# Patient Record
Sex: FEMALE | Race: WHITE | Employment: OTHER | URBAN - METROPOLITAN AREA
[De-identification: names, ages, dates, MRNs, and addresses within clinical notes are randomized per-mention and may not be internally consistent; named-entity substitution may affect disease eponyms.]

---

## 2017-01-03 RX ORDER — METHYLPHENIDATE HYDROCHLORIDE 20 MG/1
20 TABLET ORAL DAILY
Qty: 30 TABLET | Refills: 0 | Status: SHIPPED | OUTPATIENT
Start: 2017-01-03 | End: 2017-02-13 | Stop reason: SDUPTHER

## 2017-01-16 RX ORDER — SIMVASTATIN 10 MG
TABLET ORAL
Qty: 90 TABLET | Refills: 3 | Status: SHIPPED | OUTPATIENT
Start: 2017-01-16 | End: 2017-07-26 | Stop reason: SDUPTHER

## 2017-02-13 RX ORDER — METHYLPHENIDATE HYDROCHLORIDE 20 MG/1
20 TABLET ORAL DAILY
Qty: 30 TABLET | Refills: 0 | Status: SHIPPED | OUTPATIENT
Start: 2017-02-13 | End: 2017-02-15 | Stop reason: DRUGHIGH

## 2017-02-14 ENCOUNTER — TELEPHONE (OUTPATIENT)
Dept: FAMILY MEDICINE CLINIC | Age: 67
End: 2017-02-14

## 2017-02-15 RX ORDER — METHYLPHENIDATE HYDROCHLORIDE 10 MG/1
10 TABLET ORAL DAILY
Qty: 30 TABLET | Refills: 0 | Status: SHIPPED | OUTPATIENT
Start: 2017-02-15 | End: 2017-07-05 | Stop reason: SDUPTHER

## 2017-02-24 ENCOUNTER — TELEPHONE (OUTPATIENT)
Dept: FAMILY MEDICINE CLINIC | Age: 67
End: 2017-02-24

## 2017-06-05 ENCOUNTER — TELEPHONE (OUTPATIENT)
Dept: FAMILY MEDICINE CLINIC | Age: 67
End: 2017-06-05

## 2017-06-22 ENCOUNTER — TELEPHONE (OUTPATIENT)
Dept: FAMILY MEDICINE CLINIC | Age: 67
End: 2017-06-22

## 2017-06-22 DIAGNOSIS — I69.398 ABNORMALITY OF GAIT FOLLOWING CEREBROVASCULAR ACCIDENT: ICD-10-CM

## 2017-06-22 DIAGNOSIS — R41.89 COGNITIVE IMPAIRMENT: ICD-10-CM

## 2017-06-22 DIAGNOSIS — R26.9 ABNORMALITY OF GAIT FOLLOWING CEREBROVASCULAR ACCIDENT: ICD-10-CM

## 2017-06-22 DIAGNOSIS — Z86.73 H/O: CVA (CEREBROVASCULAR ACCIDENT): Primary | ICD-10-CM

## 2017-07-04 ENCOUNTER — TELEPHONE (OUTPATIENT)
Dept: FAMILY MEDICINE CLINIC | Age: 67
End: 2017-07-04

## 2017-07-04 DIAGNOSIS — R41.89 COGNITIVE IMPAIRMENT: ICD-10-CM

## 2017-07-04 DIAGNOSIS — Z86.73 H/O: CVA (CEREBROVASCULAR ACCIDENT): Primary | ICD-10-CM

## 2017-07-05 RX ORDER — METHYLPHENIDATE HYDROCHLORIDE 5 MG/1
5 TABLET ORAL DAILY
Qty: 30 TABLET | Refills: 0 | Status: SHIPPED | OUTPATIENT
Start: 2017-07-05 | End: 2017-07-26 | Stop reason: SDUPTHER

## 2017-07-21 ENCOUNTER — HOSPITAL ENCOUNTER (OUTPATIENT)
Dept: OCCUPATIONAL THERAPY | Age: 67
Setting detail: THERAPIES SERIES
Discharge: HOME | End: 2017-07-21
Payer: MEDICARE

## 2017-07-21 DIAGNOSIS — I67.1 NONRUPTURED CEREBRAL ANEURYSM: ICD-10-CM

## 2017-07-21 PROCEDURE — G8992 OTHER PT/OT  D/C STATUS: HCPCS

## 2017-07-21 PROCEDURE — G8990 OTHER PT/OT CURRENT STATUS: HCPCS

## 2017-07-21 PROCEDURE — G8991 OTHER PT/OT GOAL STATUS: HCPCS

## 2017-07-21 PROCEDURE — 97535 SELF CARE MNGMENT TRAINING: CPT

## 2017-07-21 PROCEDURE — 97165 OT EVAL LOW COMPLEX 30 MIN: CPT

## 2017-07-26 ENCOUNTER — OFFICE VISIT (OUTPATIENT)
Dept: FAMILY MEDICINE CLINIC | Age: 67
End: 2017-07-26
Payer: MEDICARE

## 2017-07-26 VITALS
HEIGHT: 64 IN | SYSTOLIC BLOOD PRESSURE: 130 MMHG | WEIGHT: 130.5 LBS | DIASTOLIC BLOOD PRESSURE: 70 MMHG | RESPIRATION RATE: 20 BRPM | BODY MASS INDEX: 22.28 KG/M2 | HEART RATE: 96 BPM

## 2017-07-26 DIAGNOSIS — M85.80 OSTEOPENIA: ICD-10-CM

## 2017-07-26 DIAGNOSIS — E78.2 MIXED HYPERLIPIDEMIA: ICD-10-CM

## 2017-07-26 DIAGNOSIS — N32.81 OAB (OVERACTIVE BLADDER): Primary | ICD-10-CM

## 2017-07-26 DIAGNOSIS — E03.9 HYPOTHYROIDISM, UNSPECIFIED TYPE: ICD-10-CM

## 2017-07-26 DIAGNOSIS — F32.9 REACTIVE DEPRESSION: ICD-10-CM

## 2017-07-26 DIAGNOSIS — R41.89 COGNITIVE IMPAIRMENT: ICD-10-CM

## 2017-07-26 DIAGNOSIS — F41.9 CHRONIC ANXIETY: ICD-10-CM

## 2017-07-26 DIAGNOSIS — Z11.59 NEED FOR HEPATITIS C SCREENING TEST: ICD-10-CM

## 2017-07-26 DIAGNOSIS — Z51.81 MEDICATION MONITORING ENCOUNTER: ICD-10-CM

## 2017-07-26 DIAGNOSIS — Z23 NEED FOR PROPHYLACTIC VACCINATION AGAINST STREPTOCOCCUS PNEUMONIAE (PNEUMOCOCCUS): ICD-10-CM

## 2017-07-26 DIAGNOSIS — Z86.73 H/O: CVA (CEREBROVASCULAR ACCIDENT): ICD-10-CM

## 2017-07-26 PROCEDURE — 1090F PRES/ABSN URINE INCON ASSESS: CPT | Performed by: FAMILY MEDICINE

## 2017-07-26 PROCEDURE — 90670 PCV13 VACCINE IM: CPT | Performed by: FAMILY MEDICINE

## 2017-07-26 PROCEDURE — 4040F PNEUMOC VAC/ADMIN/RCVD: CPT | Performed by: FAMILY MEDICINE

## 2017-07-26 PROCEDURE — 3017F COLORECTAL CA SCREEN DOC REV: CPT | Performed by: FAMILY MEDICINE

## 2017-07-26 PROCEDURE — G8420 CALC BMI NORM PARAMETERS: HCPCS | Performed by: FAMILY MEDICINE

## 2017-07-26 PROCEDURE — G0009 ADMIN PNEUMOCOCCAL VACCINE: HCPCS | Performed by: FAMILY MEDICINE

## 2017-07-26 PROCEDURE — G8400 PT W/DXA NO RESULTS DOC: HCPCS | Performed by: FAMILY MEDICINE

## 2017-07-26 PROCEDURE — 3014F SCREEN MAMMO DOC REV: CPT | Performed by: FAMILY MEDICINE

## 2017-07-26 PROCEDURE — G8427 DOCREV CUR MEDS BY ELIG CLIN: HCPCS | Performed by: FAMILY MEDICINE

## 2017-07-26 PROCEDURE — 99214 OFFICE O/P EST MOD 30 MIN: CPT | Performed by: FAMILY MEDICINE

## 2017-07-26 PROCEDURE — 1036F TOBACCO NON-USER: CPT | Performed by: FAMILY MEDICINE

## 2017-07-26 PROCEDURE — 1123F ACP DISCUSS/DSCN MKR DOCD: CPT | Performed by: FAMILY MEDICINE

## 2017-07-26 RX ORDER — METHYLPHENIDATE HYDROCHLORIDE 5 MG/1
5 TABLET ORAL DAILY
Qty: 30 TABLET | Refills: 0 | Status: SHIPPED | OUTPATIENT
Start: 2017-07-26 | End: 2017-08-31 | Stop reason: SDUPTHER

## 2017-07-26 RX ORDER — SIMVASTATIN 10 MG
TABLET ORAL
Qty: 90 TABLET | Refills: 3 | Status: SHIPPED | OUTPATIENT
Start: 2017-07-26 | End: 2018-03-19 | Stop reason: SDUPTHER

## 2017-07-26 RX ORDER — OXYBUTYNIN CHLORIDE 5 MG/1
5 TABLET ORAL NIGHTLY
Qty: 90 TABLET | Refills: 3 | Status: CANCELLED | OUTPATIENT
Start: 2017-07-26

## 2017-07-26 RX ORDER — LEVOTHYROXINE SODIUM 0.05 MG/1
TABLET ORAL
Qty: 90 TABLET | Refills: 3 | Status: SHIPPED | OUTPATIENT
Start: 2017-07-26 | End: 2017-08-25 | Stop reason: SDUPTHER

## 2017-07-26 RX ORDER — DONEPEZIL HYDROCHLORIDE 10 MG/1
TABLET, FILM COATED ORAL
Qty: 90 TABLET | Refills: 3 | Status: SHIPPED | OUTPATIENT
Start: 2017-07-26 | End: 2018-07-03

## 2017-07-26 RX ORDER — OXYBUTYNIN CHLORIDE 10 MG/1
10 TABLET, EXTENDED RELEASE ORAL DAILY
Qty: 90 TABLET | Refills: 3 | Status: SHIPPED | OUTPATIENT
Start: 2017-07-26 | End: 2017-09-17 | Stop reason: ALTCHOICE

## 2017-07-26 RX ORDER — BUPROPION HYDROCHLORIDE 150 MG/1
TABLET, EXTENDED RELEASE ORAL
Qty: 180 TABLET | Refills: 3 | Status: SHIPPED | OUTPATIENT
Start: 2017-07-26 | End: 2018-07-25 | Stop reason: SDUPTHER

## 2017-07-26 ASSESSMENT — ENCOUNTER SYMPTOMS
GASTROINTESTINAL NEGATIVE: 1
ALLERGIC/IMMUNOLOGIC NEGATIVE: 1
WHEEZING: 0
COUGH: 0
SHORTNESS OF BREATH: 0
RESPIRATORY NEGATIVE: 1

## 2017-07-26 ASSESSMENT — PATIENT HEALTH QUESTIONNAIRE - PHQ9
2. FEELING DOWN, DEPRESSED OR HOPELESS: 1
1. LITTLE INTEREST OR PLEASURE IN DOING THINGS: 1
SUM OF ALL RESPONSES TO PHQ QUESTIONS 1-9: 2
SUM OF ALL RESPONSES TO PHQ9 QUESTIONS 1 & 2: 2

## 2017-08-25 ENCOUNTER — TELEPHONE (OUTPATIENT)
Dept: FAMILY MEDICINE CLINIC | Age: 67
End: 2017-08-25

## 2017-08-25 DIAGNOSIS — E03.9 HYPOTHYROIDISM, UNSPECIFIED TYPE: ICD-10-CM

## 2017-08-25 LAB
CHOLESTEROL, TOTAL: 220 MG/DL
CHOLESTEROL/HDL RATIO: ABNORMAL
HDLC SERPL-MCNC: 110 MG/DL (ref 35–70)
LDL CHOLESTEROL CALCULATED: 94 MG/DL (ref 0–160)
TRIGL SERPL-MCNC: 80 MG/DL
TSH SERPL DL<=0.05 MIU/L-ACNC: 1.25 UIU/ML
VLDLC SERPL CALC-MCNC: 16 MG/DL

## 2017-08-25 RX ORDER — LEVOTHYROXINE SODIUM 0.05 MG/1
TABLET ORAL
Qty: 90 TABLET | Refills: 3 | Status: SHIPPED | OUTPATIENT
Start: 2017-08-25 | End: 2018-07-25 | Stop reason: SDUPTHER

## 2017-08-31 ENCOUNTER — TELEPHONE (OUTPATIENT)
Dept: FAMILY MEDICINE CLINIC | Age: 67
End: 2017-08-31

## 2017-08-31 DIAGNOSIS — R41.89 COGNITIVE IMPAIRMENT: ICD-10-CM

## 2017-08-31 DIAGNOSIS — Z86.73 H/O: CVA (CEREBROVASCULAR ACCIDENT): ICD-10-CM

## 2017-08-31 RX ORDER — METHYLPHENIDATE HYDROCHLORIDE 5 MG/1
5 TABLET ORAL DAILY
Qty: 30 TABLET | Refills: 0 | Status: SHIPPED | OUTPATIENT
Start: 2017-08-31 | End: 2017-10-09 | Stop reason: SDUPTHER

## 2017-09-15 ENCOUNTER — TELEPHONE (OUTPATIENT)
Dept: FAMILY MEDICINE CLINIC | Age: 67
End: 2017-09-15

## 2017-09-17 ENCOUNTER — HOSPITAL ENCOUNTER (EMERGENCY)
Age: 67
Discharge: HOME | End: 2017-09-17
Attending: EMERGENCY MEDICINE
Payer: MEDICARE

## 2017-09-17 VITALS
BODY MASS INDEX: 21.17 KG/M2 | TEMPERATURE: 98.4 F | RESPIRATION RATE: 18 BRPM | HEART RATE: 95 BPM | OXYGEN SATURATION: 100 % | WEIGHT: 124 LBS | HEIGHT: 64 IN | SYSTOLIC BLOOD PRESSURE: 140 MMHG | DIASTOLIC BLOOD PRESSURE: 95 MMHG

## 2017-09-17 DIAGNOSIS — N39.0 URINARY TRACT INFECTION WITHOUT HEMATURIA, SITE UNSPECIFIED: Primary | ICD-10-CM

## 2017-09-17 LAB
BILIRUBIN URINE: NEGATIVE
BLOOD, URINE: ABNORMAL
CHARACTER, URINE: CLEAR
COLOR: YELLOW
GLUCOSE, URINE: NEGATIVE MG/DL
KETONES, URINE: NEGATIVE
LEUKOCYTES, UA: ABNORMAL
NITRATE, UA: NEGATIVE
PH UA: 6 (ref 5–9)
PROTEIN UA: 30 MG/DL
REFLEX TO URINE C & S: ABNORMAL
SPECIFIC GRAVITY UA: 1.02 (ref 1–1.03)
UROBILINOGEN, URINE: 0.2 EU/DL (ref 0–1)

## 2017-09-17 PROCEDURE — 87077 CULTURE AEROBIC IDENTIFY: CPT

## 2017-09-17 PROCEDURE — 99213 OFFICE O/P EST LOW 20 MIN: CPT

## 2017-09-17 PROCEDURE — 81003 URINALYSIS AUTO W/O SCOPE: CPT

## 2017-09-17 PROCEDURE — 87186 SC STD MICRODIL/AGAR DIL: CPT

## 2017-09-17 PROCEDURE — 87086 URINE CULTURE/COLONY COUNT: CPT

## 2017-09-17 PROCEDURE — 87184 SC STD DISK METHOD PER PLATE: CPT

## 2017-09-17 RX ORDER — CIPROFLOXACIN 500 MG/1
500 TABLET, FILM COATED ORAL 2 TIMES DAILY
Qty: 20 TABLET | Refills: 0 | Status: SHIPPED | OUTPATIENT
Start: 2017-09-17 | End: 2017-09-27

## 2017-09-17 RX ORDER — PHENAZOPYRIDINE HYDROCHLORIDE 200 MG/1
200 TABLET, FILM COATED ORAL 3 TIMES DAILY PRN
Qty: 6 TABLET | Refills: 1 | Status: SHIPPED | OUTPATIENT
Start: 2017-09-17 | End: 2017-09-19

## 2017-09-17 ASSESSMENT — ENCOUNTER SYMPTOMS
SORE THROAT: 0
EYE REDNESS: 0
EYE DISCHARGE: 0
EYE PAIN: 0
COUGH: 0
WHEEZING: 0
SHORTNESS OF BREATH: 0
DIARRHEA: 0
VOMITING: 0
NAUSEA: 0
ABDOMINAL PAIN: 0

## 2017-09-19 LAB
ORGANISM: ABNORMAL
URINE CULTURE REFLEX: ABNORMAL

## 2017-10-09 DIAGNOSIS — Z86.73 H/O: CVA (CEREBROVASCULAR ACCIDENT): ICD-10-CM

## 2017-10-09 DIAGNOSIS — R41.89 COGNITIVE IMPAIRMENT: ICD-10-CM

## 2017-10-09 RX ORDER — METHYLPHENIDATE HYDROCHLORIDE 5 MG/1
5 TABLET ORAL DAILY
Qty: 30 TABLET | Refills: 0 | Status: SHIPPED | OUTPATIENT
Start: 2017-10-09 | End: 2017-11-06 | Stop reason: SDUPTHER

## 2017-11-06 DIAGNOSIS — Z86.73 H/O: CVA (CEREBROVASCULAR ACCIDENT): ICD-10-CM

## 2017-11-06 DIAGNOSIS — R41.89 COGNITIVE IMPAIRMENT: ICD-10-CM

## 2017-11-06 RX ORDER — METHYLPHENIDATE HYDROCHLORIDE 5 MG/1
5 TABLET ORAL DAILY
Qty: 30 TABLET | Refills: 0 | Status: SHIPPED | OUTPATIENT
Start: 2017-11-06 | End: 2017-12-07 | Stop reason: SDUPTHER

## 2017-11-06 NOTE — TELEPHONE ENCOUNTER
11/06/2017   Lavon Gamboa called requesting a refill on the following medications:  Requested Prescriptions     Pending Prescriptions Disp Refills    methylphenidate (RITALIN) 5 MG tablet 30 tablet 0     Sig: Take 1 tablet by mouth daily .      Pharmacy verified:  [email or web address]      Date of last visit: 07/26/2017  Date of next visit (if applicable): 3/17/0598        Date of last fill and quantity (to be completed by clinical staff)  Pharmacy name:

## 2017-12-07 DIAGNOSIS — Z86.73 H/O: CVA (CEREBROVASCULAR ACCIDENT): ICD-10-CM

## 2017-12-07 DIAGNOSIS — R41.89 COGNITIVE IMPAIRMENT: ICD-10-CM

## 2017-12-07 RX ORDER — METHYLPHENIDATE HYDROCHLORIDE 5 MG/1
5 TABLET ORAL DAILY
Qty: 30 TABLET | Refills: 0 | Status: SHIPPED | OUTPATIENT
Start: 2017-12-07 | End: 2018-01-04 | Stop reason: SDUPTHER

## 2017-12-07 NOTE — TELEPHONE ENCOUNTER
Prem Joseph called requesting a refill on the following medications:  Requested Prescriptions     Pending Prescriptions Disp Refills    methylphenidate (RITALIN) 5 MG tablet 30 tablet 0     Sig: Take 1 tablet by mouth daily . Pharmacy verified:  [facility]      Date of last visit:   7/26/2017  Date of next visit (if applicable): 4/03/6359

## 2017-12-18 DIAGNOSIS — K21.9 GASTROESOPHAGEAL REFLUX DISEASE WITHOUT ESOPHAGITIS: ICD-10-CM

## 2017-12-18 RX ORDER — PANTOPRAZOLE SODIUM 40 MG/1
TABLET, DELAYED RELEASE ORAL
Qty: 60 TABLET | Refills: 11 | Status: SHIPPED | OUTPATIENT
Start: 2017-12-18 | End: 2018-07-03

## 2018-01-04 DIAGNOSIS — R41.89 COGNITIVE IMPAIRMENT: ICD-10-CM

## 2018-01-04 DIAGNOSIS — Z86.73 H/O: CVA (CEREBROVASCULAR ACCIDENT): ICD-10-CM

## 2018-01-04 RX ORDER — METHYLPHENIDATE HYDROCHLORIDE 5 MG/1
5 TABLET ORAL DAILY
Qty: 30 TABLET | Refills: 0 | Status: SHIPPED | OUTPATIENT
Start: 2018-01-04 | End: 2018-01-24 | Stop reason: SDUPTHER

## 2018-01-04 NOTE — TELEPHONE ENCOUNTER
Chano Mims called requesting a refill on the following medications:  Requested Prescriptions     Pending Prescriptions Disp Refills    methylphenidate (RITALIN) 5 MG tablet 30 tablet 0     Sig: Take 1 tablet by mouth daily for 30 days.      Ph[facility]:  [email or web address]      Date of last visit: 7/26/17  Date of next visit (if applicable): 0/35/8022

## 2018-01-24 ENCOUNTER — OFFICE VISIT (OUTPATIENT)
Dept: FAMILY MEDICINE CLINIC | Age: 68
End: 2018-01-24
Payer: MEDICARE

## 2018-01-24 VITALS
WEIGHT: 127.4 LBS | RESPIRATION RATE: 13 BRPM | BODY MASS INDEX: 21.75 KG/M2 | HEART RATE: 112 BPM | OXYGEN SATURATION: 97 % | HEIGHT: 64 IN | DIASTOLIC BLOOD PRESSURE: 72 MMHG | SYSTOLIC BLOOD PRESSURE: 120 MMHG

## 2018-01-24 DIAGNOSIS — R11.0 CHRONIC NAUSEA: ICD-10-CM

## 2018-01-24 DIAGNOSIS — R41.89 COGNITIVE IMPAIRMENT: Primary | ICD-10-CM

## 2018-01-24 DIAGNOSIS — E03.9 ACQUIRED HYPOTHYROIDISM: ICD-10-CM

## 2018-01-24 DIAGNOSIS — F32.9 REACTIVE DEPRESSION: ICD-10-CM

## 2018-01-24 DIAGNOSIS — Z12.31 ENCOUNTER FOR SCREENING MAMMOGRAM FOR BREAST CANCER: ICD-10-CM

## 2018-01-24 DIAGNOSIS — Z86.73 H/O: CVA (CEREBROVASCULAR ACCIDENT): ICD-10-CM

## 2018-01-24 DIAGNOSIS — Z51.81 MEDICATION MONITORING ENCOUNTER: ICD-10-CM

## 2018-01-24 DIAGNOSIS — L98.491 SUPERFICIAL ULCER OF SKIN (HCC): ICD-10-CM

## 2018-01-24 DIAGNOSIS — F32.5 MAJOR DEPRESSION, SINGLE EPISODE, IN COMPLETE REMISSION (HCC): ICD-10-CM

## 2018-01-24 PROCEDURE — 99214 OFFICE O/P EST MOD 30 MIN: CPT | Performed by: FAMILY MEDICINE

## 2018-01-24 PROCEDURE — 3017F COLORECTAL CA SCREEN DOC REV: CPT | Performed by: FAMILY MEDICINE

## 2018-01-24 PROCEDURE — G8420 CALC BMI NORM PARAMETERS: HCPCS | Performed by: FAMILY MEDICINE

## 2018-01-24 PROCEDURE — 1090F PRES/ABSN URINE INCON ASSESS: CPT | Performed by: FAMILY MEDICINE

## 2018-01-24 PROCEDURE — 1123F ACP DISCUSS/DSCN MKR DOCD: CPT | Performed by: FAMILY MEDICINE

## 2018-01-24 PROCEDURE — G8400 PT W/DXA NO RESULTS DOC: HCPCS | Performed by: FAMILY MEDICINE

## 2018-01-24 PROCEDURE — 1036F TOBACCO NON-USER: CPT | Performed by: FAMILY MEDICINE

## 2018-01-24 PROCEDURE — 4040F PNEUMOC VAC/ADMIN/RCVD: CPT | Performed by: FAMILY MEDICINE

## 2018-01-24 PROCEDURE — G8484 FLU IMMUNIZE NO ADMIN: HCPCS | Performed by: FAMILY MEDICINE

## 2018-01-24 PROCEDURE — 3014F SCREEN MAMMO DOC REV: CPT | Performed by: FAMILY MEDICINE

## 2018-01-24 PROCEDURE — G8427 DOCREV CUR MEDS BY ELIG CLIN: HCPCS | Performed by: FAMILY MEDICINE

## 2018-01-24 RX ORDER — ONDANSETRON 4 MG/1
4 TABLET, FILM COATED ORAL EVERY 6 HOURS PRN
Qty: 120 TABLET | Refills: 2 | Status: SHIPPED | OUTPATIENT
Start: 2018-01-24 | End: 2019-10-27

## 2018-01-24 RX ORDER — METHYLPHENIDATE HYDROCHLORIDE 10 MG/1
10 TABLET ORAL DAILY
Qty: 30 TABLET | Refills: 0 | Status: SHIPPED | OUTPATIENT
Start: 2018-01-24 | End: 2018-02-27 | Stop reason: SDUPTHER

## 2018-01-24 ASSESSMENT — ENCOUNTER SYMPTOMS
CONSTIPATION: 0
NAUSEA: 1
RESPIRATORY NEGATIVE: 1
VOMITING: 0
DIARRHEA: 0

## 2018-01-29 ENCOUNTER — HOSPITAL ENCOUNTER (OUTPATIENT)
Dept: MRI IMAGING | Age: 68
Discharge: HOME | End: 2018-01-29
Payer: MEDICARE

## 2018-01-29 DIAGNOSIS — I67.1 CEREBRAL ANEURYSM: ICD-10-CM

## 2018-01-29 PROCEDURE — 70544 MR ANGIOGRAPHY HEAD W/O DYE: CPT

## 2018-02-07 ENCOUNTER — HOSPITAL ENCOUNTER (OUTPATIENT)
Dept: WOMENS IMAGING | Age: 68
Discharge: HOME | End: 2018-02-07
Payer: MEDICARE

## 2018-02-07 DIAGNOSIS — Z12.31 ENCOUNTER FOR SCREENING MAMMOGRAM FOR BREAST CANCER: ICD-10-CM

## 2018-02-07 PROCEDURE — 77067 SCR MAMMO BI INCL CAD: CPT

## 2018-02-27 DIAGNOSIS — Z86.73 H/O: CVA (CEREBROVASCULAR ACCIDENT): ICD-10-CM

## 2018-02-27 DIAGNOSIS — R41.89 COGNITIVE IMPAIRMENT: ICD-10-CM

## 2018-02-27 RX ORDER — METHYLPHENIDATE HYDROCHLORIDE 10 MG/1
10 TABLET ORAL DAILY
Qty: 30 TABLET | Refills: 0 | Status: SHIPPED | OUTPATIENT
Start: 2018-02-27 | End: 2018-04-02 | Stop reason: SDUPTHER

## 2018-03-19 RX ORDER — SIMVASTATIN 10 MG
TABLET ORAL
Qty: 90 TABLET | Refills: 3 | Status: SHIPPED | OUTPATIENT
Start: 2018-03-19 | End: 2019-02-25 | Stop reason: SDUPTHER

## 2018-03-28 ENCOUNTER — TELEPHONE (OUTPATIENT)
Dept: FAMILY MEDICINE CLINIC | Age: 68
End: 2018-03-28

## 2018-03-28 NOTE — TELEPHONE ENCOUNTER
Patient calling and questioning if she can take OTC Prevagen with all her other medications. Please advise. OK to LVM if patient does not answer.

## 2018-04-02 DIAGNOSIS — Z86.73 H/O: CVA (CEREBROVASCULAR ACCIDENT): ICD-10-CM

## 2018-04-02 DIAGNOSIS — R41.89 COGNITIVE IMPAIRMENT: ICD-10-CM

## 2018-04-02 RX ORDER — METHYLPHENIDATE HYDROCHLORIDE 10 MG/1
10 TABLET ORAL DAILY
Qty: 30 TABLET | Refills: 0 | Status: SHIPPED | OUTPATIENT
Start: 2018-04-02 | End: 2018-05-04 | Stop reason: SDUPTHER

## 2018-05-04 DIAGNOSIS — Z86.73 H/O: CVA (CEREBROVASCULAR ACCIDENT): ICD-10-CM

## 2018-05-04 DIAGNOSIS — R41.89 COGNITIVE IMPAIRMENT: ICD-10-CM

## 2018-05-04 RX ORDER — METHYLPHENIDATE HYDROCHLORIDE 10 MG/1
10 TABLET ORAL DAILY
Qty: 30 TABLET | Refills: 0 | Status: SHIPPED | OUTPATIENT
Start: 2018-05-04 | End: 2018-06-28 | Stop reason: SDUPTHER

## 2018-06-15 ENCOUNTER — TELEPHONE (OUTPATIENT)
Dept: FAMILY MEDICINE CLINIC | Age: 68
End: 2018-06-15

## 2018-06-15 DIAGNOSIS — R41.89 COGNITIVE IMPAIRMENT: Primary | ICD-10-CM

## 2018-06-28 DIAGNOSIS — R41.89 COGNITIVE IMPAIRMENT: ICD-10-CM

## 2018-06-28 DIAGNOSIS — Z86.73 H/O: CVA (CEREBROVASCULAR ACCIDENT): ICD-10-CM

## 2018-06-28 RX ORDER — METHYLPHENIDATE HYDROCHLORIDE 5 MG/1
10 TABLET ORAL DAILY
Qty: 30 TABLET | Refills: 0 | Status: SHIPPED | OUTPATIENT
Start: 2018-06-28 | End: 2018-07-28

## 2018-06-28 RX ORDER — METHYLPHENIDATE HYDROCHLORIDE 10 MG/1
10 TABLET ORAL DAILY
Qty: 30 TABLET | Refills: 0 | Status: SHIPPED | OUTPATIENT
Start: 2018-06-28 | End: 2018-06-28 | Stop reason: SDUPTHER

## 2018-07-03 ENCOUNTER — INITIAL CONSULT (OUTPATIENT)
Dept: NEUROLOGY | Age: 68
End: 2018-07-03
Payer: MEDICARE

## 2018-07-03 VITALS
HEART RATE: 78 BPM | DIASTOLIC BLOOD PRESSURE: 64 MMHG | BODY MASS INDEX: 21.68 KG/M2 | HEIGHT: 64 IN | WEIGHT: 127 LBS | SYSTOLIC BLOOD PRESSURE: 120 MMHG

## 2018-07-03 DIAGNOSIS — I69.30 HISTORY OF STROKE WITH RESIDUAL DEFICIT: Primary | ICD-10-CM

## 2018-07-03 PROCEDURE — 99204 OFFICE O/P NEW MOD 45 MIN: CPT | Performed by: PSYCHIATRY & NEUROLOGY

## 2018-07-03 PROCEDURE — 1090F PRES/ABSN URINE INCON ASSESS: CPT | Performed by: PSYCHIATRY & NEUROLOGY

## 2018-07-03 PROCEDURE — 1123F ACP DISCUSS/DSCN MKR DOCD: CPT | Performed by: PSYCHIATRY & NEUROLOGY

## 2018-07-03 PROCEDURE — G8427 DOCREV CUR MEDS BY ELIG CLIN: HCPCS | Performed by: PSYCHIATRY & NEUROLOGY

## 2018-07-03 PROCEDURE — 1036F TOBACCO NON-USER: CPT | Performed by: PSYCHIATRY & NEUROLOGY

## 2018-07-03 PROCEDURE — G8400 PT W/DXA NO RESULTS DOC: HCPCS | Performed by: PSYCHIATRY & NEUROLOGY

## 2018-07-03 PROCEDURE — 3017F COLORECTAL CA SCREEN DOC REV: CPT | Performed by: PSYCHIATRY & NEUROLOGY

## 2018-07-03 PROCEDURE — G8420 CALC BMI NORM PARAMETERS: HCPCS | Performed by: PSYCHIATRY & NEUROLOGY

## 2018-07-03 PROCEDURE — 4040F PNEUMOC VAC/ADMIN/RCVD: CPT | Performed by: PSYCHIATRY & NEUROLOGY

## 2018-07-03 RX ORDER — OXYBUTYNIN CHLORIDE 10 MG/1
10 TABLET, EXTENDED RELEASE ORAL DAILY
COMMUNITY
End: 2018-07-25 | Stop reason: SDUPTHER

## 2018-07-03 RX ORDER — DONEPEZIL HYDROCHLORIDE 5 MG/1
5 TABLET, FILM COATED ORAL NIGHTLY
Qty: 30 TABLET | Refills: 3 | Status: SHIPPED | OUTPATIENT
Start: 2018-07-03 | End: 2018-07-25 | Stop reason: SINTOL

## 2018-07-03 NOTE — LETTER
[facility] PROFESSIONAL [facility]  [facility]  [address]  Dept: [phone]  Dept Fax: [phone]  Loc: [id number] [facility] [place]        7/10/2018      Patient:  Paz Garcia  MRN:  [id number]  YOB: 1950  Date of Visit:  7/3/2018    Dear Dr. Anika Goldstein,    Thank you for referring Noe Casillas to me for consultation. Please see attached visit summary with my findings. If you have any questions, please do not hesitate to call me.       Sincerely,         Deisy Rojas MD

## 2018-07-03 NOTE — PROGRESS NOTES
Take  by mouth.  Calcium Carbonate-Vitamin D (CALCIUM-VITAMIN D) 600-200 MG-UNIT CAPS Take 2 tablets by mouth daily.  fish oil-omega-3 fatty acids 1000 MG capsule Take 1 g by mouth. One tablet daily along with red yeast rice       No current facility-administered medications for this visit. Social History     Social History    Marital status:      Spouse name: N/A    Number of children: N/A    Years of education: N/A     Social History Main Topics    Smoking status: Former Smoker     Packs/day: 1.00     Years: 40.00     Types: Cigarettes    Smokeless tobacco: Never Used    Alcohol use No    Drug use: No    Sexual activity: Not Asked     Other Topics Concern    None     Social History Narrative    None       Family History   Problem Relation Age of Onset    Cancer Mother         Female Cancer    Stroke Mother         Possible    Diabetes Father     Heart Disease Maternal Aunt     Parkinsonism Maternal Uncle        Review of Systems   Complete review of systems is negative other than what is mentioned in HPI      Vitals:    07/03/18 1338   BP: 120/64   Site: Right Arm   Position: Sitting   Cuff Size: Medium Adult   Pulse: 78   Weight: 127 lb (57.6 kg)   Height: 5' 4\" (1.626 m)       Physical Examination:  General appearance - alert, well appearing, and in no distress, oriented to person, place, and time and normal weight  Mental status- Level of Alertness: awake  Orientation: person, place, time  Memory: normal  Fund of Knowledge: normal  Attention/Concentration: normal  Language: normal. Mood is normal.   Neck - supple, no significant adenopathy, carotids upstroke normal bilaterally,   There is no carotid bruit . No neck lymphadenopathy .  No thyroid enlargement   Neurological -   Cranial [place]-TB-NCW:.   Cranial nerve II: Normal   Cranial nerve III: Pupils: equal, round, reactive to light  Cranial nerves III, IV, VI: Extraocular Movements: intact   Cranial nerve V: Facial The anterior cerebral arteries are normal. The middle cerebral arteries and their proximal branches are normal. In the anterior communicating artery location, there is   low signal consistent with a coil mass. This area appears stable. There is no residual or recurrent aneurysm. Impression  No evidence of a residual or recurrent aneurysm. **This report has been created using voice recognition software. It may contain minor errors which are inherent in voice recognition technology. **    Final report electronically signed by Dr. Lyric Santana on 1/29/2018 3:10 PM       Results for orders placed during the hospital encounter of 05/18/15   CT Head W/O contrast    Narrative PROCEDURE: CT HEAD WO CONTRAST    CLINICAL INFORMATION: hit head this AM, bruising, hx of cva, no recollection of injury        COMPARISON: 1/7/15    TECHNIQUE: Noncontrast head CT. Motion limits evaluation. FINDINGS: There is moderate chronic small vessel ischemic white matter disease change. There are cerebrovascular calcifications. Metallic density at the midline suprasellar cistern region is present. Artifact from this limits evaluation. It is   approximately 7 mm size. Patient has a history of aneurysm with coil placement    No acute abnormality of visible paranasal sinuses and mastoid air cells. No definite acute calvarial fracture. Right frontal region akila hole noted    No definite acute infarct or intracranial hemorrhage. The ventricular system is prominent. It could be residual from the previous hemorrhage. Impression IMPRESSION:   1. No definite acute intracranial hemorrhage or infarct. 2. Mild hydrocephalus. 3. Status post aneurysm coil and akila hole.   4. Moderate appearing chronic small vessel ischemic white matter disease changes    Final report electronically signed by Dr. Johann Taylor on 5/18/2015 12:55 PM       We reviewed the patient records from referring provider and available information in the EHR ASSESSMENT:      Diagnosis Orders   1. History of stroke with residual deficit        This is a 60-year-old female with history of prior hemorrhagic stroke, status post aneurysm coiling in 2015, who presents for establishing care. She experienced some mild cognitive impairment and was placed on Aricept, She is complaining from nausea with the Aricept @10 mg daily. She was recommended evaluation with PMNR and was suggested by Dr Fabio Toledo to see PMNR in [facility] and was suggested to see Dr Julia Molina. Her exam is non focal.  She would benefit from rehab services for help with her post stroke care. I reviewed her MRA of the brain, agree with interpretation. We will change her Aricept to 5 mg daily to help her cope with the nausea that she is experiencing. I will refer her to physical medicine and rehab. The patient was agreeable with the plan as outlined below;        Plan    1. Change Arciept to 5 mg daily. 2. Referral to [facility] for post stroke management. 3. Call with any new symptoms or concerns. 4. Follow up as needed.      Dong Oconnor MD

## 2018-07-25 ENCOUNTER — OFFICE VISIT (OUTPATIENT)
Dept: FAMILY MEDICINE CLINIC | Age: 68
End: 2018-07-25
Payer: MEDICARE

## 2018-07-25 VITALS
HEIGHT: 64 IN | WEIGHT: 126.5 LBS | DIASTOLIC BLOOD PRESSURE: 70 MMHG | HEART RATE: 76 BPM | SYSTOLIC BLOOD PRESSURE: 124 MMHG | RESPIRATION RATE: 12 BRPM | BODY MASS INDEX: 21.6 KG/M2

## 2018-07-25 DIAGNOSIS — F32.9 REACTIVE DEPRESSION: ICD-10-CM

## 2018-07-25 DIAGNOSIS — E03.9 HYPOTHYROIDISM, UNSPECIFIED TYPE: ICD-10-CM

## 2018-07-25 DIAGNOSIS — Z86.73 H/O: CVA (CEREBROVASCULAR ACCIDENT): ICD-10-CM

## 2018-07-25 DIAGNOSIS — N32.81 OAB (OVERACTIVE BLADDER): Primary | ICD-10-CM

## 2018-07-25 DIAGNOSIS — R41.89 COGNITIVE IMPAIRMENT: ICD-10-CM

## 2018-07-25 PROCEDURE — 1036F TOBACCO NON-USER: CPT | Performed by: FAMILY MEDICINE

## 2018-07-25 PROCEDURE — G8427 DOCREV CUR MEDS BY ELIG CLIN: HCPCS | Performed by: FAMILY MEDICINE

## 2018-07-25 PROCEDURE — 1123F ACP DISCUSS/DSCN MKR DOCD: CPT | Performed by: FAMILY MEDICINE

## 2018-07-25 PROCEDURE — 99214 OFFICE O/P EST MOD 30 MIN: CPT | Performed by: FAMILY MEDICINE

## 2018-07-25 PROCEDURE — G8420 CALC BMI NORM PARAMETERS: HCPCS | Performed by: FAMILY MEDICINE

## 2018-07-25 PROCEDURE — 4040F PNEUMOC VAC/ADMIN/RCVD: CPT | Performed by: FAMILY MEDICINE

## 2018-07-25 PROCEDURE — G8400 PT W/DXA NO RESULTS DOC: HCPCS | Performed by: FAMILY MEDICINE

## 2018-07-25 PROCEDURE — 1090F PRES/ABSN URINE INCON ASSESS: CPT | Performed by: FAMILY MEDICINE

## 2018-07-25 PROCEDURE — 1101F PT FALLS ASSESS-DOCD LE1/YR: CPT | Performed by: FAMILY MEDICINE

## 2018-07-25 PROCEDURE — 3017F COLORECTAL CA SCREEN DOC REV: CPT | Performed by: FAMILY MEDICINE

## 2018-07-25 RX ORDER — OXYBUTYNIN CHLORIDE 15 MG/1
15 TABLET, EXTENDED RELEASE ORAL DAILY
Qty: 30 TABLET | Refills: 3 | Status: SHIPPED | OUTPATIENT
Start: 2018-07-25 | End: 2019-06-10 | Stop reason: DRUGHIGH

## 2018-07-25 RX ORDER — RIVASTIGMINE 4.6 MG/24H
1 PATCH, EXTENDED RELEASE TRANSDERMAL DAILY
Qty: 30 PATCH | Refills: 3 | Status: SHIPPED | OUTPATIENT
Start: 2018-07-25 | End: 2018-10-11

## 2018-07-25 RX ORDER — LEVOTHYROXINE SODIUM 0.05 MG/1
TABLET ORAL
Qty: 90 TABLET | Refills: 3 | Status: SHIPPED | OUTPATIENT
Start: 2018-07-25 | End: 2019-09-02 | Stop reason: SDUPTHER

## 2018-07-25 RX ORDER — OXYBUTYNIN CHLORIDE 10 MG/1
10 TABLET, EXTENDED RELEASE ORAL DAILY
Qty: 90 TABLET | Refills: 3 | Status: SHIPPED | OUTPATIENT
Start: 2018-07-25 | End: 2018-07-25 | Stop reason: DRUGHIGH

## 2018-07-25 RX ORDER — BUPROPION HYDROCHLORIDE 150 MG/1
TABLET, EXTENDED RELEASE ORAL
Qty: 180 TABLET | Refills: 3 | Status: SHIPPED | OUTPATIENT
Start: 2018-07-25 | End: 2019-04-11 | Stop reason: ALTCHOICE

## 2018-07-25 ASSESSMENT — ENCOUNTER SYMPTOMS
ALLERGIC/IMMUNOLOGIC NEGATIVE: 1
RESPIRATORY NEGATIVE: 1
EYES NEGATIVE: 1
GASTROINTESTINAL NEGATIVE: 1

## 2018-07-25 NOTE — PATIENT INSTRUCTIONS
You may receive a survey about your visit with us today. The feedback from our patients helps us identify what is working well and where the service to all patients can be enhanced. Thank you! Stop Aricept due to N/V and start Exelon patch for cognitive impairment. Continue present medications.

## 2018-07-25 NOTE — PROGRESS NOTES
Chronic Disease Visit Information    BP Readings from Last 3 Encounters:   07/25/18 124/70   07/03/18 120/64   01/24/18 120/72          Hemoglobin A1C (%)   Date Value   01/30/2016 5.3     LDL Calculated (mg/dL)   Date Value   08/25/2017 94     HDL   Date Value   08/25/2017 110 mg/dL (A)   04/26/2012 110 mg/dl     BUN (mg/dl)   Date Value   05/18/2015 14     CREATININE (mg/dl)   Date Value   05/18/2015 0.6     Glucose (mg/dl)   Date Value   05/18/2015 99            Have you changed or started any medications since your last visit including any over-the-counter medicines, vitamins, or herbal medicines? no   Are you having any side effects from any of your medications? -  no  Have you stopped taking any of your medications? Is so, why? -  no    Have you seen any other physician or provider since your last visit? No  Have you had any other diagnostic tests since your last visit? No  Have you been seen in the emergency room and/or had an admission to a hospital since we last saw you? No  Have you had your annual diabetic retinal (eye) exam? No  Have you had your routine dental cleaning in the past 6 months? yes -  5/2018    Have you activated your [employer] account? If not, what are your barriers?  Yes     Patient Care Team:  Brandi Becker MD as PCP - General (Family Medicine)  Brandi Becker MD as PCP - S Attributed Provider         Medical History Review  Past Medical, Family, and Social History reviewed and does contribute to the patient presenting condition    Health Maintenance   Topic Date Due    DTaP/Tdap/Td vaccine (1 - Tdap) 06/13/1969    Shingles Vaccine (1 of 2 - 2 Dose Series) 06/13/2000    Low dose CT lung screening  06/13/2005    Pneumococcal low/med risk (2 of 2 - PPSV23) 07/26/2018    TSH testing  08/25/2018    Flu vaccine (1) 09/01/2018    Colon cancer screen colonoscopy  06/18/2019    Breast cancer screen  02/07/2020    Lipid screen  08/25/2022    DEXA (modify frequency per FRAX score)

## 2018-07-31 ENCOUNTER — OFFICE VISIT (OUTPATIENT)
Dept: PHYSICAL MEDICINE AND REHAB | Age: 68
End: 2018-07-31
Payer: MEDICARE

## 2018-07-31 VITALS
BODY MASS INDEX: 21.82 KG/M2 | WEIGHT: 127.8 LBS | DIASTOLIC BLOOD PRESSURE: 87 MMHG | SYSTOLIC BLOOD PRESSURE: 134 MMHG | HEIGHT: 64 IN | HEART RATE: 96 BPM

## 2018-07-31 DIAGNOSIS — R41.89 COGNITIVE DEFICITS: ICD-10-CM

## 2018-07-31 DIAGNOSIS — I69.90 LATE EFFECTS OF CVA (CEREBROVASCULAR ACCIDENT): Primary | ICD-10-CM

## 2018-07-31 PROCEDURE — G8400 PT W/DXA NO RESULTS DOC: HCPCS | Performed by: NURSE PRACTITIONER

## 2018-07-31 PROCEDURE — 3017F COLORECTAL CA SCREEN DOC REV: CPT | Performed by: NURSE PRACTITIONER

## 2018-07-31 PROCEDURE — 1090F PRES/ABSN URINE INCON ASSESS: CPT | Performed by: NURSE PRACTITIONER

## 2018-07-31 PROCEDURE — G8427 DOCREV CUR MEDS BY ELIG CLIN: HCPCS | Performed by: NURSE PRACTITIONER

## 2018-07-31 PROCEDURE — 1123F ACP DISCUSS/DSCN MKR DOCD: CPT | Performed by: NURSE PRACTITIONER

## 2018-07-31 PROCEDURE — G8420 CALC BMI NORM PARAMETERS: HCPCS | Performed by: NURSE PRACTITIONER

## 2018-07-31 PROCEDURE — 99214 OFFICE O/P EST MOD 30 MIN: CPT | Performed by: NURSE PRACTITIONER

## 2018-07-31 PROCEDURE — 4040F PNEUMOC VAC/ADMIN/RCVD: CPT | Performed by: NURSE PRACTITIONER

## 2018-07-31 PROCEDURE — 1036F TOBACCO NON-USER: CPT | Performed by: NURSE PRACTITIONER

## 2018-07-31 PROCEDURE — 1101F PT FALLS ASSESS-DOCD LE1/YR: CPT | Performed by: NURSE PRACTITIONER

## 2018-07-31 RX ORDER — ATOMOXETINE 18 MG/1
18 CAPSULE ORAL DAILY
Qty: 30 CAPSULE | Refills: 2 | Status: SHIPPED | OUTPATIENT
Start: 2018-07-31 | End: 2018-09-11 | Stop reason: SDUPTHER

## 2018-07-31 RX ORDER — METHYLPHENIDATE HYDROCHLORIDE 10 MG/1
10 TABLET ORAL DAILY
COMMUNITY
End: 2018-07-31

## 2018-07-31 RX ORDER — DONEPEZIL HYDROCHLORIDE 5 MG/1
5 TABLET, FILM COATED ORAL NIGHTLY
COMMUNITY
End: 2019-04-11

## 2018-07-31 NOTE — PROGRESS NOTES
Physical Medicine & Rehabilitation   Outpatient Consult Note       Chief Complaint:    Chief Complaint   Patient presents with    Consultation     Stroke        Referring Provider: Jack Elias MD    History of Present Illness:  I had the opportunity to evaluate Amy Callaway today in my office. As you know, she is a 76 y.o. female who presents for evaluation after stroke. She suffered from a subarachnoid hemorrhage in January 2015 and completed inpatient rehabilitation at [facility]. Has been following with neurology and PM&R at [facility], and she wants to establish locally. She shares that her residual deficits from the stroke were an off balance feeling, cognitive deficits, as well as nocturnal urinary incontinence. She has issues was concentration, impulsivity, and short term memory. She is currently on ditropan which she feels helps. Is also taking Aricept and Ritalin. She shares that the Aricept has helped with her memory issues, however it makes her nauseous. Her PCP tried to switch her to Exelon, but it was too expensive and she never filled the prescription. Dr. Lon Lizama decreased the dose to 5 mg, and she is no longer nauseous, however she does not feel like it is helping with her memory as much. She was also previously on Strattera which she greatly benefited from, however it became too costly as well, and she was switched to Ritalin. She had a driving evaluation completed while following with [facility] which she has passed and is now back to independently driving. Overall she feels that her balance has greatly improved since her SAH. She denies any recent falls, states she hasn't had any since right after her stroke. She can lose balance at times, but is able to catch herself before falling. Her biggest frustration at this point is her memory and cognition. She would like to try Strattera again.      Past Medical and Surgical History:    The patient has a past medical history of Aneurysm, identified. Within the right external capsule and putamen region there is a small 5 mm focal area of  enhancement (series 9 image 82) which demonstrates mild surrounding edema on the T2/FLAIR sequences. No associated diffusion is visualized with this small area of enhancement. There is increased T2/FLAIR signal and marked intrinsic T1 hyperintense material consistent with patient's known subarachnoid hemorrhage within the left sylvian fissure, adjacent to the interhemispheric  falx, and within multiple sulci in the left frontal and parietal lobes. These same areas also demonstrate decreased signal on the susceptibility weighted images. There are a few additional scattered foci of punctate microhemorrhages  in the right frontal and parietal lobes in a lobar distribution. Increased FLAIR signal mild enhancement is also seen around the prior right frontal ventriculostomy catheter tract. There is also artifact associated with the aneurysm clip in the region of the anterior communicating artery. The ventricles remain enlarged disproportionate to the size of the sulci,  unchanged compared to the prior studies. Impression:  · History of subarachnoid hemorrhage (1/2015)  · Cognitive deficits due to CVA  · Ataxic gait    Plan:  · Stop Ritalin  · Start Strattera 18 mg daily- will see if insurance approves and how much it costs patient  · Continue Aricept, can try taking 10 mg in divided doses with meals to see if this helps with nausea and continues to help with memory  · Continue oxybutynin  · Patient gait stable at this time, she declines any additional therapy    Return in about 6 weeks (around 9/11/2018). It was my pleasure to evaluate Renetta Price today. Please call with any concerns or questions.   40 minutes spent in evaluation efforts    SARITHA Valentin - CNP

## 2018-09-01 ENCOUNTER — HOSPITAL ENCOUNTER (EMERGENCY)
Age: 68
Discharge: HOME | End: 2018-09-01
Payer: MEDICARE

## 2018-09-01 VITALS
HEART RATE: 90 BPM | BODY MASS INDEX: 21.45 KG/M2 | OXYGEN SATURATION: 96 % | RESPIRATION RATE: 18 BRPM | WEIGHT: 125 LBS | TEMPERATURE: 97.6 F | DIASTOLIC BLOOD PRESSURE: 75 MMHG | SYSTOLIC BLOOD PRESSURE: 134 MMHG

## 2018-09-01 DIAGNOSIS — N30.01 ACUTE CYSTITIS WITH HEMATURIA: Primary | ICD-10-CM

## 2018-09-01 LAB
BILIRUBIN URINE: NEGATIVE
BLOOD, URINE: ABNORMAL
CHARACTER, URINE: ABNORMAL
COLOR: YELLOW
GLUCOSE, URINE: NEGATIVE MG/DL
KETONES, URINE: NEGATIVE
LEUKOCYTES, UA: ABNORMAL
NITRATE, UA: NEGATIVE
PH UA: 6 (ref 5–9)
PROTEIN UA: NEGATIVE MG/DL
REFLEX TO URINE C & S: ABNORMAL
SPECIFIC GRAVITY UA: 1.01 (ref 1–1.03)
UROBILINOGEN, URINE: 0.2 EU/DL (ref 0–1)

## 2018-09-01 PROCEDURE — 99213 OFFICE O/P EST LOW 20 MIN: CPT

## 2018-09-01 PROCEDURE — 87184 SC STD DISK METHOD PER PLATE: CPT

## 2018-09-01 PROCEDURE — 87077 CULTURE AEROBIC IDENTIFY: CPT

## 2018-09-01 PROCEDURE — 87186 SC STD MICRODIL/AGAR DIL: CPT

## 2018-09-01 PROCEDURE — 87086 URINE CULTURE/COLONY COUNT: CPT

## 2018-09-01 PROCEDURE — 81003 URINALYSIS AUTO W/O SCOPE: CPT

## 2018-09-01 PROCEDURE — 99213 OFFICE O/P EST LOW 20 MIN: CPT | Performed by: NURSE PRACTITIONER

## 2018-09-01 RX ORDER — CIPROFLOXACIN 250 MG/1
250 TABLET, FILM COATED ORAL 2 TIMES DAILY
Qty: 14 TABLET | Refills: 0 | Status: SHIPPED | OUTPATIENT
Start: 2018-09-01 | End: 2018-09-08

## 2018-09-01 RX ORDER — CIPROFLOXACIN 250 MG/1
250 TABLET, FILM COATED ORAL 2 TIMES DAILY
Qty: 14 TABLET | Refills: 0 | Status: SHIPPED | OUTPATIENT
Start: 2018-09-01 | End: 2018-09-01 | Stop reason: SDUPTHER

## 2018-09-01 ASSESSMENT — ENCOUNTER SYMPTOMS
BACK PAIN: 0
ABDOMINAL PAIN: 0
NAUSEA: 0
VOMITING: 0

## 2018-09-01 ASSESSMENT — PAIN SCALES - GENERAL: PAINLEVEL_OUTOF10: 1

## 2018-09-01 ASSESSMENT — PAIN DESCRIPTION - PAIN TYPE: TYPE: ACUTE PAIN

## 2018-09-01 NOTE — ED PROVIDER NOTES
MEDICATIONS       Previous Medications    ATOMOXETINE (STRATTERA) 18 MG CAPSULE    Take 1 capsule by mouth daily    BUPROPION (WELLBUTRIN SR) 150 MG EXTENDED RELEASE TABLET    TAKE 1 TABLET TWICE A DAY    CALCIUM CARBONATE-VITAMIN D (CALCIUM-VITAMIN D) 600-200 MG-UNIT CAPS    Take 2 tablets by mouth daily. DONEPEZIL (ARICEPT) 5 MG TABLET    Take 5 mg by mouth nightly    FISH OIL-OMEGA-3 FATTY ACIDS 1000 MG CAPSULE    Take 1 g by mouth. One tablet daily along with red yeast rice    GLUCOSAMINE-CHONDROIT-VIT C-MN (GLUCOSAMINE CHONDR 500 COMPLEX) CAPS    Take 2 capsules by mouth daily. LEVOTHYROXINE (SYNTHROID) 50 MCG TABLET    TAKE 1 TABLET DAILY    MULTIPLE VITAMIN (MULTI-VITAMIN PO)    Take  by mouth. ONDANSETRON (ZOFRAN) 4 MG TABLET    Take 1 tablet by mouth every 6 hours as needed for Nausea or Vomiting    OXYBUTYNIN (DITROPAN XL) 15 MG EXTENDED RELEASE TABLET    Take 1 tablet by mouth daily    RIVASTIGMINE (EXELON) 4.6 MG/24HR    Place 1 patch onto the skin daily    SIMVASTATIN (ZOCOR) 10 MG TABLET    TAKE ONE TABLET BY MOUTH NIGHTLY       ALLERGIES     Patient is is allergic to sulfa antibiotics. Patients   Immunization History   Administered Date(s) Administered    Influenza, High Dose (Fluzone 65 yrs and older) 12/06/2017    Pneumococcal 13-valent Conjugate (Crater Limbo) 07/26/2017       FAMILY HISTORY     Patient's family history includes Cancer in her mother; Diabetes in her father; Heart Disease in her maternal aunt; Parkinsonism in her maternal uncle; Stroke in her mother. SOCIAL HISTORY     Patient  reports that she quit smoking about 3 years ago. Her smoking use included Cigarettes. She has a 40.00 pack-year smoking history. She has never used smokeless tobacco. She reports that she does not drink alcohol or use drugs.     PHYSICAL EXAM     ED TRIAGE VITALS  BP: 134/75, Temp: 97.6 °F (36.4 °C), Pulse: 90, Resp: 18, SpO2: 96 %,Estimated body mass index is 21.45 kg/m² as calculated from the development of high fever, chills, persistent vomiting, development of increasing back or flank pain or increase in hematuria the patient is advised to go to the emergency department for reevaluation and further follow-up if they wouldn't notice any of the above symptoms. The patient or Patient designated representative was also advised to follow up with family doctor or primary care provider after the antibiotic is completed for repeat urinalysis. The patient did verbalize understanding of discharge instructions and is agreeable to the treatment plan. The patient left ambulatory without any changes or concerns in stable condition.       PATIENT REFERRED TO:  Giselle Huitron MD  [facility] [address] [address]      DISCHARGE MEDICATIONS:  New Prescriptions    CIPROFLOXACIN (CIPRO) 250 MG TABLET    Take 1 tablet by mouth 2 times daily for 7 days       Discontinued Medications    No medications on file       Current Discharge Medication List          [address] [facility]    (Please note that portions of this note were completed with a voice recognition program.  Efforts were made to edit the dictations but occasionally words are mis-transcribed.)           SARITHA Carter - NOHEMY  09/01/18 [id number]

## 2018-09-01 NOTE — ED TRIAGE NOTES
Pt walked to room 4. Pt here with complaints of a bladder infection. Pt has urgency and burning. Started this morning.

## 2018-09-03 LAB
ORGANISM: ABNORMAL
URINE CULTURE REFLEX: ABNORMAL

## 2018-09-11 ENCOUNTER — OFFICE VISIT (OUTPATIENT)
Dept: PHYSICAL MEDICINE AND REHAB | Age: 68
End: 2018-09-11
Payer: MEDICARE

## 2018-09-11 VITALS
DIASTOLIC BLOOD PRESSURE: 81 MMHG | HEIGHT: 64 IN | WEIGHT: 126 LBS | SYSTOLIC BLOOD PRESSURE: 126 MMHG | HEART RATE: 100 BPM | BODY MASS INDEX: 21.51 KG/M2

## 2018-09-11 DIAGNOSIS — R41.89 COGNITIVE DEFICITS: ICD-10-CM

## 2018-09-11 DIAGNOSIS — I69.90 LATE EFFECTS OF CVA (CEREBROVASCULAR ACCIDENT): Primary | ICD-10-CM

## 2018-09-11 PROCEDURE — 1123F ACP DISCUSS/DSCN MKR DOCD: CPT | Performed by: NURSE PRACTITIONER

## 2018-09-11 PROCEDURE — G8400 PT W/DXA NO RESULTS DOC: HCPCS | Performed by: NURSE PRACTITIONER

## 2018-09-11 PROCEDURE — G8420 CALC BMI NORM PARAMETERS: HCPCS | Performed by: NURSE PRACTITIONER

## 2018-09-11 PROCEDURE — 3017F COLORECTAL CA SCREEN DOC REV: CPT | Performed by: NURSE PRACTITIONER

## 2018-09-11 PROCEDURE — G8427 DOCREV CUR MEDS BY ELIG CLIN: HCPCS | Performed by: NURSE PRACTITIONER

## 2018-09-11 PROCEDURE — 1036F TOBACCO NON-USER: CPT | Performed by: NURSE PRACTITIONER

## 2018-09-11 PROCEDURE — 1090F PRES/ABSN URINE INCON ASSESS: CPT | Performed by: NURSE PRACTITIONER

## 2018-09-11 PROCEDURE — 1101F PT FALLS ASSESS-DOCD LE1/YR: CPT | Performed by: NURSE PRACTITIONER

## 2018-09-11 PROCEDURE — 4040F PNEUMOC VAC/ADMIN/RCVD: CPT | Performed by: NURSE PRACTITIONER

## 2018-09-11 PROCEDURE — 99213 OFFICE O/P EST LOW 20 MIN: CPT | Performed by: NURSE PRACTITIONER

## 2018-09-11 RX ORDER — ATOMOXETINE 18 MG/1
18 CAPSULE ORAL DAILY
Qty: 30 CAPSULE | Refills: 2 | Status: SHIPPED | OUTPATIENT
Start: 2018-09-11 | End: 2018-12-10 | Stop reason: SDUPTHER

## 2018-09-20 ENCOUNTER — TELEPHONE (OUTPATIENT)
Dept: FAMILY MEDICINE CLINIC | Age: 68
End: 2018-09-20

## 2018-09-20 DIAGNOSIS — N39.42 URINARY INCONTINENCE WITHOUT SENSORY AWARENESS: Primary | ICD-10-CM

## 2018-10-11 DIAGNOSIS — Z86.73 H/O: CVA (CEREBROVASCULAR ACCIDENT): ICD-10-CM

## 2018-10-11 DIAGNOSIS — R41.89 COGNITIVE IMPAIRMENT: ICD-10-CM

## 2018-10-11 RX ORDER — DONEPEZIL HYDROCHLORIDE 10 MG/1
TABLET, FILM COATED ORAL
Qty: 90 TABLET | Refills: 3 | Status: SHIPPED | OUTPATIENT
Start: 2018-10-11 | End: 2019-10-22 | Stop reason: SDUPTHER

## 2018-10-11 NOTE — TELEPHONE ENCOUNTER
Per Dr. Cash Barroso we are to check with pt to see if she is on both Aricept and Exelon patch. They are in the same family of medication. Attempted to contact pt, left vm.

## 2018-11-07 ENCOUNTER — TELEPHONE (OUTPATIENT)
Dept: FAMILY MEDICINE CLINIC | Age: 68
End: 2018-11-07

## 2018-12-10 ENCOUNTER — OFFICE VISIT (OUTPATIENT)
Dept: PHYSICAL MEDICINE AND REHAB | Age: 68
End: 2018-12-10
Payer: MEDICARE

## 2018-12-10 VITALS
HEART RATE: 102 BPM | BODY MASS INDEX: 21.34 KG/M2 | DIASTOLIC BLOOD PRESSURE: 85 MMHG | WEIGHT: 125 LBS | SYSTOLIC BLOOD PRESSURE: 135 MMHG | HEIGHT: 64 IN

## 2018-12-10 DIAGNOSIS — R41.89 COGNITIVE DEFICITS: ICD-10-CM

## 2018-12-10 DIAGNOSIS — I69.90 LATE EFFECTS OF CVA (CEREBROVASCULAR ACCIDENT): Primary | ICD-10-CM

## 2018-12-10 PROCEDURE — 4040F PNEUMOC VAC/ADMIN/RCVD: CPT | Performed by: NURSE PRACTITIONER

## 2018-12-10 PROCEDURE — G8400 PT W/DXA NO RESULTS DOC: HCPCS | Performed by: NURSE PRACTITIONER

## 2018-12-10 PROCEDURE — G8427 DOCREV CUR MEDS BY ELIG CLIN: HCPCS | Performed by: NURSE PRACTITIONER

## 2018-12-10 PROCEDURE — 1036F TOBACCO NON-USER: CPT | Performed by: NURSE PRACTITIONER

## 2018-12-10 PROCEDURE — G8482 FLU IMMUNIZE ORDER/ADMIN: HCPCS | Performed by: NURSE PRACTITIONER

## 2018-12-10 PROCEDURE — G8420 CALC BMI NORM PARAMETERS: HCPCS | Performed by: NURSE PRACTITIONER

## 2018-12-10 PROCEDURE — 1101F PT FALLS ASSESS-DOCD LE1/YR: CPT | Performed by: NURSE PRACTITIONER

## 2018-12-10 PROCEDURE — 1123F ACP DISCUSS/DSCN MKR DOCD: CPT | Performed by: NURSE PRACTITIONER

## 2018-12-10 PROCEDURE — 3017F COLORECTAL CA SCREEN DOC REV: CPT | Performed by: NURSE PRACTITIONER

## 2018-12-10 PROCEDURE — 1090F PRES/ABSN URINE INCON ASSESS: CPT | Performed by: NURSE PRACTITIONER

## 2018-12-10 PROCEDURE — 99213 OFFICE O/P EST LOW 20 MIN: CPT | Performed by: NURSE PRACTITIONER

## 2018-12-10 RX ORDER — ATOMOXETINE 25 MG/1
25 CAPSULE ORAL DAILY
Qty: 30 CAPSULE | Refills: 2 | Status: SHIPPED | OUTPATIENT
Start: 2018-12-10 | End: 2019-03-11 | Stop reason: SDUPTHER

## 2018-12-10 NOTE — PROGRESS NOTES
[address] [facility]  Outpatient progress note    Chief Complaint:   Chief Complaint   Patient presents with    3 Month Follow-Up     Late effects of CVA         Subjective: Emilia Solis is a 76 y.o. female who returns to the office today for further follow up. Patient states she is doing well on the Strattera, however she is wondering if her cognition could be better on an increased dose. Still notices some issues with attention and concentration. Is wondering if she can go up on dose of Strattera. Otherwise no new issues. Is staying busy with the upcoming holidays. Review of Systems:  CONSTITUTIONAL:  negative  EYES:  negative  HEENT:  negative  RESPIRATORY:  negative  CARDIOVASCULAR:  negative  GASTROINTESTINAL:  negative  GENITOURINARY:  negative  SKIN:  negative  HEMATOLOGIC/LYMPHATIC:  negative  MUSCULOSKELETAL:  negative  NEUROLOGICAL:  positive for memory problems  BEHAVIOR/PSYCH:  positive for anxiety  All other review of systems otherwise negative    Physical Exam:  /85   Pulse 102   Ht 5' 4\" (1.626 m)   Wt 125 lb (56.7 kg)   LMP  (Exact Date)   BMI 21.46 kg/m²     Orientation:   person, place, time  Mood: euthymic  Affect: anxious  General appearance: Patient is well nourished, well developed, well groomed and in no acute distress, appearing stated age, anxious     Memory:   abnormal - deficits noted,   Attention/Concentration: abnormal - poor  Language:  Normal, but very fast paced     Cranial Nerves:  cranial nerves II-XII are grossly intact  ROM:  normal  Motor Exam:  Motor exam is symmetrical 5 out of 5 all extremities bilaterally  Tone:  normal  Muscle bulk: within normal limits  Sensory:  Sensory intact  Coordination:   normal  Deep Tendon Reflexes:  Reflexes are intact and symmetrical bilaterally  Gait: steady.  No AD used.      Skin: warm and dry, no rash or erythema  Peripheral vascular: Pulses: Normal upper

## 2019-01-08 DIAGNOSIS — K21.9 GASTROESOPHAGEAL REFLUX DISEASE WITHOUT ESOPHAGITIS: ICD-10-CM

## 2019-01-09 RX ORDER — PANTOPRAZOLE SODIUM 40 MG/1
TABLET, DELAYED RELEASE ORAL
Qty: 60 TABLET | Refills: 11 | Status: SHIPPED | OUTPATIENT
Start: 2019-01-09 | End: 2019-04-11

## 2019-02-14 ENCOUNTER — HOSPITAL ENCOUNTER (OUTPATIENT)
Dept: MRI IMAGING | Age: 69
Discharge: HOME | End: 2019-02-14
Payer: MEDICARE

## 2019-02-14 ENCOUNTER — HOSPITAL ENCOUNTER (OUTPATIENT)
Dept: WOMENS IMAGING | Age: 69
Discharge: HOME | End: 2019-02-14
Payer: MEDICARE

## 2019-02-14 DIAGNOSIS — I67.1 CEREBRAL ANEURYSM, NONRUPTURED: ICD-10-CM

## 2019-02-14 DIAGNOSIS — Z12.31 VISIT FOR SCREENING MAMMOGRAM: ICD-10-CM

## 2019-02-14 PROCEDURE — 77063 BREAST TOMOSYNTHESIS BI: CPT

## 2019-02-14 PROCEDURE — 70544 MR ANGIOGRAPHY HEAD W/O DYE: CPT

## 2019-02-25 RX ORDER — SIMVASTATIN 10 MG
TABLET ORAL
Qty: 90 TABLET | Refills: 3 | Status: SHIPPED | OUTPATIENT
Start: 2019-02-25 | End: 2020-03-16

## 2019-03-11 ENCOUNTER — OFFICE VISIT (OUTPATIENT)
Dept: PHYSICAL MEDICINE AND REHAB | Age: 69
End: 2019-03-11
Payer: MEDICARE

## 2019-03-11 VITALS
BODY MASS INDEX: 21.34 KG/M2 | SYSTOLIC BLOOD PRESSURE: 119 MMHG | WEIGHT: 125 LBS | HEART RATE: 102 BPM | HEIGHT: 64 IN | DIASTOLIC BLOOD PRESSURE: 80 MMHG

## 2019-03-11 DIAGNOSIS — R41.89 COGNITIVE DEFICITS: ICD-10-CM

## 2019-03-11 DIAGNOSIS — I69.90 LATE EFFECTS OF CVA (CEREBROVASCULAR ACCIDENT): Primary | ICD-10-CM

## 2019-03-11 PROCEDURE — G8420 CALC BMI NORM PARAMETERS: HCPCS | Performed by: NURSE PRACTITIONER

## 2019-03-11 PROCEDURE — 1090F PRES/ABSN URINE INCON ASSESS: CPT | Performed by: NURSE PRACTITIONER

## 2019-03-11 PROCEDURE — 99213 OFFICE O/P EST LOW 20 MIN: CPT | Performed by: NURSE PRACTITIONER

## 2019-03-11 PROCEDURE — 1036F TOBACCO NON-USER: CPT | Performed by: NURSE PRACTITIONER

## 2019-03-11 PROCEDURE — 3017F COLORECTAL CA SCREEN DOC REV: CPT | Performed by: NURSE PRACTITIONER

## 2019-03-11 PROCEDURE — 4040F PNEUMOC VAC/ADMIN/RCVD: CPT | Performed by: NURSE PRACTITIONER

## 2019-03-11 PROCEDURE — G8482 FLU IMMUNIZE ORDER/ADMIN: HCPCS | Performed by: NURSE PRACTITIONER

## 2019-03-11 PROCEDURE — G8427 DOCREV CUR MEDS BY ELIG CLIN: HCPCS | Performed by: NURSE PRACTITIONER

## 2019-03-11 PROCEDURE — 1101F PT FALLS ASSESS-DOCD LE1/YR: CPT | Performed by: NURSE PRACTITIONER

## 2019-03-11 PROCEDURE — 1123F ACP DISCUSS/DSCN MKR DOCD: CPT | Performed by: NURSE PRACTITIONER

## 2019-03-11 PROCEDURE — G8400 PT W/DXA NO RESULTS DOC: HCPCS | Performed by: NURSE PRACTITIONER

## 2019-03-11 RX ORDER — ATOMOXETINE 40 MG/1
40 CAPSULE ORAL DAILY
Qty: 30 CAPSULE | Refills: 2 | Status: SHIPPED | OUTPATIENT
Start: 2019-03-11 | End: 2019-06-24 | Stop reason: SDUPTHER

## 2019-04-04 ENCOUNTER — TELEPHONE (OUTPATIENT)
Dept: FAMILY MEDICINE CLINIC | Age: 69
End: 2019-04-04

## 2019-04-04 NOTE — TELEPHONE ENCOUNTER
I would start using Aveeno hand lotion morning and evening especially after bathing or washing her hands on a regular basis until she is seen.

## 2019-04-11 ENCOUNTER — TELEPHONE (OUTPATIENT)
Dept: FAMILY MEDICINE CLINIC | Age: 69
End: 2019-04-11

## 2019-04-11 ENCOUNTER — OFFICE VISIT (OUTPATIENT)
Dept: FAMILY MEDICINE CLINIC | Age: 69
End: 2019-04-11
Payer: MEDICARE

## 2019-04-11 VITALS
HEART RATE: 104 BPM | DIASTOLIC BLOOD PRESSURE: 82 MMHG | RESPIRATION RATE: 16 BRPM | SYSTOLIC BLOOD PRESSURE: 120 MMHG | BODY MASS INDEX: 20.45 KG/M2 | WEIGHT: 119.2 LBS

## 2019-04-11 DIAGNOSIS — R73.01 IFG (IMPAIRED FASTING GLUCOSE): ICD-10-CM

## 2019-04-11 DIAGNOSIS — N32.81 OAB (OVERACTIVE BLADDER): ICD-10-CM

## 2019-04-11 DIAGNOSIS — F32.9 REACTIVE DEPRESSION: ICD-10-CM

## 2019-04-11 DIAGNOSIS — B35.2 TINEA MANUS: Primary | ICD-10-CM

## 2019-04-11 DIAGNOSIS — E03.9 HYPOTHYROIDISM, UNSPECIFIED TYPE: ICD-10-CM

## 2019-04-11 DIAGNOSIS — Z86.73 H/O: CVA (CEREBROVASCULAR ACCIDENT): ICD-10-CM

## 2019-04-11 DIAGNOSIS — F32.5 MAJOR DEPRESSIVE DISORDER, SINGLE EPISODE, IN FULL REMISSION (HCC): ICD-10-CM

## 2019-04-11 DIAGNOSIS — E78.2 MIXED HYPERLIPIDEMIA: ICD-10-CM

## 2019-04-11 PROCEDURE — 1036F TOBACCO NON-USER: CPT | Performed by: NURSE PRACTITIONER

## 2019-04-11 PROCEDURE — G8427 DOCREV CUR MEDS BY ELIG CLIN: HCPCS | Performed by: NURSE PRACTITIONER

## 2019-04-11 PROCEDURE — G8400 PT W/DXA NO RESULTS DOC: HCPCS | Performed by: NURSE PRACTITIONER

## 2019-04-11 PROCEDURE — 4040F PNEUMOC VAC/ADMIN/RCVD: CPT | Performed by: NURSE PRACTITIONER

## 2019-04-11 PROCEDURE — 3017F COLORECTAL CA SCREEN DOC REV: CPT | Performed by: NURSE PRACTITIONER

## 2019-04-11 PROCEDURE — 99214 OFFICE O/P EST MOD 30 MIN: CPT | Performed by: NURSE PRACTITIONER

## 2019-04-11 PROCEDURE — 1090F PRES/ABSN URINE INCON ASSESS: CPT | Performed by: NURSE PRACTITIONER

## 2019-04-11 PROCEDURE — 1123F ACP DISCUSS/DSCN MKR DOCD: CPT | Performed by: NURSE PRACTITIONER

## 2019-04-11 PROCEDURE — G8420 CALC BMI NORM PARAMETERS: HCPCS | Performed by: NURSE PRACTITIONER

## 2019-04-11 RX ORDER — TURMERIC ROOT EXTRACT 500 MG
1 TABLET ORAL DAILY
COMMUNITY
End: 2019-10-27

## 2019-04-11 RX ORDER — CLOTRIMAZOLE AND BETAMETHASONE DIPROPIONATE 10; .64 MG/G; MG/G
CREAM TOPICAL
Qty: 90 G | Refills: 1 | Status: SHIPPED | OUTPATIENT
Start: 2019-04-11 | End: 2020-10-21

## 2019-04-11 RX ORDER — DULOXETIN HYDROCHLORIDE 30 MG/1
30 CAPSULE, DELAYED RELEASE ORAL DAILY
Qty: 30 CAPSULE | Refills: 1 | Status: ON HOLD | OUTPATIENT
Start: 2019-04-11 | End: 2019-04-26

## 2019-04-11 ASSESSMENT — ENCOUNTER SYMPTOMS
COUGH: 0
SHORTNESS OF BREATH: 0
ABDOMINAL PAIN: 0
NAUSEA: 0

## 2019-04-11 NOTE — TELEPHONE ENCOUNTER
Patient was just seen today by Lis Christianson for possible fungus on her hands and feet. She forgot to ask if it may be contagious, because she does have an infant grandson. Please call her to advise, and please leave her a detailed msg if she can not answer.

## 2019-04-11 NOTE — PROGRESS NOTES
Visit Information    Have you changed or started any medications since your last visit including any over-the-counter medicines, vitamins, or herbal medicines? yes - see updated med list   Are you having any side effects from any of your medications? -  no  Have you stopped taking any of your medications? Is so, why? -  yes - see updated med list    Have you seen any other physician or provider since your last visit? No  Have you had any other diagnostic tests since your last visit? No  Have you been seen in the emergency room and/or had an admission to a hospital since we last saw you? No  Have you had your routine dental cleaning in the past 6 months? n/a    Have you activated your [employer] account? If not, what are your barriers?  Yes     Patient Care Team:  Remington Renteria MD as PCP - General (Family Medicine)  Remington Renteria MD as PCP - [facility] Attributed Provider    Medical History Review  Past Medical, Family, and Social History reviewed and does not contribute to the patient presenting condition    Health Maintenance   Topic Date Due    DTaP/Tdap/Td vaccine (1 - Tdap) 06/13/1969    Shingles Vaccine (1 of 2) 06/13/2000    Low dose CT lung screening  06/13/2005    Pneumococcal 65+ years Vaccine (2 of 2 - PPSV23) 07/26/2018    TSH testing  08/25/2018    Colon cancer screen colonoscopy  06/18/2019    Breast cancer screen  02/14/2021    Lipid screen  08/25/2022    Flu vaccine  Completed    Hepatitis C screen  Completed    DEXA (modify frequency per FRAX score)  Addressed

## 2019-04-11 NOTE — PROGRESS NOTES
Subjective:      Patient ID: Porsha Whitten is a 76 y.o. female. HPI: Acute for rash    Chief Complaint   Patient presents with    Other     hands are feet are cracking - painful    Other     requesting orders for labs     Onset of 2 months with redness and dryness of her palms and fingers with cracking of skin. Cracking of skin on heel bilateral.  No redness on topside of hand. Itching. Using Aveeno x 1 week with some improvement. Patient Active Problem List   Diagnosis    Hyperlipemia    Hypothyroidism    Depression    Chronic anxiety    Osteopenia    Medication monitoring encounter    H/O: CVA (cerebrovascular accident), aneurysm bleed, Jan 2015    Abnormality of gait following cerebrovascular accident    GERD (gastroesophageal reflux disease)    Cognitive impairment due to CVA.  OAB (overactive bladder)    Superficial ulcer of skin (HCC)    Chronic nausea     MAMMO - 2019  COLONOSCOPY - 2009    NEURO - Dr. Terrie Ambrose - Dr. Natalio Cassidy    Patient is status post CVA but doing well subsequently. Follows with NEURO. Her speech is essentially back to normal also. Her hypothyroidism continues to respond well to her current thyroid dose. She is on 50 µg daily. Her reactive depression not well controlled on Wellbutrin. Complains of anxiety as well.      Denies CP, SOB or chest tightness    BP wnl    BP Readings from Last 3 Encounters:   04/11/19 120/82   03/11/19 119/80   12/10/18 135/85     Due for annual labs    Lab Results   Component Value Date    LABA1C 5.3 01/30/2016     No results found for: EAG    No components found for: CHLPL  Lab Results   Component Value Date    TRIG 80 08/25/2017    TRIG 79 01/30/2016    TRIG 56 12/23/2014     Lab Results   Component Value Date     (A) 08/25/2017    HDL 87 (A) 01/30/2016     (A) 12/23/2014     Lab Results   Component Value Date    LDLCALC 94 08/25/2017    LDLCALC 100 01/30/2016    LDLCALC 117 12/23/2014     No results found for: LABVLDL      Chemistry        Component Value Date/Time     05/18/2015 1250    K 4.6 05/18/2015 1250     05/18/2015 1250    CO2 29 05/18/2015 1250    BUN 14 05/18/2015 1250    CREATININE 0.6 05/18/2015 1250        Component Value Date/Time    CALCIUM 9.8 05/18/2015 1250            Lab Results   Component Value Date    TSH 1.250 08/25/2017       Lab Results   Component Value Date    WBC 7.3 05/18/2015    HGB 12.8 05/18/2015    HCT 38.7 05/18/2015    MCV 86.6 05/18/2015     05/18/2015         Health Maintenance   Topic Date Due    DTaP/Tdap/Td vaccine (1 - Tdap) 06/13/1969    Shingles Vaccine (1 of 2) 06/13/2000    Low dose CT lung screening  06/13/2005    TSH testing  08/25/2018    Colon cancer screen colonoscopy  06/18/2019    Breast cancer screen  02/14/2021    Lipid screen  08/25/2022    Flu vaccine  Completed    Pneumococcal 65+ years Vaccine  Completed    Hepatitis C screen  Completed    DEXA (modify frequency per FRAX score)  Addressed       Immunization History   Administered Date(s) Administered    Influenza Vaccine, unspecified formulation 12/06/2017    Influenza, High Dose (Fluzone 65 yrs and older) 12/06/2017, 11/07/2018    Pneumococcal 13-valent Conjugate ([id number]) 07/26/2017    Pneumococcal Polysaccharide ([id number]) 11/13/2018               Review of Systems   Constitutional: Negative for chills and fever. HENT: Negative. Respiratory: Negative for cough and shortness of breath. Cardiovascular: Negative for chest pain. Gastrointestinal: Negative for abdominal pain and nausea. Skin: Positive for rash. Neurological: Negative for dizziness, light-headedness and headaches. Psychiatric/Behavioral: Positive for dysphoric mood. The patient is nervous/anxious. Objective:   Physical Exam   Constitutional: Vital signs are normal. No distress. Eyes: Pupils are equal, round, and reactive to light.  EOM are normal.   Neck: Normal range of motion. Neck supple. Cardiovascular: Normal rate and regular rhythm. No murmur heard. Pulmonary/Chest: Effort normal and breath sounds normal. She has no wheezes. Abdominal: Soft. Bowel sounds are normal. She exhibits no distension. There is no tenderness. Musculoskeletal: Normal range of motion. She exhibits no tenderness. Hands:  Skin: Skin is warm and dry. No rash noted. Psychiatric: Her mood appears anxious. She exhibits a depressed mood. Assessment:       Diagnosis Orders   1. Tinea manus  clotrimazole-betamethasone (LOTRISONE) 1-0.05 % cream   2. Reactive depression  DULoxetine (CYMBALTA) 30 MG extended release capsule   3. IFG (impaired fasting glucose)  Comprehensive Metabolic Panel    Hemoglobin A1C   4. Hypothyroidism, unspecified type  TSH with Reflex   5. Mixed hyperlipidemia  CBC    Lipid Panel   6. H/O: CVA (cerebrovascular accident), aneurysm bleed, Jan 2015     7.  OAB (overactive bladder)             Plan:      Lotrisone as directed to palms and heel  Change Wellbutrin to Cymbalta 30 mg   - cross coverage anxiety/depression  Chronic conditions stable  Labs reviewed  Follow up with Specialists  Labs as above  RTO in 1 month          SARITHA Bain - CNP

## 2019-04-13 LAB
AVERAGE GLUCOSE: NORMAL
CHOLESTEROL, TOTAL: 223 MG/DL
CHOLESTEROL/HDL RATIO: ABNORMAL
HBA1C MFR BLD: 5.3 %
HDLC SERPL-MCNC: 109 MG/DL (ref 35–70)
LDL CHOLESTEROL CALCULATED: 98 MG/DL (ref 0–160)
TRIGL SERPL-MCNC: 81 MG/DL
TSH SERPL DL<=0.05 MIU/L-ACNC: 3.69 UIU/ML
VLDLC SERPL CALC-MCNC: ABNORMAL MG/DL

## 2019-04-19 ENCOUNTER — HOSPITAL ENCOUNTER (EMERGENCY)
Age: 69
Discharge: HOME | End: 2019-04-19
Payer: MEDICARE

## 2019-04-19 VITALS
WEIGHT: 125 LBS | OXYGEN SATURATION: 96 % | TEMPERATURE: 98 F | BODY MASS INDEX: 21.45 KG/M2 | SYSTOLIC BLOOD PRESSURE: 137 MMHG | HEART RATE: 82 BPM | RESPIRATION RATE: 18 BRPM | DIASTOLIC BLOOD PRESSURE: 63 MMHG

## 2019-04-19 DIAGNOSIS — R39.9 LOWER URINARY TRACT SYMPTOMS (LUTS): Primary | ICD-10-CM

## 2019-04-19 LAB
BILIRUBIN URINE: NEGATIVE
BLOOD, URINE: ABNORMAL
CHARACTER, URINE: CLEAR
COLOR: YELLOW
GLUCOSE, URINE: NEGATIVE MG/DL
KETONES, URINE: ABNORMAL
LEUKOCYTES, UA: ABNORMAL
NITRATE, UA: NEGATIVE
PH UA: 6 (ref 5–9)
PROTEIN UA: 100 MG/DL
REFLEX TO URINE C & S: ABNORMAL
SPECIFIC GRAVITY UA: >= 1.03 (ref 1–1.03)
UROBILINOGEN, URINE: 0.2 EU/DL (ref 0–1)

## 2019-04-19 PROCEDURE — 87086 URINE CULTURE/COLONY COUNT: CPT

## 2019-04-19 PROCEDURE — 99214 OFFICE O/P EST MOD 30 MIN: CPT

## 2019-04-19 PROCEDURE — 99214 OFFICE O/P EST MOD 30 MIN: CPT | Performed by: NURSE PRACTITIONER

## 2019-04-19 PROCEDURE — 81003 URINALYSIS AUTO W/O SCOPE: CPT

## 2019-04-19 RX ORDER — PHENAZOPYRIDINE HYDROCHLORIDE 200 MG/1
200 TABLET, FILM COATED ORAL EVERY 8 HOURS PRN
Qty: 9 TABLET | Refills: 0 | Status: SHIPPED | OUTPATIENT
Start: 2019-04-19 | End: 2019-04-22

## 2019-04-19 RX ORDER — NITROFURANTOIN 25; 75 MG/1; MG/1
100 CAPSULE ORAL 2 TIMES DAILY
Qty: 14 CAPSULE | Refills: 0 | Status: ON HOLD | OUTPATIENT
Start: 2019-04-19 | End: 2019-04-29 | Stop reason: HOSPADM

## 2019-04-19 RX ORDER — CIPROFLOXACIN 250 MG/1
250 TABLET, FILM COATED ORAL 2 TIMES DAILY
Qty: 6 TABLET | Refills: 0 | Status: SHIPPED | OUTPATIENT
Start: 2019-04-19 | End: 2019-04-19 | Stop reason: HOSPADM

## 2019-04-19 ASSESSMENT — ENCOUNTER SYMPTOMS
NAUSEA: 0
ABDOMINAL PAIN: 0
VOMITING: 0
BACK PAIN: 0

## 2019-04-19 NOTE — ED PROVIDER NOTES
[facility] 36  Urgent Care Encounter      CHIEFCOMPLAINT       Chief Complaint   Patient presents with    Urinary Frequency     Frequecy, urgency and pressure. Started yesterday. Nurses Notes reviewed and I agree except as noted in the HPI. HISTORY OF PRESENT ILLNESS   Klaudia Sampson is a 76 y.o. female who presents:  Patient has history of overactive bladder she is currently taking ditropan 15 mg daily. The history is provided by the patient. Dysuria    This is a new problem. The current episode started yesterday. The problem occurs every urination. The problem has not changed since onset. The patient is experiencing no pain. There has been no fever. There is no history of pyelonephritis. Associated symptoms include frequency and urgency. Pertinent negatives include no chills, no sweats, no nausea, no vomiting, no discharge, no hematuria, no hesitancy, no possible pregnancy and no flank pain. She has tried nothing for the symptoms. Her past medical history does not include kidney stones, single kidney or recurrent UTIs. REVIEW OF SYSTEMS     Review of Systems   Constitutional: Negative for activity change, appetite change, chills, diaphoresis, fatigue and fever. Gastrointestinal: Negative for abdominal pain, nausea and vomiting. Genitourinary: Positive for frequency and urgency. Negative for decreased urine volume, difficulty urinating, dysuria, flank pain, hematuria and hesitancy. Pressure to bladder   Musculoskeletal: Negative for back pain. Neurological: Negative for dizziness and headaches. Hematological: Negative for adenopathy. PAST MEDICAL HISTORY         Diagnosis Date    Aneurysm, cerebral     Osteoarthritis     Thyroid disease     Unspecified cerebral artery occlusion with cerebral infarction        SURGICAL HISTORY     Patient  has a past surgical history that includes Colonoscopy ([id number]); External ear surgery (2000);  External ear mother. SOCIAL HISTORY     Patient  reports that she quit smoking about 4 years ago. Her smoking use included cigarettes. She has a 40.00 pack-year smoking history. She has never used smokeless tobacco. She reports that she does not drink alcohol or use drugs. PHYSICAL EXAM     ED TRIAGE VITALS  BP: 137/63, Temp: 98 °F (36.7 °C), Pulse: 82, Resp: 18, SpO2: 96 %  Physical Exam   Constitutional: She is oriented to person, place, and time. Vital signs are normal. She appears well-developed and well-nourished. Non-toxic appearance. She does not have a sickly appearance. She does not appear ill. No distress. HENT:   Mouth/Throat: Mucous membranes are normal.   Neck: Normal range of motion. Neck supple. Cardiovascular: Normal rate, S1 normal, S2 normal and normal heart sounds. No murmur heard. Pulmonary/Chest: Effort normal and breath sounds normal. No accessory muscle usage. No respiratory distress. Abdominal: Soft. Normal appearance. She exhibits no distension. There is no tenderness. There is no rigidity, no rebound, no guarding and no CVA tenderness. Lymphadenopathy:     She has no cervical adenopathy. Neurological: She is alert and oriented to person, place, and time. Skin: Skin is warm, dry and intact. No rash noted. She is not diaphoretic. No cyanosis. No pallor. Nursing note and vitals reviewed.       DIAGNOSTIC RESULTS   Labs:  Results for orders placed or performed during the hospital encounter of 04/19/19   UA without Microscopic Reflex C&S   Result Value Ref Range    Glucose, Urine Negative NEGATIVE mg/dl    Bilirubin Urine Negative NEGATIVE    Ketones, Urine Trace (A) NEGATIVE    Specific Gravity, UA >=1.030 1.002 - 1.03    Blood, Urine Small (A) NEGATIVE    pH, UA 6.00 5.0 - 9.0    Protein,  (A) NEGATIVE mg/dl    Urobilinogen, Urine 0.20 0.0 - 1.0 eu/dl    Nitrate, UA Negative NEGATIVE    LEUKOCYTES, UA Small (A) NEGATIVE    Color, UA Yellow STRAW-YELL    Character, Urine Clear All patient questions answered. Pt voiced understanding. Reviewed health maintenance. Patient agreedwith treatment plan. Follow up as directed.      SARITHA Haile - SARITHA Huber CNP  04/19/19 0554

## 2019-04-21 LAB
ORGANISM: ABNORMAL
URINE CULTURE, ROUTINE: ABNORMAL

## 2019-04-22 ENCOUNTER — TELEPHONE (OUTPATIENT)
Dept: FAMILY MEDICINE CLINIC | Age: 69
End: 2019-04-22

## 2019-04-26 ENCOUNTER — APPOINTMENT (OUTPATIENT)
Dept: CT IMAGING | Age: 69
DRG: 193 | End: 2019-04-26
Payer: MEDICARE

## 2019-04-26 ENCOUNTER — HOSPITAL ENCOUNTER (INPATIENT)
Age: 69
LOS: 4 days | Discharge: HOME | DRG: 193 | End: 2019-04-30
Attending: FAMILY MEDICINE | Admitting: FAMILY MEDICINE
Payer: MEDICARE

## 2019-04-26 ENCOUNTER — APPOINTMENT (OUTPATIENT)
Dept: GENERAL RADIOLOGY | Age: 69
DRG: 193 | End: 2019-04-26
Payer: MEDICARE

## 2019-04-26 DIAGNOSIS — F32.9 REACTIVE DEPRESSION: ICD-10-CM

## 2019-04-26 DIAGNOSIS — R59.0 MEDIASTINAL LYMPHADENOPATHY: ICD-10-CM

## 2019-04-26 DIAGNOSIS — R06.02 SHORTNESS OF BREATH: ICD-10-CM

## 2019-04-26 DIAGNOSIS — J18.9 PNEUMONIA DUE TO ORGANISM: ICD-10-CM

## 2019-04-26 DIAGNOSIS — R09.02 HYPOXIA: Primary | ICD-10-CM

## 2019-04-26 DIAGNOSIS — R79.89 ELEVATED BRAIN NATRIURETIC PEPTIDE (BNP) LEVEL: ICD-10-CM

## 2019-04-26 LAB
ALLEN TEST: POSITIVE
ANION GAP SERPL CALCULATED.3IONS-SCNC: 13 MEQ/L (ref 8–16)
BACTERIA: ABNORMAL
BASE EXCESS (CALCULATED): 2.1 MMOL/L (ref -2.5–2.5)
BASOPHILS # BLD: 0.2 %
BASOPHILS ABSOLUTE: 0 THOU/MM3 (ref 0–0.1)
BILIRUBIN URINE: ABNORMAL
BLOOD, URINE: ABNORMAL
BUN BLDV-MCNC: 15 MG/DL (ref 7–22)
CALCIUM SERPL-MCNC: 8.9 MG/DL (ref 8.5–10.5)
CASTS: ABNORMAL /LPF
CASTS: PRESENT /LPF
CHARACTER, URINE: ABNORMAL
CHLORIDE BLD-SCNC: 98 MEQ/L (ref 98–111)
CO2: 26 MEQ/L (ref 23–33)
COLLECTED BY:: ABNORMAL
COLOR: ABNORMAL
CREAT SERPL-MCNC: 0.7 MG/DL (ref 0.4–1.2)
CRYSTALS: ABNORMAL
D-DIMER QUANTITATIVE: 2014 NG/ML FEU (ref 0–500)
DEVICE: ABNORMAL
EKG ATRIAL RATE: 118 BPM
EKG P AXIS: 81 DEGREES
EKG P-R INTERVAL: 126 MS
EKG Q-T INTERVAL: 316 MS
EKG QRS DURATION: 78 MS
EKG QTC CALCULATION (BAZETT): 442 MS
EKG R AXIS: 61 DEGREES
EKG T AXIS: 75 DEGREES
EKG VENTRICULAR RATE: 118 BPM
EOSINOPHIL # BLD: 1.4 %
EOSINOPHILS ABSOLUTE: 0.2 THOU/MM3 (ref 0–0.4)
EPITHELIAL CELLS, UA: ABNORMAL /HPF
ERYTHROCYTE [DISTWIDTH] IN BLOOD BY AUTOMATED COUNT: 12.6 % (ref 11.5–14.5)
ERYTHROCYTE [DISTWIDTH] IN BLOOD BY AUTOMATED COUNT: 42.2 FL (ref 35–45)
FLU A ANTIGEN: NEGATIVE
FLU B ANTIGEN: NEGATIVE
GFR SERPL CREATININE-BSD FRML MDRD: 83 ML/MIN/1.73M2
GLUCOSE BLD-MCNC: 113 MG/DL (ref 70–108)
GLUCOSE, URINE: NEGATIVE MG/DL
HCO3: 25 MMOL/L (ref 23–28)
HCT VFR BLD CALC: 39.3 % (ref 37–47)
HEMOGLOBIN: 12.7 GM/DL (ref 12–16)
ICTOTEST: NEGATIVE
IFIO2: 2
IMMATURE GRANS (ABS): 0.05 THOU/MM3 (ref 0–0.07)
IMMATURE GRANULOCYTES: 0.4 %
KETONES, URINE: NEGATIVE
LACTIC ACID: 1.7 MMOL/L (ref 0.5–2.2)
LEUKOCYTE ESTERASE, URINE: ABNORMAL
LYMPHOCYTES # BLD: 7.6 %
LYMPHOCYTES ABSOLUTE: 0.9 THOU/MM3 (ref 1–4.8)
MCH RBC QN AUTO: 29.3 PG (ref 26–33)
MCHC RBC AUTO-ENTMCNC: 32.3 GM/DL (ref 32.2–35.5)
MCV RBC AUTO: 90.6 FL (ref 81–99)
MISCELLANEOUS LAB TEST RESULT: ABNORMAL
MONOCYTES # BLD: 10.7 %
MONOCYTES ABSOLUTE: 1.3 THOU/MM3 (ref 0.4–1.3)
NITRITE, URINE: NEGATIVE
NUCLEATED RED BLOOD CELLS: 0 /100 WBC
O2 SATURATION: 93 %
OSMOLALITY CALCULATION: 275.5 MOSMOL/KG (ref 275–300)
PCO2: 35 MMHG (ref 35–45)
PH BLOOD GAS: 7.47 (ref 7.35–7.45)
PH UA: 6 (ref 5–9)
PLATELET # BLD: 241 THOU/MM3 (ref 130–400)
PLATELET ESTIMATE: ADEQUATE
PMV BLD AUTO: 11.3 FL (ref 9.4–12.4)
PO2: 61 MMHG (ref 71–104)
POTASSIUM SERPL-SCNC: 4.1 MEQ/L (ref 3.5–5.2)
PRO-BNP: 1415 PG/ML (ref 0–900)
PROTEIN UA: 100 MG/DL
RBC # BLD: 4.34 MILL/MM3 (ref 4.2–5.4)
RBC URINE: ABNORMAL /HPF
RENAL EPITHELIAL, UA: PRESENT
SCAN OF BLOOD SMEAR: NORMAL
SEG NEUTROPHILS: 79.7 %
SEGMENTED NEUTROPHILS ABSOLUTE COUNT: 9.3 THOU/MM3 (ref 1.8–7.7)
SODIUM BLD-SCNC: 137 MEQ/L (ref 135–145)
SOURCE, BLOOD GAS: ABNORMAL
SPECIFIC GRAVITY UA: 1.03 (ref 1–1.03)
TROPONIN T: < 0.01 NG/ML
TROPONIN T: < 0.01 NG/ML
UROBILINOGEN, URINE: 1 EU/DL (ref 0–1)
WBC # BLD: 11.7 THOU/MM3 (ref 4.8–10.8)
WBC UA: ABNORMAL /HPF
YEAST: ABNORMAL

## 2019-04-26 PROCEDURE — 99223 1ST HOSP IP/OBS HIGH 75: CPT | Performed by: FAMILY MEDICINE

## 2019-04-26 PROCEDURE — 6370000000 HC RX 637 (ALT 250 FOR IP): Performed by: FAMILY MEDICINE

## 2019-04-26 PROCEDURE — 36415 COLL VENOUS BLD VENIPUNCTURE: CPT

## 2019-04-26 PROCEDURE — 1200000003 HC TELEMETRY R&B

## 2019-04-26 PROCEDURE — 2700000000 HC OXYGEN THERAPY PER DAY

## 2019-04-26 PROCEDURE — 85025 COMPLETE CBC W/AUTO DIFF WBC: CPT

## 2019-04-26 PROCEDURE — 2580000003 HC RX 258: Performed by: FAMILY MEDICINE

## 2019-04-26 PROCEDURE — 87641 MR-STAPH DNA AMP PROBE: CPT

## 2019-04-26 PROCEDURE — 82803 BLOOD GASES ANY COMBINATION: CPT

## 2019-04-26 PROCEDURE — 71275 CT ANGIOGRAPHY CHEST: CPT

## 2019-04-26 PROCEDURE — 36600 WITHDRAWAL OF ARTERIAL BLOOD: CPT

## 2019-04-26 PROCEDURE — 87804 INFLUENZA ASSAY W/OPTIC: CPT

## 2019-04-26 PROCEDURE — 83605 ASSAY OF LACTIC ACID: CPT

## 2019-04-26 PROCEDURE — 2709999900 HC NON-CHARGEABLE SUPPLY

## 2019-04-26 PROCEDURE — 84484 ASSAY OF TROPONIN QUANT: CPT

## 2019-04-26 PROCEDURE — 6360000002 HC RX W HCPCS: Performed by: FAMILY MEDICINE

## 2019-04-26 PROCEDURE — 99215 OFFICE O/P EST HI 40 MIN: CPT

## 2019-04-26 PROCEDURE — 85379 FIBRIN DEGRADATION QUANT: CPT

## 2019-04-26 PROCEDURE — 87040 BLOOD CULTURE FOR BACTERIA: CPT

## 2019-04-26 PROCEDURE — 87205 SMEAR GRAM STAIN: CPT

## 2019-04-26 PROCEDURE — 81001 URINALYSIS AUTO W/SCOPE: CPT

## 2019-04-26 PROCEDURE — 99285 EMERGENCY DEPT VISIT HI MDM: CPT

## 2019-04-26 PROCEDURE — 99214 OFFICE O/P EST MOD 30 MIN: CPT | Performed by: NURSE PRACTITIONER

## 2019-04-26 PROCEDURE — 87899 AGENT NOS ASSAY W/OPTIC: CPT

## 2019-04-26 PROCEDURE — 71046 X-RAY EXAM CHEST 2 VIEWS: CPT

## 2019-04-26 PROCEDURE — 6360000004 HC RX CONTRAST MEDICATION: Performed by: FAMILY MEDICINE

## 2019-04-26 PROCEDURE — 93010 ELECTROCARDIOGRAM REPORT: CPT | Performed by: INTERNAL MEDICINE

## 2019-04-26 PROCEDURE — 94760 N-INVAS EAR/PLS OXIMETRY 1: CPT

## 2019-04-26 PROCEDURE — 93005 ELECTROCARDIOGRAM TRACING: CPT | Performed by: NURSE PRACTITIONER

## 2019-04-26 PROCEDURE — 87070 CULTURE OTHR SPECIMN AEROBIC: CPT

## 2019-04-26 PROCEDURE — 83880 ASSAY OF NATRIURETIC PEPTIDE: CPT

## 2019-04-26 PROCEDURE — 80048 BASIC METABOLIC PNL TOTAL CA: CPT

## 2019-04-26 PROCEDURE — 94640 AIRWAY INHALATION TREATMENT: CPT

## 2019-04-26 PROCEDURE — 87449 NOS EACH ORGANISM AG IA: CPT

## 2019-04-26 RX ORDER — ATOMOXETINE 40 MG/1
40 CAPSULE ORAL DAILY
Status: DISCONTINUED | OUTPATIENT
Start: 2019-04-27 | End: 2019-04-30 | Stop reason: HOSPADM

## 2019-04-26 RX ORDER — ONDANSETRON 2 MG/ML
4 INJECTION INTRAMUSCULAR; INTRAVENOUS EVERY 6 HOURS PRN
Status: DISCONTINUED | OUTPATIENT
Start: 2019-04-26 | End: 2019-04-30 | Stop reason: HOSPADM

## 2019-04-26 RX ORDER — ONDANSETRON 4 MG/1
4 TABLET, FILM COATED ORAL EVERY 6 HOURS PRN
Status: DISCONTINUED | OUTPATIENT
Start: 2019-04-26 | End: 2019-04-30 | Stop reason: HOSPADM

## 2019-04-26 RX ORDER — LEVOTHYROXINE SODIUM 0.05 MG/1
50 TABLET ORAL DAILY
Status: DISCONTINUED | OUTPATIENT
Start: 2019-04-27 | End: 2019-04-30 | Stop reason: HOSPADM

## 2019-04-26 RX ORDER — SIMVASTATIN 10 MG
10 TABLET ORAL NIGHTLY
Status: DISCONTINUED | OUTPATIENT
Start: 2019-04-26 | End: 2019-04-30 | Stop reason: HOSPADM

## 2019-04-26 RX ORDER — FUROSEMIDE 10 MG/ML
40 INJECTION INTRAMUSCULAR; INTRAVENOUS ONCE
Status: COMPLETED | OUTPATIENT
Start: 2019-04-26 | End: 2019-04-26

## 2019-04-26 RX ORDER — DULOXETIN HYDROCHLORIDE 30 MG/1
30 CAPSULE, DELAYED RELEASE ORAL DAILY
Status: DISCONTINUED | OUTPATIENT
Start: 2019-04-27 | End: 2019-04-30 | Stop reason: HOSPADM

## 2019-04-26 RX ORDER — SODIUM CHLORIDE 0.9 % (FLUSH) 0.9 %
10 SYRINGE (ML) INJECTION EVERY 12 HOURS SCHEDULED
Status: DISCONTINUED | OUTPATIENT
Start: 2019-04-26 | End: 2019-04-30 | Stop reason: HOSPADM

## 2019-04-26 RX ORDER — SODIUM CHLORIDE 0.9 % (FLUSH) 0.9 %
10 SYRINGE (ML) INJECTION PRN
Status: DISCONTINUED | OUTPATIENT
Start: 2019-04-26 | End: 2019-04-30 | Stop reason: HOSPADM

## 2019-04-26 RX ORDER — ACETAMINOPHEN 325 MG/1
650 TABLET ORAL EVERY 4 HOURS PRN
Status: DISCONTINUED | OUTPATIENT
Start: 2019-04-26 | End: 2019-04-30 | Stop reason: HOSPADM

## 2019-04-26 RX ORDER — DONEPEZIL HYDROCHLORIDE 10 MG/1
10 TABLET, FILM COATED ORAL NIGHTLY
Status: DISCONTINUED | OUTPATIENT
Start: 2019-04-26 | End: 2019-04-30 | Stop reason: HOSPADM

## 2019-04-26 RX ORDER — IPRATROPIUM BROMIDE AND ALBUTEROL SULFATE 2.5; .5 MG/3ML; MG/3ML
1 SOLUTION RESPIRATORY (INHALATION) ONCE
Status: COMPLETED | OUTPATIENT
Start: 2019-04-26 | End: 2019-04-26

## 2019-04-26 RX ADMIN — ENOXAPARIN SODIUM 40 MG: 40 INJECTION SUBCUTANEOUS at 21:32

## 2019-04-26 RX ADMIN — CEFTRIAXONE SODIUM 1 G: 1 INJECTION, POWDER, FOR SOLUTION INTRAMUSCULAR; INTRAVENOUS at 21:26

## 2019-04-26 RX ADMIN — DONEPEZIL HYDROCHLORIDE 10 MG: 10 TABLET, FILM COATED ORAL at 21:22

## 2019-04-26 RX ADMIN — IOPAMIDOL 80 ML: 755 INJECTION, SOLUTION INTRAVENOUS at 15:28

## 2019-04-26 RX ADMIN — AZITHROMYCIN MONOHYDRATE 500 MG: 500 INJECTION, POWDER, LYOPHILIZED, FOR SOLUTION INTRAVENOUS at 21:30

## 2019-04-26 RX ADMIN — Medication 10 ML: at 21:23

## 2019-04-26 RX ADMIN — SIMVASTATIN 10 MG: 10 TABLET, FILM COATED ORAL at 21:22

## 2019-04-26 RX ADMIN — IPRATROPIUM BROMIDE AND ALBUTEROL SULFATE 1 AMPULE: .5; 3 SOLUTION RESPIRATORY (INHALATION) at 16:08

## 2019-04-26 RX ADMIN — FUROSEMIDE 40 MG: 10 INJECTION, SOLUTION INTRAMUSCULAR; INTRAVENOUS at 17:42

## 2019-04-26 ASSESSMENT — ENCOUNTER SYMPTOMS
SPUTUM PRODUCTION: 0
HEMOPTYSIS: 0
VOMITING: 0
COUGH: 1
NAUSEA: 0
RHINORRHEA: 0
DIARRHEA: 0
SHORTNESS OF BREATH: 1
ABDOMINAL PAIN: 0
SWOLLEN GLANDS: 0
CHEST TIGHTNESS: 0
BACK PAIN: 0
WHEEZING: 0
EYE DISCHARGE: 0
SORE THROAT: 0
STRIDOR: 0
EYE PAIN: 0

## 2019-04-26 NOTE — H&P
History & Physical        Patient:  Isai Dutta  YOB: 1950    MRN: [id number]     Acct: [de-identified]    PCP: SARITHA Vasquez CNP    Date of Admission: 4/26/2019    Date of Service: Pt seen/examined on 04/26/19  and Admitted to Inpatient with expected LOS greater than two midnights due to medical therapy. Chief Complaint:  SOB      History Of Present Illness:   76 y.o. female who presented to [address] [place] [address] with complaints of difficulty breathing. Pt's SOB started about 2 days ago. With her SOB, she has had some sputum production. No fever. She does have a 40 year pack history and stopped smoking 4 years ago. She has never had PFT before. Pt denies orthopnea, but has increased SOB with ambulation. Past Medical History:          Diagnosis Date    Aneurysm, cerebral     Osteoarthritis     Thyroid disease     Unspecified cerebral artery occlusion with cerebral infarction        Past Surgical History:          Procedure Laterality Date   [employer] BRAIN ANEURYSM SURGERY      COLONOSCOPY  [id number]   [employer] COSMETIC SURGERY  2009,2010    juvederm,botox    COSMETIC SURGERY  2013    Restylane, Botox    EXTERNAL EAR SURGERY  2000    reconstruction of lft ear w/re-opening of canal & pinning back of superior ear    EXTERNAL EAR SURGERY  1999    abscess lft ear    EXTERNAL EAR SURGERY  1997    hematoma left ear       Medications Prior to Admission:      Prior to Admission medications    Medication Sig Start Date End Date Taking? Authorizing Provider   nitrofurantoin, macrocrystal-monohydrate, (MACROBID) 100 MG capsule Take 1 capsule by mouth 2 times daily for 7 days 4/19/19 4/26/19  SARITHA Novoa CNP   Cranberry 500 MG TABS Take 1 tablet by mouth daily    Historical Provider, MD   Turmeric 500 MG TABS Take 1 tablet by mouth daily    Historical Provider, MD   clotrimazole-betamethasone (LOTRISONE) 1-0.05 % cream Apply topically 2 times daily.  4/11/19   Ximena Rosenthal 12/16/2013       Intake & Output:  No intake/output data recorded. No intake/output data recorded. Radiology:   CTA CHEST W WO CONTRAST   Final Result      1. There is no acute pulmonary arterial embolism, within limitations of exam..   2. There is hilar and mediastinal lymphadenopathy which is nonspecific. Findings may be reactive, chronic or neoplastic. Correlation and follow-up is advised. 3. Small bilateral pleural effusions with adjacent atelectasis or infiltrate. **This report has been created using voice recognition software. It may contain minor errors which are inherent in voice recognition technology. **      Final report electronically signed by Dr. Monet Garcia on 4/26/2019 3:50 PM      XR CHEST STANDARD (2 VW)   Final Result   Left retrocardiac opacities which may represent atelectasis or infiltrate. **This report has been created using voice recognition software. It may contain minor errors which are inherent in voice recognition technology. **      Final report electronically signed by Dr Simin Brar on 4/26/2019 1:33 PM           DVT prophylaxis: lovenox    Code Status: No Order      PT/OT Eval Status: ordered    Disposition: hopefully home    Active Hospital Problems    Diagnosis Date Noted    SOB (shortness of breath) [R06.02] 04/26/2019       ASSESSMENT/PLAN:  1) Acute Hypoxic Respiratory Failure 2/2 to CHF vs PNA  - CXR: Left retrocardiac opacities which may represent atelectasis or infiltrate.  - CTA:  There is no acute pulmonary arterial embolism, within limitations of the exam.  There is hilar and mediastinal lymphadenopathy which is nonspecific. Findings may be reactive, chronic or neoplastic.   Small B/L pleural effusions  - Pt has been treated with azithromycin/CTX in the ED which we will continue  - Pt also treated with lasix 40 mg IV X 1 dose in the ED   - Pt on supplemental oxygen at 2 liters   - Urine strep/legionella and sputum cultures ordered  - Pt does have a 40 year pack year history --. Would benefit from OP PFTs as ? Underlying lung disease    2) Elevated BNP  - ? CHF  - Cycle Trop  - Check Echo    3) Dementia  - Continue with Aricept    4) Depression  - Continue with Cymbalta    5) Hypothyroidism  - Continue with synthroid    6) HLD  - Continue with zocor      Thank you SARITHA Orellana - NOHEMY for the opportunity to be involved in this patient's care.     Electronically signed by Reyes Dang MD on 4/26/2019 at 5:27 PM

## 2019-04-26 NOTE — ED PROVIDER NOTES
[facility] [address] [address] EMERGENCY DEPT  Urgent Care Encounter       CHIEF COMPLAINT       Chief Complaint   Patient presents with    Cough       Nurses Notes reviewed and I agree except as noted in the HPI. HISTORY OF PRESENT ILLNESS   Jordyn Reynoso is a 76 y.o. female who presents     Patient states that for the last 2-3 days she has had an ongoing cough and has noted more shortness of breath. She states today shortness of breath occurs even with rest.  Patient states that she does not have a history of COPD, asthma, former smoker. It is noted that pulse ox on room air was 90%. Patient states that she does not have any chest pain, however she does have intermittent dizziness with shortness of breath.       Shortness of Breath   Severity:  Moderate  Onset quality:  Gradual  Duration:  2 days  Timing:  Intermittent  Progression:  Unchanged  Chronicity:  New  Context: not activity, not animal exposure, not emotional upset, not fumes, not known allergens, not occupational exposure, not pollens, not smoke exposure, not strong odors, not URI and not weather changes    Relieved by:  None tried  Worsened by:  Nothing  Ineffective treatments:  None tried  Associated symptoms: cough    Associated symptoms: no abdominal pain, no chest pain, no claudication, no diaphoresis, no ear pain, no fever, no headaches, no hemoptysis, no neck pain, no PND, no rash, no sore throat, no sputum production, no syncope, no swollen glands, no vomiting and no wheezing    Cough:     Cough characteristics:  Non-productive    Sputum characteristics:  Nondescript    Severity:  Mild    Onset quality:  Gradual    Duration:  2 days    Timing:  Constant    Progression:  Waxing and waning    Chronicity:  New  Risk factors: no recent alcohol use, no family hx of DVT, no hx of cancer, no hx of PE/DVT, no obesity, no oral contraceptive use, no prolonged immobilization, no recent surgery and no tobacco use        REVIEW OF SYSTEMS     Review of Systems Constitutional: Negative for appetite change, diaphoresis, fatigue and fever. HENT: Positive for congestion and postnasal drip. Negative for ear pain and sore throat. Respiratory: Positive for cough and shortness of breath. Negative for hemoptysis, sputum production, chest tightness, wheezing and stridor. Cardiovascular: Negative for chest pain, palpitations, claudication, leg swelling, syncope and PND. Gastrointestinal: Negative for abdominal pain and vomiting. Musculoskeletal: Negative for neck pain. Skin: Negative for rash. Neurological: Positive for dizziness (intermittent). Negative for weakness, light-headedness, numbness and headaches. All other systems reviewed and are negative. PAST MEDICAL HISTORY         Diagnosis Date    Aneurysm, cerebral     Osteoarthritis     Thyroid disease     Unspecified cerebral artery occlusion with cerebral infarction        SURGICALHISTORY     Patient  has a past surgical history that includes Colonoscopy ([id number]); External ear surgery (2000); External ear surgery (1999); External ear surgery (1997); Cosmetic surgery ([id number]); Cosmetic surgery (2013); and Brain aneurysm surgery. CURRENT MEDICATIONS       Previous Medications    ATOMOXETINE (STRATTERA) 40 MG CAPSULE    Take 1 capsule by mouth daily    CALCIUM CARBONATE-VITAMIN D (CALCIUM-VITAMIN D) 600-200 MG-UNIT CAPS    Take 2 tablets by mouth daily. CLOTRIMAZOLE-BETAMETHASONE (LOTRISONE) 1-0.05 % CREAM    Apply topically 2 times daily. CRANBERRY 500 MG TABS    Take 1 tablet by mouth daily    DONEPEZIL (ARICEPT) 10 MG TABLET    TAKE ONE TABLET BY MOUTH ONCE DAILY IN THE EVENING    DULOXETINE (CYMBALTA) 30 MG EXTENDED RELEASE CAPSULE    Take 1 capsule by mouth daily    FISH OIL-OMEGA-3 FATTY ACIDS 1000 MG CAPSULE    Take 1 g by mouth. One tablet daily along with red yeast rice    GLUCOSAMINE-CHONDROIT-VIT C-MN (GLUCOSAMINE CHONDR 500 COMPLEX) CAPS    Take 2 capsules by mouth daily. Normocephalic and atraumatic. Mouth/Throat: No oropharyngeal exudate. Cardiovascular: Normal rate, normal heart sounds and intact distal pulses. Exam reveals no gallop and no friction rub. No murmur heard. Pulmonary/Chest: Accessory muscle usage present. No stridor. No apnea, no tachypnea and no bradypnea. She is not intubated. No respiratory distress. She has decreased breath sounds in the right lower field and the left lower field. She has wheezes. She has no rhonchi. She has no rales. She exhibits no tenderness. Musculoskeletal: Normal range of motion. Neurological: She is alert and oriented to person, place, and time. Skin: Skin is warm. Capillary refill takes less than 2 seconds. She is not diaphoretic. Psychiatric: She has a normal mood and affect. Her behavior is normal. Judgment and thought content normal.   Nursing note and vitals reviewed. DIAGNOSTIC RESULTS     Labs:  Results for orders placed or performed during the hospital encounter of 04/26/19   EKG 12 Lead   Result Value Ref Range    Ventricular Rate 118 BPM    Atrial Rate 118 BPM    P-R Interval 126 ms    QRS Duration 78 ms    Q-T Interval 316 ms    QTc Calculation (Bazett) 442 ms    P Axis 81 degrees    R Axis 61 degrees    T Axis 75 degrees       IMAGING:    XR CHEST STANDARD (2 VW)    (Results Pending)         EKG: , PACs      URGENT CARE COURSE:     Vitals:    04/26/19 1219 04/26/19 1237 04/26/19 1243   BP: (!) 124/59  (!) 124/54   Pulse: 116     Resp: 26 24 24   Temp: 97.7 °F (36.5 °C)     SpO2: 90% 96% 93%   Weight: 122 lb (55.3 kg)         Medications - No data to display         PROCEDURES:  None    FINAL IMPRESSION      1. Shortness of breath          DISPOSITION/ PLAN   Patient is transferred to [address] [place] [address] via squad due to patient's Pulse ox of 90% on room air. Patient was put on oxygen via nasal cannula at 2 L which did help improve oxygenation to 93%.   Patient is stable upon discharge        PATIENT REFERRED

## 2019-04-26 NOTE — ED PROVIDER NOTES
Jose Correa 13 COMPLAINT       Chief Complaint   Patient presents with    Cough       Nurses Notes reviewed and I agree except as noted in the HPI. HISTORY OF PRESENT ILLNESS    Otto Salinas is a 76 y.o. female with a past medical history of brain aneurysm and stroke who presents to the Emergency Department from urgent care for the evaluation of cough onset 2 days ago. The patient states she had a deep cough and shortness of breath that started 2 days ago. She was seen at urgent care and had an EKG done and then was sent here for further evaluation. The patient states she is producing clear phlegm. She was short of breath but it got better once she was put on 2: NC. Patient denies chest pain or sore throat. Patient states she got her flu and pneumonia shot. She has had pneumonia 3 times in the past, last one being 10-20 years ago. Patient denies ever having a DVT/PE. She states she quit smoking in 2015 due to her stroke but states she is around 2nd hand smoking. Patient had a brain aneurysm in the past. She was life flighted to [facility] and had a coil put in. The patient states she went to urgent care on 4/19 and was diagnosed with a UTI. She was given Macrobid. No further complaints at the time of the initial encounter. The HPI was provided by the patient. REVIEW OF SYSTEMS     Review of Systems   Constitutional: Negative for appetite change, chills, fatigue and fever. HENT: Negative for congestion, ear pain, rhinorrhea and sore throat. Eyes: Negative for pain, discharge and visual disturbance. Respiratory: Positive for cough (productive) and shortness of breath (better with 2L NC). Negative for wheezing. Cardiovascular: Negative for chest pain, palpitations and leg swelling. Gastrointestinal: Negative for abdominal pain, diarrhea, nausea and vomiting.    Genitourinary: Negative for difficulty urinating, dysuria, hematuria and vaginal discharge. Musculoskeletal: Negative for arthralgias, back pain, joint swelling and neck pain. Skin: Negative for pallor and rash. Neurological: Negative for dizziness, syncope, weakness, light-headedness, numbness and headaches. Hematological: Negative for adenopathy. Psychiatric/Behavioral: Negative for confusion and suicidal ideas. The patient is not nervous/anxious. PAST MEDICAL HISTORY    has a past medical history of Aneurysm, cerebral, Osteoarthritis, Pneumonia, Thyroid disease, and Unspecified cerebral artery occlusion with cerebral infarction. SURGICAL HISTORY      has a past surgical history that includes Colonoscopy ([id number]); External ear surgery (2000); External ear surgery (1999); External ear surgery (1997); Cosmetic surgery (6690,1738); Cosmetic surgery (2013); Brain aneurysm surgery; and Breast surgery. CURRENT MEDICATIONS       Current Discharge Medication List      CONTINUE these medications which have NOT CHANGED    Details   nitrofurantoin, macrocrystal-monohydrate, (MACROBID) 100 MG capsule Take 1 capsule by mouth 2 times daily for 7 days  Qty: 14 capsule, Refills: 0      Cranberry 500 MG TABS Take 1 tablet by mouth daily      Turmeric 500 MG TABS Take 1 tablet by mouth daily      clotrimazole-betamethasone (LOTRISONE) 1-0.05 % cream Apply topically 2 times daily. Qty: 90 g, Refills: 1    Associated Diagnoses: Tinea manus      atomoxetine (STRATTERA) 40 MG capsule Take 1 capsule by mouth daily  Qty: 30 capsule, Refills: 2      simvastatin (ZOCOR) 10 MG tablet TAKE 1 TABLET BY MOUTH ONCE DAILY NIGHTLY  Qty: 90 tablet, Refills: 3      donepezil (ARICEPT) 10 MG tablet TAKE ONE TABLET BY MOUTH ONCE DAILY IN THE EVENING  Qty: 90 tablet, Refills: 3    Associated Diagnoses: H/O: CVA (cerebrovascular accident);  Cognitive impairment      levothyroxine (SYNTHROID) 50 MCG tablet TAKE 1 TABLET DAILY  Qty: 90 tablet, Refills: 3    Associated Diagnoses: Hypothyroidism, unspecified type      oxybutynin (DITROPAN XL) 15 MG extended release tablet Take 1 tablet by mouth daily  Qty: 30 tablet, Refills: 3    Associated Diagnoses: OAB (overactive bladder)      ondansetron (ZOFRAN) 4 MG tablet Take 1 tablet by mouth every 6 hours as needed for Nausea or Vomiting  Qty: 120 tablet, Refills: 2    Associated Diagnoses: Chronic nausea      Glucosamine-Chondroit-Vit C-Mn (GLUCOSAMINE CHONDR 500 COMPLEX) CAPS Take 1 capsule by mouth 2 times daily       Multiple Vitamin (MULTI-VITAMIN PO) Take  by mouth. Calcium Carbonate-Vitamin D (CALCIUM-VITAMIN D) 600-200 MG-UNIT CAPS Take 1 tablet by mouth 2 times daily     Associated Diagnoses: Chronic bronchitis (HCC)      fish oil-omega-3 fatty acids 1000 MG capsule Take 1 g by mouth. One tablet daily along with red yeast rice             ALLERGIES     is allergic to sulfa antibiotics. FAMILY HISTORY     indicated that her mother is . She indicated that her father is . She indicated that her maternal aunt is . She indicated that her maternal uncle is . family history includes Cancer in her mother; Diabetes in her father; Heart Disease in her maternal aunt; Parkinsonism in her maternal uncle; Stroke in her mother. SOCIAL HISTORY      reports that she quit smoking about 4 years ago. Her smoking use included cigarettes. She has a 40.00 pack-year smoking history. She has never used smokeless tobacco. She reports that she does not drink alcohol or use drugs. PHYSICAL EXAM     INITIAL VITALS:  height is 5' 4\" (1.626 m) and weight is 122 lb (55.3 kg). Her oral temperature is 99.6 °F (37.6 °C). Her blood pressure is 118/66 and her pulse is 103. Her respiration is 18 and oxygen saturation is 94%. Physical Exam   Constitutional: She is oriented to person, place, and time. She appears well-developed and well-nourished. No distress. HENT:   Head: Normocephalic and atraumatic.    Right Ear: External ear normal.   Left Ear: External ear normal.   Eyes: Conjunctivae are normal.   Neck: Normal range of motion. Neck supple. No thyromegaly present. Cardiovascular: Regular rhythm and normal heart sounds. Tachycardia present. No murmur heard. Pulmonary/Chest: Effort normal. No respiratory distress. She has no decreased breath sounds. She has no wheezes. She has no rhonchi. She has rales (crackles) in the right lower field. She exhibits no tenderness. Abdominal: Soft. She exhibits no distension. There is no tenderness. Musculoskeletal: Normal range of motion. She exhibits no edema. Neurological: She is alert and oriented to person, place, and time. No cranial nerve deficit. Skin: Skin is warm and dry. No rash noted. She is not diaphoretic. No erythema. No pallor. Psychiatric: She has a normal mood and affect. Her behavior is normal. Judgment and thought content normal.   Nursing note and vitals reviewed. DIFFERENTIALDIAGNOSIS:   Pneumonia, bronchitis, influenza, UTI, ACS, PE    DIAGNOSTIC RESULTS     EKG: All EKG's are interpreted by the Emergency Department Physician who either signs or Co-signs this chart in the absence of a cardiologist.  EKG interpreted by Jah Godiwn MD:    Vent. Rate: 118 bpm  WA interval: 126 ms  QRS duration: 78 ms  QTc: 442 ms  P-R-T axes: 81, 61, 75  Sinus tachycardia with PAC's  No STEMI    RADIOLOGY: non-plain film images(s) such as CT, Ultrasound and MRI are read by the radiologist.    CTA CHEST [address]   Final Result      1. There is no acute pulmonary arterial embolism, within limitations of exam..   2. There is hilar and mediastinal lymphadenopathy which is nonspecific. Findings may be reactive, chronic or neoplastic. Correlation and follow-up is advised. 3. Small bilateral pleural effusions with adjacent atelectasis or infiltrate. **This report has been created using voice recognition software.  It may contain minor errors which are inherent in voice recognition technology. **      Final report electronically signed by Dr. Ladan Ruiz on 4/26/2019 3:50 PM      XR CHEST STANDARD (2 VW)   Final Result   Left retrocardiac opacities which may represent atelectasis or infiltrate. **This report has been created using voice recognition software. It may contain minor errors which are inherent in voice recognition technology. **      Final report electronically signed by Dr Jaki Huffman on 4/26/2019 1:33 PM          LABS:   Labs Reviewed   CBC WITH AUTO DIFFERENTIAL - Abnormal; Notable for the following components:       Result Value    WBC 11.7 (*)     Segs Absolute 9.3 (*)     Lymphocytes # 0.9 (*)     All other components within normal limits   BASIC METABOLIC PANEL - Abnormal; Notable for the following components:    Glucose 113 (*)     All other components within normal limits   BRAIN NATRIURETIC PEPTIDE - Abnormal; Notable for the following components:    Pro-BNP 1415.0 (*)     All other components within normal limits   D-DIMER, QUANTITATIVE - Abnormal; Notable for the following components:    D-Dimer, Quant 2014.00 (*)     All other components within normal limits   GLOMERULAR FILTRATION RATE, ESTIMATED - Abnormal; Notable for the following components:    Est, Glom Filt Rate 83 (*)     All other components within normal limits   URINALYSIS WITH MICROSCOPIC - Abnormal; Notable for the following components:    Bilirubin Urine SMALL (*)     Blood, Urine TRACE (*)     Protein,  (*)     Leukocyte Esterase, Urine SMALL (*)     Character, Urine CLOUDY (*)     All other components within normal limits   BLOOD GAS, ARTERIAL - Abnormal; Notable for the following components:    pH, Blood Gas 7.47 (*)     PO2 61 (*)     All other components within normal limits   RAPID INFLUENZA A/B ANTIGENS   CULTURE BLOOD #1   CULTURE BLOOD #2   STREP PNEUMONIAE ANTIGEN   LEGIONELLA ANTIGEN, URINE   RESPIRATORY CULTURE   TROPONIN   LACTIC ACID, PLASMA   ANION GAP OSMOLALITY   SCAN OF BLOOD SMEAR   ICTOTEST, URINE   TROPONIN   TROPONIN   BASIC METABOLIC PANEL W/ REFLEX TO MG FOR LOW K   CBC   TROPONIN       EMERGENCYDEPARTMENT COURSE:   Vitals:    Vitals:    04/26/19 1337 04/26/19 1414 04/26/19 1505 04/26/19 1800   BP: 116/77 115/69 125/74 118/66   Pulse: 107 101 95 103   Resp: 22 24 18 18   Temp:    99.6 °F (37.6 °C)   TempSrc:    Oral   SpO2: 94% (!) 87% 96% 94%   Weight:    122 lb (55.3 kg)   Height:    5' 4\" (1.626 m)       1:24 PM: The patient was seen and evaluated. MDM:  The patient presents to the ED from urgent care with complaints of cough. The patient was put on a cardiac monitor and an EKG was done on arrival. EKG showed no ischemic changes. The patient was tachycardiac and tachypneic but not in any acute distress. The patient's physical exam revealed crackles in the right lower field. Patient was tachycardiac on exam. Patient was hypoxic on ambulation. Within the department, the patient was treated with Zithromax, Rocephin, Lasix and duoneb. Patient's status improved during the duration of their stay. Labs and imaging were ordered, and reviewed with the patient. UA showed signs of infection. Troponin was within normal limits. Pro-BNP was elevated. D-Dimer was elevated. Rest of labs were reassuring. Influenza was negative. CTA chest with and without contrast revealed no acute pulmonary arterial embolism, within limitations of exam. There is hilar and mediastinal lymphadenopathy which is nonspecific. Findings may be reactive, chronic or neoplastic. Small bilateral pleural effusions with adjacent atelectasis or infiltrate. CXR revealed left retrocardiac opacities which may represent atelectasis or infiltrate. I explained my proposed course of treatment with the patient, and he was amenable to my decision. The patient was admitted under Dr. Gaensh Guillaume.      CRITICAL CARE:   None    CONSULTS:  Dr. Ganesh Guillauem, Hospitalist    PROCEDURES:  None     FINAL IMPRESSION      1. Hypoxia    2. Shortness of breath    3. Mediastinal lymphadenopathy    4. Pneumonia due to organism    5. Elevated brain natriuretic peptide (BNP) level          DISPOSITION/PLAN   Admit    PATIENT REFERRED TO:  SARITHA Mckee - CNP  [address] [address]  [phone]          [address] [address]  [phone]            DISCHARGE MEDICATIONS:  Current Discharge Medication List          (Please note that portions of this note were completed with a voice recognition program.  Efforts were made to edit the dictations but occasionally words aremis-transcribed.)    Scribe:  Klaudia Guo 4/26/19 1:24 PM Scribing for and in the presence of Layton Kelly MD.    Signed by: Radha Garcia, 04/26/19 7:08 PM    Provider:  I personally performed theservices described in the documentation, reviewed and edited the documentation which was dictated to the scribe in my presence, and it accurately records my words and actions.     Layton Kelly MD 4/26/19 7:08 PM        Layton Kelly MD  04/26/19 4191

## 2019-04-26 NOTE — ED NOTES
PATIENT UP TO USE THE BATHROOM, WHEN PATIENT RETURNED TO HER BED, HER SPO2 WAS BACK DOWN TO 87% ON ROOM AIR- PATIENT PLACED BACK ON 2 LITERS NC, SPO2 INCREASED TO 96% IMMEDIATELY     Bonita Viveros RN  04/26/19 9108

## 2019-04-26 NOTE — PROGRESS NOTES
Pt admitted to  6K11 via cart/stretcher. Complaints: Shortness of breath. IV in the forearm left, condition patent and no redness. IV site free of s/s of infection or infiltration. Vital signs obtained. Assessment and data collection initiated. Two nurse skin assessment performed by Suzy Bicrh and Gregorio Mon RN. Oriented to room. Policies and procedures for  explained. All questions answered with no further questions at this time. Fall prevention and safety brochure discussed with patient. Bed alarm on. Call light in reach. The best day to schedule a follow up Dr appointment is:  Monday p.m.

## 2019-04-27 LAB
ANION GAP SERPL CALCULATED.3IONS-SCNC: 14 MEQ/L (ref 8–16)
BUN BLDV-MCNC: 14 MG/DL (ref 7–22)
CALCIUM SERPL-MCNC: 8.2 MG/DL (ref 8.5–10.5)
CHLORIDE BLD-SCNC: 100 MEQ/L (ref 98–111)
CO2: 23 MEQ/L (ref 23–33)
CREAT SERPL-MCNC: 0.7 MG/DL (ref 0.4–1.2)
ERYTHROCYTE [DISTWIDTH] IN BLOOD BY AUTOMATED COUNT: 12.6 % (ref 11.5–14.5)
ERYTHROCYTE [DISTWIDTH] IN BLOOD BY AUTOMATED COUNT: 41.5 FL (ref 35–45)
GFR SERPL CREATININE-BSD FRML MDRD: 83 ML/MIN/1.73M2
GLUCOSE BLD-MCNC: 98 MG/DL (ref 70–108)
HCT VFR BLD CALC: 36.2 % (ref 37–47)
HEMOGLOBIN: 11.8 GM/DL (ref 12–16)
LV EF: 60 %
LVEF MODALITY: NORMAL
MAGNESIUM: 2 MG/DL (ref 1.6–2.4)
MCH RBC QN AUTO: 29.3 PG (ref 26–33)
MCHC RBC AUTO-ENTMCNC: 32.6 GM/DL (ref 32.2–35.5)
MCV RBC AUTO: 89.8 FL (ref 81–99)
MRSA SCREEN RT-PCR: NEGATIVE
PLATELET # BLD: 250 THOU/MM3 (ref 130–400)
PMV BLD AUTO: 11.8 FL (ref 9.4–12.4)
POTASSIUM REFLEX MAGNESIUM: 3.3 MEQ/L (ref 3.5–5.2)
RBC # BLD: 4.03 MILL/MM3 (ref 4.2–5.4)
SODIUM BLD-SCNC: 137 MEQ/L (ref 135–145)
TROPONIN T: < 0.01 NG/ML
WBC # BLD: 10.2 THOU/MM3 (ref 4.8–10.8)

## 2019-04-27 PROCEDURE — 6360000002 HC RX W HCPCS: Performed by: FAMILY MEDICINE

## 2019-04-27 PROCEDURE — 2580000003 HC RX 258: Performed by: FAMILY MEDICINE

## 2019-04-27 PROCEDURE — 80048 BASIC METABOLIC PNL TOTAL CA: CPT

## 2019-04-27 PROCEDURE — 93306 TTE W/DOPPLER COMPLETE: CPT

## 2019-04-27 PROCEDURE — 83735 ASSAY OF MAGNESIUM: CPT

## 2019-04-27 PROCEDURE — 85027 COMPLETE CBC AUTOMATED: CPT

## 2019-04-27 PROCEDURE — 84484 ASSAY OF TROPONIN QUANT: CPT

## 2019-04-27 PROCEDURE — 2580000003 HC RX 258: Performed by: HOSPITALIST

## 2019-04-27 PROCEDURE — 36415 COLL VENOUS BLD VENIPUNCTURE: CPT

## 2019-04-27 PROCEDURE — 6370000000 HC RX 637 (ALT 250 FOR IP): Performed by: FAMILY MEDICINE

## 2019-04-27 PROCEDURE — 6370000000 HC RX 637 (ALT 250 FOR IP)

## 2019-04-27 PROCEDURE — 99232 SBSQ HOSP IP/OBS MODERATE 35: CPT | Performed by: HOSPITALIST

## 2019-04-27 PROCEDURE — 1200000003 HC TELEMETRY R&B

## 2019-04-27 RX ORDER — 0.9 % SODIUM CHLORIDE 0.9 %
500 INTRAVENOUS SOLUTION INTRAVENOUS ONCE
Status: COMPLETED | OUTPATIENT
Start: 2019-04-27 | End: 2019-04-27

## 2019-04-27 RX ORDER — POTASSIUM CHLORIDE 20 MEQ/1
40 TABLET, EXTENDED RELEASE ORAL ONCE
Status: COMPLETED | OUTPATIENT
Start: 2019-04-27 | End: 2019-04-27

## 2019-04-27 RX ADMIN — ENOXAPARIN SODIUM 40 MG: 40 INJECTION SUBCUTANEOUS at 20:03

## 2019-04-27 RX ADMIN — POTASSIUM CHLORIDE 40 MEQ: 20 TABLET, EXTENDED RELEASE ORAL at 11:58

## 2019-04-27 RX ADMIN — SIMVASTATIN 10 MG: 10 TABLET, FILM COATED ORAL at 20:04

## 2019-04-27 RX ADMIN — Medication 10 ML: at 08:45

## 2019-04-27 RX ADMIN — OXYBUTYNIN 15 MG: 10 TABLET, FILM COATED, EXTENDED RELEASE ORAL at 08:45

## 2019-04-27 RX ADMIN — AZITHROMYCIN MONOHYDRATE 500 MG: 500 INJECTION, POWDER, LYOPHILIZED, FOR SOLUTION INTRAVENOUS at 20:03

## 2019-04-27 RX ADMIN — ATOMOXETINE 40 MG: 40 CAPSULE ORAL at 08:45

## 2019-04-27 RX ADMIN — Medication 10 ML: at 20:04

## 2019-04-27 RX ADMIN — DONEPEZIL HYDROCHLORIDE 10 MG: 10 TABLET, FILM COATED ORAL at 20:04

## 2019-04-27 RX ADMIN — LEVOTHYROXINE SODIUM 50 MCG: 50 TABLET ORAL at 06:33

## 2019-04-27 RX ADMIN — SODIUM CHLORIDE 500 ML: 9 INJECTION, SOLUTION INTRAVENOUS at 12:30

## 2019-04-27 RX ADMIN — DULOXETINE HYDROCHLORIDE 30 MG: 30 CAPSULE, DELAYED RELEASE ORAL at 08:45

## 2019-04-27 NOTE — PLAN OF CARE
Problem: Safety:  Goal: Free from accidental physical injury  Description  Free from accidental physical injury  Outcome: Ongoing  Note:   No falls noted this shift. Continue falling star program. Bed alarm on, bed in low position. Call light and personal belongings in reach. Patient uses call light appropriately. Problem: Daily Care:  Goal: Daily care needs are met  Description  Daily care needs are met  Outcome: Ongoing  Note:   Patient able to voice needs appropriately and is call light appropriate. Problem: Pain:  Goal: Patient's pain/discomfort is manageable  Description  Patient's pain/discomfort is manageable  Outcome: Ongoing  Note:   Patient does not complain of pain this shift. Problem: Skin Integrity:  Goal: Skin integrity will stabilize  Description  Skin integrity will stabilize  Outcome: Ongoing  Note:   No new signs or symptoms of skin breakdown noted this shift, encouraging patient to turn and reposition self in bed q2h       Problem: Discharge Planning:  Goal: Patients continuum of care needs are met  Description  Patients continuum of care needs are met  Outcome: Ongoing  Note:   Patient plans to return home upon discharge when medically stable. Care plan reviewed with patient. Patient verbalizes understanding of the plan of care and contribute to goal setting.

## 2019-04-27 NOTE — FLOWSHEET NOTE
04/27/19 0841   Provider Notification   Reason for Communication Evaluate   Provider Name [facility]   Provider Notification Physician   Method of Communication Secure Message   Response See orders   Notification Time [phone]       8:41 am  [phone] From: Ruben Murillo RN [facility] RE: Ronald Ontiveros 6A61- Potassium level was 3.3 this AM , would you like potassium replacement ordered?  Thank you  Unread

## 2019-04-27 NOTE — PLAN OF CARE
Problem: Safety:  Goal: Free from accidental physical injury  Description  Free from accidental physical injury  4/27/2019 1025 by Lana Apodaca RN  Outcome: Ongoing  Note:   Call light within reach. Side rails up x2. Bed alarm on. Non skid slippers available. 4/27/2019 0122 by Messi Carrion RN  Outcome: Ongoing  Note:   No falls noted this shift. Continue falling star program. Bed alarm on, bed in low position. Call light and personal belongings in reach. Patient uses call light appropriately. Problem: Daily Care:  Goal: Daily care needs are met  Description  Daily care needs are met  4/27/2019 1025 by Lana Apodaca RN  Outcome: Ongoing  4/27/2019 0122 by Messi Carrion RN  Outcome: Ongoing  Note:   Patient able to voice needs appropriately and is call light appropriate. Problem: Pain:  Goal: Patient's pain/discomfort is manageable  Description  Patient's pain/discomfort is manageable  4/27/2019 1025 by Lana Apodaca RN  Outcome: Ongoing  Note:   Patient denies pain at this time. 4/27/2019 0122 by Messi Carrion RN  Outcome: Ongoing  Note:   Patient does not complain of pain this shift.         Problem: Skin Integrity:  Goal: Skin integrity will stabilize  Description  Skin integrity will stabilize  4/27/2019 1025 by Lana Apodaca RN  Outcome: Ongoing  Note:   No new signs or symptoms of skin breakdown noted this shift, encouraging patient to turn and reposition self in bed q2h    4/27/2019 0122 by Messi Carrion RN  Outcome: Ongoing  Note:   No new signs or symptoms of skin breakdown noted this shift, encouraging patient to turn and reposition self in bed q2h       Problem: Discharge Planning:  Goal: Patients continuum of care needs are met  Description  Patients continuum of care needs are met  4/27/2019 1025 by Lana Apodaca RN  Outcome: Ongoing  Note:   Patient plans to discharge to home   4/27/2019 0122 by Messi Carrion RN  Outcome: Ongoing  Note:   Patient plans to return home upon discharge when medically stable. Care plan reviewed with patient. Patient verbalize understanding of the plan of care and contribute to goal setting.

## 2019-04-27 NOTE — PROGRESS NOTES
Hospitalist Progress Note    Patient:  Rodney Quintanilla      Unit/Bed:6K-11/011-A    YOB: 1950    MRN: [id number]       Acct: [de-identified]     PCP: SARITHA Jane CNP    Date of Admission: 4/26/2019    Active Hospital Problems    Diagnosis Date Noted    SOB (shortness of breath) [R06.02] 04/26/2019       Assessment/Plan:    - Acute Hypoxic Respiratory Failure: likely d/t PNA     4/27: 2D echo unremarkable. Will treat as for atypical PNA. F/U cultures. CXR shows hyperaeration c/w COPD. ABG sowed NL PCo2. Given longstanding smoking Hx would benefit from OP PFT to r/o COPD. RN aware to wean as tolerated  CT chest negative for PE. Serial trop negative and ECG negative for any new/dynamic ischemic changes    - HypoK: 3.3. Likely d/t lasix. On replacement      - Dementia  - Depression  - Continue with Cymbalta  - Hypothyroidism on synthroid. Last TSH WNLs. - HLD  - Continue with zocor          Expected discharge date:  1-2 days         Disposition:      [x] Home                             [] TCU                             [] Rehab                             [] Psych                             [] SNF                             [] [facility]                             [] Other-    Chief Complaint:   Chief Complaint   Patient presents with   [facility]       Hospital Course: Patient was seen, examined and the medical chart was reviewed thoroughly today. In summary, 76 y. o.female admitted overnight for Acute Hypoxic Respiratory Failure. Subjective (past 24 hours):   3-4 day hx of cough, URT symptoms. Denies CP. Ex- heavy smoker quit 4 yrs ago.       Medications:  Reviewed    Infusion Medications   Scheduled Medications    potassium replacement protocol   Other RX Placeholder    potassium chloride  40 mEq Oral Once    atomoxetine  40 mg Oral Daily    donepezil  10 mg Oral Nightly    DULoxetine  30 mg Oral Daily    levothyroxine  50 mcg Oral Daily    oxybutynin  15 mg Oral Daily    simvastatin  10 mg Oral Nightly    sodium chloride flush  10 mL Intravenous 2 times per day    enoxaparin  40 mg Subcutaneous Q24H    azithromycin  500 mg Intravenous Q24H    And    cefTRIAXone (ROCEPHIN) IV  1 g Intravenous Q24H     PRN Meds: ondansetron, sodium chloride flush, magnesium hydroxide, ondansetron, acetaminophen      Intake/Output Summary (Last 24 hours) at 4/27/2019 1108  Last data filed at 4/27/2019 0326  Gross per 24 hour   Intake 316.36 ml   Output 0 ml   Net 316.36 ml       Diet:  DIET CARDIAC; Exam:  BP (!) 117/55   Pulse 100   Temp 98.6 °F (37 °C) (Oral)   Resp 18   Ht 5' 4\" (1.626 m)   Wt 121 lb 12.8 oz (55.2 kg)   LMP  (Exact Date)   SpO2 91%   BMI 20.91 kg/m²     General appearance: A&O x3, Not ill or toxic, in no apparent distress  Neck: Supple, no JVD, no carotid bruits  Heart: Regular rhythm, bradycardic, normal S1 and S2, no rubs, murmurs or gallops  Lungs: clear to ascultation no rales, wheezes, or rhonchi  Abdomen: soft, non-tender, non-distended, no bruits, no masses  Extremities: no clubbing, cyanosis or edema  Neurologic: alert and oriented x 3, cranial nerves 2-12 grossly intact, motor and sensory intact, moving all extremities  Skin: No rashes  Capillary Refill: Brisk,< 3 seconds   Peripheral Pulses: +2 palpable, equal bilaterally           Labs:   Recent Labs     04/26/19  1354 04/27/19  0711   WBC 11.7* 10.2   HGB 12.7 11.8*   HCT 39.3 36.2*    250     Recent Labs     04/26/19  1354 04/27/19  0711    137   K 4.1 3.3*   CL 98 100   CO2 26 23   BUN 15 14   CREATININE 0.7 0.7   CALCIUM 8.9 8.2*     No results for input(s): AST, ALT, BILIDIR, BILITOT, ALKPHOS in the last 72 hours. No results for input(s): INR in the last 72 hours. No results for input(s): [place] in the last 72 hours.     Urinalysis:      Lab Results   Component Value Date    NITRU NEGATIVE 04/26/2019    WBCUA 50-75 04/26/2019    BACTERIA NONE 04/26/2019    RBCUA 3-5 04/26/2019    BLOODU TRACE 04/26/2019    SPECGRAV 1.029 04/26/2019    GLUCOSEU neg 12/16/2013       Radiology:  Xr Chest Standard (2 Vw)    Result Date: 4/26/2019  PROCEDURE: XR CHEST (2 VW) CLINICAL INFORMATION: 69-year-old female with cough. COMPARISON: Chest x-ray 3/15/2012 TECHNIQUE: PA and lateral views of the chest were obtained. FINDINGS: There are some left retrocardiac opacities which may be on the basis of atelectasis or infiltrate. The cardiac silhouette and pulmonary vasculature are within normal limits. There is no significant pleural effusion or pneumothorax. Visualized portions of the upper abdomen are within normal limits. The osseous structures are intact. No acute fractures or suspicious osseous lesions. Left retrocardiac opacities which may represent atelectasis or infiltrate. **This report has been created using voice recognition software. It may contain minor errors which are inherent in voice recognition technology. ** Final report electronically signed by Dr Rosealee Spurling on 4/26/2019 1:33 PM    Cta Chest W Wo Contrast    Result Date: 4/26/2019  PROCEDURE: CTA CHEST W WO CONTRAST CLINICAL INFORMATION: sob chest pain tachycardia elevated d dimer. COMPARISON: No prior study. TECHNIQUE: 1.5 mm axial images were obtained through the chest after the administration of IV contrast.  A non-contrast localizer was obtained. 3D reconstructions were performed on the scanner to include sagittal and bilateral oblique images through the  chest. 80 mL Isovue-370 were administered intravenously. All CT scans at this facility use dose modulation, iterative reconstruction, and/or weight-based dosing when appropriate to reduce radiation dose to as low as reasonably achievable. FINDINGS: Motion artifact throughout the exam limits interpretation. Thyroid gland is unremarkable. Thoracic aorta with normal caliber with atherosclerotic changes. Pulmonary arterial vessels are adequately opacified.  There is no acute pulmonary arterial embolism. Multiple hilar and mediastinal lymphadenopathy. Correlation is advised. No cardiomegaly. No pericardial effusion. There is mild dependent atelectasis with trace bilateral pleural effusions and interlobular septal thickening. No acute osseous findings. No acute upper abdominal findings. 1. There is no acute pulmonary arterial embolism, within limitations of exam.. 2. There is hilar and mediastinal lymphadenopathy which is nonspecific. Findings may be reactive, chronic or neoplastic. Correlation and follow-up is advised. 3. Small bilateral pleural effusions with adjacent atelectasis or infiltrate. **This report has been created using voice recognition software. It may contain minor errors which are inherent in voice recognition technology. ** Final report electronically signed by Dr. Mitali Duarte on 4/26/2019 3:50 PM      Diet: DIET CARDIAC;    DVT prophylaxis: [x] Lovenox                                 [] SCDs                                 [] SQ Heparin                                 [] Encourage ambulation           [] Already on Anticoagulation       Code Status: Full Code      Active Hospital Problems    Diagnosis Date Noted    SOB (shortness of breath) [R06.02] 04/26/2019           Patient was updated about the treatment plan, all the questions and concerns were addressed.         Electronically signed by Randall Meza MD on 4/27/2019 at 11:08 AM

## 2019-04-27 NOTE — PROGRESS NOTES
Per Lizbet Fay , Patient ambulated in the hallway minimal of 20 feet. Patient pulse ox was 80 % with Exercise. Patient felt short of breath . Patient returnt Oxygen saturation to 91 % at rest. Will continue to monitor.

## 2019-04-28 LAB
ANION GAP SERPL CALCULATED.3IONS-SCNC: 14 MEQ/L (ref 8–16)
BASOPHILS # BLD: 0.5 %
BASOPHILS ABSOLUTE: 0.1 THOU/MM3 (ref 0–0.1)
BUN BLDV-MCNC: 19 MG/DL (ref 7–22)
CALCIUM SERPL-MCNC: 8.7 MG/DL (ref 8.5–10.5)
CHLORIDE BLD-SCNC: 102 MEQ/L (ref 98–111)
CO2: 22 MEQ/L (ref 23–33)
CREAT SERPL-MCNC: 0.9 MG/DL (ref 0.4–1.2)
EOSINOPHIL # BLD: 3.7 %
EOSINOPHILS ABSOLUTE: 0.4 THOU/MM3 (ref 0–0.4)
ERYTHROCYTE [DISTWIDTH] IN BLOOD BY AUTOMATED COUNT: 12.9 % (ref 11.5–14.5)
ERYTHROCYTE [DISTWIDTH] IN BLOOD BY AUTOMATED COUNT: 43.8 FL (ref 35–45)
GFR SERPL CREATININE-BSD FRML MDRD: 62 ML/MIN/1.73M2
GLUCOSE BLD-MCNC: 141 MG/DL (ref 70–108)
HCT VFR BLD CALC: 38.2 % (ref 37–47)
HEMOGLOBIN: 12.1 GM/DL (ref 12–16)
IMMATURE GRANS (ABS): 0.04 THOU/MM3 (ref 0–0.07)
IMMATURE GRANULOCYTES: 0.4 %
LYMPHOCYTES # BLD: 14.5 %
LYMPHOCYTES ABSOLUTE: 1.6 THOU/MM3 (ref 1–4.8)
MCH RBC QN AUTO: 29.4 PG (ref 26–33)
MCHC RBC AUTO-ENTMCNC: 31.7 GM/DL (ref 32.2–35.5)
MCV RBC AUTO: 92.9 FL (ref 81–99)
MONOCYTES # BLD: 13.4 %
MONOCYTES ABSOLUTE: 1.5 THOU/MM3 (ref 0.4–1.3)
NUCLEATED RED BLOOD CELLS: 0 /100 WBC
PLATELET # BLD: 297 THOU/MM3 (ref 130–400)
PMV BLD AUTO: 10.7 FL (ref 9.4–12.4)
POTASSIUM SERPL-SCNC: 3.7 MEQ/L (ref 3.5–5.2)
RBC # BLD: 4.11 MILL/MM3 (ref 4.2–5.4)
SEG NEUTROPHILS: 67.5 %
SEGMENTED NEUTROPHILS ABSOLUTE COUNT: 7.4 THOU/MM3 (ref 1.8–7.7)
SODIUM BLD-SCNC: 138 MEQ/L (ref 135–145)
TROPONIN T: < 0.01 NG/ML
TROPONIN T: < 0.01 NG/ML
WBC # BLD: 10.9 THOU/MM3 (ref 4.8–10.8)

## 2019-04-28 PROCEDURE — 1200000003 HC TELEMETRY R&B

## 2019-04-28 PROCEDURE — 80048 BASIC METABOLIC PNL TOTAL CA: CPT

## 2019-04-28 PROCEDURE — 6370000000 HC RX 637 (ALT 250 FOR IP): Performed by: FAMILY MEDICINE

## 2019-04-28 PROCEDURE — 36415 COLL VENOUS BLD VENIPUNCTURE: CPT

## 2019-04-28 PROCEDURE — 2580000003 HC RX 258: Performed by: FAMILY MEDICINE

## 2019-04-28 PROCEDURE — 84484 ASSAY OF TROPONIN QUANT: CPT

## 2019-04-28 PROCEDURE — 99232 SBSQ HOSP IP/OBS MODERATE 35: CPT | Performed by: HOSPITALIST

## 2019-04-28 PROCEDURE — 85025 COMPLETE CBC W/AUTO DIFF WBC: CPT

## 2019-04-28 PROCEDURE — 6360000002 HC RX W HCPCS: Performed by: FAMILY MEDICINE

## 2019-04-28 RX ADMIN — OXYBUTYNIN 15 MG: 10 TABLET, FILM COATED, EXTENDED RELEASE ORAL at 08:02

## 2019-04-28 RX ADMIN — AZITHROMYCIN MONOHYDRATE 500 MG: 500 INJECTION, POWDER, LYOPHILIZED, FOR SOLUTION INTRAVENOUS at 20:23

## 2019-04-28 RX ADMIN — ACETAMINOPHEN 650 MG: 325 TABLET ORAL at 22:15

## 2019-04-28 RX ADMIN — ATOMOXETINE 40 MG: 40 CAPSULE ORAL at 08:02

## 2019-04-28 RX ADMIN — DULOXETINE HYDROCHLORIDE 30 MG: 30 CAPSULE, DELAYED RELEASE ORAL at 08:02

## 2019-04-28 RX ADMIN — Medication 10 ML: at 20:23

## 2019-04-28 RX ADMIN — LEVOTHYROXINE SODIUM 50 MCG: 50 TABLET ORAL at 06:46

## 2019-04-28 RX ADMIN — SIMVASTATIN 10 MG: 10 TABLET, FILM COATED ORAL at 20:22

## 2019-04-28 RX ADMIN — Medication 10 ML: at 08:02

## 2019-04-28 RX ADMIN — ENOXAPARIN SODIUM 40 MG: 40 INJECTION SUBCUTANEOUS at 20:23

## 2019-04-28 RX ADMIN — DONEPEZIL HYDROCHLORIDE 10 MG: 10 TABLET, FILM COATED ORAL at 20:22

## 2019-04-28 ASSESSMENT — PAIN SCALES - GENERAL
PAINLEVEL_OUTOF10: 3
PAINLEVEL_OUTOF10: 0
PAINLEVEL_OUTOF10: 0

## 2019-04-28 NOTE — PROGRESS NOTES
anxious and reassurance provided. Medications:  Reviewed    Infusion Medications   Scheduled Medications    potassium replacement protocol   Other RX Placeholder    atomoxetine  40 mg Oral Daily    donepezil  10 mg Oral Nightly    DULoxetine  30 mg Oral Daily    levothyroxine  50 mcg Oral Daily    oxybutynin  15 mg Oral Daily    simvastatin  10 mg Oral Nightly    sodium chloride flush  10 mL Intravenous 2 times per day    enoxaparin  40 mg Subcutaneous Q24H    azithromycin  500 mg Intravenous Q24H     PRN Meds: ondansetron, sodium chloride flush, magnesium hydroxide, ondansetron, acetaminophen      Intake/Output Summary (Last 24 hours) at 4/28/2019 1411  Last data filed at 4/28/2019 7946  Gross per 24 hour   Intake 1507.15 ml   Output 500 ml   Net 1007.15 ml       Diet:  DIET CARDIAC;     Exam:  BP (!) 104/55   Pulse 104   Temp 97.5 °F (36.4 °C) (Oral)   Resp 16   Ht 5' 4\" (1.626 m)   Wt 121 lb 4.8 oz (55 kg)   LMP  (Exact Date)   SpO2 94%   BMI 20.82 kg/m²     General appearance: A&O x3, Not ill or toxic, in no apparent distress  Neck: Supple, no JVD, no carotid bruits  Heart: Regular rhythm, bradycardic, normal S1 and S2, no rubs, murmurs or gallops  Lungs: clear to ascultation no rales, wheezes, or rhonchi  Abdomen: soft, non-tender, non-distended, no bruits, no masses  Extremities: no clubbing, cyanosis or edema  Neurologic: alert and oriented x 3, cranial nerves 2-12 grossly intact, motor and sensory intact, moving all extremities  Skin: No rashes  Capillary Refill: Brisk,< 3 seconds   Peripheral Pulses: +2 palpable, equal bilaterally           Labs:   Recent Labs     04/26/19  1354 04/27/19  0711   WBC 11.7* 10.2   HGB 12.7 11.8*   HCT 39.3 36.2*    250     Recent Labs     04/26/19  1354 04/27/19  0711    137   K 4.1 3.3*   CL 98 100   CO2 26 23   BUN 15 14   CREATININE 0.7 0.7   CALCIUM 8.9 8.2*     No results for input(s): AST, ALT, BILIDIR, BILITOT, ALKPHOS in the last 72 hours. No results for input(s): INR in the last 72 hours. No results for input(s): Rodrick Aver in the last 72 hours. Urinalysis:      Lab Results   Component Value Date    NITRU NEGATIVE 04/26/2019    WBCUA 50-75 04/26/2019    BACTERIA NONE 04/26/2019    RBCUA 3-5 04/26/2019    BLOODU TRACE 04/26/2019    SPECGRAV 1.029 04/26/2019    GLUCOSEU neg 12/16/2013       Radiology:  Xr Chest Standard (2 Vw)    Result Date: 4/26/2019  PROCEDURE: XR CHEST (2 VW) CLINICAL INFORMATION: 59-year-old female with cough. COMPARISON: Chest x-ray 3/15/2012 TECHNIQUE: PA and lateral views of the chest were obtained. FINDINGS: There are some left retrocardiac opacities which may be on the basis of atelectasis or infiltrate. The cardiac silhouette and pulmonary vasculature are within normal limits. There is no significant pleural effusion or pneumothorax. Visualized portions of the upper abdomen are within normal limits. The osseous structures are intact. No acute fractures or suspicious osseous lesions. Left retrocardiac opacities which may represent atelectasis or infiltrate. **This report has been created using voice recognition software. It may contain minor errors which are inherent in voice recognition technology. ** Final report electronically signed by Dr Puma Martinez on 4/26/2019 1:33 PM    Cta Chest W Wo Contrast    Result Date: 4/26/2019  PROCEDURE: CTA CHEST W WO CONTRAST CLINICAL INFORMATION: sob chest pain tachycardia elevated d dimer. COMPARISON: No prior study. TECHNIQUE: 1.5 mm axial images were obtained through the chest after the administration of IV contrast.  A non-contrast localizer was obtained. 3D reconstructions were performed on the scanner to include sagittal and bilateral oblique images through the  chest. 80 mL Isovue-370 were administered intravenously.  All CT scans at this facility use dose modulation, iterative reconstruction, and/or weight-based dosing when appropriate to reduce radiation dose to as low as reasonably achievable. FINDINGS: Motion artifact throughout the exam limits interpretation. Thyroid gland is unremarkable. Thoracic aorta with normal caliber with atherosclerotic changes. Pulmonary arterial vessels are adequately opacified. There is no acute pulmonary arterial embolism. Multiple hilar and mediastinal lymphadenopathy. Correlation is advised. No cardiomegaly. No pericardial effusion. There is mild dependent atelectasis with trace bilateral pleural effusions and interlobular septal thickening. No acute osseous findings. No acute upper abdominal findings. 1. There is no acute pulmonary arterial embolism, within limitations of exam.. 2. There is hilar and mediastinal lymphadenopathy which is nonspecific. Findings may be reactive, chronic or neoplastic. Correlation and follow-up is advised. 3. Small bilateral pleural effusions with adjacent atelectasis or infiltrate. **This report has been created using voice recognition software. It may contain minor errors which are inherent in voice recognition technology. ** Final report electronically signed by Dr. Giovanni Farris on 4/26/2019 3:50 PM      Diet: DIET CARDIAC;    DVT prophylaxis: [x] Lovenox                                 [] SCDs                                 [] SQ Heparin                                 [] Encourage ambulation           [] Already on Anticoagulation       Code Status: Full Code      Active Hospital Problems    Diagnosis Date Noted    Hypoxia [R09.02]     SOB (shortness of breath) [R06.02] 04/26/2019           Patient was updated about the treatment plan, all the questions and concerns were addressed.         Electronically signed by Sabina Thurman MD on 4/28/2019 at 2:11 PM

## 2019-04-28 NOTE — PROGRESS NOTES
Ambulated patient in hallway 300 ft , ROOM AIR ,  tolerated well. Pulse oxygen stayed above 92% , ranging from 94 -100% . Encouraged patient to focus on breathing when ambulating.

## 2019-04-28 NOTE — PROGRESS NOTES
Patients oxygen saturation has been 93 - 97 % , decreased oxygen to 1L/min. Will continue to monitor.

## 2019-04-28 NOTE — PLAN OF CARE
Problem: Safety:  Goal: Free from accidental physical injury  Description  Free from accidental physical injury  Outcome: Ongoing  Note:   Call light within reach. Side rails up x2. Bed alarm on. Non skid slippers available. Problem: Daily Care:  Goal: Daily care needs are met  Description  Daily care needs are met  Outcome: Ongoing     Problem: Pain:  Goal: Patient's pain/discomfort is manageable  Description  Patient's pain/discomfort is manageable  Outcome: Ongoing  Note:   Patient denies pain      Problem: Skin Integrity:  Goal: Skin integrity will stabilize  Description  Skin integrity will stabilize  Outcome: Ongoing  Note:   No new signs or symptoms of skin breakdown noted this shift, encouraging patient to turn and reposition self in bed q2h       Problem: Discharge Planning:  Goal: Patients continuum of care needs are met  Description  Patients continuum of care needs are met  Outcome: Ongoing   Care plan reviewed with patient. Patient verbalize understanding of the plan of care and contribute to goal setting.

## 2019-04-29 LAB
ANION GAP SERPL CALCULATED.3IONS-SCNC: 11 MEQ/L (ref 8–16)
ANION GAP SERPL CALCULATED.3IONS-SCNC: 12 MEQ/L (ref 8–16)
BASOPHILS # BLD: 0.5 %
BASOPHILS ABSOLUTE: 0.1 THOU/MM3 (ref 0–0.1)
BUN BLDV-MCNC: 22 MG/DL (ref 7–22)
BUN BLDV-MCNC: 23 MG/DL (ref 7–22)
CALCIUM SERPL-MCNC: 8.1 MG/DL (ref 8.5–10.5)
CALCIUM SERPL-MCNC: 8.7 MG/DL (ref 8.5–10.5)
CHLORIDE BLD-SCNC: 104 MEQ/L (ref 98–111)
CHLORIDE BLD-SCNC: 104 MEQ/L (ref 98–111)
CO2: 23 MEQ/L (ref 23–33)
CO2: 27 MEQ/L (ref 23–33)
CREAT SERPL-MCNC: 1.4 MG/DL (ref 0.4–1.2)
CREAT SERPL-MCNC: 1.4 MG/DL (ref 0.4–1.2)
CREATININE URINE: 58.5 MG/DL
EOSINOPHIL # BLD: 4.8 %
EOSINOPHIL SMEAR: NORMAL
EOSINOPHILS ABSOLUTE: 0.6 THOU/MM3 (ref 0–0.4)
ERYTHROCYTE [DISTWIDTH] IN BLOOD BY AUTOMATED COUNT: 12.7 % (ref 11.5–14.5)
ERYTHROCYTE [DISTWIDTH] IN BLOOD BY AUTOMATED COUNT: 41.8 FL (ref 35–45)
GFR SERPL CREATININE-BSD FRML MDRD: 37 ML/MIN/1.73M2
GFR SERPL CREATININE-BSD FRML MDRD: 37 ML/MIN/1.73M2
GLUCOSE BLD-MCNC: 101 MG/DL (ref 70–108)
GLUCOSE BLD-MCNC: 99 MG/DL (ref 70–108)
GRAM STAIN RESULT: NORMAL
HCT VFR BLD CALC: 37.3 % (ref 37–47)
HEMOGLOBIN: 12 GM/DL (ref 12–16)
IMMATURE GRANS (ABS): 0.06 THOU/MM3 (ref 0–0.07)
IMMATURE GRANULOCYTES: 0.5 %
LEGIONELLA URINARY AG: NEGATIVE
LYMPHOCYTES # BLD: 21 %
LYMPHOCYTES ABSOLUTE: 2.5 THOU/MM3 (ref 1–4.8)
MCH RBC QN AUTO: 28.8 PG (ref 26–33)
MCHC RBC AUTO-ENTMCNC: 32.2 GM/DL (ref 32.2–35.5)
MCV RBC AUTO: 89.4 FL (ref 81–99)
MONOCYTES # BLD: 14.2 %
MONOCYTES ABSOLUTE: 1.7 THOU/MM3 (ref 0.4–1.3)
MRSA SCREEN RT-PCR: NEGATIVE
NUCLEATED RED BLOOD CELLS: 0 /100 WBC
OSMOLALITY URINE: 159 MOSMOL/KG (ref 250–750)
PLATELET # BLD: 356 THOU/MM3 (ref 130–400)
PMV BLD AUTO: 10.7 FL (ref 9.4–12.4)
POTASSIUM SERPL-SCNC: 3.9 MEQ/L (ref 3.5–5.2)
POTASSIUM SERPL-SCNC: 4.3 MEQ/L (ref 3.5–5.2)
RBC # BLD: 4.17 MILL/MM3 (ref 4.2–5.4)
REASON FOR REJECTION: NORMAL
REJECTED TEST: NORMAL
RESPIRATORY CULTURE: NORMAL
SEG NEUTROPHILS: 59 %
SEGMENTED NEUTROPHILS ABSOLUTE COUNT: 6.9 THOU/MM3 (ref 1.8–7.7)
SODIUM BLD-SCNC: 138 MEQ/L (ref 135–145)
SODIUM BLD-SCNC: 143 MEQ/L (ref 135–145)
SODIUM URINE: < 20 MEQ/L
SPECIMEN: NORMAL
STREP PNEUMO AG, UR: NEGATIVE
VANCOMYCIN RESISTANT ENTEROCOCCUS: NEGATIVE
WBC # BLD: 11.7 THOU/MM3 (ref 4.8–10.8)

## 2019-04-29 PROCEDURE — 6370000000 HC RX 637 (ALT 250 FOR IP): Performed by: FAMILY MEDICINE

## 2019-04-29 PROCEDURE — 80048 BASIC METABOLIC PNL TOTAL CA: CPT

## 2019-04-29 PROCEDURE — 2580000003 HC RX 258: Performed by: HOSPITALIST

## 2019-04-29 PROCEDURE — 2580000003 HC RX 258: Performed by: FAMILY MEDICINE

## 2019-04-29 PROCEDURE — 87081 CULTURE SCREEN ONLY: CPT

## 2019-04-29 PROCEDURE — 87500 VANOMYCIN DNA AMP PROBE: CPT

## 2019-04-29 PROCEDURE — 6360000002 HC RX W HCPCS: Performed by: FAMILY MEDICINE

## 2019-04-29 PROCEDURE — 1200000003 HC TELEMETRY R&B

## 2019-04-29 PROCEDURE — 87641 MR-STAPH DNA AMP PROBE: CPT

## 2019-04-29 PROCEDURE — 36415 COLL VENOUS BLD VENIPUNCTURE: CPT

## 2019-04-29 PROCEDURE — 82570 ASSAY OF URINE CREATININE: CPT

## 2019-04-29 PROCEDURE — 99232 SBSQ HOSP IP/OBS MODERATE 35: CPT | Performed by: HOSPITALIST

## 2019-04-29 PROCEDURE — 83935 ASSAY OF URINE OSMOLALITY: CPT

## 2019-04-29 PROCEDURE — 84300 ASSAY OF URINE SODIUM: CPT

## 2019-04-29 PROCEDURE — 85025 COMPLETE CBC W/AUTO DIFF WBC: CPT

## 2019-04-29 PROCEDURE — 89190 NASAL SMEAR FOR EOSINOPHILS: CPT

## 2019-04-29 RX ORDER — DULOXETIN HYDROCHLORIDE 30 MG/1
30 CAPSULE, DELAYED RELEASE ORAL DAILY
Qty: 30 CAPSULE | Refills: 1 | Status: SHIPPED | OUTPATIENT
Start: 2019-04-29 | End: 2019-05-07 | Stop reason: SDUPTHER

## 2019-04-29 RX ORDER — 0.9 % SODIUM CHLORIDE 0.9 %
1000 INTRAVENOUS SOLUTION INTRAVENOUS ONCE
Status: COMPLETED | OUTPATIENT
Start: 2019-04-29 | End: 2019-04-29

## 2019-04-29 RX ORDER — AZITHROMYCIN 250 MG/1
250 TABLET, FILM COATED ORAL DAILY
Qty: 3 TABLET | Refills: 0 | Status: SHIPPED | OUTPATIENT
Start: 2019-04-29 | End: 2019-04-30 | Stop reason: SDUPTHER

## 2019-04-29 RX ORDER — SODIUM CHLORIDE 9 MG/ML
INJECTION, SOLUTION INTRAVENOUS CONTINUOUS
Status: DISCONTINUED | OUTPATIENT
Start: 2019-04-29 | End: 2019-04-30 | Stop reason: HOSPADM

## 2019-04-29 RX ORDER — AZITHROMYCIN 500 MG/1
250 TABLET, FILM COATED ORAL DAILY
Qty: 1.5 TABLET | Refills: 0 | Status: SHIPPED | OUTPATIENT
Start: 2019-04-29 | End: 2019-04-29 | Stop reason: SDUPTHER

## 2019-04-29 RX ORDER — 0.9 % SODIUM CHLORIDE 0.9 %
1000 INTRAVENOUS SOLUTION INTRAVENOUS ONCE
Status: DISCONTINUED | OUTPATIENT
Start: 2019-04-29 | End: 2019-04-29

## 2019-04-29 RX ADMIN — AZITHROMYCIN MONOHYDRATE 500 MG: 500 INJECTION, POWDER, LYOPHILIZED, FOR SOLUTION INTRAVENOUS at 21:16

## 2019-04-29 RX ADMIN — SODIUM CHLORIDE 1000 ML: 9 INJECTION, SOLUTION INTRAVENOUS at 10:41

## 2019-04-29 RX ADMIN — SODIUM CHLORIDE: 9 INJECTION, SOLUTION INTRAVENOUS at 16:57

## 2019-04-29 RX ADMIN — ATOMOXETINE 40 MG: 40 CAPSULE ORAL at 08:37

## 2019-04-29 RX ADMIN — DULOXETINE HYDROCHLORIDE 30 MG: 30 CAPSULE, DELAYED RELEASE ORAL at 08:37

## 2019-04-29 RX ADMIN — LEVOTHYROXINE SODIUM 50 MCG: 50 TABLET ORAL at 05:42

## 2019-04-29 RX ADMIN — ENOXAPARIN SODIUM 40 MG: 40 INJECTION SUBCUTANEOUS at 21:16

## 2019-04-29 RX ADMIN — OXYBUTYNIN 15 MG: 10 TABLET, FILM COATED, EXTENDED RELEASE ORAL at 08:37

## 2019-04-29 RX ADMIN — SIMVASTATIN 10 MG: 10 TABLET, FILM COATED ORAL at 21:16

## 2019-04-29 RX ADMIN — SODIUM CHLORIDE 1000 ML: 9 INJECTION, SOLUTION INTRAVENOUS at 15:44

## 2019-04-29 RX ADMIN — DONEPEZIL HYDROCHLORIDE 10 MG: 10 TABLET, FILM COATED ORAL at 21:16

## 2019-04-29 ASSESSMENT — PAIN SCALES - GENERAL
PAINLEVEL_OUTOF10: 0

## 2019-04-29 NOTE — PLAN OF CARE
Problem: Safety:  Goal: Free from accidental physical injury  Description  Free from accidental physical injury  4/29/2019 0144 by Maira Craig RN  Outcome: Ongoing  Note:   The patient has been free of falls this shift. Bed is in low position, 2/4 rails are up, and alarm is active. Call light is in reach, non-slip socks are on, and hourly rounding performed. 4/28/2019 1603 by Beatriz Mckay RN  Outcome: Ongoing  Note:   Call light within reach. Side rails up x2. Bed alarm on. Non skid slippers available. Problem: Daily Care:  Goal: Daily care needs are met  Description  Daily care needs are met  4/29/2019 0144 by Maira Craig RN  Outcome: Ongoing  Note:   The patient was able to take a shower this shift. Continue to assess and monitor. 4/28/2019 1603 by Beatriz Mckay RN  Outcome: Ongoing     Problem: Pain:  Goal: Patient's pain/discomfort is manageable  Description  Patient's pain/discomfort is manageable  4/29/2019 0144 by Maira Craig RN  Outcome: Ongoing  Note:   The patient did have some discomfort when her IV infiltrated. Ice pack was applied and gave pt tylenol. Pt satisfied. Continue to assess and monitor. 4/28/2019 1603 by Beatriz Mckay RN  Outcome: Ongoing  Note:   Patient denies pain      Problem: Skin Integrity:  Goal: Skin integrity will stabilize  Description  Skin integrity will stabilize  4/29/2019 0144 by Maira Craig RN  Outcome: Ongoing  Note:   The patient has no new skin breakdown this shift. Continue to assess and monitor.   4/28/2019 1603 by Beatriz Mckay RN  Outcome: Ongoing  Note:   No new signs or symptoms of skin breakdown noted this shift, encouraging patient to turn and reposition self in bed q2h       Problem: Discharge Planning:  Goal: Patients continuum of care needs are met  Description  Patients continuum of care needs are met  4/29/2019 0144 by Maira Craig RN  Outcome: Ongoing  Note:   Discharge is pending at this

## 2019-04-29 NOTE — CARE COORDINATION
4/29/19, 9:01 AM    Discharge orders received. Spoke with Jose Florence, she plans return home with her ex-/friend. Denies needs for discharge. Discharge plan discussed by  and . Discharge plan reviewed with patient/ family. Patient/ family verbalize understanding of discharge plan and are in agreement with plan. Understanding was demonstrated using the teach back method.        IMM Letter  IMM Letter given to Patient/Family/Significant other/Guardian/POA/by[de-identified] Dami Hartman CM   IMM Letter date given[de-identified] 04/29/19  IMM Letter time given[de-identified] 2245

## 2019-04-29 NOTE — FLOWSHEET NOTE
04/29/19 1015   Provider Notification   Reason for Communication Review case   Provider Name [facility]   Provider Notification Physician   Method of Communication Secure Message   Response No new orders   Notification Time 1016       Notified  [facility] of Lab results this morning.

## 2019-04-29 NOTE — PROGRESS NOTES
CLINICAL PHARMACY: DISCHARGE MED RECONCILIATION/REVIEW    [facility] Select Patient?: No  Total # of Interventions Recommended: 1 -    - Updated Order #: 1  Total # Interventions Accepted: 1  Intervention Severity:   - Level 1 Intervention Present?: No   - Level 2 #: 0   - Level 3 #: 1   Time Spent (min): 15    Alex CutlerD, BCPS  4/29/2019  10:34 AM

## 2019-04-29 NOTE — FLOWSHEET NOTE
Pt is a 76year old female who is in bed on 6K. She is pleased that she is going home today. Pt talked about her medical condition and how much better she feels today. She welcomed prayer.        04/29/19 1014   Encounter Summary   Services provided to: Patient   Referral/Consult From: Federico   Continue Visiting Yes  (4/29 going home)   Complexity of Encounter Low   Length of Encounter 15 minutes   Spiritual/Congregation   Type Spiritual support   Assessment Approachable   Intervention Prayer;Nurtured hope   Outcome Expressed gratitude;Encouraged

## 2019-04-29 NOTE — DISCHARGE SUMMARY
Hospital Medicine Discharge Summary      Patient Identification:   Mateo Flores   : 1950  MRN: [id number]   Account: [de-identified]      Patient's PCP: SARITHA Coronado CNP    Admit Date: 2019     Discharge Date:   2019    Admitting Physician: Alli Spring MD     Discharge Physician: Adalid June MD     Discharge Diagnoses: Active Hospital Problems    Diagnosis Date Noted    Hypoxia [R09.02]     SOB (shortness of breath) [R06.02] 2019       The patient was seen and examined on day of discharge and this discharge summary is in conjunction with any daily progress note from day of discharge. Hospital Course:   Mateo Flores is a 76 y.o. female admitted to [facility] on 2019 for acute Hypoxic Respiratory Failure. Assessment/Plan:     Acute Hypoxic Respiratory Failure: likely d/t PNA      : 2D echo unremarkable. Will treat as for atypical PNA. F/U cultures. CXR shows hyperaeration c/w COPD. ABG sowed NL PCo2. Given longstanding smoking Hx would benefit from OP PFT to r/o COPD. RN aware to wean as tolerated  CT chest negative for PE. Serial trop negative and ECG negative for any new/dynamic ischemic changes     : noted pt on 1 lpm via NC with Sao2 in high 90s. RN aware to wean completely off O2. Encouraged mobilizing today, also incentive spirometry. Will aim for d/c tomorrow. Home O2 eval only if pt drops belowe 88-89% with activity     - HypoK: 3.3. Likely d/t lasix. On replacement   : resolved. K 3.7     - MILLA: Cr today 1.4 from previously 0.9. Likely pre-renal. Urine lytes c/w pre-renal with Carri less than 20. Negative for  eosinophil . Bolus NS 1 L over 4 hrs given with repeat BMP which showed Cr of . OP F/U w/ PCP in 3 days with repeat BMP. - Dementia  - Depression  - Continue with Cymbalta  - Hypothyroidism on synthroid. Last TSH WNLs.   - HLD  - Continue with zocor        Exam:     Vitals:  Vitals:    19 1519 19 lymphadenopathy which is nonspecific. Findings may be reactive, chronic or neoplastic. Correlation and follow-up is advised. 3. Small bilateral pleural effusions with adjacent atelectasis or infiltrate. **This report has been created using voice recognition software. It may contain minor errors which are inherent in voice recognition technology. **      Final report electronically signed by Dr. Catalan Current on 4/26/2019 3:50 PM      XR CHEST STANDARD (2 VW)   Final Result   Left retrocardiac opacities which may represent atelectasis or infiltrate. **This report has been created using voice recognition software. It may contain minor errors which are inherent in voice recognition technology. **      Final report electronically signed by Dr Saqib Carlton on 4/26/2019 1:33 PM             Consults:     None    Disposition:    [x] Home       [] TCU       [] Rehab       [] Psych       [] SNF       [] [facility]       [] Other-    Condition at Discharge: Stable    Code Status:  Full Code     Patient Instructions:    Discharge lab work: BMP in 3 days  Activity: activity as tolerated  Diet: DIET CARDIAC; Follow-up visits:   SARITHA Aponte - CNP  [address] [place] [address], [address] [place] [address]  [phone]               Discharge Medications:      [facility] Medication Instructions [id number]    Printed on:04/29/19 0745   Medication Information                      atomoxetine (STRATTERA) 40 MG capsule  Take 1 capsule by mouth daily             azithromycin (ZITHROMAX) 500 MG tablet  Take 0.5 tablets by mouth daily for 3 days             Calcium Carbonate-Vitamin D (CALCIUM-VITAMIN D) 600-200 MG-UNIT CAPS  Take 1 tablet by mouth 2 times daily              clotrimazole-betamethasone (LOTRISONE) 1-0.05 % cream  Apply topically 2 times daily.              Cranberry 500 MG TABS  Take 1 tablet by mouth daily             donepezil (ARICEPT) 10 MG tablet  TAKE ONE TABLET BY MOUTH ONCE DAILY IN THE EVENING             DULoxetine (CYMBALTA) 30 MG extended release capsule  Take 1 capsule by mouth daily             fish oil-omega-3 fatty acids 1000 MG capsule  Take 1 g by mouth. One tablet daily along with red yeast rice             Glucosamine-Chondroit-Vit C-Mn (GLUCOSAMINE CHONDR 500 COMPLEX) CAPS  Take 1 capsule by mouth 2 times daily              levothyroxine (SYNTHROID) 50 MCG tablet  TAKE 1 TABLET DAILY             Multiple Vitamin (MULTI-VITAMIN PO)  Take  by mouth. ondansetron (ZOFRAN) 4 MG tablet  Take 1 tablet by mouth every 6 hours as needed for Nausea or Vomiting             oxybutynin (DITROPAN XL) 15 MG extended release tablet  Take 1 tablet by mouth daily             simvastatin (ZOCOR) 10 MG tablet  TAKE 1 TABLET BY MOUTH ONCE DAILY NIGHTLY             Turmeric 500 MG TABS  Take 1 tablet by mouth daily                 Time Spent on discharge is more than 20 minutes in the examination, evaluation, counseling and review of medications and discharge plan. With RN in room, patient was updated about the treatment plan, all the questions and concerns were addressed. Alarming signs and symptoms to return to ED were explained in length. Signed: Thank you SARITHA Portillo - NOHEMY for the opportunity to be involved in this patient's care.     Electronically signed by Jaydon Richardson MD on 4/29/2019 at 7:45 AM

## 2019-04-30 ENCOUNTER — TELEPHONE (OUTPATIENT)
Dept: FAMILY MEDICINE CLINIC | Age: 69
End: 2019-04-30

## 2019-04-30 VITALS
HEIGHT: 64 IN | RESPIRATION RATE: 20 BRPM | DIASTOLIC BLOOD PRESSURE: 65 MMHG | HEART RATE: 94 BPM | WEIGHT: 124.1 LBS | OXYGEN SATURATION: 91 % | SYSTOLIC BLOOD PRESSURE: 133 MMHG | BODY MASS INDEX: 21.19 KG/M2 | TEMPERATURE: 98.2 F

## 2019-04-30 LAB
ANION GAP SERPL CALCULATED.3IONS-SCNC: 13 MEQ/L (ref 8–16)
BASOPHILS # BLD: 0.9 %
BASOPHILS ABSOLUTE: 0.1 THOU/MM3 (ref 0–0.1)
BUN BLDV-MCNC: 18 MG/DL (ref 7–22)
CALCIUM SERPL-MCNC: 7.9 MG/DL (ref 8.5–10.5)
CHLORIDE BLD-SCNC: 112 MEQ/L (ref 98–111)
CO2: 23 MEQ/L (ref 23–33)
CREAT SERPL-MCNC: 1.1 MG/DL (ref 0.4–1.2)
EOSINOPHIL # BLD: 5.7 %
EOSINOPHILS ABSOLUTE: 0.5 THOU/MM3 (ref 0–0.4)
ERYTHROCYTE [DISTWIDTH] IN BLOOD BY AUTOMATED COUNT: 12.9 % (ref 11.5–14.5)
ERYTHROCYTE [DISTWIDTH] IN BLOOD BY AUTOMATED COUNT: 42.2 FL (ref 35–45)
GFR SERPL CREATININE-BSD FRML MDRD: 49 ML/MIN/1.73M2
GLUCOSE BLD-MCNC: 90 MG/DL (ref 70–108)
HCT VFR BLD CALC: 37.1 % (ref 37–47)
HEMOGLOBIN: 12.1 GM/DL (ref 12–16)
IMMATURE GRANS (ABS): 0.04 THOU/MM3 (ref 0–0.07)
IMMATURE GRANULOCYTES: 0.4 %
LYMPHOCYTES # BLD: 28 %
LYMPHOCYTES ABSOLUTE: 2.7 THOU/MM3 (ref 1–4.8)
MCH RBC QN AUTO: 29.2 PG (ref 26–33)
MCHC RBC AUTO-ENTMCNC: 32.6 GM/DL (ref 32.2–35.5)
MCV RBC AUTO: 89.4 FL (ref 81–99)
MONOCYTES # BLD: 10.8 %
MONOCYTES ABSOLUTE: 1 THOU/MM3 (ref 0.4–1.3)
NUCLEATED RED BLOOD CELLS: 0 /100 WBC
PLATELET # BLD: 390 THOU/MM3 (ref 130–400)
PMV BLD AUTO: 10.7 FL (ref 9.4–12.4)
POTASSIUM SERPL-SCNC: 4 MEQ/L (ref 3.5–5.2)
RBC # BLD: 4.15 MILL/MM3 (ref 4.2–5.4)
SEG NEUTROPHILS: 54.2 %
SEGMENTED NEUTROPHILS ABSOLUTE COUNT: 5.2 THOU/MM3 (ref 1.8–7.7)
SODIUM BLD-SCNC: 148 MEQ/L (ref 135–145)
WBC # BLD: 9.6 THOU/MM3 (ref 4.8–10.8)

## 2019-04-30 PROCEDURE — 2580000003 HC RX 258: Performed by: HOSPITALIST

## 2019-04-30 PROCEDURE — 99232 SBSQ HOSP IP/OBS MODERATE 35: CPT | Performed by: HOSPITALIST

## 2019-04-30 PROCEDURE — 36415 COLL VENOUS BLD VENIPUNCTURE: CPT

## 2019-04-30 PROCEDURE — 85025 COMPLETE CBC W/AUTO DIFF WBC: CPT

## 2019-04-30 PROCEDURE — 80048 BASIC METABOLIC PNL TOTAL CA: CPT

## 2019-04-30 PROCEDURE — 6370000000 HC RX 637 (ALT 250 FOR IP): Performed by: FAMILY MEDICINE

## 2019-04-30 RX ORDER — AZITHROMYCIN 250 MG/1
250 TABLET, FILM COATED ORAL DAILY
Qty: 3 TABLET | Refills: 0 | Status: SHIPPED | OUTPATIENT
Start: 2019-04-30 | End: 2019-05-01

## 2019-04-30 RX ADMIN — ATOMOXETINE 40 MG: 40 CAPSULE ORAL at 07:59

## 2019-04-30 RX ADMIN — SODIUM CHLORIDE: 9 INJECTION, SOLUTION INTRAVENOUS at 00:46

## 2019-04-30 RX ADMIN — DULOXETINE HYDROCHLORIDE 30 MG: 30 CAPSULE, DELAYED RELEASE ORAL at 07:59

## 2019-04-30 RX ADMIN — LEVOTHYROXINE SODIUM 50 MCG: 50 TABLET ORAL at 05:33

## 2019-04-30 RX ADMIN — OXYBUTYNIN 15 MG: 10 TABLET, FILM COATED, EXTENDED RELEASE ORAL at 07:59

## 2019-04-30 ASSESSMENT — PAIN SCALES - GENERAL: PAINLEVEL_OUTOF10: 0

## 2019-04-30 NOTE — PROGRESS NOTES
Discharge teaching and instructions for diagnosis/procedure of pneumonia/shortness of breath completed with patient using teachback method. AVS reviewed. Printed prescriptions given to patient. Patient voiced understanding regarding prescriptions, follow up appointments, and care of self at home.

## 2019-04-30 NOTE — CARE COORDINATION
Discharge held yesterday due to elev creat. Creat WNL today. Discharge ordered. 4/30/19, 11:59 AM    Home with friend. Denies needs. Discharge plan discussed by  and . Discharge plan reviewed with patient/ family. Patient/ family verbalize understanding of discharge plan and are in agreement with plan. Understanding was demonstrated using the teach back method.        IMM Letter  IMM Letter given to Patient/Family/Significant other/Guardian/POA/by[de-identified] Nacho Dumont CM   IMM Letter date given[de-identified] 04/29/19  IMM Letter time given[de-identified] 9215

## 2019-04-30 NOTE — PLAN OF CARE
Problem: Safety:  Goal: Free from accidental physical injury  Description  Free from accidental physical injury  Outcome: Ongoing  Note:   Pt has been free of falls this shift. Bed is in low position, 2/4 rails are up, and alarm is active. Call light is in reach, non-slip socks are on, and hourly rounding performed. Problem: Daily Care:  Goal: Daily care needs are met  Description  Daily care needs are met  Outcome: Ongoing  Note:   The patient has been satisfied with her daily needs. Continue to assess and monitor. Problem: Pain:  Goal: Patient's pain/discomfort is manageable  Description  Patient's pain/discomfort is manageable  Outcome: Ongoing  Note:   The patient has had no complaints of pain this shift. Continue to assess and monitor. Problem: Skin Integrity:  Goal: Skin integrity will stabilize  Description  Skin integrity will stabilize  Outcome: Ongoing  Note:   The patient has had no new skin breakdown. Pt turns herself. Continue to assess and monitor. Problem: Discharge Planning:  Goal: Patients continuum of care needs are met  Description  Patients continuum of care needs are met  Outcome: Ongoing  Note:   Discharge is pending at this time. Pt plans on going home depending how her labs come back. Continue to assess and monitor. Care plan reviewed with patient. The patient contributes and verbalizes understanding with plan of care.

## 2019-04-30 NOTE — DISCHARGE SUMMARY
Hospital Medicine Discharge Summary      Patient Identification:   Luís Tyson   : 1950  MRN: [id number]   Account: [de-identified]      Patient's PCP: SARITHA Herbert CNP    Admit Date: 2019     Discharge Date:   2019    Admitting Physician: No admitting provider for patient encounter. Discharge Physician: Jewel Gibson MD     Discharge Diagnoses: Active Hospital Problems    Diagnosis Date Noted    Hypoxia [R09.02]     SOB (shortness of breath) [R06.02] 2019       The patient was seen and examined on day of discharge and this discharge summary is in conjunction with any daily progress note from day of discharge. Hospital Course:   Luís Tyson is a 76 y.o. female admitted to [address] [place] [address] on 2019 for acute Hypoxic Respiratory Failure. Assessment/Plan:     Acute Hypoxic Respiratory Failure: likely d/t PNA      : 2D echo unremarkable. Will treat as for atypical PNA. F/U cultures. CXR shows hyperaeration c/w COPD. ABG sowed NL PCo2. Given longstanding smoking Hx would benefit from OP PFT to r/o COPD. RN aware to wean as tolerated  CT chest negative for PE. Serial trop negative and ECG negative for any new/dynamic ischemic changes     : noted pt on 1 lpm via NC with Sao2 in high 90s. RN aware to wean completely off O2. Encouraged mobilizing today, also incentive spirometry. Will aim for d/c tomorrow. Home O2 eval only if pt drops belowe 88-89% with activity     - HypoK: 3.3. Likely d/t lasix. On replacement   : resolved. K 3.7  : K 4.0 today     - MILLA: Cr today 1.4 from previously 0.9. Likely pre-renal. Urine lytes c/w pre-renal with Carri less than 20. Negative for  eosinophil . Bolus NS 1 L over 4 hrs given with repeat BMP which showed Cr of 1.4 . Pt kept in hospital for IVF NS at 100 cc/hr. : Cr 1.1. Today. Encouraged hydration. OP F/U w/ PCP in 3 days with repeat BMP.       - Dementia  - Depression  - Continue with Cymbalta  - Hypothyroidism on synthroid. Last TSH WNLs. - HLD  - Continue with zocor        Exam:     Vitals:  Vitals:    04/29/19 2100 04/29/19 2314 04/30/19 0300 04/30/19 0750   BP: 120/63 128/60 121/68 133/65   Pulse: 94 100 87 94   Resp: 16 17 18 20   Temp: 98.9 °F (37.2 °C) 98.6 °F (37 °C) 98.9 °F (37.2 °C) 98.2 °F (36.8 °C)   TempSrc: Oral Oral Oral Oral   SpO2: 95% 94% 99% 91%   Weight:   124 lb 1.6 oz (56.3 kg)    Height:         Weight: Weight: 124 lb 1.6 oz (56.3 kg)     24 hour intake/output:    Intake/Output Summary (Last 24 hours) at 4/30/2019 0927  Last data filed at 4/30/2019 0532  Gross per 24 hour   Intake 3327.24 ml   Output 1250 ml   Net 2077.24 ml           General appearance: A&O x3, Not ill or toxic, in no apparent distress  HEENT:  BEE  EOM intact. Neck: Supple, with full range of motion. No jugular venous distention. Trachea midline. Respiratory:   NL A/E bilat with no adventitious sounds   Cardiovascular:  normal S1/S2 with no murmurs/gallops  Abdomen: Soft, non-tender, non-distended, no rigidity or peritoneal signs  Musculoskeletal: NL symmetrical A/PROM bilat U/L extremities   Skin: No rashes. No edema  Neurologic:  CN II-XII intact. NL symmetrical reflexes. NL gait and stance. NL Cerebellar exam. Power 5/5 all muscle groups U/L extremities. Toes downgoing  Capillary Refill: Brisk,< 3 seconds   Peripheral Pulses: +2 palpable, equal bilaterally          Labs:  For convenience and continuity at follow-up the following most recent labs are provided:      CBC:    Lab Results   Component Value Date    WBC 9.6 04/30/2019    HGB 12.1 04/30/2019    HCT 37.1 04/30/2019     04/30/2019       Renal:    Lab Results   Component Value Date     04/30/2019    K 4.0 04/30/2019    K 3.3 04/27/2019     04/30/2019    CO2 23 04/30/2019    BUN 18 04/30/2019    CREATININE 1.1 04/30/2019    CALCIUM 7.9 04/30/2019         Significant Diagnostic Studies    Radiology:   CTA CHEST W WO CONTRAST daily              clotrimazole-betamethasone (LOTRISONE) 1-0.05 % cream  Apply topically 2 times daily. Cranberry 500 MG TABS  Take 1 tablet by mouth daily             donepezil (ARICEPT) 10 MG tablet  TAKE ONE TABLET BY MOUTH ONCE DAILY IN THE EVENING             DULoxetine (CYMBALTA) 30 MG extended release capsule  Take 1 capsule by mouth daily             fish oil-omega-3 fatty acids 1000 MG capsule  Take 1 g by mouth. One tablet daily along with red yeast rice             Glucosamine-Chondroit-Vit C-Mn (GLUCOSAMINE CHONDR 500 COMPLEX) CAPS  Take 1 capsule by mouth 2 times daily              levothyroxine (SYNTHROID) 50 MCG tablet  TAKE 1 TABLET DAILY             Multiple Vitamin (MULTI-VITAMIN PO)  Take  by mouth. ondansetron (ZOFRAN) 4 MG tablet  Take 1 tablet by mouth every 6 hours as needed for Nausea or Vomiting             oxybutynin (DITROPAN XL) 15 MG extended release tablet  Take 1 tablet by mouth daily             simvastatin (ZOCOR) 10 MG tablet  TAKE 1 TABLET BY MOUTH ONCE DAILY NIGHTLY             Turmeric 500 MG TABS  Take 1 tablet by mouth daily                 Time Spent on discharge is more than 20 minutes in the examination, evaluation, counseling and review of medications and discharge plan. With RN in room, patient was updated about the treatment plan, all the questions and concerns were addressed. Alarming signs and symptoms to return to ED were explained in length. Signed: Thank you SARITHA Brooke - NOHEMY for the opportunity to be involved in this patient's care.     Electronically signed by Roberto Tafoya MD on 4/30/2019 at 9:27 AM

## 2019-04-30 NOTE — DISCHARGE INSTR - DIET
 Good nutrition is important when healing from an illness, injury, or surgery. Follow any nutrition recommendations given to you during your hospital stay.  If you were given an oral nutrition supplement while in the hospital, continue to take this supplement at home. You can take it with meals, in-between meals, and/or before bedtime. These supplements can be purchased at most local grocery stores, pharmacies, and chain super-stores.  If you have any questions about your diet or nutrition, call the hospital and ask for the dietitian. Healthy Eating habits help your heart health. Below are general guidlelines for heart healthy choices:    Eat fruits and vegetables. They are loaded with vitamins, minerals and fiber. Eat a variety every day. .   Eat a variety of whole grain products every day. They contain a lot of fiber and nutrients. Examples of whole grains include oats, whole wheat bread, and brown rice. Eat fish at least 2 times each week. Oily fish, which contain omega-3 fatty acids, are best for your heart. These fish include tuna, salmon, mackerel, lake trout, herring, and sardines. Limit saturated fat and cholesterol. To limit saturated fats and cholesterol, try to choose the following foods:   Lean meats and meat alternatives (chicken, fish, turkey, beans, and nuts)  Nonfat and low-fat dairy products (skim or 1% milk, low fat yogurt)  Unsaturated fats, like canola and olive oils instead of saturated fats, such as butter  Read food labels and limit the amount of trans fat you eat. Trans fat raises cholesterol. It is found in many processed foods made with shortening or with partially hydrogenated vegetable oils. These foods include cookies, crackers, chips, and many snack foods. Choose snacks with \"0\" grams of Trans fat. Choose healthy fats. Unsaturated fats, such as olive, canola and peanut oils, and nuts are part of a healthy diet.  But all fats are high in calories, so limit your

## 2019-04-30 NOTE — TELEPHONE ENCOUNTER
.Transition of Care visit scheduled.   5/7/2019  Patient is being discharged to: home   Date of discharge:  4/30/19  Discharge from facility: [facility]  Reason for admission: pneumonia

## 2019-05-01 ENCOUNTER — TELEPHONE (OUTPATIENT)
Dept: FAMILY MEDICINE CLINIC | Age: 69
End: 2019-05-01

## 2019-05-01 LAB — MRSA SCREEN: NORMAL

## 2019-05-01 NOTE — TELEPHONE ENCOUNTER
[facility] Transitions Initial Follow Up Call    Outreach made within 2 business days of discharge: Yes    Patient: Kasandra Duggan Patient : 1950   MRN: [id number]  Reason for Admission: There are no discharge diagnoses documented for the most recent discharge. Discharge Date: 19       Spoke with: Pt    Discharge department/facility: [facility]    TCM Interactive Patient Contact:  Was patient able to fill all prescriptions: Yes  Was patient instructed to bring all medications to the follow-up visit: Yes  Is patient taking all medications as directed in the discharge summary?  Yes  Does patient understand their discharge instructions: Yes  Does patient have questions or concerns that need addressed prior to 7-14 day follow up office visit: no    Scheduled appointment with PCP within 7-14 days    Follow Up  Future Appointments   Date Time Provider Otis Dennis   2019  2:45 PM [address]   2019  1:00 PM [address]   2019  2:00 PM SARITHA Shin - CNP [facility] - [address] [place] [address] ([address] [place] [address])

## 2019-05-01 NOTE — TELEPHONE ENCOUNTER
[facility] Transitions Initial Follow Up Call    Outreach made within 2 business days of discharge: Yes    Patient: Otto Salinas Patient : 1950   MRN: [id number]  Reason for Admission: There are no discharge diagnoses documented for the most recent discharge. Discharge Date: 19       Spoke with: attempted to contact pt, left vm.     Discharge department/facility: [address] [place] [address] Interactive Patient Contact:      Scheduled appointment with PCP within 7-14 days    Follow Up  Future Appointments   Date Time Provider Otis Dennis   2019  2:45 PM [address]   2019  1:00 PM [address]   2019  2:00 PM [facility] - [address] [place] [address] ([address] [place] [address])

## 2019-05-02 LAB
BLOOD CULTURE, ROUTINE: NORMAL
BLOOD CULTURE, ROUTINE: NORMAL

## 2019-05-07 ENCOUNTER — OFFICE VISIT (OUTPATIENT)
Dept: FAMILY MEDICINE CLINIC | Age: 69
End: 2019-05-07
Payer: MEDICARE

## 2019-05-07 VITALS
HEART RATE: 88 BPM | WEIGHT: 118.1 LBS | RESPIRATION RATE: 16 BRPM | HEIGHT: 64 IN | SYSTOLIC BLOOD PRESSURE: 130 MMHG | DIASTOLIC BLOOD PRESSURE: 76 MMHG | BODY MASS INDEX: 20.16 KG/M2

## 2019-05-07 DIAGNOSIS — R09.02 HYPOXIA: Primary | ICD-10-CM

## 2019-05-07 DIAGNOSIS — R73.01 IFG (IMPAIRED FASTING GLUCOSE): ICD-10-CM

## 2019-05-07 DIAGNOSIS — F32.5 MAJOR DEPRESSIVE DISORDER, SINGLE EPISODE, IN FULL REMISSION (HCC): ICD-10-CM

## 2019-05-07 DIAGNOSIS — N17.9 AKI (ACUTE KIDNEY INJURY) (HCC): ICD-10-CM

## 2019-05-07 DIAGNOSIS — E78.2 MIXED HYPERLIPIDEMIA: ICD-10-CM

## 2019-05-07 DIAGNOSIS — F17.210 SMOKING GREATER THAN 40 PACK YEARS: ICD-10-CM

## 2019-05-07 DIAGNOSIS — F32.9 REACTIVE DEPRESSION: ICD-10-CM

## 2019-05-07 DIAGNOSIS — E03.9 HYPOTHYROIDISM, UNSPECIFIED TYPE: ICD-10-CM

## 2019-05-07 DIAGNOSIS — J18.9 ATYPICAL PNEUMONIA: ICD-10-CM

## 2019-05-07 DIAGNOSIS — L98.491 SUPERFICIAL ULCER OF SKIN (HCC): ICD-10-CM

## 2019-05-07 DIAGNOSIS — R35.0 URINARY FREQUENCY: ICD-10-CM

## 2019-05-07 LAB
BILIRUBIN, POC: NEGATIVE
BLOOD URINE, POC: NEGATIVE
CLARITY, POC: ABNORMAL
COLOR, POC: ABNORMAL
GLUCOSE URINE, POC: NEGATIVE
KETONES, POC: NEGATIVE
LEUKOCYTE EST, POC: ABNORMAL
NITRITE, POC: NEGATIVE
PH, POC: 6.5
PROTEIN, POC: NEGATIVE
SPECIFIC GRAVITY, POC: 1.01
UROBILINOGEN, POC: ABNORMAL

## 2019-05-07 PROCEDURE — 99495 TRANSJ CARE MGMT MOD F2F 14D: CPT | Performed by: NURSE PRACTITIONER

## 2019-05-07 PROCEDURE — 81003 URINALYSIS AUTO W/O SCOPE: CPT | Performed by: NURSE PRACTITIONER

## 2019-05-07 RX ORDER — DULOXETIN HYDROCHLORIDE 60 MG/1
60 CAPSULE, DELAYED RELEASE ORAL DAILY
Qty: 30 CAPSULE | Refills: 1 | Status: SHIPPED | OUTPATIENT
Start: 2019-05-07 | End: 2019-06-10 | Stop reason: SDUPTHER

## 2019-05-07 ASSESSMENT — ENCOUNTER SYMPTOMS
COUGH: 0
SHORTNESS OF BREATH: 0
SORE THROAT: 0

## 2019-05-07 NOTE — PROGRESS NOTES
Post-Discharge Transitional Care Management Services      Jordyn Reynoso   YOB: 1950    Date of Visit:  5/7/2019  30 DayPost-Discharge Date: 5/30/19    Chief Complaint   Patient presents with    Follow-Up from [facility] Urinary Tract Infection     urinary urgency       Admit Date: 4/26/2019   Discharge Date:   4/30/2019     Discharge Diagnoses:  [facility] Problems     Diagnosis Date Noted    Hypoxia [R09.02]      SOB (shortness of breath) [R06.02] 04/26/2019         The patient was seen and examined on day of discharge and this discharge summary is in conjunction with any daily progress note from day of discharge.     Hospital Course:   Jordyn Reynoso is a 76 y.o. female admitted to [address] [place] [address] on 4/26/2019 for acute Hypoxic Respiratory Failure.     Assessment/Plan:      Acute Hypoxic Respiratory Failure: likely d/t PNA      4/27: 2D echo unremarkable. Will treat as for atypical PNA. F/U cultures. CXR shows hyperaeration c/w COPD. ABG sowed NL PCo2.  Given longstanding smoking Hx would benefit from OP PFT to r/o COPD. RN aware to wean as tolerated  CT chest negative for PE. Serial trop negative and ECG negative for any new/dynamic ischemic changes     4/28: noted pt on 1 lpm via NC with Sao2 in high 90s. RN aware to wean completely off O2. Encouraged mobilizing today, also incentive spirometry. Will aim for d/c tomorrow. Home O2 eval only if pt drops belowe 88-89% with activity     - HypoK: 3.3. Likely d/t lasix. On replacement   4/29: resolved. K 3.7  4/30: K 4.0 today     - MILLA: Cr today 1.4 from previously 0.9. Likely pre-renal. Urine lytes c/w pre-renal with Carri less than 20. Negative for  eosinophil . Bolus NS 1 L over 4 hrs given with repeat BMP which showed Cr of 1.4 . Pt kept in hospital for IVF NS at 100 cc/hr. 4/30: Cr 1.1. Today. Encouraged hydration. OP F/U w/ PCP in 3 days with repeat BMP.         - Dementia  - Depression  - Continue with 05/07/19 1450   BP: 130/76   Site: Left Upper Arm   Position: Sitting   Cuff Size: Medium Adult   Pulse: 88   Resp: 16   Weight: 118 lb 1.6 oz (53.6 kg)   Height: 5' 4\" (1.626 m)     Body mass index is 20.27 kg/m². Wt Readings from Last 3 Encounters:   05/07/19 118 lb 1.6 oz (53.6 kg)   04/30/19 124 lb 1.6 oz (56.3 kg)   04/19/19 125 lb (56.7 kg)     BP Readings from Last 3 Encounters:   05/07/19 130/76   04/30/19 133/65   04/19/19 137/63        Patient was admitted to [address] [place] [address] from 4/26/19 to 4/30/19 for Respiratory Failure. Inpatient course: Discharge summary reviewed- see chart. Current status: Denies CP, SOB or chest tightness      Review of Systems:  Review of Systems   Constitutional: Negative for chills and fever. HENT: Negative for congestion and sore throat. Respiratory: Negative for cough and shortness of breath. Cardiovascular: Negative for chest pain. Skin: Negative. Neurological: Negative. Negative for dizziness and headaches. Psychiatric/Behavioral: Negative. Physical Exam:  Physical Exam   Constitutional: Vital signs are normal. No distress. Eyes: Pupils are equal, round, and reactive to light. EOM are normal.   Neck: Normal range of motion. Neck supple. Cardiovascular: Normal rate and regular rhythm. No murmur heard. Pulmonary/Chest: Effort normal and breath sounds normal. She has no wheezes. Abdominal: Soft. Bowel sounds are normal. She exhibits no distension. There is no tenderness. Musculoskeletal: Normal range of motion. She exhibits no tenderness. Skin: Skin is warm and dry. No rash noted. Initial post-discharge communication occurred between medical assistant and patient on 5/1/19 - see documentation in chart: telephone encounter. Assessment/Plan:  Kim Medina was seen today for follow-up from hospital and urinary tract infection.     Diagnoses and all orders for this visit:    Hypoxia  -     Full PFT Study With Bronchodilator; Future    Atypical pneumonia    Hypothyroidism, unspecified type    IFG (impaired fasting glucose)    Mixed hyperlipidemia    Major depressive disorder, single episode, in full remission ([facility] [address]    Smoking greater than 40 pack years  -     Full PFT Study With Bronchodilator; Future    Urinary frequency  -     POCT Urinalysis No Micro (Auto)    [facility]  -     Basic Metabolic Panel; Future    Reactive depression  -     DULoxetine (CYMBALTA) 60 MG extended release capsule; Take 1 capsule by mouth daily        MDM:  Overall feeling well. PFT ordered. Repeat BMP due to MILLA in hospital.  Push fluids.       UA: small LEUK   Reassurance of no UTI without symptoms  Mood improved on Cymbalta - would like to increase dose  Cymbalta 60 mg Daily sent  RTO in 1 month      Diagnostic test results reviewed: inpatient labs, EKG, chest x-ray, CT angiogram-Chest and echocardiogram    Patient risk of morbidity and mortality: moderate    Medical Decision Making: moderate complexity

## 2019-05-07 NOTE — PROGRESS NOTES
Visit Information    Have you changed or started any medications since your last visit including any over-the-counter medicines, vitamins, or herbal medicines? yes - see updated med list   Are you having any side effects from any of your medications? -  no  Have you stopped taking any of your medications? Is so, why? -  no    Have you seen any other physician or provider since your last visit? Yes - Records Obtained  Have you had any other diagnostic tests since your last visit? Yes - Records Obtained  Have you been seen in the emergency room and/or had an admission to a hospital since we last saw you? Yes - Records Obtained  Have you had your routine dental cleaning in the past 6 months? n/a    Have you activated your [employer] account? If not, what are your barriers?  Yes     Patient Care Team:  SARITHA Larson - CNP as PCP - General (Family Nurse Practitioner)  Gela Shine MD as PCP - S Attributed Provider    Medical History Review  Past Medical, Family, and Social History reviewed and does contribute to the patient presenting condition    Health Maintenance   Topic Date Due    DTaP/Tdap/Td vaccine (1 - Tdap) 06/13/1969    Shingles Vaccine (1 of 2) 06/13/2000    Colon cancer screen colonoscopy  06/18/2019    TSH testing  04/13/2020    Low dose CT lung screening  04/26/2020    Breast cancer screen  02/14/2021    Lipid screen  04/13/2024    Flu vaccine  Completed    Pneumococcal 65+ years Vaccine  Completed    Hepatitis C screen  Completed    DEXA (modify frequency per FRAX score)  Addressed

## 2019-05-29 ENCOUNTER — HOSPITAL ENCOUNTER (OUTPATIENT)
Dept: PULMONOLOGY | Age: 69
Discharge: HOME | End: 2019-05-29
Payer: MEDICARE

## 2019-05-29 DIAGNOSIS — F17.210 SMOKING GREATER THAN 40 PACK YEARS: ICD-10-CM

## 2019-05-29 DIAGNOSIS — R09.02 HYPOXIA: ICD-10-CM

## 2019-05-29 PROCEDURE — 94726 PLETHYSMOGRAPHY LUNG VOLUMES: CPT

## 2019-05-29 PROCEDURE — 2709999900 HC NON-CHARGEABLE SUPPLY

## 2019-05-29 PROCEDURE — 94060 EVALUATION OF WHEEZING: CPT

## 2019-05-29 PROCEDURE — 94729 DIFFUSING CAPACITY: CPT

## 2019-06-04 ENCOUNTER — TELEPHONE (OUTPATIENT)
Dept: FAMILY MEDICINE CLINIC | Age: 69
End: 2019-06-04

## 2019-06-04 NOTE — TELEPHONE ENCOUNTER
Pt. Is calling to see if they have the test results back yet from the pulmonary function she had done last Wednesday. Please advise pt.  At [phone]

## 2019-06-05 ENCOUNTER — HOSPITAL ENCOUNTER (EMERGENCY)
Age: 69
Discharge: HOME | End: 2019-06-05
Payer: MEDICARE

## 2019-06-05 VITALS
WEIGHT: 120 LBS | SYSTOLIC BLOOD PRESSURE: 134 MMHG | OXYGEN SATURATION: 95 % | HEIGHT: 64 IN | DIASTOLIC BLOOD PRESSURE: 74 MMHG | HEART RATE: 100 BPM | BODY MASS INDEX: 20.49 KG/M2 | TEMPERATURE: 98.9 F | RESPIRATION RATE: 18 BRPM

## 2019-06-05 DIAGNOSIS — N30.00 ACUTE CYSTITIS WITHOUT HEMATURIA: ICD-10-CM

## 2019-06-05 DIAGNOSIS — R30.0 DYSURIA: Primary | ICD-10-CM

## 2019-06-05 DIAGNOSIS — R39.15 URINARY URGENCY: ICD-10-CM

## 2019-06-05 LAB
BILIRUBIN URINE: NEGATIVE
BLOOD, URINE: NEGATIVE
CHARACTER, URINE: ABNORMAL
COLOR: YELLOW
GLUCOSE, URINE: NEGATIVE MG/DL
KETONES, URINE: NEGATIVE
LEUKOCYTES, UA: ABNORMAL
NITRATE, UA: NEGATIVE
PH UA: 6 (ref 5–9)
PROTEIN UA: NEGATIVE MG/DL
REFLEX TO URINE C & S: ABNORMAL
SPECIFIC GRAVITY UA: 1.02 (ref 1–1.03)
UROBILINOGEN, URINE: 0.2 EU/DL (ref 0–1)

## 2019-06-05 PROCEDURE — 81003 URINALYSIS AUTO W/O SCOPE: CPT

## 2019-06-05 PROCEDURE — 99214 OFFICE O/P EST MOD 30 MIN: CPT | Performed by: NURSE PRACTITIONER

## 2019-06-05 PROCEDURE — 99213 OFFICE O/P EST LOW 20 MIN: CPT

## 2019-06-05 PROCEDURE — 87086 URINE CULTURE/COLONY COUNT: CPT

## 2019-06-05 RX ORDER — CIPROFLOXACIN 500 MG/1
500 TABLET, FILM COATED ORAL 2 TIMES DAILY
Qty: 6 TABLET | Refills: 0 | Status: SHIPPED | OUTPATIENT
Start: 2019-06-05 | End: 2019-06-08

## 2019-06-05 RX ORDER — PHENAZOPYRIDINE HYDROCHLORIDE 100 MG/1
100 TABLET, FILM COATED ORAL 3 TIMES DAILY PRN
Qty: 9 TABLET | Refills: 0 | Status: SHIPPED | OUTPATIENT
Start: 2019-06-05 | End: 2019-06-08

## 2019-06-05 ASSESSMENT — ENCOUNTER SYMPTOMS
VOMITING: 0
BACK PAIN: 0
SHORTNESS OF BREATH: 0
SORE THROAT: 0
STRIDOR: 0
NAUSEA: 0
WHEEZING: 0
COUGH: 0
CHEST TIGHTNESS: 0
DIARRHEA: 0

## 2019-06-05 NOTE — ED PROVIDER NOTES
[place]  Urgent Care Encounter       CHIEF COMPLAINT       Chief Complaint   Patient presents with    Dysuria       Nurses Notes reviewed and I agree except as noted in the HPI. HISTORY OF PRESENT ILLNESS   Jordyn Reynoso is a 76 y.o. female who presents     Patient states that for approximately the last 24 hours she has noticed increased urinary frequency as well as urgency. She denies any flank pain or fevers. She states that she has had several UTIs in the past, these symptoms feel similar to that. Dysuria    This is a new problem. The current episode started yesterday. The problem has been gradually worsening. The patient is experiencing no pain. There has been no fever. Associated symptoms include frequency and urgency. Pertinent negatives include no chills, no sweats, no nausea, no vomiting, no hematuria, no possible pregnancy and no flank pain. She has tried sitz baths for the symptoms. Her past medical history is significant for recurrent UTIs. Her past medical history does not include kidney stones, single kidney, urological procedure, urinary stasis or catheterization. REVIEW OF SYSTEMS     Review of Systems   Constitutional: Negative for chills, fatigue and fever. HENT: Negative for congestion and sore throat. Eyes: Negative for visual disturbance. Respiratory: Negative for cough, chest tightness, shortness of breath, wheezing and stridor. Cardiovascular: Negative for chest pain. Gastrointestinal: Negative for diarrhea, nausea and vomiting. Genitourinary: Positive for dysuria, frequency and urgency. Negative for difficulty urinating, flank pain, hematuria and pelvic pain. Musculoskeletal: Negative for arthralgias, back pain, gait problem, joint swelling, myalgias, neck pain and neck stiffness. Skin: Negative for rash. Allergic/Immunologic: Negative for environmental allergies and food allergies.    Neurological: Negative for dizziness, show small amount of leukocytes, urine culture ordered has been made. Patient may take her Pyridium to help with irritation or inflammation to lining of bladder, this may be causing the urgency with her spasms. Patient is informed that medication can turn her urine orange. Patient should continue to drink adequate amounts of fluid to help flush out any bacteria from urinary tract.         PATIENT REFERRED TO:  Lawrnce Phoenix, APRN - CNP  [address] [address] / [place] [address]      DISCHARGE MEDICATIONS:  Discharge Medication List as of 6/5/2019 12:45 PM      START taking these medications    Details   ciprofloxacin (CIPRO) 500 MG tablet Take 1 tablet by mouth 2 times daily for 3 days, Disp-6 tablet, R-0Print      phenazopyridine (PYRIDIUM) 100 MG tablet Take 1 tablet by mouth 3 times daily as needed for Pain, Disp-9 tablet, R-0Print             Discharge Medication List as of 6/5/2019 12:45 PM          Discharge Medication List as of 6/5/2019 12:45 PM          SARITHA Feliz NP    (Please note that portions of this note were completed with a voice recognition program. Efforts were made to edit the dictations but occasionally words are mis-transcribed.)         SARITHA Costa NP  06/05/19 3136

## 2019-06-06 ENCOUNTER — TELEPHONE (OUTPATIENT)
Dept: FAMILY MEDICINE CLINIC | Age: 69
End: 2019-06-06

## 2019-06-06 LAB
ORGANISM: ABNORMAL
URINE CULTURE, ROUTINE: ABNORMAL

## 2019-06-10 ENCOUNTER — OFFICE VISIT (OUTPATIENT)
Dept: FAMILY MEDICINE CLINIC | Age: 69
End: 2019-06-10
Payer: MEDICARE

## 2019-06-10 VITALS
TEMPERATURE: 97.6 F | SYSTOLIC BLOOD PRESSURE: 114 MMHG | RESPIRATION RATE: 15 BRPM | OXYGEN SATURATION: 97 % | DIASTOLIC BLOOD PRESSURE: 60 MMHG | HEART RATE: 88 BPM | WEIGHT: 119.4 LBS | BODY MASS INDEX: 20.49 KG/M2

## 2019-06-10 DIAGNOSIS — J43.9 PULMONARY EMPHYSEMA, UNSPECIFIED EMPHYSEMA TYPE (HCC): Primary | ICD-10-CM

## 2019-06-10 DIAGNOSIS — N32.81 OAB (OVERACTIVE BLADDER): ICD-10-CM

## 2019-06-10 DIAGNOSIS — M85.89 OSTEOPENIA OF MULTIPLE SITES: ICD-10-CM

## 2019-06-10 DIAGNOSIS — Z86.73 H/O: CVA (CEREBROVASCULAR ACCIDENT): ICD-10-CM

## 2019-06-10 DIAGNOSIS — E03.9 ACQUIRED HYPOTHYROIDISM: ICD-10-CM

## 2019-06-10 DIAGNOSIS — F32.9 REACTIVE DEPRESSION: ICD-10-CM

## 2019-06-10 DIAGNOSIS — F17.210 SMOKING GREATER THAN 40 PACK YEARS: ICD-10-CM

## 2019-06-10 DIAGNOSIS — E78.2 MIXED HYPERLIPIDEMIA: ICD-10-CM

## 2019-06-10 PROCEDURE — 99214 OFFICE O/P EST MOD 30 MIN: CPT | Performed by: NURSE PRACTITIONER

## 2019-06-10 PROCEDURE — 3017F COLORECTAL CA SCREEN DOC REV: CPT | Performed by: NURSE PRACTITIONER

## 2019-06-10 PROCEDURE — 1036F TOBACCO NON-USER: CPT | Performed by: NURSE PRACTITIONER

## 2019-06-10 PROCEDURE — 1123F ACP DISCUSS/DSCN MKR DOCD: CPT | Performed by: NURSE PRACTITIONER

## 2019-06-10 PROCEDURE — 1090F PRES/ABSN URINE INCON ASSESS: CPT | Performed by: NURSE PRACTITIONER

## 2019-06-10 PROCEDURE — 3023F SPIROM DOC REV: CPT | Performed by: NURSE PRACTITIONER

## 2019-06-10 PROCEDURE — G8420 CALC BMI NORM PARAMETERS: HCPCS | Performed by: NURSE PRACTITIONER

## 2019-06-10 PROCEDURE — G8400 PT W/DXA NO RESULTS DOC: HCPCS | Performed by: NURSE PRACTITIONER

## 2019-06-10 PROCEDURE — G8427 DOCREV CUR MEDS BY ELIG CLIN: HCPCS | Performed by: NURSE PRACTITIONER

## 2019-06-10 PROCEDURE — G8926 SPIRO NO PERF OR DOC: HCPCS | Performed by: NURSE PRACTITIONER

## 2019-06-10 PROCEDURE — 4040F PNEUMOC VAC/ADMIN/RCVD: CPT | Performed by: NURSE PRACTITIONER

## 2019-06-10 RX ORDER — DULOXETIN HYDROCHLORIDE 60 MG/1
60 CAPSULE, DELAYED RELEASE ORAL DAILY
Qty: 90 CAPSULE | Refills: 3 | Status: SHIPPED | OUTPATIENT
Start: 2019-06-10 | End: 2020-07-06

## 2019-06-10 RX ORDER — OXYBUTYNIN CHLORIDE 15 MG/1
7.5 TABLET, EXTENDED RELEASE ORAL DAILY
Qty: 30 TABLET | Refills: 3 | Status: SHIPPED
Start: 2019-06-10 | End: 2020-01-14

## 2019-06-10 ASSESSMENT — ENCOUNTER SYMPTOMS
SHORTNESS OF BREATH: 0
NAUSEA: 0
ABDOMINAL PAIN: 0
COUGH: 0

## 2019-06-10 NOTE — PROGRESS NOTES
Subjective:      Patient ID: Pat Dixon is a 76 y.o. female. HPI  : 1 month     Chief Complaint   Patient presents with    1 Month Follow-Up     depression     Her reactive depression not well controlled on Wellbutrin. Increase on Cymbalta to improve anxiety/depression controll last visit. Tolerating well. Benefit seen in both anxiety and depression    Denies CP, SOB or chest tightness. ECHO 2019 EF 60% with LV function normal.  PFT 2019 showed moderate emphysema. 40 pack year hx. No maintenance inhalers. Complains of urinary frequency and urgency. Recent UA and Urine Culture NEG for UTI. On Ditropan 15 mg XL. Bladder control issues came on after CVA. Patient Active Problem List   Diagnosis    Hyperlipemia    Hypothyroidism    Depression    Chronic anxiety    Osteopenia    Medication monitoring encounter    H/O: CVA (cerebrovascular accident), aneurysm bleed, Jan 2015    Abnormality of gait following cerebrovascular accident    GERD (gastroesophageal reflux disease)    Cognitive impairment due to CVA.  OAB (overactive bladder)    Superficial ulcer of skin (HCC)    Chronic nausea    SOB (shortness of breath)    Hypoxia    Major depressive disorder, single episode, in full remission ([facility] [address]    Smoking greater than 40 pack years     MAMMO - 2019  COLONOSCOPY - 2009  Chanelle Leyva    Patient is status post CVA but doing well subsequently. Follows with NEURO. Her speech is essentially back to normal also. Her hypothyroidism continues to respond well to her current thyroid dose. She is on 50 µg daily.     Lab Results   Component Value Date    TSH 3.690 04/13/2019       BP wnl    BP Readings from Last 3 Encounters:   06/10/19 114/60   06/05/19 134/74   05/07/19 130/76     Labs reviewed    Lab Results   Component Value Date    LABA1C 5.3 04/13/2019    LABA1C 5.3 01/30/2016     No results found for: EAG    No components found for: CHLPL  Lab Results   Component Value Date    TRIG 81 04/13/2019    TRIG 80 08/25/2017    TRIG 79 01/30/2016     Lab Results   Component Value Date     (A) 04/13/2019     (A) 08/25/2017    HDL 87 (A) 01/30/2016     Lab Results   Component Value Date    LDLCALC 98 04/13/2019    LDLCALC 94 08/25/2017    LDLCALC 100 01/30/2016     No results found for: LABVLDL      Chemistry        Component Value Date/Time     (H) 04/30/2019 0730    K 4.0 04/30/2019 0730    K 3.3 (L) 04/27/2019 0711     (H) 04/30/2019 0730    CO2 23 04/30/2019 0730    BUN 18 04/30/2019 0730    CREATININE 1.1 04/30/2019 0730        Component Value Date/Time    CALCIUM 7.9 (L) 04/30/2019 0730            Lab Results   Component Value Date    TSH 3.690 04/13/2019       Lab Results   Component Value Date    WBC 9.6 04/30/2019    HGB 12.1 04/30/2019    HCT 37.1 04/30/2019    MCV 89.4 04/30/2019     04/30/2019         Health Maintenance   Topic Date Due    DTaP/Tdap/Td vaccine (1 - Tdap) 06/13/1969    Shingles Vaccine (1 of 2) 06/13/2000    Colon cancer screen colonoscopy  06/18/2019    TSH testing  04/13/2020    Low dose CT lung screening  04/26/2020    Breast cancer screen  02/14/2021    Lipid screen  04/13/2024    Flu vaccine  Completed    Pneumococcal 65+ years Vaccine  Completed    Hepatitis C screen  Completed    DEXA (modify frequency per FRAX score)  Addressed       Immunization History   Administered Date(s) Administered    Influenza Vaccine, unspecified formulation 12/06/2017    Influenza, High Dose (Fluzone 65 yrs and older) 12/06/2017, 11/07/2018    Pneumococcal 13-valent Conjugate ([id number]) 07/26/2017    Pneumococcal Polysaccharide ([id number]) 11/13/2018         Review of Systems   Constitutional: Negative for chills and fever. HENT: Negative. Respiratory: Negative for cough and shortness of breath. Cardiovascular: Negative for chest pain.    Gastrointestinal: Negative for abdominal pain Specialists  Healthy Lifestyles discussed  RTO in 10 month          Js Garza, SARITHA - CNP

## 2019-06-11 ENCOUNTER — TELEPHONE (OUTPATIENT)
Dept: FAMILY MEDICINE CLINIC | Age: 69
End: 2019-06-11

## 2019-06-11 DIAGNOSIS — J43.9 PULMONARY EMPHYSEMA, UNSPECIFIED EMPHYSEMA TYPE (HCC): Primary | ICD-10-CM

## 2019-06-11 NOTE — TELEPHONE ENCOUNTER
Pt called and said that the inhaler that was called in is not covered under pts insurance.  Pt said that her insurance covers:  [address] [facility]

## 2019-06-24 ENCOUNTER — OFFICE VISIT (OUTPATIENT)
Dept: PHYSICAL MEDICINE AND REHAB | Age: 69
End: 2019-06-24
Payer: MEDICARE

## 2019-06-24 VITALS
BODY MASS INDEX: 20.4 KG/M2 | WEIGHT: 119.49 LBS | SYSTOLIC BLOOD PRESSURE: 120 MMHG | HEART RATE: 96 BPM | HEIGHT: 64 IN | DIASTOLIC BLOOD PRESSURE: 75 MMHG

## 2019-06-24 DIAGNOSIS — I69.90 LATE EFFECTS OF CVA (CEREBROVASCULAR ACCIDENT): Primary | ICD-10-CM

## 2019-06-24 DIAGNOSIS — R41.89 COGNITIVE DEFICITS: ICD-10-CM

## 2019-06-24 PROCEDURE — 99213 OFFICE O/P EST LOW 20 MIN: CPT | Performed by: NURSE PRACTITIONER

## 2019-06-24 PROCEDURE — 1123F ACP DISCUSS/DSCN MKR DOCD: CPT | Performed by: NURSE PRACTITIONER

## 2019-06-24 PROCEDURE — G8420 CALC BMI NORM PARAMETERS: HCPCS | Performed by: NURSE PRACTITIONER

## 2019-06-24 PROCEDURE — 1036F TOBACCO NON-USER: CPT | Performed by: NURSE PRACTITIONER

## 2019-06-24 PROCEDURE — G8400 PT W/DXA NO RESULTS DOC: HCPCS | Performed by: NURSE PRACTITIONER

## 2019-06-24 PROCEDURE — 1090F PRES/ABSN URINE INCON ASSESS: CPT | Performed by: NURSE PRACTITIONER

## 2019-06-24 PROCEDURE — G8427 DOCREV CUR MEDS BY ELIG CLIN: HCPCS | Performed by: NURSE PRACTITIONER

## 2019-06-24 PROCEDURE — 3017F COLORECTAL CA SCREEN DOC REV: CPT | Performed by: NURSE PRACTITIONER

## 2019-06-24 PROCEDURE — 4040F PNEUMOC VAC/ADMIN/RCVD: CPT | Performed by: NURSE PRACTITIONER

## 2019-06-24 RX ORDER — ATOMOXETINE 40 MG/1
40 CAPSULE ORAL DAILY
Qty: 30 CAPSULE | Refills: 5 | Status: SHIPPED | OUTPATIENT
Start: 2019-06-24 | End: 2019-09-12 | Stop reason: SDUPTHER

## 2019-06-24 NOTE — PROGRESS NOTES
pulses; Edema: no    Impression:  · History of subarachnoid hemorrhage (1/2015)  · Cognitive deficits due to CVA  · Ataxic gait    Plan:  · Continue Strattera 40 mg daily  · Continue Aricept  · Encouraged HEP    Return in about 6 months (around 12/24/2019). It was my pleasure to evaluate Kaleb Parrer today. Please call with any concerns or questions.   15 minutes spent in evaluation efforts    Stormy Walker, APRN - CNP

## 2019-09-02 DIAGNOSIS — E03.9 HYPOTHYROIDISM, UNSPECIFIED TYPE: ICD-10-CM

## 2019-09-03 RX ORDER — LEVOTHYROXINE SODIUM 0.05 MG/1
TABLET ORAL
Qty: 90 TABLET | Refills: 3 | Status: SHIPPED | OUTPATIENT
Start: 2019-09-03 | End: 2020-06-22

## 2019-09-12 RX ORDER — ATOMOXETINE 40 MG/1
40 CAPSULE ORAL DAILY
Qty: 90 CAPSULE | Refills: 1 | Status: SHIPPED | OUTPATIENT
Start: 2019-09-12 | End: 2019-12-23 | Stop reason: SDUPTHER

## 2019-10-22 DIAGNOSIS — R41.89 COGNITIVE IMPAIRMENT: ICD-10-CM

## 2019-10-22 DIAGNOSIS — Z86.73 H/O: CVA (CEREBROVASCULAR ACCIDENT): ICD-10-CM

## 2019-10-23 RX ORDER — DONEPEZIL HYDROCHLORIDE 10 MG/1
TABLET, FILM COATED ORAL
Qty: 90 TABLET | Refills: 3 | Status: SHIPPED | OUTPATIENT
Start: 2019-10-23 | End: 2020-11-09

## 2019-10-27 ENCOUNTER — HOSPITAL ENCOUNTER (EMERGENCY)
Age: 69
Discharge: HOME | End: 2019-10-27
Payer: MEDICARE

## 2019-10-27 VITALS
SYSTOLIC BLOOD PRESSURE: 149 MMHG | HEART RATE: 100 BPM | TEMPERATURE: 97.1 F | OXYGEN SATURATION: 96 % | BODY MASS INDEX: 21.85 KG/M2 | HEIGHT: 64 IN | RESPIRATION RATE: 18 BRPM | WEIGHT: 128 LBS | DIASTOLIC BLOOD PRESSURE: 70 MMHG

## 2019-10-27 DIAGNOSIS — N30.01 ACUTE CYSTITIS WITH HEMATURIA: Primary | ICD-10-CM

## 2019-10-27 LAB
BILIRUBIN URINE: NEGATIVE
BLOOD, URINE: ABNORMAL
CHARACTER, URINE: ABNORMAL
COLOR: YELLOW
GLUCOSE, URINE: NEGATIVE MG/DL
KETONES, URINE: NEGATIVE
LEUKOCYTES, UA: ABNORMAL
NITRATE, UA: NEGATIVE
PH UA: 8.5 (ref 5–9)
PROTEIN UA: 30 MG/DL
REFLEX TO URINE C & S: ABNORMAL
SPECIFIC GRAVITY UA: 1.01 (ref 1–1.03)
UROBILINOGEN, URINE: 0.2 EU/DL (ref 0–1)

## 2019-10-27 PROCEDURE — 99213 OFFICE O/P EST LOW 20 MIN: CPT

## 2019-10-27 PROCEDURE — 87186 SC STD MICRODIL/AGAR DIL: CPT

## 2019-10-27 PROCEDURE — 87077 CULTURE AEROBIC IDENTIFY: CPT

## 2019-10-27 PROCEDURE — 87086 URINE CULTURE/COLONY COUNT: CPT

## 2019-10-27 PROCEDURE — 99213 OFFICE O/P EST LOW 20 MIN: CPT | Performed by: NURSE PRACTITIONER

## 2019-10-27 PROCEDURE — 81003 URINALYSIS AUTO W/O SCOPE: CPT

## 2019-10-27 RX ORDER — CEFDINIR 300 MG/1
300 CAPSULE ORAL 2 TIMES DAILY
Qty: 10 CAPSULE | Refills: 0 | Status: SHIPPED | OUTPATIENT
Start: 2019-10-27 | End: 2019-11-01

## 2019-10-27 ASSESSMENT — ENCOUNTER SYMPTOMS
NAUSEA: 0
VOMITING: 0
SHORTNESS OF BREATH: 0

## 2019-10-28 ENCOUNTER — TELEPHONE (OUTPATIENT)
Dept: FAMILY MEDICINE CLINIC | Age: 69
End: 2019-10-28

## 2019-10-29 LAB
ORGANISM: ABNORMAL
URINE CULTURE REFLEX: ABNORMAL

## 2019-11-05 ENCOUNTER — OFFICE VISIT (OUTPATIENT)
Dept: FAMILY MEDICINE CLINIC | Age: 69
End: 2019-11-05
Payer: MEDICARE

## 2019-11-05 VITALS
TEMPERATURE: 97.8 F | SYSTOLIC BLOOD PRESSURE: 130 MMHG | BODY MASS INDEX: 21.78 KG/M2 | WEIGHT: 126.9 LBS | HEART RATE: 92 BPM | DIASTOLIC BLOOD PRESSURE: 82 MMHG | RESPIRATION RATE: 18 BRPM

## 2019-11-05 DIAGNOSIS — N30.00 ACUTE CYSTITIS WITHOUT HEMATURIA: Primary | ICD-10-CM

## 2019-11-05 PROCEDURE — 4040F PNEUMOC VAC/ADMIN/RCVD: CPT | Performed by: NURSE PRACTITIONER

## 2019-11-05 PROCEDURE — G8400 PT W/DXA NO RESULTS DOC: HCPCS | Performed by: NURSE PRACTITIONER

## 2019-11-05 PROCEDURE — 99213 OFFICE O/P EST LOW 20 MIN: CPT | Performed by: NURSE PRACTITIONER

## 2019-11-05 PROCEDURE — G8427 DOCREV CUR MEDS BY ELIG CLIN: HCPCS | Performed by: NURSE PRACTITIONER

## 2019-11-05 PROCEDURE — G8482 FLU IMMUNIZE ORDER/ADMIN: HCPCS | Performed by: NURSE PRACTITIONER

## 2019-11-05 PROCEDURE — 3017F COLORECTAL CA SCREEN DOC REV: CPT | Performed by: NURSE PRACTITIONER

## 2019-11-05 PROCEDURE — 1036F TOBACCO NON-USER: CPT | Performed by: NURSE PRACTITIONER

## 2019-11-05 PROCEDURE — 1123F ACP DISCUSS/DSCN MKR DOCD: CPT | Performed by: NURSE PRACTITIONER

## 2019-11-05 PROCEDURE — 1090F PRES/ABSN URINE INCON ASSESS: CPT | Performed by: NURSE PRACTITIONER

## 2019-11-05 PROCEDURE — G8420 CALC BMI NORM PARAMETERS: HCPCS | Performed by: NURSE PRACTITIONER

## 2019-11-05 RX ORDER — ACYCLOVIR 400 MG/1
1 TABLET ORAL 2 TIMES DAILY
Refills: 11 | COMMUNITY
Start: 2019-11-04 | End: 2020-10-21

## 2019-11-05 RX ORDER — CIPROFLOXACIN 250 MG/1
250 TABLET, FILM COATED ORAL 2 TIMES DAILY
Qty: 10 TABLET | Refills: 0 | Status: SHIPPED | OUTPATIENT
Start: 2019-11-05 | End: 2019-11-10

## 2019-11-05 ASSESSMENT — ENCOUNTER SYMPTOMS
NAUSEA: 0
RESPIRATORY NEGATIVE: 1
VOMITING: 0
ABDOMINAL PAIN: 0

## 2019-11-12 ENCOUNTER — TELEPHONE (OUTPATIENT)
Dept: FAMILY MEDICINE CLINIC | Age: 69
End: 2019-11-12

## 2019-11-12 DIAGNOSIS — J43.9 PULMONARY EMPHYSEMA, UNSPECIFIED EMPHYSEMA TYPE (HCC): Primary | ICD-10-CM

## 2019-11-12 RX ORDER — BUDESONIDE AND FORMOTEROL FUMARATE DIHYDRATE 80; 4.5 UG/1; UG/1
2 AEROSOL RESPIRATORY (INHALATION) 2 TIMES DAILY
Qty: 1 INHALER | Refills: 5 | Status: ON HOLD | OUTPATIENT
Start: 2019-11-12 | End: 2019-12-01

## 2019-12-01 ENCOUNTER — HOSPITAL ENCOUNTER (OUTPATIENT)
Age: 69
Setting detail: OBSERVATION
Discharge: HOME | End: 2019-12-02
Attending: EMERGENCY MEDICINE | Admitting: INTERNAL MEDICINE
Payer: MEDICARE

## 2019-12-01 ENCOUNTER — APPOINTMENT (OUTPATIENT)
Dept: GENERAL RADIOLOGY | Age: 69
End: 2019-12-01
Payer: MEDICARE

## 2019-12-01 ENCOUNTER — APPOINTMENT (OUTPATIENT)
Dept: CT IMAGING | Age: 69
End: 2019-12-01
Payer: MEDICARE

## 2019-12-01 DIAGNOSIS — R55 SYNCOPE AND COLLAPSE: Primary | ICD-10-CM

## 2019-12-01 DIAGNOSIS — S01.01XA LACERATION OF SCALP, INITIAL ENCOUNTER: ICD-10-CM

## 2019-12-01 LAB
ALBUMIN SERPL-MCNC: 4.6 G/DL (ref 3.5–5.1)
ALP BLD-CCNC: 68 U/L (ref 38–126)
ALT SERPL-CCNC: 21 U/L (ref 11–66)
ANION GAP SERPL CALCULATED.3IONS-SCNC: 18 MEQ/L (ref 8–16)
APTT: 28.9 SECONDS (ref 22–38)
AST SERPL-CCNC: 35 U/L (ref 5–40)
BASOPHILS # BLD: 1.4 %
BASOPHILS ABSOLUTE: 0.1 THOU/MM3 (ref 0–0.1)
BILIRUB SERPL-MCNC: 0.3 MG/DL (ref 0.3–1.2)
BUN BLDV-MCNC: 12 MG/DL (ref 7–22)
CALCIUM SERPL-MCNC: 10.1 MG/DL (ref 8.5–10.5)
CHLORIDE BLD-SCNC: 103 MEQ/L (ref 98–111)
CO2: 21 MEQ/L (ref 23–33)
CREAT SERPL-MCNC: 0.6 MG/DL (ref 0.4–1.2)
EKG ATRIAL RATE: 93 BPM
EKG P AXIS: 75 DEGREES
EKG P-R INTERVAL: 146 MS
EKG Q-T INTERVAL: 358 MS
EKG QRS DURATION: 78 MS
EKG QTC CALCULATION (BAZETT): 445 MS
EKG R AXIS: 44 DEGREES
EKG T AXIS: 66 DEGREES
EKG VENTRICULAR RATE: 93 BPM
EOSINOPHIL # BLD: 2.6 %
EOSINOPHILS ABSOLUTE: 0.2 THOU/MM3 (ref 0–0.4)
ERYTHROCYTE [DISTWIDTH] IN BLOOD BY AUTOMATED COUNT: 13.5 % (ref 11.5–14.5)
ERYTHROCYTE [DISTWIDTH] IN BLOOD BY AUTOMATED COUNT: 45.9 FL (ref 35–45)
ETHYL ALCOHOL, SERUM: 0.22 %
GFR SERPL CREATININE-BSD FRML MDRD: > 90 ML/MIN/1.73M2
GLUCOSE BLD-MCNC: 91 MG/DL (ref 70–108)
GLUCOSE BLD-MCNC: 98 MG/DL (ref 70–108)
HCT VFR BLD CALC: 45.4 % (ref 37–47)
HEMOGLOBIN: 14.7 GM/DL (ref 12–16)
IMMATURE GRANS (ABS): 0.02 THOU/MM3 (ref 0–0.07)
IMMATURE GRANULOCYTES: 0.3 %
INR BLD: 0.9 (ref 0.85–1.13)
LYMPHOCYTES # BLD: 33.2 %
LYMPHOCYTES ABSOLUTE: 2.2 THOU/MM3 (ref 1–4.8)
MCH RBC QN AUTO: 29.7 PG (ref 26–33)
MCHC RBC AUTO-ENTMCNC: 32.4 GM/DL (ref 32.2–35.5)
MCV RBC AUTO: 91.7 FL (ref 81–99)
MONOCYTES # BLD: 8 %
MONOCYTES ABSOLUTE: 0.5 THOU/MM3 (ref 0.4–1.3)
NUCLEATED RED BLOOD CELLS: 0 /100 WBC
OSMOLALITY CALCULATION: 282.9 MOSMOL/KG (ref 275–300)
PHOSPHORUS: 3.5 MG/DL (ref 2.4–4.7)
PLATELET # BLD: 285 THOU/MM3 (ref 130–400)
PMV BLD AUTO: 10.7 FL (ref 9.4–12.4)
POTASSIUM REFLEX MAGNESIUM: 3.9 MEQ/L (ref 3.5–5.2)
PRO-BNP: 130.2 PG/ML (ref 0–900)
RBC # BLD: 4.95 MILL/MM3 (ref 4.2–5.4)
SEG NEUTROPHILS: 54.5 %
SEGMENTED NEUTROPHILS ABSOLUTE COUNT: 3.7 THOU/MM3 (ref 1.8–7.7)
SODIUM BLD-SCNC: 142 MEQ/L (ref 135–145)
TOTAL CK: 111 U/L (ref 30–135)
TOTAL PROTEIN: 7.9 G/DL (ref 6.1–8)
TROPONIN T: < 0.01 NG/ML
WBC # BLD: 6.7 THOU/MM3 (ref 4.8–10.8)

## 2019-12-01 PROCEDURE — 84484 ASSAY OF TROPONIN QUANT: CPT

## 2019-12-01 PROCEDURE — 70450 CT HEAD/BRAIN W/O DYE: CPT

## 2019-12-01 PROCEDURE — 71045 X-RAY EXAM CHEST 1 VIEW: CPT

## 2019-12-01 PROCEDURE — 72125 CT NECK SPINE W/O DYE: CPT

## 2019-12-01 PROCEDURE — 83880 ASSAY OF NATRIURETIC PEPTIDE: CPT

## 2019-12-01 PROCEDURE — 85730 THROMBOPLASTIN TIME PARTIAL: CPT

## 2019-12-01 PROCEDURE — 84100 ASSAY OF PHOSPHORUS: CPT

## 2019-12-01 PROCEDURE — 85610 PROTHROMBIN TIME: CPT

## 2019-12-01 PROCEDURE — 36415 COLL VENOUS BLD VENIPUNCTURE: CPT

## 2019-12-01 PROCEDURE — 82948 REAGENT STRIP/BLOOD GLUCOSE: CPT

## 2019-12-01 PROCEDURE — G0378 HOSPITAL OBSERVATION PER HR: HCPCS

## 2019-12-01 PROCEDURE — 82550 ASSAY OF CK (CPK): CPT

## 2019-12-01 PROCEDURE — 93005 ELECTROCARDIOGRAM TRACING: CPT | Performed by: EMERGENCY MEDICINE

## 2019-12-01 PROCEDURE — 2060000000 HC ICU INTERMEDIATE R&B

## 2019-12-01 PROCEDURE — G0480 DRUG TEST DEF 1-7 CLASSES: HCPCS

## 2019-12-01 PROCEDURE — 80053 COMPREHEN METABOLIC PANEL: CPT

## 2019-12-01 PROCEDURE — 99220 PR INITIAL OBSERVATION CARE/DAY 70 MINUTES: CPT | Performed by: INTERNAL MEDICINE

## 2019-12-01 PROCEDURE — 85025 COMPLETE CBC W/AUTO DIFF WBC: CPT

## 2019-12-01 PROCEDURE — 99285 EMERGENCY DEPT VISIT HI MDM: CPT

## 2019-12-01 PROCEDURE — 2709999900 HC NON-CHARGEABLE SUPPLY

## 2019-12-01 RX ORDER — ONDANSETRON 2 MG/ML
4 INJECTION INTRAMUSCULAR; INTRAVENOUS EVERY 6 HOURS PRN
Status: DISCONTINUED | OUTPATIENT
Start: 2019-12-01 | End: 2019-12-01

## 2019-12-01 RX ORDER — SODIUM CHLORIDE 0.9 % (FLUSH) 0.9 %
10 SYRINGE (ML) INJECTION EVERY 12 HOURS SCHEDULED
Status: DISCONTINUED | OUTPATIENT
Start: 2019-12-01 | End: 2019-12-02 | Stop reason: HOSPADM

## 2019-12-01 RX ORDER — LORAZEPAM 2 MG/1
4 TABLET ORAL
Status: DISCONTINUED | OUTPATIENT
Start: 2019-12-01 | End: 2019-12-02 | Stop reason: HOSPADM

## 2019-12-01 RX ORDER — SODIUM CHLORIDE 0.9 % (FLUSH) 0.9 %
10 SYRINGE (ML) INJECTION PRN
Status: DISCONTINUED | OUTPATIENT
Start: 2019-12-01 | End: 2019-12-01

## 2019-12-01 RX ORDER — ONDANSETRON 2 MG/ML
4 INJECTION INTRAMUSCULAR; INTRAVENOUS EVERY 6 HOURS PRN
Status: DISCONTINUED | OUTPATIENT
Start: 2019-12-01 | End: 2019-12-02 | Stop reason: HOSPADM

## 2019-12-01 RX ORDER — LORAZEPAM 2 MG/1
2 TABLET ORAL
Status: DISCONTINUED | OUTPATIENT
Start: 2019-12-01 | End: 2019-12-02 | Stop reason: HOSPADM

## 2019-12-01 RX ORDER — SODIUM CHLORIDE 9 MG/ML
INJECTION, SOLUTION INTRAVENOUS CONTINUOUS
Status: DISCONTINUED | OUTPATIENT
Start: 2019-12-02 | End: 2019-12-02 | Stop reason: HOSPADM

## 2019-12-01 RX ORDER — ACETAMINOPHEN 325 MG/1
650 TABLET ORAL EVERY 4 HOURS PRN
Status: DISCONTINUED | OUTPATIENT
Start: 2019-12-01 | End: 2019-12-02 | Stop reason: HOSPADM

## 2019-12-01 RX ORDER — LORAZEPAM 2 MG/ML
3 INJECTION INTRAMUSCULAR
Status: DISCONTINUED | OUTPATIENT
Start: 2019-12-01 | End: 2019-12-02 | Stop reason: HOSPADM

## 2019-12-01 RX ORDER — SODIUM CHLORIDE 0.9 % (FLUSH) 0.9 %
10 SYRINGE (ML) INJECTION PRN
Status: DISCONTINUED | OUTPATIENT
Start: 2019-12-01 | End: 2019-12-02 | Stop reason: HOSPADM

## 2019-12-01 RX ORDER — LORAZEPAM 2 MG/ML
2 INJECTION INTRAMUSCULAR
Status: DISCONTINUED | OUTPATIENT
Start: 2019-12-01 | End: 2019-12-02 | Stop reason: HOSPADM

## 2019-12-01 RX ORDER — LORAZEPAM 2 MG/ML
4 INJECTION INTRAMUSCULAR
Status: DISCONTINUED | OUTPATIENT
Start: 2019-12-01 | End: 2019-12-02 | Stop reason: HOSPADM

## 2019-12-01 RX ORDER — LORAZEPAM 1 MG/1
1 TABLET ORAL
Status: DISCONTINUED | OUTPATIENT
Start: 2019-12-01 | End: 2019-12-02 | Stop reason: HOSPADM

## 2019-12-01 RX ORDER — LORAZEPAM 2 MG/ML
1 INJECTION INTRAMUSCULAR
Status: DISCONTINUED | OUTPATIENT
Start: 2019-12-01 | End: 2019-12-02 | Stop reason: HOSPADM

## 2019-12-01 RX ORDER — THIAMINE HYDROCHLORIDE 100 MG/ML
100 INJECTION, SOLUTION INTRAMUSCULAR; INTRAVENOUS DAILY
Status: DISCONTINUED | OUTPATIENT
Start: 2019-12-02 | End: 2019-12-02 | Stop reason: HOSPADM

## 2019-12-01 ASSESSMENT — PAIN SCALES - GENERAL: PAINLEVEL_OUTOF10: 0

## 2019-12-02 VITALS
SYSTOLIC BLOOD PRESSURE: 110 MMHG | HEART RATE: 94 BPM | WEIGHT: 130.9 LBS | RESPIRATION RATE: 17 BRPM | DIASTOLIC BLOOD PRESSURE: 60 MMHG | BODY MASS INDEX: 22.35 KG/M2 | TEMPERATURE: 98.7 F | HEIGHT: 64 IN | OXYGEN SATURATION: 97 %

## 2019-12-02 PROBLEM — S01.91XA LACERATION OF HEAD: Status: ACTIVE | Noted: 2019-12-02

## 2019-12-02 PROBLEM — F10.10 ALCOHOL ABUSE: Status: ACTIVE | Noted: 2019-12-02

## 2019-12-02 PROBLEM — S06.0X9A CONCUSSION WITH LOSS OF CONSCIOUSNESS: Status: ACTIVE | Noted: 2019-12-02

## 2019-12-02 LAB
ANION GAP SERPL CALCULATED.3IONS-SCNC: 16 MEQ/L (ref 8–16)
BUN BLDV-MCNC: 14 MG/DL (ref 7–22)
CALCIUM SERPL-MCNC: 9.2 MG/DL (ref 8.5–10.5)
CHLORIDE BLD-SCNC: 107 MEQ/L (ref 98–111)
CO2: 21 MEQ/L (ref 23–33)
CREAT SERPL-MCNC: 0.7 MG/DL (ref 0.4–1.2)
D-DIMER QUANTITATIVE: 428 NG/ML FEU (ref 0–500)
EKG ATRIAL RATE: 97 BPM
EKG P AXIS: 81 DEGREES
EKG P-R INTERVAL: 138 MS
EKG Q-T INTERVAL: 370 MS
EKG QRS DURATION: 78 MS
EKG QTC CALCULATION (BAZETT): 469 MS
EKG R AXIS: 0 DEGREES
EKG T AXIS: 80 DEGREES
EKG VENTRICULAR RATE: 97 BPM
ERYTHROCYTE [DISTWIDTH] IN BLOOD BY AUTOMATED COUNT: 13.7 % (ref 11.5–14.5)
ERYTHROCYTE [DISTWIDTH] IN BLOOD BY AUTOMATED COUNT: 46.1 FL (ref 35–45)
GFR SERPL CREATININE-BSD FRML MDRD: 83 ML/MIN/1.73M2
GLUCOSE BLD-MCNC: 81 MG/DL (ref 70–108)
HCT VFR BLD CALC: 42.7 % (ref 37–47)
HEMOGLOBIN: 13.6 GM/DL (ref 12–16)
MCH RBC QN AUTO: 29.5 PG (ref 26–33)
MCHC RBC AUTO-ENTMCNC: 31.9 GM/DL (ref 32.2–35.5)
MCV RBC AUTO: 92.6 FL (ref 81–99)
PLATELET # BLD: 281 THOU/MM3 (ref 130–400)
PMV BLD AUTO: 11.2 FL (ref 9.4–12.4)
POTASSIUM SERPL-SCNC: 3.8 MEQ/L (ref 3.5–5.2)
RBC # BLD: 4.61 MILL/MM3 (ref 4.2–5.4)
REASON FOR REJECTION: NORMAL
REJECTED TEST: NORMAL
SODIUM BLD-SCNC: 144 MEQ/L (ref 135–145)
TROPONIN T: < 0.01 NG/ML
TROPONIN T: < 0.01 NG/ML
WBC # BLD: 6.8 THOU/MM3 (ref 4.8–10.8)

## 2019-12-02 PROCEDURE — 2500000003 HC RX 250 WO HCPCS: Performed by: INTERNAL MEDICINE

## 2019-12-02 PROCEDURE — 6370000000 HC RX 637 (ALT 250 FOR IP): Performed by: INTERNAL MEDICINE

## 2019-12-02 PROCEDURE — 36415 COLL VENOUS BLD VENIPUNCTURE: CPT

## 2019-12-02 PROCEDURE — 80048 BASIC METABOLIC PNL TOTAL CA: CPT

## 2019-12-02 PROCEDURE — 2709999900 HC NON-CHARGEABLE SUPPLY

## 2019-12-02 PROCEDURE — 99217 PR OBSERVATION CARE DISCHARGE MANAGEMENT: CPT | Performed by: INTERNAL MEDICINE

## 2019-12-02 PROCEDURE — 94640 AIRWAY INHALATION TREATMENT: CPT

## 2019-12-02 PROCEDURE — 93010 ELECTROCARDIOGRAM REPORT: CPT | Performed by: INTERNAL MEDICINE

## 2019-12-02 PROCEDURE — 93270 REMOTE 30 DAY ECG REV/REPORT: CPT | Performed by: INTERNAL MEDICINE

## 2019-12-02 PROCEDURE — 93307 TTE W/O DOPPLER COMPLETE: CPT

## 2019-12-02 PROCEDURE — 2580000003 HC RX 258: Performed by: INTERNAL MEDICINE

## 2019-12-02 PROCEDURE — 93005 ELECTROCARDIOGRAM TRACING: CPT | Performed by: INTERNAL MEDICINE

## 2019-12-02 PROCEDURE — G0378 HOSPITAL OBSERVATION PER HR: HCPCS

## 2019-12-02 PROCEDURE — 99213 OFFICE O/P EST LOW 20 MIN: CPT | Performed by: INTERNAL MEDICINE

## 2019-12-02 PROCEDURE — 6360000002 HC RX W HCPCS: Performed by: INTERNAL MEDICINE

## 2019-12-02 PROCEDURE — 85379 FIBRIN DEGRADATION QUANT: CPT

## 2019-12-02 PROCEDURE — 96372 THER/PROPH/DIAG INJ SC/IM: CPT

## 2019-12-02 PROCEDURE — 84484 ASSAY OF TROPONIN QUANT: CPT

## 2019-12-02 PROCEDURE — 94760 N-INVAS EAR/PLS OXIMETRY 1: CPT

## 2019-12-02 PROCEDURE — 85027 COMPLETE CBC AUTOMATED: CPT

## 2019-12-02 RX ORDER — ATOMOXETINE 40 MG/1
40 CAPSULE ORAL DAILY
Status: DISCONTINUED | OUTPATIENT
Start: 2019-12-02 | End: 2019-12-02 | Stop reason: HOSPADM

## 2019-12-02 RX ORDER — CLOTRIMAZOLE AND BETAMETHASONE DIPROPIONATE 10; .64 MG/G; MG/G
CREAM TOPICAL 2 TIMES DAILY
Status: DISCONTINUED | OUTPATIENT
Start: 2019-12-02 | End: 2019-12-02 | Stop reason: HOSPADM

## 2019-12-02 RX ORDER — ACYCLOVIR 400 MG/1
400 TABLET ORAL 2 TIMES DAILY
Status: DISCONTINUED | OUTPATIENT
Start: 2019-12-02 | End: 2019-12-02 | Stop reason: HOSPADM

## 2019-12-02 RX ORDER — SIMVASTATIN 10 MG
10 TABLET ORAL NIGHTLY
Status: DISCONTINUED | OUTPATIENT
Start: 2019-12-02 | End: 2019-12-02 | Stop reason: HOSPADM

## 2019-12-02 RX ORDER — DONEPEZIL HYDROCHLORIDE 10 MG/1
10 TABLET, FILM COATED ORAL NIGHTLY
Status: DISCONTINUED | OUTPATIENT
Start: 2019-12-02 | End: 2019-12-02 | Stop reason: HOSPADM

## 2019-12-02 RX ORDER — LEVOTHYROXINE SODIUM 0.05 MG/1
50 TABLET ORAL DAILY
Status: DISCONTINUED | OUTPATIENT
Start: 2019-12-02 | End: 2019-12-02 | Stop reason: HOSPADM

## 2019-12-02 RX ORDER — OMEGA-3/DHA/EPA/FISH OIL 300-1000MG
1 CAPSULE ORAL DAILY
Status: DISCONTINUED | OUTPATIENT
Start: 2019-12-03 | End: 2019-12-02

## 2019-12-02 RX ORDER — DULOXETIN HYDROCHLORIDE 60 MG/1
60 CAPSULE, DELAYED RELEASE ORAL DAILY
Status: DISCONTINUED | OUTPATIENT
Start: 2019-12-02 | End: 2019-12-02 | Stop reason: HOSPADM

## 2019-12-02 RX ORDER — MULTIVITAMIN WITH FOLIC ACID 400 MCG
1 TABLET ORAL DAILY
Status: DISCONTINUED | OUTPATIENT
Start: 2019-12-02 | End: 2019-12-02 | Stop reason: HOSPADM

## 2019-12-02 RX ORDER — OXYBUTYNIN CHLORIDE 5 MG/1
30 TABLET, EXTENDED RELEASE ORAL DAILY
Status: DISCONTINUED | OUTPATIENT
Start: 2019-12-02 | End: 2019-12-02 | Stop reason: HOSPADM

## 2019-12-02 RX ORDER — MAGNESIUM HYDROXIDE 400 MG/5ML
1 SUSPENSION, ORAL (FINAL DOSE FORM) ORAL 2 TIMES DAILY
Status: DISCONTINUED | OUTPATIENT
Start: 2019-12-02 | End: 2019-12-02

## 2019-12-02 RX ORDER — OYSTER SHELL CALCIUM WITH VITAMIN D 500; 200 MG/1; [IU]/1
1 TABLET, FILM COATED ORAL 2 TIMES DAILY
Status: DISCONTINUED | OUTPATIENT
Start: 2019-12-02 | End: 2019-12-02 | Stop reason: HOSPADM

## 2019-12-02 RX ADMIN — GLYCOPYRROLATE AND FORMOTEROL FUMARATE 1 PUFF: 9; 4.8 AEROSOL, METERED RESPIRATORY (INHALATION) at 08:09

## 2019-12-02 RX ADMIN — SODIUM CHLORIDE, PRESERVATIVE FREE 10 ML: 5 INJECTION INTRAVENOUS at 00:51

## 2019-12-02 RX ADMIN — OXYBUTYNIN CHLORIDE 30 MG: 5 TABLET, EXTENDED RELEASE ORAL at 08:59

## 2019-12-02 RX ADMIN — ACETAMINOPHEN 650 MG: 325 TABLET ORAL at 09:00

## 2019-12-02 RX ADMIN — DULOXETINE HYDROCHLORIDE 60 MG: 60 CAPSULE, DELAYED RELEASE ORAL at 08:59

## 2019-12-02 RX ADMIN — THERA TABS 1 TABLET: TAB at 09:00

## 2019-12-02 RX ADMIN — ATOMOXETINE 40 MG: 40 CAPSULE ORAL at 11:51

## 2019-12-02 RX ADMIN — CALCIUM CARBONATE-VITAMIN D TAB 500 MG-200 UNIT 1 TABLET: 500-200 TAB at 08:59

## 2019-12-02 RX ADMIN — SODIUM CHLORIDE: 9 INJECTION, SOLUTION INTRAVENOUS at 00:51

## 2019-12-02 RX ADMIN — FOLIC ACID: 5 INJECTION, SOLUTION INTRAMUSCULAR; INTRAVENOUS; SUBCUTANEOUS at 13:20

## 2019-12-02 RX ADMIN — THIAMINE HYDROCHLORIDE 100 MG: 100 INJECTION, SOLUTION INTRAMUSCULAR; INTRAVENOUS at 09:04

## 2019-12-02 RX ADMIN — ACYCLOVIR 400 MG: 400 TABLET ORAL at 09:00

## 2019-12-02 RX ADMIN — LEVOTHYROXINE SODIUM 50 MCG: 50 TABLET ORAL at 08:59

## 2019-12-02 RX ADMIN — CLOTRIMAZOLE AND BETAMETHASONE DIPROPIONATE: 10; .5 CREAM TOPICAL at 08:59

## 2019-12-02 ASSESSMENT — PAIN SCALES - GENERAL
PAINLEVEL_OUTOF10: 8
PAINLEVEL_OUTOF10: 4

## 2019-12-03 ENCOUNTER — TELEPHONE (OUTPATIENT)
Dept: FAMILY MEDICINE CLINIC | Age: 69
End: 2019-12-03

## 2019-12-11 ENCOUNTER — HOSPITAL ENCOUNTER (EMERGENCY)
Age: 69
Discharge: HOME | End: 2019-12-12
Payer: MEDICARE

## 2019-12-11 DIAGNOSIS — F10.929 ACUTE ALCOHOLIC INTOXICATION WITH COMPLICATION (HCC): Primary | ICD-10-CM

## 2019-12-11 DIAGNOSIS — F32.A DEPRESSION, UNSPECIFIED DEPRESSION TYPE: ICD-10-CM

## 2019-12-11 LAB
ACETAMINOPHEN LEVEL: < 5 UG/ML (ref 0–20)
ALBUMIN SERPL-MCNC: 4.6 G/DL (ref 3.5–5.1)
ALP BLD-CCNC: 55 U/L (ref 38–126)
ALT SERPL-CCNC: 21 U/L (ref 11–66)
AMPHETAMINE+METHAMPHETAMINE URINE SCREEN: NEGATIVE
ANION GAP SERPL CALCULATED.3IONS-SCNC: 17 MEQ/L (ref 8–16)
AST SERPL-CCNC: 37 U/L (ref 5–40)
BACTERIA: ABNORMAL /HPF
BARBITURATE QUANTITATIVE URINE: NEGATIVE
BASOPHILS # BLD: 1.4 %
BASOPHILS ABSOLUTE: 0.1 THOU/MM3 (ref 0–0.1)
BENZODIAZEPINE QUANTITATIVE URINE: NEGATIVE
BILIRUB SERPL-MCNC: 0.3 MG/DL (ref 0.3–1.2)
BILIRUBIN DIRECT: < 0.2 MG/DL (ref 0–0.3)
BILIRUBIN URINE: NEGATIVE
BLOOD, URINE: NEGATIVE
BUN BLDV-MCNC: 11 MG/DL (ref 7–22)
CALCIUM SERPL-MCNC: 9.6 MG/DL (ref 8.5–10.5)
CANNABINOID QUANTITATIVE URINE: NEGATIVE
CASTS 2: ABNORMAL /LPF
CASTS UA: ABNORMAL /LPF
CHARACTER, URINE: CLEAR
CHLORIDE BLD-SCNC: 106 MEQ/L (ref 98–111)
CO2: 21 MEQ/L (ref 23–33)
COCAINE METABOLITE QUANTITATIVE URINE: NEGATIVE
COLOR: YELLOW
CREAT SERPL-MCNC: 0.7 MG/DL (ref 0.4–1.2)
CRYSTALS, UA: ABNORMAL
EOSINOPHIL # BLD: 0.8 %
EOSINOPHILS ABSOLUTE: 0.1 THOU/MM3 (ref 0–0.4)
EPITHELIAL CELLS, UA: ABNORMAL /HPF
ERYTHROCYTE [DISTWIDTH] IN BLOOD BY AUTOMATED COUNT: 13 % (ref 11.5–14.5)
ERYTHROCYTE [DISTWIDTH] IN BLOOD BY AUTOMATED COUNT: 44.8 FL (ref 35–45)
ETHYL ALCOHOL, SERUM: 0.17 %
GFR SERPL CREATININE-BSD FRML MDRD: 83 ML/MIN/1.73M2
GLUCOSE BLD-MCNC: 103 MG/DL (ref 70–108)
GLUCOSE URINE: NEGATIVE MG/DL
HCT VFR BLD CALC: 46.8 % (ref 37–47)
HEMOGLOBIN: 14.6 GM/DL (ref 12–16)
IMMATURE GRANS (ABS): 0.04 THOU/MM3 (ref 0–0.07)
IMMATURE GRANULOCYTES: 0.4 %
KETONES, URINE: NEGATIVE
LEUKOCYTE ESTERASE, URINE: ABNORMAL
LYMPHOCYTES # BLD: 22.2 %
LYMPHOCYTES ABSOLUTE: 2.1 THOU/MM3 (ref 1–4.8)
MCH RBC QN AUTO: 29.6 PG (ref 26–33)
MCHC RBC AUTO-ENTMCNC: 31.2 GM/DL (ref 32.2–35.5)
MCV RBC AUTO: 94.7 FL (ref 81–99)
MISCELLANEOUS 2: ABNORMAL
MONOCYTES # BLD: 5.4 %
MONOCYTES ABSOLUTE: 0.5 THOU/MM3 (ref 0.4–1.3)
NITRITE, URINE: NEGATIVE
NUCLEATED RED BLOOD CELLS: 0 /100 WBC
OPIATES, URINE: NEGATIVE
OSMOLALITY CALCULATION: 286.5 MOSMOL/KG (ref 275–300)
OXYCODONE: NEGATIVE
PH UA: 6.5 (ref 5–9)
PHENCYCLIDINE QUANTITATIVE URINE: NEGATIVE
PLATELET # BLD: 284 THOU/MM3 (ref 130–400)
PMV BLD AUTO: 11.5 FL (ref 9.4–12.4)
POTASSIUM SERPL-SCNC: 3.9 MEQ/L (ref 3.5–5.2)
PROTEIN UA: NEGATIVE
RBC # BLD: 4.94 MILL/MM3 (ref 4.2–5.4)
RBC URINE: ABNORMAL /HPF
RENAL EPITHELIAL, UA: ABNORMAL
SALICYLATE, SERUM: < 0.3 MG/DL (ref 2–10)
SEG NEUTROPHILS: 69.8 %
SEGMENTED NEUTROPHILS ABSOLUTE COUNT: 6.7 THOU/MM3 (ref 1.8–7.7)
SODIUM BLD-SCNC: 144 MEQ/L (ref 135–145)
SPECIFIC GRAVITY, URINE: 1 (ref 1–1.03)
TOTAL PROTEIN: 7.9 G/DL (ref 6.1–8)
TSH SERPL DL<=0.05 MIU/L-ACNC: 1.17 UIU/ML (ref 0.4–4.2)
UROBILINOGEN, URINE: 0.2 EU/DL (ref 0–1)
WBC # BLD: 9.6 THOU/MM3 (ref 4.8–10.8)
WBC UA: ABNORMAL /HPF
YEAST: ABNORMAL

## 2019-12-11 PROCEDURE — 85025 COMPLETE CBC W/AUTO DIFF WBC: CPT

## 2019-12-11 PROCEDURE — 81001 URINALYSIS AUTO W/SCOPE: CPT

## 2019-12-11 PROCEDURE — 84443 ASSAY THYROID STIM HORMONE: CPT

## 2019-12-11 PROCEDURE — 80307 DRUG TEST PRSMV CHEM ANLYZR: CPT

## 2019-12-11 PROCEDURE — 80053 COMPREHEN METABOLIC PANEL: CPT

## 2019-12-11 PROCEDURE — 82248 BILIRUBIN DIRECT: CPT

## 2019-12-11 PROCEDURE — G0480 DRUG TEST DEF 1-7 CLASSES: HCPCS

## 2019-12-11 PROCEDURE — 99285 EMERGENCY DEPT VISIT HI MDM: CPT

## 2019-12-11 PROCEDURE — 36415 COLL VENOUS BLD VENIPUNCTURE: CPT

## 2019-12-11 ASSESSMENT — ENCOUNTER SYMPTOMS
RHINORRHEA: 0
SORE THROAT: 0
COUGH: 0
NAUSEA: 0
VOMITING: 0
DIARRHEA: 0
WHEEZING: 0
ABDOMINAL PAIN: 0
EYE DISCHARGE: 0
BACK PAIN: 0
SHORTNESS OF BREATH: 0
EYE PAIN: 0

## 2019-12-11 ASSESSMENT — SLEEP AND FATIGUE QUESTIONNAIRES: DO YOU HAVE DIFFICULTY SLEEPING: COMMENT

## 2019-12-12 VITALS
TEMPERATURE: 98.1 F | WEIGHT: 130 LBS | RESPIRATION RATE: 19 BRPM | HEIGHT: 64 IN | BODY MASS INDEX: 22.2 KG/M2 | DIASTOLIC BLOOD PRESSURE: 73 MMHG | SYSTOLIC BLOOD PRESSURE: 133 MMHG | HEART RATE: 91 BPM | OXYGEN SATURATION: 93 %

## 2019-12-12 LAB — ETHYL ALCOHOL, SERUM: 0.03 %

## 2019-12-12 PROCEDURE — G0480 DRUG TEST DEF 1-7 CLASSES: HCPCS

## 2019-12-12 PROCEDURE — 36415 COLL VENOUS BLD VENIPUNCTURE: CPT

## 2019-12-13 ENCOUNTER — OFFICE VISIT (OUTPATIENT)
Dept: FAMILY MEDICINE CLINIC | Age: 69
End: 2019-12-13
Payer: MEDICARE

## 2019-12-13 VITALS
SYSTOLIC BLOOD PRESSURE: 112 MMHG | HEART RATE: 108 BPM | BODY MASS INDEX: 21.46 KG/M2 | DIASTOLIC BLOOD PRESSURE: 62 MMHG | WEIGHT: 125 LBS | OXYGEN SATURATION: 98 % | RESPIRATION RATE: 24 BRPM

## 2019-12-13 DIAGNOSIS — F32.9 REACTIVE DEPRESSION: ICD-10-CM

## 2019-12-13 DIAGNOSIS — R93.0 ABNORMAL CT OF THE HEAD: ICD-10-CM

## 2019-12-13 DIAGNOSIS — R41.89 COGNITIVE IMPAIRMENT: ICD-10-CM

## 2019-12-13 DIAGNOSIS — R55 SYNCOPE AND COLLAPSE: ICD-10-CM

## 2019-12-13 DIAGNOSIS — N39.0 RECURRENT UTI: ICD-10-CM

## 2019-12-13 DIAGNOSIS — N32.81 OAB (OVERACTIVE BLADDER): ICD-10-CM

## 2019-12-13 DIAGNOSIS — S01.01XD LACERATION OF SCALP, SUBSEQUENT ENCOUNTER: ICD-10-CM

## 2019-12-13 DIAGNOSIS — F10.10 ALCOHOL ABUSE: Primary | ICD-10-CM

## 2019-12-13 DIAGNOSIS — Z86.73 H/O: CVA (CEREBROVASCULAR ACCIDENT): ICD-10-CM

## 2019-12-13 DIAGNOSIS — M48.02 SPINAL STENOSIS, CERVICAL REGION: ICD-10-CM

## 2019-12-13 PROCEDURE — 99215 OFFICE O/P EST HI 40 MIN: CPT | Performed by: NURSE PRACTITIONER

## 2019-12-13 PROCEDURE — G8420 CALC BMI NORM PARAMETERS: HCPCS | Performed by: NURSE PRACTITIONER

## 2019-12-13 PROCEDURE — 4040F PNEUMOC VAC/ADMIN/RCVD: CPT | Performed by: NURSE PRACTITIONER

## 2019-12-13 PROCEDURE — G8482 FLU IMMUNIZE ORDER/ADMIN: HCPCS | Performed by: NURSE PRACTITIONER

## 2019-12-13 PROCEDURE — 3017F COLORECTAL CA SCREEN DOC REV: CPT | Performed by: NURSE PRACTITIONER

## 2019-12-13 PROCEDURE — 1123F ACP DISCUSS/DSCN MKR DOCD: CPT | Performed by: NURSE PRACTITIONER

## 2019-12-13 PROCEDURE — G8427 DOCREV CUR MEDS BY ELIG CLIN: HCPCS | Performed by: NURSE PRACTITIONER

## 2019-12-13 PROCEDURE — 1036F TOBACCO NON-USER: CPT | Performed by: NURSE PRACTITIONER

## 2019-12-13 PROCEDURE — 1090F PRES/ABSN URINE INCON ASSESS: CPT | Performed by: NURSE PRACTITIONER

## 2019-12-13 PROCEDURE — 1111F DSCHRG MED/CURRENT MED MERGE: CPT | Performed by: NURSE PRACTITIONER

## 2019-12-13 PROCEDURE — G8400 PT W/DXA NO RESULTS DOC: HCPCS | Performed by: NURSE PRACTITIONER

## 2019-12-13 RX ORDER — DILTIAZEM HYDROCHLORIDE 60 MG/1
TABLET, FILM COATED ORAL
Refills: 5 | COMMUNITY
Start: 2019-12-12 | End: 2020-10-21

## 2019-12-13 RX ORDER — QUETIAPINE FUMARATE 50 MG/1
50 TABLET, EXTENDED RELEASE ORAL NIGHTLY
Qty: 30 TABLET | Refills: 1 | Status: SHIPPED | OUTPATIENT
Start: 2019-12-13 | End: 2020-01-14 | Stop reason: SDUPTHER

## 2019-12-15 ASSESSMENT — ENCOUNTER SYMPTOMS
NAUSEA: 0
ABDOMINAL PAIN: 0
SHORTNESS OF BREATH: 0
COUGH: 0

## 2019-12-23 ENCOUNTER — OFFICE VISIT (OUTPATIENT)
Dept: PHYSICAL MEDICINE AND REHAB | Age: 69
End: 2019-12-23
Payer: MEDICARE

## 2019-12-23 VITALS
BODY MASS INDEX: 21.34 KG/M2 | HEIGHT: 64 IN | HEART RATE: 82 BPM | DIASTOLIC BLOOD PRESSURE: 70 MMHG | WEIGHT: 125 LBS | SYSTOLIC BLOOD PRESSURE: 122 MMHG

## 2019-12-23 DIAGNOSIS — R41.89 COGNITIVE DEFICITS: ICD-10-CM

## 2019-12-23 DIAGNOSIS — I69.90 LATE EFFECTS OF CVA (CEREBROVASCULAR ACCIDENT): Primary | ICD-10-CM

## 2019-12-23 PROCEDURE — G8427 DOCREV CUR MEDS BY ELIG CLIN: HCPCS | Performed by: NURSE PRACTITIONER

## 2019-12-23 PROCEDURE — 4040F PNEUMOC VAC/ADMIN/RCVD: CPT | Performed by: NURSE PRACTITIONER

## 2019-12-23 PROCEDURE — 1090F PRES/ABSN URINE INCON ASSESS: CPT | Performed by: NURSE PRACTITIONER

## 2019-12-23 PROCEDURE — 3017F COLORECTAL CA SCREEN DOC REV: CPT | Performed by: NURSE PRACTITIONER

## 2019-12-23 PROCEDURE — 1111F DSCHRG MED/CURRENT MED MERGE: CPT | Performed by: NURSE PRACTITIONER

## 2019-12-23 PROCEDURE — 99213 OFFICE O/P EST LOW 20 MIN: CPT | Performed by: NURSE PRACTITIONER

## 2019-12-23 PROCEDURE — 1036F TOBACCO NON-USER: CPT | Performed by: NURSE PRACTITIONER

## 2019-12-23 PROCEDURE — G8482 FLU IMMUNIZE ORDER/ADMIN: HCPCS | Performed by: NURSE PRACTITIONER

## 2019-12-23 PROCEDURE — G8400 PT W/DXA NO RESULTS DOC: HCPCS | Performed by: NURSE PRACTITIONER

## 2019-12-23 PROCEDURE — 1123F ACP DISCUSS/DSCN MKR DOCD: CPT | Performed by: NURSE PRACTITIONER

## 2019-12-23 PROCEDURE — G8420 CALC BMI NORM PARAMETERS: HCPCS | Performed by: NURSE PRACTITIONER

## 2019-12-23 RX ORDER — ATOMOXETINE 40 MG/1
40 CAPSULE ORAL DAILY
Qty: 90 CAPSULE | Refills: 1 | Status: SHIPPED | OUTPATIENT
Start: 2019-12-23 | End: 2020-06-25 | Stop reason: SDUPTHER

## 2020-01-06 ENCOUNTER — HOSPITAL ENCOUNTER (OUTPATIENT)
Age: 70
Discharge: HOME | End: 2020-01-06
Payer: MEDICARE

## 2020-01-06 ENCOUNTER — HOSPITAL ENCOUNTER (OUTPATIENT)
Dept: GENERAL RADIOLOGY | Age: 70
Discharge: HOME | End: 2020-01-06
Payer: MEDICARE

## 2020-01-06 PROCEDURE — 71046 X-RAY EXAM CHEST 2 VIEWS: CPT

## 2020-01-08 ENCOUNTER — OFFICE VISIT (OUTPATIENT)
Dept: NEUROSURGERY | Age: 70
End: 2020-01-08
Payer: MEDICARE

## 2020-01-08 VITALS
DIASTOLIC BLOOD PRESSURE: 81 MMHG | WEIGHT: 126 LBS | HEIGHT: 64 IN | SYSTOLIC BLOOD PRESSURE: 125 MMHG | BODY MASS INDEX: 21.51 KG/M2

## 2020-01-08 PROCEDURE — 1036F TOBACCO NON-USER: CPT | Performed by: NEUROLOGICAL SURGERY

## 2020-01-08 PROCEDURE — 4040F PNEUMOC VAC/ADMIN/RCVD: CPT | Performed by: NEUROLOGICAL SURGERY

## 2020-01-08 PROCEDURE — G8427 DOCREV CUR MEDS BY ELIG CLIN: HCPCS | Performed by: NEUROLOGICAL SURGERY

## 2020-01-08 PROCEDURE — 1090F PRES/ABSN URINE INCON ASSESS: CPT | Performed by: NEUROLOGICAL SURGERY

## 2020-01-08 PROCEDURE — 1123F ACP DISCUSS/DSCN MKR DOCD: CPT | Performed by: NEUROLOGICAL SURGERY

## 2020-01-08 PROCEDURE — G8482 FLU IMMUNIZE ORDER/ADMIN: HCPCS | Performed by: NEUROLOGICAL SURGERY

## 2020-01-08 PROCEDURE — 3017F COLORECTAL CA SCREEN DOC REV: CPT | Performed by: NEUROLOGICAL SURGERY

## 2020-01-08 PROCEDURE — 99204 OFFICE O/P NEW MOD 45 MIN: CPT | Performed by: NEUROLOGICAL SURGERY

## 2020-01-08 PROCEDURE — G8420 CALC BMI NORM PARAMETERS: HCPCS | Performed by: NEUROLOGICAL SURGERY

## 2020-01-08 PROCEDURE — G8400 PT W/DXA NO RESULTS DOC: HCPCS | Performed by: NEUROLOGICAL SURGERY

## 2020-01-08 ASSESSMENT — ENCOUNTER SYMPTOMS
ABDOMINAL PAIN: 0
CHEST TIGHTNESS: 0
TROUBLE SWALLOWING: 0

## 2020-01-08 NOTE — LETTER
[address] [facility] Neurosurgery  [address]  Phone: [phone]  Fax: [phone]    Betty Gregorio MD        January 8, 2020     Shaun Adams, APRN - [facility]  [address] [facility], [address] [place] [address]  [address] [address]    Patient: Geraldine Ford  MR Number: [id number]  YOB: 1950  Date of Visit: 1/8/2020    Dear Dr. Shaun Adams: Thank you for the request for consultation for Joao Carey to me for the evaluation of her abnormal findings of her recent brain CT. Below are the relevant portions of my assessment and plan of care. Assessment:    HPI:    This is a 71-year-old female who was referred to the neurosurgery outpatient clinic after she underwent a brain CT that showed findings suggestive of communicating hydrocephalus. Patient had past medical history significant for cerebral aneurysm clipping in 2015. In today visit, patient denied any headache any significant dizziness. He denied any change in her balance or any new weakness or numbness. She denied any changes in her vision. She denied any seizures. She denied any decline in her cognitive function. However patient stated that she has some issues in controlling her urination from time to time. On Physical exam:      Examination of carotid arteries (puls, amplitude, bruits) or Examination of peripheral vascular system  (swelling, varicosities and pulses, temperature, edema,tenderness) : WNL  Patient is A/A/Ox3  Recent and remote memory: Good  Attention span and concentration: decline   Language (naming objects;repeating phrases;spontaneous speech): WNL  Fund of knowledge: Good   Cranial nerves: Grossly intact   Muscle strength, tone in all 4 extremities 5/5 through out  DTR in all 4 extremities:2+  Babinski:Down response  Gait: WNL  Cerebellar function: WNL  Sensation: Grossly intact.    Straight leg raising test:Negative     Reviewed CT Type:  Film and Report    Lab Results Component Value Date    WBC 9.6 12/11/2019    HGB 14.6 12/11/2019    HCT 46.8 12/11/2019     12/11/2019    CHOL 223 04/13/2019    TRIG 81 04/13/2019     (A) 04/13/2019    ALT 21 12/11/2019    AST 37 12/11/2019     12/11/2019    K 3.9 12/11/2019     12/11/2019    CREATININE 0.7 12/11/2019    BUN 11 12/11/2019    CO2 21 (L) 12/11/2019    TSH 1.170 12/11/2019    INR 0.90 12/01/2019    LABA1C 5.3 04/13/2019       Assessment and Plan      Diagnosis Orders   1. Spinal stenosis of cervical region     2. Cervical disc herniation       -Given patient previous medical history of aneurysmal subarachnoid hemorrhage and the findings of her new brain CT, patient perhaps is  Developing a normal pressure hydrocephalus.  - However, at this time, she does not have the full picture of this diagnosis.  -For this reason, at this time, I will request for the patient and ophthalmology referral to rule out any papillary edema ( as a method to rule out the possibility of high pressure hydrocephalus). - I will request for patient also a new brain CT after after 3 months as there is no deterioration in patient neurological status.  -With patient in detail the finding of her brain CT and my recommendation and treatment plan for her at this time.  -All questions and concerns were addressed and answered.  -Patient was in was in agreement with the above recommendation treatment plan. If you have questions, please do not hesitate to call me. I look forward to following Tabatha Ann along with you.     Sincerely,        Kia Pichardo MD

## 2020-01-08 NOTE — PROGRESS NOTES
[facility]  [address] [place] [address] Road [address]  Dept: [phone]  Dept Fax: [phone]      Patient Name:  Fallon Finley  Visit Date:  1/8/2020    HPI:     Ms. Helena Lopez is a 71 y.o. female that presents today at [facility] for evaluation of the following:      No chief complaint on file. HPI   This is a 58-year-old female who was referred to the neurosurgery outpatient clinic after she underwent a brain CT that showed findings suggestive of communicating hydrocephalus. Patient had past medical history significant for cerebral aneurysm clipping in 2015. In today visit, patient denied any headache any significant dizziness. He denied any change in her balance or any new weakness or numbness. She denied any changes in her vision. She denied any seizures. She denied any decline in her cognitive function. However patient stated that she has some issues in controlling her urination from time to time.        Medications:    Current Outpatient Medications:     atomoxetine (STRATTERA) 40 MG capsule, Take 1 capsule by mouth daily, Disp: 90 capsule, Rfl: 1    SYMBICORT 80-4.5 MCG/ACT AERO, INHALE 2 PUFFS BY MOUTH TWICE DAILY RINSE MOUTH AFTER USE, Disp: , Rfl: 5    QUEtiapine (SEROQUEL XR) 50 MG extended release tablet, Take 1 tablet by mouth nightly, Disp: 30 tablet, Rfl: 1    acyclovir (ZOVIRAX) 400 MG tablet, Take 1 tablet by mouth 2 times daily, Disp: , Rfl: 11    donepezil (ARICEPT) 10 MG tablet, TAKE 1 TABLET BY MOUTH IN THE EVENING, Disp: 90 tablet, Rfl: 3    levothyroxine (EUTHYROX) 50 MCG tablet, TAKE 1 TABLET BY MOUTH ONCE DAILY, Disp: 90 tablet, Rfl: 3    umeclidinium-vilanterol (ANORO ELLIPTA) 62.5-25 MCG/INH AEPB inhaler, Inhale 1 puff into the lungs daily, Disp: 60 each, Rfl: 11    DULoxetine (CYMBALTA) 60 MG extended release capsule, Take 1 capsule by mouth daily, Disp: 90 capsule, Rfl: 3   standard drinks of alcohol per week. She reports that she does not use drugs. Subjective:      Review of Systems   Constitutional: Negative for activity change. HENT: Negative for trouble swallowing. Eyes: Negative for visual disturbance. Respiratory: Negative for chest tightness. Cardiovascular: Negative for chest pain. Gastrointestinal: Negative for abdominal pain. Genitourinary: Positive for urgency. Musculoskeletal: Positive for neck pain. Neurological: Negative for dizziness, seizures, weakness, numbness and headaches. Psychiatric/Behavioral: The patient is nervous/anxious. Objective:     LMP  (Exact Date)      Examination of carotid arteries (puls, amplitude, bruits) or Examination of peripheral vascular system  (swelling, varicosities and pulses, temperature, edema,tenderness) : WNL  Patient is A/A/Ox3  Recent and remote memory: Good  Attention span and concentration: decline   Language (naming objects;repeating phrases;spontaneous speech): WNL  Fund of knowledge: Good   Cranial nerves: Grossly intact   Muscle strength, tone in all 4 extremities 5/5 through out  DTR in all 4 extremities:2+  Babinski:Down response  Gait: WNL  Cerebellar function: WNL  Sensation: Grossly intact. Straight leg raising test:Negative     Reviewed CT Type:  Film and Report    Lab Results   Component Value Date    WBC 9.6 12/11/2019    HGB 14.6 12/11/2019    HCT 46.8 12/11/2019     12/11/2019    CHOL 223 04/13/2019    TRIG 81 04/13/2019     (A) 04/13/2019    ALT 21 12/11/2019    AST 37 12/11/2019     12/11/2019    K 3.9 12/11/2019     12/11/2019    CREATININE 0.7 12/11/2019    BUN 11 12/11/2019    CO2 21 (L) 12/11/2019    TSH 1.170 12/11/2019    INR 0.90 12/01/2019    LABA1C 5.3 04/13/2019       Assessment and Plan      Diagnosis Orders   1. Spinal stenosis of cervical region     2.  Cervical disc herniation       -Given patient previous medical history of aneurysmal subarachnoid hemorrhage and the findings of her new brain CT, patient perhaps is  Developing a normal pressure hydrocephalus.  - However, at this time, she does not have the full picture of this diagnosis.  -For this reason, at this time, I will request for the patient and ophthalmology referral to rule out any papillary edema ( as a method to rule out the possibility of high pressure hydrocephalus). - I will request for patient also a new brain CT after after 3 months as there is no deterioration in patient neurological status.  -With patient in detail the finding of her brain CT and my recommendation and treatment plan for her at this time.  -All questions and concerns were addressed and answered.  -Patient was in was in agreement with the above recommendation treatment plan. *Time spent with the patient was 45 minutes. More than 50% was spent counseling/coordinating the patient's care.      Electronically signed by Felix Brooke MD on 1/8/2020 at 1:48 PM

## 2020-01-09 ENCOUNTER — OFFICE VISIT (OUTPATIENT)
Dept: UROLOGY | Age: 70
End: 2020-01-09
Payer: MEDICARE

## 2020-01-09 ENCOUNTER — TELEPHONE (OUTPATIENT)
Dept: FAMILY MEDICINE CLINIC | Age: 70
End: 2020-01-09

## 2020-01-09 VITALS
SYSTOLIC BLOOD PRESSURE: 132 MMHG | DIASTOLIC BLOOD PRESSURE: 76 MMHG | HEIGHT: 64 IN | BODY MASS INDEX: 22.14 KG/M2 | WEIGHT: 129.7 LBS

## 2020-01-09 LAB
BILIRUBIN URINE: NEGATIVE
BLOOD URINE, POC: NEGATIVE
CHARACTER, URINE: ABNORMAL
COLOR, URINE: YELLOW
GLUCOSE URINE: NEGATIVE MG/DL
KETONES, URINE: NEGATIVE
LEUKOCYTE CLUMPS, URINE: ABNORMAL
NITRITE, URINE: NEGATIVE
PH, URINE: 7.5 (ref 5–9)
POST VOID RESIDUAL (PVR): 0 ML
PROTEIN, URINE: NEGATIVE MG/DL
SPECIFIC GRAVITY, URINE: 1.02 (ref 1–1.03)
UROBILINOGEN, URINE: 1 EU/DL (ref 0–1)

## 2020-01-09 PROCEDURE — 81003 URINALYSIS AUTO W/O SCOPE: CPT | Performed by: NURSE PRACTITIONER

## 2020-01-09 PROCEDURE — G8427 DOCREV CUR MEDS BY ELIG CLIN: HCPCS | Performed by: NURSE PRACTITIONER

## 2020-01-09 PROCEDURE — 1090F PRES/ABSN URINE INCON ASSESS: CPT | Performed by: NURSE PRACTITIONER

## 2020-01-09 PROCEDURE — 99214 OFFICE O/P EST MOD 30 MIN: CPT | Performed by: NURSE PRACTITIONER

## 2020-01-09 PROCEDURE — G8482 FLU IMMUNIZE ORDER/ADMIN: HCPCS | Performed by: NURSE PRACTITIONER

## 2020-01-09 PROCEDURE — 4040F PNEUMOC VAC/ADMIN/RCVD: CPT | Performed by: NURSE PRACTITIONER

## 2020-01-09 PROCEDURE — 1123F ACP DISCUSS/DSCN MKR DOCD: CPT | Performed by: NURSE PRACTITIONER

## 2020-01-09 PROCEDURE — 1036F TOBACCO NON-USER: CPT | Performed by: NURSE PRACTITIONER

## 2020-01-09 PROCEDURE — 3017F COLORECTAL CA SCREEN DOC REV: CPT | Performed by: NURSE PRACTITIONER

## 2020-01-09 PROCEDURE — G8420 CALC BMI NORM PARAMETERS: HCPCS | Performed by: NURSE PRACTITIONER

## 2020-01-09 PROCEDURE — G8400 PT W/DXA NO RESULTS DOC: HCPCS | Performed by: NURSE PRACTITIONER

## 2020-01-09 PROCEDURE — 51798 US URINE CAPACITY MEASURE: CPT | Performed by: NURSE PRACTITIONER

## 2020-01-09 NOTE — PROGRESS NOTES
Vitamin (MULTI-VITAMIN PO) Take  by mouth.  Calcium Carbonate-Vitamin D (CALCIUM-VITAMIN D) 600-200 MG-UNIT CAPS Take 1 tablet by mouth 2 times daily       fish oil-omega-3 fatty acids 1000 MG capsule Take 1 g by mouth. One tablet daily along with red yeast rice      umeclidinium-vilanterol (ANORO ELLIPTA) 62.5-25 MCG/INH AEPB inhaler Inhale 1 puff into the lungs daily (Patient not taking: Reported on 1/9/2020) 60 each 11     No current facility-administered medications for this visit. Allergies   Allergen Reactions    Sulfa Antibiotics Nausea And Vomiting       ROS:  Constitutional: Negative for chills, fatigue, fever, or weight loss. Eyes: Denies reported visual changes. ENT: Denies headache, difficulty swallowing, nose bleeds, ringing in ears, or earaches. Cardiovascular: Negative for chest pain, palpitations, tachycardia or edema. Respiratory: Denies cough or SOB. GI:The patient denies abdominal or flank pain, anorexia, nausea or vomiting. : See HPI  Musculoskeletal: Patient denies low back pain or painful or reduced ROM of the back or extremities. Neurological: The patient denies any symptoms of neurological impairment or TIA's; no history of stroke. Lymphatic: Denies swollen glands in neck, axillary or inguinal areas. Psychiatric: Denies anxiety or depression. Skin: Denies rash or lesions. The remainder of the complete ROS is negative    PHYSICAL EXAM:  VITALS:  /76   Ht 5' 4\" (1.626 m)   Wt 129 lb 11.2 oz (58.8 kg)   LMP  (Exact Date)   BMI 22.26 kg/m² . Constitutional:    Alert and oriented times 3, no acute distress and cooperative to examination with appropriate mood and affect. HEENT:   Head:         Normocephalic and atraumatic. Mouth/Throat:          Mucous membranes are normal.   Eyes:         EOM are normal. No scleral icterus. Nose:    The external appearance of the nose is normal  Ears:      The ears appear normal to external inspection   Neck:

## 2020-01-09 NOTE — TELEPHONE ENCOUNTER
Fax received from [address] stating Symbicort is non-formulary--see letter in media. Attempted PA or change medication?   Please advise

## 2020-01-10 NOTE — TELEPHONE ENCOUNTER
Patient notified and understanding voiced. She will drop off new card to the office so PA can be done.

## 2020-01-10 NOTE — COMMUNICATION BODY
aneurysmal subarachnoid hemorrhage and the findings of her new brain CT, patient perhaps is  Developing a normal pressure hydrocephalus.  - However, at this time, she does not have the full picture of this diagnosis.  -For this reason, at this time, I will request for the patient and ophthalmology referral to rule out any papillary edema ( as a method to rule out the possibility of high pressure hydrocephalus). - I will request for patient also a new brain CT after after 3 months as there is no deterioration in patient neurological status.  -With patient in detail the finding of her brain CT and my recommendation and treatment plan for her at this time.  -All questions and concerns were addressed and answered.  -Patient was in was in agreement with the above recommendation treatment plan.

## 2020-01-14 ENCOUNTER — OFFICE VISIT (OUTPATIENT)
Dept: FAMILY MEDICINE CLINIC | Age: 70
End: 2020-01-14
Payer: MEDICARE

## 2020-01-14 VITALS
WEIGHT: 130.6 LBS | DIASTOLIC BLOOD PRESSURE: 72 MMHG | BODY MASS INDEX: 22.42 KG/M2 | RESPIRATION RATE: 20 BRPM | SYSTOLIC BLOOD PRESSURE: 128 MMHG | HEART RATE: 112 BPM

## 2020-01-14 PROBLEM — M48.02 SPINAL STENOSIS, CERVICAL REGION: Status: ACTIVE | Noted: 2020-01-14

## 2020-01-14 PROCEDURE — 1090F PRES/ABSN URINE INCON ASSESS: CPT | Performed by: NURSE PRACTITIONER

## 2020-01-14 PROCEDURE — 99214 OFFICE O/P EST MOD 30 MIN: CPT | Performed by: NURSE PRACTITIONER

## 2020-01-14 PROCEDURE — 3023F SPIROM DOC REV: CPT | Performed by: NURSE PRACTITIONER

## 2020-01-14 PROCEDURE — G8427 DOCREV CUR MEDS BY ELIG CLIN: HCPCS | Performed by: NURSE PRACTITIONER

## 2020-01-14 PROCEDURE — 1036F TOBACCO NON-USER: CPT | Performed by: NURSE PRACTITIONER

## 2020-01-14 PROCEDURE — G8420 CALC BMI NORM PARAMETERS: HCPCS | Performed by: NURSE PRACTITIONER

## 2020-01-14 PROCEDURE — 4040F PNEUMOC VAC/ADMIN/RCVD: CPT | Performed by: NURSE PRACTITIONER

## 2020-01-14 PROCEDURE — G8400 PT W/DXA NO RESULTS DOC: HCPCS | Performed by: NURSE PRACTITIONER

## 2020-01-14 PROCEDURE — 1123F ACP DISCUSS/DSCN MKR DOCD: CPT | Performed by: NURSE PRACTITIONER

## 2020-01-14 PROCEDURE — 3017F COLORECTAL CA SCREEN DOC REV: CPT | Performed by: NURSE PRACTITIONER

## 2020-01-14 PROCEDURE — G8926 SPIRO NO PERF OR DOC: HCPCS | Performed by: NURSE PRACTITIONER

## 2020-01-14 PROCEDURE — G8482 FLU IMMUNIZE ORDER/ADMIN: HCPCS | Performed by: NURSE PRACTITIONER

## 2020-01-14 RX ORDER — QUETIAPINE 150 MG/1
150 TABLET, FILM COATED, EXTENDED RELEASE ORAL NIGHTLY
Qty: 90 TABLET | Refills: 3 | Status: SHIPPED | OUTPATIENT
Start: 2020-01-14 | End: 2020-05-06

## 2020-01-14 ASSESSMENT — ENCOUNTER SYMPTOMS
SHORTNESS OF BREATH: 0
NAUSEA: 0
COUGH: 0
ABDOMINAL PAIN: 0

## 2020-01-14 NOTE — PROGRESS NOTES
Subjective:      Patient ID: Maite Kirkland is a 71 y.o. female. HPI  : 1 month     Chief Complaint   Patient presents with    1 Month Follow-Up       Patient Active Problem List   Diagnosis    Hyperlipemia    Hypothyroidism    Depression    Chronic anxiety    Osteopenia    Medication monitoring encounter    H/O: CVA (cerebrovascular accident), aneurysm bleed, Jan 2015    Abnormality of gait following cerebrovascular accident    GERD (gastroesophageal reflux disease)    Cognitive impairment due to CVA.  OAB (overactive bladder)    Chronic nausea    SOB (shortness of breath)    Hypoxia    Major depressive disorder, single episode, in full remission ([address]    Smoking greater than 40 pack years    Pulmonary emphysema ([facility] [address]    Syncope and collapse    Concussion with loss of consciousness    Laceration of head    Alcohol abuse    Spinal stenosis, cervical region     MAMMO - 2019  COLONOSCOPY - 2009  DEXA - due with hx of osteopenia    NEURO - Dr. Tao Rogers - Dr. Rocio Joy    She is on Cymbalta 60 mg and Seroquel 50 mg XR. Mood is improved. Still anxious. Sleeping well. She has been sober x 1 month. Has not got established with alcohol recovery plan yet. Patient is status post CVA but doing well subsequently. Follows with NEURO. Her speech is essentially back to normal also. She is on Strattera and Aricept due to cognitive changes s/p CVA. Seen Dr. Marylen Second regarding potential hydrocephalus. She is getting set up with [facility] for evaluation and repeat CT Head in 3 months. Denies CP, SOB or chest tightness. ECHO 2019 EF 60% with LV function normal.  PFT 2019 showed moderate emphysema. 40 pack year hx. No maintenance inhalers. Will start Myrbetriq 25 mg daily for her OAB symptoms. Seen Urology within the last 1 week    Her hypothyroidism continues to respond well to her current thyroid dose. She is on 50 µg daily.     Lab Results   Component Value Date    TSH 1.170 12/11/2019       BP wnl    BP Readings from Last 3 Encounters:   01/14/20 128/72   01/09/20 132/76   01/08/20 125/81     Labs reviewed    Lab Results   Component Value Date    LABA1C 5.3 04/13/2019    LABA1C 5.3 01/30/2016     No results found for: EAG    No components found for: CHLPL  Lab Results   Component Value Date    TRIG 81 04/13/2019    TRIG 80 08/25/2017    TRIG 79 01/30/2016     Lab Results   Component Value Date     (A) 04/13/2019     (A) 08/25/2017    HDL 87 (A) 01/30/2016     Lab Results   Component Value Date    LDLCALC 98 04/13/2019    LDLCALC 94 08/25/2017    LDLCALC 100 01/30/2016     No results found for: LABVLDL      Chemistry        Component Value Date/Time     12/11/2019 2107    K 3.9 12/11/2019 2107    K 3.9 12/01/2019 2000     12/11/2019 2107    CO2 21 (L) 12/11/2019 2107    BUN 11 12/11/2019 2107    CREATININE 0.7 12/11/2019 2107        Component Value Date/Time    CALCIUM 9.6 12/11/2019 2107    ALKPHOS 55 12/11/2019 2107    AST 37 12/11/2019 2107    ALT 21 12/11/2019 2107    BILITOT 0.3 12/11/2019 2107            Lab Results   Component Value Date    TSH 1.170 12/11/2019       Lab Results   Component Value Date    WBC 9.6 12/11/2019    HGB 14.6 12/11/2019    HCT 46.8 12/11/2019    MCV 94.7 12/11/2019     12/11/2019         Health Maintenance   Topic Date Due    DTaP/Tdap/Td vaccine (1 - Tdap) 06/13/1961    Shingles Vaccine (1 of 2) 06/13/2000    Annual Wellness Visit (AWV)  05/29/2019    Lipid screen  04/13/2020    Low dose CT lung screening  04/26/2020    TSH testing  12/11/2020    Breast cancer screen  02/14/2021    Colon cancer screen colonoscopy  08/29/2029    Flu vaccine  Completed    Pneumococcal 65+ years Vaccine  Completed    Hepatitis C screen  Completed    DEXA (modify frequency per FRAX score)  Addressed       Immunization History   Administered Date(s) Administered    Influenza Vaccine, Diagnosis Orders   1. Major depressive disorder, single episode, in full remission (HCC)  QUEtiapine (SEROQUEL XR) 150 MG TB24 extended release tablet   2. Alcohol abuse     3. Spinal stenosis, cervical region     4. H/O: CVA (cerebrovascular accident), aneurysm bleed, Jan 2015     5. Cognitive impairment due to CVA. 6. Abnormal CT of the head, questionable hydrocephalus     7. OAB (overactive bladder)     8. IFG (impaired fasting glucose)     9. Mixed hyperlipidemia     10. Postmenopausal  MAMMO DEXA BONE DENSITY SCAN   11. Osteopenia of multiple sites     12. Acquired hypothyroidism     13.  Pulmonary emphysema, unspecified emphysema type ([facility] [address]             Plan:      Chronic conditions stable  Specialists OV reviewed  Labs reviewed  Follow up with Specialists  Refill as above   - increase Seroquel 150 mg XR  Establish with Alcoholics Support Group  DEXA SCAN  Healthy Lifestyles discussed  RTO in 3 month          SARITHA Castelan - CNP

## 2020-01-14 NOTE — PROGRESS NOTES
Patient scheduled at [facility] for Dexa Scan on 1/22/2020 at 2:10 pm.  Patient to arrive at 1:55pm to [facility]. NO Calcium or MVI day of and day prior to appt. Patient notified at appt.

## 2020-01-15 RX ORDER — QUETIAPINE FUMARATE 50 MG/1
50 TABLET, EXTENDED RELEASE ORAL NIGHTLY
Qty: 30 TABLET | Refills: 0 | OUTPATIENT
Start: 2020-01-15

## 2020-01-22 ENCOUNTER — HOSPITAL ENCOUNTER (OUTPATIENT)
Dept: WOMENS IMAGING | Age: 70
Discharge: HOME | End: 2020-01-22
Payer: MEDICARE

## 2020-01-22 PROCEDURE — 77080 DXA BONE DENSITY AXIAL: CPT

## 2020-01-23 ENCOUNTER — TELEPHONE (OUTPATIENT)
Dept: FAMILY MEDICINE CLINIC | Age: 70
End: 2020-01-23

## 2020-01-23 RX ORDER — ALENDRONATE SODIUM 70 MG/1
70 TABLET ORAL
Qty: 4 TABLET | Refills: 5 | Status: SHIPPED | OUTPATIENT
Start: 2020-01-23 | End: 2020-02-17 | Stop reason: SDUPTHER

## 2020-01-23 NOTE — TELEPHONE ENCOUNTER
Pt called office stating she is taking the Glucosamine Chondroitin with MSM and feels that it is causing bowel problems. Pt feels it is the MSM in the product that gives her diarrhea. Pt is not able to find a Glucosamine Chondroitin without MSM. She is asking if it is ok to stop taking this altogether. Please advise.

## 2020-01-23 NOTE — TELEPHONE ENCOUNTER
Patient called asking if there should have been a Vit D script sent to [address] [place] [address].   If no call patient will check with pharmacy after 4 pm.

## 2020-01-27 ENCOUNTER — TELEPHONE (OUTPATIENT)
Dept: FAMILY MEDICINE CLINIC | Age: 70
End: 2020-01-27

## 2020-01-27 ENCOUNTER — OFFICE VISIT (OUTPATIENT)
Dept: CARDIOLOGY CLINIC | Age: 70
End: 2020-01-27
Payer: MEDICARE

## 2020-01-27 VITALS
WEIGHT: 127.4 LBS | HEART RATE: 103 BPM | SYSTOLIC BLOOD PRESSURE: 119 MMHG | DIASTOLIC BLOOD PRESSURE: 77 MMHG | HEIGHT: 63 IN | BODY MASS INDEX: 22.57 KG/M2

## 2020-01-27 PROCEDURE — 1036F TOBACCO NON-USER: CPT | Performed by: PHYSICIAN ASSISTANT

## 2020-01-27 PROCEDURE — G8399 PT W/DXA RESULTS DOCUMENT: HCPCS | Performed by: PHYSICIAN ASSISTANT

## 2020-01-27 PROCEDURE — G8482 FLU IMMUNIZE ORDER/ADMIN: HCPCS | Performed by: PHYSICIAN ASSISTANT

## 2020-01-27 PROCEDURE — 3017F COLORECTAL CA SCREEN DOC REV: CPT | Performed by: PHYSICIAN ASSISTANT

## 2020-01-27 PROCEDURE — G8427 DOCREV CUR MEDS BY ELIG CLIN: HCPCS | Performed by: PHYSICIAN ASSISTANT

## 2020-01-27 PROCEDURE — 99213 OFFICE O/P EST LOW 20 MIN: CPT | Performed by: PHYSICIAN ASSISTANT

## 2020-01-27 PROCEDURE — G8420 CALC BMI NORM PARAMETERS: HCPCS | Performed by: PHYSICIAN ASSISTANT

## 2020-01-27 PROCEDURE — 1090F PRES/ABSN URINE INCON ASSESS: CPT | Performed by: PHYSICIAN ASSISTANT

## 2020-01-27 PROCEDURE — 1123F ACP DISCUSS/DSCN MKR DOCD: CPT | Performed by: PHYSICIAN ASSISTANT

## 2020-01-27 PROCEDURE — 4040F PNEUMOC VAC/ADMIN/RCVD: CPT | Performed by: PHYSICIAN ASSISTANT

## 2020-01-27 NOTE — PROGRESS NOTES
Pt C/O sob on exertion, sleeps more at night than she would like but some is due to medications.        Pt denies CP, Headache, dizziness, heart palpitations, swelling
of wine per week     Comment: 2 glass of vodka every other day     Drug use: No    Sexual activity: Yes   Lifestyle    Physical activity:     Days per week: None     Minutes per session: None    Stress: None   Relationships    Social connections:     Talks on phone: None     Gets together: None     Attends Hinduism service: None     Active member of club or organization: None     Attends meetings of clubs or organizations: None     Relationship status: None    Intimate partner violence:     Fear of current or ex partner: None     Emotionally abused: None     Physically abused: None     Forced sexual activity: None   Other Topics Concern    None   Social History Narrative    None       Family History   Problem Relation Age of Onset    Cancer Mother         Female Cancer    Stroke Mother         Possible    Diabetes Father     Heart Disease Maternal Aunt     Parkinsonism Maternal Uncle        Blood pressure 119/77, pulse 103, height 5' 3\" (1.6 m), weight 127 lb 6.4 oz (57.8 kg), not currently breastfeeding. General:   Well developed, well nourished  Lungs:   Clear to auscultation  Heart:    Normal S1 S2, No murmur, rubs, or gallops  Abdomen:   Soft, non tender, no organomegalies, positive bowel sounds  Extremities:   No edema, no cyanosis, good peripheral pulses  Neurological:   Awake, alert, oriented. No obvious focal deficits  Musculoskeletal:  No obvious deformities        Echo 12/12/2019  Conclusions      Summary   Normal left ventricle size and systolic function. Ejection fraction was   estimated at 60-65%. There were no regional left ventricular wall motion   abnormalities and wall thickness was within normal limits. Cardiac event monitor 12/31/2019  CONCLUSION:  Sinus rhythm. Occasional PACs and PVCs.        Diagnosis Orders   1.  History of syncope         The patient is educated on symptoms of heart disease that include chest pain and passing out, dizziness, etc and to report them if there

## 2020-01-28 ENCOUNTER — TELEPHONE (OUTPATIENT)
Dept: NEUROSURGERY | Age: 70
End: 2020-01-28

## 2020-02-06 ENCOUNTER — OFFICE VISIT (OUTPATIENT)
Dept: UROLOGY | Age: 70
End: 2020-02-06
Payer: MEDICARE

## 2020-02-06 VITALS — WEIGHT: 128.3 LBS | HEIGHT: 63 IN | BODY MASS INDEX: 22.73 KG/M2

## 2020-02-06 LAB
BILIRUBIN URINE: NEGATIVE
BLOOD URINE, POC: NEGATIVE
CHARACTER, URINE: CLEAR
COLOR, URINE: YELLOW
GLUCOSE URINE: NEGATIVE MG/DL
KETONES, URINE: NEGATIVE
LEUKOCYTE CLUMPS, URINE: ABNORMAL
NITRITE, URINE: NEGATIVE
PH, URINE: 7 (ref 5–9)
POST VOID RESIDUAL (PVR): 43 ML
PROTEIN, URINE: NEGATIVE MG/DL
SPECIFIC GRAVITY, URINE: 1.02 (ref 1–1.03)
UROBILINOGEN, URINE: 0.2 EU/DL (ref 0–1)

## 2020-02-06 PROCEDURE — 1090F PRES/ABSN URINE INCON ASSESS: CPT | Performed by: NURSE PRACTITIONER

## 2020-02-06 PROCEDURE — G8427 DOCREV CUR MEDS BY ELIG CLIN: HCPCS | Performed by: NURSE PRACTITIONER

## 2020-02-06 PROCEDURE — 81003 URINALYSIS AUTO W/O SCOPE: CPT | Performed by: NURSE PRACTITIONER

## 2020-02-06 PROCEDURE — G8420 CALC BMI NORM PARAMETERS: HCPCS | Performed by: NURSE PRACTITIONER

## 2020-02-06 PROCEDURE — 99213 OFFICE O/P EST LOW 20 MIN: CPT | Performed by: NURSE PRACTITIONER

## 2020-02-06 PROCEDURE — 4040F PNEUMOC VAC/ADMIN/RCVD: CPT | Performed by: NURSE PRACTITIONER

## 2020-02-06 PROCEDURE — 1123F ACP DISCUSS/DSCN MKR DOCD: CPT | Performed by: NURSE PRACTITIONER

## 2020-02-06 PROCEDURE — G8399 PT W/DXA RESULTS DOCUMENT: HCPCS | Performed by: NURSE PRACTITIONER

## 2020-02-06 PROCEDURE — 3017F COLORECTAL CA SCREEN DOC REV: CPT | Performed by: NURSE PRACTITIONER

## 2020-02-06 PROCEDURE — 51798 US URINE CAPACITY MEASURE: CPT | Performed by: NURSE PRACTITIONER

## 2020-02-06 PROCEDURE — G8482 FLU IMMUNIZE ORDER/ADMIN: HCPCS | Performed by: NURSE PRACTITIONER

## 2020-02-06 PROCEDURE — 1036F TOBACCO NON-USER: CPT | Performed by: NURSE PRACTITIONER

## 2020-02-06 NOTE — PROGRESS NOTES
SURGERY      reconstruction of lft ear w/re-opening of canal & pinning back of superior ear    EXTERNAL EAR SURGERY      abscess lft ear    EXTERNAL EAR SURGERY      hematoma left ear       Family History   Problem Relation Age of Onset    Cancer Mother         Female Cancer    Stroke Mother         Possible    Diabetes Father     Heart Disease Maternal Aunt     Parkinsonism Maternal Uncle        Social History     Tobacco Use    Smoking status: Former Smoker     Packs/day: 1.00     Years: 40.00     Pack years: 40.00     Types: Cigarettes     Last attempt to quit: 2015     Years since quittin.1    Smokeless tobacco: Never Used   Substance Use Topics    Alcohol use: Yes     Alcohol/week: 2.0 standard drinks     Types: 2 Glasses of wine per week     Comment: 2 glass of vodka every other day       Current Outpatient Medications   Medication Sig Dispense Refill    mirabegron (MYRBETRIQ) 25 MG TB24 Take 1 tablet by mouth daily 30 tablet 5    alendronate (FOSAMAX) 70 MG tablet Take 1 tablet by mouth every 7 days Take with water on an empty stomach- wait 30 minutes before eating or taking other meds. Avoid lying down for 30 minutes after dose.  4 tablet 5    QUEtiapine (SEROQUEL XR) 150 MG TB24 extended release tablet Take 1 tablet by mouth nightly 90 tablet 3    atomoxetine (STRATTERA) 40 MG capsule Take 1 capsule by mouth daily 90 capsule 1    SYMBICORT 80-4.5 MCG/ACT AERO INHALE 2 PUFFS BY MOUTH TWICE DAILY RINSE MOUTH AFTER USE  5    acyclovir (ZOVIRAX) 400 MG tablet Take 1 tablet by mouth 2 times daily  11    donepezil (ARICEPT) 10 MG tablet TAKE 1 TABLET BY MOUTH IN THE EVENING 90 tablet 3    levothyroxine (EUTHYROX) 50 MCG tablet TAKE 1 TABLET BY MOUTH ONCE DAILY 90 tablet 3    DULoxetine (CYMBALTA) 60 MG extended release capsule Take 1 capsule by mouth daily 90 capsule 3    Cranberry 500 MG TABS Take 2 tablets by mouth daily       clotrimazole-betamethasone (LOTRISONE) 1-0.05 % cream Apply topically 2 times daily. 90 g 1    simvastatin (ZOCOR) 10 MG tablet TAKE 1 TABLET BY MOUTH ONCE DAILY NIGHTLY 90 tablet 3    Glucosamine-Chondroit-Vit C-Mn (GLUCOSAMINE CHONDR 500 COMPLEX) CAPS Take 1 capsule by mouth 2 times daily       Multiple Vitamin (MULTI-VITAMIN PO) Take  by mouth.  Calcium Carbonate-Vitamin D (CALCIUM-VITAMIN D) 600-200 MG-UNIT CAPS Take 1 tablet by mouth 2 times daily       fish oil-omega-3 fatty acids 1000 MG capsule Take 1 g by mouth. One tablet daily along with red yeast rice       No current facility-administered medications for this visit. Allergies   Allergen Reactions    Sulfa Antibiotics Nausea And Vomiting       ROS:  Constitutional: Negative for chills, fatigue, fever, or weight loss. Eyes: Denies reported visual changes. ENT: Denies headache, difficulty swallowing, nose bleeds, ringing in ears, or earaches. Cardiovascular: Negative for chest pain, palpitations, tachycardia or edema. Respiratory: Denies cough or SOB. GI:The patient denies abdominal or flank pain, anorexia, nausea or vomiting. : See HPI  Musculoskeletal: Patient denies low back pain or painful or reduced ROM of the back or extremities. Neurological: The patient denies any symptoms of neurological impairment or TIA's; no history of stroke. Lymphatic: Denies swollen glands in neck, axillary or inguinal areas. Psychiatric: Denies anxiety or depression. Skin: Denies rash or lesions. The remainder of the complete ROS is negative    PHYSICAL EXAM:  VITALS:  Ht 5' 3\" (1.6 m)   Wt 128 lb 4.8 oz (58.2 kg)   LMP  (Exact Date)   BMI 22.73 kg/m² . Constitutional:    Alert and oriented times 3, no acute distress and cooperative to examination with appropriate mood and affect. HEENT:   Head:         Normocephalic and atraumatic. Mouth/Throat:          Mucous membranes are normal.   Eyes:         EOM are normal. No scleral icterus.   Nose:    The external appearance of

## 2020-02-17 ENCOUNTER — TELEPHONE (OUTPATIENT)
Dept: FAMILY MEDICINE CLINIC | Age: 70
End: 2020-02-17

## 2020-02-17 RX ORDER — ALENDRONATE SODIUM 70 MG/1
70 TABLET ORAL
Qty: 12 TABLET | Refills: 3 | Status: SHIPPED | OUTPATIENT
Start: 2020-02-17 | End: 2021-03-22

## 2020-02-17 NOTE — TELEPHONE ENCOUNTER
Patient called asking if she can get a larger supply of Fosamax currently getting 1 month is she able to get a 90 day supply to [address] [place] [address]. Patient would like a call back with advice.

## 2020-04-23 ENCOUNTER — TELEPHONE (OUTPATIENT)
Dept: UROLOGY | Age: 70
End: 2020-04-23

## 2020-04-28 ENCOUNTER — TELEPHONE (OUTPATIENT)
Dept: UROLOGY | Age: 70
End: 2020-04-28

## 2020-04-28 NOTE — TELEPHONE ENCOUNTER
Prior authorization attempted but it came back no prior Abimael Castañeda is needed. The cost is after insurance has paid there part. Pt may have deductable she has to meet. I attempted to call patient. Left voice mail message to call back.

## 2020-04-28 NOTE — TELEPHONE ENCOUNTER
Prior Auth initiated for [employer] . PA sent to [facility]. Came back no PA is required for Myrbetriq. The price pharmacy is giving her is after insurance has paid. She may have a deductable to meet first. She will have to call insurance company and inquire about what her deductable is if she doesn't know. Left message to call us back.

## 2020-04-30 ENCOUNTER — TELEPHONE (OUTPATIENT)
Dept: UROLOGY | Age: 70
End: 2020-04-30

## 2020-04-30 NOTE — TELEPHONE ENCOUNTER
Can the patient have samples of the myrbetriq? Her office visit is scheduled 05/07/2020. Please advise. Thank you.

## 2020-05-06 ENCOUNTER — TELEPHONE (OUTPATIENT)
Dept: FAMILY MEDICINE CLINIC | Age: 70
End: 2020-05-06

## 2020-05-06 RX ORDER — QUETIAPINE FUMARATE 50 MG/1
50 TABLET, EXTENDED RELEASE ORAL NIGHTLY
Qty: 30 TABLET | Refills: 1 | Status: SHIPPED | OUTPATIENT
Start: 2020-05-06 | End: 2020-05-06 | Stop reason: SDUPTHER

## 2020-05-06 RX ORDER — QUETIAPINE FUMARATE 50 MG/1
50 TABLET, EXTENDED RELEASE ORAL NIGHTLY
Qty: 30 TABLET | Refills: 1 | Status: SHIPPED | OUTPATIENT
Start: 2020-05-06 | End: 2020-10-21

## 2020-05-07 ENCOUNTER — VIRTUAL VISIT (OUTPATIENT)
Dept: UROLOGY | Age: 70
End: 2020-05-07
Payer: MEDICARE

## 2020-05-07 PROCEDURE — 99442 PR PHYS/QHP TELEPHONE EVALUATION 11-20 MIN: CPT | Performed by: NURSE PRACTITIONER

## 2020-05-07 ASSESSMENT — ENCOUNTER SYMPTOMS
EYE PAIN: 0
EYE ITCHING: 0
CHEST TIGHTNESS: 0
SHORTNESS OF BREATH: 0
NAUSEA: 0
BACK PAIN: 0
COLOR CHANGE: 0
DIARRHEA: 0

## 2020-05-07 NOTE — PROGRESS NOTES
Patient:  Eric Me    Review of Systems    Review of Systems   Constitutional: Negative for chills and fever. Eyes: Negative for pain and itching. Respiratory: Negative for chest tightness and shortness of breath. Cardiovascular: Negative for chest pain and leg swelling. Gastrointestinal: Negative for diarrhea and nausea. Endocrine: Positive for heat intolerance. Negative for cold intolerance. Genitourinary: Negative for difficulty urinating and hematuria. Musculoskeletal: Negative for back pain and joint swelling. Skin: Negative for color change and rash. Allergic/Immunologic: Negative for environmental allergies and food allergies. Neurological: Negative for dizziness and headaches. Hematological: Does not bruise/bleed easily.

## 2020-05-11 NOTE — TELEPHONE ENCOUNTER
I will see if [place] exception and if they do I will initiate it but if it is deductible it isn't going to make a difference.  She will have to pay that first.

## 2020-05-12 ENCOUNTER — HOSPITAL ENCOUNTER (EMERGENCY)
Age: 70
Discharge: HOME | End: 2020-05-12
Payer: MEDICARE

## 2020-05-12 VITALS
TEMPERATURE: 97 F | OXYGEN SATURATION: 95 % | RESPIRATION RATE: 14 BRPM | DIASTOLIC BLOOD PRESSURE: 76 MMHG | HEART RATE: 86 BPM | HEIGHT: 63 IN | SYSTOLIC BLOOD PRESSURE: 153 MMHG | BODY MASS INDEX: 23.04 KG/M2 | WEIGHT: 130 LBS

## 2020-05-12 LAB
BILIRUBIN URINE: NEGATIVE
BLOOD, URINE: ABNORMAL
CHARACTER, URINE: CLEAR
COLOR: YELLOW
GLUCOSE URINE: NEGATIVE MG/DL
KETONES, URINE: ABNORMAL
LEUKOCYTE ESTERASE, URINE: ABNORMAL
NITRITE, URINE: NEGATIVE
PH UA: 8 (ref 5–9)
PROTEIN UA: ABNORMAL MG/DL
SPECIFIC GRAVITY UA: 1.02 (ref 1–1.03)
UROBILINOGEN, URINE: 0.2 EU/DL (ref 0.2–1)

## 2020-05-12 PROCEDURE — 99213 OFFICE O/P EST LOW 20 MIN: CPT | Performed by: NURSE PRACTITIONER

## 2020-05-12 PROCEDURE — 87086 URINE CULTURE/COLONY COUNT: CPT

## 2020-05-12 PROCEDURE — 81003 URINALYSIS AUTO W/O SCOPE: CPT

## 2020-05-12 PROCEDURE — 99213 OFFICE O/P EST LOW 20 MIN: CPT

## 2020-05-12 RX ORDER — CEFDINIR 300 MG/1
300 CAPSULE ORAL 2 TIMES DAILY
Qty: 6 CAPSULE | Refills: 0 | Status: SHIPPED | OUTPATIENT
Start: 2020-05-12 | End: 2020-05-15

## 2020-05-12 RX ORDER — GREEN TEA/HOODIA GORDONII 315-12.5MG
1 CAPSULE ORAL DAILY
Qty: 30 TABLET | Refills: 0 | COMMUNITY
Start: 2020-05-12 | End: 2020-06-11

## 2020-05-12 ASSESSMENT — ENCOUNTER SYMPTOMS
WHEEZING: 0
COUGH: 0
STRIDOR: 0
APNEA: 0
SHORTNESS OF BREATH: 0
CHEST TIGHTNESS: 0
CHOKING: 0
NAUSEA: 0
VOMITING: 0

## 2020-05-13 ENCOUNTER — CARE COORDINATION (OUTPATIENT)
Dept: CARE COORDINATION | Age: 70
End: 2020-05-13

## 2020-05-14 LAB
ORGANISM: ABNORMAL
URINE CULTURE, ROUTINE: ABNORMAL

## 2020-05-26 ENCOUNTER — HOSPITAL ENCOUNTER (OUTPATIENT)
Dept: CT IMAGING | Age: 70
Discharge: HOME | End: 2020-05-26
Payer: MEDICARE

## 2020-05-26 ENCOUNTER — HOSPITAL ENCOUNTER (OUTPATIENT)
Dept: MRI IMAGING | Age: 70
Discharge: HOME | End: 2020-05-26
Payer: MEDICARE

## 2020-05-26 PROCEDURE — 70450 CT HEAD/BRAIN W/O DYE: CPT

## 2020-05-26 PROCEDURE — 70544 MR ANGIOGRAPHY HEAD W/O DYE: CPT

## 2020-06-08 ENCOUNTER — OFFICE VISIT (OUTPATIENT)
Dept: FAMILY MEDICINE CLINIC | Age: 70
End: 2020-06-08
Payer: MEDICARE

## 2020-06-08 VITALS
HEART RATE: 96 BPM | BODY MASS INDEX: 23.48 KG/M2 | DIASTOLIC BLOOD PRESSURE: 80 MMHG | OXYGEN SATURATION: 98 % | WEIGHT: 132.5 LBS | SYSTOLIC BLOOD PRESSURE: 128 MMHG | RESPIRATION RATE: 15 BRPM | HEIGHT: 63 IN | TEMPERATURE: 97.2 F

## 2020-06-08 PROCEDURE — G8926 SPIRO NO PERF OR DOC: HCPCS | Performed by: NURSE PRACTITIONER

## 2020-06-08 PROCEDURE — 1036F TOBACCO NON-USER: CPT | Performed by: NURSE PRACTITIONER

## 2020-06-08 PROCEDURE — G0438 PPPS, INITIAL VISIT: HCPCS | Performed by: NURSE PRACTITIONER

## 2020-06-08 PROCEDURE — G8420 CALC BMI NORM PARAMETERS: HCPCS | Performed by: NURSE PRACTITIONER

## 2020-06-08 PROCEDURE — 4040F PNEUMOC VAC/ADMIN/RCVD: CPT | Performed by: NURSE PRACTITIONER

## 2020-06-08 PROCEDURE — G8427 DOCREV CUR MEDS BY ELIG CLIN: HCPCS | Performed by: NURSE PRACTITIONER

## 2020-06-08 PROCEDURE — 3017F COLORECTAL CA SCREEN DOC REV: CPT | Performed by: NURSE PRACTITIONER

## 2020-06-08 PROCEDURE — 3023F SPIROM DOC REV: CPT | Performed by: NURSE PRACTITIONER

## 2020-06-08 PROCEDURE — 1123F ACP DISCUSS/DSCN MKR DOCD: CPT | Performed by: NURSE PRACTITIONER

## 2020-06-08 PROCEDURE — 1090F PRES/ABSN URINE INCON ASSESS: CPT | Performed by: NURSE PRACTITIONER

## 2020-06-08 PROCEDURE — 99214 OFFICE O/P EST MOD 30 MIN: CPT | Performed by: NURSE PRACTITIONER

## 2020-06-08 PROCEDURE — G8399 PT W/DXA RESULTS DOCUMENT: HCPCS | Performed by: NURSE PRACTITIONER

## 2020-06-08 ASSESSMENT — PATIENT HEALTH QUESTIONNAIRE - PHQ9
SUM OF ALL RESPONSES TO PHQ QUESTIONS 1-9: 0
SUM OF ALL RESPONSES TO PHQ QUESTIONS 1-9: 0

## 2020-06-08 ASSESSMENT — ENCOUNTER SYMPTOMS
SHORTNESS OF BREATH: 0
NAUSEA: 0
ABDOMINAL PAIN: 0
COUGH: 0

## 2020-06-08 ASSESSMENT — LIFESTYLE VARIABLES: HOW OFTEN DO YOU HAVE A DRINK CONTAINING ALCOHOL: 0

## 2020-06-08 NOTE — PROGRESS NOTES
12/11/2019     BP wnl    BP Readings from Last 3 Encounters:   06/08/20 128/80   05/12/20 (!) 153/76   01/27/20 119/77     Labs reviewed. Due for repeat screening.      Lab Results   Component Value Date    LABA1C 5.3 04/13/2019    LABA1C 5.3 01/30/2016     No results found for: EAG    No components found for: CHLPL  Lab Results   Component Value Date    TRIG 81 04/13/2019    TRIG 80 08/25/2017    TRIG 79 01/30/2016     Lab Results   Component Value Date     (A) 04/13/2019     (A) 08/25/2017    HDL 87 (A) 01/30/2016     Lab Results   Component Value Date    LDLCALC 98 04/13/2019    LDLCALC 94 08/25/2017    LDLCALC 100 01/30/2016     No results found for: LABVLDL      Chemistry        Component Value Date/Time     12/11/2019 2107    K 3.9 12/11/2019 2107    K 3.9 12/01/2019 2000     12/11/2019 2107    CO2 21 (L) 12/11/2019 2107    BUN 11 12/11/2019 2107    CREATININE 0.7 12/11/2019 2107        Component Value Date/Time    CALCIUM 9.6 12/11/2019 2107    ALKPHOS 55 12/11/2019 2107    AST 37 12/11/2019 2107    ALT 21 12/11/2019 2107    BILITOT 0.3 12/11/2019 2107            Lab Results   Component Value Date    TSH 1.170 12/11/2019       Lab Results   Component Value Date    WBC 9.6 12/11/2019    HGB 14.6 12/11/2019    HCT 46.8 12/11/2019    MCV 94.7 12/11/2019     12/11/2019         Health Maintenance   Topic Date Due    DTaP/Tdap/Td vaccine (1 - Tdap) 06[birth year]13/1969    Shingles Vaccine (1 of 2) 06/13/2000    Annual Wellness Visit (AWV)  05/29/2019    Lipid screen  04/13/2020    TSH testing  12/11/2020    Low dose CT lung screening  01/17/2021    Breast cancer screen  02/14/2021    Colon cancer screen colonoscopy  08/29/2029    Flu vaccine  Completed    Pneumococcal 65+ years Vaccine  Completed    Hepatitis C screen  Completed    DEXA (modify frequency per FRAX score)  Addressed    Hepatitis A vaccine  Aged Out    Hepatitis B vaccine  Aged Out    Hib vaccine  Aged C/ Apollo Beatris 19 Meningococcal (ACWY) vaccine  Aged Out       Immunization History   Administered Date(s) Administered    Influenza Vaccine, unspecified formulation 12/06/2017    Influenza, High Dose (Fluzone 65 yrs and older) 12/06/2017, 11/07/2018    Influenza, Triv, inactivated, subunit, adjuvanted, IM (Fluad 65 yrs and older) 10/30/2019    Pneumococcal Conjugate 13-valent ([id number]) 07/26/2017    Pneumococcal Polysaccharide ([id number]) 11/13/2018         Review of Systems   Constitutional: Negative for chills and fever. HENT: Negative. Respiratory: Negative for cough and shortness of breath. Cardiovascular: Negative for chest pain. Gastrointestinal: Negative for abdominal pain and nausea. Genitourinary: Positive for frequency and urgency. Neurological: Negative for dizziness, light-headedness and headaches. Psychiatric/Behavioral: Negative for dysphoric mood. The patient is nervous/anxious. Objective:   Physical Exam  Constitutional:       General: She is not in acute distress. HENT:      Head: Normocephalic. Right Ear: Tympanic membrane normal.      Left Ear: Tympanic membrane normal.      Nose: Nose normal.   Eyes:      Pupils: Pupils are equal, round, and reactive to light. Neck:      Musculoskeletal: Normal range of motion and neck supple. Vascular: No carotid bruit. Cardiovascular:      Rate and Rhythm: Normal rate and regular rhythm. Pulses: Normal pulses. Heart sounds: Normal heart sounds. No murmur. Pulmonary:      Effort: Pulmonary effort is normal.      Breath sounds: Normal breath sounds. No wheezing. Abdominal:      General: Bowel sounds are normal. There is no distension. Palpations: Abdomen is soft. Tenderness: There is no abdominal tenderness. Musculoskeletal: Normal range of motion. General: No tenderness. Skin:     General: Skin is warm and dry. Findings: No rash.    Neurological:      Mental Status: She is alert and oriented

## 2020-06-08 NOTE — PROGRESS NOTES
Medicare Annual Wellness Visit  Name: Rose Rubin Date: 2020   MRN: [id number] Sex: Female   Age: 71 y.o. Ethnicity: Non-/Non    : 1950 Race: Dawna Murphy is here for Medicare AWV    Screenings for behavioral, psychosocial and functional/safety risks, and cognitive dysfunction are all negative except as indicated below. These results, as well as other patient data from the [address] form, are documented in Flowsheets linked to this Encounter. Allergies   Allergen Reactions    Sulfa Antibiotics Nausea And Vomiting         Prior to Visit Medications    Medication Sig Taking? Authorizing Provider   Oxybutynin Chloride (DITROPAN XL PO) Take by mouth Yes Historical Provider, MD   Probiotic Acidophilus (FLORANEX) TABS Take 1 tablet by mouth daily Yes SARITHA Olson CNP   QUEtiapine (SEROQUEL XR) 50 MG extended release tablet Take 1 tablet by mouth nightly Yes SARITHA Cunningham CNP   simvastatin (ZOCOR) 10 MG tablet Take 1 tablet by mouth nightly Yes SARITHA Cunningham CNP   alendronate (FOSAMAX) 70 MG tablet Take 1 tablet by mouth every 7 days Take with water on an empty stomach- wait 30 minutes before eating or taking other meds. Avoid lying down for 30 minutes after dose.  Yes SARITHA Cunningham CNP   mirabegron (MYRBETRIQ) 25 MG TB24 Take 1 tablet by mouth daily Yes SARITHA Riley CNP   atomoxetine (STRATTERA) 40 MG capsule Take 1 capsule by mouth daily Yes SARITHA Laboy CNP   SYMBICORT 80-4.5 MCG/ACT AERO INHALE 2 PUFFS BY MOUTH TWICE DAILY RINSE MOUTH AFTER USE Yes Historical Provider, MD   acyclovir (ZOVIRAX) 400 MG tablet Take 1 tablet by mouth 2 times daily Yes Historical Provider, MD   donepezil (ARICEPT) 10 MG tablet TAKE 1 TABLET BY MOUTH IN THE EVENING Yes SARITHA Cunningham CNP   levothyroxine (EUTHYROX) 50 MCG tablet TAKE 1 TABLET BY MOUTH ONCE DAILY Yes SARITHA Cunningham CNP MD as Consulting Physician (Ophthalmology)    Wt Readings from Last 3 Encounters:   06/08/20 132 lb 8 oz (60.1 kg)   05/12/20 130 lb (59 kg)   02/06/20 128 lb 4.8 oz (58.2 kg)     Vitals:    06/08/20 1422   BP: 128/80   Site: Left Upper Arm   Position: Sitting   Cuff Size: Medium Adult   Pulse: 96   Resp: 15   Temp: 97.2 °F (36.2 °C)   TempSrc: Infrared   SpO2: 98%   Weight: 132 lb 8 oz (60.1 kg)   Height: 5' 3\" (1.6 m)     Body mass index is 23.47 kg/m². Based upon direct observation of the patient, evaluation of cognition reveals recent and remote memory intact. Patient's complete Health Risk Assessment and screening values have been reviewed and are found in Flowsheets. The following problems were reviewed today and where indicated follow up appointments were made and/or referrals ordered. Positive Risk Factor Screenings with Interventions:     Health Habits/Nutrition:  Health Habits/Nutrition  Do you exercise for at least 20 minutes 2-3 times per week?: (!) No  Have you lost any weight without trying in the past 3 months?: No  Do you eat fewer than 2 meals per day?: No  Have you seen a dentist within the past year?: Yes  Body mass index is 23.47 kg/m².   Health Habits/Nutrition Interventions:  · Inadequate physical activity:  educational materials provided to promote increased physical activity    Hearing/Vision:  No exam data present  Hearing/Vision  Do you or your family notice any trouble with your hearing?: (!) Yes  Do you have difficulty driving, watching TV, or doing any of your daily activities because of your eyesight?: No  Have you had an eye exam within the past year?: Yes  Hearing/Vision Interventions:  · Vision concerns:  patient encouraged to make appointment with his/her eye specialist    Personalized Preventive Plan   Current Health Maintenance Status  Immunization History   Administered Date(s) Administered    Influenza Vaccine, unspecified formulation 12/06/2017    Influenza, High Dose

## 2020-06-08 NOTE — PATIENT INSTRUCTIONS
Personalized Preventive Plan for Marilou Dyer - 6/8/2020  Medicare offers a range of preventive health benefits. Some of the tests and screenings are paid in full while other may be subject to a deductible, co-insurance, and/or copay. Some of these benefits include a comprehensive review of your medical history including lifestyle, illnesses that may run in your family, and various assessments and screenings as appropriate. After reviewing your medical record and screening and assessments performed today your provider may have ordered immunizations, labs, imaging, and/or referrals for you. A list of these orders (if applicable) as well as your Preventive Care list are included within your After Visit Summary for your review. Other Preventive Recommendations:    · A preventive eye exam performed by an eye specialist is recommended every 1-2 years to screen for glaucoma; cataracts, macular degeneration, and other eye disorders. · A preventive dental visit is recommended every 6 months. · Try to get at least 150 minutes of exercise per week or 10,000 steps per day on a pedometer . · Order or download the FREE \"Exercise & Physical Activity: Your Everyday Guide\" from [employer]. Call 1-374.748.4799 or search [employer] online. · You need 3359-3227 mg of calcium and 9599-5374 IU of vitamin D per day. It is possible to meet your calcium requirement with diet alone, but a vitamin D supplement is usually necessary to meet this goal.  · When exposed to the sun, use a sunscreen that protects against both UVA and UVB radiation with an SPF of 30 or greater. Reapply every 2 to 3 hours or after sweating, drying off with a towel, or swimming. · Always wear a seat belt when traveling in a car. Always wear a helmet when riding a bicycle or motorcycle. Personalized Preventive Plan for Marilou Dyer - 6/8/2020  Medicare offers a range of preventive health benefits.  Some of the tests and screenings are paid in full while other may be subject to a deductible, co-insurance, and/or copay. Some of these benefits include a comprehensive review of your medical history including lifestyle, illnesses that may run in your family, and various assessments and screenings as appropriate. After reviewing your medical record and screening and assessments performed today your provider may have ordered immunizations, labs, imaging, and/or referrals for you. A list of these orders (if applicable) as well as your Preventive Care list are included within your After Visit Summary for your review. Other Preventive Recommendations:    A preventive eye exam performed by an eye specialist is recommended every 1-2 years to screen for glaucoma; cataracts, macular degeneration, and other eye disorders. A preventive dental visit is recommended every 6 months. Try to get at least 150 minutes of exercise per week or 10,000 steps per day on a pedometer . Order or download the FREE \"Exercise & Physical Activity: Your Everyday Guide\" from [employer]. Call 7-647.415.4371 or search [employer] online. You need 3403-4266 mg of calcium and 9349-0310 IU of vitamin D per day. It is possible to meet your calcium requirement with diet alone, but a vitamin D supplement is usually necessary to meet this goal.  When exposed to the sun, use a sunscreen that protects against both UVA and UVB radiation with an SPF of 30 or greater. Reapply every 2 to 3 hours or after sweating, drying off with a towel, or swimming. Always wear a seat belt when traveling in a car. Always wear a helmet when riding a bicycle or motorcycle.

## 2020-06-10 ENCOUNTER — OFFICE VISIT (OUTPATIENT)
Dept: NEUROSURGERY | Age: 70
End: 2020-06-10
Payer: MEDICARE

## 2020-06-10 VITALS
BODY MASS INDEX: 23.46 KG/M2 | WEIGHT: 132.4 LBS | HEIGHT: 63 IN | TEMPERATURE: 96.7 F | DIASTOLIC BLOOD PRESSURE: 84 MMHG | HEART RATE: 96 BPM | SYSTOLIC BLOOD PRESSURE: 131 MMHG

## 2020-06-10 PROCEDURE — 1036F TOBACCO NON-USER: CPT | Performed by: NEUROLOGICAL SURGERY

## 2020-06-10 PROCEDURE — 3017F COLORECTAL CA SCREEN DOC REV: CPT | Performed by: NEUROLOGICAL SURGERY

## 2020-06-10 PROCEDURE — G8427 DOCREV CUR MEDS BY ELIG CLIN: HCPCS | Performed by: NEUROLOGICAL SURGERY

## 2020-06-10 PROCEDURE — G8420 CALC BMI NORM PARAMETERS: HCPCS | Performed by: NEUROLOGICAL SURGERY

## 2020-06-10 PROCEDURE — G8399 PT W/DXA RESULTS DOCUMENT: HCPCS | Performed by: NEUROLOGICAL SURGERY

## 2020-06-10 PROCEDURE — 1123F ACP DISCUSS/DSCN MKR DOCD: CPT | Performed by: NEUROLOGICAL SURGERY

## 2020-06-10 PROCEDURE — 99214 OFFICE O/P EST MOD 30 MIN: CPT | Performed by: NEUROLOGICAL SURGERY

## 2020-06-10 PROCEDURE — 1090F PRES/ABSN URINE INCON ASSESS: CPT | Performed by: NEUROLOGICAL SURGERY

## 2020-06-10 PROCEDURE — 4040F PNEUMOC VAC/ADMIN/RCVD: CPT | Performed by: NEUROLOGICAL SURGERY

## 2020-06-10 NOTE — PROGRESS NOTES
Afton Courser presented  today for a follow-up visit. This is a 70-year-old female who I saw initially in 1/8/2020 as a new referral after she underwent a brain CT that showed findings suggestive of communicating hydrocephalus. Patient had past medical history significant for cerebral aneurysm clipping in 2015. In today visit, patient denied any headache any significant dizziness. He denied any change in her balance or any new weakness or numbness. She denied any changes in her vision. She denied any seizures. She denied any decline in her cognitive function. However patient stated that she has some issues in controlling her urination from time to time. In today visit:    Patient stated that there is no significant changes in her symptoms since the last time I saw her. However with further questioning the patient she admits that she has some memory issues by that patient denied that in her previous visit.  -So she mentioned to get that her an issue in controlling her urination that is going for a while. Also patient stated that she has a balance issue.    -Today neurological exam showed no significant changes in patient neurological exam comparing with her previous visit. Patient's most recent brain CT:    There is again noted to be a coil pack at the anterior communicating artery with streak artifact, stable compared to prior exam. There is mild to moderate ventriculomegaly which is mildly disproportionate to peripheral volume loss, unchanged compared to    prior exam dating back to 2015. There are mild patchy areas of hypodensity in the periventricular white matter, also similar to prior exam. Gray-white matter differentiation otherwise appears grossly preserved. No intracranial hemorrhage, mass effect or    midline shift is identified. The calvarium appears intact. Orbits are unremarkable.  Paranasal sinuses and mastoid air cells are clear.     -Patient ophthalmology evaluation showed no papillary edema.    A/P:    -As patient overall clinical picture, past medical history finding of her brain imaging studies are suggestive of normal pressure hydrocephalus as a possible underlying pathological process, I will request for patient PT/OT and speech (cognative) according to normal pressure hydrocephalus protocol to see the degree of deficit that patient has in this areas, is on the result of upcoming evaluation I may consider the patient for lumbar drain trial according to normal pressure hydrocephalus protocol.  - Given patient previous medical history of aneurysmal subarachnoid hemorrhage and the findings of her new brain CT, patient perhaps is  Developing a normal pressure hydrocephalus.  - However, at this time, she does not have the full picture of this diagnosis.  -For this reason, at this time, I will request for the patient and ophthalmology referral to rule out any papillary edema ( as a method to rule out the possibility of high pressure hydrocephalus). -I discussed with patient in detail her current overall neurological status and the finding of her brain CT, as well as my recommendation and treatment plan for her at this time.  -All questions and concerns were addressed and answered.  -Patient was in was in agreement with the above recommendation treatment plan. -I will see the patient again after a couple of weeks after she completed her upcoming PT OT and speech evaluation.     *Time spent with the patient was 25 minutes. More than 50% was spent counseling/coordinating the patient's care.

## 2020-06-16 LAB
AVERAGE GLUCOSE: NORMAL
CHOLESTEROL, TOTAL: 245 MG/DL
CHOLESTEROL/HDL RATIO: ABNORMAL
HBA1C MFR BLD: 5.1 %
HDLC SERPL-MCNC: 138 MG/DL (ref 35–70)
LDL CHOLESTEROL CALCULATED: 85 MG/DL (ref 0–160)
TRIGL SERPL-MCNC: 110 MG/DL
TSH SERPL DL<=0.05 MIU/L-ACNC: 2.34 UIU/ML
VLDLC SERPL CALC-MCNC: 22 MG/DL

## 2020-06-18 ENCOUNTER — HOSPITAL ENCOUNTER (OUTPATIENT)
Dept: WOMENS IMAGING | Age: 70
Discharge: HOME | End: 2020-06-18
Payer: MEDICARE

## 2020-06-18 ENCOUNTER — TELEPHONE (OUTPATIENT)
Dept: FAMILY MEDICINE CLINIC | Age: 70
End: 2020-06-18

## 2020-06-18 PROCEDURE — G0279 TOMOSYNTHESIS, MAMMO: HCPCS

## 2020-06-18 PROCEDURE — 76642 ULTRASOUND BREAST LIMITED: CPT

## 2020-06-25 ENCOUNTER — OFFICE VISIT (OUTPATIENT)
Dept: PHYSICAL MEDICINE AND REHAB | Age: 70
End: 2020-06-25
Payer: MEDICARE

## 2020-06-25 VITALS
HEART RATE: 91 BPM | DIASTOLIC BLOOD PRESSURE: 78 MMHG | HEIGHT: 55 IN | WEIGHT: 132.4 LBS | TEMPERATURE: 97.7 F | SYSTOLIC BLOOD PRESSURE: 137 MMHG | BODY MASS INDEX: 30.64 KG/M2

## 2020-06-25 PROCEDURE — G8427 DOCREV CUR MEDS BY ELIG CLIN: HCPCS | Performed by: NURSE PRACTITIONER

## 2020-06-25 PROCEDURE — 1090F PRES/ABSN URINE INCON ASSESS: CPT | Performed by: NURSE PRACTITIONER

## 2020-06-25 PROCEDURE — G8417 CALC BMI ABV UP PARAM F/U: HCPCS | Performed by: NURSE PRACTITIONER

## 2020-06-25 PROCEDURE — 4040F PNEUMOC VAC/ADMIN/RCVD: CPT | Performed by: NURSE PRACTITIONER

## 2020-06-25 PROCEDURE — 3017F COLORECTAL CA SCREEN DOC REV: CPT | Performed by: NURSE PRACTITIONER

## 2020-06-25 PROCEDURE — 1036F TOBACCO NON-USER: CPT | Performed by: NURSE PRACTITIONER

## 2020-06-25 PROCEDURE — G8399 PT W/DXA RESULTS DOCUMENT: HCPCS | Performed by: NURSE PRACTITIONER

## 2020-06-25 PROCEDURE — 99213 OFFICE O/P EST LOW 20 MIN: CPT | Performed by: NURSE PRACTITIONER

## 2020-06-25 PROCEDURE — 1123F ACP DISCUSS/DSCN MKR DOCD: CPT | Performed by: NURSE PRACTITIONER

## 2020-06-25 RX ORDER — ATOMOXETINE 40 MG/1
40 CAPSULE ORAL DAILY
Qty: 90 CAPSULE | Refills: 1 | Status: SHIPPED | OUTPATIENT
Start: 2020-06-25 | End: 2020-10-23 | Stop reason: SDUPTHER

## 2020-06-25 RX ORDER — HYDROXYZINE HYDROCHLORIDE 25 MG/1
25 TABLET, FILM COATED ORAL 3 TIMES DAILY PRN
Qty: 30 TABLET | Refills: 0 | Status: SHIPPED | OUTPATIENT
Start: 2020-06-25 | End: 2020-07-05

## 2020-06-25 NOTE — PROGRESS NOTES
[address] [facility]  Outpatient progress note    Chief Complaint:   Chief Complaint   Patient presents with    Follow-up     6 month F/U        Subjective: Dameon Story is a 79 y.o. female who returns to the office today for further follow up. Cognitive deficits from CVA are stable. Strattera working well. Feels that current dose is adequate. Taking aricept as well. Having more anxiety lately. Seeing psychologist which helps. Wondering if she can get something to help if she starts having anxiety attack. Review of Systems:  CONSTITUTIONAL:  negative  EYES:  negative  HEENT:  negative  RESPIRATORY:  negative  CARDIOVASCULAR:  negative  GASTROINTESTINAL:  negative  GENITOURINARY:  negative  SKIN:  negative  HEMATOLOGIC/LYMPHATIC:  negative  MUSCULOSKELETAL:  negative  NEUROLOGICAL:  positive for memory problems  BEHAVIOR/PSYCH:  positive for anxiety  All other review of systems otherwise negative    Physical Exam:  /78 (Site: Left Upper Arm, Position: Sitting)   Pulse 91   Temp 97.7 °F (36.5 °C)   Ht 1' 7.2\" (0.488 m)   Wt 132 lb 6.4 oz (60.1 kg)   .52 kg/m²     Orientation:   person, place, time  Mood: euthymic  Affect: anxious  General appearance: Patient is well nourished, well developed, well groomed and in no acute distress, appearing stated age, anxious     Memory:   abnormal - deficits noted,   Attention/Concentration: abnormal - poor  Language:  Normal, but very fast paced     Cranial Nerves:  cranial nerves II-XII are grossly intact  ROM:  normal  Motor Exam:  Motor exam is symmetrical 5 out of 5 all extremities bilaterally  Tone:  normal  Muscle bulk: within normal limits  Sensory:  Sensory intact  Coordination:   normal  Deep Tendon Reflexes:  Reflexes are intact and symmetrical bilaterally  Gait: steady.  No AD used.      Skin: warm and dry, no rash or erythema  Peripheral vascular: Pulses: Normal upper and

## 2020-07-14 ENCOUNTER — OFFICE VISIT (OUTPATIENT)
Dept: UROLOGY | Age: 70
End: 2020-07-14
Payer: MEDICARE

## 2020-07-14 VITALS — HEIGHT: 63 IN | WEIGHT: 132 LBS | BODY MASS INDEX: 23.39 KG/M2 | TEMPERATURE: 97 F

## 2020-07-14 LAB
BILIRUBIN URINE: NEGATIVE
BLOOD URINE, POC: NEGATIVE
CHARACTER, URINE: CLEAR
COLOR, URINE: YELLOW
GLUCOSE URINE: NEGATIVE MG/DL
KETONES, URINE: NEGATIVE
LEUKOCYTE CLUMPS, URINE: NEGATIVE
NITRITE, URINE: NEGATIVE
PH, URINE: 6.5 (ref 5–9)
POST VOID RESIDUAL (PVR): 0 ML
PROTEIN, URINE: NEGATIVE MG/DL
SPECIFIC GRAVITY, URINE: >= 1.03 (ref 1–1.03)
UROBILINOGEN, URINE: 0.2 EU/DL (ref 0–1)

## 2020-07-14 PROCEDURE — 1090F PRES/ABSN URINE INCON ASSESS: CPT | Performed by: NURSE PRACTITIONER

## 2020-07-14 PROCEDURE — 81003 URINALYSIS AUTO W/O SCOPE: CPT | Performed by: NURSE PRACTITIONER

## 2020-07-14 PROCEDURE — 51798 US URINE CAPACITY MEASURE: CPT | Performed by: NURSE PRACTITIONER

## 2020-07-14 PROCEDURE — 99213 OFFICE O/P EST LOW 20 MIN: CPT | Performed by: NURSE PRACTITIONER

## 2020-07-14 PROCEDURE — 1123F ACP DISCUSS/DSCN MKR DOCD: CPT | Performed by: NURSE PRACTITIONER

## 2020-07-14 PROCEDURE — 1036F TOBACCO NON-USER: CPT | Performed by: NURSE PRACTITIONER

## 2020-07-14 PROCEDURE — G8427 DOCREV CUR MEDS BY ELIG CLIN: HCPCS | Performed by: NURSE PRACTITIONER

## 2020-07-14 PROCEDURE — G8420 CALC BMI NORM PARAMETERS: HCPCS | Performed by: NURSE PRACTITIONER

## 2020-07-14 PROCEDURE — 3017F COLORECTAL CA SCREEN DOC REV: CPT | Performed by: NURSE PRACTITIONER

## 2020-07-14 PROCEDURE — 4040F PNEUMOC VAC/ADMIN/RCVD: CPT | Performed by: NURSE PRACTITIONER

## 2020-07-14 PROCEDURE — G8399 PT W/DXA RESULTS DOCUMENT: HCPCS | Performed by: NURSE PRACTITIONER

## 2020-07-16 ENCOUNTER — HOSPITAL ENCOUNTER (OUTPATIENT)
Dept: SPEECH THERAPY | Age: 70
Setting detail: THERAPIES SERIES
Discharge: HOME | End: 2020-07-16
Payer: MEDICARE

## 2020-07-16 ENCOUNTER — HOSPITAL ENCOUNTER (OUTPATIENT)
Dept: OCCUPATIONAL THERAPY | Age: 70
Setting detail: THERAPIES SERIES
Discharge: HOME | End: 2020-07-16
Payer: MEDICARE

## 2020-07-16 ENCOUNTER — HOSPITAL ENCOUNTER (OUTPATIENT)
Dept: PHYSICAL THERAPY | Age: 70
Setting detail: THERAPIES SERIES
Discharge: HOME | End: 2020-07-16
Payer: MEDICARE

## 2020-07-16 PROCEDURE — 97165 OT EVAL LOW COMPLEX 30 MIN: CPT

## 2020-07-16 PROCEDURE — 97112 NEUROMUSCULAR REEDUCATION: CPT

## 2020-07-16 PROCEDURE — 92523 SPEECH SOUND LANG COMPREHEN: CPT

## 2020-07-16 PROCEDURE — 97161 PT EVAL LOW COMPLEX 20 MIN: CPT

## 2020-07-16 ASSESSMENT — 9 HOLE PEG TEST
TEST_RESULT: FUNCTIONAL
TESTTIME_SECONDS: 22
TESTTIME_SECONDS: 24
TEST_RESULT: FUNCTIONAL

## 2020-07-16 NOTE — DISCHARGE SUMMARY
** PLEASE SIGN, DATE AND TIME CERTIFICATION BELOW AND RETURN TO [facility] OUTPATIENT REHABILITATION (FAX #: [phone]). ATTEST/CO-SIGN IF ACCESSING VIA [employer]. THANK YOU.**    I certify that I have examined the patient below and determined that Physical Medicine and Rehabilitation service is necessary and that I approve the established plan of care for up to 90 days or as specifically noted. Attestation, signature or co-signature of physician indicates approval of certification requirements.    ________________________ ____________ __________  Physician Signature   Date   Time  [address]                        Date: 2020  Patient Name: Destin Mendez        CSN: [id number]     : 1950  (79 y.o.)  Gender: female   Referring Practitioner: Lizet José MD  Diagnosis: other hydrocephalus G91.8     Additional Pertinent Hx: Patient suffered a CVA 5 years ago. States that she is doing much better but is still having some balance issues along with incontinence and memory issues. States family doctor set her up with Dr Hernán Motta due to some concerns. States Dr Hernán Motta is considering possible hydrocephalus but wants therapy results to help make a decision on if needs a shunt. States also mentioned possibly just doing a drain and not putting a shunt in.    Destin Mendez  has a past medical history of Aneurysm, cerebral, COPD (chronic obstructive pulmonary disease) ([facility] [address] Hyperlipidemia, Osteoarthritis, Pneumonia, Thyroid disease, and Unspecified cerebral artery occlusion with cerebral infarction. Destin Mendez  has a past surgical history that includes Colonoscopy ([id number]); External ear surgery (); External ear surgery (); External ear surgery (); Cosmetic surgery (1194,0210); Cosmetic surgery (); Brain aneurysm surgery; and Breast surgery.     See Medical History Questionnaire for information related to allergies and medications. General:  OT Visit Information  Onset Date: 07/16/20  OT Insurance Information: Medicare - unlimited visits based on medical necessity  Total # of Visits to Date: 1  Certification Period Expiration Date: 07/16/20  Progress Note Counter: 1/1  Chart Reviewed: Yes    Restrictions/Precautions:       Position Activity Restriction  Other position/activity restrictions: previous CVA         Subjective:  Subjective: Patient reports that her greatest aggrevation is her incontinence. Pain:  Pain Assessment  Patient Currently in Pain: No    Social/Functional History:    Lives With: Alone  Type of Home: House  Home Layout: One level  Home Access: Level entry     Bathroom Shower/Tub: Tub/Shower unit  Bathroom Toilet: Standard  Bathroom Equipment: Grab bars in shower       ADL Assistance: Independent     Homemaking Responsibilities: Yes  Meal Prep Responsibility: Primary  Laundry Responsibility: Primary  Cleaning Responsibility: Primary  Bill Paying/Finance Responsibility: Primary  Shopping Responsibility: Primary  Dependent Care Responsibility: Primary(two cats)  Health Care Management: Primary    Active : Yes  Occupation: Retired    Objective     Hand Dominance: Right            LUE AROM (degrees)  LUE AROM : WFL          RUE AROM (degrees)  RUE AROM : WFL       LUE Strength  LUE Strength Comment: Left shoulder throughout: 5/5. Left elbow flexion and extension 4/5    Left Hand Strength -  (lbs)  Handle Setting 2: 53              RUE Strength  RUE Strength Comment: Right Shoulder throughout: 4/5.   Left elbow flexion and extension 5/5    Right Hand Strength -  (lbs)  Handle Setting 2: 50        Left 9 Hole Peg Test Time (secs): 22   Left 9-Hole Peg Test: Functional   Right 9 Hole Peg Test Time (secs): 24   Right 9-Hole Peg Test: Functional       ADL  Feeding: Independent  Grooming: Independent  UE Bathing: Independent  LE Bathing: Independent  UE Dressing: Independent  LE Dressing: Independent  Toileting: Modified independent          Activity Tolerance: Additional Comments: Tolerated treatment well. Assessment:  Assessment: Patient presents to the clinic this date per normal pressure hydrocephalus protocol. Recommend pelvic floor therapy to address incontinence concerns. Clinical Decision Making: Clinical Decision making was of Low Complexity as the result of analysis of data from a problem focused assessment, a consideration of a limited number of treatment options, no significant comorbidities affecting the plan of care and no modification or assistance required to complete the evaluation. Patient Education: role of OT, purpose of evaluation this date        Plan: Recommend pelvic floor therapy to address incontinence concerns. Evaluation Complexity: Based on the findings of patient history, examination, clinical presentation, and decision making during this evaluation, this patient is of low complexity.     Arlette Taylor, MOT, OTR/L [id number]

## 2020-07-16 NOTE — DISCHARGE SUMMARY
** PLEASE SIGN, DATE AND TIME CERTIFICATION BELOW AND RETURN TO [facility] OUTPATIENT REHABILITATION (FAX #: [phone]). ATTEST/CO-SIGN IF ACCESSING VIA [employer]. THANK YOU.**    I certify that I have examined the patient below and determined that Physical Medicine and Rehabilitation service is necessary and that I approve the established plan of care for up to 90 days or as specifically noted. Attestation, signature or co-signature of physician indicates approval of certification requirements.    ________________________ ____________ __________  Physician Signature   Date   Time  [address] [facility]  Normal Pressure Hydrocephalus Protocol Baseline Evaluation  [address] [employer] IN:  1100  TIME OUT:  7354  UNTIMED TREATMENT:  45  TIMED TREATMENT: 0  TOTAL TIME: 45 minutes     FCM - Memory:  5    Date: 2020  Patient Name: Yesica Chan       CSN: [id number]    : 1950  (79 y.o.)  Gender: female   Referring Physician:  Alaina Saba MD  Diagnosis: Abnormal brain CT (R90.89 [ICD-10-CM]); Other hydrocephalus ([facility] [address] (G91.8 [ICD-10-CM]); Memory deficit (R41.3 [ICD-10-CM])  Secondary Diagnosis:  Cognitive-linguistic deficits   Insurance/Certification Information: Medicare   Visit number / total approved visits: 1 - unlimited visits based on medical necessity   Visit count since last progress note:  1  Script comments: NPH protocol     History of Present Illness/Injury: Patient reports that she was referred to Dr. Taye Aponte with symptoms of incontinence and memory changes. Patient reports that Dr. Taye Aponte referred patient to OP rehabilitation for completion of NPH protocol. Patient reports there are not any current plans for shunt placement at this time - patient has a follow up with Dr. Taye Aponte on 20 to review results of NPH protocol, additional testing, and to establish medical plan of care.   Patient reports she wants to get her 'bladder under control.'  Patient admits to some memory changes, specifically remembering names and recall of events that occurred the day prior. Patient with history of stroke in 2015 and received OP speech therapy from March 2015-February of 2016 to address cognitive deficits and dysphagia. Please see medical history questionnaire for information related to prior medical history, allergies and medications    Social History: Patient is living alone independently, has one daughter who lives locally and available for support. Patient has 2 other daughters who live out of state. Patient cares for 2 cats at home. Patient is an active . Patient reports independence with home related IADLs, including medication management, financial management, daily schedule management, cooking, and cleaning. Patient reports she recently had cataract surgery to both eyes. Subjective:  Patient pleasant and cooperative. No family present. Pain: 0/10    [place] Cognitive Assessment [facility]) version 7.1 completed. Pt scored 20/30. Normal is greater than or equal to 26/30. Visuospatial/Executive 4/5   Naming 1/3   Memory   Immediate 4/5   Short-Term 1/5   Attention   Digit Repetition 2/2   Sustained/Selective 1/1   Mental Math 3/3   Language   Sentence Repetition 0/2   Fluency Naming 7 items in 60seconds   Abstraction 2/2   Orientation 6/6   Total      O-Log 30/30   Typical Functioning      Cog-Log 23/30   Mild Impairment       IMPRESSIONS: The patient presents with mild cognitive-linguistic deficits as indicated by results of MOCA and Cog-Log administered this date. Deficits noted in the areas of immediate/working/delayed recall, executive functioning, attention, naming, and sentence repetition. Decreased word retrieval was evident both in structured and unstructured verbal tasks, although patient was often able to self-correct errors within conversation.       REHAB POTENTIAL:  Good     EDUCATION:  Learner:

## 2020-07-16 NOTE — DISCHARGE SUMMARY
** PLEASE SIGN, DATE AND TIME CERTIFICATION BELOW AND RETURN TO [facility] OUTPATIENT REHABILITATION (FAX #: [phone]). ATTEST/CO-SIGN IF ACCESSING VIA [employer]. THANK YOU.**    I certify that I have examined the patient below and determined that Physical Medicine and Rehabilitation service is necessary and that I approve the established plan of care for up to 90 days or as specifically noted. Attestation, signature or co-signature of physician indicates approval of certification requirements.    ________________________ ____________ __________  Physician Signature   Date   Time  [facility]  OUTPATIENT REHABILITATION  PHYSICAL THERAPY   NPH EVALUATION/DISCHARGE  PRE-SHUNT PLACEMENT   Myriam -   Time In: 1000  Time Out: 1025  Total timed code minutes: 8  Total treatment minutes: 25    Date: 2020  Patient Name: Vic Delgado        CSN: [id number]   : 1950  (79 y.o.)  Gender: female   Diagnosis:   R90.89 (ICD-10-CM) - Other abnormal findings on diagnostic imaging of central nervous system    R41.3 (ICD-10-CM) - Other amnesia    G91.8 (ICD-10-CM) - Other hydrocephalus        Treatment Diagnosis: balance deficits  Physician: Dr Beto Martinez  Certification Date:   Follow up with doctor: 2020   Insurance: Medicare unlimited visits with aquatics and modalities except Ionto and HP/CP covered. GENERAL:  Fall History:  had a fall a couple months ago where caught foot on edge of bed and fell but did not injure herself. Pain: denies  Functional/ADL History: WFL    SUBJECTIVE: Patient had a sroke 5 years ago and was in and out of therapy. States is doing much better bur still having some balance issues along with incontinence and memory issues. [facility] family doctor set her up with Dr Malaika Gustafson due to some concerns. States Dr Malaika Gustafson is considering possible hydrocephalus but wants therapy results to help make a decision on if needs a shunt.  States also mentioned possibly just doing a drain and not putting a shunt in. OBJECTIVE:  Gait: [facility]    Functional Outcomes Measurements:  Functional Outcome Measurement Used Initially: Tinetti Balance Test  Current Functional Outcome Measurements:24/28  TINETTI BALANCE TEST    BALANCE Patient is seated in a hard, armless chair   1 SITTING BALANCE 1 - Steady, safe   2. RISES FROM CHAIR 2 - Able without use of arms   3. ATTEMPTS TO RISE 2 - Able to rise, 1 attempt   4. IMMEDIATE STANDING BALANCE (FIRST 5 SECONDS) 1 - Steady but uses walker or other support   5. STANDING BALANCE 1 - Steady but wide stance and uses support   6. NUDGED 1 - Staggers, grabs, catches   7. EYES CLOSED 1 - Steady   8. TURNING 360 DEGREES 1 - Continuous and 1 - Steady   9. SITTING DOWN 2 - Safe,smooth motion   BALANCE SCORE 13/16       GAIT SECTION Patient stands with therapist, walks across room (+/- aids), fist at usual pace, then at rapid pace. 1.  INDICATION OF GAIT (Immediately after told to \"go\" 1 - No hesitancy   2. STEP LENGTH AND HEIGHT 1 - Step through Right and 1 - Step through Left   3. FOOT CLEARANCE 1 - Left foot clears floor and 1 - Right foot clears floor   4. STEP SYMMETRY 1 - Right and left step length appear equal   5. STEP CONTINUITY 1 - Steps appear continuous   6. PATH 1 - Mild/moderate deviation or uses walking aid   7. TRUNK 2 - No sway, flexion, use of arms or walking aid   8. WALKING TIME 1 - Heels almost touching while walking   GAIT SCORE 11/12   TOTAL SCORE 24/28   Risk Indicators:  Less than/equal to 18 = high risk  19-23 Moderate risk  Greater than/equal to 24 = low risk                     Activity Tolerance: Patient tolerated treatment well    ASSESSMENT:  Problem List:Decreased balance    Assessment: Patient with minimal balance issues that could be remnants from stroke 5 years ago but not sure the exact cause. Patient not having issues with falls and is just slightly unsteady - no AD needed. No further therapy needed. Prognosis: Good  Patient Education: POC  Education Outcome: Verbalized understanding    PLAN:  Frequency and Duration of Treatment: No further treatment warranted  Treatment Recommendations: None    History: Personal factors or comorbidities that impact plan of care -  Moderate Complexity: 1-2 personal factors or comorbidities. See history section above for details. Examination: Body structures and functions, activity limitations, participation restrictions; using standardized tests and measures - Low Complexity: 1-2 body structures and functional, activity limitation and/or participation restrictions. See restrictions and objective section above for details. Clinical Presentation: Low - Stable and Uncomplicated: No further therapy needed    Decision Making: Low Complexity due to No further therapy needed. Decision making was based on patient assessment and decision making process in determining plan of care and establishing reasonable expectations for measurable functional outcomes. Evaluation Complexity: Based on the findings of patient history, examination, clinical presentation, and decision making during this evaluation, the evaluation of Mackenzie Current  is of low complexity.     GOALS:   PATIENT GOAL: To find out if needs a shunt   LONG-TERM GOALS:  to be met in 1 visit   X No goals set due to patient seen for eval [place] [id number]: 7/16/2020

## 2020-08-03 ENCOUNTER — HOSPITAL ENCOUNTER (OUTPATIENT)
Dept: GENERAL RADIOLOGY | Age: 70
Discharge: HOME | End: 2020-08-03
Payer: MEDICARE

## 2020-08-03 ENCOUNTER — TELEPHONE (OUTPATIENT)
Dept: FAMILY MEDICINE CLINIC | Age: 70
End: 2020-08-03

## 2020-08-03 ENCOUNTER — HOSPITAL ENCOUNTER (OUTPATIENT)
Age: 70
Discharge: HOME | End: 2020-08-03
Payer: MEDICARE

## 2020-08-03 PROCEDURE — 72072 X-RAY EXAM THORAC SPINE 3VWS: CPT

## 2020-08-03 PROCEDURE — 71100 X-RAY EXAM RIBS UNI 2 VIEWS: CPT

## 2020-08-03 NOTE — TELEPHONE ENCOUNTER
Pt on  stating she injured her back positive for bruising. Pt requesting a xray order to be done at [place]. LVM for pt to call the office to obtain further detail on her back injury.

## 2020-08-04 ENCOUNTER — TELEPHONE (OUTPATIENT)
Dept: FAMILY MEDICINE CLINIC | Age: 70
End: 2020-08-04

## 2020-08-04 RX ORDER — BACLOFEN 10 MG/1
10-20 TABLET ORAL 3 TIMES DAILY
Qty: 15 TABLET | Refills: 0 | Status: SHIPPED | OUTPATIENT
Start: 2020-08-04 | End: 2020-10-21

## 2020-08-04 NOTE — TELEPHONE ENCOUNTER
----- Message from SARITHA Quinones CNP sent at 8/4/2020  7:41 AM EDT -----  Notify XR NEG for rib fracture. Back XR showed arthritis change but nothing acute. Rest, heating pad for pain from fall.

## 2020-08-04 NOTE — TELEPHONE ENCOUNTER
Patient calling and requesting medication for muscle spasms in her back.   Will check with [facility] after 4pm   Please advise

## 2020-08-12 ENCOUNTER — OFFICE VISIT (OUTPATIENT)
Dept: NEUROSURGERY | Age: 70
End: 2020-08-12
Payer: MEDICARE

## 2020-08-12 VITALS
SYSTOLIC BLOOD PRESSURE: 138 MMHG | DIASTOLIC BLOOD PRESSURE: 84 MMHG | HEIGHT: 63 IN | WEIGHT: 130 LBS | BODY MASS INDEX: 23.04 KG/M2 | TEMPERATURE: 98 F

## 2020-08-12 PROCEDURE — 1036F TOBACCO NON-USER: CPT | Performed by: NEUROLOGICAL SURGERY

## 2020-08-12 PROCEDURE — 1090F PRES/ABSN URINE INCON ASSESS: CPT | Performed by: NEUROLOGICAL SURGERY

## 2020-08-12 PROCEDURE — G8427 DOCREV CUR MEDS BY ELIG CLIN: HCPCS | Performed by: NEUROLOGICAL SURGERY

## 2020-08-12 PROCEDURE — G8399 PT W/DXA RESULTS DOCUMENT: HCPCS | Performed by: NEUROLOGICAL SURGERY

## 2020-08-12 PROCEDURE — G8420 CALC BMI NORM PARAMETERS: HCPCS | Performed by: NEUROLOGICAL SURGERY

## 2020-08-12 PROCEDURE — 3017F COLORECTAL CA SCREEN DOC REV: CPT | Performed by: NEUROLOGICAL SURGERY

## 2020-08-12 PROCEDURE — 99214 OFFICE O/P EST MOD 30 MIN: CPT | Performed by: NEUROLOGICAL SURGERY

## 2020-08-12 PROCEDURE — 1123F ACP DISCUSS/DSCN MKR DOCD: CPT | Performed by: NEUROLOGICAL SURGERY

## 2020-08-12 PROCEDURE — 4040F PNEUMOC VAC/ADMIN/RCVD: CPT | Performed by: NEUROLOGICAL SURGERY

## 2020-08-12 NOTE — PROGRESS NOTES
Arina Graf presented  today for a follow-up visit.     This is a 80-year-old female who I saw initially in 1/8/2020 as a new referral after she underwent a brain CT that showed findings suggestive of communicating hydrocephalus. Patient had past medical history significant for cerebral aneurysm clipping in 2015. In today visit, patient denied any headache any significant dizziness. He denied any change in her balance or any new weakness or numbness. She denied any changes in her vision. She denied any seizures. She denied any decline in her cognitive function. However patient stated that she has some issues in controlling her urination from time to time.      During patient's last visit (on 6/10/2020), patient stated that there is no significant changes in her symptoms since the last time I saw her. However with further questioning the patient she admits that she has some memory issues despite denied that in her previous visit.  -Patient also stated that she has an issue  controlling her urination that is going for a while. Also patient stated that she has a balance issue.        Patient's most recent brain CT:     There is again noted to be a coil pack at the anterior communicating artery with streak artifact, stable compared to prior exam. There is mild to moderate ventriculomegaly which is mildly disproportionate to peripheral volume loss, unchanged compared to    prior exam dating back to 2015. There are mild patchy areas of hypodensity in the periventricular white matter, also similar to prior exam. Gray-white matter differentiation otherwise appears grossly preserved. No intracranial hemorrhage, mass effect or    midline shift is identified. The calvarium appears intact. Orbits are unremarkable.  Paranasal sinuses and mastoid air cells are clear.      -Patient ophthalmology evaluation showed no papillary edema.  -As I suspected that patient that may have normal pressure hydrocephalus I sent her for PT OT and speech evaluation according to our normal pressure hydrocephalus protocol. In today evaluation:    -Patient stated that there is no significant changes in her last visit to us.    -Also today neurological exam showed no significant changes in patient neurological exam comparing with her previous visit. - Patient underwent PT OT and speech evaluation (cording to our NPH protocol ) that showed:    he patient presents with mild cognitive-linguistic deficits as indicated by results of MOCA and Cog-Log administered this date. Deficits noted in the areas of immediate/working/delayed recall, executive functioning, attention, naming, and sentence repetition. Decreased word retrieval was evident both in structured and unstructured verbal tasks, although patient was often able to self-correct errors within conversation. Patient with minimal balance issues that could be remnants from stroke 5 years ago but not sure the exact cause. Patient not having issues with falls and is just slightly unsteady . Tinetti Balance Test: 24/28    Assessment and plan:    -Despite patient overall clinical picture, past medical history findings, neurological exam elevation the findings of her brain imaging studies are suggestive of normal pressure hydrocephalus as a possible underlying pathological process. However, this possible pathological process seems to not interfere syncope significantly with her overall all functionality in her daily life. Beside that I have noticed that patient still did not appreciate the issues that she has especially in her balance and memory. For that reason, and as patient's overall symptoms are still not that significant, I would recommend for patient to continue observation clinically and radiologically at this time.   In this regard, quest for the patient a new brain CT after 6 to 1-year with new PT/ OT and speech evaluation according to our normal pressure hydrocephalus (as long as there is no deterioration patient never current status). -After the above are completed I will see the patient again for reevaluation and to update her recommendation treatment plan. -Today, I discussed with patient in detail again her current overall neurological status and the finding of her brain CT, as well as the findings of her PT/OT and speech evaluation. I disscused with patient my recommendation and treatment plan for her at this time.  -I encouraged the patient to contact my office immediately if she develop any deterioration in her neurological status (especially in her balance cognitive). -All questions and concerns were addressed and answered.  -Patient was in was in agreement with the above recommendation treatment plan.       *Time spent with the patient was 25 minutes. More than 50% was spent counseling/coordinating the patient's care.

## 2020-08-18 ENCOUNTER — TELEPHONE (OUTPATIENT)
Dept: FAMILY MEDICINE CLINIC | Age: 70
End: 2020-08-18

## 2020-08-18 NOTE — TELEPHONE ENCOUNTER
Pt called office stating she has an appt with Nav Taveras at [address] [facility] next week for incontinence. She saw her previously for this, but it's time for a new order. Fax to them at [phone]. If no call back, pt will know this has been faxed today. Please advise.

## 2020-08-26 ENCOUNTER — OFFICE VISIT (OUTPATIENT)
Dept: UROLOGY | Age: 70
End: 2020-08-26
Payer: MEDICARE

## 2020-08-26 VITALS — TEMPERATURE: 97.1 F | HEIGHT: 63 IN | WEIGHT: 131 LBS | BODY MASS INDEX: 23.21 KG/M2

## 2020-08-26 LAB
BILIRUBIN URINE: NEGATIVE
BLOOD URINE, POC: NEGATIVE
CHARACTER, URINE: CLEAR
COLOR, URINE: YELLOW
GLUCOSE URINE: NEGATIVE MG/DL
KETONES, URINE: NEGATIVE
LEUKOCYTE CLUMPS, URINE: ABNORMAL
NITRITE, URINE: NEGATIVE
PH, URINE: 7 (ref 5–9)
POST VOID RESIDUAL (PVR): 0 ML
PROTEIN, URINE: NEGATIVE MG/DL
SPECIFIC GRAVITY, URINE: 1.02 (ref 1–1.03)
UROBILINOGEN, URINE: 0.2 EU/DL (ref 0–1)

## 2020-08-26 PROCEDURE — 51798 US URINE CAPACITY MEASURE: CPT | Performed by: NURSE PRACTITIONER

## 2020-08-26 PROCEDURE — G8420 CALC BMI NORM PARAMETERS: HCPCS | Performed by: NURSE PRACTITIONER

## 2020-08-26 PROCEDURE — 1036F TOBACCO NON-USER: CPT | Performed by: NURSE PRACTITIONER

## 2020-08-26 PROCEDURE — G8399 PT W/DXA RESULTS DOCUMENT: HCPCS | Performed by: NURSE PRACTITIONER

## 2020-08-26 PROCEDURE — 4040F PNEUMOC VAC/ADMIN/RCVD: CPT | Performed by: NURSE PRACTITIONER

## 2020-08-26 PROCEDURE — G8427 DOCREV CUR MEDS BY ELIG CLIN: HCPCS | Performed by: NURSE PRACTITIONER

## 2020-08-26 PROCEDURE — 99214 OFFICE O/P EST MOD 30 MIN: CPT | Performed by: NURSE PRACTITIONER

## 2020-08-26 PROCEDURE — 1123F ACP DISCUSS/DSCN MKR DOCD: CPT | Performed by: NURSE PRACTITIONER

## 2020-08-26 PROCEDURE — 3017F COLORECTAL CA SCREEN DOC REV: CPT | Performed by: NURSE PRACTITIONER

## 2020-08-26 PROCEDURE — 1090F PRES/ABSN URINE INCON ASSESS: CPT | Performed by: NURSE PRACTITIONER

## 2020-08-26 PROCEDURE — 81003 URINALYSIS AUTO W/O SCOPE: CPT | Performed by: NURSE PRACTITIONER

## 2020-08-26 RX ORDER — OXYBUTYNIN CHLORIDE 5 MG/1
5 TABLET, EXTENDED RELEASE ORAL DAILY
Qty: 30 TABLET | Refills: 3 | Status: SHIPPED | OUTPATIENT
Start: 2020-08-26 | End: 2020-12-17

## 2020-08-26 NOTE — PROGRESS NOTES
[address] [facility].  [address]  [facility] [address]  Dept: [phone]  Loc: [phone]  Visit Date: 8/26/2020      HPI:     Amie Hernandez is a 79 y.o. with past medical history as listed below who presents today in follow-up for OAB. Recently increased Myrbetriq to 50 mg daily as pt was reporting symptoms of urgency with incontinence and nocturia on the 25 mg. She still is bothered by symptoms of urgency. She wears pad when out of house and changes it 2-3 times a day. She has appt scheduled with pelvic floor therapy next week. She reports she sometimes has these symptoms with UTI. No dysuria or hematuria. Previous records:     Patient reports frequent UTI. Last documented UTI in October. She reports increase in urgency is usually her first symptom of  UTI. She was given Myrbetriq samples at last office visit, has not tried them yet. She is still on Oxybutynin. States urgency is a little better, nocturia 2 times a night.     She reports history of urgency, frequency, and urge incontinence for greater than 1 year. She reports being on Oxybutynin 15 mg XL for over a year with no real improvement in symptoms. She gets up 3 times a night. She tries to avoid fluids 2 hours before bedtime but its hard for her. She reports urgency during the day, does not wear pad but does need to change underwear at times. She reports leaking of urine with laughing, coughing, and sneezing. Patient concerned about needing bladder surgery to help with symptoms. She doesn't feel any vaginal pressure or feel that her bladder has dropped. She has no previous history of bladder or pelvic surgeries. She does have history of stroke after aneurysm in 2015      Debbiebrii Smith comes in today by herself. Hx is obtained from the patient and medical record.        Current Outpatient Medications   Medication Sig Dispense Refill    oxybutynin (DITROPAN XL) 5 MG extended release tablet with no wheezes, rales, or rhonchi noted. Normal respiratory rate and rhthym. No use of accessory muscles. Abdominal:         Soft. No tenderness. Bowel sounds present. Musculoskeletal:         Normal range of motion. No edema or tenderness of lower extremities. Extremities: No cyanosis, clubbing, or edema present. Neurological:        Alert and oriented. Psychiatric:        Normal mood and affect. Labs   Urine dip demonstrates   Results for POC orders placed in visit on 08/26/20   POCT Urinalysis No Micro (Auto)   Result Value Ref Range    Glucose, Ur Negative NEGATIVE mg/dl    Bilirubin Urine Negative     Ketones, Urine Negative NEGATIVE    Specific Gravity, Urine 1.025 1.002 - 1.03    Blood, UA POC Negative NEGATIVE    pH, Urine 7.00 5.0 - 9.0    Protein, Urine Negative NEGATIVE mg/dl    Urobilinogen, Urine 0.20 0.0 - 1.0 eu/dl    Nitrite, Urine Negative NEGATIVE    Leukocyte Clumps, Urine Trace (A) NEGATIVE    Color, Urine Yellow YELLOW-STR    Character, Urine Clear [id number].[id number]   poct post void residual   Result Value Ref Range    post void residual 0 ml        Recent BUN/Creatinine:  Lab Results   Component Value Date    BUN 11 12/11/2019    CREATININE 0.7 12/11/2019          Assessment & Plan:     OAB  Urge incontinence    Pt was given Myrbetriq 50 mg samples, she reports minimal improvement in symptoms of urgency with incontinence. We discussed advanced therapies and importance of keeping diary to monitor progress. Add oxybutynin 5 mg daily    FU with pelvic floor therapy next week. Samples of Myrbetriq given. Send urine for culture. FU in 2 months.        SARITHA Walker  Urology

## 2020-08-28 LAB
ORGANISM: ABNORMAL
URINE CULTURE, ROUTINE: ABNORMAL
URINE CULTURE, ROUTINE: ABNORMAL

## 2020-08-30 ENCOUNTER — TELEPHONE (OUTPATIENT)
Dept: UROLOGY | Age: 70
End: 2020-08-30

## 2020-08-31 RX ORDER — DOXYCYCLINE HYCLATE 100 MG/1
100 CAPSULE ORAL 2 TIMES DAILY
Qty: 10 CAPSULE | Refills: 0 | Status: SHIPPED | OUTPATIENT
Start: 2020-08-31 | End: 2020-09-05

## 2020-08-31 NOTE — TELEPHONE ENCOUNTER
Patient advised of the urine results. The urgency and frequency has not changed. She denies burning, fever or chills. She still has some incontinence that is unchanged. Please advise. Thank you.

## 2020-08-31 NOTE — TELEPHONE ENCOUNTER
The patient is concerned with having MRSA. She does not want to give it to her fiance. Can she be retested at a later date to be cleared? I can leave a voicemail. Please advise. Thank you.

## 2020-09-07 ENCOUNTER — NURSE ONLY (OUTPATIENT)
Dept: LAB | Age: 70
End: 2020-09-07

## 2020-09-10 ENCOUNTER — TELEPHONE (OUTPATIENT)
Dept: UROLOGY | Age: 70
End: 2020-09-10

## 2020-09-10 LAB
ORGANISM: ABNORMAL
URINE CULTURE, ROUTINE: ABNORMAL

## 2020-09-10 NOTE — TELEPHONE ENCOUNTER
Attempted to call the patient. After checking HIPPA, voicemail left stating the urine culture was negative and did not show infection.

## 2020-09-15 ENCOUNTER — TELEPHONE (OUTPATIENT)
Dept: UROLOGY | Age: 70
End: 2020-09-15

## 2020-09-15 NOTE — TELEPHONE ENCOUNTER
Patient cancelled the appointment with you tomorrow. She would like to speak to you regarding the MRSA since she can't come to the appointment. Please call her at [phone]. Thank you.

## 2020-09-28 ENCOUNTER — TELEPHONE (OUTPATIENT)
Dept: FAMILY MEDICINE CLINIC | Age: 70
End: 2020-09-28

## 2020-09-28 RX ORDER — HYDROXYZINE HYDROCHLORIDE 25 MG/1
12.5-25 TABLET, FILM COATED ORAL EVERY 8 HOURS PRN
Qty: 30 TABLET | Refills: 1 | Status: SHIPPED | OUTPATIENT
Start: 2020-09-28 | End: 2020-10-21 | Stop reason: SDUPTHER

## 2020-10-21 ENCOUNTER — OFFICE VISIT (OUTPATIENT)
Dept: FAMILY MEDICINE CLINIC | Age: 70
End: 2020-10-21
Payer: MEDICARE

## 2020-10-21 VITALS
HEART RATE: 101 BPM | OXYGEN SATURATION: 98 % | WEIGHT: 130.5 LBS | DIASTOLIC BLOOD PRESSURE: 62 MMHG | BODY MASS INDEX: 23.12 KG/M2 | TEMPERATURE: 96.7 F | SYSTOLIC BLOOD PRESSURE: 112 MMHG | RESPIRATION RATE: 16 BRPM

## 2020-10-21 PROCEDURE — 1090F PRES/ABSN URINE INCON ASSESS: CPT | Performed by: NURSE PRACTITIONER

## 2020-10-21 PROCEDURE — G8427 DOCREV CUR MEDS BY ELIG CLIN: HCPCS | Performed by: NURSE PRACTITIONER

## 2020-10-21 PROCEDURE — G8482 FLU IMMUNIZE ORDER/ADMIN: HCPCS | Performed by: NURSE PRACTITIONER

## 2020-10-21 PROCEDURE — G8399 PT W/DXA RESULTS DOCUMENT: HCPCS | Performed by: NURSE PRACTITIONER

## 2020-10-21 PROCEDURE — 1036F TOBACCO NON-USER: CPT | Performed by: NURSE PRACTITIONER

## 2020-10-21 PROCEDURE — 3017F COLORECTAL CA SCREEN DOC REV: CPT | Performed by: NURSE PRACTITIONER

## 2020-10-21 PROCEDURE — G8926 SPIRO NO PERF OR DOC: HCPCS | Performed by: NURSE PRACTITIONER

## 2020-10-21 PROCEDURE — 99214 OFFICE O/P EST MOD 30 MIN: CPT | Performed by: NURSE PRACTITIONER

## 2020-10-21 PROCEDURE — 1123F ACP DISCUSS/DSCN MKR DOCD: CPT | Performed by: NURSE PRACTITIONER

## 2020-10-21 PROCEDURE — G8420 CALC BMI NORM PARAMETERS: HCPCS | Performed by: NURSE PRACTITIONER

## 2020-10-21 PROCEDURE — 4040F PNEUMOC VAC/ADMIN/RCVD: CPT | Performed by: NURSE PRACTITIONER

## 2020-10-21 PROCEDURE — 3023F SPIROM DOC REV: CPT | Performed by: NURSE PRACTITIONER

## 2020-10-21 RX ORDER — ACAMPROSATE CALCIUM 333 MG/1
666 TABLET, DELAYED RELEASE ORAL NIGHTLY
Qty: 60 TABLET | Refills: 2 | Status: SHIPPED | OUTPATIENT
Start: 2020-10-21 | End: 2021-02-08

## 2020-10-21 RX ORDER — HYDROXYZINE HYDROCHLORIDE 25 MG/1
12.5-25 TABLET, FILM COATED ORAL EVERY 8 HOURS PRN
Qty: 60 TABLET | Refills: 1 | Status: SHIPPED | OUTPATIENT
Start: 2020-10-21 | End: 2020-11-16 | Stop reason: SDUPTHER

## 2020-10-21 ASSESSMENT — ENCOUNTER SYMPTOMS
COUGH: 0
SHORTNESS OF BREATH: 0
ABDOMINAL PAIN: 0
NAUSEA: 0

## 2020-10-21 NOTE — PROGRESS NOTES
Subjective:      Patient ID: Elia Goodwin is a 79 y.o. female. HPI  : 1 month     Chief Complaint   Patient presents with    Discuss Medications       Patient Active Problem List   Diagnosis    Hyperlipemia    Hypothyroidism    Depression    Chronic anxiety    Osteopenia    Medication monitoring encounter    H/O: CVA (cerebrovascular accident), aneurysm bleed, Jan 2015    Abnormality of gait following cerebrovascular accident    GERD (gastroesophageal reflux disease)    Cognitive impairment due to CVA.  OAB (overactive bladder)    Chronic nausea    SOB (shortness of breath)    Hypoxia    Major depressive disorder, single episode, in full remission ([facility] [place] [address]    Smoking greater than 40 pack years    Pulmonary emphysema (HCC)    Syncope and collapse    Concussion with loss of consciousness    Laceration of head    Alcohol abuse    Spinal stenosis, cervical region     MAMMO - 2019  COLONOSCOPY - 2009  DEXA - 2020 Osteoporosis  [facility] - Dr. Karel Venegas - Dr. Mercedes Moctezuma    She is on Cymbalta 60 mg and Straterra 40 mg. Mood stable. Seroquel 50 mg XR HS for sleep - sleeping 10+ hours. She is following with counselor via [email or web address]. She is drinking a glass of wine. Alcoholic. Patient is status post CVA but doing well subsequently. Follows with [facility]. Her speech is essentially back to normal also. She is on Strattera and Aricept due to cognitive changes s/p CVA. Seen Dr. Bailey Bueno regarding potential hydrocephalus. MRA June 2020 and CT Head June 2020 were unremarkable    Denies CP, SOB or chest tightness. ECHO 2019 EF 60% with LV function normal.  PFT 2019 showed moderate emphysema. 40 pack year hx. Has not been compliant with her inhalers. Her hypothyroidism continues to respond well to her current thyroid dose. She is on 50 µg daily.     Lab Results   Component Value Date    TSH 2.340 06/16/2020     BP wnl    BP Readings from Last 3 Encounters:   10/21/20 112/62   08/12/20 138/84   06/25/20 137/78     Labs reviewed.       Lab Results   Component Value Date    LABA1C 5.1 06/16/2020    LABA1C 5.3 04/13/2019    LABA1C 5.3 01/30/2016     No results found for: EAG    No components found for: CHLPL  Lab Results   Component Value Date    TRIG 110 06/16/2020    TRIG 81 04/13/2019    TRIG 80 08/25/2017     Lab Results   Component Value Date     (A) 06/16/2020     (A) 04/13/2019     (A) 08/25/2017     Lab Results   Component Value Date    LDLCALC 85 06/16/2020    LDLCALC 98 04/13/2019    LDLCALC 94 08/25/2017     No results found for: LABVLDL      Chemistry        Component Value Date/Time     12/11/2019 2107    K 3.9 12/11/2019 2107    K 3.9 12/01/2019 2000     12/11/2019 2107    CO2 21 (L) 12/11/2019 2107    BUN 11 12/11/2019 2107    CREATININE 0.7 12/11/2019 2107        Component Value Date/Time    CALCIUM 9.6 12/11/2019 2107    ALKPHOS 55 12/11/2019 2107    AST 37 12/11/2019 2107    ALT 21 12/11/2019 2107    BILITOT 0.3 12/11/2019 2107            Lab Results   Component Value Date    TSH 2.340 06/16/2020       Lab Results   Component Value Date    WBC 9.6 12/11/2019    HGB 14.6 12/11/2019    HCT 46.8 12/11/2019    MCV 94.7 12/11/2019     12/11/2019         Health Maintenance   Topic Date Due    DTaP/Tdap/Td vaccine (1 - Tdap) 06/13/1969    Shingles Vaccine (2 of 2) 12/08/2020    Low dose CT lung screening  01/17/2021    Annual Wellness Visit (AWV)  06/09/2021    Lipid screen  06/16/2021    TSH testing  06/16/2021    Breast cancer screen  06/18/2022    Colon cancer screen colonoscopy  08/29/2029    Flu vaccine  Completed    Pneumococcal 65+ years Vaccine  Completed    Hepatitis C screen  Completed    DEXA (modify frequency per FRAX score)  Addressed    Hepatitis A vaccine  Aged Out    Hepatitis B vaccine  Aged Out    Hib vaccine  Aged Out    Meningococcal (ACWY) tenderness. Skin:     General: Skin is warm and dry. Findings: No rash. Neurological:      Mental Status: She is alert and oriented to person, place, and time. Psychiatric:         Mood and Affect: Mood is anxious and depressed. Assessment:       Diagnosis Orders   1. Alcohol abuse  acamprosate (CAMPRAL) 333 MG tablet   2. Major depressive disorder, single episode, in full remission (HCC)  hydrOXYzine (ATARAX) 25 MG tablet   3. Cognitive impairment due to CVA. 4. Pulmonary emphysema, unspecified emphysema type (HCC)  tiotropium (SPIRIVA RESPIMAT) 2.5 MCG/ACT AERS inhaler   5. Smoking greater than 40 pack years     6. Mixed hyperlipidemia     7.  Acquired hypothyroidism     8. OAB (overactive bladder)             Plan:      Chronic conditions stable   Medications reviewed  Follow up with Specialists  Refill as above   - d/c Seroqeul due to drowsiness   - Atarax 25 mg HS for sleep  Campral 2 tabs every evening   - wine drinking routinely in evening  Healthy Lifestyles discussed  Preventative TUD  RTO in 2 weeks          SARITHA Mayorga - CNP

## 2020-10-23 ENCOUNTER — OFFICE VISIT (OUTPATIENT)
Dept: PHYSICAL MEDICINE AND REHAB | Age: 70
End: 2020-10-23
Payer: MEDICARE

## 2020-10-23 VITALS
OXYGEN SATURATION: 95 % | DIASTOLIC BLOOD PRESSURE: 58 MMHG | BODY MASS INDEX: 23.64 KG/M2 | TEMPERATURE: 97.9 F | SYSTOLIC BLOOD PRESSURE: 108 MMHG | HEART RATE: 93 BPM | HEIGHT: 63 IN | WEIGHT: 133.4 LBS

## 2020-10-23 PROCEDURE — 3017F COLORECTAL CA SCREEN DOC REV: CPT | Performed by: NURSE PRACTITIONER

## 2020-10-23 PROCEDURE — 1123F ACP DISCUSS/DSCN MKR DOCD: CPT | Performed by: NURSE PRACTITIONER

## 2020-10-23 PROCEDURE — 99213 OFFICE O/P EST LOW 20 MIN: CPT | Performed by: NURSE PRACTITIONER

## 2020-10-23 PROCEDURE — G8482 FLU IMMUNIZE ORDER/ADMIN: HCPCS | Performed by: NURSE PRACTITIONER

## 2020-10-23 PROCEDURE — G8420 CALC BMI NORM PARAMETERS: HCPCS | Performed by: NURSE PRACTITIONER

## 2020-10-23 PROCEDURE — 1090F PRES/ABSN URINE INCON ASSESS: CPT | Performed by: NURSE PRACTITIONER

## 2020-10-23 PROCEDURE — 4040F PNEUMOC VAC/ADMIN/RCVD: CPT | Performed by: NURSE PRACTITIONER

## 2020-10-23 PROCEDURE — G8399 PT W/DXA RESULTS DOCUMENT: HCPCS | Performed by: NURSE PRACTITIONER

## 2020-10-23 PROCEDURE — G8427 DOCREV CUR MEDS BY ELIG CLIN: HCPCS | Performed by: NURSE PRACTITIONER

## 2020-10-23 PROCEDURE — 1036F TOBACCO NON-USER: CPT | Performed by: NURSE PRACTITIONER

## 2020-10-23 RX ORDER — QUETIAPINE FUMARATE 50 MG/1
50 TABLET, EXTENDED RELEASE ORAL NIGHTLY
Qty: 30 TABLET | Refills: 0 | OUTPATIENT
Start: 2020-10-23

## 2020-10-23 RX ORDER — ATOMOXETINE 40 MG/1
40 CAPSULE ORAL DAILY
Qty: 90 CAPSULE | Refills: 1 | Status: SHIPPED | OUTPATIENT
Start: 2020-10-23 | End: 2021-07-23 | Stop reason: SDUPTHER

## 2020-10-28 ENCOUNTER — OFFICE VISIT (OUTPATIENT)
Dept: UROLOGY | Age: 70
End: 2020-10-28
Payer: MEDICARE

## 2020-10-28 VITALS — TEMPERATURE: 97 F | BODY MASS INDEX: 24.27 KG/M2 | WEIGHT: 137 LBS | HEIGHT: 63 IN

## 2020-10-28 LAB
BILIRUBIN URINE: NEGATIVE
BLOOD URINE, POC: NEGATIVE
CHARACTER, URINE: CLEAR
COLOR, URINE: YELLOW
GLUCOSE URINE: NEGATIVE MG/DL
KETONES, URINE: NEGATIVE
LEUKOCYTE CLUMPS, URINE: NEGATIVE
NITRITE, URINE: NEGATIVE
PH, URINE: 6 (ref 5–9)
POST VOID RESIDUAL (PVR): 66 ML
PROTEIN, URINE: NEGATIVE MG/DL
SPECIFIC GRAVITY, URINE: >= 1.03 (ref 1–1.03)
UROBILINOGEN, URINE: 0.2 EU/DL (ref 0–1)

## 2020-10-28 PROCEDURE — G8427 DOCREV CUR MEDS BY ELIG CLIN: HCPCS | Performed by: NURSE PRACTITIONER

## 2020-10-28 PROCEDURE — 81003 URINALYSIS AUTO W/O SCOPE: CPT | Performed by: NURSE PRACTITIONER

## 2020-10-28 PROCEDURE — G8482 FLU IMMUNIZE ORDER/ADMIN: HCPCS | Performed by: NURSE PRACTITIONER

## 2020-10-28 PROCEDURE — G8399 PT W/DXA RESULTS DOCUMENT: HCPCS | Performed by: NURSE PRACTITIONER

## 2020-10-28 PROCEDURE — 1090F PRES/ABSN URINE INCON ASSESS: CPT | Performed by: NURSE PRACTITIONER

## 2020-10-28 PROCEDURE — 1123F ACP DISCUSS/DSCN MKR DOCD: CPT | Performed by: NURSE PRACTITIONER

## 2020-10-28 PROCEDURE — 99213 OFFICE O/P EST LOW 20 MIN: CPT | Performed by: NURSE PRACTITIONER

## 2020-10-28 PROCEDURE — 3017F COLORECTAL CA SCREEN DOC REV: CPT | Performed by: NURSE PRACTITIONER

## 2020-10-28 PROCEDURE — 4040F PNEUMOC VAC/ADMIN/RCVD: CPT | Performed by: NURSE PRACTITIONER

## 2020-10-28 PROCEDURE — G8420 CALC BMI NORM PARAMETERS: HCPCS | Performed by: NURSE PRACTITIONER

## 2020-10-28 PROCEDURE — 1036F TOBACCO NON-USER: CPT | Performed by: NURSE PRACTITIONER

## 2020-10-28 PROCEDURE — 51798 US URINE CAPACITY MEASURE: CPT | Performed by: NURSE PRACTITIONER

## 2020-10-28 NOTE — PROGRESS NOTES
[address] [place] [address],[address]  [address] [place], [place] [id number]  Dept: [phone]  Loc: [phone]  Visit Date: 10/28/2020      HPI:     Juan Severino is a 79 y.o. with past medical history as listed below who presents today in follow-up for OAB. Recently increased Myrbetriq to 50 mg daily as pt was reporting symptoms of urgency with incontinence and nocturia on the 25 mg. She reports symptoms are improved. She denies nocturia, dysuria, or hematuria. She reports urgency is improved. She is pleased with results. She is still taking Oxybutynin 5 mg XL, and wondering if she can stop this medication. She is seeing pelvic floor therapy but has trouble remembering to do exercises at home. She thinks it is helping. Urine today negative, and PVR acceptable at 66. Previous records:     Patient reports frequent UTI. Last documented UTI in October. She reports increase in urgency is usually her first symptom of  UTI. She was given Myrbetriq samples at last office visit, has not tried them yet. She is still on Oxybutynin. States urgency is a little better, nocturia 2 times a night.     She reports history of urgency, frequency, and urge incontinence for greater than 1 year. She reports being on Oxybutynin 15 mg XL for over a year with no real improvement in symptoms. She gets up 3 times a night. She tries to avoid fluids 2 hours before bedtime but its hard for her. She reports urgency during the day, does not wear pad but does need to change underwear at times. She reports leaking of urine with laughing, coughing, and sneezing. Patient concerned about needing bladder surgery to help with symptoms. She doesn't feel any vaginal pressure or feel that her bladder has dropped. She has no previous history of bladder or pelvic surgeries. She does have history of stroke after aneurysm in 2015      Venecia Painting comes in today by herself.  Hx is obtained from the patient and medical record. Current Outpatient Medications   Medication Sig Dispense Refill    mirabegron (MYRBETRIQ) 50 MG TB24 Take 50 mg by mouth daily 30 tablet 11    atomoxetine (STRATTERA) 40 MG capsule Take 1 capsule by mouth daily 90 capsule 1    hydrOXYzine (ATARAX) 25 MG tablet Take 0.5-1 tablets by mouth every 8 hours as needed for Anxiety 60 tablet 1    tiotropium (SPIRIVA RESPIMAT) 2.5 MCG/ACT AERS inhaler Inhale 2 puffs into the lungs daily 4 g 5    acamprosate (CAMPRAL) 333 MG tablet Take 2 tablets by mouth nightly 60 tablet 2    DULoxetine (CYMBALTA) 60 MG extended release capsule Take 1 capsule by mouth once daily 90 capsule 3    oxybutynin (DITROPAN XL) 5 MG extended release tablet Take 1 tablet by mouth daily 30 tablet 3    levothyroxine (SYNTHROID) 50 MCG tablet Take 1 tablet by mouth once daily 90 tablet 2    simvastatin (ZOCOR) 10 MG tablet Take 1 tablet by mouth nightly 90 tablet 3    alendronate (FOSAMAX) 70 MG tablet Take 1 tablet by mouth every 7 days Take with water on an empty stomach- wait 30 minutes before eating or taking other meds. Avoid lying down for 30 minutes after dose. 12 tablet 3    donepezil (ARICEPT) 10 MG tablet TAKE 1 TABLET BY MOUTH IN THE EVENING 90 tablet 3    Multiple Vitamin (MULTI-VITAMIN PO) Take  by mouth.  Calcium Carbonate-Vitamin D (CALCIUM-VITAMIN D) 600-200 MG-UNIT CAPS Take 1 tablet by mouth 2 times daily       fish oil-omega-3 fatty acids 1000 MG capsule Take 1 g by mouth. One tablet daily along with red yeast rice       No current facility-administered medications for this visit. Past Medical History  Anup White  has a past medical history of Aneurysm, cerebral, COPD (chronic obstructive pulmonary disease) ([facility] [address] Hyperlipidemia, Osteoarthritis, Pneumonia, Thyroid disease, and Unspecified cerebral artery occlusion with cerebral infarction.     Past Surgical History  The patient  has a past surgical history that includes Colonoscopy (14196980); External ear surgery (2000); External ear surgery (1999); External ear surgery (1997); Cosmetic surgery (0912,0955); Cosmetic surgery (2013); Brain aneurysm surgery; and Breast surgery. Family History  This patient's family history includes Cancer in her mother; Diabetes in her father; Heart Disease in her maternal aunt; Parkinsonism in her maternal uncle; Stroke in her mother. Social History  Anup White  reports that she quit smoking about 5 years ago. Her smoking use included cigarettes. She has a 40.00 pack-year smoking history. She has never used smokeless tobacco. She reports current alcohol use of about 2.0 standard drinks of alcohol per week. She reports that she does not use drugs. Subjective:     Review of Systems  No problems with ears, nose or throat. No problems with eyes. No chest pain, shortness of breath, abdominal pain, extremity pain or weakness, and no neurological deficits. No rashes.  symptoms per HPI. The remainder of the review of symptoms is negative. Objective:     PE:   Vitals:    10/28/20 1443   Temp: 97 °F (36.1 °C)   Weight: 137 lb (62.1 kg)   Height: 5' 3\" (1.6 m)       Constitutional: Alert and oriented times 3, no acute distress and cooperative to examination with appropriate mood and affect. HENT:   Head:        Normocephalic and atraumatic. Mouth/Throat:         Mucous membranes are normal.   Eyes:         EOM are normal. No scleral icterus. PERRLA. Neck:        Supple, symmetrical, trachea midline  Cardiovascular:        Normal rate, regular rhythm, S1 S2 heart sounds. No murmurs, rub, or gallops. Pulmonary/Chest:     Normal respiratory rate and rhthym. No use of accessory muscles. Abdominal:         Soft. No tenderness. Bowel sounds present. Musculoskeletal:         Normal range of motion. No edema or tenderness of lower extremities. Extremities: No cyanosis, clubbing, or edema present. Neurological:        Alert and oriented.    Psychiatric: Normal mood and affect. Labs   Urine dip demonstrates   Results for POC orders placed in visit on 10/28/20   POCT Urinalysis No Micro (Auto)   Result Value Ref Range    Glucose, Ur Negative NEGATIVE mg/dl    Bilirubin Urine Negative     Ketones, Urine Negative NEGATIVE    Specific Gravity, Urine >= 1.030 1.002 - 1.030    Blood, UA POC Negative NEGATIVE    pH, Urine 6.00 5.0 - 9.0    Protein, Urine Negative NEGATIVE mg/dl    Urobilinogen, Urine 0.20 0.0 - 1.0 eu/dl    Nitrite, Urine Negative NEGATIVE    Leukocyte Clumps, Urine Negative NEGATIVE    Color, Urine Yellow [id number]-STRAW    Character, Urine Clear [id number]   poct post void residual   Result Value Ref Range    post void residual 66 ml        Recent BUN/Creatinine:  Lab Results   Component Value Date    BUN 11 12/11/2019    CREATININE 0.7 12/11/2019          Assessment & Plan:     OAB  Urge incontinence    Pt was given Myrbetriq 50 mg samples, she would like to continue on this medication, script sent as well. We discussed weaning off of Oxybutynin. Continue pelvic floor therapy  Start colace    Urine negative today    FU in 6 months call sooner with any questions or concerns.       SARITHA Dyer  Urology

## 2020-11-03 ENCOUNTER — OFFICE VISIT (OUTPATIENT)
Dept: FAMILY MEDICINE CLINIC | Age: 70
End: 2020-11-03
Payer: MEDICARE

## 2020-11-03 VITALS
DIASTOLIC BLOOD PRESSURE: 68 MMHG | HEART RATE: 84 BPM | RESPIRATION RATE: 18 BRPM | WEIGHT: 131.8 LBS | BODY MASS INDEX: 23.35 KG/M2 | TEMPERATURE: 97.3 F | SYSTOLIC BLOOD PRESSURE: 118 MMHG

## 2020-11-03 PROCEDURE — G8420 CALC BMI NORM PARAMETERS: HCPCS | Performed by: NURSE PRACTITIONER

## 2020-11-03 PROCEDURE — 4040F PNEUMOC VAC/ADMIN/RCVD: CPT | Performed by: NURSE PRACTITIONER

## 2020-11-03 PROCEDURE — 3023F SPIROM DOC REV: CPT | Performed by: NURSE PRACTITIONER

## 2020-11-03 PROCEDURE — 1123F ACP DISCUSS/DSCN MKR DOCD: CPT | Performed by: NURSE PRACTITIONER

## 2020-11-03 PROCEDURE — 1036F TOBACCO NON-USER: CPT | Performed by: NURSE PRACTITIONER

## 2020-11-03 PROCEDURE — 99214 OFFICE O/P EST MOD 30 MIN: CPT | Performed by: NURSE PRACTITIONER

## 2020-11-03 PROCEDURE — G8427 DOCREV CUR MEDS BY ELIG CLIN: HCPCS | Performed by: NURSE PRACTITIONER

## 2020-11-03 PROCEDURE — G8399 PT W/DXA RESULTS DOCUMENT: HCPCS | Performed by: NURSE PRACTITIONER

## 2020-11-03 PROCEDURE — 3017F COLORECTAL CA SCREEN DOC REV: CPT | Performed by: NURSE PRACTITIONER

## 2020-11-03 PROCEDURE — 1090F PRES/ABSN URINE INCON ASSESS: CPT | Performed by: NURSE PRACTITIONER

## 2020-11-03 PROCEDURE — G8482 FLU IMMUNIZE ORDER/ADMIN: HCPCS | Performed by: NURSE PRACTITIONER

## 2020-11-03 PROCEDURE — G8926 SPIRO NO PERF OR DOC: HCPCS | Performed by: NURSE PRACTITIONER

## 2020-11-03 ASSESSMENT — ENCOUNTER SYMPTOMS
COUGH: 0
SHORTNESS OF BREATH: 0
ABDOMINAL PAIN: 0
NAUSEA: 0

## 2020-11-03 NOTE — PROGRESS NOTES
Subjective:      Patient ID: Dragan Man is a 79 y.o. female. HPI  : 2 week Follow Up    Chief Complaint   Patient presents with    Follow-up       Patient Active Problem List   Diagnosis    Hyperlipemia    Hypothyroidism    Depression    Chronic anxiety    Osteopenia    Medication monitoring encounter    H/O: CVA (cerebrovascular accident), aneurysm bleed, Jan 2015    Abnormality of gait following cerebrovascular accident    GERD (gastroesophageal reflux disease)    Cognitive impairment due to CVA.  OAB (overactive bladder)    Chronic nausea    SOB (shortness of breath)    Hypoxia    Major depressive disorder, single episode, in full remission ([facility] [address]    Smoking greater than 40 pack years    Pulmonary emphysema (HCC)    Syncope and collapse    Concussion with loss of consciousness    Laceration of head    Alcohol abuse    Spinal stenosis, cervical region       MAMMO - 2019  COLONOSCOPY - 2009  DEXA - 2020 Osteoporosis  [facility] - Dr. Linnette Hinojosa - Dr. Gretta Painting    She is on Cymbalta 60 mg and Straterra 40 mg. Mood stable. Take off Seroquel due to next morning drowsinesss. Placed on hydroxyzine with benefit sleep and more energy during the day. Likes benefit of medication change.s     She is following with counselor via [employer] for alcohol abuse. She was drinking a glass of wine in evening. Was started on camparell 2 tabs in evening to decrease alcohol cravings. It is doing well. No wine since last visit. Feeling well. Patient is status post CVA but doing well subsequently. Follows with [facility]. Her speech is essentially back to normal also. She is on Strattera and Aricept due to cognitive changes s/p CVA. Seen Dr. An Chery regarding potential hydrocephalus. MRA June 2020 and CT Head June 2020 were unremarkable    Denies CP, SOB or chest tightness.  ECHO 2019 EF 60% with LV function normal.  PFT 2019 showed moderate emphysema. 40 pack year hx. Has not been compliant with her inhalers. Her hypothyroidism continues to respond well to her current thyroid dose. She is on 50 µg daily. Lab Results   Component Value Date    TSH 2.340 06/16/2020     BP wnl    BP Readings from Last 3 Encounters:   11/03/20 118/68   10/23/20 (!) 108/58   10/21/20 112/62     Labs reviewed.       Lab Results   Component Value Date    LABA1C 5.1 06/16/2020    LABA1C 5.3 04/13/2019    LABA1C 5.3 01/30/2016     No results found for: EAG    No components found for: CHLPL  Lab Results   Component Value Date    TRIG 110 06/16/2020    TRIG 81 04/13/2019    TRIG 80 08/25/2017     Lab Results   Component Value Date     (A) 06/16/2020     (A) 04/13/2019     (A) 08/25/2017     Lab Results   Component Value Date    LDLCALC 85 06/16/2020    LDLCALC 98 04/13/2019    LDLCALC 94 08/25/2017     No results found for: LABVLDL      Chemistry        Component Value Date/Time     12/11/2019 2107    K 3.9 12/11/2019 2107    K 3.9 12/01/2019 2000     12/11/2019 2107    CO2 21 (L) 12/11/2019 2107    BUN 11 12/11/2019 2107    CREATININE 0.7 12/11/2019 2107        Component Value Date/Time    CALCIUM 9.6 12/11/2019 2107    ALKPHOS 55 12/11/2019 2107    AST 37 12/11/2019 2107    ALT 21 12/11/2019 2107    BILITOT 0.3 12/11/2019 2107          Lab Results   Component Value Date    TSH 2.340 06/16/2020       Lab Results   Component Value Date    WBC 9.6 12/11/2019    HGB 14.6 12/11/2019    HCT 46.8 12/11/2019    MCV 94.7 12/11/2019     12/11/2019         Health Maintenance   Topic Date Due    DTaP/Tdap/Td vaccine (1 - Tdap) 06/13/1969    Shingles Vaccine (2 of 2) 12/08/2020    Low dose CT lung screening  01/17/2021    Annual Wellness Visit (AWV)  06/09/2021    Lipid screen  06/16/2021    TSH testing  06/16/2021    Breast cancer screen  06/18/2022    Colon cancer screen colonoscopy  08/29/2029    Flu vaccine Completed    Pneumococcal 65+ years Vaccine  Completed    Hepatitis C screen  Completed    DEXA (modify frequency per FRAX score)  Addressed    Hepatitis A vaccine  Aged Out    Hepatitis B vaccine  Aged Out    Hib vaccine  Aged Out    Meningococcal (ACWY) vaccine  Aged [employer] History   Administered Date(s) Administered    Influenza Vaccine, unspecified formulation 12/06/2017    Influenza Virus Vaccine 12/06/2017    Influenza, High Dose (Fluzone 65 yrs and older) 12/06/2017, 11/07/2018, 10/13/2020    Influenza, High-dose, Eyssenia Memory, 65 yrs +, IM (Fluzone) 10/13/2020    Influenza, Triv, inactivated, subunit, adjuvanted, IM (Fluad 65 yrs and older) 10/30/2019    Pneumococcal Conjugate 13-valent ([id number]) 07/26/2017    Pneumococcal Polysaccharide ([id number]) 11/13/2018    Zoster Recombinant (Shingrix) 10/13/2020         Review of Systems   Constitutional: Negative for chills and fever. HENT: Negative. Respiratory: Negative for cough and shortness of breath. Cardiovascular: Negative for chest pain. Gastrointestinal: Negative for abdominal pain and nausea. Genitourinary: Negative for frequency and urgency. Neurological: Negative for dizziness, light-headedness and headaches. Psychiatric/Behavioral: Positive for confusion. Negative for dysphoric mood. The patient is nervous/anxious. Objective:   Physical Exam  Constitutional:       General: She is not in acute distress. HENT:      Head: Normocephalic. Right Ear: Tympanic membrane normal.      Left Ear: Tympanic membrane normal.      Nose: Nose normal.   Eyes:      Pupils: Pupils are equal, round, and reactive to light. Neck:      Musculoskeletal: Normal range of motion and neck supple. Vascular: No carotid bruit. Cardiovascular:      Rate and Rhythm: Normal rate and regular rhythm. Pulses: Normal pulses. Heart sounds: Normal heart sounds. No murmur.    Pulmonary:      Effort: Pulmonary effort is normal.      Breath sounds: Normal breath sounds. No wheezing. Abdominal:      General: Bowel sounds are normal. There is no distension. Palpations: Abdomen is soft. Tenderness: There is no abdominal tenderness. Musculoskeletal: Normal range of motion. General: No tenderness. Skin:     General: Skin is warm and dry. Findings: No rash. Neurological:      Mental Status: She is alert and oriented to person, place, and time. Psychiatric:         Attention and Perception: She is inattentive. Mood and Affect: Mood is anxious and depressed. Behavior: Behavior is cooperative. Thought Content: Thought content normal.         Cognition and Memory: She exhibits impaired recent memory. Judgment: Judgment normal.         Assessment:       Diagnosis Orders   1. Major depressive disorder, single episode, in full remission ([facility] [address]     2. Alcohol abuse     3. Cognitive impairment due to CVA. 4. Pulmonary emphysema, unspecified emphysema type (HCC)     5. Smoking greater than 40 pack years     6. Mixed hyperlipidemia     7. Acquired hypothyroidism     8. OAB (overactive bladder)     9. IFG (impaired fasting glucose)     10.  Localized osteoporosis without current pathological fracture             Plan:      Chronic conditions stable   Medications reviewed  Follow up with Specialists  Refill as above  Continue Campral 2 tabs every evening for alcohol abuse  Healthy Lifestyles discussed  Preventative UTD  RTO 4 months          Melissa Jackson, APRN - CNP

## 2020-11-16 ENCOUNTER — TELEPHONE (OUTPATIENT)
Dept: UROLOGY | Age: 70
End: 2020-11-16

## 2020-11-16 ENCOUNTER — TELEPHONE (OUTPATIENT)
Dept: FAMILY MEDICINE CLINIC | Age: 70
End: 2020-11-16

## 2020-11-16 RX ORDER — HYDROXYZINE HYDROCHLORIDE 25 MG/1
25-50 TABLET, FILM COATED ORAL EVERY 8 HOURS PRN
Qty: 60 TABLET | Refills: 1 | Status: SHIPPED | OUTPATIENT
Start: 2020-11-16 | End: 2021-03-22

## 2020-11-16 NOTE — TELEPHONE ENCOUNTER
Pt on VM asking if she can have a mg increase on her Atarax from 25 mg to 40 mg. Pharmacy is [facility]. Pt would like a call back with advice.

## 2020-11-16 NOTE — TELEPHONE ENCOUNTER
I called and spoke with the patient and notified her that there are 4 boxes of Myrbetriq 50mg samples at our front office for her to  at her convenience. Patient voiced understanding.

## 2020-12-07 ENCOUNTER — TELEPHONE (OUTPATIENT)
Dept: FAMILY MEDICINE CLINIC | Age: 70
End: 2020-12-07

## 2020-12-07 NOTE — TELEPHONE ENCOUNTER
Pt called office requesting an order for a new handicap placard. If no call back, pt is aware this will be ready for  after 2pm today. Please advise.

## 2020-12-10 ENCOUNTER — TELEPHONE (OUTPATIENT)
Dept: UROLOGY | Age: 70
End: 2020-12-10

## 2020-12-17 RX ORDER — OXYBUTYNIN CHLORIDE 5 MG/1
TABLET, EXTENDED RELEASE ORAL
Qty: 30 TABLET | Refills: 0 | Status: SHIPPED | OUTPATIENT
Start: 2020-12-17 | End: 2021-01-25

## 2020-12-18 ENCOUNTER — TELEPHONE (OUTPATIENT)
Dept: UROLOGY | Age: 70
End: 2020-12-18

## 2020-12-18 ENCOUNTER — NURSE ONLY (OUTPATIENT)
Dept: LAB | Age: 70
End: 2020-12-18

## 2020-12-18 LAB
BACTERIA: ABNORMAL
BILIRUBIN URINE: NEGATIVE
BLOOD, URINE: NEGATIVE
CASTS: ABNORMAL /LPF
CASTS: ABNORMAL /LPF
CHARACTER, URINE: ABNORMAL
COLOR: YELLOW
CRYSTALS: ABNORMAL
EPITHELIAL CELLS, UA: ABNORMAL /HPF
GLUCOSE, URINE: NEGATIVE MG/DL
KETONES, URINE: NEGATIVE
LEUKOCYTE ESTERASE, URINE: ABNORMAL
MISCELLANEOUS LAB TEST RESULT: ABNORMAL
NITRITE, URINE: NEGATIVE
PH UA: 8.5 (ref 5–9)
PROTEIN UA: NEGATIVE MG/DL
RBC URINE: ABNORMAL /HPF
RENAL EPITHELIAL, UA: ABNORMAL
SPECIFIC GRAVITY UA: 1.02 (ref 1–1.03)
UROBILINOGEN, URINE: 0.2 EU/DL (ref 0–1)
WBC UA: ABNORMAL /HPF
YEAST: ABNORMAL

## 2020-12-18 RX ORDER — DOXYCYCLINE HYCLATE 100 MG/1
100 CAPSULE ORAL 2 TIMES DAILY
Qty: 10 CAPSULE | Refills: 0 | Status: SHIPPED | OUTPATIENT
Start: 2020-12-18 | End: 2020-12-21 | Stop reason: ALTCHOICE

## 2020-12-18 NOTE — TELEPHONE ENCOUNTER
Patient is requesting lab orders sent to [facility] so she can have a urine sample done for a possible UTI. She stated that she had talked to Linden Lacey about this previously.   Please advise  [phone]

## 2020-12-18 NOTE — TELEPHONE ENCOUNTER
Patient advised doxycycline was sent to the pharmacy to start after giving the urine specimen. She voiced understanding.

## 2020-12-18 NOTE — TELEPHONE ENCOUNTER
shes had staph in her urine before, will send doxycycline , have her send urine prior to starting, will watch for UA

## 2020-12-18 NOTE — TELEPHONE ENCOUNTER
Patient states she has increase urgency and frequency. She denies fever, chills, or burning. Order placed for a urine. Please advise. Thank you.

## 2020-12-20 LAB
ORGANISM: ABNORMAL
URINE CULTURE, ROUTINE: ABNORMAL

## 2020-12-21 RX ORDER — NITROFURANTOIN 25; 75 MG/1; MG/1
100 CAPSULE ORAL 2 TIMES DAILY
Qty: 10 CAPSULE | Refills: 0 | Status: ON HOLD | OUTPATIENT
Start: 2020-12-21 | End: 2020-12-23 | Stop reason: ALTCHOICE

## 2020-12-21 NOTE — TELEPHONE ENCOUNTER
Patient advised of the urine results. She voiced understanding to stop the doxycycline and start the [address].

## 2020-12-22 ENCOUNTER — HOSPITAL ENCOUNTER (INPATIENT)
Age: 70
LOS: 2 days | Discharge: HOME | DRG: 871 | End: 2020-12-25
Attending: EMERGENCY MEDICINE | Admitting: FAMILY MEDICINE
Payer: MEDICARE

## 2020-12-22 ENCOUNTER — APPOINTMENT (OUTPATIENT)
Dept: GENERAL RADIOLOGY | Age: 70
DRG: 871 | End: 2020-12-22
Payer: MEDICARE

## 2020-12-22 LAB
ABO: NORMAL
ALBUMIN SERPL-MCNC: 4.4 G/DL (ref 3.5–5.1)
ALP BLD-CCNC: 44 U/L (ref 38–126)
ALT SERPL-CCNC: 24 U/L (ref 11–66)
ANION GAP SERPL CALCULATED.3IONS-SCNC: 16 MEQ/L (ref 8–16)
ANTIBODY SCREEN: NORMAL
AST SERPL-CCNC: 43 U/L (ref 5–40)
BASOPHILS # BLD: 0.1 %
BASOPHILS # BLD: 0.2 %
BASOPHILS ABSOLUTE: 0 THOU/MM3 (ref 0–0.1)
BASOPHILS ABSOLUTE: 0 THOU/MM3 (ref 0–0.1)
BILIRUB SERPL-MCNC: 0.5 MG/DL (ref 0.3–1.2)
BUN BLDV-MCNC: 18 MG/DL (ref 7–22)
C-REACTIVE PROTEIN: 5.45 MG/DL (ref 0–1)
CALCIUM SERPL-MCNC: 9.9 MG/DL (ref 8.5–10.5)
CHLORIDE BLD-SCNC: 101 MEQ/L (ref 98–111)
CO2: 22 MEQ/L (ref 23–33)
CREAT SERPL-MCNC: 0.8 MG/DL (ref 0.4–1.2)
D-DIMER QUANTITATIVE: 919 NG/ML FEU (ref 0–500)
EKG ATRIAL RATE: 128 BPM
EKG P AXIS: 85 DEGREES
EKG P-R INTERVAL: 148 MS
EKG Q-T INTERVAL: 308 MS
EKG QRS DURATION: 76 MS
EKG QTC CALCULATION (BAZETT): 449 MS
EKG R AXIS: 48 DEGREES
EKG T AXIS: 85 DEGREES
EKG VENTRICULAR RATE: 128 BPM
EOSINOPHIL # BLD: 0.6 %
EOSINOPHIL # BLD: 0.7 %
EOSINOPHILS ABSOLUTE: 0.1 THOU/MM3 (ref 0–0.4)
EOSINOPHILS ABSOLUTE: 0.1 THOU/MM3 (ref 0–0.4)
ERYTHROCYTE [DISTWIDTH] IN BLOOD BY AUTOMATED COUNT: 12.1 % (ref 11.5–14.5)
ERYTHROCYTE [DISTWIDTH] IN BLOOD BY AUTOMATED COUNT: 12.3 % (ref 11.5–14.5)
ERYTHROCYTE [DISTWIDTH] IN BLOOD BY AUTOMATED COUNT: 40.7 FL (ref 35–45)
ERYTHROCYTE [DISTWIDTH] IN BLOOD BY AUTOMATED COUNT: 42.1 FL (ref 35–45)
GFR SERPL CREATININE-BSD FRML MDRD: 71 ML/MIN/1.73M2
GLUCOSE BLD-MCNC: 150 MG/DL (ref 70–108)
HCT VFR BLD CALC: 41.3 % (ref 37–47)
HCT VFR BLD CALC: 42.2 % (ref 37–47)
HEMOGLOBIN: 13.6 GM/DL (ref 12–16)
HEMOGLOBIN: 13.6 GM/DL (ref 12–16)
IMMATURE GRANS (ABS): 0.03 THOU/MM3 (ref 0–0.07)
IMMATURE GRANS (ABS): 0.05 THOU/MM3 (ref 0–0.07)
IMMATURE GRANULOCYTES: 0.3 %
IMMATURE GRANULOCYTES: 0.4 %
LACTIC ACID: 1.2 MMOL/L (ref 0.5–2.2)
LYMPHOCYTES # BLD: 3.1 %
LYMPHOCYTES # BLD: 3.3 %
LYMPHOCYTES ABSOLUTE: 0.3 THOU/MM3 (ref 1–4.8)
LYMPHOCYTES ABSOLUTE: 0.4 THOU/MM3 (ref 1–4.8)
MCH RBC QN AUTO: 30 PG (ref 26–33)
MCH RBC QN AUTO: 30.2 PG (ref 26–33)
MCHC RBC AUTO-ENTMCNC: 32.2 GM/DL (ref 32.2–35.5)
MCHC RBC AUTO-ENTMCNC: 32.9 GM/DL (ref 32.2–35.5)
MCV RBC AUTO: 91.6 FL (ref 81–99)
MCV RBC AUTO: 93.2 FL (ref 81–99)
MONOCYTES # BLD: 2.9 %
MONOCYTES # BLD: 3 %
MONOCYTES ABSOLUTE: 0.3 THOU/MM3 (ref 0.4–1.3)
MONOCYTES ABSOLUTE: 0.3 THOU/MM3 (ref 0.4–1.3)
NUCLEATED RED BLOOD CELLS: 0 /100 WBC
NUCLEATED RED BLOOD CELLS: 0 /100 WBC
OSMOLALITY CALCULATION: 282.3 MOSMOL/KG (ref 275–300)
PLATELET # BLD: 209 THOU/MM3 (ref 130–400)
PLATELET # BLD: 220 THOU/MM3 (ref 130–400)
PMV BLD AUTO: 11.5 FL (ref 9.4–12.4)
PMV BLD AUTO: 11.6 FL (ref 9.4–12.4)
POTASSIUM REFLEX MAGNESIUM: 3.7 MEQ/L (ref 3.5–5.2)
PROCALCITONIN: 0.43 NG/ML (ref 0.01–0.09)
RBC # BLD: 4.51 MILL/MM3 (ref 4.2–5.4)
RBC # BLD: 4.53 MILL/MM3 (ref 4.2–5.4)
RH FACTOR: NORMAL
SARS-COV-2, NAAT: NOT DETECTED
SEG NEUTROPHILS: 92.5 %
SEG NEUTROPHILS: 92.9 %
SEGMENTED NEUTROPHILS ABSOLUTE COUNT: 10.4 THOU/MM3 (ref 1.8–7.7)
SEGMENTED NEUTROPHILS ABSOLUTE COUNT: 10.5 THOU/MM3 (ref 1.8–7.7)
SODIUM BLD-SCNC: 139 MEQ/L (ref 135–145)
TOTAL PROTEIN: 6.8 G/DL (ref 6.1–8)
TROPONIN T: < 0.01 NG/ML
WBC # BLD: 11.2 THOU/MM3 (ref 4.8–10.8)
WBC # BLD: 11.3 THOU/MM3 (ref 4.8–10.8)

## 2020-12-22 PROCEDURE — 6370000000 HC RX 637 (ALT 250 FOR IP): Performed by: EMERGENCY MEDICINE

## 2020-12-22 PROCEDURE — 36415 COLL VENOUS BLD VENIPUNCTURE: CPT

## 2020-12-22 PROCEDURE — 71045 X-RAY EXAM CHEST 1 VIEW: CPT

## 2020-12-22 PROCEDURE — 85025 COMPLETE CBC W/AUTO DIFF WBC: CPT

## 2020-12-22 PROCEDURE — 6360000002 HC RX W HCPCS: Performed by: EMERGENCY MEDICINE

## 2020-12-22 PROCEDURE — 83615 LACTATE (LD) (LDH) ENZYME: CPT

## 2020-12-22 PROCEDURE — 99223 1ST HOSP IP/OBS HIGH 75: CPT | Performed by: NURSE PRACTITIONER

## 2020-12-22 PROCEDURE — 99285 EMERGENCY DEPT VISIT HI MDM: CPT

## 2020-12-22 PROCEDURE — 83605 ASSAY OF LACTIC ACID: CPT

## 2020-12-22 PROCEDURE — 86900 BLOOD TYPING SEROLOGIC ABO: CPT

## 2020-12-22 PROCEDURE — 82728 ASSAY OF FERRITIN: CPT

## 2020-12-22 PROCEDURE — 96365 THER/PROPH/DIAG IV INF INIT: CPT

## 2020-12-22 PROCEDURE — 80053 COMPREHEN METABOLIC PANEL: CPT

## 2020-12-22 PROCEDURE — 84145 PROCALCITONIN (PCT): CPT

## 2020-12-22 PROCEDURE — 84484 ASSAY OF TROPONIN QUANT: CPT

## 2020-12-22 PROCEDURE — 85379 FIBRIN DEGRADATION QUANT: CPT

## 2020-12-22 PROCEDURE — U0002 COVID-19 LAB TEST NON-CDC: HCPCS

## 2020-12-22 PROCEDURE — 86140 C-REACTIVE PROTEIN: CPT

## 2020-12-22 PROCEDURE — 87040 BLOOD CULTURE FOR BACTERIA: CPT

## 2020-12-22 PROCEDURE — 96375 TX/PRO/DX INJ NEW DRUG ADDON: CPT

## 2020-12-22 PROCEDURE — 86850 RBC ANTIBODY SCREEN: CPT

## 2020-12-22 PROCEDURE — 93005 ELECTROCARDIOGRAM TRACING: CPT | Performed by: EMERGENCY MEDICINE

## 2020-12-22 PROCEDURE — 86901 BLOOD TYPING SEROLOGIC RH(D): CPT

## 2020-12-22 PROCEDURE — 2580000003 HC RX 258: Performed by: EMERGENCY MEDICINE

## 2020-12-22 RX ORDER — ACETAMINOPHEN 650 MG/1
650 SUPPOSITORY RECTAL EVERY 6 HOURS PRN
Status: DISCONTINUED | OUTPATIENT
Start: 2020-12-22 | End: 2020-12-23 | Stop reason: SDUPTHER

## 2020-12-22 RX ORDER — ONDANSETRON 2 MG/ML
4 INJECTION INTRAMUSCULAR; INTRAVENOUS ONCE
Status: COMPLETED | OUTPATIENT
Start: 2020-12-22 | End: 2020-12-22

## 2020-12-22 RX ORDER — 0.9 % SODIUM CHLORIDE 0.9 %
500 INTRAVENOUS SOLUTION INTRAVENOUS ONCE
Status: COMPLETED | OUTPATIENT
Start: 2020-12-22 | End: 2020-12-23

## 2020-12-22 RX ORDER — ACETAMINOPHEN 325 MG/1
650 TABLET ORAL EVERY 6 HOURS PRN
Status: DISCONTINUED | OUTPATIENT
Start: 2020-12-22 | End: 2020-12-23 | Stop reason: SDUPTHER

## 2020-12-22 RX ADMIN — CEFTRIAXONE SODIUM 1 G: 1 INJECTION, POWDER, FOR SOLUTION INTRAMUSCULAR; INTRAVENOUS at 22:54

## 2020-12-22 RX ADMIN — ONDANSETRON 4 MG: 2 INJECTION INTRAMUSCULAR; INTRAVENOUS at 22:54

## 2020-12-22 RX ADMIN — ACETAMINOPHEN 650 MG: 325 TABLET ORAL at 23:08

## 2020-12-22 RX ADMIN — SODIUM CHLORIDE 500 ML: 9 INJECTION, SOLUTION INTRAVENOUS at 23:38

## 2020-12-22 ASSESSMENT — ENCOUNTER SYMPTOMS
SHORTNESS OF BREATH: 0
DIARRHEA: 1
ABDOMINAL PAIN: 0
BLOOD IN STOOL: 0
COUGH: 0
NAUSEA: 1
CONSTIPATION: 0
RHINORRHEA: 0
VOMITING: 1
SORE THROAT: 0

## 2020-12-22 ASSESSMENT — PAIN SCALES - GENERAL
PAINLEVEL_OUTOF10: 0
PAINLEVEL_OUTOF10: 5

## 2020-12-22 ASSESSMENT — PAIN DESCRIPTION - PAIN TYPE: TYPE: ACUTE PAIN

## 2020-12-22 ASSESSMENT — PAIN DESCRIPTION - DESCRIPTORS: DESCRIPTORS: ACHING

## 2020-12-22 ASSESSMENT — PAIN DESCRIPTION - ONSET: ONSET: ON-GOING

## 2020-12-22 ASSESSMENT — PAIN DESCRIPTION - PROGRESSION: CLINICAL_PROGRESSION: NOT CHANGED

## 2020-12-22 ASSESSMENT — PAIN DESCRIPTION - LOCATION: LOCATION: HEAD

## 2020-12-22 ASSESSMENT — PAIN DESCRIPTION - FREQUENCY: FREQUENCY: CONTINUOUS

## 2020-12-23 ENCOUNTER — APPOINTMENT (OUTPATIENT)
Dept: CT IMAGING | Age: 70
DRG: 871 | End: 2020-12-23
Payer: MEDICARE

## 2020-12-23 PROBLEM — N39.0 UTI (URINARY TRACT INFECTION): Status: ACTIVE | Noted: 2020-12-23

## 2020-12-23 LAB
ALBUMIN SERPL-MCNC: 3.5 G/DL (ref 3.5–5.1)
ALP BLD-CCNC: 35 U/L (ref 38–126)
ALT SERPL-CCNC: 42 U/L (ref 11–66)
ANION GAP SERPL CALCULATED.3IONS-SCNC: 10 MEQ/L (ref 8–16)
AST SERPL-CCNC: 64 U/L (ref 5–40)
BACTERIA: ABNORMAL /HPF
BILIRUB SERPL-MCNC: 0.3 MG/DL (ref 0.3–1.2)
BILIRUBIN URINE: NEGATIVE
BLOOD, URINE: NEGATIVE
BUN BLDV-MCNC: 16 MG/DL (ref 7–22)
CALCIUM SERPL-MCNC: 8.4 MG/DL (ref 8.5–10.5)
CASTS 2: ABNORMAL /LPF
CASTS UA: ABNORMAL /LPF
CHARACTER, URINE: CLEAR
CHLORIDE BLD-SCNC: 107 MEQ/L (ref 98–111)
CO2: 21 MEQ/L (ref 23–33)
COLOR: ABNORMAL
CREAT SERPL-MCNC: 0.7 MG/DL (ref 0.4–1.2)
CRYSTALS, UA: ABNORMAL
D-DIMER QUANTITATIVE: 1152 NG/ML FEU (ref 0–500)
EPITHELIAL CELLS, UA: ABNORMAL /HPF
FERRITIN: 105 NG/ML (ref 10–291)
GFR SERPL CREATININE-BSD FRML MDRD: 83 ML/MIN/1.73M2
GLUCOSE BLD-MCNC: 133 MG/DL (ref 70–108)
GLUCOSE URINE: NEGATIVE MG/DL
KETONES, URINE: 15
LD: 246 U/L (ref 100–190)
LEUKOCYTE ESTERASE, URINE: ABNORMAL
MISCELLANEOUS 2: ABNORMAL
NITRITE, URINE: NEGATIVE
OSMOLALITY CALCULATION: 278.8 MOSMOL/KG (ref 275–300)
PH UA: 5 (ref 5–9)
POTASSIUM REFLEX MAGNESIUM: 3.8 MEQ/L (ref 3.5–5.2)
PROTEIN UA: ABNORMAL
RBC URINE: ABNORMAL /HPF
RENAL EPITHELIAL, UA: ABNORMAL
SODIUM BLD-SCNC: 138 MEQ/L (ref 135–145)
SPECIFIC GRAVITY, URINE: 1.02 (ref 1–1.03)
TOTAL PROTEIN: 6.2 G/DL (ref 6.1–8)
UROBILINOGEN, URINE: 0.2 EU/DL (ref 0–1)
WBC UA: ABNORMAL /HPF
YEAST: ABNORMAL

## 2020-12-23 PROCEDURE — 80053 COMPREHEN METABOLIC PANEL: CPT

## 2020-12-23 PROCEDURE — 6360000002 HC RX W HCPCS: Performed by: NURSE PRACTITIONER

## 2020-12-23 PROCEDURE — 6370000000 HC RX 637 (ALT 250 FOR IP): Performed by: NURSE PRACTITIONER

## 2020-12-23 PROCEDURE — 2580000003 HC RX 258: Performed by: NURSE PRACTITIONER

## 2020-12-23 PROCEDURE — 99232 SBSQ HOSP IP/OBS MODERATE 35: CPT | Performed by: PHYSICIAN ASSISTANT

## 2020-12-23 PROCEDURE — 81001 URINALYSIS AUTO W/SCOPE: CPT

## 2020-12-23 PROCEDURE — 71275 CT ANGIOGRAPHY CHEST: CPT

## 2020-12-23 PROCEDURE — 85379 FIBRIN DEGRADATION QUANT: CPT

## 2020-12-23 PROCEDURE — 1200000003 HC TELEMETRY R&B

## 2020-12-23 PROCEDURE — 6360000004 HC RX CONTRAST MEDICATION: Performed by: INTERNAL MEDICINE

## 2020-12-23 PROCEDURE — 87086 URINE CULTURE/COLONY COUNT: CPT

## 2020-12-23 PROCEDURE — 2580000003 HC RX 258: Performed by: PHYSICIAN ASSISTANT

## 2020-12-23 PROCEDURE — 93010 ELECTROCARDIOGRAM REPORT: CPT | Performed by: NUCLEAR MEDICINE

## 2020-12-23 RX ORDER — POTASSIUM CHLORIDE 7.45 MG/ML
10 INJECTION INTRAVENOUS PRN
Status: DISCONTINUED | OUTPATIENT
Start: 2020-12-23 | End: 2020-12-25 | Stop reason: HOSPADM

## 2020-12-23 RX ORDER — ACETAMINOPHEN 325 MG/1
650 TABLET ORAL EVERY 6 HOURS PRN
Status: DISCONTINUED | OUTPATIENT
Start: 2020-12-23 | End: 2020-12-25 | Stop reason: HOSPADM

## 2020-12-23 RX ORDER — ACYCLOVIR 400 MG/1
400 TABLET ORAL 2 TIMES DAILY
Status: DISCONTINUED | OUTPATIENT
Start: 2020-12-24 | End: 2020-12-25 | Stop reason: HOSPADM

## 2020-12-23 RX ORDER — ONDANSETRON 2 MG/ML
4 INJECTION INTRAMUSCULAR; INTRAVENOUS EVERY 6 HOURS PRN
Status: DISCONTINUED | OUTPATIENT
Start: 2020-12-23 | End: 2020-12-25 | Stop reason: HOSPADM

## 2020-12-23 RX ORDER — POTASSIUM CHLORIDE 20 MEQ/1
40 TABLET, EXTENDED RELEASE ORAL PRN
Status: DISCONTINUED | OUTPATIENT
Start: 2020-12-23 | End: 2020-12-25 | Stop reason: HOSPADM

## 2020-12-23 RX ORDER — DULOXETIN HYDROCHLORIDE 60 MG/1
60 CAPSULE, DELAYED RELEASE ORAL DAILY
Status: DISCONTINUED | OUTPATIENT
Start: 2020-12-23 | End: 2020-12-25 | Stop reason: HOSPADM

## 2020-12-23 RX ORDER — SODIUM CHLORIDE 9 MG/ML
INJECTION, SOLUTION INTRAVENOUS CONTINUOUS
Status: DISCONTINUED | OUTPATIENT
Start: 2020-12-23 | End: 2020-12-25 | Stop reason: HOSPADM

## 2020-12-23 RX ORDER — OYSTER SHELL CALCIUM WITH VITAMIN D 500; 200 MG/1; [IU]/1
1 TABLET, FILM COATED ORAL 2 TIMES DAILY
Status: DISCONTINUED | OUTPATIENT
Start: 2020-12-23 | End: 2020-12-25 | Stop reason: HOSPADM

## 2020-12-23 RX ORDER — POLYETHYLENE GLYCOL 3350 17 G/17G
17 POWDER, FOR SOLUTION ORAL DAILY PRN
Status: DISCONTINUED | OUTPATIENT
Start: 2020-12-23 | End: 2020-12-25 | Stop reason: HOSPADM

## 2020-12-23 RX ORDER — MAGNESIUM SULFATE IN WATER 40 MG/ML
2 INJECTION, SOLUTION INTRAVENOUS PRN
Status: DISCONTINUED | OUTPATIENT
Start: 2020-12-23 | End: 2020-12-25 | Stop reason: HOSPADM

## 2020-12-23 RX ORDER — 0.9 % SODIUM CHLORIDE 0.9 %
1000 INTRAVENOUS SOLUTION INTRAVENOUS ONCE
Status: COMPLETED | OUTPATIENT
Start: 2020-12-23 | End: 2020-12-23

## 2020-12-23 RX ORDER — ALENDRONATE SODIUM 70 MG/1
70 TABLET ORAL
Status: DISCONTINUED | OUTPATIENT
Start: 2020-12-23 | End: 2020-12-23 | Stop reason: CLARIF

## 2020-12-23 RX ORDER — ACYCLOVIR 400 MG/1
400 TABLET ORAL DAILY
COMMUNITY
Start: 2020-12-11 | End: 2021-11-01

## 2020-12-23 RX ORDER — ACETAMINOPHEN 650 MG/1
650 SUPPOSITORY RECTAL EVERY 6 HOURS PRN
Status: DISCONTINUED | OUTPATIENT
Start: 2020-12-23 | End: 2020-12-23 | Stop reason: SDUPTHER

## 2020-12-23 RX ORDER — HYDROXYZINE HYDROCHLORIDE 25 MG/1
25 TABLET, FILM COATED ORAL EVERY 8 HOURS PRN
Status: DISCONTINUED | OUTPATIENT
Start: 2020-12-23 | End: 2020-12-25 | Stop reason: HOSPADM

## 2020-12-23 RX ORDER — ACETAMINOPHEN 325 MG/1
650 TABLET ORAL EVERY 6 HOURS PRN
Status: DISCONTINUED | OUTPATIENT
Start: 2020-12-23 | End: 2020-12-23 | Stop reason: SDUPTHER

## 2020-12-23 RX ORDER — ACETAMINOPHEN 650 MG/1
650 SUPPOSITORY RECTAL EVERY 6 HOURS PRN
Status: DISCONTINUED | OUTPATIENT
Start: 2020-12-23 | End: 2020-12-25 | Stop reason: HOSPADM

## 2020-12-23 RX ORDER — ACAMPROSATE CALCIUM 333 MG/1
666 TABLET, DELAYED RELEASE ORAL NIGHTLY
Status: DISCONTINUED | OUTPATIENT
Start: 2020-12-23 | End: 2020-12-25 | Stop reason: HOSPADM

## 2020-12-23 RX ORDER — ATORVASTATIN CALCIUM 10 MG/1
10 TABLET, FILM COATED ORAL NIGHTLY
Status: DISCONTINUED | OUTPATIENT
Start: 2020-12-23 | End: 2020-12-25 | Stop reason: HOSPADM

## 2020-12-23 RX ORDER — DEXAMETHASONE 4 MG/1
6 TABLET ORAL DAILY
Status: DISCONTINUED | OUTPATIENT
Start: 2020-12-23 | End: 2020-12-23

## 2020-12-23 RX ORDER — PROMETHAZINE HYDROCHLORIDE 25 MG/1
12.5 TABLET ORAL EVERY 6 HOURS PRN
Status: DISCONTINUED | OUTPATIENT
Start: 2020-12-23 | End: 2020-12-25 | Stop reason: HOSPADM

## 2020-12-23 RX ORDER — ATOMOXETINE 40 MG/1
40 CAPSULE ORAL DAILY
Status: DISCONTINUED | OUTPATIENT
Start: 2020-12-23 | End: 2020-12-25 | Stop reason: HOSPADM

## 2020-12-23 RX ORDER — LEVOTHYROXINE SODIUM 0.05 MG/1
50 TABLET ORAL DAILY
Status: DISCONTINUED | OUTPATIENT
Start: 2020-12-23 | End: 2020-12-25 | Stop reason: HOSPADM

## 2020-12-23 RX ORDER — DONEPEZIL HYDROCHLORIDE 10 MG/1
10 TABLET, FILM COATED ORAL NIGHTLY
Status: DISCONTINUED | OUTPATIENT
Start: 2020-12-23 | End: 2020-12-25 | Stop reason: HOSPADM

## 2020-12-23 RX ORDER — OXYBUTYNIN CHLORIDE 5 MG/1
5 TABLET, EXTENDED RELEASE ORAL NIGHTLY
Status: DISCONTINUED | OUTPATIENT
Start: 2020-12-23 | End: 2020-12-25 | Stop reason: HOSPADM

## 2020-12-23 RX ORDER — SODIUM CHLORIDE 0.9 % (FLUSH) 0.9 %
10 SYRINGE (ML) INJECTION EVERY 12 HOURS SCHEDULED
Status: DISCONTINUED | OUTPATIENT
Start: 2020-12-23 | End: 2020-12-25 | Stop reason: HOSPADM

## 2020-12-23 RX ORDER — SODIUM CHLORIDE 0.9 % (FLUSH) 0.9 %
10 SYRINGE (ML) INJECTION PRN
Status: DISCONTINUED | OUTPATIENT
Start: 2020-12-23 | End: 2020-12-25 | Stop reason: HOSPADM

## 2020-12-23 RX ADMIN — ACAMPROSATE CALCIUM 666 MG: 333 TABLET, DELAYED RELEASE ORAL at 19:53

## 2020-12-23 RX ADMIN — ATOMOXETINE 40 MG: 40 CAPSULE ORAL at 10:01

## 2020-12-23 RX ADMIN — OXYBUTYNIN CHLORIDE 5 MG: 5 TABLET, EXTENDED RELEASE ORAL at 03:56

## 2020-12-23 RX ADMIN — IOPAMIDOL 80 ML: 755 INJECTION, SOLUTION INTRAVENOUS at 02:08

## 2020-12-23 RX ADMIN — HYDROXYZINE HYDROCHLORIDE 25 MG: 25 TABLET ORAL at 16:05

## 2020-12-23 RX ADMIN — ATORVASTATIN CALCIUM 10 MG: 10 TABLET, FILM COATED ORAL at 19:52

## 2020-12-23 RX ADMIN — ENOXAPARIN SODIUM 30 MG: 30 INJECTION SUBCUTANEOUS at 10:01

## 2020-12-23 RX ADMIN — LEVOTHYROXINE SODIUM 50 MCG: 50 TABLET ORAL at 03:58

## 2020-12-23 RX ADMIN — DULOXETINE HYDROCHLORIDE 60 MG: 60 CAPSULE, DELAYED RELEASE ORAL at 10:01

## 2020-12-23 RX ADMIN — CALCIUM CARBONATE-VITAMIN D TAB 500 MG-200 UNIT 1 TABLET: 500-200 TAB at 10:01

## 2020-12-23 RX ADMIN — SODIUM CHLORIDE 1000 ML: 9 INJECTION, SOLUTION INTRAVENOUS at 07:58

## 2020-12-23 RX ADMIN — SODIUM CHLORIDE 1000 ML: 9 INJECTION, SOLUTION INTRAVENOUS at 16:05

## 2020-12-23 RX ADMIN — ACETAMINOPHEN 650 MG: 325 TABLET ORAL at 11:26

## 2020-12-23 RX ADMIN — CALCIUM CARBONATE-VITAMIN D TAB 500 MG-200 UNIT 1 TABLET: 500-200 TAB at 19:53

## 2020-12-23 RX ADMIN — DONEPEZIL HYDROCHLORIDE 10 MG: 10 TABLET, FILM COATED ORAL at 19:52

## 2020-12-23 RX ADMIN — ENOXAPARIN SODIUM 30 MG: 30 INJECTION SUBCUTANEOUS at 19:53

## 2020-12-23 RX ADMIN — SODIUM CHLORIDE 1000 ML: 9 INJECTION, SOLUTION INTRAVENOUS at 01:35

## 2020-12-23 ASSESSMENT — PAIN SCALES - GENERAL
PAINLEVEL_OUTOF10: 0

## 2020-12-23 NOTE — PROGRESS NOTES
Pharmacy Medication History Note      List of current medications patient is taking is complete. Source of information: outside fill report    Changes made to medication list:  Medications removed (include reason, ex. therapy complete or physician discontinued):  Myrbetriq - pt has not filled this medication since 7/3/20  Macrobid - therapy complete    Medications added/doses adjusted:  Acyclovir 400 mg BID     Other notes (ex.  Recent course of antibiotics, Coumadin dosing):  Could not confirm doses of calcium-vit D, fish oil, red yeast rice, or multivitamin     Electronically signed by WILSON Pastor [facility] on 12/23/2020 at 4:15 PM

## 2020-12-23 NOTE — CARE COORDINATION
DISASTER CHARTING    12/23/20, 7:33 AM EST    DISCHARGE ONGOING EVALUATION:     Craig Dong day: 0  Location: 6K-02/002-A Reason for admit: UTI (urinary tract infection) [N39.0]   Barriers to Discharge: Tmax 102.6, Ddimer 919, CRP 5.45, PCT 0.43. Was requiring 6L oxygen in the ER, now 92% RA. IVF, IV rocephin. PCP: SARITHA Xavier - CNP  Patient Goals/Plan/Treatment Preferences: Spoke with Aimee Brown, she is from home alone but her daughter lives only a mile down the road, and can assist if needed. [facility], and denies needs for discharge.

## 2020-12-23 NOTE — PROGRESS NOTES
Pt admitted to  6K02 from ED. Complaints: Shortness of breath, headache, confusion. Vital signs obtained. Assessment and data collection initiated. Two nurse skin assessment performed by Brianna Angela and Rj Sarmiento RN. Oriented to room. Policies and procedures for  explained. Dominga Leo RN discussed hourly rounding with patient addressing 5 P's. Fall prevention and safety brochure discussed with patient. Bed alarm on. Call light in reach. Pt's Daughter Braxton Jackson #[phone] updated on her mothers status and plan of care.

## 2020-12-23 NOTE — H&P
History & Physical        Patient:  Krista Solis  YOB: 1950    MRN: [id number]     Acct: [de-identified]    PCP: SARITHA Sanchez CNP    Date of Admission: 12/22/2020    Date of Service: Pt seen/examined on 12/23/20  and Admitted to Inpatient with expected LOS greater than two midnights due to medical therapy. ASSESSMENT/PLAN:    1. Sepsis possibly secondary to UTI (POA)--check a U/A, has been on Macrobid and doxycycline outpatient; Rocephin 12/22  2. Acute hypoxic respiratory failure--COVID-19 negative; will add Decadron 12/23; painting the patient in droplet precaution  3. Possible metabolic encephalopathy--she is currently alert and oriented for me  4. Moderate to severe centrilobular emphysema--on Spiriva  5. Leukocytosis--monitor  6.  Mild mediastinal and left hilar lymphadenopathy    Chief Complaint: Weakness    History Of Present Illness: 79 y.o. female who presented to [address] [place] [address] with weakness; it was reported in the ER that the patient complained of a headache, confusion, vomiting, diarrhea along with body aches; has recently been treated for UTI as she was initially on doxycycline and then switched over to [facility]; daughter reported to the ER staff that she was getting worse for 2 hours prior to coming in; patient is had a poor oral intake secondary to her not feeling well; the emergency department she was found to have a temperature 102.6, she was also hypoxic ranging 85 to 88% and was placed on 3 L with an O2 sat of 93%; her procalcitonin was noted to be 0.43; 19 was not detected; chest x-ray showed a possible underlying groundglass infiltrate so a CTA of her chest was done as she was tachycardic and hypoxic and it did not reveal a pulmonary embolism however did reveal moderate to severe centrilobular emphysema over no consolidation or pneumonitis; patient's heart rate improved with improvement of her temperature; the emergency department she was started on Rocephin 1 g daily; is being admitted to the hospital service for further care and evaluation.     Past Medical History:          Diagnosis Date    Aneurysm, cerebral     COPD [facility]chronic obstructive pulmonary disease) ([facility] [address]     Hyperlipidemia     Osteoarthritis     Pneumonia     Thyroid disease     Unspecified cerebral artery occlusion with cerebral infarction        Past Surgical History:          Procedure Laterality Date    BRAIN ANEURYSM SURGERY      BREAST SURGERY      breast abcesses    COLONOSCOPY  [id number]    COSMETIC SURGERY  2009,2010    juvederm,botox    COSMETIC SURGERY  2013    Restylane, Botox    EXTERNAL EAR SURGERY  2000    reconstruction of lft ear w/re-opening of canal & pinning back of superior ear    EXTERNAL EAR SURGERY  1999    abscess lft ear    EXTERNAL EAR SURGERY  1997    hematoma left ear       Medications Prior to Admission: Prior to Admission medications    Medication Sig Start Date End Date Taking? Authorizing Provider   nitrofurantoin, macrocrystal-monohydrate, (MACROBID) 100 MG capsule Take 1 capsule by mouth 2 times daily for 5 days 12/21/20 12/26/20  SARITHA Alvarez CNP   oxybutynin (DITROPAN-XL) 5 MG extended release tablet Take 1 tablet by mouth once daily 12/17/20   SARITHA Lozano CNP   Handicap Placard MISC by Does not apply route Expires 12/2025 12/7/20   SARITHA Moise CNP   hydrOXYzine (ATARAX) 25 MG tablet Take 1-2 tablets by mouth every 8 hours as needed for Anxiety 11/16/20   SARITHA Moise CNP   donepezil (ARICEPT) 10 MG tablet Take 1 tablet by mouth in the evening 11/9/20   SARITHA Moise CNP   mirabegron (MYRBETRIQ) 50 MG TB24 Take 50 mg by mouth daily 10/28/20   SARITHA Alvarez CNP   atomoxetine (STRATTERA) 40 MG capsule Take 1 capsule by mouth daily 10/23/20 4/21/21  SARITHA Topete CNP   tiotropium (SPIRIVA RESPIMAT) 2.5 MCG/ACT AERS inhaler Inhale 2 puffs into the lungs daily 10/21/20   SARITHA Moise CNP   acamprosate (CAMPRAL) 333 MG tablet Take 2 tablets by mouth nightly 10/21/20   SARITHA Moise CNP   DULoxetine (CYMBALTA) 60 MG extended release capsule Take 1 capsule by mouth once daily 10/12/20   SARITHA Moise CNP   levothyroxine (SYNTHROID) 50 MCG tablet Take 1 tablet by mouth once daily 6/22/20   SARITHA Moise CNP   simvastatin (ZOCOR) 10 MG tablet Take 1 tablet by mouth nightly 3/16/20   SARITHA Moise CNP   alendronate (FOSAMAX) 70 MG tablet Take 1 tablet by mouth every 7 days Take with water on an empty stomach- wait 30 minutes before eating or taking other meds. Avoid lying down for 30 minutes after dose. 2/17/20   Kang Ti, APRN - CNP   Multiple Vitamin (MULTI-VITAMIN PO) Take  by mouth.     Historical Provider, MD Calcium Carbonate-Vitamin D (CALCIUM-VITAMIN D) 600-200 MG-UNIT CAPS Take 1 tablet by mouth 2 times daily     Historical Provider, MD   fish oil-omega-3 fatty acids 1000 MG capsule Take 1 g by mouth. One tablet daily along with red yeast rice    Historical Provider, MD       Allergies:  Sulfa antibiotics    Social History:   reports that she quit smoking about 5 years ago. Her smoking use included cigarettes. She has a 40.00 pack-year smoking history. She has never used smokeless tobacco. She reports current alcohol use of about 2.0 standard drinks of alcohol per week. She reports that she does not use drugs.     Family History:      Positive as follows:        Problem Relation Age of Onset    Cancer Mother         Female Cancer    Stroke Mother         Possible    Diabetes Father     Heart Disease Maternal Aunt     Parkinsonism Maternal Uncle        REVIEW OF SYSTEMS:     Constitutional: ROS: positive for  -lightheadedness  Head: no headache, no head injury, no migraine   Eyes ROS: denies blurred/double vision  Ears ROS: no hearing difficulty, no tinnitus  Mouth and Throat ROS: no ulceration, dysphagia, dental caries  Psychological ROS: no depression, no anxiety, no panic attacks, denies suicide/homicide ideation  Endocrine ROS: denies polyuria, polydypsia, no heat or cold intolerance  Respiratory ROS: positive for - shortness of breath  Cardiovascular ROS: no chest pain or dyspnea on exertion  Gastrointestinal ROS: positive for - diarrhea and nausea/vomiting  Genito-Urinary ROS: denies dysuria, frequency, urgency; denies hematuria  Musculoskeletal ROS: negative  Neurological ROS: no syncope, no seizures, no numbness or tingling of hands, no numbness or tingling of feet, no paresis  Dermatology: no skin rash, no eczema  Endocrine: no polyuria, polydypsia, no heat/cold intolerance  Hematology: denies bruising easily, denies bleeding problems, denies clotting disorders    PHYSICAL EXAM: GLUCOSEU Negative 10/28/2020       Radiology:     Xr Chest Portable    Result Date: 12/22/2020   ADDENDUM #1 Receipt of this report by the clinical staff was confirmed with Martín Perez RN on Dec 22, 2020 22:47:00 EST. This document has been electronically signed by: Efren Black on 12/22/2020 10:47 PM ORIGINAL REPORT 1-view chest x-ray. Comparison:  DX,SR  - XR CHEST STANDARD (2 VW)  - 01/06/2020 04:03 PM EST Findings: Normal lung volumes. Chronic interstitial changes bilaterally, without consolidation. Suggestion of mild groundglass infiltrates bilaterally, more so on the left. Possible left suprahilar 13 x 9 mm nodule, new compared to previous. No pleural effusion or pneumothorax. Heart size normal. No pulmonary edema. No significant degenerative changes of the thoracic spine. No acute fracture. Impression: 1. Chronic interstitial changes/scarring bilaterally. An underlying groundglass infiltrate may be present. 2.  Possible left suprahilar nodule. 3.  Recommend chest CT scan, with IV contrast if not contraindicated. This document has been electronically signed by: Adenike Qureshi MD on 12/22/2020 10:29 PM       EKG:  I have reviewed the EKG with the following interpretation: 9 is tachycardia heart rate 128      Thank you SARITHA Echols CNP for the opportunity to be involved in this patient's care.     Electronically signed by SARITHA Ochoa CNP on 12/23/2020 at 3:16 AM

## 2020-12-23 NOTE — ED PROVIDER NOTES
[facility] [address] COMPLAINT       Chief Complaint   Patient presents with    Altered Mental Status    Headache       Nurses Notes reviewed and I agree except as noted in the HPI. HISTORY OF PRESENT ILLNESS    Juan R Galdamez is a 79 y.o. female who presents to the emergency department for headache, confusion, vomiting, diarrhea, body aches. Daughter states that patient had a thermometer at home that she was unable to use so she is unsure regarding fever. She states she was switched over to [facility] for a urinary tract infection but daughter states \"I have seen her just get worse and worse in the last 2 hours\". Patient denies cough. Per daughter patient has a history of COPD but does not wear home O2. Daughter states that patient has been going out to local stores. Patient has been unable to keep down anything by mouth recently. REVIEW OF SYSTEMS     Review of Systems   Constitutional: Positive for fatigue and fever. Negative for diaphoresis. HENT: Negative for congestion, rhinorrhea and sore throat. Eyes: Negative for visual disturbance. Respiratory: Negative for cough and shortness of breath. Cardiovascular: Negative for chest pain, palpitations and leg swelling. Gastrointestinal: Positive for diarrhea, nausea and vomiting. Negative for abdominal pain, blood in stool and constipation. Endocrine: Negative for polyuria. Genitourinary: Negative for difficulty urinating and dysuria. Musculoskeletal: Positive for myalgias. Negative for joint swelling. Skin: Negative for rash. Neurological: Positive for headaches. Negative for seizures, syncope, facial asymmetry, speech difficulty and weakness. Hematological: Negative for adenopathy. Psychiatric/Behavioral: Negative for confusion. All other systems reviewed and are negative.        PAST MEDICAL HISTORY has a past medical history of Aneurysm, cerebral, COPD (chronic obstructive pulmonary disease) ([facility] [address] Hyperlipidemia, Osteoarthritis, Pneumonia, Thyroid disease, and Unspecified cerebral artery occlusion with cerebral infarction. SURGICAL HISTORY      has a past surgical history that includes Colonoscopy ([id number]); External ear surgery (2000); External ear surgery (1999); External ear surgery (1997); Cosmetic surgery (0807,5441); Cosmetic surgery (2013); Brain aneurysm surgery; and Breast surgery. CURRENT MEDICATIONS       Previous Medications    ACAMPROSATE (CAMPRAL) 333 MG TABLET    Take 2 tablets by mouth nightly    ALENDRONATE (FOSAMAX) 70 MG TABLET    Take 1 tablet by mouth every 7 days Take with water on an empty stomach- wait 30 minutes before eating or taking other meds. Avoid lying down for 30 minutes after dose. ATOMOXETINE (STRATTERA) 40 MG CAPSULE    Take 1 capsule by mouth daily    CALCIUM CARBONATE-VITAMIN D (CALCIUM-VITAMIN D) 600-200 MG-UNIT CAPS    Take 1 tablet by mouth 2 times daily     DONEPEZIL (ARICEPT) 10 MG TABLET    Take 1 tablet by mouth in the evening    DULOXETINE (CYMBALTA) 60 MG EXTENDED RELEASE CAPSULE    Take 1 capsule by mouth once daily    FISH OIL-OMEGA-3 FATTY ACIDS 1000 MG CAPSULE    Take 1 g by mouth. One tablet daily along with red yeast rice    HANDICAP PLACARD MISC    by Does not apply route Expires 12/2025    HYDROXYZINE (ATARAX) 25 MG TABLET    Take 1-2 tablets by mouth every 8 hours as needed for Anxiety    LEVOTHYROXINE (SYNTHROID) 50 MCG TABLET    Take 1 tablet by mouth once daily    MIRABEGRON (MYRBETRIQ) 50 MG TB24    Take 50 mg by mouth daily    MULTIPLE VITAMIN (MULTI-VITAMIN PO)    Take  by mouth.     NITROFURANTOIN, MACROCRYSTAL-MONOHYDRATE, (MACROBID) 100 MG CAPSULE    Take 1 capsule by mouth 2 times daily for 5 days    OXYBUTYNIN (DITROPAN-XL) 5 MG EXTENDED RELEASE TABLET    Take 1 tablet by mouth once daily SIMVASTATIN (ZOCOR) 10 MG TABLET    Take 1 tablet by mouth nightly    TIOTROPIUM (SPIRIVA RESPIMAT) 2.5 MCG/ACT AERS INHALER    Inhale 2 puffs into the lungs daily       ALLERGIES     is allergic to sulfa antibiotics. FAMILY HISTORY     She indicated that her mother is . She indicated that her father is . She indicated that her maternal aunt is . She indicated that her maternal uncle is . family history includes Cancer in her mother; Diabetes in her father; Heart Disease in her maternal aunt; Parkinsonism in her maternal uncle; Stroke in her mother. SOCIAL HISTORY      reports that she quit smoking about 5 years ago. Her smoking use included cigarettes. She has a 40.00 pack-year smoking history. She has never used smokeless tobacco. She reports current alcohol use of about 2.0 standard drinks of alcohol per week. She reports that she does not use drugs. PHYSICAL EXAM     INITIAL VITALS:  height is 5' 3\" (1.6 m) and weight is 130 lb (59 kg). Her rectal temperature is 102.6 °F (39.2 °C). Her blood pressure is 151/87 (abnormal) and her pulse is 125. Her respiration is 18 and oxygen saturation is 96%. Constitutional: Well-nourished, no distress evident. HEENT: Normocephalic, normal hearing, EOMI, speech clear. Neck: Soft supple. Trachea midline. Heart: Regular rate and rhythm on cardiac monitor, no cyanosis, no JVD appreciated  Lungs: Respiratory effort normal; no retractions, no audible wheezing, no signs of air hunger  Abdomen: Nondistended; soft, nontender. Extremities: Well-perfused, movement normal as observed. Neuro: No focal deficits noted. Psych: Normal affect and behavior.     DIFFERENTIAL DIAGNOSIS:   covid 19, ?sepsis/urosepsis, ?dehydration, ?pna, ?PE, and others    DIAGNOSTIC RESULTS         RADIOLOGY: non-plain film images(s) such as CT,Ultrasound and MRI are read by the radiologist.    XR CHEST PORTABLE   Final Result   Impression: Based upon the patient's history, physical exam, and ED evaluation, I feel the patient's medical condition warrants admission to the hospital for further evaluation and treatment. I have discussed the patient with hospitalist who has agreed with the treatment plan and agreed to admit. I have transferred care of the patient to jagdish Jett    CRITICAL CARE:   None    CONSULTS:  hospitalist for admission    PROCEDURES:  None    FINAL IMPRESSION      1. Hypoxia    2. Altered mental status, unspecified altered mental status type    3. Encounter for screening laboratory testing for COVID-19 virus          DISPOSITION/PLAN   admit    PATIENT REFERRED TO:  No follow-up provider specified. DISCHARGE MEDICATIONS:  New Prescriptions    No medications on file       (Please note that portions of this note were completed with a voice recognition program.  Efforts were made to edit the dictations but occasionally words are mis-transcribed.)    Provider:  I personally performed the services described in the documentation, reviewed and edited the documentation which was dictated, and it accurately records my words and actions.     Osvaldo Song MD 12/22/20 11:07 PM                Osvaldo Song MD  12/22/20 8810

## 2020-12-23 NOTE — PROGRESS NOTES
Hospitalist Progress Note      Patient:  Osiel Dueñas    Unit/Bed:6K-02/002-A  YOB: 1950  MRN: [id number]   Acct: [de-identified]   PCP: SARITHA Nichols CNP  Date of Admission: 12/22/2020    Assessment/Plan:    1. Sepsis 2/2 UTI: Tmax 102. Heart rate 125. White blood cell count 11.2. Patient has failed outpatient therapies: Macrobid and doxy. Patient started on ceftriaxone on 12/22. Urine culture pending. UA shows 15-25 white blood cell count. 2. Acute respiratory failure, hypoxia: Patient's baseline is room air. Patient required 6 L in the emergency department. Patient is currently only requiring 1 L nasal cannula. Continue to monitor. 3. Centrilobular Emphysema, moderate to severe: Continue breathing treatment regimen. 4. HLD: Statin. 5. Hypothyroidism: Synthroid. 6. OAB: Continue home medications.     Chief Complaint: Weakness     Initial H and P:- \"78 y.o. female who presented to [facility] with weakness; it was reported in the ER that the patient complained of a headache, confusion, vomiting, diarrhea along with body aches; has recently been treated for UTI as she was initially on doxycycline and then switched over to [facility]; daughter reported to the ER staff that she was getting worse for 2 hours prior to coming in; patient is had a poor oral intake secondary to her not feeling well; the emergency department she was found to have a temperature 102.6, she was also hypoxic ranging 85 to 88% and was placed on 3 L with an O2 sat of 93%; her procalcitonin was noted to be 0.43; 19 was not detected; chest x-ray showed a possible underlying groundglass infiltrate so a CTA of her chest was done as she was tachycardic and hypoxic and it did not reveal a pulmonary embolism however did reveal moderate to severe centrilobular emphysema over no consolidation or pneumonitis; patient's heart rate improved with improvement of her temperature; the emergency department she was started on Rocephin 1 g daily; is being admitted to the hospital service for further care and evaluation. \"    Subjective (past 24 hours):   Patient was admitted through the night. Patient has been tachycardic throughout her stay. Patient has received multiple boluses. CTA of the chest was negative for PE. Patient has no issues or complaints at this time. Past medical history, family history, social history and allergies reviewed again and is unchanged since admission. ROS patient denies chest pain, shortness of breath, headache, abdominal pain.     Medications:  Reviewed    Infusion Medications    sodium chloride 75 mL/hr at 12/23/20 0345     Scheduled Medications    acamprosate  666 mg Oral Nightly    atomoxetine  40 mg Oral Daily    calcium-vitamin D  1 tablet Oral BID    donepezil  10 mg Oral Nightly    DULoxetine  60 mg Oral Daily 12/22/20  2202      K 3.7      CO2 22*   BUN 18   CREATININE 0.8   CALCIUM 9.9     Recent Labs     12/22/20 2202   AST 43*   ALT 24   BILITOT 0.5   ALKPHOS 44     Microbiology:    Organism:  Lab Results   Component Value Date    ORG Enterococcus faecalis - (Group D) 12/18/2020         Urine culture:   Lab Results   Component Value Date    LABURIN [place] count: 50,000-90,000 CFU/mL  12/18/2020     Urinalysis:      Lab Results   Component Value Date    NITRU NEGATIVE 12/23/2020    WBCUA 15-25 12/23/2020    BACTERIA NONE SEEN 12/23/2020    RBCUA 0-2 12/23/2020    BLOODU NEGATIVE 12/23/2020    SPECGRAV 1.020 12/18/2020    GLUCOSEU NEGATIVE 12/23/2020       Radiology:  CTA CHEST W WO CONTRAST   Final Result   1. No central pulmonary artery embolism. A small peripheral pulmonary    embolism is not excluded. 2.  Moderate to severe centrilobular emphysema. No    consolidation/pneumonitis. Improved aeration at the bases compared to    previous. 3.  Nonspecific mild mediastinal and left hilar lymphadenopathy-improved    compared to previous. No left suprahilar nodule. This document has been electronically signed by: Chanetta Leyden, MD on    12/23/2020 02:53 AM      All CT scans at this facility use dose modulation, iterative    reconstruction, and/or weight-based   dosing when appropriate to reduce radiation dose to as low as reasonably    achievable. XR CHEST PORTABLE   Final Result   Impression:   1. Chronic interstitial changes/scarring bilaterally. An underlying    groundglass infiltrate may be present. 2.  Possible left suprahilar nodule. 3.  Recommend chest CT scan, with IV contrast if not contraindicated.       This document has been electronically signed by: Chanetta Leyden, MD on    12/22/2020 10:29 PM           Electronically signed by ANNAMARIA Vanegas on 12/23/2020 at 7:49 AM

## 2020-12-24 LAB
ALBUMIN SERPL-MCNC: 3.5 G/DL (ref 3.5–5.1)
ALP BLD-CCNC: 53 U/L (ref 38–126)
ALT SERPL-CCNC: 81 U/L (ref 11–66)
ANION GAP SERPL CALCULATED.3IONS-SCNC: 12 MEQ/L (ref 8–16)
AST SERPL-CCNC: 92 U/L (ref 5–40)
BASOPHILS # BLD: 0.3 %
BASOPHILS ABSOLUTE: 0 THOU/MM3 (ref 0–0.1)
BILIRUB SERPL-MCNC: 0.3 MG/DL (ref 0.3–1.2)
BUN BLDV-MCNC: 16 MG/DL (ref 7–22)
CALCIUM SERPL-MCNC: 8.4 MG/DL (ref 8.5–10.5)
CHLORIDE BLD-SCNC: 106 MEQ/L (ref 98–111)
CO2: 23 MEQ/L (ref 23–33)
CREAT SERPL-MCNC: 0.6 MG/DL (ref 0.4–1.2)
D-DIMER QUANTITATIVE: 2061 NG/ML FEU (ref 0–500)
EOSINOPHIL # BLD: 4.1 %
EOSINOPHILS ABSOLUTE: 0.3 THOU/MM3 (ref 0–0.4)
ERYTHROCYTE [DISTWIDTH] IN BLOOD BY AUTOMATED COUNT: 12.5 % (ref 11.5–14.5)
ERYTHROCYTE [DISTWIDTH] IN BLOOD BY AUTOMATED COUNT: 43.2 FL (ref 35–45)
GFR SERPL CREATININE-BSD FRML MDRD: > 90 ML/MIN/1.73M2
GLUCOSE BLD-MCNC: 109 MG/DL (ref 70–108)
HCT VFR BLD CALC: 38 % (ref 37–47)
HEMOGLOBIN: 12.3 GM/DL (ref 12–16)
IMMATURE GRANS (ABS): 0.02 THOU/MM3 (ref 0–0.07)
IMMATURE GRANULOCYTES: 0.3 %
LYMPHOCYTES # BLD: 9.5 %
LYMPHOCYTES ABSOLUTE: 0.7 THOU/MM3 (ref 1–4.8)
MCH RBC QN AUTO: 30.3 PG (ref 26–33)
MCHC RBC AUTO-ENTMCNC: 32.4 GM/DL (ref 32.2–35.5)
MCV RBC AUTO: 93.6 FL (ref 81–99)
MONOCYTES # BLD: 5.8 %
MONOCYTES ABSOLUTE: 0.4 THOU/MM3 (ref 0.4–1.3)
NUCLEATED RED BLOOD CELLS: 0 /100 WBC
ORGANISM: ABNORMAL
PLATELET # BLD: 148 THOU/MM3 (ref 130–400)
PMV BLD AUTO: 12.2 FL (ref 9.4–12.4)
POTASSIUM REFLEX MAGNESIUM: 3.8 MEQ/L (ref 3.5–5.2)
RBC # BLD: 4.06 MILL/MM3 (ref 4.2–5.4)
SEG NEUTROPHILS: 80 %
SEGMENTED NEUTROPHILS ABSOLUTE COUNT: 6.1 THOU/MM3 (ref 1.8–7.7)
SODIUM BLD-SCNC: 141 MEQ/L (ref 135–145)
TOTAL PROTEIN: 5.9 G/DL (ref 6.1–8)
URINE CULTURE REFLEX: ABNORMAL
WBC # BLD: 7.6 THOU/MM3 (ref 4.8–10.8)

## 2020-12-24 PROCEDURE — 6370000000 HC RX 637 (ALT 250 FOR IP): Performed by: NURSE PRACTITIONER

## 2020-12-24 PROCEDURE — 99232 SBSQ HOSP IP/OBS MODERATE 35: CPT | Performed by: PHYSICIAN ASSISTANT

## 2020-12-24 PROCEDURE — 1200000003 HC TELEMETRY R&B

## 2020-12-24 PROCEDURE — 85379 FIBRIN DEGRADATION QUANT: CPT

## 2020-12-24 PROCEDURE — 94761 N-INVAS EAR/PLS OXIMETRY MLT: CPT

## 2020-12-24 PROCEDURE — 36415 COLL VENOUS BLD VENIPUNCTURE: CPT

## 2020-12-24 PROCEDURE — 6360000002 HC RX W HCPCS: Performed by: PHYSICIAN ASSISTANT

## 2020-12-24 PROCEDURE — 6370000000 HC RX 637 (ALT 250 FOR IP): Performed by: PHYSICIAN ASSISTANT

## 2020-12-24 PROCEDURE — 2580000003 HC RX 258: Performed by: NURSE PRACTITIONER

## 2020-12-24 PROCEDURE — 94640 AIRWAY INHALATION TREATMENT: CPT

## 2020-12-24 PROCEDURE — 6360000002 HC RX W HCPCS: Performed by: NURSE PRACTITIONER

## 2020-12-24 PROCEDURE — 80053 COMPREHEN METABOLIC PANEL: CPT

## 2020-12-24 PROCEDURE — 85025 COMPLETE CBC W/AUTO DIFF WBC: CPT

## 2020-12-24 RX ORDER — PREDNISONE 20 MG/1
20 TABLET ORAL DAILY
Status: DISCONTINUED | OUTPATIENT
Start: 2020-12-24 | End: 2020-12-25 | Stop reason: HOSPADM

## 2020-12-24 RX ORDER — ALBUTEROL SULFATE 2.5 MG/3ML
2.5 SOLUTION RESPIRATORY (INHALATION) 4 TIMES DAILY
Status: DISCONTINUED | OUTPATIENT
Start: 2020-12-24 | End: 2020-12-25 | Stop reason: HOSPADM

## 2020-12-24 RX ADMIN — ENOXAPARIN SODIUM 30 MG: 30 INJECTION SUBCUTANEOUS at 08:43

## 2020-12-24 RX ADMIN — OXYBUTYNIN CHLORIDE 5 MG: 5 TABLET, EXTENDED RELEASE ORAL at 20:43

## 2020-12-24 RX ADMIN — CALCIUM CARBONATE-VITAMIN D TAB 500 MG-200 UNIT 1 TABLET: 500-200 TAB at 20:44

## 2020-12-24 RX ADMIN — HYDROXYZINE HYDROCHLORIDE 25 MG: 25 TABLET ORAL at 20:44

## 2020-12-24 RX ADMIN — CEFTRIAXONE SODIUM 1 G: 1 INJECTION, POWDER, FOR SOLUTION INTRAMUSCULAR; INTRAVENOUS at 00:10

## 2020-12-24 RX ADMIN — PREDNISONE 20 MG: 20 TABLET ORAL at 16:43

## 2020-12-24 RX ADMIN — CALCIUM CARBONATE-VITAMIN D TAB 500 MG-200 UNIT 1 TABLET: 500-200 TAB at 08:43

## 2020-12-24 RX ADMIN — ATOMOXETINE 40 MG: 40 CAPSULE ORAL at 08:43

## 2020-12-24 RX ADMIN — ALBUTEROL SULFATE 2.5 MG: 2.5 SOLUTION RESPIRATORY (INHALATION) at 16:32

## 2020-12-24 RX ADMIN — ENOXAPARIN SODIUM 30 MG: 30 INJECTION SUBCUTANEOUS at 20:44

## 2020-12-24 RX ADMIN — DULOXETINE HYDROCHLORIDE 60 MG: 60 CAPSULE, DELAYED RELEASE ORAL at 08:43

## 2020-12-24 RX ADMIN — ALBUTEROL SULFATE 2.5 MG: 2.5 SOLUTION RESPIRATORY (INHALATION) at 20:12

## 2020-12-24 RX ADMIN — SODIUM CHLORIDE: 9 INJECTION, SOLUTION INTRAVENOUS at 00:08

## 2020-12-24 RX ADMIN — ACYCLOVIR 400 MG: 400 TABLET ORAL at 20:44

## 2020-12-24 RX ADMIN — TIOTROPIUM BROMIDE INHALATION SPRAY 2 PUFF: 3.12 SPRAY, METERED RESPIRATORY (INHALATION) at 09:15

## 2020-12-24 RX ADMIN — DONEPEZIL HYDROCHLORIDE 10 MG: 10 TABLET, FILM COATED ORAL at 20:43

## 2020-12-24 RX ADMIN — ACAMPROSATE CALCIUM 666 MG: 333 TABLET, DELAYED RELEASE ORAL at 20:44

## 2020-12-24 RX ADMIN — ATORVASTATIN CALCIUM 10 MG: 10 TABLET, FILM COATED ORAL at 20:44

## 2020-12-24 RX ADMIN — LEVOTHYROXINE SODIUM 50 MCG: 50 TABLET ORAL at 05:50

## 2020-12-24 RX ADMIN — ACYCLOVIR 400 MG: 400 TABLET ORAL at 08:43

## 2020-12-24 ASSESSMENT — PAIN SCALES - GENERAL
PAINLEVEL_OUTOF10: 0
PAINLEVEL_OUTOF10: 0

## 2020-12-24 NOTE — PROGRESS NOTES
Hospitalist Progress Note      Patient:  Payton Vincent    Unit/Bed:6K-02/002-A  YOB: 1950  MRN: [id number]   Acct: [de-identified]   PCP: SARITHA Carlos CNP  Date of Admission: 12/22/2020    Assessment/Plan:    1. Sepsis 2/2 UTI: Tmax 102. Heart rate 125. White blood cell count 11.2. Patient has failed outpatient therapies: Macrobid and doxy. Patient started on ceftriaxone on 12/22. Urine culture showing Mixed Growth. UA shows 15-25 white blood cell count. 2. Acute respiratory failure, hypoxia: Patient's baseline is room air. Patient required 6 L in the emergency department. Patient is currently only requiring 1 L nasal cannula. Continue to monitor. Steroids initiated. CXR tomorrow AM. IS. Acapella. Albuterol. 3. Centrilobular Emphysema, moderate to severe: Continue breathing treatment regimen. 4. HLD: Statin. 5. Hypothyroidism: Synthroid. 6. OAB: Continue home medications.     Chief Complaint: Weakness     Initial H and P:- \"78 y.o. female who presented to [facility] with weakness; it was reported in the ER that the patient complained of a headache, confusion, vomiting, diarrhea along with body aches; has recently been treated for UTI as she was initially on doxycycline and then switched over to [facility]; daughter reported to the ER staff that she was getting worse for 2 hours prior to coming in; patient is had a poor oral intake secondary to her not feeling well; the emergency department she was found to have a temperature 102.6, she was also hypoxic ranging 85 to 88% and was placed on 3 L with an O2 sat of 93%; her procalcitonin was noted to be 0.43; 19 was not detected; chest x-ray showed a possible underlying groundglass infiltrate so a CTA of her chest was done as she was tachycardic and hypoxic and it did not reveal a pulmonary embolism however did reveal moderate to severe centrilobular emphysema over no consolidation or pneumonitis; patient's heart rate improved with improvement of her temperature; the emergency department she was started on Rocephin 1 g daily; is being admitted to the hospital service for further care and evaluation. \"    12/23: Patient was admitted through the night. Patient has been tachycardic throughout her stay. Patient has received multiple boluses. CTA of the chest was negative for PE. Patient has no issues or complaints at this time. Subjective (past 24 hours):   No acute events overnight. Pt has been afebrile. Pt's urinary symptoms are improving. Past medical history, family history, social history and allergies reviewed again and is unchanged since admission. ROS patient denies chest pain, shortness of breath, headache, abdominal pain.     Medications:  Reviewed    Infusion Medications    sodium chloride 75 mL/hr at 12/24/20 0008     Scheduled Medications    acamprosate  666 mg Oral Nightly    atomoxetine  40 mg Oral Daily    calcium-vitamin D  1 tablet Oral BID  donepezil  10 mg Oral Nightly    DULoxetine  60 mg Oral Daily    levothyroxine  50 mcg Oral Daily    oxybutynin  5 mg Oral Nightly    atorvastatin  10 mg Oral Nightly    tiotropium  2 puff Inhalation Daily    sodium chloride flush  10 mL Intravenous 2 times per day    cefTRIAXone (ROCEPHIN) IV  1 g Intravenous Q24H    enoxaparin  30 mg Subcutaneous BID    acyclovir  400 mg Oral BID     PRN Meds: hydrOXYzine, sodium chloride flush, promethazine **OR** ondansetron, polyethylene glycol, potassium chloride **OR** potassium alternative oral replacement **OR** potassium chloride, magnesium sulfate, acetaminophen **OR** acetaminophen      Intake/Output Summary (Last 24 hours) at 12/24/2020 1431  Last data filed at 12/24/2020 1133  Gross per 24 hour   Intake 4666.39 ml   Output 0 ml   Net 4666.39 ml       Diet:  DIET GENERAL;    Exam:  /66   Pulse 99   Temp 99 °F (37.2 °C) (Oral)   Resp 18   Ht 5' 3\" (1.6 m)   Wt 140 lb 11.2 oz (63.8 kg)   SpO2 91%   BMI 24.92 kg/m²   General appearance: No apparent distress, appears stated age and cooperative. HEENT: Pupils equal, round, and reactive to light. Conjunctivae/corneas clear. Neck: Supple, with full range of motion. No jugular venous distention. Trachea midline. Respiratory:  Normal respiratory effort on 1L NC. Breath sounds diminished. Cardiovascular: Regular rate and rhythm with normal S1/S2 without murmurs, rubs or gallops. Abdomen: Soft, non-tender, non-distended with normal bowel sounds. Musculoskeletal: passive and active ROM x 4 extremities. Skin: Skin color, texture, turgor normal.  No rashes or lesions. Neurologic:  Neurovascularly intact without any focal sensory/motor deficits.  Cranial nerves: II-XII intact, grossly non-focal.  Psychiatric: Alert and oriented, thought content appropriate, normal insight  Capillary Refill: Brisk,< 3 seconds   Peripheral Pulses: +2 palpable, equal bilaterally     Labs:   Recent Labs     12/22/20 2202 12/22/20  2215 12/24/20  0759   WBC 11.3* 11.2* 7.6   HGB 13.6 13.6 12.3   HCT 42.2 41.3 38.0    220 148     Recent Labs     12/22/20 2202 12/23/20  0954 12/24/20  0759    138 141   K 3.7 3.8 3.8    107 106   CO2 22* 21* 23   BUN 18 16 16   CREATININE 0.8 0.7 0.6   CALCIUM 9.9 8.4* 8.4*     Recent Labs     12/22/20 2202 12/23/20  0954 12/24/20  0759   AST 43* 64* 92*   ALT 24 42 81*   BILITOT 0.5 0.3 0.3   ALKPHOS 44 35* 53     Microbiology:    Organism:  Lab Results   Component Value Date    ORG Mixed Growth     12/23/2020         Urine culture:   Lab Results   Component Value Date    LABURIN [place] count: 50,000-90,000 CFU/mL  12/18/2020     Urinalysis:      Lab Results   Component Value Date    NITRU NEGATIVE 12/23/2020    WBCUA 15-25 12/23/2020    BACTERIA NONE SEEN 12/23/2020    RBCUA 0-2 12/23/2020    BLOODU NEGATIVE 12/23/2020    SPECGRAV 1.020 12/18/2020    GLUCOSEU NEGATIVE 12/23/2020       Radiology:  CTA CHEST W WO CONTRAST   Final Result   1. No central pulmonary artery embolism. A small peripheral pulmonary    embolism is not excluded. 2.  Moderate to severe centrilobular emphysema. No    consolidation/pneumonitis. Improved aeration at the bases compared to    previous. 3.  Nonspecific mild mediastinal and left hilar lymphadenopathy-improved    compared to previous. No left suprahilar nodule. This document has been electronically signed by: Tabby Joaquin MD on    12/23/2020 02:53 AM      All CT scans at this facility use dose modulation, iterative    reconstruction, and/or weight-based   dosing when appropriate to reduce radiation dose to as low as reasonably    achievable. XR CHEST PORTABLE   Final Result   Impression:   1. Chronic interstitial changes/scarring bilaterally. An underlying    groundglass infiltrate may be present. 2.  Possible left suprahilar nodule. 3.  Recommend chest CT scan, with IV contrast if not contraindicated.

## 2020-12-25 ENCOUNTER — APPOINTMENT (OUTPATIENT)
Dept: GENERAL RADIOLOGY | Age: 70
DRG: 871 | End: 2020-12-25
Payer: MEDICARE

## 2020-12-25 VITALS
DIASTOLIC BLOOD PRESSURE: 76 MMHG | RESPIRATION RATE: 18 BRPM | BODY MASS INDEX: 24.49 KG/M2 | HEART RATE: 99 BPM | TEMPERATURE: 98.1 F | OXYGEN SATURATION: 92 % | SYSTOLIC BLOOD PRESSURE: 155 MMHG | WEIGHT: 138.2 LBS | HEIGHT: 63 IN

## 2020-12-25 LAB
ALBUMIN SERPL-MCNC: 3.4 G/DL (ref 3.5–5.1)
ALP BLD-CCNC: 64 U/L (ref 38–126)
ALT SERPL-CCNC: 65 U/L (ref 11–66)
ANION GAP SERPL CALCULATED.3IONS-SCNC: 8 MEQ/L (ref 8–16)
AST SERPL-CCNC: 55 U/L (ref 5–40)
BASOPHILS # BLD: 0.4 %
BASOPHILS ABSOLUTE: 0 THOU/MM3 (ref 0–0.1)
BILIRUB SERPL-MCNC: 0.2 MG/DL (ref 0.3–1.2)
BUN BLDV-MCNC: 10 MG/DL (ref 7–22)
CALCIUM SERPL-MCNC: 8.2 MG/DL (ref 8.5–10.5)
CHLORIDE BLD-SCNC: 112 MEQ/L (ref 98–111)
CO2: 26 MEQ/L (ref 23–33)
CREAT SERPL-MCNC: 0.5 MG/DL (ref 0.4–1.2)
D-DIMER QUANTITATIVE: 1511 NG/ML FEU (ref 0–500)
EOSINOPHIL # BLD: 1.4 %
EOSINOPHILS ABSOLUTE: 0.1 THOU/MM3 (ref 0–0.4)
ERYTHROCYTE [DISTWIDTH] IN BLOOD BY AUTOMATED COUNT: 12.4 % (ref 11.5–14.5)
ERYTHROCYTE [DISTWIDTH] IN BLOOD BY AUTOMATED COUNT: 41.4 FL (ref 35–45)
GFR SERPL CREATININE-BSD FRML MDRD: > 90 ML/MIN/1.73M2
GLUCOSE BLD-MCNC: 107 MG/DL (ref 70–108)
HCT VFR BLD CALC: 34.4 % (ref 37–47)
HEMOGLOBIN: 11.2 GM/DL (ref 12–16)
IMMATURE GRANS (ABS): 0.02 THOU/MM3 (ref 0–0.07)
IMMATURE GRANULOCYTES: 0.4 %
LYMPHOCYTES # BLD: 16.2 %
LYMPHOCYTES ABSOLUTE: 0.8 THOU/MM3 (ref 1–4.8)
MCH RBC QN AUTO: 29.8 PG (ref 26–33)
MCHC RBC AUTO-ENTMCNC: 32.6 GM/DL (ref 32.2–35.5)
MCV RBC AUTO: 91.5 FL (ref 81–99)
MONOCYTES # BLD: 9.1 %
MONOCYTES ABSOLUTE: 0.4 THOU/MM3 (ref 0.4–1.3)
NUCLEATED RED BLOOD CELLS: 0 /100 WBC
PLATELET # BLD: 179 THOU/MM3 (ref 130–400)
PMV BLD AUTO: 12.1 FL (ref 9.4–12.4)
POTASSIUM REFLEX MAGNESIUM: 3.7 MEQ/L (ref 3.5–5.2)
RBC # BLD: 3.76 MILL/MM3 (ref 4.2–5.4)
SEG NEUTROPHILS: 72.5 %
SEGMENTED NEUTROPHILS ABSOLUTE COUNT: 3.6 THOU/MM3 (ref 1.8–7.7)
SODIUM BLD-SCNC: 146 MEQ/L (ref 135–145)
TOTAL PROTEIN: 5.8 G/DL (ref 6.1–8)
WBC # BLD: 4.9 THOU/MM3 (ref 4.8–10.8)

## 2020-12-25 PROCEDURE — 2580000003 HC RX 258: Performed by: NURSE PRACTITIONER

## 2020-12-25 PROCEDURE — 6360000002 HC RX W HCPCS: Performed by: PHYSICIAN ASSISTANT

## 2020-12-25 PROCEDURE — 94761 N-INVAS EAR/PLS OXIMETRY MLT: CPT

## 2020-12-25 PROCEDURE — 71045 X-RAY EXAM CHEST 1 VIEW: CPT

## 2020-12-25 PROCEDURE — 94640 AIRWAY INHALATION TREATMENT: CPT

## 2020-12-25 PROCEDURE — 85379 FIBRIN DEGRADATION QUANT: CPT

## 2020-12-25 PROCEDURE — 6360000002 HC RX W HCPCS: Performed by: NURSE PRACTITIONER

## 2020-12-25 PROCEDURE — 99232 SBSQ HOSP IP/OBS MODERATE 35: CPT | Performed by: PHYSICIAN ASSISTANT

## 2020-12-25 PROCEDURE — 80053 COMPREHEN METABOLIC PANEL: CPT

## 2020-12-25 PROCEDURE — 94669 MECHANICAL CHEST WALL OSCILL: CPT

## 2020-12-25 PROCEDURE — 6370000000 HC RX 637 (ALT 250 FOR IP): Performed by: NURSE PRACTITIONER

## 2020-12-25 PROCEDURE — 85025 COMPLETE CBC W/AUTO DIFF WBC: CPT

## 2020-12-25 PROCEDURE — 6370000000 HC RX 637 (ALT 250 FOR IP): Performed by: PHYSICIAN ASSISTANT

## 2020-12-25 PROCEDURE — 36415 COLL VENOUS BLD VENIPUNCTURE: CPT

## 2020-12-25 RX ORDER — AZITHROMYCIN 250 MG/1
500 TABLET, FILM COATED ORAL DAILY
Status: DISCONTINUED | OUTPATIENT
Start: 2020-12-25 | End: 2020-12-25 | Stop reason: HOSPADM

## 2020-12-25 RX ORDER — ALBUTEROL SULFATE 90 UG/1
2 AEROSOL, METERED RESPIRATORY (INHALATION) 4 TIMES DAILY PRN
Qty: 1 INHALER | Refills: 0 | Status: SHIPPED | OUTPATIENT
Start: 2020-12-25 | End: 2022-04-14 | Stop reason: SDUPTHER

## 2020-12-25 RX ORDER — CEFDINIR 300 MG/1
300 CAPSULE ORAL 2 TIMES DAILY
Qty: 10 CAPSULE | Refills: 0 | Status: SHIPPED | OUTPATIENT
Start: 2020-12-25 | End: 2020-12-30

## 2020-12-25 RX ORDER — AZITHROMYCIN 500 MG/1
500 TABLET, FILM COATED ORAL DAILY
Qty: 5 TABLET | Refills: 0 | Status: SHIPPED | OUTPATIENT
Start: 2020-12-25 | End: 2020-12-30

## 2020-12-25 RX ORDER — PREDNISONE 20 MG/1
20 TABLET ORAL DAILY
Qty: 3 TABLET | Refills: 0 | Status: SHIPPED | OUTPATIENT
Start: 2020-12-26 | End: 2020-12-29

## 2020-12-25 RX ADMIN — TIOTROPIUM BROMIDE INHALATION SPRAY 2 PUFF: 3.12 SPRAY, METERED RESPIRATORY (INHALATION) at 08:23

## 2020-12-25 RX ADMIN — ACYCLOVIR 400 MG: 400 TABLET ORAL at 09:20

## 2020-12-25 RX ADMIN — ALBUTEROL SULFATE 2.5 MG: 2.5 SOLUTION RESPIRATORY (INHALATION) at 15:25

## 2020-12-25 RX ADMIN — DULOXETINE HYDROCHLORIDE 60 MG: 60 CAPSULE, DELAYED RELEASE ORAL at 09:20

## 2020-12-25 RX ADMIN — LEVOTHYROXINE SODIUM 50 MCG: 50 TABLET ORAL at 04:20

## 2020-12-25 RX ADMIN — HYDROXYZINE HYDROCHLORIDE 25 MG: 25 TABLET ORAL at 09:20

## 2020-12-25 RX ADMIN — ALBUTEROL SULFATE 2.5 MG: 2.5 SOLUTION RESPIRATORY (INHALATION) at 08:15

## 2020-12-25 RX ADMIN — ATOMOXETINE 40 MG: 40 CAPSULE ORAL at 09:20

## 2020-12-25 RX ADMIN — CALCIUM CARBONATE-VITAMIN D TAB 500 MG-200 UNIT 1 TABLET: 500-200 TAB at 09:20

## 2020-12-25 RX ADMIN — ENOXAPARIN SODIUM 30 MG: 30 INJECTION SUBCUTANEOUS at 09:20

## 2020-12-25 RX ADMIN — CEFTRIAXONE SODIUM 1 G: 1 INJECTION, POWDER, FOR SOLUTION INTRAMUSCULAR; INTRAVENOUS at 00:35

## 2020-12-25 RX ADMIN — PREDNISONE 20 MG: 20 TABLET ORAL at 09:20

## 2020-12-25 RX ADMIN — ALBUTEROL SULFATE 2.5 MG: 2.5 SOLUTION RESPIRATORY (INHALATION) at 12:18

## 2020-12-25 RX ADMIN — AZITHROMYCIN 500 MG: 250 TABLET, FILM COATED ORAL at 13:48

## 2020-12-25 ASSESSMENT — PAIN SCALES - GENERAL: PAINLEVEL_OUTOF10: 0

## 2020-12-25 NOTE — PROGRESS NOTES
A home oxygen evaluation has been completed. [x]Patient is an inpatient. It is expected that the patient will be discharged within the next 48 hours. Qualified provider to write order for home prescription if patient qualifies. Social service/care managers will arrange for home oxygen. If patient is active, arrange for Home Medical supplier to assess for Oxygen Conserving Device per pulse oximetry. []Patient is an outpatient. Results will be faxed to the ordering provider. Qualified provider to write order for home prescription if patient qualifies and arranges for home oxygen. Patient was placed on room air for >60 minutes. SpO2 was 90 % on room air at rest. Patients SpO2 was 89% or above and did not qualify for home oxygen. Patient was walked for 6 minutes. SpO2 was 84 % during walking. Patients SpO2 was below 89% and qualified for home oxygen. Oxygen was applied at 2 lpm via nasal cannula to maintain a SpO2 between 90-92% while walking. Actual SpO2 was 93 %. Note: For any SpO2 at 70% see policy and procedure for possible qualifications.

## 2020-12-25 NOTE — PROGRESS NOTES
A home oxygen evaluation has been completed. [x]Patient is an inpatient. It is expected that the patient will be discharged within the next 48 hours. Qualified provider to write order for home prescription if patient qualifies. Social service/care managers will arrange for home oxygen. If patient is active, arrange for Home Medical supplier to assess for Oxygen Conserving Device per pulse oximetry. []Patient is an outpatient. Results will be faxed to the ordering provider. Qualified provider to write order for home prescription if patient qualifies and arranges for home oxygen. Patient was placed on room air during morning hours. SpO2 was 85 % on room air at rest. Placed on 3L to maintain sats of 92% Patient was walked for 8 minutes. SpO2 was 87 % during walking. Patients SpO2 was below 89% and qualified for home oxygen. Oxygen was applied at 6 lpm via nasal cannula to maintain a SpO2 between 90-92% while walking. Actual SpO2 was 91 %. If oxygen need is greater than 4 lpm the SpO2 on 4 lpm was 89. Note: For any SpO2 at 01% see policy and procedure for possible qualifications.

## 2020-12-25 NOTE — PROGRESS NOTES
Discharge teaching and instructions for diagnosis/procedure of UTI completed with patient using teachback method. AVS reviewed. Printed prescriptions given to patient. Patient voiced understanding regarding prescriptions, follow up appointments, and care of self at home. Discharged in a wheelchair to  home with support per family. Daughter here to  patient, here with O2 concentrator, patient left with O2 on and educated on use of by daughter with this RN in room.

## 2020-12-28 ENCOUNTER — TELEPHONE (OUTPATIENT)
Dept: FAMILY MEDICINE CLINIC | Age: 70
End: 2020-12-28

## 2020-12-28 LAB
BLOOD CULTURE, ROUTINE: NORMAL
BLOOD CULTURE, ROUTINE: NORMAL

## 2020-12-28 NOTE — TELEPHONE ENCOUNTER
Cesar 45 Transitions Initial Follow Up Call    Outreach made within 2 business days of discharge: Yes    Patient: Rocio Jackson Patient : 1950   MRN: [id number]  Reason for Admission: There are no discharge diagnoses documented for the most recent discharge.   Discharge Date: 20       Spoke with: SWETA to return call     Discharge department/facility: [facility]        Scheduled appointment with PCP within 7-14 days    Follow Up  Future Appointments   Date Time Provider Otis Dennis   2021  3:00 PM Veronica Rice [facility]   3/4/2021  1:00 PM SARITHA Cherry   2021 11:00 AM STR CT IMAGING RM1  OP [facility] STR[facility] OUT EXP STR Radiolog   2021  1:00 PM Giovany Cervantes [facility] - [address] [facility]   2021  2:30 PM SARITHA Washburn - [address] [facility] [address] [facility]   2021 11:00 AM Dondra Schirmer, APRN - CNP N SRPX Pain [facility] - [address] [place], [place]

## 2020-12-28 NOTE — DISCHARGE SUMMARY
Hospitalist Discharge Summary        Patient: Nini Kennedy  YOB: 1950  MRN: [id number]   Acct: [de-identified]    Primary Care Physician: SARITHA Moise CNP    Admit date  12/22/2020    Discharge date: 12/25/2020 6:31 PM    Chief Complaint on presentation :-  Weakness     Discharge Assessment and Plan:-   1. Sepsis 2/2 UTI: Tmax 102. Heart rate 125. White blood cell count 11.2. Patient has failed outpatient therapies: Macrobid and doxy. Patient started on ceftriaxone on 12/22. Urine culture showing Mixed Growth. UA shows 15-25 white blood cell count. Pt was discharged on [address] 2. Acute respiratory failure, hypoxia: Patient's baseline is room air. Patient required 6 L in the emergency department. Patient is currently only requiring 1 L nasal cannula. Continue to monitor. Steroids initiated. Omnicef & Azithro. IS. Acapella. Albuterol. Home O2 eval was completed and patient required 2L with activity. 3. Centrilobular Emphysema, moderate to severe: Continue breathing treatment regimen. 4. HLD: Statin. 5. Hypothyroidism: Synthroid. 6. OAB: Continue home medications.     Initial H and P and Hospital course:- \"70 y.o. female who presented to [address] [place] [address] with weakness; it was reported in the ER that the patient complained of a headache, confusion, vomiting, diarrhea along with body aches; has recently been treated for UTI as she was initially on doxycycline and then switched over to Macrobid; daughter reported to the ER staff that she was getting worse for 2 hours prior to coming in; patient is had a poor oral intake secondary to her not feeling well; the emergency department she was found to have a temperature 102.6, she was also hypoxic ranging 85 to 88% and was placed on 3 L with an O2 sat of 93%; her procalcitonin was noted to be 0.43; 19 was not detected; chest x-ray showed a possible underlying groundglass infiltrate so a CTA of her chest was done as she was tachycardic and hypoxic and it did not reveal a pulmonary embolism however did reveal moderate to severe centrilobular emphysema over no consolidation or pneumonitis; patient's heart rate improved with improvement of her temperature; the emergency department she was started on Rocephin 1 g daily; is being admitted to the hospital service for further care and evaluation. \"     12/23: Patient was admitted through the night. Patient has been tachycardic throughout her stay. Patient has received multiple boluses. CTA of the chest was negative for PE. Patient has no issues or complaints at this time.     12/24: No acute events overnight. Pt has been afebrile. Pt's urinary symptoms are improving. 12/25: Pt was very anxious throughout the day. The patient was very anxious and sad about not being home for the holiday and being a \"bother\" for her daughter, who was making [place] dinner. Home O2 eval was done during lunch time, the patient was anxious about leaving while her daughter was making dinner. During her first Home O2 eval, she was breathing 30-40 times a minute and saying \"I do not think I can do this. \" After her daughter was done with [place] dinner, the patient stated that she felt more calm and asked to do the evaluation again. The second Home O2 eval showed no oxygen at rest and 2L NC while exercising. On the day of discharge, it was explained to the patient that it was very important to follow up with his PCP to have continued care. Appointments were made and information was given. Physical Exam:-  General appearance: No apparent distress, appears stated age and cooperative. HEENT: Pupils equal, round, and reactive to light. Conjunctivae/corneas clear. Neck: Supple, with full range of motion. No jugular venous distention. Trachea midline. Respiratory:  Normal respiratory effort on 1L NC. Breath sounds diminished. Cardiovascular: Regular rate and rhythm with normal S1/S2 without murmurs, rubs or gallops. Abdomen: Soft, non-tender, non-distended with normal bowel sounds. Musculoskeletal: passive and active ROM x 4 extremities. Skin: Skin color, texture, turgor normal.  No rashes or lesions. Neurologic:  Neurovascularly intact without any focal sensory/motor deficits. Cranial nerves: II-XII intact, grossly non-focal.  Psychiatric: Alert and oriented, thought content appropriate, normal insight  Capillary Refill: Brisk,< 3 seconds   Peripheral Pulses: +2 palpable, equal bilaterally     Vitals:   No data found.   Weight:   Weight: 138 lb 3.2 oz (62.7 kg)   24 hour intake/output:   No intake or output data in the 24 hours ending 12/28/20 1831    Discharge Medications:- Medication List      START taking these medications    albuterol sulfate  (90 Base) MCG/ACT inhaler  Commonly known as: Ventolin HFA  Inhale 2 puffs into the lungs 4 times daily as needed for Wheezing     azithromycin 500 MG tablet  Commonly known as: ZITHROMAX  Take 1 tablet by mouth daily for 5 days     cefdinir 300 MG capsule  Commonly known as: OMNICEF  Take 1 capsule by mouth 2 times daily for 5 days     predniSONE 20 MG tablet  Commonly known as: DELTASONE  Take 1 tablet by mouth daily for 3 days        CONTINUE taking these medications    acamprosate 333 MG tablet  Commonly known as: CAMPRAL  Take 2 tablets by mouth nightly     acyclovir 400 MG tablet  Commonly known as: ZOVIRAX     alendronate 70 MG tablet  Commonly known as: Fosamax  Take 1 tablet by mouth every 7 days Take with water on an empty stomach- wait 30 minutes before eating or taking other meds. Avoid lying down for 30 minutes after dose.      atomoxetine 40 MG capsule  Commonly known as: STRATTERA  Take 1 capsule by mouth daily     Calcium-Vitamin D 600-200 MG-UNIT Caps     donepezil 10 MG tablet  Commonly known as: ARICEPT  Take 1 tablet by mouth in the evening     DULoxetine 60 MG extended release capsule  Commonly known as: CYMBALTA  Take 1 capsule by mouth once daily     fish oil-omega-3 fatty acids 1000 MG capsule     Handicap Placard Misc  by Does not apply route Expires 12/2025     hydrOXYzine 25 MG tablet  Commonly known as: ATARAX  Take 1-2 tablets by mouth every 8 hours as needed for Anxiety     levothyroxine 50 MCG tablet  Commonly known as: SYNTHROID  Take 1 tablet by mouth once daily     MULTI-VITAMIN PO     oxybutynin 5 MG extended release tablet  Commonly known as: DITROPAN-XL  Take 1 tablet by mouth once daily     simvastatin 10 MG tablet  Commonly known as: ZOCOR  Take 1 tablet by mouth nightly     tiotropium 2.5 MCG/ACT Aers inhaler  Commonly known as: Spiriva Respimat  Inhale 2 puffs into the lungs daily STOP taking these medications    mirabegron 50 MG Tb24  Commonly known as: Myrbetriq     nitrofurantoin (macrocrystal-monohydrate) 100 MG capsule  Commonly known as: MACROBID           Where to Get Your Medications      These medications were sent to [facility] [id number] [id number] - [place], [address] [phone]  [address], [facility] [address]    Phone: [phone]   · albuterol sulfate  (90 Base) MCG/ACT inhaler  · azithromycin 500 MG tablet  · cefdinir 300 MG capsule  · predniSONE 20 MG tablet          Labs :-  Labs:         Recent Labs     12/22/20  2215 12/24/20  0759 12/25/20  0536   WBC 11.2* 7.6 4.9   HGB 13.6 12.3 11.2*   HCT 41.3 38.0 34.4*    148 179            Recent Labs     12/23/20  0954 12/24/20  0759 12/25/20  0536    141 146*   K 3.8 3.8 3.7    106 112*   CO2 21* 23 26   BUN 16 16 10   CREATININE 0.7 0.6 0.5   CALCIUM 8.4* 8.4* 8.2*            Recent Labs     12/23/20  0954 12/24/20  0759 12/25/20  0536   AST 64* 92* 55*   ALT 42 81* 65   BILITOT 0.3 0.3 0.2*   ALKPHOS 35* 53 59         Microbiology:    Blood culture #1:   Lab Results   Component Value Date    BC No growth-preliminary No growth  12/22/2020     Organism:    Lab Results   Component Value Date    LABGRAM  04/26/2019     Quality of sputum specimen: Specimen acceptable. Moderate segmented neutrophils observed. Few epithelial cells observed. Rare gram positive cocci occurring singly and in pairs. Rare gram positive bacilli.        Urine culture:   Lab Results   Component Value Date    LABURIN [place] count: 50,000-90,000 CFU/mL  12/18/2020     Lab Results   Component Value Date    ORG Mixed Growth     12/23/2020        Urinalysis:      Lab Results   Component Value Date    NITRU NEGATIVE 12/23/2020    WBCUA 15-25 12/23/2020    BACTERIA NONE SEEN 12/23/2020    RBCUA 0-2 12/23/2020    BLOODU NEGATIVE 12/23/2020    SPECGRAV 1.020 12/18/2020    GLUCOSEU NEGATIVE 12/23/2020 Radiology:-  Cta Chest W Wo Contrast    Result Date: 12/23/2020  CT angiogram of chest with MIP postprocessing: Comparison:  CR,SR  - XR CHEST PORTABLE  - 12/22/2020 09:50 PM EST  CT,SR  - CTA CHEST W WO CONTRAST  - 04/26/2019 03:21 PM EDT FINDINGS: Pulmonary arteries: Pulmonary arteries are well-opacified and demonstrated centrally, without thrombus. Distal segmental pulmonary arteries are suboptimally demonstrated. Thoracic aorta: Normal caliber without dissection. Severe calcific atheromatous disease. Mediastinum/heart: Heart size is normal.  No pericardial effusion. Mild mediastinal and left hilar lymphadenopathy. Trachea and mainstem bronchi of normal caliber without intraluminal lesion. Visualized thyroid gland without mass or enlargement. Esophagus without significant distention or evidence of mass. Lungs/pleura: Moderate to severe centrilobular emphysema with bilateral lung hyperexpansion. No consolidation, pleural effusion or pneumothorax. Atelectasis or scarring at the lung bases. Uppermost abdomen: Limited assessment, unremarkable. Bone/MSK: Moderate spondylosis of the thoracic spine. No acute vertebral body or rib fracture. No destructive osseous lesion. 1.  No central pulmonary artery embolism. A small peripheral pulmonary embolism is not excluded. 2.  Moderate to severe centrilobular emphysema. No consolidation/pneumonitis. Improved aeration at the bases compared to previous. 3.  Nonspecific mild mediastinal and left hilar lymphadenopathy-improved compared to previous. No left suprahilar nodule. This document has been electronically signed by: Farhana Rojas MD on 12/23/2020 02:53 AM All CT scans at this facility use dose modulation, iterative reconstruction, and/or weight-based dosing when appropriate to reduce radiation dose to as low as reasonably achievable.      Xr Chest Portable    Result Date: 12/22/2020 ** ADDENDUM #1 ** Receipt of this report by the clinical staff was confirmed with Steven Razo RN on Dec 22, 2020 22:47:00 EST. This document has been electronically signed by: Ralf Tracy on 12/22/2020 10:47 PM ** ORIGINAL REPORT ** 1-view chest x-ray. Comparison:  DX,SR  - XR CHEST STANDARD (2 VW)  - 01/06/2020 04:03 PM EST Findings: Normal lung volumes. Chronic interstitial changes bilaterally, without consolidation. Suggestion of mild groundglass infiltrates bilaterally, more so on the left. Possible left suprahilar 13 x 9 mm nodule, new compared to previous. No pleural effusion or pneumothorax. Heart size normal. No pulmonary edema. No significant degenerative changes of the thoracic spine. No acute fracture. Impression: 1. Chronic interstitial changes/scarring bilaterally. An underlying groundglass infiltrate may be present. 2.  Possible left suprahilar nodule. 3.  Recommend chest CT scan, with IV contrast if not contraindicated.  This document has been electronically signed by: Ayan Verdin MD on 12/22/2020 10:29 PM        Follow-up scheduled after discharge :-    in the next few days with SARITHA Cabrera - CNP    Consultations during this hospital stay:-  [] NONE [] Cardiology  [] Nephrology  [] Hemo onco   [] GI   [] ID  [] Endocrine  [] Pulm    [] Neuro    [] Psych   [] Urology  [] ENT   [] G SURGERY   []Ortho    []CV surg    [] Palliative  [] Hospice [] Pain management   []    []TCU   [] PT/OT  OTHERS:-    Disposition: home  Condition at Discharge: Stable    Time Spent:- 40 minutes    Electronically signed by ANNAMARIA Martinez on 12/28/2020 at 6:31 PM  Discharging Hospitalist

## 2020-12-28 NOTE — TELEPHONE ENCOUNTER
Cesar 45 Transitions Initial Follow Up Call    Outreach made within 2 business days of discharge: Yes    Patient: Osiel Dueñas Patient : 1950   MRN: [id number]  Reason for Admission: There are no discharge diagnoses documented for the most recent discharge.   Discharge Date: 20       Spoke with: SWETA to return call     Discharge department/facility: [facility]      Scheduled appointment with PCP within 7-14 days    Follow Up  Future Appointments   Date Time Provider Otis Dennis   2021  3:00 PM Veronica Gilbert Jose [address] [facility]   3/4/2021  1:00 PM SARITHA Nichols   2021 11:00 AM STR CT IMAGING RM1  [facility] STR[facility] OUT EXP STR Radiolog   2021  1:00 PM Rosa Maria Harmon [facility] - [address] [facility]   2021  2:30 PM SARITHA Willoughby - [address] [facility]   2021 11:00 AM SARITHA Rosa - CNP N SRPX Pain [facility] - [address] [address]

## 2020-12-30 ENCOUNTER — OFFICE VISIT (OUTPATIENT)
Dept: FAMILY MEDICINE CLINIC | Age: 70
End: 2020-12-30
Payer: MEDICARE

## 2020-12-30 VITALS
DIASTOLIC BLOOD PRESSURE: 72 MMHG | BODY MASS INDEX: 23.01 KG/M2 | SYSTOLIC BLOOD PRESSURE: 118 MMHG | HEART RATE: 112 BPM | WEIGHT: 129.9 LBS | RESPIRATION RATE: 20 BRPM | TEMPERATURE: 97 F

## 2020-12-30 PROCEDURE — 99215 OFFICE O/P EST HI 40 MIN: CPT | Performed by: NURSE PRACTITIONER

## 2020-12-30 PROCEDURE — 3017F COLORECTAL CA SCREEN DOC REV: CPT | Performed by: NURSE PRACTITIONER

## 2020-12-30 PROCEDURE — G8420 CALC BMI NORM PARAMETERS: HCPCS | Performed by: NURSE PRACTITIONER

## 2020-12-30 PROCEDURE — 4040F PNEUMOC VAC/ADMIN/RCVD: CPT | Performed by: NURSE PRACTITIONER

## 2020-12-30 PROCEDURE — 1036F TOBACCO NON-USER: CPT | Performed by: NURSE PRACTITIONER

## 2020-12-30 PROCEDURE — 1111F DSCHRG MED/CURRENT MED MERGE: CPT | Performed by: NURSE PRACTITIONER

## 2020-12-30 PROCEDURE — 3023F SPIROM DOC REV: CPT | Performed by: NURSE PRACTITIONER

## 2020-12-30 PROCEDURE — 1123F ACP DISCUSS/DSCN MKR DOCD: CPT | Performed by: NURSE PRACTITIONER

## 2020-12-30 PROCEDURE — 1090F PRES/ABSN URINE INCON ASSESS: CPT | Performed by: NURSE PRACTITIONER

## 2020-12-30 PROCEDURE — G8926 SPIRO NO PERF OR DOC: HCPCS | Performed by: NURSE PRACTITIONER

## 2020-12-30 PROCEDURE — G8427 DOCREV CUR MEDS BY ELIG CLIN: HCPCS | Performed by: NURSE PRACTITIONER

## 2020-12-30 PROCEDURE — G8399 PT W/DXA RESULTS DOCUMENT: HCPCS | Performed by: NURSE PRACTITIONER

## 2020-12-30 PROCEDURE — G8482 FLU IMMUNIZE ORDER/ADMIN: HCPCS | Performed by: NURSE PRACTITIONER

## 2020-12-30 RX ORDER — ALBUTEROL SULFATE 2.5 MG/3ML
2.5 SOLUTION RESPIRATORY (INHALATION) EVERY 6 HOURS PRN
Qty: 120 EACH | Refills: 3 | Status: SHIPPED | OUTPATIENT
Start: 2020-12-30

## 2020-12-30 RX ORDER — UMECLIDINIUM BROMIDE AND VILANTEROL TRIFENATATE 62.5; 25 UG/1; UG/1
1 POWDER RESPIRATORY (INHALATION) DAILY
Qty: 60 EACH | Refills: 11 | Status: SHIPPED | OUTPATIENT
Start: 2020-12-30 | End: 2021-08-26 | Stop reason: SDUPTHER

## 2020-12-30 NOTE — PROGRESS NOTES
Visit Information    Have you changed or started any medications since your last visit including any over-the-counter medicines, vitamins, or herbal medicines? yes - see list   Are you having any side effects from any of your medications? -  no  Have you stopped taking any of your medications? Is so, why? -  no    Have you seen any other physician or provider since your last visit? No  Have you had any other diagnostic tests since your last visit? Yes - Records Obtained  Have you been seen in the emergency room and/or had an admission to a hospital since we last saw you? Yes - Records Obtained  Have you had your routine dental cleaning in the past 6 months? yes - few months ago    Have you activated your [employer] account? If not, what are your barriers?  Yes     Patient Care Team:  SARITHA Delgado CNP as PCP - General (Family Nurse Practitioner)  SARITHA Delgado CNP as PCP - [facility] Provider  Noreen Mejia MD as Consulting Physician (Ophthalmology)    Medical History Review  Past Medical, Family, and Social History reviewed and does contribute to the patient presenting condition    Health Maintenance   Topic Date Due    DTaP/Tdap/Td vaccine (1 - Tdap) 06/13/1969    Shingles Vaccine (2 of 2) 12/08/2020    Annual Wellness Visit (AWV)  06/09/2021    Lipid screen  06/16/2021    TSH testing  06/16/2021    Low dose CT lung screening  12/23/2021    Breast cancer screen  06/18/2022    Colon cancer screen colonoscopy  08/29/2029    DEXA (modify frequency per FRAX score)  Completed    Flu vaccine  Completed    Pneumococcal 65+ years Vaccine  Completed    Hepatitis C screen  Completed    Hepatitis A vaccine  Aged Out    Hepatitis B vaccine  Aged Out    Hib vaccine  Aged Out    Meningococcal (ACWY) vaccine  Aged Out

## 2020-12-30 NOTE — PROGRESS NOTES
Post-Discharge Transitional Care Management Services      Pa Jung   YOB: 1950    Date of Visit:  12/30/2020  30 DayPost-Discharge Date: 1/25/21    Chief Complaint   Patient presents with    Follow-Up from [facility] 12/25/2020       Admit date  12/22/2020                       Discharge date: 12/25/2020 6:31 PM     Chief Complaint on presentation :-  Weakness      Discharge Assessment and Plan:-   1. Sepsis 2/2 UTI: Tmax 102.  Heart rate 125.  White blood cell count 11.2.  Patient has failed outpatient therapies: Macrobid and doxy.  Patient started on ceftriaxone on 12/22.  Urine culture showing Mixed [place] shows 15-25 white blood cell count. Pt was discharged on [address] 2. Acute respiratory failure, hypoxia: Patient's baseline is room air.  Patient required 6 L in the emergency department. Sha Graham is currently only requiring 1 L nasal cannula.  Continue to monitor. Steroids initiated. Omnicef & Azithro. IS. Acapella. Albuterol. Home O2 eval was completed and patient required 2L with activity. 3. Centrilobular Emphysema, moderate to severe: Continue breathing treatment regimen. 4. HLD: Statin. 5. Hypothyroidism: Synthroid.   6. OAB: Continue home medications.     Initial H and P and Hospital course:-  \"70 y.o. female who presented to [address] [place] [address] with weakness; it was reported in the ER that the patient complained of a headache, confusion, vomiting, diarrhea along with body aches; has recently been treated for UTI as she was initially on doxycycline and then switched over to Macrobid; daughter reported to the ER staff that she was getting worse for 2 hours prior to coming in; patient is had a poor oral intake secondary to her not feeling well; the emergency department she was found to have a temperature 102.6, she was also hypoxic ranging 85 to 88% and was placed on 3 L with an O2 sat of 93%; her procalcitonin was noted to be 0.43; 19 was not fatty acids 1000 MG capsule Take 1 g by mouth. One tablet daily along with red yeast rice           Vitals:    12/30/20 1145   BP: 118/72   Site: Left Upper Arm   Position: Sitting   Cuff Size: Small Adult   Pulse: 112   Resp: 20   Temp: 97 °F (36.1 °C)   TempSrc: Temporal   Weight: 129 lb 14.4 oz (58.9 kg)     Body mass index is 23.01 kg/m². Wt Readings from Last 3 Encounters:   12/30/20 129 lb 14.4 oz (58.9 kg)   12/25/20 138 lb 3.2 oz (62.7 kg)   11/03/20 131 lb 12.8 oz (59.8 kg)     BP Readings from Last 3 Encounters:   12/30/20 118/72   12/25/20 (!) 155/76   11/03/20 118/68        Patient was admitted to [address] [place] [address] from 12/22/20 to 12/25/20 for UTI, COPD. Inpatient course: Discharge summary reviewed- see chart. Current status: breathing improved. UTI symptoms improved    Review of Systems:  Review of Systems   Constitutional: Negative for chills and fever. HENT: Negative. Respiratory: Positive for shortness of breath. Negative for cough and chest tightness. Cardiovascular: Negative for chest pain. Gastrointestinal: Negative for abdominal pain and nausea. Genitourinary: Negative for dysuria, frequency and urgency. Neurological: Negative for dizziness, light-headedness and headaches. Psychiatric/Behavioral: Positive for confusion. Negative for dysphoric mood. The patient is nervous/anxious. Physical Exam:  Physical Exam  Constitutional:       General: She is not in acute distress. HENT:      Head: Normocephalic. Right Ear: Tympanic membrane normal.      Left Ear: Tympanic membrane normal.      Nose: Nose normal.   Eyes:      Pupils: Pupils are equal, round, and reactive to light. Neck:      Musculoskeletal: Normal range of motion and neck supple. Vascular: No carotid bruit. Cardiovascular:      Rate and Rhythm: Normal rate and regular rhythm. Pulses: Normal pulses. Heart sounds: Normal heart sounds. No murmur.    Pulmonary:      Effort: Pulmonary effort is normal. No accessory muscle usage or respiratory distress. Breath sounds: Decreased breath sounds present. No wheezing. Abdominal:      General: Bowel sounds are normal. There is no distension. Palpations: Abdomen is soft. Tenderness: There is no abdominal tenderness. Musculoskeletal: Normal range of motion. General: No tenderness. Skin:     General: Skin is warm and dry. Findings: No rash. Neurological:      Mental Status: She is alert and oriented to person, place, and time. Psychiatric:         Attention and Perception: She is inattentive. Mood and Affect: Mood is anxious and depressed. Behavior: Behavior is cooperative. Thought Content: Thought content normal.         Cognition and Memory: She exhibits impaired recent memory. Judgment: Judgment normal.           Assessment/Plan:  Venecia Painting was seen today for follow-up from hospital.    Diagnoses and all orders for this visit:    Sepsis secondary to UTI [facility])    Acute cystitis without hematuria    OAB (overactive bladder)    Acute respiratory failure with hypoxia (HCC)    Centrilobular emphysema (HCC)  -     umeclidinium-vilanterol (ANORO ELLIPTA) 62.5-25 MCG/INH AEPB inhaler; Inhale 1 puff into the lungs daily  -     Nebulizer Administration Set; Future  -     albuterol (PROVENTIL) (2.5 MG/3ML) 0.083% nebulizer solution; Take 3 mLs by nebulization every 6 hours as needed for Wheezing    Major depressive disorder, single episode, in full remission ([facility] [address]    Cognitive impairment due to CVA. H/O: CVA (cerebrovascular accident), aneurysm bleed, Jan 2015    Alcohol abuse          MDM:  Follow up with UROLOGY. Continue O2 with exercise. Daughter will monitor via pulse ox. Switch Spiriva to Anoro for ease of use. NEB rx sent. Continue present treatment.   KNA    Diagnostic test results reviewed: inpatient labs, culture/micro-urine, EKG, chest x-ray and CT angiogram-Chest    Patient risk of morbidity and mortality: high    Medical Decision Making: high complexity

## 2021-01-21 ENCOUNTER — TELEPHONE (OUTPATIENT)
Dept: FAMILY MEDICINE CLINIC | Age: 71
End: 2021-01-21

## 2021-01-21 NOTE — TELEPHONE ENCOUNTER
Pt called office left  stating she received a letter from her secondary insurance stating one of her medications is not covered. They sent a form for you to complete to make an exception for this and is asking if you want her to drop it off to the office. Says you have filled one out before for this. Please advise.

## 2021-01-22 PROBLEM — N39.0 UTI (URINARY TRACT INFECTION): Status: RESOLVED | Noted: 2020-12-23 | Resolved: 2021-01-22

## 2021-01-23 DIAGNOSIS — N39.0 RECURRENT UTI: Primary | ICD-10-CM

## 2021-01-23 RX ORDER — CEFDINIR 300 MG/1
300 CAPSULE ORAL 2 TIMES DAILY
Qty: 10 CAPSULE | Refills: 0 | Status: SHIPPED | OUTPATIENT
Start: 2021-01-23 | End: 2021-01-23

## 2021-01-25 ENCOUNTER — TELEPHONE (OUTPATIENT)
Dept: FAMILY MEDICINE CLINIC | Age: 71
End: 2021-01-25

## 2021-01-26 ENCOUNTER — TELEPHONE (OUTPATIENT)
Dept: FAMILY MEDICINE CLINIC | Age: 71
End: 2021-01-26

## 2021-01-26 NOTE — TELEPHONE ENCOUNTER
Vicenta Stern [phone] spoke with Eddie Schulz regarding pending PA. PA information given and reviewed with Nam. PA pending review of the pharmacy office will receive a faxed determination within the next 72 hrs. Case ID: [id number].

## 2021-01-26 NOTE — TELEPHONE ENCOUNTER
[facility] received a call from:    Name of Caller: Mayur Edwards    Relationship to patient: Self    Organization name: n/a    Best contact number: [phone]    Reason for call: Pt called about appointment schedule on 2/2/21 at 2:30. She just has a few questions for Prieto Delgado and wanted to know if it would be okay if she cancels her appointment and just tags along to her daughters appointment Kyleigh Faith scheduled 2/2/21 at 1:30) would like a call back either way.

## 2021-01-28 ENCOUNTER — HOSPITAL ENCOUNTER (OUTPATIENT)
Dept: CT IMAGING | Age: 71
Discharge: HOME | End: 2021-01-28
Payer: MEDICARE

## 2021-01-28 DIAGNOSIS — Z12.2 SCREENING FOR MALIGNANT NEOPLASM OF RESPIRATORY ORGAN: ICD-10-CM

## 2021-01-28 DIAGNOSIS — F17.211 CIGARETTE NICOTINE DEPENDENCE IN REMISSION: ICD-10-CM

## 2021-01-28 PROCEDURE — 71271 CT THORAX LUNG CANCER SCR C-: CPT

## 2021-02-02 ENCOUNTER — OFFICE VISIT (OUTPATIENT)
Dept: FAMILY MEDICINE CLINIC | Age: 71
End: 2021-02-02
Payer: MEDICARE

## 2021-02-02 VITALS
SYSTOLIC BLOOD PRESSURE: 112 MMHG | BODY MASS INDEX: 23.03 KG/M2 | DIASTOLIC BLOOD PRESSURE: 64 MMHG | RESPIRATION RATE: 18 BRPM | WEIGHT: 130 LBS | TEMPERATURE: 97.1 F | HEART RATE: 112 BPM

## 2021-02-02 DIAGNOSIS — F17.210 SMOKING GREATER THAN 40 PACK YEARS: ICD-10-CM

## 2021-02-02 DIAGNOSIS — G91.2 NORMAL PRESSURE HYDROCEPHALUS (HCC): ICD-10-CM

## 2021-02-02 DIAGNOSIS — J43.9 PULMONARY EMPHYSEMA, UNSPECIFIED EMPHYSEMA TYPE (HCC): Primary | ICD-10-CM

## 2021-02-02 DIAGNOSIS — N32.81 OAB (OVERACTIVE BLADDER): ICD-10-CM

## 2021-02-02 DIAGNOSIS — E03.9 ACQUIRED HYPOTHYROIDISM: ICD-10-CM

## 2021-02-02 DIAGNOSIS — F41.9 CHRONIC ANXIETY: ICD-10-CM

## 2021-02-02 DIAGNOSIS — Z86.73 H/O: CVA (CEREBROVASCULAR ACCIDENT): ICD-10-CM

## 2021-02-02 DIAGNOSIS — E78.2 MIXED HYPERLIPIDEMIA: ICD-10-CM

## 2021-02-02 DIAGNOSIS — F10.10 ALCOHOL ABUSE: ICD-10-CM

## 2021-02-02 DIAGNOSIS — M48.02 SPINAL STENOSIS, CERVICAL REGION: ICD-10-CM

## 2021-02-02 DIAGNOSIS — F32.5 MAJOR DEPRESSIVE DISORDER, SINGLE EPISODE, IN FULL REMISSION (HCC): ICD-10-CM

## 2021-02-02 PROCEDURE — 3023F SPIROM DOC REV: CPT | Performed by: NURSE PRACTITIONER

## 2021-02-02 PROCEDURE — 99214 OFFICE O/P EST MOD 30 MIN: CPT | Performed by: NURSE PRACTITIONER

## 2021-02-02 PROCEDURE — G8427 DOCREV CUR MEDS BY ELIG CLIN: HCPCS | Performed by: NURSE PRACTITIONER

## 2021-02-02 PROCEDURE — G8399 PT W/DXA RESULTS DOCUMENT: HCPCS | Performed by: NURSE PRACTITIONER

## 2021-02-02 PROCEDURE — 1036F TOBACCO NON-USER: CPT | Performed by: NURSE PRACTITIONER

## 2021-02-02 PROCEDURE — G8420 CALC BMI NORM PARAMETERS: HCPCS | Performed by: NURSE PRACTITIONER

## 2021-02-02 PROCEDURE — 1123F ACP DISCUSS/DSCN MKR DOCD: CPT | Performed by: NURSE PRACTITIONER

## 2021-02-02 PROCEDURE — 1090F PRES/ABSN URINE INCON ASSESS: CPT | Performed by: NURSE PRACTITIONER

## 2021-02-02 PROCEDURE — G8926 SPIRO NO PERF OR DOC: HCPCS | Performed by: NURSE PRACTITIONER

## 2021-02-02 PROCEDURE — 4040F PNEUMOC VAC/ADMIN/RCVD: CPT | Performed by: NURSE PRACTITIONER

## 2021-02-02 PROCEDURE — G8482 FLU IMMUNIZE ORDER/ADMIN: HCPCS | Performed by: NURSE PRACTITIONER

## 2021-02-02 PROCEDURE — 3017F COLORECTAL CA SCREEN DOC REV: CPT | Performed by: NURSE PRACTITIONER

## 2021-02-02 ASSESSMENT — ENCOUNTER SYMPTOMS
COUGH: 0
ABDOMINAL PAIN: 0
SHORTNESS OF BREATH: 0

## 2021-02-02 NOTE — TELEPHONE ENCOUNTER
Franco Morales spoke with [place]. Hydroxyzine HCL 25 mg tablets have been APPROVED. Coverage 1/26/2021-1/26/2022. PA auth number: [id number]. Pt filled and picked up script on 1/30/2021.

## 2021-02-02 NOTE — PROGRESS NOTES
Subjective:      Patient ID: Amy Castaneda is a 79 y.o. female. HPI: 2 week Follow Up    Chief Complaint   Patient presents with    Follow-up    Urinary Tract Infection       Following with Urology. OAB symptoms. UTI that was septic in end of December 2020. Symptoms felt like they were coming back in mid-January and ATb was sent in. Vague urinary symptoms. Does not know if related to UTI or her OAB    Patient Active Problem List   Diagnosis    Hyperlipemia    Hypothyroidism    Depression    Chronic anxiety    Osteopenia    Medication monitoring encounter    H/O: CVA (cerebrovascular accident), aneurysm bleed, Jan 2015    Abnormality of gait following cerebrovascular accident    GERD (gastroesophageal reflux disease)    Cognitive impairment due to CVA.  OAB (overactive bladder)    Chronic nausea    SOB (shortness of breath)    Hypoxia    Major depressive disorder, single episode, in full remission ([address]    Smoking greater than 40 pack years    Pulmonary emphysema ([address]    Syncope and collapse    Concussion with loss of consciousness    Laceration of head    Alcohol abuse    Spinal stenosis, cervical region    Normal pressure hydrocephalus ([address]       MAMMO - 2019  COLONOSCOPY - 2009  DEXA - 2020 Osteoporosis  CT Lung Screen - 2020    NEURO - Dr. Mary Kay Queen - Dr. Roxanne Casey - Dr. Ashley Banda    She is on Cymbalta 60 mg, Straterra 40 mg and Aricept 10 mg. .  Mood stable. Take off Seroquel due to next morning drowsinesss. Placed on hydroxyzine with benefit sleep and more energy during the day. Likes benefit of medication changes     She is following with counselor via [facility] [address] [facility] Final meetings for alcohol abuse. She was drinking a glass of wine in evening. Was started on camparell 2 tabs in evening to decrease alcohol cravings. It is doing well. No wine since last visit. Feeling well. Patient is status post CVA but doing well subsequently. Follows with NEURO. Her speech is essentially back to normal also. She is on Strattera and Aricept due to cognitive changes s/p CVA. Seen Dr. Theresa Soto regarding potential hydrocephalus. MRA June 2020 and CT Head June 2020 were unremarkable    Denies CP, SOB or chest tightness. ECHO 2019 EF 60% with LV function normal.  PFT 2019 showed moderate emphysema. 40 pack year hx. Anoro and NEB tx. Has O2 at home she uses with activity and PRN at night. Following with PULM at [facility]. Vitals:    02/02/21 1341   BP: 112/64   Pulse: 112   Resp: 18   Temp: 97.1 °F (36.2 °C)       Her hypothyroidism continues to respond well to her current thyroid dose. She is on 50 µg daily. Lab Results   Component Value Date    TSH 2.340 06/16/2020     BP wnl    BP Readings from Last 3 Encounters:   02/02/21 112/64   12/30/20 118/72   12/25/20 (!) 155/76     Labs reviewed.       Lab Results   Component Value Date    LABA1C 5.1 06/16/2020    LABA1C 5.3 04/13/2019    LABA1C 5.3 01/30/2016     No results found for: EAG    No components found for: CHLPL  Lab Results   Component Value Date    TRIG 110 06/16/2020    TRIG 81 04/13/2019    TRIG 80 08/25/2017     Lab Results   Component Value Date     (A) 06/16/2020     (A) 04/13/2019     (A) 08/25/2017     Lab Results   Component Value Date    LDLCALC 85 06/16/2020    LDLCALC 98 04/13/2019    LDLCALC 94 08/25/2017     No results found for: LABVLDL      Chemistry        Component Value Date/Time     (H) 12/25/2020 0536    K 3.7 12/25/2020 0536     (H) 12/25/2020 0536    CO2 26 12/25/2020 0536    BUN 10 12/25/2020 0536    CREATININE 0.5 12/25/2020 0536        Component Value Date/Time    CALCIUM 8.2 (L) 12/25/2020 0536    ALKPHOS 64 12/25/2020 0536    AST 55 (H) 12/25/2020 0536    ALT 65 12/25/2020 0536    BILITOT 0.2 (L) 12/25/2020 0536          Lab Results   Component Value Date    TSH 2.340 06/16/2020       Lab Results   Component Value Date WBC 4.9 12/25/2020    HGB 11.2 (L) 12/25/2020    HCT 34.4 (L) 12/25/2020    MCV 91.5 12/25/2020     12/25/2020         Health Maintenance   Topic Date Due    COVID-19 Vaccine (1 of 2) 06/13/1966    DTaP/Tdap/Td vaccine (1 - Tdap) 06/13/1969    Annual Wellness Visit (AWV)  06/09/2021    Lipid screen  06/16/2021    TSH testing  06/16/2021    Low dose CT lung screening  01/28/2022    Breast cancer screen  06/18/2022    Colon cancer screen colonoscopy  08/29/2029    DEXA (modify frequency per FRAX score)  Completed    Flu vaccine  Completed    Shingles Vaccine  Completed    Pneumococcal 65+ years Vaccine  Completed    Hepatitis C screen  Completed    Hepatitis A vaccine  Aged Out    Hepatitis B vaccine  Aged Out    Hib vaccine  Aged Out    Meningococcal (ACWY) vaccine  Aged [employer] History   Administered Date(s) Administered    Influenza Vaccine, unspecified formulation 12/06/2017    Influenza Virus Vaccine 12/06/2017    Influenza, High Dose (Fluzone 65 yrs and older) 12/06/2017, 11/07/2018, 10/13/2020    Influenza, High-dose, Quadv, 65 yrs +, IM (Fluzone) 10/13/2020    Influenza, Triv, inactivated, subunit, adjuvanted, IM (Fluad 65 yrs and older) 10/30/2019    Pneumococcal Conjugate 13-valent ([id number]) 07/26/2017    Pneumococcal Polysaccharide ([id number]) 11/13/2018    Zoster Recombinant (Shingrix) 10/13/2020, 12/12/2020       Review of Systems   Constitutional: Negative for fever. HENT: Negative. Respiratory: Negative for cough and shortness of breath. Cardiovascular: Negative for chest pain. Gastrointestinal: Negative for abdominal pain. Genitourinary: Positive for frequency. Neurological: Negative for dizziness, light-headedness and headaches. Psychiatric/Behavioral: Positive for confusion. Negative for dysphoric mood. The patient is nervous/anxious.         Objective:   Physical Exam  Constitutional:       General: She is not in acute distress. HENT:      Head: Normocephalic. Right Ear: Tympanic membrane normal.      Left Ear: Tympanic membrane normal.      Nose: Nose normal.   Eyes:      Pupils: Pupils are equal, round, and reactive to light. Neck:      Musculoskeletal: Normal range of motion and neck supple. Vascular: No carotid bruit. Cardiovascular:      Rate and Rhythm: Normal rate and regular rhythm. Pulses: Normal pulses. Heart sounds: Normal heart sounds. No murmur. Pulmonary:      Effort: Pulmonary effort is normal.      Breath sounds: Normal breath sounds. No wheezing. Abdominal:      General: Bowel sounds are normal. There is no distension. Palpations: Abdomen is soft. Tenderness: There is no abdominal tenderness. Musculoskeletal: Normal range of motion. General: No tenderness. Skin:     General: Skin is warm and dry. Findings: No rash. Neurological:      Mental Status: She is alert and oriented to person, place, and time. Psychiatric:         Attention and Perception: She is inattentive. Mood and Affect: Mood is anxious and depressed. Behavior: Behavior is cooperative. Thought Content: Thought content normal.         Cognition and Memory: She exhibits impaired recent memory. Judgment: Judgment normal.         Assessment:       Diagnosis Orders   1. Pulmonary emphysema, unspecified emphysema type ([address]  DME Order for Home Oxygen as OP   2. Major depressive disorder, single episode, in full remission ([address]     3. Alcohol abuse     4. Smoking greater than 40 pack years  DME Order for Home Oxygen as OP   5. Acquired hypothyroidism     6. Chronic anxiety     7. Spinal stenosis, cervical region     8. H/O: CVA (cerebrovascular accident), aneurysm bleed, Jan 2015     9. Mixed hyperlipidemia     10. OAB (overactive bladder)  Culture, Urine   11.  Normal pressure hydrocephalus (HCC)             Plan:      Chronic conditions stable   Medications

## 2021-02-02 NOTE — PROGRESS NOTES
Visit Information    Have you changed or started any medications since your last visit including any over-the-counter medicines, vitamins, or herbal medicines? no   Are you having any side effects from any of your medications? -  no  Have you stopped taking any of your medications? Is so, why? -  no    Have you seen any other physician or provider since your last visit? No  Have you had any other diagnostic tests since your last visit? No  Have you been seen in the emergency room and/or had an admission to a hospital since we last saw you? No  Have you had your routine dental cleaning in the past 6 months? no    Have you activated your [employer] account? If not, what are your barriers?  Yes     Patient Care Team:  SARITHA Engle CNP as PCP - General (Family Nurse Practitioner)  [facility]AR[facility] Engle [facility]  Luisa Tiwari MD as Consulting Physician (Ophthalmology)    Medical History Review  Past Medical, Family, and Social History reviewed and does not contribute to the patient presenting condition    Health Maintenance   Topic Date Due    COVID-19 Vaccine (1 of 2) 06/13/1966    DTaP/Tdap/Td vaccine (1 - Tdap) 06/13/1969    Annual Wellness Visit (AWV)  06/09/2021    Lipid screen  06/16/2021    TSH testing  06/16/2021    Low dose CT lung screening  01/28/2022    Breast cancer screen  06/18/2022    Colon cancer screen colonoscopy  08/29/2029    DEXA (modify frequency per FRAX score)  Completed    Flu vaccine  Completed    Shingles Vaccine  Completed    Pneumococcal 65+ years Vaccine  Completed    Hepatitis C screen  Completed    Hepatitis A vaccine  Aged Out    Hepatitis B vaccine  Aged Out    Hib vaccine  Aged Out    Meningococcal (ACWY) vaccine  Aged Out

## 2021-02-04 ENCOUNTER — TELEPHONE (OUTPATIENT)
Dept: FAMILY MEDICINE CLINIC | Age: 71
End: 2021-02-04

## 2021-02-05 LAB
ORGANISM: ABNORMAL
URINE CULTURE, ROUTINE: ABNORMAL

## 2021-02-08 ENCOUNTER — IMMUNIZATION (OUTPATIENT)
Dept: PRIMARY CARE CLINIC | Age: 71
End: 2021-02-08
Payer: MEDICARE

## 2021-02-08 PROCEDURE — 91301 COVID-19, MODERNA VACCINE 100MCG/0.5ML DOSE: CPT | Performed by: FAMILY MEDICINE

## 2021-02-08 PROCEDURE — 0011A COVID-19, MODERNA VACCINE 100MCG/0.5ML DOSE: CPT | Performed by: FAMILY MEDICINE

## 2021-03-04 ENCOUNTER — HOSPITAL ENCOUNTER (OUTPATIENT)
Dept: PHYSICAL THERAPY | Age: 71
Setting detail: THERAPIES SERIES
Discharge: HOME | End: 2021-03-04
Payer: MEDICARE

## 2021-03-04 PROCEDURE — 97161 PT EVAL LOW COMPLEX 20 MIN: CPT | Performed by: PHYSICAL THERAPIST

## 2021-03-04 PROCEDURE — 97530 THERAPEUTIC ACTIVITIES: CPT | Performed by: PHYSICAL THERAPIST

## 2021-03-04 PROCEDURE — 97112 NEUROMUSCULAR REEDUCATION: CPT | Performed by: PHYSICAL THERAPIST

## 2021-03-04 NOTE — PROGRESS NOTES
** PLEASE SIGN, DATE AND TIME CERTIFICATION BELOW AND RETURN TO [facility] OUTPATIENT REHABILITATION (FAX #: [phone]). ATTEST/CO-SIGN IF ACCESSING VIA IN"[employer] THANK YOU.**    I certify that I have examined the patient below and determined that Physical Medicine and Rehabilitation service is necessary and that I approve the established plan of care for up to 90 days or as specifically noted. Attestation, signature or co-signature of physician indicates approval of certification requirements.    ________________________ ____________ __________  Physician Signature   Date   Time  [address] [facility]  PHYSICAL THERAPY  OUTPATIENT REHABILITATION - SPECIALIZED THERAPY SERVICES  [x] PELVIC HEALTH EVALUATION  [] DAILY NOTE  [] PROGRESS NOTE [] DISCHARGE NOTE    Date: 3/4/2021  Patient Name:  Fer Sharif  : 1950  MRN: [id number]  CSN: [id number]    Referring Practitioner SARITHA Wolff -*   Diagnosis Stress incontinence (female) (male) [N39.3]    Treatment Diagnosis M62.58 - Muscle Wasting and Atrophy, nec, other site  M62.89 - Other Specified Disorders of Muscle and R39.15 - Urgency of Urination  R35.1 - Nocturia  N39.41 - Urge Incontinence   N81.84 PFM wasting   Date of Evaluation 3/4/21    Additional Pertinent History CVA approx 5 years ago, lichens sclerosis      Functional Outcome Measure Used IIQ and YUVAL   Functional Outcome Score 7 and 5 (3/4/21)       Insurance: Primary: Payor: Delon Ugarte /  /  / ,   Secondary: [facility]   Authorization Information: No pre cert req'd   Visit # 1, 1/10 for progress note   Visits Allowed: Unlimited based on med nec   Recertification Date: 0/3/0767   Physician Follow-Up:    Physician Orders: eval and treat   History of Present Illness: Chronic urinary incont which currently presents primarily as UUI; nearly septic from UTI in Dec 2020     SUBJECTIVE: Pt is 78 yo female with c/c urinary incont for several years.   Pt has done past PT for this condition with some success (and variable home compliance). Pt most recently has gotten a new puppy which has caused her to be even more busy than typical for her. Pt also was hospitalized in Dec for 3 days with a severe UTI with pt stating she was nearly septic causing further disruption in her attempts to persist with her PT home routine. Pt was on Myrbetriq, but this medication was d/c when she was hospitalized. Pt is taking Oxybutinin currently. Pt reports to me today that she has stopped drinking all alcohol for the past couple of months. I am familiar with this pt, and her affect and cognition are a bit more sharp today than I have known her to be in the past.    Social/Functional History and Current Status:  Medications and Allergies have been reviewed and are listed on Medical History Questionnaire. Ishan Carroll lives alone and and a new puppy in a single story home with level surface to enter.     Task Previous Current   ADLs  Independent Independent   IADL's Independent must wear a pad at all times   [address] [facility] active with housework and family act's   [employer] Independent pt does all of her own basic errands; pt avoids water/fluid drinking when in public   Driving Active  Active    Work Retired desk work Retired     PAIN - pt denies consistent pain       [address] [] Deferred secondary to:    Number of Pregnancies 4, 1 miscarry   Number of Vaginal Deliveries 3   Number of Caesarean Deliveries 0       BLADDER ASSESSMENT [] Deferred secondary to:   Daily Fluid Ingestion: 2 ICE drinks per day or [employer], 1c coffee, 1 Diet Coke with evening meal, occ milk   Urination Frequency Times/Day: every few hours  Times/Night: 2 usually   Volume Medium   Urge Sensation occas strong gen with waiting too long, running water, coming home, cold air   SYMPTOM QUESTIONNAIRE   Loses Urine Upon: with an urge   Incontinence Volume: Small   Frequency of Leakage: a couple times per day   Wets the Bed: no   Burning/Pain with Urination: no   Difficulty Starting a Stream of Urine: no   Incomplete Emptying no but leans forward at the end, must concentrate to avoid rushing   Strain to Empty Bladder: no   Falling Out Feeling: no   Urinate more than 7 times/day: no   Use a form of Leakage Protection: yes: pad--changes with every void, even if not wet   Restrict Fluid Intake: No (pt does not like water)   Stream Strength Weak       BOWEL ASSESSMENT [] Deferred secondary to:   Frequency: Qd usually   Most Common Stool Consistency: Normal to soft   SYMPTOM QUESTIONNAIRE   Strain to have a BM: no   Include fiber in your diet: yes: Raisin Bran, fruit   Take laxatives/enemas regularly: no   Pain with BM: no   Strong urge to have BM: no   Leak/Stain Feces: no   Diarrhea often: no         SEXUAL ASSESSMENT [] Deferred secondary to:   Sexually Active Not currently active   Pain with [place] (Dyspareunia) pt can have some tenderness due to the lichens sclerosis     VITALS [] Deferred secondary to:   Height 5'3\"   Weight 130#       GENERAL ASSESSMENT   [] Deferred secondary to:   Palpation    Observation    Posture Forward Head Posture and Rounded Shoulders   Range of Motion Lumbar and LE AROM WFL and without c/o   Strength B LE strength WFL and without c/o; abdominal strength 1/5   Gait WNL   Sensation WNL   Edema WNL   Balance/Fall History Denies balance or fall problems   Special Tests Neg SLR, good hams flexi (more flexible on R side), Neg Homans           OBSERVATION  [] Deferred secondary to:   Patient Safety Patient offered a chaperone and declined.    Skin Condition Intact but reddened    Urogenital Triangle No tenderness or tightness reported       PELVIC FLOOR INTERNAL EXAM [] Deferred secondary to:   Exam Vaginal   Sensation Intact   Muscle Localization Good - minus due to glut mm assist   Palpation/Tone mildly increased on L>R PFM   Pelvic Floor Strength PERF:Power: 2,Endurance: 3,Reps: 5,Flicks: 10   Relax after Contraction Normal   Prolapse None           TREATMENT   Precautions:     X in shaded column indicates activity completed today   Modalities Parameters/  Location  Notes                     Manual Therapy Time/Technique  Notes                     Exercise/Intervention    Notes   Basic PFM anatomy, nature of condition, PT POC 10 min  x    Optimal perineal hygiene to prevent UTI and in light of lichens sclerosis 10 min  x Avoid oily soaps gen for a bath, rinse with water, wipe front to back, cotton underwear, consider no pad for sleep   Instruction on precise PFM localization 10 min  x    Urge control strategies       kegel-quick 1 sec 10 x Do one kegel option every 2 hrs in sitting or lying   kegel-hold 3 sec 10 x    Pelvic tilt 5 sec 10 x bid                                                      Specific Interventions Next Treatment: progress all kegel and abdominal stability instructions as approp to symptoms and as approp to pt's ability to complete the home routine    Activity/Treatment Tolerance:  [x]  Patient tolerated treatment well  []  Patient limited by fatigue  []  Patient limited by pain   []  Patient limited by medical complications  []  Other:     Patient Education:   [x]  HEP/Education Completed: PT plan of care, Pelvic Floor Musculature anatomy with instruction on precise localization. Patient to start doing 10 reps of kegels every 2 hours in sitting or lying alternating quicks with holds. Instructed pelvic tilt and concept of gaining abdominal stability in inf to sup manner. Instructed optimal perineal hygiene to limit UTI risk and avoid further lichens sclerosis irritation. Handouts provided. []  No new Education completed  []  Reviewed Prior HEP      [x]  Patient verbalized and/or demonstrated understanding of education provided.   []  Patient unable to verbalize and/or demonstrate understanding of education provided. Will continue education. [x]  Barriers to learning: pt has been known by me to be a somewhat forgetful at times    Assessment: Pt is in need of skilled PT due to problems with chronic urinary incont which currently is presenting primarily with an urge. She also has mild nocturia, PFM weakness with decreased endur, and abdominal weakness. Body Structures/Functions/Activity Limitations: PFM weakness with decreased localization and endurance, Decreased awareness of functional factors that increase pelvic organ strain, Decreased awareness of normal bladder habits, control, and diet effects on functioning, Abdominal and core weakness, Impaired endurance and Impaired strength  Prognosis: Good    GOALS:  Patient Goal: stop leaking; not need to wear pads        Long Term Goals:  Time Frame: 6-8 total visits  *  Patient to be independent with progressed HEP. * Patient will increase PFM strength to 3/5 with endurance of 10 seconds x 10 reps to increase pelvic organ support and decrease incontinence. * Patient will Increase abdominal strength of 2+/5 or greater to allow for increased pelvic organ support and decreased incontinence. * Patient will decrease urine leak frequency and amount by 85%  * Patient to use 1 pad for public, no protection otherwise needed. * Patient to improve void frequency to 0-1 times per night consistently. * Patient's IIQ and YUVAL scores will improve to 2 or less to demonstrate return to normal functioning with reduction of incontinence. PLAN:  Treatment Recommendations: Strengthening, Functional Mobility Training, Endurance Training, Neuromuscular Re-education, Home Exercise Program, Patient Education and Self-Care Education and Training    [x]  Plan of care initiated. Plan to see patient 1 time every 3-4 weeks for 6-8 total visits to address the treatment planned outlined above.   []  Continue with current plan of care  []  Modify plan of care as

## 2021-03-08 ENCOUNTER — IMMUNIZATION (OUTPATIENT)
Dept: PRIMARY CARE CLINIC | Age: 71
End: 2021-03-08
Payer: MEDICARE

## 2021-03-08 PROCEDURE — 91301 COVID-19, MODERNA VACCINE 100MCG/0.5ML DOSE: CPT

## 2021-03-08 PROCEDURE — 0012A COVID-19, MODERNA VACCINE 100MCG/0.5ML DOSE: CPT

## 2021-03-22 ENCOUNTER — TELEPHONE (OUTPATIENT)
Dept: FAMILY MEDICINE CLINIC | Age: 71
End: 2021-03-22

## 2021-03-22 DIAGNOSIS — J43.2 CENTRILOBULAR EMPHYSEMA (HCC): Primary | ICD-10-CM

## 2021-03-22 RX ORDER — DENOSUMAB 60 MG/ML
60 INJECTION SUBCUTANEOUS ONCE
Qty: 1 ML | Refills: 1 | Status: SHIPPED | OUTPATIENT
Start: 2021-03-22 | End: 2021-06-28

## 2021-03-22 NOTE — TELEPHONE ENCOUNTER
Pt called she's been taking Fosamax for her osteoporosis she would like to change to Prolia. Asking if you will send a script to [facility]. Pt would like a call back with advice.

## 2021-03-23 ENCOUNTER — TELEPHONE (OUTPATIENT)
Dept: FAMILY MEDICINE CLINIC | Age: 71
End: 2021-03-23

## 2021-03-31 ENCOUNTER — HOSPITAL ENCOUNTER (OUTPATIENT)
Dept: PHYSICAL THERAPY | Age: 71
Setting detail: THERAPIES SERIES
Discharge: HOME | End: 2021-03-31
Payer: MEDICARE

## 2021-03-31 PROCEDURE — 97530 THERAPEUTIC ACTIVITIES: CPT | Performed by: PHYSICAL THERAPIST

## 2021-03-31 NOTE — PROGRESS NOTES
[address] [facility]  PHYSICAL THERAPY  OUTPATIENT REHABILITATION - SPECIALIZED THERAPY SERVICES  [] PELVIC HEALTH EVALUATION  [x] DAILY NOTE  [] PROGRESS NOTE [] DISCHARGE NOTE    Date: 3/31/2021  Patient Name:  Isabella José  : 1950  MRN: [id number]  CSN: [id number]    Referring Practitioner SARITHA Nunez -*   Diagnosis Stress incontinence (female) (male) [N39.3]    Treatment Diagnosis M62.58 - Muscle Wasting and Atrophy, nec, other site  M62.89 - Other Specified Disorders of Muscle and R39.15 - Urgency of Urination  R35.1 - Nocturia  N39.41 - Urge Incontinence   N81.84 PFM wasting   Date of Evaluation 3/4/21    Additional Pertinent History CVA approx 5 years ago, lichens sclerosis      Functional Outcome Measure Used IIQ and YUVAL   Functional Outcome Score 7 and 5 (3/4/21)       Insurance: Primary: Payor: Yanely Hammer /  /  / ,   Secondary: [employer]   Authorization Information: No pre cert req'd   Visit # 2, 2/10 for progress note   Visits Allowed: Unlimited based on med nec   Recertification Date: 3222   Physician Follow-Up:    Physician Orders: eval and treat   History of Present Illness: Chronic urinary incont which currently presents primarily as UUI; nearly septic from UTI in Dec 2020     SUBJECTIVE: Pt reports she has been working with [employer] about every 3-4 hrs, and she is happy to report that she is not wearing a pad today. Pt has not used protection for over 1 week now. Pt is still taking the Oxybutinin. Pt also reports no UTI symptoms. Pt can still get some occas urinary urgency which is worse at night. Pt's nocturia has decreased to every 4 hours/1 time now. Social/Functional History and Current Status:  Medications and Allergies have been reviewed and are listed on Medical History Questionnaire. Isabella José lives alone and and a new puppy in a single story home with level surface to enter.     Task Previous Current   ADLs  Independent Independent   IADL's Independent must wear a pad at all times   [address] [facility] active with housework and family act's   [address] pt does all of her own basic errands; pt avoids water/fluid drinking when in public   Driving Active  Active    Work Retired desk work Retired     PAIN - pt denies consistent pain       [address] [] Deferred secondary to:    Number of Pregnancies 4, 1 miscarry   Number of Vaginal Deliveries 3   Number of Caesarean Deliveries 0       BLADDER ASSESSMENT [] Deferred secondary to:   Daily Fluid Ingestion: 2 ICE drinks per day or [employer], 1c coffee, 1 Diet Coke with evening meal, occ milk   Urination Frequency Times/Day: every 4 hours or so  Times/Night: 1   Volume Medium   Urge Sensation occas strong gen with waiting too long, cold air; at night   SYMPTOM QUESTIONNAIRE   Loses Urine Upon: with an urge   Incontinence Volume: drops   Frequency of Leakage:  Once every few days   Wets the Bed: no   Burning/Pain with Urination: no   Difficulty Starting a Stream of Urine: no   Incomplete Emptying no but leans forward at the end, must concentrate to avoid rushing   Strain to Empty Bladder: no   Falling Out Feeling: no   Urinate more than 7 times/day: no   Use a form of Leakage Protection: None for 1 wk now   Restrict Fluid Intake: No (pt does not like water)   Stream Strength Weak to average       BOWEL ASSESSMENT [] Deferred secondary to:   Frequency: Qd usually   Most Common Stool Consistency: Normal to soft   SYMPTOM QUESTIONNAIRE   Strain to have a BM: no   Include fiber in your diet: yes: Raisin Bran, fruit   Take laxatives/enemas regularly: no   Pain with BM: no   Strong urge to have BM: no   Leak/Stain Feces: no   Diarrhea often: no         SEXUAL ASSESSMENT [] Deferred secondary to:   Sexually Active Not currently active   Pain with [place] (Dyspareunia) pt can have some tenderness due to the lichens sclerosis     VITALS [] Deferred secondary to:   Height 5'3\"   Weight 130#       GENERAL ASSESSMENT   [] Deferred secondary to:   Palpation    Observation    Posture Forward Head Posture and Rounded Shoulders   Range of Motion Lumbar and LE AROM WFL and without c/o   Strength B LE strength WFL and without c/o; abdominal strength 1/5   Gait WNL   Sensation WNL   Edema WNL   Balance/Fall History Denies balance or fall problems   Special Tests Neg SLR, good hams flexi (more flexible on R side), Neg Homans           OBSERVATION  [] Deferred secondary to:   Patient Safety Patient offered a chaperone and declined.    Skin Condition Intact but reddened    Urogenital Triangle No tenderness or tightness reported       PELVIC FLOOR INTERNAL EXAM [] Deferred secondary to:   Exam Vaginal   Sensation Intact   Muscle Localization Good - minus due to glut mm assist   Palpation/Tone mildly increased on L>R PFM   Pelvic Floor Strength PERF:Power: 2,Endurance: 3,Reps: 5,Flicks: 10   Relax after Contraction Normal   Prolapse None           TREATMENT   Precautions:     X in shaded column indicates activity completed today   Modalities Parameters/  Location  Notes                     Manual Therapy Time/Technique  Notes                     Exercise/Intervention    Notes   Basic PFM anatomy, nature of condition, PT POC 2 min  x    Optimal perineal hygiene to prevent UTI and in light of lichens sclerosis 2 min  x Avoid oily soaps gen for a bath, rinse with water, wipe front to back, cotton underwear, consider no pad for sleep   Instruction on precise PFM localization 2 min  x    Urge control strategies       kegel-quick 1 sec 10 x Do one kegel option every 2 hrs in sitting or lying   kegel-hold 5 sec 10 x    kegel with hip IR 5 sec 10 x qd   kegel with hip ER 5 sec 10 x qd   Pelvic tilt 5 sec 10 x bid   Pelvic tilt with UE  10 x    Pelvic tilt with LE  10 x                                         Specific Interventions Next Treatment: progress all kegel and abdominal stability instructions as approp to symptoms and as approp to pt's ability to complete the home routine    Activity/Treatment Tolerance:  [x]  Patient tolerated treatment well  []  Patient limited by fatigue  []  Patient limited by pain   []  Patient limited by medical complications  []  Other:     Patient Education:   [x]  HEP/Education Completed: Rev'd prior instructions with pt along with ideal parameters for doing kegels. Progressed kegel endur to 5 sec holds. Instructed kegel with hip IR and ER without c/o but some repetition req'd for technique. Instructed pelvic tilt with UE and LE again with some repetition for ideal technique req'd. HO's given. []  No new Education completed  []  Reviewed Prior HEP      [x]  Patient verbalized and/or demonstrated understanding of education provided. []  Patient unable to verbalize and/or demonstrate understanding of education provided. Will continue education. [x]  Barriers to learning: pt has been known by me to be a somewhat forgetful at times    Assessment: Pt has made very good progress with her bladder control in this past month. She has not worn protection for 1 week now, and nocturia has reduced. Pt compliant with home routine but req's substantial review of ideal parameters. Body Structures/Functions/Activity Limitations: PFM weakness with decreased localization and endurance, Decreased awareness of functional factors that increase pelvic organ strain, Decreased awareness of normal bladder habits, control, and diet effects on functioning, Abdominal and core weakness, Impaired endurance and Impaired strength  Prognosis: Good    GOALS:  Patient Goal: stop leaking; not need to wear pads        Long Term Goals:  Time Frame: 6-8 total visits  *  Patient to be independent with progressed HEP.   * Patient will increase PFM strength to 3/5 with endurance of 10 seconds x 10 reps to increase pelvic organ support and decrease incontinence. * Patient will Increase abdominal strength of 2+/5 or greater to allow for increased pelvic organ support and decreased incontinence. * Patient will decrease urine leak frequency and amount by 85%  * Patient to use 1 pad for public, no protection otherwise needed. * Patient to improve void frequency to 0-1 times per night consistently. * Patient's IIQ and YUVAL scores will improve to 2 or less to demonstrate return to normal functioning with reduction of incontinence. PLAN:  Treatment Recommendations: Strengthening, Functional Mobility Training, Endurance Training, Neuromuscular Re-education, Home Exercise Program, Patient Education and Self-Care Education and Training    [x]  Plan of care initiated. Plan to see patient 1 time every 3-4 weeks for 6-8 total visits to address the treatment planned outlined above.   [x]  Continue with current plan of care  []  Modify plan of care as follows:    []  Hold pending physician visit  []  Discharge    Time In 2:05   Time Out 3:00   Timed Code Minutes: 55 min   Total Treatment Time: 55 min       Electronically Signed by: GENET CORONA

## 2021-04-08 ENCOUNTER — HOSPITAL ENCOUNTER (OUTPATIENT)
Dept: CT IMAGING | Age: 71
Discharge: HOME | End: 2021-04-08
Payer: MEDICARE

## 2021-04-08 DIAGNOSIS — G91.2 NORMAL PRESSURE HYDROCEPHALUS (HCC): ICD-10-CM

## 2021-04-08 PROCEDURE — 70450 CT HEAD/BRAIN W/O DYE: CPT

## 2021-04-15 ENCOUNTER — TELEPHONE (OUTPATIENT)
Dept: FAMILY MEDICINE CLINIC | Age: 71
End: 2021-04-15

## 2021-05-04 ENCOUNTER — HOSPITAL ENCOUNTER (EMERGENCY)
Age: 71
Discharge: HOME | End: 2021-05-04
Attending: EMERGENCY MEDICINE
Payer: MEDICARE

## 2021-05-04 VITALS
RESPIRATION RATE: 16 BRPM | SYSTOLIC BLOOD PRESSURE: 134 MMHG | OXYGEN SATURATION: 98 % | TEMPERATURE: 98.3 F | HEART RATE: 87 BPM | DIASTOLIC BLOOD PRESSURE: 82 MMHG

## 2021-05-04 DIAGNOSIS — N30.01 ACUTE CYSTITIS WITH HEMATURIA: Primary | ICD-10-CM

## 2021-05-04 LAB
BILIRUBIN URINE: NEGATIVE
BLOOD, URINE: ABNORMAL
CHARACTER, URINE: ABNORMAL
COLOR: YELLOW
GLUCOSE URINE: NEGATIVE MG/DL
KETONES, URINE: ABNORMAL
LEUKOCYTE ESTERASE, URINE: ABNORMAL
NITRITE, URINE: NEGATIVE
PH UA: 6.5 (ref 5–9)
PROTEIN UA: >= 300 MG/DL
SPECIFIC GRAVITY UA: >= 1.03 (ref 1–1.03)
UROBILINOGEN, URINE: 0.2 EU/DL (ref 0.2–1)

## 2021-05-04 PROCEDURE — 99213 OFFICE O/P EST LOW 20 MIN: CPT

## 2021-05-04 PROCEDURE — 87086 URINE CULTURE/COLONY COUNT: CPT

## 2021-05-04 PROCEDURE — 81003 URINALYSIS AUTO W/O SCOPE: CPT

## 2021-05-04 PROCEDURE — 87186 SC STD MICRODIL/AGAR DIL: CPT

## 2021-05-04 PROCEDURE — 99214 OFFICE O/P EST MOD 30 MIN: CPT | Performed by: EMERGENCY MEDICINE

## 2021-05-04 PROCEDURE — 87077 CULTURE AEROBIC IDENTIFY: CPT

## 2021-05-04 RX ORDER — CEFDINIR 300 MG/1
300 CAPSULE ORAL 2 TIMES DAILY
Qty: 10 CAPSULE | Refills: 0 | Status: SHIPPED | OUTPATIENT
Start: 2021-05-04 | End: 2021-05-09

## 2021-05-04 ASSESSMENT — ENCOUNTER SYMPTOMS
BACK PAIN: 0
CONSTIPATION: 0
NAUSEA: 0
EYE DISCHARGE: 0
TROUBLE SWALLOWING: 0
VOMITING: 0
ABDOMINAL PAIN: 0
CHOKING: 0
WHEEZING: 0
FACIAL SWELLING: 0
EYE REDNESS: 0
VOICE CHANGE: 0
SORE THROAT: 0
STRIDOR: 0
BLOOD IN STOOL: 0
SINUS PRESSURE: 0
COUGH: 0
DIARRHEA: 0
EYE PAIN: 0
SHORTNESS OF BREATH: 0

## 2021-05-04 NOTE — ED PROVIDER NOTES
[facility] Encounter      CHIEFCOMPLAINT       Chief Complaint   Patient presents with    Urinary Frequency       Nurses Notes reviewed and I agree except as noted in the HPI. HISTORY OF PRESENT ILLNESS   Lisa Yates is a 79 y.o. female who presents with 24-hour history of urgency, dysuria, and urinary frequency. States symptoms are identical to previous bladder infection. No abdominal pain, back pain, fever, hematuria, vomiting. History of urosepsis. Patient requesting [address]  for treatment. History of CVA, COPD. No history of diabetes. Last GFR 90. Quit smoking  REVIEW OF SYSTEMS     Review of Systems   Constitutional: Negative for appetite change, chills, fatigue, fever and unexpected weight change. HENT: Negative for congestion, ear discharge, ear pain, facial swelling, hearing loss, nosebleeds, postnasal drip, sinus pressure, sore throat, trouble swallowing and voice change. Eyes: Negative for pain, discharge, redness and visual disturbance. Respiratory: Negative for cough, choking, shortness of breath, wheezing and stridor. Cardiovascular: Negative for chest pain and leg swelling. Gastrointestinal: Negative for abdominal pain, blood in stool, constipation, diarrhea, nausea and vomiting. Genitourinary: Positive for dysuria, frequency and urgency. Negative for flank pain, hematuria, vaginal bleeding and vaginal discharge. Musculoskeletal: Negative for arthralgias, back pain, neck pain and neck stiffness. Skin: Negative for rash. Neurological: Negative for dizziness, seizures, syncope, weakness, light-headedness and headaches. Hematological: Negative for adenopathy. Does not bruise/bleed easily. Psychiatric/Behavioral: Negative for confusion, sleep disturbance and suicidal ideas. The patient is not nervous/anxious. All other systems reviewed and are negative.       PAST MEDICAL HISTORY         Diagnosis Date    Aneurysm, cerebral  COPD (chronic obstructive pulmonary disease) (HCC)     Hyperlipidemia     Osteoarthritis     Pneumonia     Thyroid disease     Unspecified cerebral artery occlusion with cerebral infarction        SURGICAL HISTORY     Patient  has a past surgical history that includes Colonoscopy ([id number]); External ear surgery (2000); External ear surgery (1999); External ear surgery (1997); Cosmetic surgery (8898,4718); Cosmetic surgery (2013); Brain aneurysm surgery; and Breast surgery.     CURRENT MEDICATIONS       Discharge Medication List as of 5/4/2021  5:43 PM      CONTINUE these medications which have NOT CHANGED    Details   levothyroxine (SYNTHROID) 50 MCG tablet Take 1 tablet by mouth once daily, Disp-90 tablet, R-1Normal      hydrOXYzine (ATARAX) 25 MG tablet TAKE 1/2 TO 1 (ONE-HALF TO ONE) TABLET BY MOUTH EVERY 8 HOURS AS NEEDED FOR ANXIETY, Disp-60 tablet, R-5Normal      denosumab (PROLIA) 60 MG/ML SOSY SC injection Inject 1 mL into the skin once for 1 dose, Disp-1 mL, R-1Normal      simvastatin (ZOCOR) 10 MG tablet Take 1 tablet by mouth nightly, Disp-90 tablet, R-3Normal      acamprosate (CAMPRAL) 333 MG tablet TAKE 2 TABLETS BY MOUTH NIGHTLY, Disp-60 tablet, R-5Normal      oxybutynin (DITROPAN-XL) 5 MG extended release tablet Take 1 tablet by mouth once daily, Disp-30 tablet, R-11Normal      umeclidinium-vilanterol (ANORO ELLIPTA) 62.5-25 MCG/INH AEPB inhaler Inhale 1 puff into the lungs daily, Disp-60 each, R-11Normal      albuterol (PROVENTIL) (2.5 MG/3ML) 0.083% nebulizer solution Take 3 mLs by nebulization every 6 hours as needed for Wheezing, Disp-120 each, R-3Normal      albuterol sulfate HFA (VENTOLIN HFA) 108 (90 Base) MCG/ACT inhaler Inhale 2 puffs into the lungs 4 times daily as needed for Wheezing, Disp-1 Inhaler, R-0Normal      acyclovir (ZOVIRAX) 400 MG tablet Take 400 mg by mouth 2 times dailyHistorical Med      [facility]icap [facility] Starting Mon 12/7/2020, Disp-1 each, R-0, PrintExpires 12/2025      donepezil (ARICEPT) 10 MG tablet Take 1 tablet by mouth in the evening, Disp-90 tablet, R-3Normal      atomoxetine (STRATTERA) 40 MG capsule Take 1 capsule by mouth daily, Disp-90 capsule,R-1Normal      DULoxetine (CYMBALTA) 60 MG extended release capsule Take 1 capsule by mouth once daily, Disp-90 capsule,R-3Normal      Multiple Vitamin (MULTI-VITAMIN PO) Take  by mouth. Calcium Carbonate-Vitamin D (CALCIUM-VITAMIN D) 600-200 MG-UNIT CAPS Take 1 tablet by mouth 2 times daily Historical Med      fish oil-omega-3 fatty acids 1000 MG capsule Take 1 g by mouth. One tablet daily along with red yeast rice             ALLERGIES     Patient is is allergic to sulfa antibiotics. FAMILY HISTORY     Patient'sfamily history includes Cancer in her mother; Diabetes in her father; Heart Disease in her maternal aunt; Parkinsonism in her maternal uncle; Stroke in her mother. SOCIAL HISTORY     Patient  reports that she quit smoking about 6 years ago. Her smoking use included cigarettes. She has a 40.00 pack-year smoking history. She has never used smokeless tobacco. She reports current alcohol use of about 2.0 standard drinks of alcohol per week. She reports that she does not use drugs. PHYSICAL EXAM     ED TRIAGE VITALS  BP: 134/82, Temp: 98.3 °F (36.8 °C), Pulse: 87, Resp: 16, SpO2: 98 %  Physical Exam  Vitals signs and nursing note reviewed. Constitutional:       General: She is not in acute distress. Appearance: She is well-developed. She is not ill-appearing. Comments: Moist membranes, normal airway   HENT:      Head: Normocephalic and atraumatic. Right Ear: Tympanic membrane and external ear normal.      Left Ear: Tympanic membrane and external ear normal.      Nose: Nose normal.      Mouth/Throat:      Pharynx: No oropharyngeal exudate. Comments: Pharynx normal  Eyes:      General: No scleral icterus. Right eye: No discharge. Left eye: No discharge. START taking these medications    Details   cefdinir (OMNICEF) 300 MG capsule Take 1 capsule by mouth 2 times daily for 5 days, Disp-10 capsule, R-0Print           Discharge Medication List as of 5/4/2021  5:43 PM          MD Estefany Littlejohn MD  05/04/21 Stella [address] Connie Islas MD  05/04/21 6860

## 2021-05-05 LAB
ORGANISM: ABNORMAL
URINE CULTURE, ROUTINE: ABNORMAL

## 2021-05-11 ENCOUNTER — CARE COORDINATION (OUTPATIENT)
Dept: CARE COORDINATION | Age: 71
End: 2021-05-11

## 2021-05-11 NOTE — CARE COORDINATION
Attempted to reach Hussain Del today for enrollment in care coordination. No answer. Message left to return call.

## 2021-05-13 ENCOUNTER — CARE COORDINATION (OUTPATIENT)
Dept: CARE COORDINATION | Age: 71
End: 2021-05-13

## 2021-05-13 NOTE — CARE COORDINATION
Attempted to reach Mayuri Feliciano for enrollment in care coordination. No answer. Message left to return call.

## 2021-05-25 ENCOUNTER — CARE COORDINATION (OUTPATIENT)
Dept: CARE COORDINATION | Age: 71
End: 2021-05-25

## 2021-06-07 ENCOUNTER — TELEPHONE (OUTPATIENT)
Dept: FAMILY MEDICINE CLINIC | Age: 71
End: 2021-06-07

## 2021-06-07 ENCOUNTER — APPOINTMENT (OUTPATIENT)
Dept: GENERAL RADIOLOGY | Age: 71
End: 2021-06-07
Payer: MEDICARE

## 2021-06-07 ENCOUNTER — HOSPITAL ENCOUNTER (EMERGENCY)
Age: 71
Discharge: HOME | End: 2021-06-07
Payer: MEDICARE

## 2021-06-07 VITALS
BODY MASS INDEX: 23.04 KG/M2 | WEIGHT: 130 LBS | SYSTOLIC BLOOD PRESSURE: 154 MMHG | HEART RATE: 101 BPM | DIASTOLIC BLOOD PRESSURE: 60 MMHG | TEMPERATURE: 98.2 F | RESPIRATION RATE: 16 BRPM | OXYGEN SATURATION: 92 % | HEIGHT: 63 IN

## 2021-06-07 DIAGNOSIS — J40 BRONCHITIS: Primary | ICD-10-CM

## 2021-06-07 PROCEDURE — 99213 OFFICE O/P EST LOW 20 MIN: CPT

## 2021-06-07 PROCEDURE — 99213 OFFICE O/P EST LOW 20 MIN: CPT | Performed by: EMERGENCY MEDICINE

## 2021-06-07 PROCEDURE — 71046 X-RAY EXAM CHEST 2 VIEWS: CPT

## 2021-06-07 RX ORDER — PREDNISONE 20 MG/1
20 TABLET ORAL 2 TIMES DAILY
Qty: 10 TABLET | Refills: 0 | Status: SHIPPED | OUTPATIENT
Start: 2021-06-07 | End: 2021-06-12

## 2021-06-07 ASSESSMENT — ENCOUNTER SYMPTOMS
SHORTNESS OF BREATH: 1
COUGH: 1
SINUS PRESSURE: 0
WHEEZING: 0
SINUS PAIN: 0
RHINORRHEA: 0
COLOR CHANGE: 0

## 2021-06-07 NOTE — TELEPHONE ENCOUNTER
----- Message from [facility] sent at 6/7/2021  9:27 AM EDT -----  Subject: Appointment Request    Reason for Call: Urgent Cough Cold    QUESTIONS  Type of Appointment? Established Patient  Reason for appointment request? Available appointments did not meet   patient need  Additional Information for Provider? Pt is having a deep cough and   shortness of breath. Pt has COPD and would like to be seen in the office. Pt is not comfortable with VV.   ---------------------------------------------------------------------------  --------------  CALL BACK INFO  What is the best way for the office to contact you? OK to leave message on   voicemail  Preferred Call Back Phone Number? [phone]  ---------------------------------------------------------------------------  --------------  SCRIPT ANSWERS  Relationship to Patient? Self  Appointment reason? Symptomatic  Select script based on patient symptoms? Adult Cough/Cold Symptoms [Runny   Nose, Sore Throat, Flu, Sinus, Sinus Infection, Upper Respiratory   Infection [URI], Congestion]   Are you currently unable to finish sentences due to any difficulty   breathing? No  Are you unable to swallow liquids? No  Are you having fevers (100.4 or greater), chills, or sweats? No  Do you have COPD, asthma or a chronic lung condition? Yes   Have you been diagnosed with, awaiting test results for, or told that you   are suspected of having COVID-19 (Coronavirus)? (If patient has tested   negative or was tested as a requirement for work, school, or travel and   not based on symptoms, answer no)? No  Do you currently have flu-like symptoms including fever or chills, cough,   shortness of breath, difficulty breathing, or new loss of taste or smell?    Yes

## 2021-06-07 NOTE — ED PROVIDER NOTES
[facility]       CHIEF COMPLAINT       Chief Complaint   Patient presents with    Cough    Chest Congestion    Shortness of Breath       Nurses Notes reviewed and I agree except as noted in the HPI. HISTORY OF PRESENT ILLNESS   Melissa Prado is a 79 y.o. female who presents complaints of having intermittent cough and chest pain shortness of breath. Symptoms began yesterday. Patient reports that she has a history of COPD. She is a former smoker. She stopped smoking in 2015. Cough is nonproductive. No fever. The patient has been fully vaccinated for COVID-19 viral infection. Denies any loss of sense of taste or smell. HPI    REVIEW OF SYSTEMS     Review of Systems   Constitutional: Negative for fatigue and fever. HENT: Negative for rhinorrhea, sinus pressure and sinus pain. Respiratory: Positive for cough and shortness of breath (mild). Negative for wheezing. Cardiovascular: Negative for chest pain. Skin: Negative for color change. Neurological: Negative for dizziness and light-headedness. Psychiatric/Behavioral: Negative for behavioral problems. PAST MEDICAL HISTORY         Diagnosis Date    Aneurysm, cerebral     COPD (chronic obstructive pulmonary disease) (HCC)     Hyperlipidemia     Osteoarthritis     Pneumonia     Thyroid disease     Unspecified cerebral artery occlusion with cerebral infarction        SURGICALHISTORY     Patient  has a past surgical history that includes Colonoscopy ([id number]); External ear surgery (2000); External ear surgery (1999); External ear surgery (1997); Cosmetic surgery (4034,0557); Cosmetic surgery (2013); Brain aneurysm surgery; and Breast surgery.     CURRENT MEDICATIONS       Discharge Medication List as of 6/7/2021  3:13 PM      CONTINUE these medications which have NOT CHANGED    Details   levothyroxine (SYNTHROID) 50 MCG tablet Take 1 tablet by mouth once daily, Disp-90 tablet, R-1Normal      hydrOXYzine (ATARAX) 25 MG tablet TAKE 1/2 TO 1 (ONE-HALF TO ONE) TABLET BY MOUTH EVERY 8 HOURS AS NEEDED FOR ANXIETY, Disp-60 tablet, R-5Normal      denosumab (PROLIA) 60 MG/ML SOSY SC injection Inject 1 mL into the skin once for 1 dose, Disp-1 mL, R-1Normal      simvastatin (ZOCOR) 10 MG tablet Take 1 tablet by mouth nightly, Disp-90 tablet, R-3Normal      acamprosate (CAMPRAL) 333 MG tablet TAKE 2 TABLETS BY MOUTH NIGHTLY, Disp-60 tablet, R-5Normal      oxybutynin (DITROPAN-XL) 5 MG extended release tablet Take 1 tablet by mouth once daily, Disp-30 tablet, R-11Normal      umeclidinium-vilanterol (ANORO ELLIPTA) 62.5-25 MCG/INH AEPB inhaler Inhale 1 puff into the lungs daily, Disp-60 each, R-11Normal      albuterol (PROVENTIL) (2.5 MG/3ML) 0.083% nebulizer solution Take 3 mLs by nebulization every 6 hours as needed for Wheezing, Disp-120 each, R-3Normal      albuterol sulfate HFA (VENTOLIN HFA) 108 (90 Base) MCG/ACT inhaler Inhale 2 puffs into the lungs 4 times daily as needed for Wheezing, Disp-1 Inhaler, R-0Normal      acyclovir (ZOVIRAX) 400 MG tablet Take 400 mg by mouth 2 times dailyHistorical Med      Handicap [facility] Starting Mon 12/7/2020, Disp-1 each, R-0, PrintExpires 12/2025      donepezil (ARICEPT) 10 MG tablet Take 1 tablet by mouth in the evening, Disp-90 tablet, R-3Normal      atomoxetine (STRATTERA) 40 MG capsule Take 1 capsule by mouth daily, Disp-90 capsule,R-1Normal      DULoxetine (CYMBALTA) 60 MG extended release capsule Take 1 capsule by mouth once daily, Disp-90 capsule,R-3Normal      Multiple Vitamin (MULTI-VITAMIN PO) Take  by mouth. Calcium Carbonate-Vitamin D (CALCIUM-VITAMIN D) 600-200 MG-UNIT CAPS Take 1 tablet by mouth 2 times daily Historical Med      fish oil-omega-3 fatty acids 1000 MG capsule Take 1 g by mouth. One tablet daily along with red yeast rice             ALLERGIES     Patient is is allergic to sulfa antibiotics.     Patients   Immunization No focal deficit present. Mental Status: She is alert. Psychiatric:         Mood and Affect: Mood normal.         Behavior: Behavior normal.         DIAGNOSTIC RESULTS     Labs:No results found for this visit on 06/07/21. IMAGING:    XR CHEST (2 VW)   Final Result   Mild linear opacities at the lung bases as evidence for recurrent atelectasis, scar or bronchiectasis. No acute abnormality identified. **This report has been created using voice recognition software. It may contain minor errors which are inherent in voice recognition technology. **      Final report electronically signed by Dr. Neeraj Covington MD on 6/7/2021 3:08 PM            EKG:      URGENT CARE COURSE:     Vitals:    06/07/21 1437   BP: (!) 154/60   Pulse: 101   Resp: 16   Temp: 98.2 °F (36.8 °C)   SpO2: 92%   Weight: 130 lb (59 kg)   Height: 5' 3\" (1.6 m)       Medications - No data to display         PROCEDURES:  None    FINAL IMPRESSION      1. Bronchitis          DISPOSITION/ PLAN   Patient presents for cough. Patient appears in no acute distress. Patient's O2 saturation 92% on room air, however patient was wearing dark nail polish. No tachypnea. No wheezing. No signs of distress. Patient will be discharged. Will be placed on prednisone. Continue her home medications. Drink plenty of fluids. Over-the-counter cough syrup or may try a teaspoon of honey to suppress cough. Advise follow-up primary care provider next week if no improvement. Return here or the emergency department if worse.         PATIENT REFERRED TO:  Shanell Chilel, APRN - CNP  [address] [place] [id number]      DISCHARGE MEDICATIONS:  Discharge Medication List as of 6/7/2021  3:13 PM      START taking these medications    Details   predniSONE (DELTASONE) 20 MG tablet Take 1 tablet by mouth 2 times daily for 5 days, Disp-10 tablet, R-0Normal             Discharge Medication List as of 6/7/2021  3:13 PM          Discharge Medication List as of 6/7/2021  3:13 PM          Parul Maffucci, APRN - CNP    (Please note that portions of this note were completed with a voice recognition program. Efforts were made to edit the dictations but occasionally words are mis-transcribed.)          SARITHA Spencer CNP  06/07/21 1524

## 2021-06-08 ENCOUNTER — CARE COORDINATION (OUTPATIENT)
Dept: CARE COORDINATION | Age: 71
End: 2021-06-08

## 2021-06-08 NOTE — ACP (ADVANCE CARE PLANNING)
Advance Care Planning   Healthcare Decision Maker: Today we documented desired Decision Maker(s), who is (are) NOT Legal Next of Kin. ACP documents are required for decision maker authority. Healthcare Decision Maker information reviewed and verified with patient.

## 2021-06-08 NOTE — CARE COORDINATION
Attempted to reach patient for covid-19 f/u s/p recent UC visit for c/o cough, congestion, and SOB. COVID-19 testing was not completed. Patient was not available at the time of my call and generic voicemail message was left asking patient to please return call to my direct number. No further f/u planned as no COVID-19 testing was completed. Episode of Care resolved.

## 2021-06-08 NOTE — CARE COORDINATION
Educated patient about risk for severe COVID-19 due to risk factors according to CDC guidelines. ACM reviewed discharge instructions, medical action plan and red flag symptoms with the patient who verbalized understanding. Discussed COVID vaccination status: Yes. Patient shared she is current with COVID-19 vaccine. Discussed exposure protocols and quarantine with CDC Guidelines. Patient was given an opportunity to verbalize any questions and concerns and agrees to contact ACM or health care provider for questions related to their healthcare. Patient returned call for covid-19 f/u s/p recent  visit for c/o cough, congestion, and SOB. Patient denied any new/change/worsening of symptoms. Patient shared she has started prednisone as directed and it is helping \"some. \"  Patient reported she has developed diarrhea today and cough remains unchanged. Patient denied any questions re: her discharge instructions. Patient was encouraged to call and schedule PCP f/u appointment, especially with any new/worsening of symptoms, and patient verbalized understanding and declined any assistance with scheduling during our call. Covid-19 education was reviewed with patient, and patient verbalized understanding. Patient was reminded to call covid-19 hotline number with any new symptoms or questions/concerns. Patient verbalized understanding and hotline number provided. Healthcare Decision Maker information reviewed and verified with patient. ACP note completed. Patient denied any other questions, concerns, or needs. No further f/u planned at this time as no COVID-19 testing completed. Episode of Care resolved.

## 2021-06-08 NOTE — DISCHARGE SUMMARY
[facility] DISCHARGE NOTE  OUTPATIENT  Specialized Therapy Services    Patient Name: Bernie Ruiz        CSN: [id number]   YOB: 1950  Gender: female  [de-identified], SARITHA Mtz -*,    Stress incontinence (female) (male) [N39.3] ,      Patient is discharged from Physical Therapy services at this time. See last note for details related to results of therapy and goal achievement. Reason for discharge: Attendance. Pt has not attended PT since March 2021.       Raymond Salgado, PT 6/8/2021

## 2021-06-10 ENCOUNTER — TELEPHONE (OUTPATIENT)
Dept: FAMILY MEDICINE CLINIC | Age: 71
End: 2021-06-10

## 2021-06-10 DIAGNOSIS — J40 BRONCHITIS: Primary | ICD-10-CM

## 2021-06-10 RX ORDER — ALBUTEROL SULFATE 2.5 MG/3ML
2.5 SOLUTION RESPIRATORY (INHALATION) EVERY 6 HOURS PRN
Qty: 120 EACH | Refills: 3 | Status: SHIPPED | OUTPATIENT
Start: 2021-06-10 | End: 2021-06-11

## 2021-06-10 RX ORDER — AZITHROMYCIN 250 MG/1
TABLET, FILM COATED ORAL
Qty: 6 TABLET | Refills: 0 | Status: SHIPPED | OUTPATIENT
Start: 2021-06-10 | End: 2021-06-20

## 2021-06-10 NOTE — TELEPHONE ENCOUNTER
Called and updated the patient, she stated that she does not have a nebulizer machine or a rescue inhaler  States that she only uses her Anoro. Please advise.

## 2021-06-11 ENCOUNTER — TELEPHONE (OUTPATIENT)
Dept: FAMILY MEDICINE CLINIC | Age: 71
End: 2021-06-11

## 2021-06-11 NOTE — TELEPHONE ENCOUNTER
----- Message from Jamison Walters sent at 6/10/2021  4:40 PM EDT -----  Subject: Message to Provider    QUESTIONS  Information for Provider? [facility] on [address] told pt she needs a   prior authorization for the medication that goes in her nebulizer. [facility]   PH[facility]Y [facility] [place] [facility] [place] [facility]   [phone]  ---------------------------------------------------------------------------  --------------  Deanna JAEGER  What is the best way for the office to contact you? OK to leave message on   voicemail  Preferred Call Back Phone Number? [phone]  ---------------------------------------------------------------------------  --------------  SCRIPT ANSWERS  Relationship to Patient?  Self

## 2021-06-15 ENCOUNTER — FOLLOWUP TELEPHONE ENCOUNTER (OUTPATIENT)
Dept: CARDIAC REHAB | Age: 71
End: 2021-06-15

## 2021-06-15 NOTE — TELEPHONE ENCOUNTER
PULMONARY REHABILITATION REFERRAL  COPD Exacerbation    Pulmonary Rehab Evaluation order received. Called pt at this time and there was no answer so I left a message asking the pt to call us back at [phone].

## 2021-06-22 DIAGNOSIS — F32.5 MAJOR DEPRESSIVE DISORDER, SINGLE EPISODE, IN FULL REMISSION (HCC): ICD-10-CM

## 2021-06-22 DIAGNOSIS — F10.10 ALCOHOL ABUSE: ICD-10-CM

## 2021-06-22 RX ORDER — OXYBUTYNIN CHLORIDE 5 MG/1
TABLET, EXTENDED RELEASE ORAL
Qty: 90 TABLET | Refills: 3 | Status: SHIPPED | OUTPATIENT
Start: 2021-06-22 | End: 2021-07-21 | Stop reason: DRUGHIGH

## 2021-06-22 RX ORDER — HYDROXYZINE HYDROCHLORIDE 25 MG/1
TABLET, FILM COATED ORAL
Qty: 90 TABLET | Refills: 3 | Status: SHIPPED | OUTPATIENT
Start: 2021-06-22 | End: 2021-12-09

## 2021-06-22 RX ORDER — ACAMPROSATE CALCIUM 333 MG/1
666 TABLET, DELAYED RELEASE ORAL NIGHTLY
Qty: 180 TABLET | Refills: 1 | Status: SHIPPED | OUTPATIENT
Start: 2021-06-22 | End: 2021-08-26

## 2021-06-22 NOTE — TELEPHONE ENCOUNTER
Requested Prescriptions     Pending Prescriptions Disp Refills    acamprosate (CAMPRAL) 333 MG tablet 90 tablet 3     Sig: Take 2 tablets by mouth nightly    hydrOXYzine (ATARAX) 25 MG tablet 90 tablet 3     Sig: TAKE 1/2 TO 1 (ONE-HALF TO ONE) TABLET BY MOUTH EVERY 8 HOURS AS NEEDED FOR ANXIETY    oxybutynin (DITROPAN-XL) 5 MG extended release tablet 90 tablet 3     Sig: Take 1 tablet by mouth once daily     Pt asking for 90 day supply with refills of the above medication to [facility] [address]. Pt will check with pharmacy later today.

## 2021-06-22 NOTE — TELEPHONE ENCOUNTER
Called pt again at this time regarding pulmonary rehab. There was no answer so I left a message asking pt to call us back at [phone].

## 2021-06-25 ENCOUNTER — TELEPHONE (OUTPATIENT)
Dept: FAMILY MEDICINE CLINIC | Age: 71
End: 2021-06-25

## 2021-06-25 DIAGNOSIS — E78.2 MIXED HYPERLIPIDEMIA: ICD-10-CM

## 2021-06-25 DIAGNOSIS — E03.9 HYPOTHYROIDISM, UNSPECIFIED TYPE: Primary | ICD-10-CM

## 2021-06-25 DIAGNOSIS — R73.01 IFG (IMPAIRED FASTING GLUCOSE): ICD-10-CM

## 2021-06-25 NOTE — TELEPHONE ENCOUNTER
Patient calling with c/o fatigue. Requesting to have screening labs done. Will go to [facility] on Monday and have done.   Aware 12 hour fasting

## 2021-06-28 ENCOUNTER — TELEPHONE (OUTPATIENT)
Dept: FAMILY MEDICINE CLINIC | Age: 71
End: 2021-06-28

## 2021-06-28 ENCOUNTER — NURSE ONLY (OUTPATIENT)
Dept: LAB | Age: 71
End: 2021-06-28

## 2021-06-28 DIAGNOSIS — M81.6 LOCALIZED OSTEOPOROSIS WITHOUT CURRENT PATHOLOGICAL FRACTURE: ICD-10-CM

## 2021-06-28 DIAGNOSIS — E78.2 MIXED HYPERLIPIDEMIA: ICD-10-CM

## 2021-06-28 DIAGNOSIS — E03.9 HYPOTHYROIDISM, UNSPECIFIED TYPE: ICD-10-CM

## 2021-06-28 DIAGNOSIS — R73.01 IFG (IMPAIRED FASTING GLUCOSE): ICD-10-CM

## 2021-06-28 LAB
ALBUMIN SERPL-MCNC: 4.7 G/DL (ref 3.5–5.1)
ALP BLD-CCNC: 45 U/L (ref 38–126)
ALT SERPL-CCNC: 20 U/L (ref 11–66)
ANION GAP SERPL CALCULATED.3IONS-SCNC: 9 MEQ/L (ref 8–16)
AST SERPL-CCNC: 24 U/L (ref 5–40)
AVERAGE GLUCOSE: 105 MG/DL (ref 70–126)
BILIRUB SERPL-MCNC: 0.6 MG/DL (ref 0.3–1.2)
BUN BLDV-MCNC: 19 MG/DL (ref 7–22)
CALCIUM SERPL-MCNC: 10.1 MG/DL (ref 8.5–10.5)
CHLORIDE BLD-SCNC: 104 MEQ/L (ref 98–111)
CHOLESTEROL, TOTAL: 247 MG/DL (ref 100–199)
CO2: 28 MEQ/L (ref 23–33)
CREAT SERPL-MCNC: 0.9 MG/DL (ref 0.4–1.2)
ERYTHROCYTE [DISTWIDTH] IN BLOOD BY AUTOMATED COUNT: 14.6 % (ref 11.5–14.5)
ERYTHROCYTE [DISTWIDTH] IN BLOOD BY AUTOMATED COUNT: 48.7 FL (ref 35–45)
GFR SERPL CREATININE-BSD FRML MDRD: 62 ML/MIN/1.73M2
GLUCOSE BLD-MCNC: 86 MG/DL (ref 70–108)
HBA1C MFR BLD: 5.5 % (ref 4.4–6.4)
HCT VFR BLD CALC: 46.5 % (ref 37–47)
HDLC SERPL-MCNC: 122 MG/DL
HEMOGLOBIN: 14.5 GM/DL (ref 12–16)
LDL CHOLESTEROL CALCULATED: 109 MG/DL
MCH RBC QN AUTO: 28.7 PG (ref 26–33)
MCHC RBC AUTO-ENTMCNC: 31.2 GM/DL (ref 32.2–35.5)
MCV RBC AUTO: 92.1 FL (ref 81–99)
PLATELET # BLD: 317 THOU/MM3 (ref 130–400)
PMV BLD AUTO: 10.6 FL (ref 9.4–12.4)
POTASSIUM SERPL-SCNC: 4.9 MEQ/L (ref 3.5–5.2)
RBC # BLD: 5.05 MILL/MM3 (ref 4.2–5.4)
SODIUM BLD-SCNC: 141 MEQ/L (ref 135–145)
TOTAL PROTEIN: 7.9 G/DL (ref 6.1–8)
TRIGL SERPL-MCNC: 80 MG/DL (ref 0–199)
TSH SERPL DL<=0.05 MIU/L-ACNC: 2.64 UIU/ML (ref 0.4–4.2)
WBC # BLD: 7.9 THOU/MM3 (ref 4.8–10.8)

## 2021-06-28 RX ORDER — ALENDRONATE SODIUM 70 MG/1
70 TABLET ORAL
Qty: 12 TABLET | Refills: 3 | Status: SHIPPED | OUTPATIENT
Start: 2021-06-28

## 2021-06-28 NOTE — TELEPHONE ENCOUNTER
----- Message from Rick Diana sent at 6/28/2021  2:15 PM EDT -----  Subject: Results Request    QUESTIONS  Which lab or imaging result is the patient calling about? Bloodwork/Labs  Which provider ordered the test? Nyla Spring   At what location was the test performed? Date the test was performed? 2021-06-28  Additional Information for Provider? Pt requesting someone call her with   lab results from today, ok to leave them on voicemail if she does not   answer. ---------------------------------------------------------------------------  --------------  Zita [place] INFO  What is the best way for the office to contact you? OK to leave message on   voicemail, OK to respond with electronic message via [employer] portal (only   for patients who have registered [employer] account)  Preferred Call Back Phone Number?  [phone]

## 2021-06-28 NOTE — TELEPHONE ENCOUNTER
Spoke with pt regarding the Alendronate, pt says she is still on this and takes it weekly. Send refill to [address] Refill if appropriate. Pt also mentions she called earlier today for the test results from the labs she had done this morning. Please advise.

## 2021-07-06 NOTE — TELEPHONE ENCOUNTER
Called pt again at this time regarding pulmonary rehab. There was no answer so left a message asking pt to call us back at [phone]. If no calls received back from pt we will consider pt lost to follow up.

## 2021-07-06 NOTE — TELEPHONE ENCOUNTER
Pt called back and left message due to I was on phone with another pt. Pt left message stating she was interested in pulmonary rehab and asked us to call her back. I called pt back but she did not answer so I left a message asking her to call us back.

## 2021-07-07 ENCOUNTER — OFFICE VISIT (OUTPATIENT)
Dept: FAMILY MEDICINE CLINIC | Age: 71
End: 2021-07-07
Payer: MEDICARE

## 2021-07-07 DIAGNOSIS — E78.2 MIXED HYPERLIPIDEMIA: ICD-10-CM

## 2021-07-07 DIAGNOSIS — M81.6 LOCALIZED OSTEOPOROSIS WITHOUT CURRENT PATHOLOGICAL FRACTURE: ICD-10-CM

## 2021-07-07 DIAGNOSIS — F10.10 ALCOHOL ABUSE: ICD-10-CM

## 2021-07-07 DIAGNOSIS — J43.2 CENTRILOBULAR EMPHYSEMA (HCC): Primary | ICD-10-CM

## 2021-07-07 DIAGNOSIS — E03.9 HYPOTHYROIDISM, UNSPECIFIED TYPE: ICD-10-CM

## 2021-07-07 DIAGNOSIS — F32.5 MAJOR DEPRESSIVE DISORDER, SINGLE EPISODE, IN FULL REMISSION (HCC): ICD-10-CM

## 2021-07-07 DIAGNOSIS — R03.0 ELEVATED BLOOD PRESSURE READING WITHOUT DIAGNOSIS OF HYPERTENSION: ICD-10-CM

## 2021-07-07 DIAGNOSIS — R73.01 IFG (IMPAIRED FASTING GLUCOSE): ICD-10-CM

## 2021-07-07 PROCEDURE — G8427 DOCREV CUR MEDS BY ELIG CLIN: HCPCS | Performed by: NURSE PRACTITIONER

## 2021-07-07 PROCEDURE — 99214 OFFICE O/P EST MOD 30 MIN: CPT | Performed by: NURSE PRACTITIONER

## 2021-07-07 PROCEDURE — G8399 PT W/DXA RESULTS DOCUMENT: HCPCS | Performed by: NURSE PRACTITIONER

## 2021-07-07 PROCEDURE — 3017F COLORECTAL CA SCREEN DOC REV: CPT | Performed by: NURSE PRACTITIONER

## 2021-07-07 PROCEDURE — G8926 SPIRO NO PERF OR DOC: HCPCS | Performed by: NURSE PRACTITIONER

## 2021-07-07 PROCEDURE — 1090F PRES/ABSN URINE INCON ASSESS: CPT | Performed by: NURSE PRACTITIONER

## 2021-07-07 PROCEDURE — 3023F SPIROM DOC REV: CPT | Performed by: NURSE PRACTITIONER

## 2021-07-07 PROCEDURE — 1036F TOBACCO NON-USER: CPT | Performed by: NURSE PRACTITIONER

## 2021-07-07 PROCEDURE — 1123F ACP DISCUSS/DSCN MKR DOCD: CPT | Performed by: NURSE PRACTITIONER

## 2021-07-07 PROCEDURE — 4040F PNEUMOC VAC/ADMIN/RCVD: CPT | Performed by: NURSE PRACTITIONER

## 2021-07-07 PROCEDURE — G8420 CALC BMI NORM PARAMETERS: HCPCS | Performed by: NURSE PRACTITIONER

## 2021-07-07 SDOH — ECONOMIC STABILITY: FOOD INSECURITY: WITHIN THE PAST 12 MONTHS, THE FOOD YOU BOUGHT JUST DIDN'T LAST AND YOU DIDN'T HAVE MONEY TO GET MORE.: PATIENT DECLINED

## 2021-07-07 SDOH — ECONOMIC STABILITY: FOOD INSECURITY: WITHIN THE PAST 12 MONTHS, YOU WORRIED THAT YOUR FOOD WOULD RUN OUT BEFORE YOU GOT MONEY TO BUY MORE.: PATIENT DECLINED

## 2021-07-07 ASSESSMENT — SOCIAL DETERMINANTS OF HEALTH (SDOH): HOW HARD IS IT FOR YOU TO PAY FOR THE VERY BASICS LIKE FOOD, HOUSING, MEDICAL CARE, AND HEATING?: PATIENT DECLINED

## 2021-07-07 ASSESSMENT — ENCOUNTER SYMPTOMS: SHORTNESS OF BREATH: 0

## 2021-07-07 NOTE — PROGRESS NOTES
with counselor via [place]. [address] [facility] Final meetings for alcohol abuse. She was drinking a glass of wine in evening. On camparell 2 tabs in evening to decrease alcohol cravings. It is doing well. No wine since last visit. Feeling well. Patient is status post CVA but doing well subsequently. Follows with [facility]. Her speech is essentially back to normal also. She is on Strattera and Aricept due to cognitive changes s/p CVA. Seen Dr. Hugo Parrish regarding potential hydrocephalus. MRA June 2020 and CT Head June 2020 were unremarkable    Denies CP, SOB or chest tightness. ECHO 2019 EF 60% with LV function normal.  PFT 2019 showed moderate emphysema. 40 pack year hx. Anoro and NEB tx. Has O2 at home she uses with activity and PRN at night. Following with P[facility]M at [facility]. Vitals:    07/07/21 1525   BP: (!) 146/84   Pulse: 96   Resp: 16   Temp: 97.6 °F (36.4 °C)   SpO2: 96%       Her hypothyroidism continues to respond well to her current thyroid dose. She is on 50 µg daily. Lab Results   Component Value Date    TSH 2.640 06/28/2021     BP wnl    BP Readings from Last 3 Encounters:   07/07/21 (!) 146/84   06/07/21 (!) 154/60   05/04/21 134/82     Labs reviewed.       Lab Results   Component Value Date    LABA1C 5.5 06/28/2021    LABA1C 5.1 06/16/2020    LABA1C 5.3 04/13/2019     No results found for: EAG    No components found for: CHLPL  Lab Results   Component Value Date    TRIG 80 06/28/2021    TRIG 110 06/16/2020    TRIG 81 04/13/2019     Lab Results   Component Value Date     06/28/2021     (A) 06/16/2020     (A) 04/13/2019     Lab Results   Component Value Date    LDLCALC 109 06/28/2021    LDLCALC 85 06/16/2020    LDLCALC 98 04/13/2019     No results found for: LABVLDL      Chemistry        Component Value Date/Time     06/28/2021 1121    K 4.9 06/28/2021 1121    K 3.7 12/25/2020 0536     06/28/2021 1121    CO2 28 06/28/2021 1121    BUN 19 06/28/2021 1121    CREATININE 0.9 06/28/2021 1121        Component Value Date/Time    CALCIUM 10.1 06/28/2021 1121    ALKPHOS 45 06/28/2021 1121    AST 24 06/28/2021 1121    ALT 20 06/28/2021 1121    BILITOT 0.6 06/28/2021 1121          Lab Results   Component Value Date    TSH 2.640 06/28/2021       Lab Results   Component Value Date    WBC 7.9 06/28/2021    HGB 14.5 06/28/2021    HCT 46.5 06/28/2021    MCV 92.1 06/28/2021     06/28/2021         Health Maintenance   Topic Date Due    Annual Wellness Visit (AWV)  Never done    Flu vaccine (1) 09/01/2021    Low dose CT lung screening  01/28/2022    Breast cancer screen  06/18/2022    Lipid screen  06/28/2022    TSH testing  06/28/2022    DTaP/Tdap/Td vaccine (2 - Td or Tdap) 04/08/2024    Colon cancer screen colonoscopy  08/29/2029    DEXA (modify frequency per FRAX score)  Completed    Shingles Vaccine  Completed    Pneumococcal 65+ years Vaccine  Completed    COVID-19 Vaccine  Completed    Hepatitis C screen  Completed    Hepatitis A vaccine  Aged Out    Hepatitis B vaccine  Aged Out    Hib vaccine  Aged Out    Meningococcal (ACWY) vaccine  Aged Out       Immunization History   Administered Date(s) Administered    COVID-19, Moderna, PF, 100mcg/0.5mL 02/08/2021, 03/08/2021    Influenza A ([id number]) Vaccine PF IM 12/19/2009    Influenza Vaccine, unspecified formulation 12/06/2017    Influenza Virus Vaccine 12/06/2017    Influenza, High Dose (Fluzone 65 yrs and older) 12/06/2017, 11/07/2018, 10/13/2020    Influenza, High-dose, Quadv, 65 yrs +, IM (Fluzone) 10/13/2020    Influenza, Triv, inactivated, subunit, adjuvanted, IM (Fluad 65 yrs and older) 10/30/2019    Pneumococcal Conjugate 13-valent ([id number]) 07/21/2015, 07/26/2017    Pneumococcal Polysaccharide ([id number]) 11/13/2018    Pneumococcal Vaccine 11/14/2016    Tdap (Boostrix, Adacel) 04/08/2014    Zoster Recombinant (Shingrix) 10/13/2020, 12/12/2020       Review of Systems   HENT: Negative.     Respiratory: Negative for shortness of breath. Genitourinary: Positive for frequency. Neurological: Negative for dizziness and light-headedness. Psychiatric/Behavioral: Positive for confusion. Negative for dysphoric mood. The patient is nervous/anxious. Objective:   Physical Exam  Constitutional:       General: She is not in acute distress. HENT:      Head: Normocephalic. Right Ear: Tympanic membrane normal.      Left Ear: Tympanic membrane normal.      Nose: Nose normal.   Eyes:      Pupils: Pupils are equal, round, and reactive to light. Neck:      Vascular: No carotid bruit. Cardiovascular:      Rate and Rhythm: Normal rate and regular rhythm. Pulses: Normal pulses. Heart sounds: Normal heart sounds. No murmur heard. Pulmonary:      Effort: Pulmonary effort is normal.      Breath sounds: Normal breath sounds. No wheezing. Abdominal:      General: Bowel sounds are normal. There is no distension. Palpations: Abdomen is soft. Tenderness: There is no abdominal tenderness. Musculoskeletal:         General: No tenderness. Normal range of motion. Cervical back: Normal range of motion and neck supple. Skin:     General: Skin is warm and dry. Findings: No rash. Neurological:      Mental Status: She is alert and oriented to person, place, and time. Psychiatric:         Attention and Perception: She is inattentive. Mood and Affect: Mood is anxious and depressed. Behavior: Behavior is cooperative. Thought Content: Thought content normal.         Cognition and Memory: She exhibits impaired recent memory. Judgment: Judgment normal.         Assessment:       Diagnosis Orders   1. Centrilobular emphysema ([address]     2. Major depressive disorder, single episode, in full remission ([address]     3. Alcohol abuse     4. Mixed hyperlipidemia     5. IFG (impaired fasting glucose)     6. Hypothyroidism, unspecified type     7.  Localized osteoporosis without current pathological fracture     8.  Elevated blood pressure reading without diagnosis of hypertension             Plan:       Continue to monitor BP - notify if > 150  No concerning features of nighttime sleep - no daytime fatigue  Routine related as nothing in AM to get her up    - be consistent on wake up time  Chronic conditions stable   Labs reviewed  Follow up with Specialists  Refill as above  Healthy Lifestyles discussed  Preventative UTD  RTO 6 months          Kang Luke, SARITHA - CNP

## 2021-07-08 VITALS
RESPIRATION RATE: 16 BRPM | OXYGEN SATURATION: 96 % | HEART RATE: 96 BPM | BODY MASS INDEX: 22.99 KG/M2 | SYSTOLIC BLOOD PRESSURE: 146 MMHG | DIASTOLIC BLOOD PRESSURE: 84 MMHG | WEIGHT: 129.8 LBS | TEMPERATURE: 97.6 F

## 2021-07-14 ENCOUNTER — TELEPHONE (OUTPATIENT)
Dept: FAMILY MEDICINE CLINIC | Age: 71
End: 2021-07-14

## 2021-07-14 NOTE — TELEPHONE ENCOUNTER
----- Message from Marisol Pascal sent at 7/14/2021 12:14 PM EDT -----  Subject: Message to Provider    QUESTIONS  Information for Provider? Pt had blood work done and received a voicemail   last week with her results. Pt would like a call back to go into further   detail about the blood work and what tests were ordered for her. Please   advise. Pt states a detailed VM is ok if she does not answer. ---------------------------------------------------------------------------  --------------  Eloisa JAEGER  What is the best way for the office to contact you? OK to leave message on   voicemail  Preferred Call Back Phone Number? [phone]  ---------------------------------------------------------------------------  --------------  SCRIPT ANSWERS  Relationship to Patient?  Self

## 2021-07-14 NOTE — TELEPHONE ENCOUNTER
Lab message from 6/28/21 that all labs look great TE and what specify labs were done given to pt who voiced understanding.

## 2021-07-20 ENCOUNTER — TELEPHONE (OUTPATIENT)
Dept: FAMILY MEDICINE CLINIC | Age: 71
End: 2021-07-20

## 2021-07-20 ENCOUNTER — NURSE TRIAGE (OUTPATIENT)
Dept: OTHER | Facility: CLINIC | Age: 71
End: 2021-07-20

## 2021-07-20 DIAGNOSIS — M79.671 PAIN OF RIGHT HEEL: Primary | ICD-10-CM

## 2021-07-20 NOTE — TELEPHONE ENCOUNTER
----- Message from Jeni Mera. sent at 7/20/2021  9:19 AM EDT -----  Subject: Referral Request    QUESTIONS   Reason for referral request? podiatrist   Has the physician seen you for this condition before? No   Preferred Specialist (if applicable)? Swati Flores you already have an appointment scheduled? No  Additional Information for Provider? Patient has possible bone spur in   right heel of foot. Requesting to be referred to a podiatrist.   ---------------------------------------------------------------------------  --------------  [address]  What is the best way for the office to contact you? OK to leave message on   voicemail, OK to respond with electronic message via [facility] portal (only   for patients who have registered [facility] account)  Preferred Call Back Phone Number?  [phone]

## 2021-07-20 NOTE — TELEPHONE ENCOUNTER
Pt notified that a podiatry referral to Dr. Hernán Rossi faxed to his office at [phone]. Pt aware that POD will call her to schedule.

## 2021-07-20 NOTE — TELEPHONE ENCOUNTER
Reason for Disposition   SEVERE pain (e.g., excruciating, unable to do any normal activities)    Answer Assessment - Initial Assessment Questions  1. ONSET: \"When did the pain start? \"       Pain began yesterday     2. LOCATION: \"Where is the pain located? \"       Right heel    3. PAIN: \"How bad is the pain? \"    (Scale 1-10; or mild, moderate, severe)    -  MILD (1-3): doesn't interfere with normal activities     -  MODERATE (4-7): interferes with normal activities (e.g., work or school) or awakens from sleep, limping     -  SEVERE (8-10): excruciating pain, unable to do any normal activities, unable to walk      Pain is Moderate, and affecting her ability to walk    4. WORK OR EXERCISE: \"Has there been any recent work or exercise that involved this part of the body? \"       Pain began after your walk yesterday    5. CAUSE: \"What do you think is causing the foot pain? \"      Merilee Carlitos but thinks it could be a heel spur    6. OTHER SYMPTOMS: \"Do you have any other symptoms? \" (e.g., leg pain, rash, fever, numbness)      None    7. PREGNANCY: \"Is there any chance you are pregnant? \" \"When was your last menstrual period? \"      No    Protocols used: FOOT PAIN-ADULT-[place]    Received call from [place]  at [address]  with [place]. Brief description of triage: right heel pain that began yesterday,painful today and she can only walk on her toes of the right foot    Triage indicates for patient to be seen today in the office    Care advice provided, patient verbalizes understanding; denies any other questions or concerns; instructed to call back for any new or worsening symptoms. Writer provided warm transfer to [place] at [address] [facility]  for appointment scheduling. Attention Provider: Thank you for allowing me to participate in the care of your patient. The patient was connected to triage in response to information provided to the [facility].   Please do not respond through this encounter as the response is not directed to a shared pool.

## 2021-07-21 ENCOUNTER — OFFICE VISIT (OUTPATIENT)
Dept: FAMILY MEDICINE CLINIC | Age: 71
End: 2021-07-21
Payer: MEDICARE

## 2021-07-21 ENCOUNTER — TELEPHONE (OUTPATIENT)
Dept: FAMILY MEDICINE CLINIC | Age: 71
End: 2021-07-21

## 2021-07-21 VITALS
DIASTOLIC BLOOD PRESSURE: 76 MMHG | BODY MASS INDEX: 23.03 KG/M2 | RESPIRATION RATE: 20 BRPM | HEART RATE: 100 BPM | SYSTOLIC BLOOD PRESSURE: 134 MMHG | WEIGHT: 130 LBS

## 2021-07-21 DIAGNOSIS — R35.0 URINARY FREQUENCY: Primary | ICD-10-CM

## 2021-07-21 LAB
BILIRUBIN, POC: ABNORMAL
BLOOD URINE, POC: ABNORMAL
CLARITY, POC: CLEAR
COLOR, POC: YELLOW
GLUCOSE URINE, POC: ABNORMAL
KETONES, POC: ABNORMAL
LEUKOCYTE EST, POC: ABNORMAL
NITRITE, POC: ABNORMAL
PH, POC: 7
PROTEIN, POC: ABNORMAL
SPECIFIC GRAVITY, POC: 1.02
UROBILINOGEN, POC: 0.2

## 2021-07-21 PROCEDURE — G8399 PT W/DXA RESULTS DOCUMENT: HCPCS | Performed by: NURSE PRACTITIONER

## 2021-07-21 PROCEDURE — G8427 DOCREV CUR MEDS BY ELIG CLIN: HCPCS | Performed by: NURSE PRACTITIONER

## 2021-07-21 PROCEDURE — 3017F COLORECTAL CA SCREEN DOC REV: CPT | Performed by: NURSE PRACTITIONER

## 2021-07-21 PROCEDURE — 1036F TOBACCO NON-USER: CPT | Performed by: NURSE PRACTITIONER

## 2021-07-21 PROCEDURE — 1123F ACP DISCUSS/DSCN MKR DOCD: CPT | Performed by: NURSE PRACTITIONER

## 2021-07-21 PROCEDURE — 1090F PRES/ABSN URINE INCON ASSESS: CPT | Performed by: NURSE PRACTITIONER

## 2021-07-21 PROCEDURE — 81003 URINALYSIS AUTO W/O SCOPE: CPT | Performed by: NURSE PRACTITIONER

## 2021-07-21 PROCEDURE — 4040F PNEUMOC VAC/ADMIN/RCVD: CPT | Performed by: NURSE PRACTITIONER

## 2021-07-21 PROCEDURE — G8420 CALC BMI NORM PARAMETERS: HCPCS | Performed by: NURSE PRACTITIONER

## 2021-07-21 PROCEDURE — 99213 OFFICE O/P EST LOW 20 MIN: CPT | Performed by: NURSE PRACTITIONER

## 2021-07-21 RX ORDER — OXYBUTYNIN CHLORIDE 15 MG/1
1 TABLET, EXTENDED RELEASE ORAL NIGHTLY
COMMUNITY
Start: 2021-06-17 | End: 2022-05-17

## 2021-07-21 RX ORDER — VALACYCLOVIR HYDROCHLORIDE 500 MG/1
1 TABLET, FILM COATED ORAL DAILY
COMMUNITY
Start: 2021-07-08 | End: 2021-07-21

## 2021-07-21 RX ORDER — SOY ISOFLAVONE 40 MG
2 TABLET ORAL 2 TIMES DAILY
COMMUNITY

## 2021-07-21 NOTE — PROGRESS NOTES
Puma Reyes (1950) 70 y.o. female here for evaluation of the following chief complaint(s):      HPI:  Chief Complaint   Patient presents with    Urinary Frequency     urgency - started yesterday       Onset of 1 day with urinary frequency and urgency. No burning with urination. No abdominal pain or pressure. No back pain. Hx of OAB. No fever or chills. Vitals:    07/21/21 1318   BP: 134/76   Pulse: 100   Resp: 20       Patient Active Problem List   Diagnosis    Hyperlipemia    Hypothyroidism    Depression    Chronic anxiety    Osteopenia    Medication monitoring encounter    H/O: CVA (cerebrovascular accident), aneurysm bleed, Jan 2015    Abnormality of gait following cerebrovascular accident    GERD (gastroesophageal reflux disease)    Cognitive impairment due to CVA.  OAB (overactive bladder)    Chronic nausea    SOB (shortness of breath)    Hypoxia    Major depressive disorder, single episode, in full remission ([address]    Smoking greater than 40 pack years    Pulmonary emphysema ([address]    Syncope and collapse    Concussion with loss of consciousness    Laceration of head    Alcohol abuse    Spinal stenosis, cervical region    Normal pressure hydrocephalus (HCC)       SUBJECTIVE/OBJECTIVE:  Review of Systems   Constitutional: Negative for chills and fever. HENT: Negative. Respiratory: Negative for cough and shortness of breath. Cardiovascular: Negative for chest pain. Gastrointestinal: Negative for abdominal pain and nausea. Genitourinary: Positive for frequency and urgency. Negative for difficulty urinating, dysuria, hematuria and pelvic pain. Skin: Negative for rash. Neurological: Negative for dizziness, light-headedness and headaches. Psychiatric/Behavioral: Negative. Physical Exam  Constitutional:       General: She is not in acute distress. HENT:      Head: Normocephalic.       Right Ear: Tympanic membrane normal.      Left Ear: Tympanic membrane normal.      Nose: Nose normal.   Eyes:      Pupils: Pupils are equal, round, and reactive to light. Neck:      Vascular: No carotid bruit. Cardiovascular:      Rate and Rhythm: Normal rate and regular rhythm. Pulses: Normal pulses. Heart sounds: Normal heart sounds. No murmur heard. Pulmonary:      Effort: Pulmonary effort is normal.      Breath sounds: Normal breath sounds. No wheezing. Abdominal:      General: Bowel sounds are normal. There is no distension. Palpations: Abdomen is soft. Tenderness: There is no abdominal tenderness. Musculoskeletal:         General: No tenderness. Normal range of motion. Cervical back: Normal range of motion and neck supple. Skin:     General: Skin is warm and dry. Findings: No rash. Neurological:      Mental Status: She is alert and oriented to person, place, and time. Psychiatric:         Attention and Perception: She is inattentive. Mood and Affect: Mood is anxious and depressed. Behavior: Behavior is cooperative. Thought Content: Thought content normal.         Cognition and Memory: She exhibits impaired recent memory. Judgment: Judgment normal.           ASSESSMENT/PLAN:   Diagnosis Orders   1. Urinary frequency  Culture, Urine    POCT Urinalysis No Micro (Auto)         MDM: UA: trace   Urine Culture sent   Hold on tx until culture results - hx of OAB   Push fluids   RTO prn    An electronic signature was used to authenticate this note.     --SARITHA Steele - CNP

## 2021-07-21 NOTE — TELEPHONE ENCOUNTER
----- Message from Rafatabrahamalma Cristobal sent at 7/21/2021 10:44 AM EDT -----  Subject: Appointment Request    Reason for Call: Urgent Adult Urinary Problem    QUESTIONS  Type of Appointment? Established Patient  Reason for appointment request? No appointments available during search  Additional Information for Provider? PT is having a UTI; and wanted to see   if could come in for urine test and if a script can be sent in.   ---------------------------------------------------------------------------  --------------  [employer]  What is the best way for the office to contact you? OK to leave message on   voicemail  Preferred Call Back Phone Number? [phone]  ---------------------------------------------------------------------------  --------------  SCRIPT ANSWERS  Relationship to Patient? Self  Appointment reason? Symptomatic  Select script based on patient symptoms? Adult Urinary Symptoms   [Urine-related, UTI, Bladder Infection]  Are you having severe back pain with your urinary symptoms? No  Are you having vomiting or nausea? No  Is there blood in your urine? No  Are you having fevers (100.4), chills, or sweats? No  Have you been seen by a provider for this symptom before? No  Have you been diagnosed with, awaiting test results for, or told that you   are suspected of having COVID-19 (Coronavirus)? (If patient has tested   negative or was tested as a requirement for work, school, or travel and   not based on symptoms, answer no)? No  Do you currently have flu-like symptoms including fever or chills, cough,   shortness of breath, difficulty breathing, or new loss of taste or smell? No  Have you had close contact with someone with COVID-19 in the last 14 days? No  (Service Expert  click yes below to proceed with [employer] As Usual   Scheduling)?  Yes

## 2021-07-22 ENCOUNTER — HOSPITAL ENCOUNTER (EMERGENCY)
Age: 71
Discharge: HOME | End: 2021-07-22
Payer: MEDICARE

## 2021-07-22 ENCOUNTER — APPOINTMENT (OUTPATIENT)
Dept: GENERAL RADIOLOGY | Age: 71
End: 2021-07-22
Payer: MEDICARE

## 2021-07-22 VITALS
HEART RATE: 98 BPM | SYSTOLIC BLOOD PRESSURE: 145 MMHG | OXYGEN SATURATION: 95 % | DIASTOLIC BLOOD PRESSURE: 73 MMHG | RESPIRATION RATE: 18 BRPM | TEMPERATURE: 97.8 F

## 2021-07-22 DIAGNOSIS — S63.92XA SPRAIN OF LEFT HAND, INITIAL ENCOUNTER: Primary | ICD-10-CM

## 2021-07-22 PROCEDURE — 99283 EMERGENCY DEPT VISIT LOW MDM: CPT

## 2021-07-22 PROCEDURE — 73110 X-RAY EXAM OF WRIST: CPT

## 2021-07-22 PROCEDURE — 6370000000 HC RX 637 (ALT 250 FOR IP): Performed by: NURSE PRACTITIONER

## 2021-07-22 PROCEDURE — 73130 X-RAY EXAM OF HAND: CPT

## 2021-07-22 RX ORDER — IBUPROFEN 200 MG
600 TABLET ORAL ONCE
Status: COMPLETED | OUTPATIENT
Start: 2021-07-22 | End: 2021-07-22

## 2021-07-22 RX ORDER — IBUPROFEN 600 MG/1
600 TABLET ORAL EVERY 6 HOURS PRN
Qty: 30 TABLET | Refills: 0 | Status: SHIPPED | OUTPATIENT
Start: 2021-07-22 | End: 2021-12-22

## 2021-07-22 RX ADMIN — IBUPROFEN 600 MG: 200 TABLET, FILM COATED ORAL at 21:42

## 2021-07-22 ASSESSMENT — ENCOUNTER SYMPTOMS
NAUSEA: 0
COUGH: 0
ABDOMINAL PAIN: 0
SHORTNESS OF BREATH: 0
NAUSEA: 0
COUGH: 0
VOMITING: 0
BACK PAIN: 0
ABDOMINAL PAIN: 0

## 2021-07-22 ASSESSMENT — PAIN SCALES - GENERAL: PAINLEVEL_OUTOF10: 7

## 2021-07-22 ASSESSMENT — PAIN DESCRIPTION - DESCRIPTORS: DESCRIPTORS: DULL;THROBBING

## 2021-07-22 ASSESSMENT — PAIN DESCRIPTION - PAIN TYPE: TYPE: ACUTE PAIN

## 2021-07-22 ASSESSMENT — PAIN DESCRIPTION - ORIENTATION: ORIENTATION: LEFT

## 2021-07-22 ASSESSMENT — PAIN DESCRIPTION - LOCATION: LOCATION: HAND

## 2021-07-23 ENCOUNTER — TELEPHONE (OUTPATIENT)
Dept: FAMILY MEDICINE CLINIC | Age: 71
End: 2021-07-23

## 2021-07-23 DIAGNOSIS — R35.0 URINARY FREQUENCY: ICD-10-CM

## 2021-07-23 DIAGNOSIS — M79.671 PAIN OF RIGHT HEEL: Primary | ICD-10-CM

## 2021-07-23 LAB
ORGANISM: ABNORMAL
URINE CULTURE, ROUTINE: ABNORMAL

## 2021-07-23 RX ORDER — ATOMOXETINE 40 MG/1
40 CAPSULE ORAL DAILY
Qty: 90 CAPSULE | Refills: 1 | Status: SHIPPED | OUTPATIENT
Start: 2021-07-23 | End: 2022-02-06

## 2021-07-23 RX ORDER — CIPROFLOXACIN 250 MG/1
250 TABLET, FILM COATED ORAL 2 TIMES DAILY
Qty: 6 TABLET | Refills: 0 | Status: SHIPPED | OUTPATIENT
Start: 2021-07-23 | End: 2021-07-26

## 2021-07-23 NOTE — TELEPHONE ENCOUNTER
Pt called office stating she was seeing Sha Dc CNP after her stroke and was prescribed Strattera to help focus. She says [facility] is no longer \"seeing people like me\", she only sees Rheumatoid Arthritis pts now. Pt is out of her Strattera and is asking if you will refill this. If no call back she will check with [address] after 2pm. Refill if appropriate. Pt is asking for a new referral to Physical Medicine. Please advise. Pt is also c/o heel pain and has an appt scheduled with Podiatry next Wednesday. Pt went to [facility] and got MSM. She is asking if this is ok for her to take. Please advise. Pt is aware the office will give her a call on Monday morning with response. She does NOT check her MyChart.

## 2021-07-23 NOTE — ED PROVIDER NOTES
[facility] Emergency Department    CHIEF COMPLAINT       Chief Complaint   Patient presents with    Hand Injury       Nurses Notes reviewed and I agree except as noted in the HPI. HISTORY OF PRESENT ILLNESS    Kuldip Mcnamara paris 70 y.o. female who presents to the ED for evaluation of a hand injury. The patient was reaching for the top shelf of her medicine cabinet this evening when she fell on her outstretched hand. She denies feeling dizzy or lightheaded before she fell and denies hitting her head or loosing consciousness. She is having pain over the middle of her hand and denies wrist pain, numbness and tingling. She states this pain is a 7/8 out of 10 on a pain scale and that she would like something to help as she doesn't know if she can sleep with this pain. Anup White does see some wrist swelling. She denies any fever, chills, cough, chest pain, shortness of breath, abdominal pain, nausea, vomiting, numbness and weakness. HPI was provided by the patient    REVIEW OF SYSTEMS     Review of Systems   Constitutional: Negative for chills, fatigue and fever. HENT: Negative for congestion, ear discharge, ear pain, postnasal drip and rhinorrhea. Eyes: Negative for redness. Respiratory: Negative for cough and chest tightness. Cardiovascular: Negative for chest pain and leg swelling. Gastrointestinal: Negative for abdominal pain, nausea and vomiting. Genitourinary: Negative for difficulty urinating, dysuria, enuresis, flank pain and hematuria. Musculoskeletal: Positive for arthralgias and joint swelling. Negative for back pain. Skin: Negative for rash. Neurological: Negative for dizziness, light-headedness, numbness and headaches. Psychiatric/Behavioral: Negative for agitation, behavioral problems and confusion. All other systems negative except as noted.       PAST MEDICAL HISTORY     Past Medical History:   Diagnosis Date    Aneurysm, cerebral     COPD (chronic obstructive pulmonary disease) ([facility] [address]     Hyperlipidemia     Osteoarthritis     Pneumonia     Thyroid disease     Unspecified cerebral artery occlusion with cerebral infarction        SURGICALHISTORY      has a past surgical history that includes Colonoscopy ([id number]); External ear surgery (2000); External ear surgery (1999); External ear surgery (1997); Cosmetic surgery (4610,1183); Cosmetic surgery (2013); Brain aneurysm surgery; and Breast surgery. CURRENT MEDICATIONS       Discharge Medication List as of 7/22/2021  9:57 PM      CONTINUE these medications which have NOT CHANGED    Details   oxybutynin (DITROPAN XL) 15 MG extended release tablet Take 1 tablet by mouth nightlyHistorical Med      Methylsulfonylmethane (MSM) 1000 MG CAPS Take 2 capsules by mouth 2 times dailyHistorical Med      alendronate (FOSAMAX) 70 MG tablet Take 1 tablet by mouth every 7 days Take with water on an empty stomach- wait 30 minutes before eating or taking other meds.   Avoid lying down for 30 minutes after dose., Disp-12 tablet, R-3Normal      acamprosate (CAMPRAL) 333 MG tablet Take 2 tablets by mouth nightly, Disp-180 tablet, R-1Normal      hydrOXYzine (ATARAX) 25 MG tablet TAKE 1/2 TO 1 (ONE-HALF TO ONE) TABLET BY MOUTH EVERY 8 HOURS AS NEEDED FOR ANXIETY, Disp-90 tablet, R-3Normal      levothyroxine (SYNTHROID) 50 MCG tablet Take 1 tablet by mouth once daily, Disp-90 tablet, R-1Normal      simvastatin (ZOCOR) 10 MG tablet Take 1 tablet by mouth nightly, Disp-90 tablet, R-3Normal      umeclidinium-vilanterol (ANORO ELLIPTA) 62.5-25 MCG/INH AEPB inhaler Inhale 1 puff into the lungs daily, Disp-60 each, R-11Normal      albuterol (PROVENTIL) (2.5 MG/3ML) 0.083% nebulizer solution Take 3 mLs by nebulization every 6 hours as needed for Wheezing, Disp-120 each, R-3Normal      albuterol sulfate HFA (VENTOLIN HFA) 108 (90 Base) MCG/ACT inhaler Inhale 2 puffs into the lungs 4 times daily as needed for Wheezing, Disp-1 Inhaler, R-0Normal acyclovir (ZOVIRAX) 400 MG tablet Take 400 mg by mouth 2 times dailyHistorical Med      Handicap Placard MISC Starting Mon 2020, Disp-1 each, R-0, PrintExpires 2025      donepezil (ARICEPT) 10 MG tablet Take 1 tablet by mouth in the evening, Disp-90 tablet, R-3Normal      atomoxetine (STRATTERA) 40 MG capsule Take 1 capsule by mouth daily, Disp-90 capsule,R-1Normal      DULoxetine (CYMBALTA) 60 MG extended release capsule Take 1 capsule by mouth once daily, Disp-90 capsule,R-3Normal      Multiple Vitamin (MULTI-VITAMIN PO) Take  by mouth. Calcium Carbonate-Vitamin D (CALCIUM-VITAMIN D) 600-200 MG-UNIT CAPS Take 1 tablet by mouth 2 times daily Historical Med      fish oil-omega-3 fatty acids 1000 MG capsule Take 1 g by mouth. One tablet daily along with red yeast rice             ALLERGIES     is allergic to sulfa antibiotics. FAMILY HISTORY     She indicated that her mother is . She indicated that her father is . She indicated that her maternal aunt is . She indicated that her maternal uncle is . family history includes Cancer in her mother; Diabetes in her father; Heart Disease in her maternal aunt; Parkinsonism in her maternal uncle; Stroke in her mother. SOCIAL HISTORY       Social History     Socioeconomic History    Marital status:      Spouse name: Not on file    Number of children: 3    Years of education: Not on file    Highest education level: Not on file   Occupational History    Not on file   Tobacco Use    Smoking status: Former Smoker     Packs/day: 1.00     Years: 40.00     Pack years: 40.00     Types: Cigarettes     Quit date: 2015     Years since quittin.5    Smokeless tobacco: Never Used   Vaping Use    Vaping Use: Never used   Substance and Sexual Activity    Alcohol use:  Yes     Alcohol/week: 2.0 standard drinks     Types: 2 Glasses of wine per week     Comment: 2 glass of vodka every other day     Drug use: No    Sexual activity: Yes   Other Topics Concern    Not on file   Social History Narrative    Not on file     Social Determinants of Health     Financial Resource Strain: Unknown    Difficulty of Paying Living Expenses: Patient refused   Food Insecurity: Unknown    Worried About Running Out of Food in the Last Year: Patient refused    [address] in the Last Year: Patient refused   Transportation Needs:     Lack of Transportation (Medical):  Lack of Transportation (Non-Medical):    Physical Activity:     Days of Exercise per Week:     Minutes of Exercise per Session:    Stress:     Feeling of Stress :    Social Connections:     Frequency of Communication with Friends and Family:     Frequency of Social Gatherings with Friends and Family:     Attends [employer] Services:     Active Member of Clubs or Organizations:     Attends Club or Organization Meetings:     Marital Status:    Intimate Partner Violence:     Fear of Current or Ex-Partner:     Emotionally Abused:     Physically Abused:     Sexually Abused:        PHYSICAL EXAM     INITIAL VITALS:  oral temperature is 97.8 °F (36.6 °C). Her blood pressure is 145/73 (abnormal) and her pulse is 98. Her respiration is 18 and oxygen saturation is 95%. Physical Exam  Vitals and nursing note reviewed. Constitutional:       General: She is not in acute distress. Appearance: She is well-developed. She is not diaphoretic. HENT:      Head: Normocephalic and atraumatic. Eyes:      General:         Right eye: No discharge. Left eye: No discharge. Conjunctiva/sclera: Conjunctivae normal.   Neck:      Trachea: No tracheal deviation. Cardiovascular:      Rate and Rhythm: Normal rate and regular rhythm. Heart sounds: Normal heart sounds. No murmur heard. No gallop. Comments: Normal capillary refill  Pulmonary:      Effort: Pulmonary effort is normal. No respiratory distress. Breath sounds: Normal breath sounds.  No minor errors which are inherent in voice recognition technology. **      Final report electronically signed by Dr. Kvng Colón on 7/22/2021 9:38 PM            LABS:   Labs Reviewed - No data to display    EMERGENCY DEPARTMENT COURSE:   Vitals:    Vitals:    07/22/21 2116   BP: (!) 145/73   Pulse: 98   Resp: 18   Temp: 97.8 °F (36.6 °C)   TempSrc: Oral   SpO2: 95%          MDM                         MDM    Patient was seen in the ER for a hand injury. Appropriate imaging is ordered and reviewed. No fractures are noted. Patient is educated on [address] Wrist brace is given. Patient is to ice and elevate and take ibuprofen. She is to follow up with her pcp and ortho. Return for new or worsening symptoms. Medications   ibuprofen (ADVIL;MOTRIN) tablet 600 mg (600 mg Oral Given 7/22/21 2142)         Patient was seen independently by myself. The patient's final impression and disposition and plan was determined by myself. Strict return precautions and follow up instructions were discussed with the patient prior to discharge, with which the patient agrees. Physical assessment findings, diagnostic testing(s) if applicable, and vital signs reviewed with patient/patient representative. Questions answered. Medications asdirected, including OTC medications for supportive care. Education provided on medications. Differential diagnosis(s) discussed with patient/patient representative. Home care/self care instructions reviewed withpatient/patient representative. Patient is to follow-up with family care provider in 2-3 days if no improvement. Patient is to go to the emergency department if symptoms worsen. Patient/patient representative isaware of care plan, questions answered, verbalizes understanding and is in agreement. CRITICAL CARE:   None    CONSULTS:  None    PROCEDURES:  None    FINAL IMPRESSION     1.  Sprain of left hand, initial encounter          DISPOSITION/PLAN   DISPOSITION Decision To Discharge 07/22/2021 09:56:18 PM      PATIENT REFERREDTO:  Tamara Ganser, APRN - CNP  [address] [facility], [address]  [facility]umann [address] [facility]    Schedule an appointment as soon as possible for a visit in 2 days  For follow up    [address] [place] [address]  [phone]  Schedule an appointment as soon as possible for a visit in 1 week  For follow up, If symptoms worsen      DISCHARGE MEDICATIONS:  Discharge Medication List as of 7/22/2021  9:57 PM      START taking these medications    Details   ibuprofen (ADVIL;MOTRIN) 600 MG tablet Take 1 tablet by mouth every 6 hours as needed for Pain, Disp-30 tablet, R-0Normal             (Please note that portions of this note were completed with a voice recognition program.  Efforts were made to edit the dictations but occasionally words are mis-transcribed.)         SARITHA Herrera CNP, APRN - CNP  07/24/21 [address] [facility], APRN - CNP  07/27/21 1817

## 2021-07-23 NOTE — ED NOTES
Pt comes in through ED lobby. This afternoon she was reaching up for some medication and fell back landing on her left hand. Now she is having pain and swelling.       Mildred Mejia RN  07/22/21 4996

## 2021-07-24 ASSESSMENT — ENCOUNTER SYMPTOMS
RHINORRHEA: 0
CHEST TIGHTNESS: 0
EYE REDNESS: 0

## 2021-07-27 ENCOUNTER — CARE COORDINATION (OUTPATIENT)
Dept: CARE COORDINATION | Age: 71
End: 2021-07-27

## 2021-07-27 NOTE — TELEPHONE ENCOUNTER
Pt was notified and voiced understanding. Pt does not feel she needs to have a physical med doctor, if you are going to keep prescribing her Strattera. She says nothing further needed at this time.

## 2021-07-27 NOTE — CARE COORDINATION
Attempted to reach Moises Miller for enrollment in care coordination. No answer. Message left requesting return call.

## 2021-07-29 ENCOUNTER — CARE COORDINATION (OUTPATIENT)
Dept: CARE COORDINATION | Age: 71
End: 2021-07-29

## 2021-07-29 NOTE — CARE COORDINATION
Attempted to reach Martessamerrill Paul today for enrollment in care coordination. No answer. Message left to return call.

## 2021-08-06 ENCOUNTER — HOSPITAL ENCOUNTER (OUTPATIENT)
Dept: PULMONOLOGY | Age: 71
Discharge: HOME | End: 2021-08-06
Payer: MEDICARE

## 2021-08-06 DIAGNOSIS — J44.9 CHRONIC OBSTRUCTIVE PULMONARY DISEASE, UNSPECIFIED COPD TYPE (HCC): ICD-10-CM

## 2021-08-06 PROCEDURE — 94729 DIFFUSING CAPACITY: CPT

## 2021-08-06 PROCEDURE — 94060 EVALUATION OF WHEEZING: CPT

## 2021-08-06 PROCEDURE — 94726 PLETHYSMOGRAPHY LUNG VOLUMES: CPT

## 2021-08-17 NOTE — TELEPHONE ENCOUNTER
Pt had PFT done on 8/6/21 and calling pt now to see if pt wanting to set up first appt for pulmonary rehab as ordered by Dr. Roberto Cabrales. There was no answer so I left a message asking pt to call us back at [phone].

## 2021-08-18 ENCOUNTER — TELEPHONE (OUTPATIENT)
Dept: FAMILY MEDICINE CLINIC | Age: 71
End: 2021-08-18

## 2021-08-18 DIAGNOSIS — H93.8X1 EAR FULLNESS, RIGHT: Primary | ICD-10-CM

## 2021-08-18 NOTE — TELEPHONE ENCOUNTER
----- Message from Ese Lyons sent at 8/18/2021 11:35 AM EDT -----  Subject: Referral Request    QUESTIONS   Reason for referral request? Pt is requesting a referral to an ENT. Rt ear   feels like it is plugged up. Would like to see specialist for this   Has the physician seen you for this condition before? No   Preferred Specialist (if applicable)? Do you already have an appointment scheduled? No  Additional Information for Provider?   ---------------------------------------------------------------------------  --------------  CALL BACK INFO  What is the best way for the office to contact you? OK to leave message on   voicemail  Preferred Call Back Phone Number?  [phone]
Patient notified and voiced understanding.
stepdown

## 2021-08-20 ENCOUNTER — CARE COORDINATION (OUTPATIENT)
Dept: CARE COORDINATION | Age: 71
End: 2021-08-20

## 2021-08-25 ENCOUNTER — OFFICE VISIT (OUTPATIENT)
Dept: ENT CLINIC | Age: 71
End: 2021-08-25
Payer: MEDICARE

## 2021-08-25 VITALS
TEMPERATURE: 97.2 F | HEIGHT: 63 IN | DIASTOLIC BLOOD PRESSURE: 60 MMHG | SYSTOLIC BLOOD PRESSURE: 116 MMHG | WEIGHT: 130.4 LBS | RESPIRATION RATE: 14 BRPM | BODY MASS INDEX: 23.11 KG/M2 | HEART RATE: 72 BPM

## 2021-08-25 DIAGNOSIS — J38.1 REINKE'S EDEMA OF VOCAL FOLDS: ICD-10-CM

## 2021-08-25 DIAGNOSIS — K21.9 LARYNGOPHARYNGEAL REFLUX (LPR): ICD-10-CM

## 2021-08-25 DIAGNOSIS — H61.21 HEARING LOSS OF RIGHT EAR DUE TO CERUMEN IMPACTION: ICD-10-CM

## 2021-08-25 DIAGNOSIS — H90.A11 CONDUCTIVE HEARING LOSS OF RIGHT EAR WITH RESTRICTED HEARING OF LEFT EAR: ICD-10-CM

## 2021-08-25 DIAGNOSIS — R49.0 DYSPHONIA: Primary | ICD-10-CM

## 2021-08-25 PROCEDURE — 1036F TOBACCO NON-USER: CPT | Performed by: OTOLARYNGOLOGY

## 2021-08-25 PROCEDURE — G8399 PT W/DXA RESULTS DOCUMENT: HCPCS | Performed by: OTOLARYNGOLOGY

## 2021-08-25 PROCEDURE — 1123F ACP DISCUSS/DSCN MKR DOCD: CPT | Performed by: OTOLARYNGOLOGY

## 2021-08-25 PROCEDURE — 4040F PNEUMOC VAC/ADMIN/RCVD: CPT | Performed by: OTOLARYNGOLOGY

## 2021-08-25 PROCEDURE — 31575 DIAGNOSTIC LARYNGOSCOPY: CPT | Performed by: OTOLARYNGOLOGY

## 2021-08-25 PROCEDURE — 69210 REMOVE IMPACTED EAR WAX UNI: CPT | Performed by: OTOLARYNGOLOGY

## 2021-08-25 PROCEDURE — 99203 OFFICE O/P NEW LOW 30 MIN: CPT | Performed by: OTOLARYNGOLOGY

## 2021-08-25 PROCEDURE — G8427 DOCREV CUR MEDS BY ELIG CLIN: HCPCS | Performed by: OTOLARYNGOLOGY

## 2021-08-25 PROCEDURE — G8420 CALC BMI NORM PARAMETERS: HCPCS | Performed by: OTOLARYNGOLOGY

## 2021-08-25 PROCEDURE — 1090F PRES/ABSN URINE INCON ASSESS: CPT | Performed by: OTOLARYNGOLOGY

## 2021-08-25 PROCEDURE — 3017F COLORECTAL CA SCREEN DOC REV: CPT | Performed by: OTOLARYNGOLOGY

## 2021-08-25 RX ORDER — OMEPRAZOLE 20 MG/1
20 CAPSULE, DELAYED RELEASE ORAL
Qty: 60 CAPSULE | Refills: 5 | Status: SHIPPED | OUTPATIENT
Start: 2021-08-25 | End: 2021-11-01

## 2021-08-25 NOTE — PATIENT INSTRUCTIONS
Vocal Hygiene   ? If you rely on your voice for your livelihood, you are a professional voice user. If you are a professional voice user, it is of the utmost importance you take good care of your voice.     (teachers, preachers, actors, singers, broadcasters, doctors, brokers, salespeople)   ? If you rely on your voice to get you through your daily activities, taking care of your voice can be just as important.     (talking on the telephone, visiting with friends, caregiving)   ? Beginning (or refining) a \"healthy voice program\" is the first step in taking better care of your voice and is essential for vocal folds (aka, vocal cords) that have been hurt or stressed. Begin taking better care of your voice TODAY . 1. HYDRATE: People are more dehydrated than often realized. ? Hydration refers to keeping the vocal cords moist both externally and internally. - External dehydration may come from breathing dry air, breathing with an open mouth, smoking, and certain drying medications. The cords can be re-hydrated by inhaling steam (i.e. hot shower, facial steamer, hot-water vaporizer). - Internal dehydration comes from too much caffeine, alcohol, drying drugs, or sweating without fluid replacement. Internal re-hydration is probably best achieved by drinking lots of water. PUTTING THIS INTO PRACTICE:   ? Make an effort to carry water with your throughout the day. Try to sip small amount frequently rather than gulp down a large amount at once. ? The following amounts are estimates for healthy individuals without conflicting medical conditions:   - Men 3.0 liters (101 ounces) = approximately 6 -(16 oz) bottles of water / day   - Women 2.2 liters (74 ounces) = approximately 4 -(16 oz) bottles of water / day   ? Replace coffee, tea, sodas with water. ? If you don't like water, mix a small amount of juice or flavoring into your water (ex: ½ and ½). Gradually decrease the amount of juice or flavoring. 2. MANAGE YOUR MUCOUS: ? Bothersome mucous can cause people to frequently clear their throats or have the sensation that something is on their vocal cords. Your doctor may advise you to atake a medication called a \"mucolytic\" that helps to keep respirator secretions thin and flowing. The most common mucolytic is Mucinex (common name: \"guaifenesin\")       PUTTING THIS INTO PRACTICE:   ?  Stay hydrated! ?  Ask your doctor if a mucolytic medication would be helpful. ?  If mucous continues to be problematic, discuss the possibility of reflux with your doctor. Reflux of stomach acids into the throat may be responsible for sensations of throat mucous. 3. STOP THROAT CLEARING:   ? Throat clearing is extremely traumatic to your vocal cords - causing excess wear and tear. Bothersome mucous can cause people have the sensation something is on their vocal cords that they need to clear off. The irritation and swelling produced by the throat clearing can cause saliva to sit in your throat. This causes more throat clearing. More throat clearing causes more stagnant mucus which causes more throat clearing, which causes more mucus, etc A vicious cycle will ensue and the habit can be very difficult to break   PUTTING THIS INTO PRACTICE:   ? Begin by trying to suppress the throat clearing. ? When the feeling is present, try swallowing hard or sipping on water. ? In necessary, clear your throat silently -- \"huh\"  For example, when you close your vocal cords, think of picking up a ½ lb weight instead of a 100 lb weight. ? Ask your doctor if a mucolytic or reflux medication would be helpful. 4. IRRITATING YOUR VOICE:   ? \"Everything in moderation\" - this chary advice is especially true when it comes to your voice. Compare your vocal cords to your legs. You would not expect to run a marathon (or even a half-marathon for that matter) and then later do a hour-long leg work out in [employer].  Similarly, you should not talk all day at work and then head out for an evening of yelling or talking over noise. PUTTING THIS INTO PRACTICE:   ? Avoid lengthy conversations on the phone. ? Rest your voice 10 minutes for every 2 hours of talking. ? Talk at a moderate volume - to do this you frequently have to minimize background noise (i.e. television, radio, party noise, traffic, airplanes, and restaurants). ? Avoid shouting and screaming. These traumatize the vocal cords. ? Smoking is very hard on your voice causing chronic irritation and dehydration. ? Maintain good water intake and consider using a hot-water humidifier at night when traveling to dry environments (i.e. [address], etc.). Airplanes are notoriously dry environments. If traveling by plane, increase your water intake accordingly. ? Antihistamines/decongestants are commonly found in cold and allergy medications. These have a drying effect on the vocal cords which is detrimental. Common medications include: Benadryl, Zyrtec, Allegra, Claritin, Sudafed, and any other antihistamine. 5. LPR / REGINALD (Laryngophrayngeal Reflux / Gastroesophageal Reflux):   ? Laryngopharyngeal reflux refers to a spill-over of acids from the esophagus (food-tube) onto the vocal cords (in your throat). The acids are produced in the stomach and move up the esophagus into the throat. LPR is different from GERD, in which stomach acids back up from the stomach into the esophagus only. Research indicates people with vocal pathology frequently have LPR. ? LPR should be managed, if NOT it could inflame the vocal cords making it more difficult to heal certain vocal cord disorders. PUTTING THIS INTO PRACTICE:   ? Diet and lifestyle changes (provided in additional handout)   ? Physician prescribed medications (proton pump inhibitors, H2 blockers)   ? Surgery is reserved for very persistent cases that do not respond to behavioral treatment and medicines. Laryngopharyngeal Reflux Disease (LPR)   Your doctor has diagnosed you as having Laryngopharyngeal Reflux Disease or LPR. This condition develops when stomach acid travels up into your throat. Although you may experience \"heartburn\" or \"indigestion\", many of our patients do not have these complaints. Some of the more common symptoms seen in our patients include:    sensation of drainage down the back of the throat or excessive mucus    feeling of something caught in the throat (sometimes a tickling or burning sensation)    throat clearing    chronic cough    post-nasal drip    sore throat    hoarseness    difficulty swallowing    prolonged vocal warm-up (for singers)    loss of the high end of the vocal range     DIAGNOSIS OF LPR:   Most often, your doctor can diagnose LPR by examining your throat and vocal cords with a rigid or flexible telescope. The voice box is typically red, irritated, and swollen from acid reflux damage. This swelling and inflammation will eventually resolve with medical treatment, although it may take a few months. At other times, you may have to undergo a dual-channel pH probe test to diagnose your condition. This involves placing a small tube (catheter) through your nose and down into your swallowing passage (esophagus). The catheter is worn for a 24-hour period and measures the amount of acid that refluxes into your throat. This test is not often necessary, but can provide critical information in certain cases. TREATMENT OF LPR:   Most of the time, LPR is well controlled with medications (Proton Pump Inhibitors, or PPI's), as described on the back of this sheet. Occasionally, surgery is needed in severe cases or those that don't resolve with medications. The recommended surgery is called a Laparoscopic Nissen Fundoplication, and is performed by a General Surgeon. Positive proof of reflux disease is needed first, generally by a pH probe study.  With some patients, the substitute for raising the head of the bed; in fact, this maneuver can make the problem worse. Important Information About Your Medications: If you are taking a Proton Pump Inhibitor (PPI) such as: Nexium, Protonix, Prevacid, Aciphex, Prilosec (omeprazole), Zegerid, it is important to take your medicines 30 minutes - 1 hour before meals. These medicines may be given twice-a-day, so that will mean taking a pill before breakfast and dinner. The medicine is absorbed better if taken this way. You should know that your insurance company/pharmacy program usually prefers a certain PPI. We are happy to change your prescription to the \"preferred\" PPI. Please call your doctor's office/medical assistant about this. Also, insurance companies commonly deny twice daily dosing of these medications. This can be appealed. Please ask for handout describing how to appeal to your insurer. Gaviscon Advance Liquid Aniseed    Gaviscon Advance Liquid Aniseed    Gaviscon Advance Liquid Aniseed provides heartburn  & indigestion relief you can actually feel working. Size: 500ml    Gaviscon Advance Liquid Aniseed contains sodium alginate which provides the strongest Gaviscon  treatment that works as a raft. i.e by forming a thick layer on top of the stomach contents which prevents  acid rising back up into the oesophagus (food pipe). Informational Video: Danette.[employer].uk/how_does_gaviscon_work/double_action. php  Directions:  ? Shake well before use. ? Adults: take 10ml after meals, 3x/day, or as directed. Where to purchase products Online: There are many online pharmacy's where you can order your product on the Internet and have them delivered  straight to your door. Below are a list of online stockists where you can purchase your chosen product from:  ? [employer] - http://www. addzkxmy2b. co.uk/  ? MyPharmacy. co.uk - [facility]. co.[facility]/pharmacy/gaviscon.html  ?  Online Pharmacy: [facility]  ? [employer].es. jsp?  subframe=search&searchphrase=+[employer]+Advance&image. x=16&image. y=19  ? : Search [employer] at [employer]/

## 2021-08-25 NOTE — PROGRESS NOTES
[address], NOSE AND THROAT  David Mccormick [address] [address] [facility]  Dept: [phone]  Dept Fax: [phone]: [phone]       Dear SARITHA Jones -*, thank you for referring Andre Nolasco in consultation for a chief complaint of: hearing loss and hoarseness . My full evaluation is as follows:     HPI:     As you recall, Andre Nolasco is a 70 y.o. female who presents today for valuation of her hearing and voice. For 2 months or so she has noticed a hoarseness to her voice that was not previously there. She has had a lower pitched voice for several years. She quit smoking in 2015, and her pitch has improved somewhat since then. 2 months ago she had an upper respiratory infection that resulted in increased nasal drainage and congestion. She thinks that is related to her voice changes. She had a hemorrhagic stroke in 2015. At that time she developed reflux symptoms. She was on medicine temporarily. She still occasionally has heartburn, for which she takes Tums. In regards to the hearing loss, she had a cartilage infection of her left ear, and has had multiple surgeries. She now has a narrow meatus, with some hearing loss on that side. But currently, she has a hearing loss on the right side. She says she can barely hear anything if she plugs up her left ear. Her ear feels full on the right. She denies any ear infections. History:      Allergies   Allergen Reactions    Sulfa Antibiotics Nausea And Vomiting     Current Outpatient Medications   Medication Sig Dispense Refill    omeprazole (PRILOSEC) 20 MG delayed release capsule Take 1 capsule by mouth 2 times daily (before meals) 60 capsule 5    carbamide peroxide (DEBROX) 6.5 % otic solution Place 5 drops in ear(s) 2 times daily 1 Bottle 5    atomoxetine (STRATTERA) 40 MG capsule Take 1 capsule by mouth daily 90 capsule 1    ibuprofen (ADVIL;MOTRIN) 600 MG tablet Take 1 tablet by mouth every 6 hours as needed for Pain 30 tablet 0    oxybutynin (DITROPAN XL) 15 MG extended release tablet Take 1 tablet by mouth nightly      Methylsulfonylmethane (MSM) 1000 MG CAPS Take 2 capsules by mouth 2 times daily      alendronate (FOSAMAX) 70 MG tablet Take 1 tablet by mouth every 7 days Take with water on an empty stomach- wait 30 minutes before eating or taking other meds. Avoid lying down for 30 minutes after dose. 12 tablet 3    acamprosate (CAMPRAL) 333 MG tablet Take 2 tablets by mouth nightly 180 tablet 1    hydrOXYzine (ATARAX) 25 MG tablet TAKE 1/2 TO 1 (ONE-HALF TO ONE) TABLET BY MOUTH EVERY 8 HOURS AS NEEDED FOR ANXIETY 90 tablet 3    levothyroxine (SYNTHROID) 50 MCG tablet Take 1 tablet by mouth once daily 90 tablet 1    simvastatin (ZOCOR) 10 MG tablet Take 1 tablet by mouth nightly 90 tablet 3    umeclidinium-vilanterol (ANORO ELLIPTA) 62.5-25 MCG/INH AEPB inhaler Inhale 1 puff into the lungs daily 60 each 11    albuterol (PROVENTIL) (2.5 MG/3ML) 0.083% nebulizer solution Take 3 mLs by nebulization every 6 hours as needed for Wheezing 120 each 3    albuterol sulfate HFA (VENTOLIN HFA) 108 (90 Base) MCG/ACT inhaler Inhale 2 puffs into the lungs 4 times daily as needed for Wheezing 1 Inhaler 0    acyclovir (ZOVIRAX) 400 MG tablet Take 400 mg by mouth 2 times daily      Handicap Placard MISC by Does not apply route Expires 12/2025 1 each 0    donepezil (ARICEPT) 10 MG tablet Take 1 tablet by mouth in the evening 90 tablet 3    DULoxetine (CYMBALTA) 60 MG extended release capsule Take 1 capsule by mouth once daily 90 capsule 3    Multiple Vitamin (MULTI-VITAMIN PO) Take  by mouth.  Calcium Carbonate-Vitamin D (CALCIUM-VITAMIN D) 600-200 MG-UNIT CAPS Take 1 tablet by mouth 2 times daily       fish oil-omega-3 fatty acids 1000 MG capsule Take 1 g by mouth.  One tablet daily along with red yeast rice       No current facility-administered medications for this visit. Past Medical History:   Diagnosis Date    Aneurysm, cerebral     COPD (chronic obstructive pulmonary disease) ([address]     Hyperlipidemia     Osteoarthritis     Pneumonia     Thyroid disease     Unspecified cerebral artery occlusion with cerebral infarction       Past Surgical History:   Procedure Laterality Date    BRAIN ANEURYSM SURGERY      BREAST SURGERY      breast abcesses    COLONOSCOPY  [id number]    COSMETIC SURGERY  ,    juvederm,botox    COSMETIC SURGERY      Restylane, Botox    EXTERNAL EAR SURGERY      reconstruction of lft ear w/re-opening of canal & pinning back of superior ear    EXTERNAL EAR SURGERY      abscess lft ear    EXTERNAL EAR SURGERY      hematoma left ear     Family History   Problem Relation Age of Onset    Cancer Mother         Female Cancer    Stroke Mother         Possible    Diabetes Father     Heart Disease Maternal Aunt     Parkinsonism Maternal Uncle      Social History     Tobacco Use    Smoking status: Former Smoker     Packs/day: 1.00     Years: 40.00     Pack years: 40.00     Types: Cigarettes     Quit date: 2015     Years since quittin.6    Smokeless tobacco: Never Used   Substance Use Topics    Alcohol use: Yes     Alcohol/week: 2.0 standard drinks     Types: 2 Glasses of wine per week     Comment: 2 glass of vodka every other day        All of the past medical history, past surgical history, family history,social history, allergies and current medications were reviewed with the patient. Review of Systems      A complete review of systems was obtained by the patient intake form, which is attached to this visit. Objective:   /60 (Site: Left Upper Arm, Position: Sitting)   Pulse 72   Temp 97.2 °F (36.2 °C) (Infrared)   Resp 14   Ht 5' 3\" (1.6 m)   Wt 130 lb 6.4 oz (59.1 kg)   BMI 23.10 kg/m²     PHYSICAL EXAM  ABNORMAL OTOLARYNGOLOGIC EXAM FINDINGS: Complete cerumen impaction on the right. Diminutive pinna on the left. Small meatus, but a normal amount of cerumen and an intact TM were seen. Her voice is a grade 2 roughness. There is no breathiness or asthenia. Her pitch is low for her gender, suggestive of Michael's edema. OTHER PERTINENT EXAM FINDINGS:  GENERAL: Awake, NAD, Non-toxic appearing. Normal voice quality  NEUROLOGICAL: GCS 15, Cranial nerves II-VI, VIII-XII grossly intact,  Facial nerve (VII) w/ House-Brackman Grade 1 of 6 bilaterally, No evidence of nystagmus or gross asymmetry    EARS: See above  EYES: EOMI, No ptosis appreciated   NOSE: Dorsum w/o scar or deformity,  No mucopurulence appreciated, Patent nasal airways bilaterally. No inferior turbinate hypertrophy. No septal deviation. ORAL CAVITY: No mucosal masses/lesions appreciated, Tongue with FROM. Appropriate DAYAN w/o trismus. OROPHARYNX: No mucosal masses or lesions appreciated. Symmetric palatal elevation w/o draping. NECK: Soft, supple. No crepitus or masses appreciated,  Trachea midline. No thyromegaly or thyroid tenderness. Otomicroscopic Exam  Procedure performed: Bilateral Otomicroscopy   Attending: Mendoza Montgomery MD  Findings:  Right:  Pinna normal.  Otomicroscopic exam: external auditory canal clear, tympanic membrane is intact, middle ear aerated. Left: Pinna normal.  Otomicroscopic exam: external auditory canal clear, tympanic membrane is intact, middle ear aerated. Procedure: The patient was taken to the procedure room and laid supine on the table. The microscope was brought over the patient's right ear. A speculum was used to visualize the external auditory canal.  A variety of instruments were used to remove any cerumen to allow for complete visualization of the ear canal and tympanic membrane. The above findings were noted. The microscope was then moved to the patient's left ear and the same procedure was performed. The patient tolerated the procedure well.   There were no risk for a cerumen impaction on the left side as well with her small meatus. Return in about 6 months (around 2/25/2022). Thank you for involving me in this patient's care. Please do not hesitate to call with any questions or concerns. Sincerely,    Aster Medina M.D. Otolaryngology-Head and Neck Surgery    **This report has been created using voice recognition software. It may contain minor errors which are inherent in voice recognition technology. **

## 2021-09-02 ENCOUNTER — TELEPHONE (OUTPATIENT)
Dept: FAMILY MEDICINE CLINIC | Age: 71
End: 2021-09-02

## 2021-09-02 NOTE — TELEPHONE ENCOUNTER
----- Message from Su Segal sent at 9/2/2021  1:53 PM EDT -----  Subject: Message to Provider    QUESTIONS  Information for Provider? Wants to know if there is a at home test for   coivd-19 she could take, would like to talk to someone about this.  ---------------------------------------------------------------------------  --------------  [address]  What is the best way for the office to contact you? OK to leave message on   voicemail  Preferred Call Back Phone Number? [phone]  ---------------------------------------------------------------------------  --------------  SCRIPT ANSWERS  Relationship to Patient?  Self

## 2021-09-09 ENCOUNTER — TELEPHONE (OUTPATIENT)
Dept: FAMILY MEDICINE CLINIC | Age: 71
End: 2021-09-09

## 2021-09-09 DIAGNOSIS — N30.00 ACUTE CYSTITIS WITHOUT HEMATURIA: Primary | ICD-10-CM

## 2021-09-09 RX ORDER — NITROFURANTOIN 25; 75 MG/1; MG/1
100 CAPSULE ORAL 2 TIMES DAILY
Qty: 14 CAPSULE | Refills: 0 | Status: SHIPPED | OUTPATIENT
Start: 2021-09-09 | End: 2021-09-10

## 2021-09-09 NOTE — TELEPHONE ENCOUNTER
Pt called office stating she has another UTI. Pt c/o urinary urgency, frequency and nocturia. Pt is requesting an atb since she was just here for an appt. Please advise.

## 2021-09-10 ENCOUNTER — HOSPITAL ENCOUNTER (EMERGENCY)
Age: 71
Discharge: HOME | End: 2021-09-10
Attending: FAMILY MEDICINE
Payer: MEDICARE

## 2021-09-10 ENCOUNTER — NURSE TRIAGE (OUTPATIENT)
Dept: OTHER | Facility: CLINIC | Age: 71
End: 2021-09-10

## 2021-09-10 VITALS
SYSTOLIC BLOOD PRESSURE: 103 MMHG | RESPIRATION RATE: 16 BRPM | OXYGEN SATURATION: 92 % | HEART RATE: 80 BPM | DIASTOLIC BLOOD PRESSURE: 60 MMHG | TEMPERATURE: 98.4 F

## 2021-09-10 DIAGNOSIS — R19.7 DIARRHEA, UNSPECIFIED TYPE: Primary | ICD-10-CM

## 2021-09-10 DIAGNOSIS — N30.00 ACUTE CYSTITIS WITHOUT HEMATURIA: Primary | ICD-10-CM

## 2021-09-10 DIAGNOSIS — N30.01 ACUTE CYSTITIS WITH HEMATURIA: ICD-10-CM

## 2021-09-10 LAB
ALBUMIN SERPL-MCNC: 4.6 G/DL (ref 3.5–5.1)
ALP BLD-CCNC: 46 U/L (ref 38–126)
ALT SERPL-CCNC: 16 U/L (ref 11–66)
ANION GAP SERPL CALCULATED.3IONS-SCNC: 13 MEQ/L (ref 8–16)
AST SERPL-CCNC: 25 U/L (ref 5–40)
BACTERIA: ABNORMAL /HPF
BASOPHILS # BLD: 0.3 %
BASOPHILS ABSOLUTE: 0 THOU/MM3 (ref 0–0.1)
BILIRUB SERPL-MCNC: 0.5 MG/DL (ref 0.3–1.2)
BILIRUBIN URINE: NEGATIVE
BLOOD, URINE: NEGATIVE
BUN BLDV-MCNC: 22 MG/DL (ref 7–22)
CALCIUM SERPL-MCNC: 9.3 MG/DL (ref 8.5–10.5)
CASTS 2: ABNORMAL /LPF
CASTS UA: ABNORMAL /LPF
CHARACTER, URINE: CLEAR
CHLORIDE BLD-SCNC: 104 MEQ/L (ref 98–111)
CO2: 23 MEQ/L (ref 23–33)
COLOR: YELLOW
CREAT SERPL-MCNC: 0.8 MG/DL (ref 0.4–1.2)
CRYSTALS, UA: ABNORMAL
EOSINOPHIL # BLD: 0.7 %
EOSINOPHILS ABSOLUTE: 0.1 THOU/MM3 (ref 0–0.4)
EPITHELIAL CELLS, UA: ABNORMAL /HPF
ERYTHROCYTE [DISTWIDTH] IN BLOOD BY AUTOMATED COUNT: 14 % (ref 11.5–14.5)
ERYTHROCYTE [DISTWIDTH] IN BLOOD BY AUTOMATED COUNT: 49.6 FL (ref 35–45)
GFR SERPL CREATININE-BSD FRML MDRD: 71 ML/MIN/1.73M2
GLUCOSE BLD-MCNC: 115 MG/DL (ref 70–108)
GLUCOSE URINE: NEGATIVE MG/DL
HCT VFR BLD CALC: 44.5 % (ref 37–47)
HEMOGLOBIN: 14 GM/DL (ref 12–16)
IMMATURE GRANS (ABS): 0.05 THOU/MM3 (ref 0–0.07)
IMMATURE GRANULOCYTES: 0.4 %
KETONES, URINE: NEGATIVE
LEUKOCYTE ESTERASE, URINE: ABNORMAL
LIPASE: 50.8 U/L (ref 5.6–51.3)
LYMPHOCYTES # BLD: 6 %
LYMPHOCYTES ABSOLUTE: 0.8 THOU/MM3 (ref 1–4.8)
MAGNESIUM: 2.1 MG/DL (ref 1.6–2.4)
MCH RBC QN AUTO: 30.2 PG (ref 26–33)
MCHC RBC AUTO-ENTMCNC: 31.5 GM/DL (ref 32.2–35.5)
MCV RBC AUTO: 95.9 FL (ref 81–99)
MISCELLANEOUS 2: ABNORMAL
MONOCYTES # BLD: 6.6 %
MONOCYTES ABSOLUTE: 0.9 THOU/MM3 (ref 0.4–1.3)
NITRITE, URINE: NEGATIVE
NUCLEATED RED BLOOD CELLS: 0 /100 WBC
OSMOLALITY CALCULATION: 283.6 MOSMOL/KG (ref 275–300)
PH UA: 5.5 (ref 5–9)
PLATELET # BLD: 233 THOU/MM3 (ref 130–400)
PMV BLD AUTO: 11.5 FL (ref 9.4–12.4)
POTASSIUM REFLEX MAGNESIUM: 3.4 MEQ/L (ref 3.5–5.2)
PROTEIN UA: NEGATIVE
RBC # BLD: 4.64 MILL/MM3 (ref 4.2–5.4)
RBC URINE: ABNORMAL /HPF
RENAL EPITHELIAL, UA: ABNORMAL
SARS-COV-2, NAAT: NOT DETECTED
SEG NEUTROPHILS: 86 %
SEGMENTED NEUTROPHILS ABSOLUTE COUNT: 12 THOU/MM3 (ref 1.8–7.7)
SODIUM BLD-SCNC: 140 MEQ/L (ref 135–145)
SPECIFIC GRAVITY, URINE: 1.01 (ref 1–1.03)
TOTAL PROTEIN: 7.5 G/DL (ref 6.1–8)
UROBILINOGEN, URINE: 0.2 EU/DL (ref 0–1)
WBC # BLD: 13.9 THOU/MM3 (ref 4.8–10.8)
WBC UA: ABNORMAL /HPF
YEAST: ABNORMAL

## 2021-09-10 PROCEDURE — 83690 ASSAY OF LIPASE: CPT

## 2021-09-10 PROCEDURE — 36415 COLL VENOUS BLD VENIPUNCTURE: CPT

## 2021-09-10 PROCEDURE — 81001 URINALYSIS AUTO W/SCOPE: CPT

## 2021-09-10 PROCEDURE — 80053 COMPREHEN METABOLIC PANEL: CPT

## 2021-09-10 PROCEDURE — 99283 EMERGENCY DEPT VISIT LOW MDM: CPT

## 2021-09-10 PROCEDURE — 87635 SARS-COV-2 COVID-19 AMP PRB: CPT

## 2021-09-10 PROCEDURE — 83735 ASSAY OF MAGNESIUM: CPT

## 2021-09-10 PROCEDURE — 85025 COMPLETE CBC W/AUTO DIFF WBC: CPT

## 2021-09-10 PROCEDURE — 6370000000 HC RX 637 (ALT 250 FOR IP): Performed by: FAMILY MEDICINE

## 2021-09-10 RX ORDER — CIPROFLOXACIN 250 MG/1
250 TABLET, FILM COATED ORAL 2 TIMES DAILY
Qty: 10 TABLET | Refills: 0 | Status: SHIPPED | OUTPATIENT
Start: 2021-09-10 | End: 2021-09-15

## 2021-09-10 RX ORDER — LOPERAMIDE HYDROCHLORIDE 2 MG/1
2 CAPSULE ORAL ONCE
Status: COMPLETED | OUTPATIENT
Start: 2021-09-10 | End: 2021-09-10

## 2021-09-10 RX ORDER — LOPERAMIDE HYDROCHLORIDE 2 MG/1
4 CAPSULE ORAL 4 TIMES DAILY PRN
Qty: 40 CAPSULE | Refills: 1 | Status: SHIPPED | OUTPATIENT
Start: 2021-09-10 | End: 2021-09-20

## 2021-09-10 RX ADMIN — LOPERAMIDE HYDROCHLORIDE 2 MG: 2 CAPSULE ORAL at 13:34

## 2021-09-10 NOTE — ED PROVIDER NOTES
[address] COMPLAINT   Chief Complaint   Patient presents with    Medication Reaction        HPI   Dragan Man is a 70 y.o. female who presents with diarrhea without abdominal pain, onset was yesterday after starting on macrobid for her UTI. The duration has been constant since the onset. The patient has associated diarrhea but denies hives, rash, oral swelling or difficulty breathing. There are no alleviating factors. The context is that the symptoms started spontaneously, without any known precipitants. REVIEW OF SYSTEMS   GI: See history of present illness   Cardiac: No chest pain or syncope   Pulmonary: No difficulty breathing or new cough   General: No fevers   : No hematuria or dysuria   See HPI for further details. All other review of systems are reviewed and are otherwise negative.      PAST MEDICAL & SURGICAL HISTORY   Past Medical History:   Diagnosis Date    Aneurysm, cerebral     COPD (chronic obstructive pulmonary disease) ([facility] [address]     Hyperlipidemia     Osteoarthritis     Pneumonia     Thyroid disease     Unspecified cerebral artery occlusion with cerebral infarction       Past Surgical History:   Procedure Laterality Date    BRAIN ANEURYSM SURGERY      BREAST SURGERY      breast abcesses    COLONOSCOPY  [id number]    COSMETIC SURGERY  2009,2010    juvederm,botox    COSMETIC SURGERY  2013    Restylane, Botox    EXTERNAL EAR SURGERY  2000    reconstruction of lft ear w/re-opening of canal & pinning back of superior ear    EXTERNAL EAR SURGERY  1999    abscess lft ear    EXTERNAL EAR SURGERY  1997    hematoma left ear        CURRENT MEDICATIONS   Current Outpatient Rx   Medication Sig Dispense Refill    loperamide (RA ANTI-DIARRHEAL) 2 MG capsule Take 2 capsules by mouth 4 times daily as needed for Diarrhea 40 capsule 1    ciprofloxacin (CIPRO) 250 MG tablet Take 1 tablet by mouth 2 times daily for 5 days 10 tablet 0    acamprosate (CAMPRAL) 333 MG (MULTI-VITAMIN PO) Take  by mouth.  Calcium Carbonate-Vitamin D (CALCIUM-VITAMIN D) 600-200 MG-UNIT CAPS Take 1 tablet by mouth 2 times daily       fish oil-omega-3 fatty acids 1000 MG capsule Take 1 g by mouth. One tablet daily along with red yeast rice          ALLERGIES   Allergies   Allergen Reactions    Macrobid [Nitrofurantoin] Diarrhea    Sulfa Antibiotics Nausea And Vomiting        SOCIAL AND FAMILY HISTORY   Social History     Socioeconomic History    Marital status:      Spouse name: None    Number of children: 3    Years of education: None    Highest education level: None   Occupational History    None   Tobacco Use    Smoking status: Former Smoker     Packs/day: 1.00     Years: 40.00     Pack years: 40.00     Types: Cigarettes     Quit date: 2015     Years since quittin.6    Smokeless tobacco: Never Used   Vaping Use    Vaping Use: Never used   Substance and Sexual Activity    Alcohol use: Yes     Alcohol/week: 2.0 standard drinks     Types: 2 Glasses of wine per week     Comment: 2 glass of vodka every other day     Drug use: No    Sexual activity: Yes   Other Topics Concern    None   Social History Narrative    None     Social Determinants of Health     Financial Resource Strain: Unknown    Difficulty of Paying Living Expenses: Patient refused   Food Insecurity: Unknown    Worried About Running Out of Food in the Last Year: Patient refused    Ran Out of Food in the Last Year: Patient refused   Transportation Needs:     Lack of Transportation (Medical):      Lack of Transportation (Non-Medical):    Physical Activity:     Days of Exercise per Week:     Minutes of Exercise per Session:    Stress:     Feeling of Stress :    Social Connections:     Frequency of Communication with Friends and Family:     Frequency of Social Gatherings with Friends and Family:     Attends Sikh Services:     Active Member of Clubs or Organizations:     Attends Club or Organization Meetings:     Marital Status:    Intimate Partner Violence:     Fear of Current or Ex-Partner:     Emotionally Abused:     Physically Abused:     Sexually Abused:       Family History   Problem Relation Age of Onset    Cancer Mother         Female Cancer    Stroke Mother         Possible    Diabetes Father     Heart Disease Maternal Aunt     Parkinsonism Maternal Uncle         PHYSICAL EXAM   VITAL SIGNS: /76   Pulse 80   Temp 98.4 °F (36.9 °C) (Oral)   Resp 16   SpO2 99%    Constitutional: Well developed, well nourished   Eyes: Sclera nonicteric, conjunctiva moist   HENT: Atraumatic, nose normal   Neck: Supple, no JVD   Respiratory: No retractions, no accessory muscle use, normal breath sounds   Cardiovascular: Normal rate, normal rhythm, no murmurs   GI: Soft, no abdominal tenderness, no guarding, bowel sounds present, no audible bruits or palpable pulsatile masses   Integument: No rash, dry skin   Neurologic: Alert & oriented, normal speech   Psychiatric: Cooperative, pleasant affect     RADIOLOGY/PROCEDURES   No orders to display        LABS   Labs Reviewed   CBC WITH AUTO DIFFERENTIAL - Abnormal; Notable for the following components:       Result Value    WBC 13.9 (*)     MCHC 31.5 (*)     RDW-SD 49.6 (*)     Segs Absolute 12.0 (*)     Lymphocytes Absolute 0.8 (*)     All other components within normal limits   COMPREHENSIVE METABOLIC PANEL W/ REFLEX TO MG FOR LOW K - Abnormal; Notable for the following components:    Glucose 115 (*)     Potassium reflex Magnesium 3.4 (*)     All other components within normal limits   GLOMERULAR FILTRATION RATE, ESTIMATED - Abnormal; Notable for the following components:    Est, Glom Filt Rate 71 (*)     All other components within normal limits   URINE WITH REFLEXED MICRO - Abnormal; Notable for the following components:    Leukocyte Esterase, Urine TRACE (*)     All other components within normal limits   COVID-19, RAPID   LIPASE   ANION GAP MAGNESIUM   OSMOLALITY        ED COURSE & MEDICAL DECISION MAKING   Pertinent Labs & Imaging studies reviewed and interpreted. (See chart for details)   See EMR for medications prescribed   Vitals:    09/10/21 1315   BP: 119/76   Pulse: 80   Resp: 16   Temp:    SpO2: 99%        Differential diagnosis: UTI, med reaction, Acute Cholecystitis, Acute Appendicitis, other     FINAL IMPRESSION   1. Diarrhea, unspecified type    2. Acute cystitis with hematuria         PLAN   MDM - pt has no obvious symptoms. She has diarrhea, and requested immodium. She wants her COVID test result. She has already been switched from macrobid to cipro, though macrobid is technically not a sulfa drug. Pt is stable for dc once workup completes. Pt's UA is actually fairly clean, so I advised her not to take the cipro for now. Instead, take loperamide, push fluids. Pt understood plan of care and agreed to be discharged home.     Electronically signed by: Isaac Sandoval MD, 9/10/2021 2:31 PM   (This note was completed with a voice recognition program)         Donavon Caceres MD  09/10/21 9251

## 2021-09-10 NOTE — TELEPHONE ENCOUNTER
Received call from Bakari Hobbs at [facility] with [employer] Complaint. Brief description of triage: medication reaction     Triage indicates for patient to have call back with in one hour from nurse or pcp. Patient states Macrobid that was called in yesterday for uti symptoms is causing her to have severe diarrhea. Patient took one pill yesterday. Care advice provided, patient verbalizes understanding; denies any other questions or concerns; instructed to call back for any new or worsening symptoms. Writer provided warm transfer to Sonali Hyanes at [facility] for appointment scheduling. Attention Provider: Thank you for allowing me to participate in the care of your patient. The patient was connected to triage in response to information provided to the [facility]. Please do not respond through this encounter as the response is not directed to a shared pool. Reason for Disposition   Caller has URGENT medication question about med that PCP or specialist prescribed and triager unable to answer question    Answer Assessment - Initial Assessment Questions  1. NAME of MEDICATION: \"What medicine are you calling about? \"      macrobid    2. QUESTION: Caryl Citizen is your question? \"    Does it cause diarrhea    3. PRESCRIBING HCP: \"Who prescribed it? \" Reason: if prescribed by specialist, call should be referred to that group. Dr. Saeid Shah    4. SYMPTOMS: \"Do you have any symptoms? \"  Nausea and diarrhea    5. SEVERITY: If symptoms are present, ask \"Are they mild, moderate or severe? \"    Severe    6. PREGNANCY:  \"Is there any chance that you are pregnant? \" \"When was your last menstrual period? \"      Not addressed do to age    Protocols used: MEDICATION QUESTION CALL-ADULT-[place]

## 2021-09-30 ENCOUNTER — TELEPHONE (OUTPATIENT)
Dept: FAMILY MEDICINE CLINIC | Age: 71
End: 2021-09-30

## 2021-10-06 ENCOUNTER — TELEPHONE (OUTPATIENT)
Dept: PHARMACY | Facility: CLINIC | Age: 71
End: 2021-10-06

## 2021-10-06 ENCOUNTER — OFFICE VISIT (OUTPATIENT)
Dept: FAMILY MEDICINE CLINIC | Age: 71
End: 2021-10-06
Payer: MEDICARE

## 2021-10-06 VITALS
DIASTOLIC BLOOD PRESSURE: 78 MMHG | BODY MASS INDEX: 23.67 KG/M2 | SYSTOLIC BLOOD PRESSURE: 134 MMHG | WEIGHT: 133.6 LBS | RESPIRATION RATE: 20 BRPM | HEART RATE: 104 BPM

## 2021-10-06 DIAGNOSIS — J43.2 CENTRILOBULAR EMPHYSEMA (HCC): ICD-10-CM

## 2021-10-06 DIAGNOSIS — F41.9 CHRONIC ANXIETY: ICD-10-CM

## 2021-10-06 DIAGNOSIS — Z01.818 PREOP EXAMINATION: Primary | ICD-10-CM

## 2021-10-06 DIAGNOSIS — E78.2 MIXED HYPERLIPIDEMIA: ICD-10-CM

## 2021-10-06 DIAGNOSIS — F32.5 MAJOR DEPRESSIVE DISORDER, SINGLE EPISODE, IN FULL REMISSION (HCC): ICD-10-CM

## 2021-10-06 DIAGNOSIS — M81.6 LOCALIZED OSTEOPOROSIS WITHOUT CURRENT PATHOLOGICAL FRACTURE: ICD-10-CM

## 2021-10-06 DIAGNOSIS — M72.0 DUPUYTREN'S CONTRACTURE OF RIGHT HAND: ICD-10-CM

## 2021-10-06 DIAGNOSIS — Z23 NEEDS FLU SHOT: ICD-10-CM

## 2021-10-06 DIAGNOSIS — E03.9 HYPOTHYROIDISM, UNSPECIFIED TYPE: ICD-10-CM

## 2021-10-06 DIAGNOSIS — F32.9 REACTIVE DEPRESSION: ICD-10-CM

## 2021-10-06 PROCEDURE — G8420 CALC BMI NORM PARAMETERS: HCPCS | Performed by: NURSE PRACTITIONER

## 2021-10-06 PROCEDURE — 3023F SPIROM DOC REV: CPT | Performed by: NURSE PRACTITIONER

## 2021-10-06 PROCEDURE — 90694 VACC AIIV4 NO PRSRV 0.5ML IM: CPT | Performed by: NURSE PRACTITIONER

## 2021-10-06 PROCEDURE — G8484 FLU IMMUNIZE NO ADMIN: HCPCS | Performed by: NURSE PRACTITIONER

## 2021-10-06 PROCEDURE — G0008 ADMIN INFLUENZA VIRUS VAC: HCPCS | Performed by: NURSE PRACTITIONER

## 2021-10-06 PROCEDURE — G8427 DOCREV CUR MEDS BY ELIG CLIN: HCPCS | Performed by: NURSE PRACTITIONER

## 2021-10-06 PROCEDURE — G8926 SPIRO NO PERF OR DOC: HCPCS | Performed by: NURSE PRACTITIONER

## 2021-10-06 PROCEDURE — 1090F PRES/ABSN URINE INCON ASSESS: CPT | Performed by: NURSE PRACTITIONER

## 2021-10-06 PROCEDURE — 99212 OFFICE O/P EST SF 10 MIN: CPT | Performed by: NURSE PRACTITIONER

## 2021-10-06 RX ORDER — VALACYCLOVIR HYDROCHLORIDE 500 MG/1
1 TABLET, FILM COATED ORAL DAILY
COMMUNITY
Start: 2021-09-18

## 2021-10-06 NOTE — PROGRESS NOTES
Subjective:      Patient ID: Nancy Black 1950 is a 70 y.o. female here for PRE-OP    Chief Complaint   Patient presents with    Pre-op Exam     SX on 10/25/21 with Dr. Ashlee Nunez at [facility] for right little finger    Fatigue    Other     wants to know if she should go back to [facility] for a medication review       Patient Active Problem List   Diagnosis    Hyperlipemia    Hypothyroidism    Depression    Chronic anxiety    Osteopenia    Medication monitoring encounter    H/O: CVA (cerebrovascular accident), aneurysm bleed, Jan 2015    Abnormality of gait following cerebrovascular accident    GERD (gastroesophageal reflux disease)    Cognitive impairment due to CVA.  OAB (overactive bladder)    Chronic nausea    SOB (shortness of breath)    Hypoxia    Major depressive disorder, single episode, in full remission ([address]    Smoking greater than 40 pack years    Pulmonary emphysema ([address]    Syncope and collapse    Concussion with loss of consciousness    Laceration of head    Alcohol abuse    Spinal stenosis, cervical region    Normal pressure hydrocephalus (HCC)    Michael's edema of vocal folds       Past Surgical History:   Procedure Laterality Date    BRAIN ANEURYSM SURGERY      BREAST SURGERY      breast abcesses    COLONOSCOPY  [id number]    COSMETIC SURGERY  2009,2010    juvederm,botox    COSMETIC SURGERY  2013    Restylane, Botox    EXTERNAL EAR SURGERY  2000    reconstruction of lft ear w/re-opening of canal & pinning back of superior ear    EXTERNAL EAR SURGERY  1999    abscess lft ear    EXTERNAL EAR SURGERY  1997    hematoma left ear        No complications with above surgeries. No complications with general anesthetic. Allergies   Allergen Reactions    Macrobid [Nitrofurantoin] Diarrhea    Sulfa Antibiotics Nausea And Vomiting        Denies CP, SOB or chest tightness. Able to perform > 4 METS ECHO 2019 EF 60% with LV function normal.  PFT 2019 showed moderate emphysema.   36 pack year hx. Anoro and NEB tx. Has O2 at home she uses with activity and PRN at night. Following with PULM at [facility]. Vitals:    10/06/21 1406   BP: 134/78   Pulse: 104   Resp: 20       Patient is status post CVA but doing well subsequently. Follows with Kem Garrett speech is essentially back to normal also. She is on Strattera and Aricept due to cognitive changes s/p CVA. Seen Dr. Deysi Valentin regarding potential hydrocephalus. MRA June 2020 and CT Head June 2020 were unremarkable    She is on Cymbalta 60 mg, Straterra 40 mg and Aricept 10 mg. .  Mood stable. Take off Seroquel due to next morning drowsinesss. Placed on hydroxyzine with benefit sleep and more energy during the day. Likes benefit of medication changes     Labs reviewd.   MRSA screen POS     Lab Results   Component Value Date    LABA1C 5.5 06/28/2021    LABA1C 5.1 06/16/2020    LABA1C 5.3 04/13/2019     No results found for: EAG      Chemistry        Component Value Date/Time     09/10/2021 1113    K 3.4 (L) 09/10/2021 1113     09/10/2021 1113    CO2 23 09/10/2021 1113    BUN 22 09/10/2021 1113    CREATININE 0.8 09/10/2021 1113        Component Value Date/Time    CALCIUM 9.3 09/10/2021 1113    ALKPHOS 46 09/10/2021 1113    AST 25 09/10/2021 1113    ALT 16 09/10/2021 1113    BILITOT 0.5 09/10/2021 1113            Lab Results   Component Value Date    WBC 13.9 (H) 09/10/2021    HGB 14.0 09/10/2021    HCT 44.5 09/10/2021    MCV 95.9 09/10/2021     09/10/2021       Immunization History   Administered Date(s) Administered    COVID-19, Moderna, PF, 100mcg/0.5mL 02/08/2021, 03/08/2021    Influenza A (S1S2-08) Vaccine PF IM 12/19/2009    Influenza Vaccine, unspecified formulation 12/06/2017    Influenza Virus Vaccine 12/06/2017    Influenza, High Dose (Fluzone 65 yrs and older) 12/06/2017, 11/07/2018, 10/13/2020    Influenza, High-dose, Quadv, 65 yrs +, IM (Fluzone) 10/13/2020    Influenza, Triv, inactivated, subunit, adjuvanted, IM (Fluad 65 yrs and older) 10/30/2019    Pneumococcal Conjugate 13-valent ([id number]) 07/21/2015, 07/26/2017    Pneumococcal Polysaccharide ([id number]) 11/13/2018    Pneumococcal Vaccine 11/14/2016    Tdap (Boostrix, Adacel) 04/08/2014    Zoster Recombinant (Shingrix) 10/13/2020, 12/12/2020       Review of Systems   HENT: Negative. Respiratory: Negative for shortness of breath. Musculoskeletal: Positive for arthralgias. Neurological: Negative for dizziness and light-headedness. Psychiatric/Behavioral: Positive for confusion. Negative for dysphoric mood. The patient is nervous/anxious. Objective:   Physical Exam  Constitutional:       General: She is not in acute distress. HENT:      Head: Normocephalic. Right Ear: Tympanic membrane normal.      Left Ear: Tympanic membrane normal.      Nose: Nose normal.   Eyes:      Pupils: Pupils are equal, round, and reactive to light. Neck:      Vascular: No carotid bruit. Cardiovascular:      Rate and Rhythm: Normal rate and regular rhythm. Pulses: Normal pulses. Heart sounds: Normal heart sounds. No murmur heard. Pulmonary:      Effort: Pulmonary effort is normal.      Breath sounds: Normal breath sounds. No wheezing. Abdominal:      General: Bowel sounds are normal. There is no distension. Palpations: Abdomen is soft. Tenderness: There is no abdominal tenderness. Musculoskeletal:         General: No tenderness. Normal range of motion. Right hand: Deformity present. Decreased strength. Cervical back: Normal range of motion and neck supple. Skin:     General: Skin is warm and dry. Findings: No rash. Neurological:      Mental Status: She is alert and oriented to person, place, and time. Psychiatric:         Attention and Perception: She is inattentive. Mood and Affect: Mood is anxious and depressed. Behavior: Behavior is cooperative.          Thought Content: Thought content normal.         Cognition and Memory: She exhibits impaired recent memory. Judgment: Judgment normal.          Assessment:       Diagnosis Orders   1. Preop examination     2. Dupuytren's contracture of right hand     3. Centrilobular emphysema ([facility]  Referral - CHF ([facility] Patients - Clinical Pharmacy)   4. Major depressive disorder, single episode, in full remission ([facility]  Referral - CHF ([facility] - Clinical Pharmacy)   5. Mixed hyperlipidemia  Referral - CHF ([facility] - Clinical Pharmacy)   6. Hypothyroidism, unspecified type  Referral - CHF ([facility] - Clinical Pharmacy)   7. Localized osteoporosis without current pathological fracture  Referral - CHF ([facility] - Clinical Pharmacy)   8. Chronic anxiety     9.  Reactive depression             Plan:      Preop testing reviewed  Chronic conditions stable  Acceptable risk for surgery  Hold NSAID 1 week prior to surgery  Refer to PHARMACY for medication review  Follow up post-op if questions or new issues arise    Current Outpatient Medications   Medication Sig Dispense Refill    valACYclovir (VALTREX) 500 MG tablet Take 1 tablet by mouth daily      acamprosate (CAMPRAL) 333 MG tablet TAKE 2 TABLETS BY MOUTH NIGHTLY 60 tablet 11    umeclidinium-vilanterol (ANORO ELLIPTA) 62.5-25 MCG/INH AEPB inhaler Inhale 1 puff into the lungs daily 60 each 11    omeprazole (PRILOSEC) 20 MG delayed release capsule Take 1 capsule by mouth 2 times daily (before meals) 60 capsule 5    atomoxetine (STRATTERA) 40 MG capsule Take 1 capsule by mouth daily 90 capsule 1    ibuprofen (ADVIL;MOTRIN) 600 MG tablet Take 1 tablet by mouth every 6 hours as needed for Pain 30 tablet 0    oxybutynin (DITROPAN XL) 15 MG extended release tablet Take 1 tablet by mouth nightly      Methylsulfonylmethane (MSM) 1000 MG CAPS Take 2 capsules by mouth 2 times daily      alendronate (FOSAMAX) 70 MG tablet Take 1 tablet by mouth every 7 days Take with water on an

## 2021-10-06 NOTE — PROGRESS NOTES
Immunizations Administered     Name Date Dose Route    Influenza, Quadv, adjuvanted, 65 yrs +, IM, PF (Fluad) 10/6/2021 0.5 mL Intramuscular    Site: Deltoid- Left    Lot: [id number]    NDC: 87075-119-42            After obtaining consent, and per orders of Ashley Vance CNP, injection of FLUAD 0.5ml given IM in Left deltoid by Silvio Hyde LPN. Patient instructed to report any adverse reaction to me immediately. Patient tolerated well and without incident. VIS given to the patient.

## 2021-10-06 NOTE — LETTER
Tanesha Botello   [phone] [address]           10/13/21      Dear Nathaniel Sosa are eligible for a complete medication therapy review performed by a [facility] [address] licensed clinical pharmacist. This review helps ensure that you are getting the most benefit from the medications you receive and includes the following:  - Review of your medications, including over-the-counter and herbal medications. - Answering questions about your medications and how to get the most benefit from them. - Identifying and helping to prevent potential drug interactions or side effects.  - Possibly identifying less costly alternatives that are equally effective. Under this program, [employer] will work with you and your doctor to manage your drug therapy. Please contact the [address] [place] [address] office to set up a time for your medication review with one of our clinical pharmacists. To contact us call [phone] and select Option 2 . This will be a phone consult and therefore will not require a trip to the medical office. Please note: This is an optional program.  It is a free service provided to help ensure that your medicines are safe, necessary, and effective. Your participation is encouraged, but not required.     Sincerely,  [address] free: [phone]

## 2021-10-06 NOTE — TELEPHONE ENCOUNTER
Received a referral:  from Provider- Mauri Mohs, APRN - CNP to review patients medications. Called patient to schedule a time to speak with a pharmacist over the telephone. No answer and mailbox full. Unable to leave message. Sandra Schroeder CP.    [facility] free: [phone]

## 2021-10-07 ASSESSMENT — ENCOUNTER SYMPTOMS: SHORTNESS OF BREATH: 0

## 2021-10-11 NOTE — TELEPHONE ENCOUNTER
2nd Attempt Documentation:  2nd attempt to contact this patient regarding the previous message  CLINICAL PHARMACY: [facility]  Patient unavailable at the time of call. Left following message on home TAD: please call back at toll-free [phone] to retrieve previous message.

## 2021-10-13 NOTE — TELEPHONE ENCOUNTER
3rd attempt. Patient unavailable at the time of call. Left following message on home TAD: please call back at toll-free [phone] to retrieve previous message. Will send letter to home.

## 2021-10-20 ENCOUNTER — TELEPHONE (OUTPATIENT)
Dept: FAMILY MEDICINE CLINIC | Age: 71
End: 2021-10-20

## 2021-10-20 DIAGNOSIS — R30.0 DYSURIA: Primary | ICD-10-CM

## 2021-10-20 NOTE — TELEPHONE ENCOUNTER
[facility] NOW she has symptoms of a UTI asking for provider to place orders for UA-C & S. Please advise.

## 2021-10-21 ENCOUNTER — TELEPHONE (OUTPATIENT)
Dept: FAMILY MEDICINE CLINIC | Age: 71
End: 2021-10-21

## 2021-10-21 ENCOUNTER — NURSE ONLY (OUTPATIENT)
Dept: LAB | Age: 71
End: 2021-10-21

## 2021-10-21 DIAGNOSIS — R30.0 DYSURIA: ICD-10-CM

## 2021-10-21 LAB
BACTERIA: ABNORMAL
BILIRUBIN URINE: NEGATIVE
BLOOD, URINE: NEGATIVE
CASTS: ABNORMAL /LPF
CASTS: ABNORMAL /LPF
CHARACTER, URINE: CLEAR
COLOR: YELLOW
CRYSTALS: ABNORMAL
EPITHELIAL CELLS, UA: ABNORMAL /HPF
GLUCOSE, URINE: NEGATIVE MG/DL
KETONES, URINE: ABNORMAL
LEUKOCYTE ESTERASE, URINE: NEGATIVE
MISCELLANEOUS LAB TEST RESULT: ABNORMAL
NITRITE, URINE: NEGATIVE
PH UA: 6 (ref 5–9)
PROTEIN UA: NEGATIVE MG/DL
RBC URINE: ABNORMAL /HPF
RENAL EPITHELIAL, UA: ABNORMAL
SPECIFIC GRAVITY UA: 1.02 (ref 1–1.03)
UROBILINOGEN, URINE: 0.2 EU/DL (ref 0–1)
WBC UA: ABNORMAL /HPF
YEAST: ABNORMAL

## 2021-10-21 NOTE — TELEPHONE ENCOUNTER
Pt returned call stating she went to [facility] yesterday afternoon to have the UA done. Advised pt per Epic, result is still in process. Pt says she left her phone in [place] at the [facility] they were staying at, they are mailing it back to [place] for pt. Pt would like us to give her daughter Yanet Acosta a call when results are back at [phone].

## 2021-10-21 NOTE — TELEPHONE ENCOUNTER
----- Message from Hyun Otis sent at 10/21/2021 12:26 PM EDT -----  Subject: Results Request    QUESTIONS  Which lab or imaging result is the patient calling about? Uranalysis  Which provider ordered the test?   At what location was the test performed? Date the test was performed? Additional Information for Provider? Pt says that she left her phone in   [place] so she can be contacted on her daughter luciano phone to go over   results. ---------------------------------------------------------------------------  --------------  Taras JAEGER  What is the best way for the office to contact you? OK to leave message on   voicemail  Preferred Call Back Phone Number?  [phone]

## 2021-10-21 NOTE — TELEPHONE ENCOUNTER
Spoke with daughter Mireille Smith and patient is not with her currently. Asked it patient had u/a done yesterday and Mireille Smith is unsure if she did and where. Aware patient was at [facility] yesterday and was requesting orders for possible UTI  She will contact patient and call us back.

## 2021-10-22 NOTE — TELEPHONE ENCOUNTER
Daughter Charo Restrepo on HIPAA notified and voiced understanding. Daughter states patient is having both urine and bowl incontinence she's seen urology in the past does NOT see GI. Patient doesn't even know she's having accidents, can't remember? Charo Restrepo would like a call back.

## 2021-10-22 NOTE — TELEPHONE ENCOUNTER
Sounds more like a mental component to her incontinence and not a physical if she is not knowing when she is going.    Would recommend seeing Psychiatry if memory is an issues along with her mental health history

## 2021-10-22 NOTE — TELEPHONE ENCOUNTER
Wilma Carroll, APRN - CNP   10/21/2021  4:27 PM EDT       Notify UA was NEG for infection     Per RN 10/22/21.

## 2021-10-25 NOTE — TELEPHONE ENCOUNTER
Patient returned call and left a voice mail to call her back. States she will be having surgery Monday morning but okay to leave message or call her back on Tuesday. Writer will outreach on Tuesday.

## 2021-11-01 ENCOUNTER — SCHEDULED TELEPHONE ENCOUNTER (OUTPATIENT)
Dept: PHARMACY | Facility: CLINIC | Age: 71
End: 2021-11-01

## 2021-11-01 NOTE — TELEPHONE ENCOUNTER
CLINICAL PHARMACY NOTE - Medication Review  Patient outreach to review medications - Spoke with patient. SUBJECTIVE/OBJECTIVE:   Tiana Hunt is a 70 y.o. female referred to a clinical pharmacy specialist by provider. Medications:  Current Outpatient Medications   Medication Sig Dispense Refill    DULoxetine (CYMBALTA) 60 MG extended release capsule Take 1 capsule by mouth once daily 90 capsule 3    valACYclovir (VALTREX) 500 MG tablet Take 1 tablet by mouth daily      acamprosate (CAMPRAL) 333 MG tablet TAKE 2 TABLETS BY MOUTH NIGHTLY 60 tablet 11    umeclidinium-vilanterol (ANORO ELLIPTA) 62.5-25 MCG/INH AEPB inhaler Inhale 1 puff into the lungs daily 60 each 11    atomoxetine (STRATTERA) 40 MG capsule Take 1 capsule by mouth daily 90 capsule 1    ibuprofen (ADVIL;MOTRIN) 600 MG tablet Take 1 tablet by mouth every 6 hours as needed for Pain 30 tablet 0    oxybutynin (DITROPAN XL) 15 MG extended release tablet Take 1 tablet by mouth nightly      Methylsulfonylmethane (MSM) 1000 MG CAPS Take 2 capsules by mouth 2 times daily      alendronate (FOSAMAX) 70 MG tablet Take 1 tablet by mouth every 7 days Take with water on an empty stomach- wait 30 minutes before eating or taking other meds. Avoid lying down for 30 minutes after dose.  12 tablet 3    hydrOXYzine (ATARAX) 25 MG tablet TAKE 1/2 TO 1 (ONE-HALF TO ONE) TABLET BY MOUTH EVERY 8 HOURS AS NEEDED FOR ANXIETY 90 tablet 3    levothyroxine (SYNTHROID) 50 MCG tablet Take 1 tablet by mouth once daily 90 tablet 1    simvastatin (ZOCOR) 10 MG tablet Take 1 tablet by mouth nightly 90 tablet 3    albuterol (PROVENTIL) (2.5 MG/3ML) 0.083% nebulizer solution Take 3 mLs by nebulization every 6 hours as needed for Wheezing 120 each 3    albuterol sulfate HFA (VENTOLIN HFA) 108 (90 Base) MCG/ACT inhaler Inhale 2 puffs into the lungs 4 times daily as needed for Wheezing 1 Inhaler 0    donepezil (ARICEPT) 10 MG tablet Take 1 tablet by mouth in the evening 90 tablet 3    Multiple Vitamin (MULTI-VITAMIN PO) Take  by mouth.  Calcium Carbonate-Vitamin D (CALCIUM-VITAMIN D) 600-200 MG-UNIT CAPS Take 1 tablet by mouth 2 times daily       fish oil-omega-3 fatty acids 1000 MG capsule Take 1 g by mouth. One tablet daily along with red yeast rice      Handicap Placard MISC by Does not apply route Expires 2025 1 each 0     No current facility-administered medications for this visit. Added Zyrtec 10mg to med list. Pt reports using regularly right now. Allergies: Allergies   Allergen Reactions    Macrobid [Nitrofurantoin] Diarrhea    Sulfa Antibiotics Nausea And Vomiting       Pertinent Labs/Vitals:  BP Readings from Last 3 Encounters:   10/06/21 134/78   09/10/21 103/60   21 116/60     No results found for: LABMICR, [id number]  Lab Results   Component Value Date    LABA1C 5.5 2021    LABA1C 5.1 2020    LABA1C 5.3 2019     Lab Results   Component Value Date    CHOL 247 (H) 2021    TRIG 80 2021     2021    LDLCALC 109 2021     ALT   Date Value Ref Range Status   09/10/2021 16 11 - 66 U/L Final     Comment:     Performed at [address] [place] [address], [address]     AST   Date Value Ref Range Status   09/10/2021 25 5 - 40 U/L Final     The ASCVD Risk score (Toby Burnham, et al., 2013) failed to calculate for the following reasons: The valid HDL cholesterol range is 20 to 100 mg/dL     Lab Results   Component Value Date    CREATININE 0.8 09/10/2021     eGFR: >60 mL/min/1.73 m^2    Social History:   Social History     Tobacco Use    Smoking status: Former Smoker     Packs/day: 1.00     Years: 40.00     Pack years: 40.00     Types: Cigarettes     Quit date: 2015     Years since quittin.8    Smokeless tobacco: Never Used   Substance Use Topics    Alcohol use:  Yes     Alcohol/week: 2.0 standard drinks     Types: 2 Glasses of wine per week     Comment: 2 glass of vodka every other day        Immunizations:   Most Recent Immunizations   Administered Date(s) Administered    COVID-19, Moderna, Primary or Immunocompromised, PF, 100mcg/0.5mL 03/08/2021    Influenza A (R1Q2-08) Vaccine PF IM 12/19/2009    Influenza Vaccine, unspecified formulation 12/06/2017    Influenza Virus Vaccine 12/06/2017    Influenza, High Dose (Fluzone 65 yrs and older) 10/13/2020    Influenza, High-dose, Quadv, 65 yrs +, IM (Fluzone) 10/13/2020    Influenza, Quadv, adjuvanted, 65 yrs +, IM, PF (Fluad) 10/06/2021    Influenza, Triv, inactivated, subunit, adjuvanted, IM (Fluad 65 yrs and older) 10/30/2019    Pneumococcal Conjugate 13-valent ([id number]) 07/26/2017    Pneumococcal Polysaccharide ([id number]) 11/13/2018    Pneumococcal Vaccine 11/14/2016    Tdap (Boostrix, Adacel) 04/08/2014    Zoster Recombinant (Shingrix) 12/12/2020       ASSESSMENT/PLAN:   - General Assessment:   · Adherence: Overall adherence seems to be ok. The only med she reports struggling with is Alendronate. She said she has forgotten to take for some time, but admits she needs to be taking. Encouraged to set herself an alarm each week. Also counseled to stay upright for atleast 30-60 min after taking and also to drink a full glass of water. · Cost: Not a concern with any medications  · Current pharmacy/pharmacies: [facility]  · Drug interactions: No clinically significant interactions identified via Lexicomp Interaction Analysis as category D or higher. · Renal dosing: No renal adjustments necessary. · Patient complained of significant dry mouth. · Could be the result of a few medications. Likely the hydroxyzine which she reports taking BID, or the Oxybutynin which she is using regularly. · She reports being able to deal with this by using biotene. She agreed that stopping eihter medication would be diffiicult  · Patient complained of drowsiness throughout the day.  She reports getting to bed around 11ish and sometime sleeping 10-12 hours. · Discussed potential causes such as medication, but it could be a number of different medications. · Eventually recommended good sleep hygiene. · Mentioned that she could try B-complex. Made aware that it could potentially help, but likely not a cure. She verbalized understanding   No concerns with drug interactions. - COPD:   Inhaler technique: Patient able to verbally explain differences in moa and technique of inhalers   Pneumonia vaccine: Up to date   Smoking status: former    - Primary Care 6 Gaps:   A1c not <9 (18-74yo; A1c in current year): <9%   BP not <140/90; if >/=61yo and not diabetic, BP <150/90 (18-74yo; most recent BP recording): WNL   Pneumonia vaccination (>/= 66yo with any PPSV23 or PCV13 documented in Health Maintenance tab): Up to date   Breast cancer screening (50-74yo with mammogram within 27 months of end of current year): up to date    Colorectal cancer screening (50-74yo with FOBT in past 12 months, flex sig in past 5 years or colonoscopy in last 10 years): recommended   Nephropathy screening (18-74yo with diabetes; either on ACE-I/ARB or annual microalbumin): n/a    - Upcoming appointments:   Future Appointments   Date Time Provider Otis Dennis   1/6/2022  3:15 PM SARITHA Avitia   2/24/2022  1:00 PM Hardeep Goldsmith MD [facility] [place].  Daphnie Becker, PharmD, [address] [facility], toll free: [phone]    For Pharmacy Admin Tracking Only     CPA in place:  No   Recommendation Provided To: Patient/Caregiver: 1 via Telephone   Intervention Detail: New Rx: 1, reason: Patient Preference   Gap Closed?: Yes    Intervention Accepted By: Patient/Caregiver: 1   Time Spent (min): 45

## 2021-11-02 RX ORDER — CETIRIZINE HYDROCHLORIDE 10 MG/1
10 TABLET ORAL DAILY
COMMUNITY
End: 2022-03-22

## 2021-11-08 ENCOUNTER — NURSE TRIAGE (OUTPATIENT)
Dept: OTHER | Facility: CLINIC | Age: 71
End: 2021-11-08

## 2021-11-08 ENCOUNTER — HOSPITAL ENCOUNTER (EMERGENCY)
Age: 71
Discharge: HOME | End: 2021-11-08
Attending: EMERGENCY MEDICINE
Payer: MEDICARE

## 2021-11-08 ENCOUNTER — TELEPHONE (OUTPATIENT)
Dept: FAMILY MEDICINE CLINIC | Age: 71
End: 2021-11-08

## 2021-11-08 ENCOUNTER — APPOINTMENT (OUTPATIENT)
Dept: CT IMAGING | Age: 71
End: 2021-11-08
Payer: MEDICARE

## 2021-11-08 VITALS
RESPIRATION RATE: 19 BRPM | SYSTOLIC BLOOD PRESSURE: 134 MMHG | TEMPERATURE: 99.3 F | OXYGEN SATURATION: 93 % | HEART RATE: 90 BPM | DIASTOLIC BLOOD PRESSURE: 73 MMHG

## 2021-11-08 DIAGNOSIS — R53.83 OTHER FATIGUE: Primary | ICD-10-CM

## 2021-11-08 LAB
ANION GAP SERPL CALCULATED.3IONS-SCNC: 14 MEQ/L (ref 8–16)
BACTERIA: ABNORMAL
BASOPHILS # BLD: 0.9 %
BASOPHILS ABSOLUTE: 0.1 THOU/MM3 (ref 0–0.1)
BILIRUBIN URINE: NEGATIVE
BLOOD, URINE: NEGATIVE
BUN BLDV-MCNC: 14 MG/DL (ref 7–22)
CALCIUM SERPL-MCNC: 9.3 MG/DL (ref 8.5–10.5)
CASTS: ABNORMAL /LPF
CASTS: ABNORMAL /LPF
CHARACTER, URINE: CLEAR
CHLORIDE BLD-SCNC: 103 MEQ/L (ref 98–111)
CO2: 23 MEQ/L (ref 23–33)
COLOR: YELLOW
CREAT SERPL-MCNC: 0.7 MG/DL (ref 0.4–1.2)
CRYSTALS: ABNORMAL
EOSINOPHIL # BLD: 0.8 %
EOSINOPHILS ABSOLUTE: 0.1 THOU/MM3 (ref 0–0.4)
EPITHELIAL CELLS, UA: ABNORMAL /HPF
ERYTHROCYTE [DISTWIDTH] IN BLOOD BY AUTOMATED COUNT: 13.5 % (ref 11.5–14.5)
ERYTHROCYTE [DISTWIDTH] IN BLOOD BY AUTOMATED COUNT: 47.9 FL (ref 35–45)
GFR SERPL CREATININE-BSD FRML MDRD: 82 ML/MIN/1.73M2
GLUCOSE BLD-MCNC: 102 MG/DL (ref 70–108)
GLUCOSE, URINE: NEGATIVE MG/DL
HCT VFR BLD CALC: 45.3 % (ref 37–47)
HEMOGLOBIN: 14.1 GM/DL (ref 12–16)
IMMATURE GRANS (ABS): 0.01 THOU/MM3 (ref 0–0.07)
IMMATURE GRANULOCYTES: 0.1 %
KETONES, URINE: ABNORMAL
LEUKOCYTE ESTERASE, URINE: ABNORMAL
LYMPHOCYTES # BLD: 14.1 %
LYMPHOCYTES ABSOLUTE: 1.2 THOU/MM3 (ref 1–4.8)
MCH RBC QN AUTO: 29.9 PG (ref 26–33)
MCHC RBC AUTO-ENTMCNC: 31.1 GM/DL (ref 32.2–35.5)
MCV RBC AUTO: 96 FL (ref 81–99)
MISCELLANEOUS LAB TEST RESULT: ABNORMAL
MONOCYTES # BLD: 9.2 %
MONOCYTES ABSOLUTE: 0.8 THOU/MM3 (ref 0.4–1.3)
NITRITE, URINE: NEGATIVE
NUCLEATED RED BLOOD CELLS: 0 /100 WBC
OSMOLALITY CALCULATION: 280.1 MOSMOL/KG (ref 275–300)
PH UA: 6.5 (ref 5–9)
PLATELET # BLD: 216 THOU/MM3 (ref 130–400)
PMV BLD AUTO: 11.1 FL (ref 9.4–12.4)
POTASSIUM SERPL-SCNC: 4.4 MEQ/L (ref 3.5–5.2)
PROTEIN UA: NEGATIVE MG/DL
RBC # BLD: 4.72 MILL/MM3 (ref 4.2–5.4)
RBC URINE: ABNORMAL /HPF
RENAL EPITHELIAL, UA: ABNORMAL
SEG NEUTROPHILS: 74.9 %
SEGMENTED NEUTROPHILS ABSOLUTE COUNT: 6.5 THOU/MM3 (ref 1.8–7.7)
SODIUM BLD-SCNC: 140 MEQ/L (ref 135–145)
SPECIFIC GRAVITY UA: 1.03 (ref 1–1.03)
TROPONIN T: < 0.01 NG/ML
TSH SERPL DL<=0.05 MIU/L-ACNC: 2.26 UIU/ML (ref 0.4–4.2)
UROBILINOGEN, URINE: 0.2 EU/DL (ref 0–1)
WBC # BLD: 8.7 THOU/MM3 (ref 4.8–10.8)
WBC UA: ABNORMAL /HPF
YEAST: ABNORMAL

## 2021-11-08 PROCEDURE — 81001 URINALYSIS AUTO W/SCOPE: CPT

## 2021-11-08 PROCEDURE — 93005 ELECTROCARDIOGRAM TRACING: CPT | Performed by: EMERGENCY MEDICINE

## 2021-11-08 PROCEDURE — 85025 COMPLETE CBC W/AUTO DIFF WBC: CPT

## 2021-11-08 PROCEDURE — 70450 CT HEAD/BRAIN W/O DYE: CPT

## 2021-11-08 PROCEDURE — 36415 COLL VENOUS BLD VENIPUNCTURE: CPT

## 2021-11-08 PROCEDURE — 80048 BASIC METABOLIC PNL TOTAL CA: CPT

## 2021-11-08 PROCEDURE — 84443 ASSAY THYROID STIM HORMONE: CPT

## 2021-11-08 PROCEDURE — 84484 ASSAY OF TROPONIN QUANT: CPT

## 2021-11-08 PROCEDURE — 99282 EMERGENCY DEPT VISIT SF MDM: CPT

## 2021-11-08 ASSESSMENT — ENCOUNTER SYMPTOMS
ABDOMINAL PAIN: 0
DIARRHEA: 0
CHEST TIGHTNESS: 0
VOMITING: 0
SHORTNESS OF BREATH: 0
NAUSEA: 0
COUGH: 0

## 2021-11-08 NOTE — TELEPHONE ENCOUNTER
Reason for Disposition   [1] Caller is not with the adult (patient) AND [2] reporting urgent symptoms   MODERATE weakness (i.e., interferes with work, school, normal activities) and cause unknown (Exceptions: weakness with acute minor illness, or weakness from poor fluid intake)    Answer Assessment - Initial Assessment Questions  1. REASON FOR CALL or QUESTION: \"What is your reason for calling today? \" or \"How can I best help you? \" or \"What question do you have that I can help answer? \"      My mom got her booster yesterday, and seems more weak, lethargic and pale. But even before Had been sleeping more and short term memory has worsened,    Answer Assessment - Initial Assessment Questions  1. DESCRIPTION: \"Describe how you are feeling. \"      Sleeping more, 12-14 hours day,  lethargic and pale    2. SEVERITY: \"How bad is it? \"  \"Can you stand and walk? \"    - MILD - Feels weak or tired, but does not interfere with work, school or normal activities    - [facility] to stand and walk; weakness interferes with work, school, or normal activities    - SEVERE - Unable to stand or walk      Mild to moderate    3. ONSET:  \"When did the weakness begin? \"      I think it started after booster    4. CAUSE: \"What do you think is causing the weakness? \"      Not sure,  But with her history of stroke and aneurysm, thought it was worth looking into    5. MEDICINES: Jie Chuckie you recently started a new medicine or had a change in the amount of a medicine? \"      Not that I know    6. OTHER SYMPTOMS: \"Do you have any other symptoms? \" (e.g., chest pain, fever, cough, SOB, vomiting, diarrhea, bleeding, other areas of pain)      Weakness, lethargy, pale,    7. PREGNANCY: \"Is there any chance you are pregnant? \" \"When was your last menstrual period? \"      No    Protocols used: INFORMATION ONLY CALL - NO TRIAGE-ADULT-AH, WEAKNESS (GENERALIZED) AND FATIGUE-ADULT-[place]    Received call from [address] at [facility] with [facility] Complaint. Brief description - NO contact- daughter calling for her mom symptoms of  worsening weakness, fatigue, pale    Triage indicates for patient to be transferred to Office. Warm transferred to [facility] at [address] advice provided, patient verbalizes understanding; denies any other questions or concerns; instructed to call back for any new or worsening symptoms. Attention Provider: Thank you for allowing me to participate in the care of your patient. The patient was connected to triage in response to information provided to the ECC/PSC. Please do not respond through this encounter as the response is not directed to a shared pool.

## 2021-11-08 NOTE — ED PROVIDER NOTES
**This is a Medical/PA/APRN Student Note and is charted for educational purposes. The non-physician staff attested note is not to be used for billing purposes, chart documentation or to guide patient care. Please see the supervising physician/PA/APRN modifications/attestation for treatment plan/chart documentation/suggestions. This note has been reviewed and feedback has been provided to the student. **      MEDICAL STUDENT NOTE    Chief Complaint   Patient presents with    Fatigue    Dizziness         The history is provided by the patient and a friend. Arti Blake is a 70 y.o. female, c/o of fatigue and forgetfulness that has been going on for about 3-4 months. She reports that her symptoms have gotten progressively worse during this time. She states that she sleeps 12-14 hours each night but still have no energy during the day. She also says that her memory is getting worse, she normally has to write things down but lately she can't even find her notes that she has made herself. She has seen her PCP who has done lab work, but reports that these have come back negative. She has a history of hemorraghic stroke in 2015, so she was concerned about ongoing neurological problems. 1 week ago she called and spoke to a pharmacist to see if her symptoms were being caused by medication, she went through a medication review, and they told her that it was not likely. Review of Systems   Constitutional: Positive for fatigue. Negative for chills and fever. Respiratory: Negative for cough, chest tightness and shortness of breath. Cardiovascular: Negative for chest pain. Gastrointestinal: Negative for abdominal pain, diarrhea, nausea and vomiting. Musculoskeletal: Negative for gait problem. Neurological: Negative for weakness, numbness and headaches.            Past Medical History:   Diagnosis Date    Aneurysm, cerebral     COPD (chronic obstructive pulmonary disease) ([facility] [address]     Hyperlipidemia     Osteoarthritis     Pneumonia     Thyroid disease     Unspecified cerebral artery occlusion with cerebral infarction          Past Surgical History:   Procedure Laterality Date    BRAIN ANEURYSM SURGERY      BREAST SURGERY      breast abcesses    COLONOSCOPY  [id number]    COSMETIC SURGERY  2009,2010    juvederm,botox    COSMETIC SURGERY  2013    Restylane, Botox    EXTERNAL EAR SURGERY  2000    reconstruction of lft ear w/re-opening of canal & pinning back of superior ear    EXTERNAL EAR SURGERY  1999    abscess lft ear    EXTERNAL EAR SURGERY  1997    hematoma left ear       No current facility-administered medications for this encounter. Current Outpatient Medications:     cetirizine (ZYRTEC) 10 MG tablet, Take 10 mg by mouth daily, Disp: , Rfl:     DULoxetine (CYMBALTA) 60 MG extended release capsule, Take 1 capsule by mouth once daily, Disp: 90 capsule, Rfl: 3    valACYclovir (VALTREX) 500 MG tablet, Take 1 tablet by mouth daily, Disp: , Rfl:     acamprosate (CAMPRAL) 333 MG tablet, TAKE 2 TABLETS BY MOUTH NIGHTLY, Disp: 60 tablet, Rfl: 11    umeclidinium-vilanterol (ANORO ELLIPTA) 62.5-25 MCG/INH AEPB inhaler, Inhale 1 puff into the lungs daily, Disp: 60 each, Rfl: 11    atomoxetine (STRATTERA) 40 MG capsule, Take 1 capsule by mouth daily, Disp: 90 capsule, Rfl: 1    ibuprofen (ADVIL;MOTRIN) 600 MG tablet, Take 1 tablet by mouth every 6 hours as needed for Pain, Disp: 30 tablet, Rfl: 0    oxybutynin (DITROPAN XL) 15 MG extended release tablet, Take 1 tablet by mouth nightly, Disp: , Rfl:     Methylsulfonylmethane (MSM) 1000 MG CAPS, Take 2 capsules by mouth 2 times daily, Disp: , Rfl:     alendronate (FOSAMAX) 70 MG tablet, Take 1 tablet by mouth every 7 days Take with water on an empty stomach- wait 30 minutes before eating or taking other meds.   Avoid lying down for 30 minutes after dose., Disp: 12 tablet, Rfl: 3    hydrOXYzine (ATARAX) 25 MG tablet, TAKE 1/2 TO 1 (ONE-HALF TO ONE) TABLET BY MOUTH EVERY 8 HOURS AS NEEDED FOR ANXIETY, Disp: 90 tablet, Rfl: 3    levothyroxine (SYNTHROID) 50 MCG tablet, Take 1 tablet by mouth once daily, Disp: 90 tablet, Rfl: 1    simvastatin (ZOCOR) 10 MG tablet, Take 1 tablet by mouth nightly, Disp: 90 tablet, Rfl: 3    albuterol (PROVENTIL) (2.5 MG/3ML) 0.083% nebulizer solution, Take 3 mLs by nebulization every 6 hours as needed for Wheezing, Disp: 120 each, Rfl: 3    albuterol sulfate HFA (VENTOLIN HFA) 108 (90 Base) MCG/ACT inhaler, Inhale 2 puffs into the lungs 4 times daily as needed for Wheezing, Disp: 1 Inhaler, Rfl: 0    Handicap Placard MISC, by Does not apply route Expires 2025, Disp: 1 each, Rfl: 0    donepezil (ARICEPT) 10 MG tablet, Take 1 tablet by mouth in the evening, Disp: 90 tablet, Rfl: 3    Multiple Vitamin (MULTI-VITAMIN PO), Take  by mouth., Disp: , Rfl:     Calcium Carbonate-Vitamin D (CALCIUM-VITAMIN D) 600-200 MG-UNIT CAPS, Take 1 tablet by mouth 2 times daily , Disp: , Rfl:     fish oil-omega-3 fatty acids 1000 MG capsule, Take 1 g by mouth. One tablet daily along with red yeast rice, Disp: , Rfl:     Social History     Social History Narrative    Not on file     Social History     Tobacco Use    Smoking status: Former Smoker     Packs/day: 1.00     Years: 40.00     Pack years: 40.00     Types: Cigarettes     Quit date: 2015     Years since quittin.8    Smokeless tobacco: Never Used   Vaping Use    Vaping Use: Never used   Substance Use Topics    Alcohol use:  Yes     Alcohol/week: 2.0 standard drinks     Types: 2 Glasses of wine per week     Comment: 2 glass of vodka every other day     Drug use: No       Allergies   Allergen Reactions    Macrobid [Nitrofurantoin] Diarrhea    Sulfa Antibiotics Nausea And Vomiting       Family History   Problem Relation Age of Onset    Cancer Mother         Female Cancer    Stroke Mother         Possible    Diabetes Father     Heart Disease Maternal Aunt     Parkinsonism Maternal Uncle                Vitals:    11/08/21 1547   BP: 134/73   Pulse: 90   Resp: 19   Temp:    SpO2: 93%     Vital signs and nursing assessment reviewed. Vitals are stable      Physical Exam  Constitutional:       General: She is not in acute distress. Appearance: Normal appearance. She is normal weight. She is not ill-appearing, toxic-appearing or diaphoretic. HENT:      Head: Normocephalic and atraumatic. Mouth/Throat:      Mouth: Mucous membranes are moist.      Pharynx: Oropharynx is clear. No oropharyngeal exudate or posterior oropharyngeal erythema. Eyes:      Extraocular Movements: Extraocular movements intact. Pupils: Pupils are equal, round, and reactive to light. Cardiovascular:      Rate and Rhythm: Normal rate and regular rhythm. Pulmonary:      Effort: Pulmonary effort is normal.      Breath sounds: Normal breath sounds. No wheezing. Abdominal:      General: Abdomen is flat. Bowel sounds are normal. There is no distension. Palpations: Abdomen is soft. Tenderness: There is no abdominal tenderness. Neurological:      General: No focal deficit present. Mental Status: She is alert and oriented to person, place, and time. Cranial Nerves: No cranial nerve deficit. Sensory: No sensory deficit. Coordination: Coordination normal.             MDM  This is a 70year old female with a PMH of hemorrhagic stroke in 2015, who comes in for evaluation of progressive fatigue and forgetfulness. This has been ongoing for 3-4 months. Given her age, and the progressive nature, dementia is likely. She did not have any neurologic deficits on exam, but we will CT her head given her history. Will check her thyroid, urine and other labs for source of fatigue. Initial Assessment: dementia vs stroke vs thyroid disease vs UTI. Plan: CBC, BMP, CT, EKG, troponin, TSH, UA. Case and management plan discussed with Dr. Cameron Huang.       Labs Reviewed CBC WITH AUTO DIFFERENTIAL - Abnormal; Notable for the following components:       Result Value    MCHC 31.1 (*)     RDW-SD 47.9 (*)     All other components within normal limits   GLOMERULAR FILTRATION RATE, ESTIMATED - Abnormal; Notable for the following components:    Est, Glom Filt Rate 82 (*)     All other components within normal limits   URINALYSIS WITH MICROSCOPIC - Abnormal; Notable for the following components:    Ketones, Urine TRACE (*)     Leukocyte Esterase, Urine TRACE (*)     All other components within normal limits   BASIC METABOLIC PANEL   TROPONIN   ANION GAP   OSMOLALITY   TSH WITH REFLEX       Medications - No data to display    CT HEAD WO CONTRAST   Final Result       1. Stable CT scan of the brain, no interval change since previous study dated 8 April 2021.   2. 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 No recurrent subarachnoid hemorrhage. 3. Mild dilatation of the third and lateral ventricles. 4. Probable ischemic changes in the white matter. .               **This report has been created using voice recognition software. It may contain minor errors which are inherent in voice recognition technology. **      Final report electronically signed by DR Max Rizzo on 11/8/2021 2:49 PM          Final diagnoses:   Other fatigue       Current Discharge Medication List              Condition: stable    Disposition: discharge    Electronically signed by Pura Ramirez on 11/8/2021 at 4:58 PM       **This is a Medical/PA/APRN Student Note and is charted for educational purposes. The non-physician staff attested note is not to be used for billing purposes, chart documentation or to guide patient care. Please see the supervising physician/PA/APRN modifications/attestation for treatment plan/chart documentation/suggestions. This note has been reviewed and feedback has been provided to the student.  **      Pura Ramirez  11/08/21 9073

## 2021-11-08 NOTE — TELEPHONE ENCOUNTER
Daughter Rose Tran on HIPAA patient is having a hard time  with her short term memory getting worse unable to remember what someone tells her 2 min later, leaving cell phone in the [address] [place] [address] Street, sleeping 14 hrs per day. Today patient is very lethargic, pale white in color, DENIES headache. Patient did have a moderna booster yesterday 11/7/21. Alanna Fabian to take patient to the ED for eval and treat, so proper testing can be done. Daughter agrees patient will be going to [facility]. [facility]'s ED now.

## 2021-11-08 NOTE — ED PROVIDER NOTES
[address] [facility] [address] [address] EMERGENCY DEPT  eMERGENCY dEPARTMENT eNCOUnter      Pt Name: Pedro Luis Paris  MRN: [id number]  [place] 1950  Date of evaluation: 11/8/21      CHIEF COMPLAINT       Chief Complaint   Patient presents with    Fatigue    Dizziness       HISTORY OF PRESENT ILLNESS     Pedro Luis Paris is a 70 y.o. female who presents to the emergency department for evaluation of fatigue and forgetfulness. These have been present since the \"end of summer\" and estimated to have been present for several months at least.  They indicate that she has followed up with her primary care provider for this and describe that her \"blood work has been negative every time\". Symptoms persist so her primary care provider reportedly referred her here. She denies a headache. No neck pain or stiffness. No HEENT complaints. No shortness of breath, cough, or wheezing. No chest pain, palpitations, or near syncope. She does not feel nauseous. No abdominal pain. No genitourinary or stool complaints. No focal or unilateral sensory/motor deficits. She has no new symptoms today compared to time of onset months ago. Review of systems otherwise negative. They have no additional concerns at this time. REVIEW OF SYSTEMS       Review of Systems   Constitutional: Positive for fatigue. Negative for fever. Psychiatric/Behavioral:        Forgetfullness   All other systems reviewed and are negative. PAST MEDICAL HISTORY      has a past medical history of Aneurysm, cerebral, COPD (chronic obstructive pulmonary disease) ([address] Hyperlipidemia, Osteoarthritis, Pneumonia, Thyroid disease, and Unspecified cerebral artery occlusion with cerebral infarction. SURGICAL HISTORY        has a past surgical history that includes Colonoscopy ([id number]); External ear surgery (2000); External ear surgery (1999); External ear surgery (1997); Cosmetic surgery (0808,3485); Cosmetic surgery (2013);  Brain aneurysm surgery; and Breast surgery. CURRENT MEDICATIONS       Current Discharge Medication List      CONTINUE these medications which have NOT CHANGED    Details   cetirizine (ZYRTEC) 10 MG tablet Take 10 mg by mouth daily      DULoxetine (CYMBALTA) 60 MG extended release capsule Take 1 capsule by mouth once daily  Qty: 90 capsule, Refills: 3    Associated Diagnoses: Reactive depression      valACYclovir (VALTREX) 500 MG tablet Take 1 tablet by mouth daily      acamprosate (CAMPRAL) 333 MG tablet TAKE 2 TABLETS BY MOUTH NIGHTLY  Qty: 60 tablet, Refills: 11    Associated Diagnoses: Alcohol abuse      umeclidinium-vilanterol (ANORO ELLIPTA) 62.5-25 MCG/INH AEPB inhaler Inhale 1 puff into the lungs daily  Qty: 60 each, Refills: 11    Associated Diagnoses: Centrilobular emphysema (HCC)      atomoxetine (STRATTERA) 40 MG capsule Take 1 capsule by mouth daily  Qty: 90 capsule, Refills: 1      ibuprofen (ADVIL;MOTRIN) 600 MG tablet Take 1 tablet by mouth every 6 hours as needed for Pain  Qty: 30 tablet, Refills: 0      oxybutynin (DITROPAN XL) 15 MG extended release tablet Take 1 tablet by mouth nightly      Methylsulfonylmethane (MSM) 1000 MG CAPS Take 2 capsules by mouth 2 times daily      alendronate (FOSAMAX) 70 MG tablet Take 1 tablet by mouth every 7 days Take with water on an empty stomach- wait 30 minutes before eating or taking other meds. Avoid lying down for 30 minutes after dose.   Qty: 12 tablet, Refills: 3    Associated Diagnoses: Localized osteoporosis without current pathological fracture      hydrOXYzine (ATARAX) 25 MG tablet TAKE 1/2 TO 1 (ONE-HALF TO ONE) TABLET BY MOUTH EVERY 8 HOURS AS NEEDED FOR ANXIETY  Qty: 90 tablet, Refills: 3    Associated Diagnoses: Major depressive disorder, single episode, in full remission (HCC)      levothyroxine (SYNTHROID) 50 MCG tablet Take 1 tablet by mouth once daily  Qty: 90 tablet, Refills: 1    Associated Diagnoses: Hypothyroidism, unspecified type      simvastatin (ZOCOR) 10 MG tablet Take 1 tablet by mouth nightly  Qty: 90 tablet, Refills: 3      albuterol (PROVENTIL) (2.5 MG/3ML) 0.083% nebulizer solution Take 3 mLs by nebulization every 6 hours as needed for Wheezing  Qty: 120 each, Refills: 3    Associated Diagnoses: Centrilobular emphysema (HCC)      albuterol sulfate HFA (VENTOLIN HFA) 108 (90 Base) MCG/ACT inhaler Inhale 2 puffs into the lungs 4 times daily as needed for Wheezing  Qty: 1 Inhaler, Refills: 0      Handicap Placard MISC by Does not apply route Expires 2025  Qty: 1 each, Refills: 0    Associated Diagnoses: Major depressive disorder, single episode, in full remission ([facility] [address] H/O: CVA (cerebrovascular accident); Cognitive impairment; Pulmonary emphysema, unspecified emphysema type (HCC)      donepezil (ARICEPT) 10 MG tablet Take 1 tablet by mouth in the evening  Qty: 90 tablet, Refills: 3    Associated Diagnoses: H/O: CVA (cerebrovascular accident); Cognitive impairment      Multiple Vitamin (MULTI-VITAMIN PO) Take  by mouth. Calcium Carbonate-Vitamin D (CALCIUM-VITAMIN D) 600-200 MG-UNIT CAPS Take 1 tablet by mouth 2 times daily     Associated Diagnoses: Chronic bronchitis (HCC)      fish oil-omega-3 fatty acids 1000 MG capsule Take 1 g by mouth. One tablet daily along with red yeast rice             ALLERGIES       is allergic to macrobid [nitrofurantoin] and sulfa antibiotics. FAMILY HISTORY       She indicated that her mother is . She indicated that her father is . She indicated that her maternal aunt is . She indicated that her maternal uncle is . family history includes Cancer in her mother; Diabetes in her father; Heart Disease in her maternal aunt; Parkinsonism in her maternal uncle; Stroke in her mother. SOCIAL HISTORY        reports that she quit smoking about 6 years ago. Her smoking use included cigarettes. She has a 40.00 pack-year smoking history.  She has never used smokeless tobacco. She reports current alcohol use of about 2.0 standard drinks of alcohol per week. She reports that she does not use drugs. PHYSICAL EXAM       INITIAL VITALS:  oral temperature is 99.3 °F (37.4 °C). Her blood pressure is 134/73 and her pulse is 90. Her respiration is 19 and oxygen saturation is 93%. Physical Exam  Vitals and nursing note reviewed. Constitutional:       General: She is not in acute distress. Appearance: Normal appearance. She is not ill-appearing, toxic-appearing or diaphoretic. Eyes:      Extraocular Movements: Extraocular movements intact. Conjunctiva/sclera: Conjunctivae normal.      Pupils: Pupils are equal, round, and reactive to light. Cardiovascular:      Rate and Rhythm: Normal rate and regular rhythm. Heart sounds: Normal heart sounds. No murmur heard. Pulmonary:      Effort: Pulmonary effort is normal.      Breath sounds: Normal breath sounds. Abdominal:      Palpations: Abdomen is soft. Tenderness: There is no abdominal tenderness. Musculoskeletal:         General: No tenderness or deformity. Right lower leg: No edema. Left lower leg: No edema. Skin:     General: Skin is warm and dry. Capillary Refill: Capillary refill takes less than 2 seconds. Neurological:      General: No focal deficit present. Mental Status: She is alert and oriented to person, place, and time. Cranial Nerves: No cranial nerve deficit. Sensory: No sensory deficit. Motor: No weakness. Coordination: Coordination normal.   Psychiatric:         Mood and Affect: Mood normal.         Behavior: Behavior normal.       DIAGNOSTIC RESULTS     EKG: All EKG's are interpreted by the Emergency Department Physician who either signs or Co-signs this chart in the absence of a cardiologist.    EKG at 235 shows a sinus tachycardia with a rate of 105 bpm.  Intervals within normal ones. Indeterminate axis. Poor wave progression. T wave inversions in aVL.   No ST elevation acute infarction. No arrhythmia. RADIOLOGY:   I directly visualized the following images and reviewed the radiologist interpretations:    CT HEAD WO CONTRAST   Final Result       1. Stable CT scan of the brain, no interval change since previous study dated 8 April 2021.   2. 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 No recurrent subarachnoid hemorrhage. 3. Mild dilatation of the third and lateral ventricles. 4. Probable ischemic changes in the white matter. .               **This report has been created using voice recognition software. It may contain minor errors which are inherent in voice recognition technology. **      Final report electronically signed by DR Neelima Torres on 11/8/2021 2:49 PM          LABS:  Labs Reviewed   CBC WITH AUTO DIFFERENTIAL - Abnormal; Notable for the following components:       Result Value    MCHC 31.1 (*)     RDW-SD 47.9 (*)     All other components within normal limits   GLOMERULAR FILTRATION RATE, ESTIMATED - Abnormal; Notable for the following components:    Est, Glom Filt Rate 82 (*)     All other components within normal limits   URINALYSIS WITH MICROSCOPIC - Abnormal; Notable for the following components:    Ketones, Urine TRACE (*)     Leukocyte Esterase, Urine TRACE (*)     All other components within normal limits   BASIC METABOLIC PANEL   TROPONIN   ANION GAP   OSMOLALITY   TSH WITH REFLEX       EMERGENCY DEPARTMENT COURSE:     Vitals:    Vitals:    11/08/21 1238 11/08/21 1547   BP: 138/76 134/73   Pulse: 105 90   Resp: 18 19   Temp: 99.3 °F (37.4 °C)    TempSrc: Oral    SpO2: 91% 93%     -------------------------  BP: 134/73, Temp: 99.3 °F (37.4 °C), Pulse: 90, Resp: 19      MEDICAL DECISION MAKING:     Nursing notes reviewed  Patient denies dizziness for me  On my exam she is not tachycardic  Overall benign exam  Labs and imaging reviewed  No acute intracranial process    Patient updated on all results  No new symptoms  Remains neuro intact  Other than fatigue she is currently asymptomatic  Stable for outpatient follow-up      FINAL IMPRESSION      1. Other fatigue          DISPOSITION/PLAN     Discharge      PATIENT REFERRED TO:  SARITHA Santana - NOHEMY  [address]  [address] [facility]  [phone]    Call in 1 day      (Please note that portions of this note were completed with a voice recognition program.  Efforts were made to edit the dictations but occasionally words are mis-transcribed.)    Ene Christianson.  DO Alison  Attending Emergency Physician                    Carmelita Brock DO  11/08/21 0705

## 2021-11-08 NOTE — ED TRIAGE NOTES
Pt presents to the ED for fatigue and dizziness that started today. Pt denies pain.  Pt states she has SOB but this is her normal due to COPD

## 2021-11-09 LAB
EKG ATRIAL RATE: 105 BPM
EKG P AXIS: 81 DEGREES
EKG P-R INTERVAL: 144 MS
EKG Q-T INTERVAL: 364 MS
EKG QRS DURATION: 72 MS
EKG QTC CALCULATION (BAZETT): 481 MS
EKG R AXIS: 8 DEGREES
EKG T AXIS: 76 DEGREES
EKG VENTRICULAR RATE: 105 BPM

## 2021-11-09 PROCEDURE — 93010 ELECTROCARDIOGRAM REPORT: CPT | Performed by: INTERNAL MEDICINE

## 2021-12-08 DIAGNOSIS — F32.5 MAJOR DEPRESSIVE DISORDER, SINGLE EPISODE, IN FULL REMISSION (HCC): ICD-10-CM

## 2021-12-09 RX ORDER — HYDROXYZINE HYDROCHLORIDE 25 MG/1
TABLET, FILM COATED ORAL
Qty: 60 TABLET | Refills: 0 | Status: SHIPPED | OUTPATIENT
Start: 2021-12-09 | End: 2022-06-08

## 2021-12-13 ENCOUNTER — TELEPHONE (OUTPATIENT)
Dept: FAMILY MEDICINE CLINIC | Age: 71
End: 2021-12-13

## 2021-12-13 NOTE — TELEPHONE ENCOUNTER
Reassurance. Exposure to someone with MRSA is not going to cause an infection due to exposure. No treatment needed.

## 2021-12-13 NOTE — TELEPHONE ENCOUNTER
Patient calling due to friend diagnosed with MRSA and patient is concerned since she has had it in the past.  She denies any s/s at this time. She recently had hand surgery at [facility] and did test positive for MRSA prior to surgery but was not retested after treatment.   Please advise

## 2021-12-16 ENCOUNTER — NURSE TRIAGE (OUTPATIENT)
Dept: OTHER | Facility: CLINIC | Age: 71
End: 2021-12-16

## 2021-12-16 ENCOUNTER — APPOINTMENT (OUTPATIENT)
Dept: GENERAL RADIOLOGY | Age: 71
End: 2021-12-16
Payer: MEDICARE

## 2021-12-16 ENCOUNTER — HOSPITAL ENCOUNTER (EMERGENCY)
Age: 71
Discharge: HOME | End: 2021-12-16
Payer: MEDICARE

## 2021-12-16 VITALS
HEART RATE: 101 BPM | OXYGEN SATURATION: 95 % | TEMPERATURE: 97.5 F | DIASTOLIC BLOOD PRESSURE: 76 MMHG | RESPIRATION RATE: 20 BRPM | SYSTOLIC BLOOD PRESSURE: 124 MMHG

## 2021-12-16 DIAGNOSIS — J18.9 PNEUMONIA OF RIGHT MIDDLE LOBE DUE TO INFECTIOUS ORGANISM: Primary | ICD-10-CM

## 2021-12-16 LAB — SARS-COV-2, NAA: NOT  DETECTED

## 2021-12-16 PROCEDURE — 71046 X-RAY EXAM CHEST 2 VIEWS: CPT

## 2021-12-16 PROCEDURE — 99213 OFFICE O/P EST LOW 20 MIN: CPT

## 2021-12-16 PROCEDURE — 87635 SARS-COV-2 COVID-19 AMP PRB: CPT

## 2021-12-16 PROCEDURE — 99213 OFFICE O/P EST LOW 20 MIN: CPT | Performed by: NURSE PRACTITIONER

## 2021-12-16 RX ORDER — LEVOFLOXACIN 750 MG/1
750 TABLET ORAL DAILY
Qty: 7 TABLET | Refills: 0 | Status: SHIPPED | OUTPATIENT
Start: 2021-12-16 | End: 2021-12-22

## 2021-12-16 RX ORDER — PREDNISONE 20 MG/1
40 TABLET ORAL DAILY
Qty: 14 TABLET | Refills: 0 | Status: SHIPPED | OUTPATIENT
Start: 2021-12-16 | End: 2021-12-22

## 2021-12-16 ASSESSMENT — ENCOUNTER SYMPTOMS
SHORTNESS OF BREATH: 0
SORE THROAT: 0
DIARRHEA: 0
COUGH: 1
NAUSEA: 0
VOMITING: 0

## 2021-12-16 NOTE — ED PROVIDER NOTES
[facility]  Urgent Care Encounter       CHIEF COMPLAINT       Chief Complaint   Patient presents with    Cough    Nasal Congestion       Nurses Notes reviewed and I agree except as noted in the HPI. HISTORY OF PRESENT ILLNESS   Juan Soto is a 70 y.o. female who presents for evaluation of cough and congestion. Patient states that she has had a cough and congestion for the past 1.5 weeks but over the past 2 to 3 days it has become productive with a greenish type mucus. She denies any fever, chills or any increase in shortness of breath. She reports a history of COPD and states that she is typically short of breath but that this feels normal for her. She denies any known sick exposures. The history is provided by the patient. REVIEW OF SYSTEMS     Review of Systems   Constitutional: Negative for chills and fever. HENT: Positive for congestion. Negative for sore throat. Respiratory: Positive for cough. Negative for shortness of breath. Cardiovascular: Negative for chest pain. Gastrointestinal: Negative for diarrhea, nausea and vomiting. Musculoskeletal: Negative for arthralgias and myalgias. Skin: Negative for rash. Allergic/Immunologic: Negative for immunocompromised state. Neurological: Negative for headaches. PAST MEDICAL HISTORY         Diagnosis Date    Aneurysm, cerebral     COPD (chronic obstructive pulmonary disease) (HCC)     Hyperlipidemia     Osteoarthritis     Pneumonia     Thyroid disease     Unspecified cerebral artery occlusion with cerebral infarction        SURGICALHISTORY     Patient  has a past surgical history that includes Colonoscopy ([id number]); External ear surgery (2000); External ear surgery (1999); External ear surgery (1997); Cosmetic surgery ([id number]); Cosmetic surgery (2013); Brain aneurysm surgery; and Breast surgery.     CURRENT MEDICATIONS       Previous Medications    ACAMPROSATE (CAMPRAL) 333 MG TABLET TAKE 2 TABLETS BY MOUTH NIGHTLY    ALBUTEROL (PROVENTIL) (2.5 MG/3ML) 0.083% NEBULIZER SOLUTION    Take 3 mLs by nebulization every 6 hours as needed for Wheezing    ALBUTEROL SULFATE HFA (VENTOLIN HFA) 108 (90 BASE) MCG/ACT INHALER    Inhale 2 puffs into the lungs 4 times daily as needed for Wheezing    ALENDRONATE (FOSAMAX) 70 MG TABLET    Take 1 tablet by mouth every 7 days Take with water on an empty stomach- wait 30 minutes before eating or taking other meds. Avoid lying down for 30 minutes after dose. ATOMOXETINE (STRATTERA) 40 MG CAPSULE    Take 1 capsule by mouth daily    CALCIUM CARBONATE-VITAMIN D (CALCIUM-VITAMIN D) 600-200 MG-UNIT CAPS    Take 1 tablet by mouth 2 times daily     CETIRIZINE (ZYRTEC) 10 MG TABLET    Take 10 mg by mouth daily    DONEPEZIL (ARICEPT) 10 MG TABLET    Take 1 tablet by mouth in the evening    DULOXETINE (CYMBALTA) 60 MG EXTENDED RELEASE CAPSULE    Take 1 capsule by mouth once daily    FISH OIL-OMEGA-3 FATTY ACIDS 1000 MG CAPSULE    Take 1 g by mouth. One tablet daily along with red yeast rice    HANDICAP PLACARD MISC    by Does not apply route Expires 12/2025    HYDROXYZINE (ATARAX) 25 MG TABLET    TAKE 1/2 TO 1 (ONE-HALF TO ONE) TABLET BY MOUTH EVERY 8 HOURS AS NEEDED FOR ANXIETY    IBUPROFEN (ADVIL;MOTRIN) 600 MG TABLET    Take 1 tablet by mouth every 6 hours as needed for Pain    LEVOTHYROXINE (SYNTHROID) 50 MCG TABLET    Take 1 tablet by mouth once daily    METHYLSULFONYLMETHANE (MSM) 1000 MG CAPS    Take 2 capsules by mouth 2 times daily    MULTIPLE VITAMIN (MULTI-VITAMIN PO)    Take  by mouth.     OXYBUTYNIN (DITROPAN XL) 15 MG EXTENDED RELEASE TABLET    Take 1 tablet by mouth nightly    SIMVASTATIN (ZOCOR) 10 MG TABLET    Take 1 tablet by mouth nightly    UMECLIDINIUM-VILANTEROL (ANORO ELLIPTA) 62.5-25 MCG/INH AEPB INHALER    Inhale 1 puff into the lungs daily    VALACYCLOVIR (VALTREX) 500 MG TABLET    Take 1 tablet by mouth daily       ALLERGIES     Patient is is allergic to macrobid [nitrofurantoin] and sulfa antibiotics. Patients   Immunization History   Administered Date(s) Administered    COVID-19, Moderna, Primary or Immunocompromised, PF, 100mcg/0.5mL 02/08/2021, 03/08/2021    Influenza A ([id number]) Vaccine PF IM 12/19/2009    Influenza Vaccine, unspecified formulation 12/06/2017    Influenza Virus Vaccine 12/06/2017    Influenza, High Dose (Fluzone 65 yrs and older) 12/06/2017, 11/07/2018, 10/13/2020    Influenza, High-dose, Barrett Forget, 65 yrs +, IM (Fluzone) 10/13/2020    Influenza, Quadv, adjuvanted, 65 yrs +, IM, PF (Fluad) 10/06/2021    Influenza, Triv, inactivated, subunit, adjuvanted, IM (Fluad 65 yrs and older) 10/30/2019    Pneumococcal Conjugate 13-valent ([id number]) 07/21/2015, 07/26/2017    Pneumococcal Polysaccharide ([id number]) 11/13/2018    Pneumococcal Vaccine 11/14/2016    Tdap (Boostrix, Adacel) 04/08/2014    Zoster Recombinant (Shingrix) 10/13/2020, 12/12/2020       FAMILY HISTORY     Patient's family history includes Cancer in her mother; Diabetes in her father; Heart Disease in her maternal aunt; Parkinsonism in her maternal uncle; Stroke in her mother. SOCIAL HISTORY     Patient  reports that she quit smoking about 6 years ago. Her smoking use included cigarettes. She has a 40.00 pack-year smoking history. She has never used smokeless tobacco. She reports current alcohol use of about 2.0 standard drinks of alcohol per week. She reports that she does not use drugs. PHYSICAL EXAM     ED TRIAGE VITALS  BP: 124/76, Temp: 97.5 °F (36.4 °C), Pulse: 101, Resp: 20, SpO2: 95 %,Estimated body mass index is 23.67 kg/m² as calculated from the following:    Height as of 8/25/21: 5' 3\" (1.6 m). Weight as of 10/6/21: 133 lb 9.6 oz (60.6 kg). ,No LMP recorded. Patient is postmenopausal.    Physical Exam  Vitals and nursing note reviewed. Constitutional:       General: She is not in acute distress. Appearance: She is well-developed.  She is not diaphoretic. HENT:      Right Ear: Tympanic membrane and ear canal normal.      Left Ear: Tympanic membrane and ear canal normal.   Eyes:      Conjunctiva/sclera:      Right eye: Right conjunctiva is not injected. Left eye: Left conjunctiva is not injected. Pupils: Pupils are equal.   Cardiovascular:      Rate and Rhythm: Normal rate and regular rhythm. Heart sounds: No murmur heard. Pulmonary:      Effort: Pulmonary effort is normal. No respiratory distress. Breath sounds: Normal breath sounds. Musculoskeletal:      Cervical back: Normal range of motion. Right knee: Normal range of motion. Left knee: Normal range of motion. Skin:     General: Skin is warm. Findings: No rash. Neurological:      Mental Status: She is alert and oriented to person, place, and time. Psychiatric:         Behavior: Behavior normal.         DIAGNOSTIC RESULTS     Labs:  Results for orders placed or performed during the hospital encounter of 12/16/21   COVID-19, Rapid   Result Value Ref Range    SARS-CoV-2, SURJIT NOT  DETECTED NOT DETECTED       IMAGING:    XR CHEST (2 VW)   Final Result   Right middle lobe airspace infiltrates. **This report has been created using voice recognition software. It may contain minor errors which are inherent in voice recognition technology. **      Final report electronically signed by Dr. Jazz Henley on 12/16/2021 4:35 PM            EKG:      URGENT CARE COURSE:     Vitals:    12/16/21 1616   BP: 124/76   Pulse: 101   Resp: 20   Temp: 97.5 °F (36.4 °C)   TempSrc: Tympanic   SpO2: 95%       Medications - No data to display         PROCEDURES:  None    FINAL IMPRESSION      1.  Pneumonia of right middle lobe due to infectious organism          DISPOSITION/ PLAN       X-ray does demonstrate right middle lobe pneumonia per the read the radiologist.  I discussed with the patient the plan to treat with oral antibiotics and steroids and that she must follow-up with her PCP on an outpatient basis. She is advised to present to the ER if her symptoms worsen and she is agreeable to plan as discussed.     PATIENT REFERRED TO:  SARITHA Devries CNP  [facility] [address]      DISCHARGE MEDICATIONS:  New Prescriptions    LEVOFLOXACIN (LEVAQUIN) 750 MG TABLET    Take 1 tablet by mouth daily for 7 days    PREDNISONE (DELTASONE) 20 MG TABLET    Take 2 tablets by mouth daily for 7 days       Discontinued Medications    No medications on file       Current Discharge Medication List          SARITHA Garcia CNP    (Please note that portions of this note were completed with a voice recognition program. Efforts were made to edit the dictations but occasionally words are mis-transcribed.)          SARITHA Garcia CNP  12/16/21 5764

## 2021-12-16 NOTE — TELEPHONE ENCOUNTER
Received call from [facility] at [facility] with Red Flag Complaint. Brief description of triage: see below    Triage indicates for patient to be seen in pcp office today for productive cough with hx of copd and pneumonia. Pt was informed to go to [address] if unable to get into see pcp today. Verbalized understanding. Care advice provided, patient verbalizes understanding; denies any other questions or concerns; instructed to call back for any new or worsening symptoms. Writer provided warm transfer to [facility] for appointment scheduling. Attention Provider: Thank you for allowing me to participate in the care of your patient. The patient was connected to triage in response to information provided to the ECC/PSC. Please do not respond through this encounter as the response is not directed to a shared pool. Reason for Disposition   Known COPD or other severe lung disease (i.e., bronchiectasis, cystic fibrosis, lung surgery) and worsening symptoms (i.e., increased sputum purulence or amount, increased breathing difficulty)    Answer Assessment - Initial Assessment Questions  1. ONSET: \"When did the cough begin? \"       Pt state that the cough started a couple days but has gotten worse with the last day. Pt c/o congestion and fatigue with it. 2. SEVERITY: \"How bad is the cough today? \"       Pt states that it \"I have troulble controlling the cough\" Pt states that it is productive and the discharge is cloudy. 3. RESPIRATORY DISTRESS: \"Describe your breathing. \"       Pt states she has hx of copd and states that her sob is no different than normal. Pt denies wheezing. 4. FEVER: \"Do you have a fever? \" If so, ask: \"What is your temperature, how was it measured, and when did it start? \"      Denies    5. HEMOPTYSIS: \"Are you coughing up any blood? \" If so ask: \"How much? \" (flecks, streaks, tablespoons, etc.)      Denies    6. TREATMENT: \"What have you done so far to treat the cough? \" (e.g., meds, fluids, humidifier)      Trying to drink more water, taking otc medication    7. CARDIAC HISTORY: \"Do you have any history of heart disease? \" (e.g., heart attack, congestive heart failure)   Denies        8. LUNG HISTORY: \"Do you have any history of lung disease? \"  (e.g., pulmonary embolus, asthma, emphysema)      Hx of pneumonia and copd    9. PE RISK FACTORS: \"Do you have a history of blood clots? \" (or: recent major surgery, recent prolonged travel, bedridden)      Denies    10. OTHER SYMPTOMS: \"Do you have any other symptoms? (e.g., runny nose, wheezing, chest pain)       Congestion, fatigue, but denies cp    11. PREGNANCY: \"Is there any chance you are pregnant? \" \"When was your last menstrual period? \"        Denies    12. TRAVEL: \"Have you traveled out of the country in the last month? \" (e.g., travel history, exposures)        Denies    Protocols used: [id number]

## 2021-12-19 ENCOUNTER — HOSPITAL ENCOUNTER (EMERGENCY)
Age: 71
Discharge: HOME | End: 2021-12-19
Attending: INTERNAL MEDICINE
Payer: MEDICARE

## 2021-12-19 ENCOUNTER — APPOINTMENT (OUTPATIENT)
Dept: GENERAL RADIOLOGY | Age: 71
End: 2021-12-19
Payer: MEDICARE

## 2021-12-19 VITALS
TEMPERATURE: 98.1 F | HEIGHT: 63 IN | OXYGEN SATURATION: 94 % | DIASTOLIC BLOOD PRESSURE: 72 MMHG | SYSTOLIC BLOOD PRESSURE: 114 MMHG | HEART RATE: 104 BPM | RESPIRATION RATE: 23 BRPM | WEIGHT: 135 LBS | BODY MASS INDEX: 23.92 KG/M2

## 2021-12-19 DIAGNOSIS — R09.02 HYPOXIA: Primary | ICD-10-CM

## 2021-12-19 LAB
ALBUMIN SERPL-MCNC: 4.6 G/DL (ref 3.5–5.1)
ALP BLD-CCNC: 46 U/L (ref 38–126)
ALT SERPL-CCNC: 15 U/L (ref 11–66)
ANION GAP SERPL CALCULATED.3IONS-SCNC: 13 MEQ/L (ref 8–16)
AST SERPL-CCNC: 24 U/L (ref 5–40)
BASOPHILS # BLD: 0.9 %
BASOPHILS ABSOLUTE: 0.1 THOU/MM3 (ref 0–0.1)
BILIRUB SERPL-MCNC: 0.3 MG/DL (ref 0.3–1.2)
BILIRUBIN DIRECT: < 0.2 MG/DL (ref 0–0.3)
BUN BLDV-MCNC: 19 MG/DL (ref 7–22)
CALCIUM SERPL-MCNC: 9 MG/DL (ref 8.5–10.5)
CHLORIDE BLD-SCNC: 102 MEQ/L (ref 98–111)
CO2: 24 MEQ/L (ref 23–33)
CREAT SERPL-MCNC: 0.9 MG/DL (ref 0.4–1.2)
D-DIMER QUANTITATIVE: 570 NG/ML FEU (ref 0–500)
EOSINOPHIL # BLD: 0.7 %
EOSINOPHILS ABSOLUTE: 0 THOU/MM3 (ref 0–0.4)
ERYTHROCYTE [DISTWIDTH] IN BLOOD BY AUTOMATED COUNT: 13.6 % (ref 11.5–14.5)
ERYTHROCYTE [DISTWIDTH] IN BLOOD BY AUTOMATED COUNT: 45.1 FL (ref 35–45)
GFR SERPL CREATININE-BSD FRML MDRD: 62 ML/MIN/1.73M2
GLUCOSE BLD-MCNC: 119 MG/DL (ref 70–108)
HCT VFR BLD CALC: 42.6 % (ref 37–47)
HEMOGLOBIN: 14.2 GM/DL (ref 12–16)
IMMATURE GRANS (ABS): 0.02 THOU/MM3 (ref 0–0.07)
IMMATURE GRANULOCYTES: 0.3 %
LYMPHOCYTES # BLD: 27.9 %
LYMPHOCYTES ABSOLUTE: 1.9 THOU/MM3 (ref 1–4.8)
MAGNESIUM: 1.8 MG/DL (ref 1.6–2.4)
MCH RBC QN AUTO: 30.1 PG (ref 26–33)
MCHC RBC AUTO-ENTMCNC: 33.3 GM/DL (ref 32.2–35.5)
MCV RBC AUTO: 90.3 FL (ref 81–99)
MONOCYTES # BLD: 13.9 %
MONOCYTES ABSOLUTE: 1 THOU/MM3 (ref 0.4–1.3)
NUCLEATED RED BLOOD CELLS: 0 /100 WBC
OSMOLALITY CALCULATION: 280.9 MOSMOL/KG (ref 275–300)
PLATELET # BLD: 227 THOU/MM3 (ref 130–400)
PMV BLD AUTO: 11.1 FL (ref 9.4–12.4)
POTASSIUM REFLEX MAGNESIUM: 3.3 MEQ/L (ref 3.5–5.2)
PRO-BNP: 346.6 PG/ML (ref 0–900)
RBC # BLD: 4.72 MILL/MM3 (ref 4.2–5.4)
SARS-COV-2, NAAT: NOT  DETECTED
SEG NEUTROPHILS: 56.3 %
SEGMENTED NEUTROPHILS ABSOLUTE COUNT: 3.9 THOU/MM3 (ref 1.8–7.7)
SODIUM BLD-SCNC: 139 MEQ/L (ref 135–145)
TOTAL PROTEIN: 7.4 G/DL (ref 6.1–8)
TROPONIN T: < 0.01 NG/ML
WBC # BLD: 6.9 THOU/MM3 (ref 4.8–10.8)

## 2021-12-19 PROCEDURE — 87635 SARS-COV-2 COVID-19 AMP PRB: CPT

## 2021-12-19 PROCEDURE — 85379 FIBRIN DEGRADATION QUANT: CPT

## 2021-12-19 PROCEDURE — 6370000000 HC RX 637 (ALT 250 FOR IP): Performed by: STUDENT IN AN ORGANIZED HEALTH CARE EDUCATION/TRAINING PROGRAM

## 2021-12-19 PROCEDURE — 36415 COLL VENOUS BLD VENIPUNCTURE: CPT

## 2021-12-19 PROCEDURE — 93005 ELECTROCARDIOGRAM TRACING: CPT | Performed by: EMERGENCY MEDICINE

## 2021-12-19 PROCEDURE — 83880 ASSAY OF NATRIURETIC PEPTIDE: CPT

## 2021-12-19 PROCEDURE — 83735 ASSAY OF MAGNESIUM: CPT

## 2021-12-19 PROCEDURE — 99285 EMERGENCY DEPT VISIT HI MDM: CPT

## 2021-12-19 PROCEDURE — 80076 HEPATIC FUNCTION PANEL: CPT

## 2021-12-19 PROCEDURE — 6360000002 HC RX W HCPCS: Performed by: STUDENT IN AN ORGANIZED HEALTH CARE EDUCATION/TRAINING PROGRAM

## 2021-12-19 PROCEDURE — 2700000000 HC OXYGEN THERAPY PER DAY

## 2021-12-19 PROCEDURE — 71045 X-RAY EXAM CHEST 1 VIEW: CPT

## 2021-12-19 PROCEDURE — 85025 COMPLETE CBC W/AUTO DIFF WBC: CPT

## 2021-12-19 PROCEDURE — 80048 BASIC METABOLIC PNL TOTAL CA: CPT

## 2021-12-19 PROCEDURE — 94640 AIRWAY INHALATION TREATMENT: CPT

## 2021-12-19 PROCEDURE — 84484 ASSAY OF TROPONIN QUANT: CPT

## 2021-12-19 RX ORDER — ALBUTEROL SULFATE 2.5 MG/3ML
2.5 SOLUTION RESPIRATORY (INHALATION) ONCE
Status: COMPLETED | OUTPATIENT
Start: 2021-12-19 | End: 2021-12-19

## 2021-12-19 RX ADMIN — ALBUTEROL SULFATE 2.5 MG: 2.5 SOLUTION RESPIRATORY (INHALATION) at 15:56

## 2021-12-19 RX ADMIN — POTASSIUM BICARBONATE 40 MEQ: 782 TABLET, EFFERVESCENT ORAL at 17:34

## 2021-12-19 ASSESSMENT — ENCOUNTER SYMPTOMS
VOMITING: 0
SORE THROAT: 0
NAUSEA: 0
BACK PAIN: 0
BLOOD IN STOOL: 0
SHORTNESS OF BREATH: 1
COUGH: 0
DIARRHEA: 0
ABDOMINAL PAIN: 0
TROUBLE SWALLOWING: 0

## 2021-12-19 NOTE — ED NOTES
Pt ambulated in [place]. Pt desat to 74% on room air. Pt has portable o2 with her however it requires charging and the daughter is unsure if they have a . Pt states she does not feel sob after ambulating. Pt respirations are even and unlabored. Pt VSS.  Pt denies any pain at this time     Loy Campos RN  12/19/21 3669

## 2021-12-19 NOTE — ED PROVIDER NOTES
[facility] ENCOUNTER          Pt Name: Buell Lanes  MRN: [id number]  [place] 1950  Date of evaluation: 12/19/2021  Treating Resident Physician: Kortney Chaves MD  Supervising Physician: Justus Hall DO    CHIEF COMPLAINT       Chief Complaint   Patient presents with    Pneumonia     History obtained from the patient. HISTORY OF PRESENT ILLNESS    HPI  Buell Lanes is a 70 y.o. female with PMHx of COPD and subarachnoid hemorrhage who presents to the emergency department for evaluation of dyspnea. Patient reports that she has been experiencing right-sided pleuritic chest pain and productive cough as well as fevers for the past week. She went to urgent care 3 days ago and chest x-ray revealed a right middle lobe consolidation consistent with pneumonia. Patient was discharged on levofloxacin and steroids. Patient states that she has been having worsening dyspnea despite therapy. Today patient oxygen saturations were in the low 80s on room air. Patient does not use home oxygen. Patient denies any chest pain, syncope, leg swelling, chills, nausea, emesis. The patient has no other acute complaints at this time. REVIEW OF SYSTEMS   Review of Systems   Constitutional: Positive for fever. Negative for chills, diaphoresis and fatigue. HENT: Negative for sore throat and trouble swallowing. Eyes: Negative for visual disturbance. Respiratory: Positive for shortness of breath. Negative for cough. Cardiovascular: Negative for chest pain, palpitations and leg swelling. Gastrointestinal: Negative for abdominal pain, blood in stool, diarrhea, nausea and vomiting. Genitourinary: Negative for dysuria, hematuria and urgency. Musculoskeletal: Negative for arthralgias, back pain, myalgias and neck pain. Skin: Negative for rash. Neurological: Negative for seizures, syncope, weakness, numbness and headaches.          PAST MEDICAL AND SURGICAL HISTORY     Past Medical History:   Diagnosis Date    Aneurysm, cerebral     COPD (chronic obstructive pulmonary disease) ([facility] [address]     Hyperlipidemia     Osteoarthritis     Pneumonia     Thyroid disease     Unspecified cerebral artery occlusion with cerebral infarction      Past Surgical History:   Procedure Laterality Date    BRAIN ANEURYSM SURGERY      BREAST SURGERY      breast abcesses    COLONOSCOPY  [id number]    COSMETIC SURGERY  2009,2010    juvederm,botox    COSMETIC SURGERY  2013    Restylane, Botox    EXTERNAL EAR SURGERY  2000    reconstruction of lft ear w/re-opening of canal & pinning back of superior ear    EXTERNAL EAR SURGERY  1999    abscess lft ear    EXTERNAL EAR SURGERY  1997    hematoma left ear         MEDICATIONS     Current Facility-Administered Medications:     potassium bicarb-citric acid (EFFER-K) effervescent tablet 40 mEq, 40 mEq, Oral, Once, Victoriano Carney MD    Current Outpatient Medications:     levoFLOXacin (LEVAQUIN) 750 MG tablet, Take 1 tablet by mouth daily for 7 days, Disp: 7 tablet, Rfl: 0    predniSONE (DELTASONE) 20 MG tablet, Take 2 tablets by mouth daily for 7 days, Disp: 14 tablet, Rfl: 0    donepezil (ARICEPT) 10 MG tablet, Take 1 tablet by mouth in the evening, Disp: 90 tablet, Rfl: 1    hydrOXYzine (ATARAX) 25 MG tablet, TAKE 1/2 TO 1 (ONE-HALF TO ONE) TABLET BY MOUTH EVERY 8 HOURS AS NEEDED FOR ANXIETY, Disp: 60 tablet, Rfl: 0    cetirizine (ZYRTEC) 10 MG tablet, Take 10 mg by mouth daily, Disp: , Rfl:     DULoxetine (CYMBALTA) 60 MG extended release capsule, Take 1 capsule by mouth once daily, Disp: 90 capsule, Rfl: 3    valACYclovir (VALTREX) 500 MG tablet, Take 1 tablet by mouth daily, Disp: , Rfl:     acamprosate (CAMPRAL) 333 MG tablet, TAKE 2 TABLETS BY MOUTH NIGHTLY, Disp: 60 tablet, Rfl: 11    umeclidinium-vilanterol (ANORO ELLIPTA) 62.5-25 MCG/INH AEPB inhaler, Inhale 1 puff into the lungs daily, Disp: 60 each, Rfl: 11   used   Substance Use Topics    Alcohol use: Yes     Alcohol/week: 2.0 standard drinks     Types: 2 Glasses of wine per week     Comment: 2 glass of vodka every other day     Drug use: No         ALLERGIES     Allergies   Allergen Reactions    Macrobid [Nitrofurantoin] Diarrhea    Sulfa Antibiotics Nausea And Vomiting         FAMILY HISTORY     Family History   Problem Relation Age of Onset    Cancer Mother         Female Cancer    Stroke Mother         Possible    Diabetes Father     Heart Disease Maternal Aunt     Parkinsonism Maternal Uncle          PREVIOUS RECORDS   Previous records reviewed: I reviewed the patient's past medical records including relevant labs, imaging and procedures. PHYSICAL EXAM     ED Triage Vitals [12/19/21 1412]   BP Temp Temp Source Pulse Resp SpO2 Height Weight   (!) 146/93 98.1 °F (36.7 °C) Oral 103 18 95 % 5' 3\" (1.6 m) 135 lb (61.2 kg)     Initial vital signs and nursing assessment reviewed and abnormal from Tachycardia and hypoxia. Body mass index is 23.91 kg/m². Pulsoximetry is normal per my interpretation. Additional Vital Signs:  Vitals:    12/19/21 1618   BP: 135/79   Pulse: 98   Resp: 21   Temp:    SpO2: 94%       Physical Exam  Vitals and nursing note reviewed. Constitutional:       Appearance: Normal appearance. HENT:      Head: Normocephalic and atraumatic. Right Ear: Tympanic membrane normal.      Left Ear: Tympanic membrane normal.      Nose: Nose normal.      Mouth/Throat:      Mouth: Mucous membranes are moist.      Pharynx: Oropharynx is clear. No oropharyngeal exudate. Eyes:      General: No scleral icterus. Extraocular Movements: Extraocular movements intact. Conjunctiva/sclera: Conjunctivae normal.      Pupils: Pupils are equal, round, and reactive to light. Cardiovascular:      Rate and Rhythm: Regular rhythm. Tachycardia present. Pulses: Normal pulses. Heart sounds: Normal heart sounds. No murmur heard.   No friction rub. No gallop. Pulmonary:      Effort: Pulmonary effort is normal. No respiratory distress. Breath sounds: Normal breath sounds. Comments: Coarse breath sounds over the right lower lobe  Abdominal:      Palpations: Abdomen is soft. Tenderness: There is no abdominal tenderness. There is no right CVA tenderness, left CVA tenderness, guarding or rebound. Musculoskeletal:         General: No swelling or tenderness. Normal range of motion. Cervical back: Normal range of motion and neck supple. Right lower leg: No edema. Left lower leg: No edema. Skin:     General: Skin is warm and dry. Capillary Refill: Capillary refill takes less than 2 seconds. Neurological:      General: No focal deficit present. Mental Status: She is alert and oriented to person, place, and time. Cranial Nerves: No cranial nerve deficit. Motor: No weakness. MEDICAL DECISION MAKING   Initial Assessment:   79-year-old female with recently diagnosed right middle lobe pneumonia presenting with worsening dyspnea and new onset hypoxia. Differential diagnosis includes but is not limited to:  Progression of pneumonia, COVID-19, pulmonary embolism, pneumothorax, atelectasis    Although some of these diagnoses are unlikely they were considered in my medical decision making. MDM:   The patient's chest x-ray is stable when compared to the one taken 3 days prior therefore do not suspect progression of pneumonia. COVID-19 testing was negative. There is no pneumothorax or atelectasis on chest x-ray. Patient's BNP is not elevated and there are no clinical features consistent with CHF exacerbation. Patient has wheezes throughout the lung fields bilaterally which I suspect is her baseline COPD. Speaking with patient and daughter, they state that patient has home oxygen and that patient is on 2 L nasal cannula as needed however patient does not use this regularly.   D-dimer appropriate for age. The patient's saturations improved with nebulized albuterol. Patient states that she has home oxygen but just needs someone to teach her how to use it. Our respiratory therapist showed the patient how to use her home oxygen. ED RESULTS   Laboratory results:  Labs Reviewed   CBC WITH AUTO DIFFERENTIAL - Abnormal; Notable for the following components:       Result Value    RDW-SD 45.1 (*)     All other components within normal limits   BASIC METABOLIC PANEL W/ REFLEX TO MG FOR LOW K - Abnormal; Notable for the following components:    Potassium reflex Magnesium 3.3 (*)     Glucose 119 (*)     All other components within normal limits   D-DIMER, QUANTITATIVE - Abnormal; Notable for the following components:    D-Dimer, Quant 570.00 (*)     All other components within normal limits   GLOMERULAR FILTRATION RATE, ESTIMATED - Abnormal; Notable for the following components:    Est, Glom Filt Rate 62 (*)     All other components within normal limits   COVID-19, RAPID   HEPATIC FUNCTION PANEL   TROPONIN   BRAIN NATRIURETIC PEPTIDE   ANION GAP   MAGNESIUM   OSMOLALITY       Radiologic studies results:  XR CHEST PORTABLE   Final Result   1. No interval change since previous study dated 16th of December 2021. Juan Villalobos **This report has been created using voice recognition software. It may contain minor errors which are inherent in voice recognition technology. **      Final report electronically signed by DR Paz Liz on 12/19/2021 2:50 PM          ED Medications administered this visit:   Medications   potassium bicarb-citric acid (EFFER-K) effervescent tablet 40 mEq (has no administration in time range)   albuterol (PROVENTIL) nebulizer solution 2.5 mg (2.5 mg Nebulization Given 12/19/21 1556)         ED COURSE     ED Course as of 12/19/21 1702   Sun Dec 19, 2021   1432 Patient and her daughter state that both are tachycardic at baseline and that a heart rate of 110 is normal for her [TM] 1456 XR CHEST PORTABLE  No interval changes [TM]   1550 D-Dimer, Quant(!): 570.00  D-dimer appropriate for age [TM]      ED Course User Index  [TM] Kenton Araujo MD             Strict return precautions and follow up instructions were discussed with the patient prior to discharge, with which the patient agrees. MEDICATION CHANGES     New Prescriptions    No medications on file         FINAL DISPOSITION     Final diagnoses:   Hypoxia     Condition: condition: stable  Dispo: Discharge to home      This transcription was electronically signed. Parts of this transcriptions may have been dictated by use of voice recognition software and electronically transcribed, and parts may have been transcribed with the assistance of an ED scribe. The transcription may contain errors not detected in proofreading. Please refer to my supervising physician's documentation if my documentation differs.     Electronically Signed: Anabel Mccann MD, 12/19/21, 5:02 PM         Kenton Araujo MD  Resident  12/19/21 6223       Kenton Araujo MD  Resident  12/19/21 0477

## 2021-12-19 NOTE — ED NOTES
Pt. Resting in bed with even and unlabored respirations. Pt. Denies any pain. Pt. Updated about plan for breathing tx, potassium and blood work. Family at bedside. Pt. Has no further concerns, questions or needs at this time. Call light within reach.         Orlando Pack RN  12/19/21 2002

## 2021-12-19 NOTE — ED NOTES
Pt. Resting in bed with even and unlabored respirations. Pt. Denies any pain. Family concerned about pt going home and staying by herself. Dr. Neelam Hawkins notified. Daughter to  o2 charging cord for pt discharge. Pt. Has no further concerns, questions or needs at this time. Call light within reach.         Shasta Paulino RN  12/19/21 8172

## 2021-12-20 ENCOUNTER — TELEPHONE (OUTPATIENT)
Dept: FAMILY MEDICINE CLINIC | Age: 71
End: 2021-12-20

## 2021-12-20 LAB
EKG ATRIAL RATE: 111 BPM
EKG P AXIS: 83 DEGREES
EKG P-R INTERVAL: 136 MS
EKG Q-T INTERVAL: 344 MS
EKG QRS DURATION: 76 MS
EKG QTC CALCULATION (BAZETT): 467 MS
EKG R AXIS: 43 DEGREES
EKG T AXIS: 88 DEGREES
EKG VENTRICULAR RATE: 111 BPM

## 2021-12-22 ENCOUNTER — OFFICE VISIT (OUTPATIENT)
Dept: FAMILY MEDICINE CLINIC | Age: 71
End: 2021-12-22
Payer: MEDICARE

## 2021-12-22 VITALS
OXYGEN SATURATION: 94 % | BODY MASS INDEX: 24.09 KG/M2 | RESPIRATION RATE: 16 BRPM | HEART RATE: 80 BPM | WEIGHT: 136 LBS | DIASTOLIC BLOOD PRESSURE: 74 MMHG | SYSTOLIC BLOOD PRESSURE: 120 MMHG

## 2021-12-22 DIAGNOSIS — J18.9 PNEUMONIA OF RIGHT MIDDLE LOBE DUE TO INFECTIOUS ORGANISM: Primary | ICD-10-CM

## 2021-12-22 DIAGNOSIS — J96.11 CHRONIC RESPIRATORY FAILURE WITH HYPOXIA (HCC): ICD-10-CM

## 2021-12-22 DIAGNOSIS — F32.5 MAJOR DEPRESSIVE DISORDER, SINGLE EPISODE, IN FULL REMISSION (HCC): ICD-10-CM

## 2021-12-22 DIAGNOSIS — R41.89 COGNITIVE IMPAIRMENT: ICD-10-CM

## 2021-12-22 DIAGNOSIS — F17.210 SMOKING GREATER THAN 40 PACK YEARS: ICD-10-CM

## 2021-12-22 DIAGNOSIS — J43.9 PULMONARY EMPHYSEMA, UNSPECIFIED EMPHYSEMA TYPE (HCC): ICD-10-CM

## 2021-12-22 PROCEDURE — 3023F SPIROM DOC REV: CPT | Performed by: NURSE PRACTITIONER

## 2021-12-22 PROCEDURE — 1123F ACP DISCUSS/DSCN MKR DOCD: CPT | Performed by: NURSE PRACTITIONER

## 2021-12-22 PROCEDURE — G8926 SPIRO NO PERF OR DOC: HCPCS | Performed by: NURSE PRACTITIONER

## 2021-12-22 PROCEDURE — 1036F TOBACCO NON-USER: CPT | Performed by: NURSE PRACTITIONER

## 2021-12-22 PROCEDURE — 99214 OFFICE O/P EST MOD 30 MIN: CPT | Performed by: NURSE PRACTITIONER

## 2021-12-22 PROCEDURE — 3017F COLORECTAL CA SCREEN DOC REV: CPT | Performed by: NURSE PRACTITIONER

## 2021-12-22 PROCEDURE — G8427 DOCREV CUR MEDS BY ELIG CLIN: HCPCS | Performed by: NURSE PRACTITIONER

## 2021-12-22 PROCEDURE — G8484 FLU IMMUNIZE NO ADMIN: HCPCS | Performed by: NURSE PRACTITIONER

## 2021-12-22 PROCEDURE — G8420 CALC BMI NORM PARAMETERS: HCPCS | Performed by: NURSE PRACTITIONER

## 2021-12-22 PROCEDURE — 1090F PRES/ABSN URINE INCON ASSESS: CPT | Performed by: NURSE PRACTITIONER

## 2021-12-22 PROCEDURE — G8399 PT W/DXA RESULTS DOCUMENT: HCPCS | Performed by: NURSE PRACTITIONER

## 2021-12-22 PROCEDURE — 4040F PNEUMOC VAC/ADMIN/RCVD: CPT | Performed by: NURSE PRACTITIONER

## 2021-12-22 ASSESSMENT — ENCOUNTER SYMPTOMS
CHEST TIGHTNESS: 0
SHORTNESS OF BREATH: 0
COUGH: 1

## 2021-12-22 NOTE — PROGRESS NOTES
Subjective:      Patient ID: Geraldo Masterson is a 70 y.o. female. HPI: ED Follow up    Chief Complaint   Patient presents with   [facility] ED Follow-up       Seen in ED 12/16/21 for a cough and congestion for the past 1.5 weeks but over the past 2 to 3 days it has become productive with a greenish type mucus. Dx with PNA Right Middle Lobe. Placed on Levaquin 750 mg Daily x 7 days and Prednisone 40 mg x 7 days. Went back 12/19/21 due to low pulse ox. Work up stable. Use of O2 PRN at home    Breathing improved. Some congestion still lingers. No fever. Vitals:    12/22/21 1104   BP: 120/74   Pulse: 80   Resp: 16   SpO2: 94%       Denies CP, SOB or chest tightness. ECHO 2019 EF 60% with LV function normal.  PFT 2019 showed moderate emphysema. 40 pack year hx. Anoro and NEB tx. Has O2 at home she uses with activity and PRN at night. Following with PULM at [facility]. Jen Brown January 26th, 2022    Patient Active Problem List   Diagnosis    Hyperlipemia    Hypothyroidism    Depression    Chronic anxiety    Osteopenia    Medication monitoring encounter    H/O: CVA (cerebrovascular accident), aneurysm bleed, Jan 2015    Abnormality of gait following cerebrovascular accident    GERD (gastroesophageal reflux disease)    Cognitive impairment due to CVA.     OAB (overactive bladder)    Chronic nausea    SOB (shortness of breath)    Hypoxia    Major depressive disorder, single episode, in full remission ([address]    Smoking greater than 40 pack years    Pulmonary emphysema ([address]    Syncope and collapse    Concussion with loss of consciousness    Laceration of head    Alcohol abuse    Spinal stenosis, cervical region    Normal pressure hydrocephalus (HCC)    Michael's edema of vocal folds       MAMMO - 2019  COLONOSCOPY - 2009  DEXA - 2020 Osteoporosis  CT Lung Screen - 2020    NEURO - Dr. Gordy Naylor - Dr. Caro Hyman - Dr. Suzette Brown    She is on Cymbalta 60 mg, Straterra 40 mg and Aricept 10 mg. .  Mood stable. Take off Seroquel due to next morning drowsinesss. Placed on hydroxyzine with benefit sleep and more energy during the day. Likes benefit of medication changes     She is following with counselor via [facility] [facility] Final meetings for alcohol abuse. She was drinking a glass of wine in evening. On camparell 2 tabs in evening to decrease alcohol cravings. It is doing well. No wine since last visit. Feeling well. Patient is status post CVA but doing well subsequently. Follows with [facility]. Her speech is essentially back to normal also. She is on Strattera and Aricept due to cognitive changes s/p CVA. Seen Dr. Stephanie Victor regarding potential hydrocephalus. MRA June 2020 and CT Head June 2020 were unremarkable    Vitals:    12/22/21 1104   BP: 120/74   Pulse: 80   Resp: 16   SpO2: 94%       Her hypothyroidism continues to respond well to her current thyroid dose. She is on 50 µg daily. Lab Results   Component Value Date    TSH 2.260 11/08/2021     BP wnl    BP Readings from Last 3 Encounters:   12/22/21 120/74   12/19/21 114/72   12/16/21 124/76     Labs reviewed.       Lab Results   Component Value Date    LABA1C 5.5 06/28/2021    LABA1C 5.1 06/16/2020    LABA1C 5.3 04/13/2019     No results found for: EAG    No components found for: CHLPL  Lab Results   Component Value Date    TRIG 80 06/28/2021    TRIG 110 06/16/2020    TRIG 81 04/13/2019     Lab Results   Component Value Date     06/28/2021     (A) 06/16/2020     (A) 04/13/2019     Lab Results   Component Value Date    LDLCALC 109 06/28/2021    LDLCALC 85 06/16/2020    LDLCALC 98 04/13/2019     No results found for: LABVLDL      Chemistry        Component Value Date/Time     12/19/2021 1425    K 3.3 (L) 12/19/2021 1425     12/19/2021 1425    CO2 24 12/19/2021 1425    BUN 19 12/19/2021 1425    CREATININE 0.9 12/19/2021 1425        Component Value Date/Time CALCIUM 9.0 12/19/2021 1425    ALKPHOS 46 12/19/2021 1425    AST 24 12/19/2021 1425    ALT 15 12/19/2021 1425    BILITOT 0.3 12/19/2021 1425          Lab Results   Component Value Date    TSH 2.260 11/08/2021       Lab Results   Component Value Date    WBC 6.9 12/19/2021    HGB 14.2 12/19/2021    HCT 42.6 12/19/2021    MCV 90.3 12/19/2021     12/19/2021         Health Maintenance   Topic Date Due    Annual Wellness Visit (AWV)  10/01/2021    Low dose CT lung screening  01/28/2022    Breast cancer screen  06/18/2022    Lipid screen  06/28/2022    TSH testing  11/08/2022    DTaP/Tdap/Td vaccine (2 - Td or Tdap) 04/08/2024    Colon cancer screen colonoscopy  08/29/2029    DEXA (modify frequency per FRAX score)  Completed    Flu vaccine  Completed    Shingles Vaccine  Completed    Pneumococcal 65+ years Vaccine  Completed    COVID-19 Vaccine  Completed    Hepatitis C screen  Completed    Hepatitis A vaccine  Aged Out    Hepatitis B vaccine  Aged Out    Hib vaccine  Aged Out    Meningococcal (ACWY) vaccine  Aged Out       Immunization History   Administered Date(s) Administered    COVID-19, Moderna, Primary or Immunocompromised, PF, 100mcg/0.5mL 02/08/2021, 03/08/2021, 11/07/2021    Influenza A ([id number]) Vaccine PF IM 12/19/2009    Influenza Vaccine, unspecified formulation 12/06/2017    Influenza Virus Vaccine 12/06/2017    Influenza, High Dose (Fluzone 65 yrs and older) 12/06/2017, 11/07/2018, 10/13/2020    Influenza, High-dose, Quadv, 65 yrs +, IM (Fluzone) 10/13/2020    Influenza, Quadv, adjuvanted, 65 yrs +, IM, PF (Fluad) 10/06/2021    Influenza, Triv, inactivated, subunit, adjuvanted, IM (Fluad 65 yrs and older) 10/30/2019    Pneumococcal Conjugate 13-valent ([id number]) 07/21/2015, 07/26/2017    Pneumococcal Polysaccharide ([id number]) 11/13/2018    Pneumococcal Vaccine 11/14/2016    Tdap (Boostrix, Adacel) 04/08/2014    Zoster Recombinant (Shingrix) 10/13/2020, 12/12/2020 Review of Systems   HENT: Negative. Respiratory: Positive for cough. Negative for chest tightness and shortness of breath. Genitourinary: Negative for frequency. Neurological: Negative for dizziness and light-headedness. Psychiatric/Behavioral: Positive for confusion. Negative for dysphoric mood. The patient is nervous/anxious and is hyperactive. Objective:   Physical Exam  Constitutional:       General: She is not in acute distress. HENT:      Head: Normocephalic. Right Ear: Tympanic membrane normal.      Left Ear: Tympanic membrane normal.      Nose: Nose normal.   Eyes:      Pupils: Pupils are equal, round, and reactive to light. Neck:      Vascular: No carotid bruit. Cardiovascular:      Rate and Rhythm: Normal rate and regular rhythm. Pulses: Normal pulses. Heart sounds: Normal heart sounds. No murmur heard. Pulmonary:      Effort: Pulmonary effort is normal.      Breath sounds: Normal breath sounds. Decreased air movement present. No wheezing. Abdominal:      General: Bowel sounds are normal. There is no distension. Palpations: Abdomen is soft. Tenderness: There is no abdominal tenderness. Musculoskeletal:         General: No tenderness. Normal range of motion. Cervical back: Normal range of motion and neck supple. Skin:     General: Skin is warm and dry. Findings: No rash. Neurological:      Mental Status: She is alert and oriented to person, place, and time. Psychiatric:         Attention and Perception: She is inattentive. Mood and Affect: Mood is anxious and depressed. Behavior: Behavior is cooperative. Thought Content: Thought content normal.         Cognition and Memory: She exhibits impaired recent memory. Judgment: Judgment normal.         Assessment:       Diagnosis Orders   1. Pneumonia of right middle lobe due to infectious organism     2.  Chronic respiratory failure with hypoxia (HCC) Misc. Devices (PULSE OXIMETER FOR FINGER) MISC   3. Pulmonary emphysema, unspecified emphysema type ([facility]     4. Smoking greater than 40 pack years     5. Major depressive disorder, single episode, in full remission ([facility] [address] [facility]     6. Cognitive impairment due to CVA.              Plan:       ED report reviewed x 2  Completed ATB and Prednisone  Clinically resolved - back to baseline  PRN monitoring pulse ox  PRN use of O2  Follow up with PULM  RTO in 3 months          Varghese Ashton, SARITHA - CNP

## 2021-12-27 ENCOUNTER — TELEPHONE (OUTPATIENT)
Dept: FAMILY MEDICINE CLINIC | Age: 71
End: 2021-12-27

## 2021-12-27 DIAGNOSIS — J96.11 CHRONIC RESPIRATORY FAILURE WITH HYPOXIA (HCC): ICD-10-CM

## 2021-12-27 DIAGNOSIS — J18.9 PNEUMONIA OF RIGHT MIDDLE LOBE DUE TO INFECTIOUS ORGANISM: Primary | ICD-10-CM

## 2021-12-27 DIAGNOSIS — J43.9 PULMONARY EMPHYSEMA, UNSPECIFIED EMPHYSEMA TYPE (HCC): ICD-10-CM

## 2021-12-27 RX ORDER — METHYLPREDNISOLONE 4 MG/1
TABLET ORAL
Qty: 1 KIT | Refills: 0 | Status: SHIPPED | OUTPATIENT
Start: 2021-12-27 | End: 2022-01-02

## 2022-01-11 ENCOUNTER — NURSE ONLY (OUTPATIENT)
Dept: LAB | Age: 72
End: 2022-01-11

## 2022-01-13 ENCOUNTER — APPOINTMENT (OUTPATIENT)
Dept: GENERAL RADIOLOGY | Age: 72
End: 2022-01-13
Payer: MEDICARE

## 2022-01-13 ENCOUNTER — HOSPITAL ENCOUNTER (EMERGENCY)
Age: 72
Discharge: HOME | End: 2022-01-13
Payer: MEDICARE

## 2022-01-13 VITALS
OXYGEN SATURATION: 90 % | HEART RATE: 101 BPM | RESPIRATION RATE: 18 BRPM | SYSTOLIC BLOOD PRESSURE: 165 MMHG | TEMPERATURE: 98.5 F | DIASTOLIC BLOOD PRESSURE: 76 MMHG

## 2022-01-13 DIAGNOSIS — R05.8 COUGH PRESENT FOR GREATER THAN 3 WEEKS: Primary | ICD-10-CM

## 2022-01-13 PROCEDURE — 99213 OFFICE O/P EST LOW 20 MIN: CPT | Performed by: NURSE PRACTITIONER

## 2022-01-13 PROCEDURE — 99213 OFFICE O/P EST LOW 20 MIN: CPT

## 2022-01-13 PROCEDURE — 71046 X-RAY EXAM CHEST 2 VIEWS: CPT

## 2022-01-13 ASSESSMENT — ENCOUNTER SYMPTOMS
CHEST TIGHTNESS: 0
EYE REDNESS: 0
VOMITING: 0
SORE THROAT: 0
NAUSEA: 0
TROUBLE SWALLOWING: 0
DIARRHEA: 0
SHORTNESS OF BREATH: 1
WHEEZING: 0
EYE DISCHARGE: 0
SINUS PRESSURE: 0
SINUS PAIN: 0
RHINORRHEA: 0
COUGH: 1

## 2022-01-13 ASSESSMENT — PAIN SCALES - GENERAL: PAINLEVEL_OUTOF10: 2

## 2022-01-13 NOTE — ED PROVIDER NOTES
[id number]       Chief Complaint   Patient presents with    Cough     hx copd    Nasal Congestion    Fatigue       Nurses Notes reviewed and I agree except as noted in the HPI. HISTORY OF PRESENT ILLNESS   Rafaela Soler is a 70 y.o. female who presents for evaluation of cough. Onset of cough greater than 3 weeks ago, unchanged. Cough waxing/waning. History of COPD/emphysema. Patient was diagnosed with pneumonia towards the end of year. He completed Levaquin. She is still struggling with chest congestion/mucus. No recent fever. No exposure to COVID/influenza. Currently on home oxygen as needed. Requesting chest x-ray. REVIEW OF SYSTEMS     Review of Systems   Constitutional: Positive for fatigue. Negative for chills, diaphoresis and fever. HENT: Positive for congestion. Negative for ear pain, rhinorrhea, sinus pressure, sinus pain, sore throat and trouble swallowing. Eyes: Negative for discharge and redness. Respiratory: Positive for cough and shortness of breath (on exertion). Negative for chest tightness and wheezing. Cardiovascular: Negative for chest pain. Gastrointestinal: Negative for diarrhea, nausea and vomiting. Genitourinary: Negative for decreased urine volume. Musculoskeletal: Negative for neck pain and neck stiffness. Skin: Negative for rash. Neurological: Negative for headaches. Hematological: Negative for adenopathy. Psychiatric/Behavioral: Negative for sleep disturbance. PAST MEDICAL HISTORY         Diagnosis Date    Aneurysm, cerebral     COPD (chronic obstructive pulmonary disease) (HCC)     Hyperlipidemia     Osteoarthritis     Pneumonia     Thyroid disease     Unspecified cerebral artery occlusion with cerebral infarction        SURGICAL HISTORY     Patient  has a past surgical history that includes Colonoscopy ([id number]); External ear surgery (2000);  External ear surgery (1999); External ear surgery (1997); Cosmetic surgery (1341,3142); Cosmetic surgery (2013); Brain aneurysm surgery; and Breast surgery. CURRENT MEDICATIONS       Previous Medications    ACAMPROSATE (CAMPRAL) 333 MG TABLET    TAKE 2 TABLETS BY MOUTH NIGHTLY    ALBUTEROL (PROVENTIL) (2.5 MG/3ML) 0.083% NEBULIZER SOLUTION    Take 3 mLs by nebulization every 6 hours as needed for Wheezing    ALBUTEROL SULFATE HFA (VENTOLIN HFA) 108 (90 BASE) MCG/ACT INHALER    Inhale 2 puffs into the lungs 4 times daily as needed for Wheezing    ALENDRONATE (FOSAMAX) 70 MG TABLET    Take 1 tablet by mouth every 7 days Take with water on an empty stomach- wait 30 minutes before eating or taking other meds. Avoid lying down for 30 minutes after dose. ATOMOXETINE (STRATTERA) 40 MG CAPSULE    Take 1 capsule by mouth daily    CALCIUM CARBONATE-VITAMIN D (CALCIUM-VITAMIN D) 600-200 MG-UNIT CAPS    Take 1 tablet by mouth 2 times daily     CETIRIZINE (ZYRTEC) 10 MG TABLET    Take 10 mg by mouth daily    DONEPEZIL (ARICEPT) 10 MG TABLET    Take 1 tablet by mouth in the evening    DULOXETINE (CYMBALTA) 60 MG EXTENDED RELEASE CAPSULE    Take 1 capsule by mouth once daily    FISH OIL-OMEGA-3 FATTY ACIDS 1000 MG CAPSULE    Take 1 g by mouth. One tablet daily along with red yeast rice    HYDROXYZINE (ATARAX) 25 MG TABLET    TAKE 1/2 TO 1 (ONE-HALF TO ONE) TABLET BY MOUTH EVERY 8 HOURS AS NEEDED FOR ANXIETY    LEVOTHYROXINE (EUTHYROX) 50 MCG TABLET    Take 1 tablet by mouth once daily    METHYLSULFONYLMETHANE (MSM) 1000 MG CAPS    Take 2 capsules by mouth 2 times daily    MISC. DEVICES (PULSE OXIMETER FOR FINGER) MISC    Daily PRN    MULTIPLE VITAMIN (MULTI-VITAMIN PO)    Take  by mouth.     OXYBUTYNIN (DITROPAN XL) 15 MG EXTENDED RELEASE TABLET    Take 1 tablet by mouth nightly    SIMVASTATIN (ZOCOR) 10 MG TABLET    Take 1 tablet by mouth nightly    UMECLIDINIUM-VILANTEROL (ANORO ELLIPTA) 62.5-25 MCG/INH AEPB INHALER    Inhale 1 puff into the lungs daily    VALACYCLOVIR (VALTREX) 500 MG TABLET    Take 1 tablet by mouth daily       ALLERGIES     Patient is is allergic to macrobid [nitrofurantoin] and sulfa antibiotics. FAMILY HISTORY     Patient'sfamily history includes Cancer in her mother; Diabetes in her father; Heart Disease in her maternal aunt; Parkinsonism in her maternal uncle; Stroke in her mother. SOCIAL HISTORY     Patient  reports that she quit smoking about 7 years ago. Her smoking use included cigarettes. She has a 40.00 pack-year smoking history. She has never used smokeless tobacco. She reports current alcohol use of about 2.0 standard drinks of alcohol per week. She reports that she does not use drugs. PHYSICAL EXAM     ED TRIAGE VITALS  BP: (!) 165/76, Temp: 98.5 °F (36.9 °C), Pulse: 101, Resp: 18, SpO2: 90 % (dropped to 86 with ambulation)  Physical Exam  Vitals and nursing note reviewed. Constitutional:       General: She is not in acute distress. Appearance: Normal appearance. She is well-developed. She is not ill-appearing, toxic-appearing or diaphoretic. HENT:      Head: Normocephalic and atraumatic. Right Ear: Hearing, tympanic membrane, ear canal and external ear normal. No mastoid tenderness. No hemotympanum. Tympanic membrane is not perforated, erythematous or bulging. Left Ear: Hearing, tympanic membrane, ear canal and external ear normal. No mastoid tenderness. No hemotympanum. Tympanic membrane is not perforated, erythematous or bulging. Nose: Nose normal.      Mouth/Throat:      Mouth: Mucous membranes are moist.      Pharynx: Oropharynx is clear. Uvula midline. Tonsils: No tonsillar abscesses. Eyes:      General: No scleral icterus. Conjunctiva/sclera: Conjunctivae normal.      Right eye: Right conjunctiva is not injected. No hemorrhage. Left eye: Left conjunctiva is not injected. No hemorrhage. Neck:      Thyroid: No thyromegaly.       Trachea: Trachea normal.   Cardiovascular: Rate and Rhythm: Normal rate and regular rhythm. No extrasystoles are present. Chest Wall: PMI is not displaced. Heart sounds: Normal heart sounds. No murmur heard. No friction rub. No gallop. Pulmonary:      Effort: Pulmonary effort is normal. No respiratory distress. Breath sounds: Normal breath sounds. Chest:   Breasts:      Right: No supraclavicular adenopathy. Left: No supraclavicular adenopathy. Musculoskeletal:      Cervical back: Normal range of motion and neck supple. Lymphadenopathy:      Head:      Right side of head: No submental, submandibular, tonsillar or occipital adenopathy. Left side of head: No submental, submandibular, tonsillar or occipital adenopathy. Cervical: No cervical adenopathy. Upper Body:      Right upper body: No supraclavicular adenopathy. Left upper body: No supraclavicular adenopathy. Skin:     General: Skin is warm and dry. Capillary Refill: Capillary refill takes less than 2 seconds. Coloration: Skin is not pale. Findings: No rash. Comments: Skin warm and dry to touch, no rashes noted on exposed surfaces. Neurological:      Mental Status: She is alert and oriented to person, place, and time. She is not disoriented. Psychiatric:         Mood and Affect: Mood normal.         Behavior: Behavior is cooperative. DIAGNOSTIC RESULTS   Labs: No results found for this visit on 01/13/22. IMAGING:  XR CHEST (2 VW)   Final Result   No interval change since previous study dated 19 December 2021. Phong Bledsoe **This report has been created using voice recognition software. It may contain minor errors which are inherent in voice recognition technology. **      Final report electronically signed by DR Elizabet Garcia on 1/13/2022 3:35 PM        URGENT CARE COURSE:     Vitals:    01/13/22 1520   BP: (!) 165/76   Pulse: 101   Resp: 18   Temp: 98.5 °F (36.9 °C)   SpO2: 90%       Medications - No data to display  PROCEDURES:  None  FINALIMPRESSION      1. Cough present for greater than 3 weeks        DISPOSITION/PLAN   DISPOSITION Decision To Discharge 01/13/2022 03:50:55 PM  Chest x-ray with no acute abnormality. Continue current treatment. Discussed Mucinex/Robitussin over-the-counter. If any distress go to ER. PATIENT REFERRED TO:  Sage Gale APRN - CNP  [address] [facility], [address] [address]  [address]      Follow-up as needed. Continue current medications. Add Mucinex/Robitussin. Deep breathing exercises, take 10 deep breaths every hour. Consider pulmonary rehab. If symptoms worsen go to ER.     DISCHARGE MEDICATIONS:  New Prescriptions    No medications on file     Current Discharge Medication List          [id number] Nereida Thornton, APRN - CNP  01/13/22 0814

## 2022-01-13 NOTE — ED NOTES
advised to call back for any additional questions or concerns     Pt discharged in stable condition, ambulated to vehicle home by self.          Serjio Medellin LPN  89/24/54 [id number]

## 2022-01-14 ENCOUNTER — TELEPHONE (OUTPATIENT)
Dept: FAMILY MEDICINE CLINIC | Age: 72
End: 2022-01-14

## 2022-01-15 LAB
HERPES SIMPLEX CULTURE: NORMAL
HSV TYPE 1: NORMAL

## 2022-01-19 ENCOUNTER — OFFICE VISIT (OUTPATIENT)
Dept: FAMILY MEDICINE CLINIC | Age: 72
End: 2022-01-19
Payer: MEDICARE

## 2022-01-19 VITALS
WEIGHT: 137.5 LBS | BODY MASS INDEX: 24.36 KG/M2 | RESPIRATION RATE: 16 BRPM | DIASTOLIC BLOOD PRESSURE: 78 MMHG | HEART RATE: 104 BPM | SYSTOLIC BLOOD PRESSURE: 134 MMHG | OXYGEN SATURATION: 93 %

## 2022-01-19 DIAGNOSIS — J96.11 CHRONIC RESPIRATORY FAILURE WITH HYPOXIA (HCC): ICD-10-CM

## 2022-01-19 DIAGNOSIS — J43.9 PULMONARY EMPHYSEMA, UNSPECIFIED EMPHYSEMA TYPE (HCC): Primary | ICD-10-CM

## 2022-01-19 DIAGNOSIS — F17.210 SMOKING GREATER THAN 40 PACK YEARS: ICD-10-CM

## 2022-01-19 DIAGNOSIS — F32.5 MAJOR DEPRESSIVE DISORDER, SINGLE EPISODE, IN FULL REMISSION (HCC): ICD-10-CM

## 2022-01-19 DIAGNOSIS — G91.2 NORMAL PRESSURE HYDROCEPHALUS (HCC): ICD-10-CM

## 2022-01-19 PROCEDURE — 99214 OFFICE O/P EST MOD 30 MIN: CPT | Performed by: NURSE PRACTITIONER

## 2022-01-19 PROCEDURE — 1123F ACP DISCUSS/DSCN MKR DOCD: CPT | Performed by: NURSE PRACTITIONER

## 2022-01-19 PROCEDURE — 1090F PRES/ABSN URINE INCON ASSESS: CPT | Performed by: NURSE PRACTITIONER

## 2022-01-19 PROCEDURE — 1036F TOBACCO NON-USER: CPT | Performed by: NURSE PRACTITIONER

## 2022-01-19 PROCEDURE — G8420 CALC BMI NORM PARAMETERS: HCPCS | Performed by: NURSE PRACTITIONER

## 2022-01-19 PROCEDURE — 3023F SPIROM DOC REV: CPT | Performed by: NURSE PRACTITIONER

## 2022-01-19 PROCEDURE — G8484 FLU IMMUNIZE NO ADMIN: HCPCS | Performed by: NURSE PRACTITIONER

## 2022-01-19 PROCEDURE — G8399 PT W/DXA RESULTS DOCUMENT: HCPCS | Performed by: NURSE PRACTITIONER

## 2022-01-19 PROCEDURE — 3017F COLORECTAL CA SCREEN DOC REV: CPT | Performed by: NURSE PRACTITIONER

## 2022-01-19 PROCEDURE — 4040F PNEUMOC VAC/ADMIN/RCVD: CPT | Performed by: NURSE PRACTITIONER

## 2022-01-19 PROCEDURE — G8427 DOCREV CUR MEDS BY ELIG CLIN: HCPCS | Performed by: NURSE PRACTITIONER

## 2022-01-19 ASSESSMENT — ENCOUNTER SYMPTOMS
CHEST TIGHTNESS: 0
COUGH: 1
SHORTNESS OF BREATH: 0

## 2022-01-19 ASSESSMENT — PATIENT HEALTH QUESTIONNAIRE - PHQ9
SUM OF ALL RESPONSES TO PHQ QUESTIONS 1-9: 4
10. IF YOU CHECKED OFF ANY PROBLEMS, HOW DIFFICULT HAVE THESE PROBLEMS MADE IT FOR YOU TO DO YOUR WORK, TAKE CARE OF THINGS AT HOME, OR GET ALONG WITH OTHER PEOPLE: 0
1. LITTLE INTEREST OR PLEASURE IN DOING THINGS: 0
2. FEELING DOWN, DEPRESSED OR HOPELESS: 0
8. MOVING OR SPEAKING SO SLOWLY THAT OTHER PEOPLE COULD HAVE NOTICED. OR THE OPPOSITE, BEING SO FIGETY OR RESTLESS THAT YOU HAVE BEEN MOVING AROUND A LOT MORE THAN USUAL: 0
SUM OF ALL RESPONSES TO PHQ9 QUESTIONS 1 & 2: 0
SUM OF ALL RESPONSES TO PHQ QUESTIONS 1-9: 4
7. TROUBLE CONCENTRATING ON THINGS, SUCH AS READING THE NEWSPAPER OR WATCHING TELEVISION: 1
3. TROUBLE FALLING OR STAYING ASLEEP: 0
SUM OF ALL RESPONSES TO PHQ QUESTIONS 1-9: 4
SUM OF ALL RESPONSES TO PHQ QUESTIONS 1-9: 4
9. THOUGHTS THAT YOU WOULD BE BETTER OFF DEAD, OR OF HURTING YOURSELF: 0
4. FEELING TIRED OR HAVING LITTLE ENERGY: 2
6. FEELING BAD ABOUT YOURSELF - OR THAT YOU ARE A FAILURE OR HAVE LET YOURSELF OR YOUR FAMILY DOWN: 0
5. POOR APPETITE OR OVEREATING: 1

## 2022-01-19 NOTE — PROGRESS NOTES
Subjective:      Patient ID: Juan Miguel Dueñas is a 70 y.o. female. HPI: ED Follow up    Chief Complaint   Patient presents with    Follow-up     [facility] 1/13/22     [facility] 1/3/22    HISTORY OF PRESENT ILLNESS   Juan Miguel Dueñas is a 70 y.o. female who presents for evaluation of cough. Onset of cough greater than 3 weeks ago, unchanged. Cough waxing/waning. History of COPD/emphysema. Patient was diagnosed with pneumonia towards the end of year. He completed Levaquin. She is still struggling with chest congestion/mucus. No recent fever. No exposure to COVID/influenza. Currently on home oxygen as needed. Requesting chest x-ray. CXR 1/13/22:  FINDINGS:               The heart size is normal.  There is atherosclerotic calcification in the aortic arch. Josefina Pavy is slightly increased density throughout both lung fields, approximately unchanged. There are no effusions. [place] Pavy is prominence of the right and left    pulmonary arteries. This may represent pulmonary hypertension. . There is thoracic spondylosis. .                     Impression   No interval change since previous study dated 19 December 2021. [employer] Pyo Seen in ED 12/16/21, 12/19/21 and 1/13/22 related to cough and PNA. Breathing improved. Some congestion still lingers. No fever. Occasional SOB and wheezing. PRN use of O2    Vitals:    01/19/22 1405   BP: 134/78   Pulse: 104   Resp: 16   SpO2: 93%       Denies CP, SOB or chest tightness. ECHO 2019 EF 60% with LV function normal.  PFT 2019 showed moderate emphysema. 40 pack year hx. Anoro and NEB tx. Has O2 at home she uses with activity and PRN at night. Following with PULM at [facility].       Dami Odell January 26th, 2022    Patient Active Problem List   Diagnosis    Hyperlipemia    Hypothyroidism    Depression    Chronic anxiety    Osteopenia    Medication monitoring encounter    H/O: CVA (cerebrovascular accident), aneurysm bleed, Jan 2015    Abnormality of gait following cerebrovascular accident    GERD (gastroesophageal reflux disease)    Cognitive impairment due to CVA.  OAB (overactive bladder)    Chronic nausea    SOB (shortness of breath)    Hypoxia    Major depressive disorder, single episode, in full remission ([address]    Smoking greater than 40 pack years    Pulmonary emphysema ([address]    Syncope and collapse    Concussion with loss of consciousness    Laceration of head    Alcohol abuse    Spinal stenosis, cervical region    Normal pressure hydrocephalus (HCC)    Michael's edema of vocal folds       MAMMO - 2019  COLONOSCOPY - 2009  DEXA - 2020 Osteoporosis  CT Lung Screen - 2020    NEURO - Dr. Inocencia Conde - Dr. Tita Leo - Dr. Norma Roe    She is on Cymbalta 60 mg, Straterra 40 mg and Aricept 10 mg. .  Mood stable. Take off Seroquel due to next morning drowsinesss. Placed on hydroxyzine with benefit sleep and more energy during the day. Likes benefit of medication changes     She is following with counselor via [facility]. [address] [facility] Final meetings for alcohol abuse. She was drinking a glass of wine in evening. On camparell 2 tabs in evening to decrease alcohol cravings. It is doing well. No wine since last visit. Feeling well. Patient is status post CVA but doing well subsequently. Follows with [facility]UR[facility]. Her speech is essentially back to normal also. She is on Strattera and Aricept due to cognitive changes s/p CVA. Seen Dr. Margie Aguilera regarding potential hydrocephalus. MRA June 2020 and CT Head June 2020 were unremarkable    Vitals:    01/19/22 1405   BP: 134/78   Pulse: 104   Resp: 16   SpO2: 93%       Her hypothyroidism continues to respond well to her current thyroid dose. She is on 50 µg daily. Lab Results   Component Value Date    TSH 2.260 11/08/2021     BP wnl    BP Readings from Last 3 Encounters:   01/19/22 134/78   01/13/22 (!) 165/76   12/22/21 120/74     Labs reviewed.       Lab Results   Component Value Date    LABA1C 5.5 06/28/2021    LABA1C 5.1 06/16/2020    LABA1C 5.3 04/13/2019     No results found for: EAG    No components found for: CHLPL  Lab Results   Component Value Date    TRIG 80 06/28/2021    TRIG 110 06/16/2020    TRIG 81 04/13/2019     Lab Results   Component Value Date     06/28/2021     (A) 06/16/2020     (A) 04/13/2019     Lab Results   Component Value Date    LDLCALC 109 06/28/2021    LDLCALC 85 06/16/2020    LDLCALC 98 04/13/2019     No results found for: LABVLDL      Chemistry        Component Value Date/Time     12/19/2021 1425    K 3.3 (L) 12/19/2021 1425     12/19/2021 1425    CO2 24 12/19/2021 1425    BUN 19 12/19/2021 1425    CREATININE 0.9 12/19/2021 1425        Component Value Date/Time    CALCIUM 9.0 12/19/2021 1425    ALKPHOS 46 12/19/2021 1425    AST 24 12/19/2021 1425    ALT 15 12/19/2021 1425    BILITOT 0.3 12/19/2021 1425          Lab Results   Component Value Date    TSH 2.260 11/08/2021       Lab Results   Component Value Date    WBC 6.9 12/19/2021    HGB 14.2 12/19/2021    HCT 42.6 12/19/2021    MCV 90.3 12/19/2021     12/19/2021         Health Maintenance   Topic Date Due    Depression Monitoring  Never done    Low dose CT lung screening  01/28/2022    Breast cancer screen  06/18/2022    Lipid screen  06/28/2022    TSH testing  11/08/2022    DTaP/Tdap/Td vaccine (2 - Td or Tdap) 04/08/2024    Colon cancer screen colonoscopy  08/29/2029    DEXA (modify frequency per FRAX score)  Completed    Flu vaccine  Completed    Shingles Vaccine  Completed    Pneumococcal 65+ years Vaccine  Completed    COVID-19 Vaccine  Completed    Hepatitis C screen  Completed    Hepatitis A vaccine  Aged Out    Hepatitis B vaccine  Aged Out    Hib vaccine  Aged Out    Meningococcal (ACWY) vaccine  Aged [employer] History   Administered Date(s) Administered    COVID-19, Erika Coup, Primary or Immunocompromised, PF, 100mcg/0.5mL 02/08/2021, 03/08/2021, 11/07/2021    Influenza A ([id number]) Vaccine PF IM 12/19/2009    Influenza Vaccine, unspecified formulation 12/06/2017    Influenza Virus Vaccine 12/06/2017    Influenza, High Dose (Fluzone 65 yrs and older) 12/06/2017, 11/07/2018, 10/13/2020    Influenza, High-dose, Tahira Dayhoff, 65 yrs +, IM (Fluzone) 10/13/2020    Influenza, Quadv, adjuvanted, 65 yrs +, IM, PF (Fluad) 10/06/2021    Influenza, Triv, inactivated, subunit, adjuvanted, IM (Fluad 65 yrs and older) 10/30/2019    Pneumococcal Conjugate 13-valent ([id number]) 07/21/2015, 07/26/2017    Pneumococcal Polysaccharide ([id number]) 11/13/2018    Pneumococcal Vaccine 11/14/2016    Tdap (Boostrix, Adacel) 04/08/2014    Zoster Recombinant (Shingrix) 10/13/2020, 12/12/2020       Review of Systems   HENT: Negative. Respiratory: Positive for cough. Negative for chest tightness and shortness of breath. Genitourinary: Negative for frequency. Neurological: Negative for dizziness and light-headedness. Psychiatric/Behavioral: Positive for confusion. Negative for dysphoric mood. The patient is nervous/anxious and is hyperactive. Objective:   Physical Exam  Constitutional:       General: She is not in acute distress. HENT:      Head: Normocephalic. Right Ear: Tympanic membrane normal.      Left Ear: Tympanic membrane normal.      Nose: Nose normal.   Eyes:      Pupils: Pupils are equal, round, and reactive to light. Neck:      Vascular: No carotid bruit. Cardiovascular:      Rate and Rhythm: Normal rate and regular rhythm. Pulses: Normal pulses. Heart sounds: Normal heart sounds. No murmur heard. Pulmonary:      Effort: Pulmonary effort is normal.      Breath sounds: Normal breath sounds. Decreased air movement present. No wheezing. Abdominal:      General: Bowel sounds are normal. There is no distension. Palpations: Abdomen is soft. Tenderness: There is no abdominal tenderness.    Musculoskeletal: General: No tenderness. Normal range of motion. Cervical back: Normal range of motion and neck supple. Skin:     General: Skin is warm and dry. Findings: No rash. Neurological:      Mental Status: She is alert and oriented to person, place, and time. Psychiatric:         Attention and Perception: She is inattentive. Mood and Affect: Mood is anxious and depressed. Behavior: Behavior is cooperative. Thought Content: Thought content normal.         Cognition and Memory: She exhibits impaired recent memory. Judgment: Judgment normal.         Assessment:       Diagnosis Orders   1. Pulmonary emphysema, unspecified emphysema type (HCC)  beclomethasone (QVAR) 80 MCG/ACT inhaler   2. Chronic respiratory failure with hypoxia (HCC)     3. Smoking greater than 40 pack years     4. Normal pressure hydrocephalus (HCC)     5.  Major depressive disorder, single episode, in full remission ([address]             Plan:       [facility] note reviewed  Symptoms more COPD vs PNA  Add on QVAR BID  Continue Anoro  Follow up with PULM in 1 week  Chronic conditions stable  MAINOR Gu, APRN - CNP

## 2022-02-01 ENCOUNTER — TELEPHONE (OUTPATIENT)
Dept: FAMILY MEDICINE CLINIC | Age: 72
End: 2022-02-01

## 2022-02-01 ENCOUNTER — FOLLOWUP TELEPHONE ENCOUNTER (OUTPATIENT)
Dept: CARDIAC REHAB | Age: 72
End: 2022-02-01

## 2022-02-01 NOTE — TELEPHONE ENCOUNTER
PA request received from [address] via fax for Qvar Redihaler Aer 80mcg #1 for 30 days. PA submitted online at [employer] and Missing/Invalid Quantity Dispensed. Please resubmit using appropriate package size and dosing. Reviewed submitted # amount and it is correct. Will await PA request from pharmacy if needed.

## 2022-02-01 NOTE — TELEPHONE ENCOUNTER
PULMONARY REHABILITATION REFERRAL  COPD Exacerbation    Pulmonary Rehab Evaluation order received. There was no answer so I left a message asking pt to call us back.

## 2022-02-17 ENCOUNTER — TELEMEDICINE (OUTPATIENT)
Dept: FAMILY MEDICINE CLINIC | Age: 72
End: 2022-02-17
Payer: MEDICARE

## 2022-02-17 DIAGNOSIS — R19.5 DARK STOOLS: ICD-10-CM

## 2022-02-17 DIAGNOSIS — T88.7XXA MEDICATION SIDE EFFECT: Primary | ICD-10-CM

## 2022-02-17 PROCEDURE — 99213 OFFICE O/P EST LOW 20 MIN: CPT | Performed by: NURSE PRACTITIONER

## 2022-02-17 PROCEDURE — 4040F PNEUMOC VAC/ADMIN/RCVD: CPT | Performed by: NURSE PRACTITIONER

## 2022-02-17 PROCEDURE — G8420 CALC BMI NORM PARAMETERS: HCPCS | Performed by: NURSE PRACTITIONER

## 2022-02-17 PROCEDURE — 1090F PRES/ABSN URINE INCON ASSESS: CPT | Performed by: NURSE PRACTITIONER

## 2022-02-17 PROCEDURE — G8399 PT W/DXA RESULTS DOCUMENT: HCPCS | Performed by: NURSE PRACTITIONER

## 2022-02-17 PROCEDURE — G8484 FLU IMMUNIZE NO ADMIN: HCPCS | Performed by: NURSE PRACTITIONER

## 2022-02-17 PROCEDURE — 1123F ACP DISCUSS/DSCN MKR DOCD: CPT | Performed by: NURSE PRACTITIONER

## 2022-02-17 PROCEDURE — 3017F COLORECTAL CA SCREEN DOC REV: CPT | Performed by: NURSE PRACTITIONER

## 2022-02-17 PROCEDURE — 1036F TOBACCO NON-USER: CPT | Performed by: NURSE PRACTITIONER

## 2022-02-17 PROCEDURE — G8427 DOCREV CUR MEDS BY ELIG CLIN: HCPCS | Performed by: NURSE PRACTITIONER

## 2022-02-17 ASSESSMENT — ENCOUNTER SYMPTOMS
COUGH: 0
NAUSEA: 0
SHORTNESS OF BREATH: 0
ABDOMINAL PAIN: 0

## 2022-02-17 NOTE — PROGRESS NOTES
Subjective:      Patient ID: Romualdo Osler is a 70 y.o. female    HPI: Acute for dark stools    TELEHEALTH EVALUATION -- Audio/Visual (During NVOJG-21 public health emergency)    Patient identification was verified at the start of the visit: Yes    Services were provided through a video synchronous discussion virtually to substitute for in-person clinic visit. Patient and provider were located at their individual homes. Patient has requested an audio/video evaluation for the following concern(s):    Chief Complaint   Patient presents with    Diarrhea       Onset of 5 days with dark, tarry stools. Complains of fatigue. Denies upset stomach prior. Fatigue. Took Pepto Bismol prior to the stools changing colors for not feeling well - stools were looser. Not on any blood thinners. No NSAIDs. Denies stomach pain, nausea. Patient Active Problem List   Diagnosis    Hyperlipemia    Hypothyroidism    Depression    Chronic anxiety    Osteopenia    Medication monitoring encounter    H/O: CVA (cerebrovascular accident), aneurysm bleed, Jan 2015    Abnormality of gait following cerebrovascular accident    GERD (gastroesophageal reflux disease)    Cognitive impairment due to CVA.  OAB (overactive bladder)    Chronic nausea    SOB (shortness of breath)    Hypoxia    Major depressive disorder, single episode, in full remission ([address]    Smoking greater than 40 pack years    Pulmonary emphysema ([address]    Syncope and collapse    Concussion with loss of consciousness    Laceration of head    Alcohol abuse    Spinal stenosis, cervical region    Normal pressure hydrocephalus (HCC)    Michael's edema of vocal folds       Review of Systems   Constitutional: Negative for chills and fever. HENT: Negative. Respiratory: Negative for cough and shortness of breath. Cardiovascular: Negative for chest pain. Gastrointestinal: Negative for abdominal pain and nausea. Skin: Negative for rash. Neurological: Negative for dizziness, light-headedness and headaches. Psychiatric/Behavioral: Negative. Objective:   Physical Exam  Constitutional:       General: She is not in acute distress. Appearance: She is not ill-appearing. Pulmonary:      Effort: Pulmonary effort is normal. No respiratory distress. Neurological:      Mental Status: She is alert and oriented to person, place, and time. Psychiatric:         Mood and Affect: Mood normal.         Behavior: Behavior normal.         Assessment:       Diagnosis Orders   1. Medication side effect     2. Dark stools         Plan:     Stool color related to MV and Pepto  Reassurance given  Stay hydrated  RTO PRN         Zeny Cruz, was evaluated through a synchronous (real-time) audio-video encounter. The patient (or guardian if applicable) is aware that this is a billable service, which includes applicable co-pays. This Virtual Visit was conducted with patient's (and/or legal guardian's) consent. The visit was conducted pursuant to the emergency declaration under the [address] [place] [address] authority and the [employer] General Act. Patient identification was verified, and a caregiver was present when appropriate. The patient was located in a state where the provider was licensed to provide care. --Rodrigo Sanchez, SARITHA - CNP on 2/17/2022 at 2:28 PM    An electronic signature was used to authenticate this note.      2/17/2022

## 2022-02-22 DIAGNOSIS — J43.9 PULMONARY EMPHYSEMA, UNSPECIFIED EMPHYSEMA TYPE (HCC): Primary | ICD-10-CM

## 2022-02-22 RX ORDER — FLUTICASONE PROPIONATE 250 UG/1
POWDER, METERED RESPIRATORY (INHALATION)
Qty: 60 EACH | Refills: 11 | Status: SHIPPED | OUTPATIENT
Start: 2022-02-22 | End: 2022-04-14

## 2022-02-22 NOTE — TELEPHONE ENCOUNTER
Patient dropped of a [employer] letter stating Qvar is not covered by insurance. Attempted PA with cover my meds; Fernanda Mar not covered. Alternatives: Advair -21 Mcg/Actuation HFAA Not Required  Albuterol Sulfate (HFAA) Not Required  Arnuity Ellipta 100 Mcg/Actuation Dsdv Not Required  Breo Ellipta 100-25 Mcg/Dose Dsdv Not Required  Budesonide-Formoterol 160-4.5 Mcg/Actuation HFAA Not Required  Flovent Diskus 100 Mcg/Actuation Dsdv Not Required  Flovent  Mcg/Actuation HFAA Not Required  Fluticasone Propion-Salmeterol 250-50 Mcg/Dose Dsdv Not Required  Spiriva Respimat 1.25 Mcg/Actuation Mist Not Required  Symbicort 160-4.5 Mcg/Actuation HFAA Not Required  Wixela Inhub 100-50 Mcg/Dose Dsdv Not Required  Wilhelmenia Hammans 250-50 Mcg/Dose Dsdv Not Required  Wixela Inhub 500-50 Mcg/Dose Dsdv Not Required    Please review and advise. Pharmacy [facility] [address]

## 2022-02-23 NOTE — PLAN OF CARE
PHYSICIAN PULMONARY REHABILITATION ORDER  Medical Director:  Dr. Luann Bridges MD    (X)  Phase II Pulmonary [place], supervised exercise with SpO2 / HR monitoring and Oxygen Titration (if necessary) each session, twice weekly, with individualized education sessions. Patient Name: Maci Uriostegui  : 1950  Referring Physician: Dr. Poonam Joya   Date: 2022    Medically Necessary Pulmonary Rehab for: Moderate COPD (from my dictation or the PFT report), which is GOLD Stage 2. Physician Prescribed Exercise:  Length of program:  18 weeks, up to 36 sessions, subject to insurance limitations. Program to include aerobic endurance conditioning, resistance training (RT), and flexibility training, and education (all relevant topics including psychosocial assessment). FITT Principles + Progression for Exercise Prescription (also found on the ITP):     Frequency: 2x / wk for 36 sessions    Intensity:   Initial MET level calculated from Initial 6MWT (Feet achieved converted to METs)   Type:        Aerobic and Strength (Treadmill, AD, NuStep, UBE, RT)   Time:      Each session:  31-91 min. of Treatment; Aerobic, RT, Rest breaks and Education  Progression:  1-2 minute increase in Time, per Type, per session, 5-20% increase in Intensity per week if SpO2 >87% AND [place] RPE/RPD is <5      Note:  These are guidelines, the Pulmonary Rehab staff may adjust the treatment to suit the patient's individual needs, goals, oxygen saturation and functional level. Plan of Care:  Pulmonary Rehab aerobic endurance and strength training sessions for a total of 31-91 min/day, including Education time, 2 days/week with suggested supplemented matching minutes of walking at home on most days not participating in Pulmonary Rehab. Patient is willing to cooperate and participate in the plan of care.  Patient is physically able, motivated, and willing to participate in MO, and I expect this patient to improve in a reasonable and predictable time frame. Other Program Components:   (X)6 Minute Walk Test (6 MWT) at program initiation and discharge   (X)Evaluation for oxygen needs while exercising   (X)Titrate Oxygen to maintain >87% SpO2   (X)Stop Exercise or Apply Oxygen if patient desaturates <88%    Measurable Endurance Goal:    -Aerobic endurance goal to be measured in minutes. Start endurance training per patient tolerance at 1-15 minutes per exercise type, progressing to a total of 31-60 minutes using various modes of training (see Exercise Prescription on Individualized Treatment Plan 'ITP'). -Measurable Muscular Strength Goal: Starting at 1-3 lbs x 8 reps, progressing daily/weekly to 5-10 lbs x 15 reps    NOTE:  If patient is a current smoker, patient will participate in tobacco cessation program during Pulmonary Rehab.       Physician Signature/Date/Time:

## 2022-02-28 RX ORDER — ATOMOXETINE 40 MG/1
CAPSULE ORAL
Qty: 90 CAPSULE | Refills: 3 | Status: SHIPPED | OUTPATIENT
Start: 2022-02-28 | End: 2022-03-21

## 2022-03-01 ENCOUNTER — OFFICE VISIT (OUTPATIENT)
Dept: ENT CLINIC | Age: 72
End: 2022-03-01
Payer: MEDICARE

## 2022-03-01 VITALS
DIASTOLIC BLOOD PRESSURE: 76 MMHG | RESPIRATION RATE: 16 BRPM | HEART RATE: 95 BPM | WEIGHT: 139.5 LBS | OXYGEN SATURATION: 93 % | TEMPERATURE: 97 F | BODY MASS INDEX: 24.71 KG/M2 | SYSTOLIC BLOOD PRESSURE: 120 MMHG

## 2022-03-01 DIAGNOSIS — H90.0 CONDUCTIVE HEARING LOSS, BILATERAL: ICD-10-CM

## 2022-03-01 DIAGNOSIS — H61.23 BILATERAL IMPACTED CERUMEN: Primary | ICD-10-CM

## 2022-03-01 PROCEDURE — 69210 REMOVE IMPACTED EAR WAX UNI: CPT | Performed by: OTOLARYNGOLOGY

## 2022-03-01 NOTE — PROGRESS NOTES
Procedure note:  DATE AND TIME OF PROCEDURE: 3/1/2022 3:07 PM  PATIENT NAME: Javid Suárez  MRN [id number]  PROVIDER: Parag Flowers MD  PRE-PROCEDURE DIAGNOSIS:  Bilateral cerumen impaction  Conductive hearing loss  POST-PROCEDURE DIAGNOSIS:   Same     INDICATION: Cerumenosis causing an inability to visualize the tympanic membrane, middle ear space and/or external auditory canal, causing a conductive hearing loss. TEACHING: Procedure, benefits, and risks were explained to patient/family. Verbal informed consent was obtained. TIME OUT: A time out was conducted immediately before starting the procedure that confirmed a final verification of the correct patient, correct procedure, correct patient position, correct site and availability of special equipment. DESCRIPTION OF PROCEDURE:  PROCEDURE: Cerumenectomy  SITE: Bilateral ears  ANESTHESIA:  NONE  COMPLICATIONS: NONE  EBL: NONE  Findings: After removal, both tympanic membranes were found to be intact without evidence of infection or effusion. PROCEDURE: The patient was taken to the procedure room and the otomicroscope was used to visualize right EAC. Cerumen was completely obstructing visualization of tympanic membranes. The same findings were seen on the left side. Cerumen removed with wire loop until tympanic membranes could be visualized as documented above. The patient tolerated the procedure well, with no complications. RTC 6 months.

## 2022-03-07 RX ORDER — ATOMOXETINE 40 MG/1
CAPSULE ORAL
Qty: 45 CAPSULE | Refills: 0 | OUTPATIENT
Start: 2022-03-07

## 2022-03-08 ENCOUNTER — HOSPITAL ENCOUNTER (OUTPATIENT)
Dept: CARDIAC REHAB | Age: 72
Discharge: HOME | End: 2022-03-08

## 2022-03-14 RX ORDER — ATOMOXETINE 40 MG/1
CAPSULE ORAL
Qty: 45 CAPSULE | Refills: 0 | OUTPATIENT
Start: 2022-03-14

## 2022-03-21 RX ORDER — ATOMOXETINE 40 MG/1
CAPSULE ORAL
Qty: 30 CAPSULE | Refills: 5 | Status: SHIPPED | OUTPATIENT
Start: 2022-03-21

## 2022-03-22 ENCOUNTER — HOSPITAL ENCOUNTER (OUTPATIENT)
Dept: CARDIAC REHAB | Age: 72
Setting detail: THERAPIES SERIES
Discharge: HOME | End: 2022-03-22
Payer: MEDICARE

## 2022-03-22 VITALS — BODY MASS INDEX: 24.54 KG/M2 | WEIGHT: 138.5 LBS | HEIGHT: 63 IN

## 2022-03-22 PROCEDURE — 94625 PHY/QHP OP PULM RHB W/O MNTR: CPT

## 2022-03-22 NOTE — PROGRESS NOTES
[facility] Facility-Based Therapy for COPD   INDIVIDUAL TREATMENT PLAN (ITP)     Name: Ludin Childers   :  1950  Acct Number: [[id number]]   AGE: 70 y.o. Diagnosis:  Moderate COPD, which is GOLD Stage 2                                               PFT:  Post Bronchodilator FEV1/FVC: 59  FEV1: 59    Patient Goals:  (x) Breathe better  (x) Increase my endurance (x) Have more energy  (x) Rely less on others  (x) Ease ADLs  (x) Understand and use meds correctly  (x) Control cough  (x) Make fewer visits to hospital  () Quit smoking  (x) Feel less anxious / have more confidence    Glossary:  WV=Pulmonary Rehab  PF=Physical Fitness TM=Treadmill  AD=Schwinn Airdyne  UBE=UpperBody Ergometer  RT=Resistance Training  PLB=Pursed Lip Breathing      COVID -19 Screen  Do you have any of the following symptoms:  [] Fever [] Cough [] SOB [] Muscle/Body Ache [] Loss of taste/smell [x] None    Have you traveled outside of the [place]? [] Yes      [x] No    Have you been around anyone who has tested Positive for COVID 19?  [] Yes      [x] No    The following Education has been completed with patient. [x] Wait in the lobby until we call you back to rehab. [x] You must wear a mask while in the medical center and in pulmonary rehab unless exercising. Please bring your own. [x] Bring your own bottle of water with you. [x] You can not bring anyone with you in to the rehab clinic at this time. They are welcome to sit in the lobby or wait in the car.       INDIVIDUAL TREATMENT PLAN    INITIAL ASSESSMENT    Day #1 INDIVIDUAL TREATMENT PLAN    PLAN      Day #2-30 INDIVIDUAL TREATMENT PLAN    RE ASSESSMENT    Day #31-60 INDIVIDUAL TREATMENT PLAN    RE ASSESSMENT    Day #61-90 INDIVIDUAL TREATMENT PLAN    RE ASSESSMENT    Day # INDIVIDUAL TREATMENT PLAN    DISCHARGE      Last Day        Date: 3/22/2022     Date:    Date:    Date:    Date:    Date:    EXERCISE   INITIAL ASSESSMENT      Prescribed Oxygen Use  Activity:2L/min  (x) Continuous  () Breath Actuated      Oxygen Titration  (x) Assess for desaturation            Current Home Exercise:  None   EXERCISE  PLAN        Current Oxygen Use  Activity:  L/min  () Continuous  () Breath Actuated      Oxygen Titration  () Maintaining >87% Spo2  () Not maintaining -  L/min needed on       Current Home Exercise:  Frequency:  x/wk  Intensity:  /10  Duration:  min  Mode:   EXERCISE  RE ASSESSMENT      Current Oxygen Use  Activity:  L/min  () Continuous  () Breath Actuated      Oxygen Titration  () Maintaining >87% Spo2  () Not maintaining -  L/min needed on       Current Home Exercise:  Frequency:  x/wk  Intensity:  /10  Duration:  min  Mode: EXERCISE  RE ASSESSMENT      Current Oxygen Use  Activity:  L/min  () Continuous  () Breath Actuated      Oxygen Titration  () Maintaining >87% Spo2  () Not maintaining -  L/min needed on       Current Home Exercise:  Frequency:  x/wk  Intensity:  /10  Duration:  min  Mode: EXERCISE  RE ASSESSMENT      Current Oxygen Use  Activity:  L/min  () Continuous  () Breath Actuated      Oxygen Titration  () Maintaining >87% Spo2  () Not maintaining -  L/min needed on       Current Home Exercise:  Frequency:  x/wk  Intensity:  /10  Duration:  min  Mode:   EXERCISE  DISCHARGE        Final Oxygen Use  Activity:  L/min  () Continuous  () Breath Actuated      Oxygen Titration  () Maintaining >87% Spo2  () Not maintaining -  L/min needed on       Current Home Exercise:  Frequency:  x/wk  Intensity:  /10  Duration:  min  Mode:       6 Minute Walk Test (6MWT):  O2 used: 2LPM nasal cannula   1100 ft walked = 2.5 METs  SpO2: 90-96%  HR:    RPD: 2  RPE: 1  Number of Breaks:  none   Avg time for break:  n/a  Assist device used: none         6 Minute Walk Test (6MWT):  O2 used:   ft walked =  METs  SpO2:  %  HR:    RPD:   RPE:  Number of Breaks:    Avg time for break:  secs  Assist device used: OUTCOMES:  6MWD Improvement:  > 98'?  () Yes  () No     Exercise Prescription    THR: 74 - 119 bpm    Frequency:  2-3x/wk in [place], 1-3x/wk home activity    Intensity:  METs (from 6MWT)      Time:  15-30 total minutes up to 55 minutes max    Type:  Aerobic (TM, AD, UBE, NuStep), 6 ST, RT 1-10# 8-15 reps    Progression:  Increase 30s - 2 min/mode for Aerobic activity, and increase Intensity 2-5% each week if RPE <5, RPD <5, SpO2 >87% and pt is within [facility] above. Exercise Prescription    () Pt exercising within THR    Frequency:  2-3x/wk in SD, 1-3x/wk home activity    Intensity:  3-5 on [place] Dyspnea/ Fatigue scale    Time:  15-30 total minutes up to 55 minutes max    Type:  Aerobic (TM, AD, UBE, NuStep), 6 ST, RT 1-10# 8-15 reps    Progression:  Increase 30s - 2 min/mode for Aerobic activity, and increase Intensity 2-5% each week if RPE <5, RPD <5, SpO2 >87% and pt is within [facility] above. Exercise Prescription    () Pt exercising within THR    Frequency:  2-3x/wk in SD, 2-5x/wk home activity    Intensity:  3-5 on [place] Dyspnea/ Fatigue scale    Time:  30-45 total minutes up to 55 minutes max    Type:  Aerobic (TM, AD, UBE, NuStep), 6 ST, RT 1-10# 8-15 reps    Progression:  Increase 30s - 2 min/mode for Aerobic activity, and increase Intensity 2-5% each week if RPE <5, RPD <5, SpO2 >87% and pt is within [facility] above. Exercise Prescription    () Pt exercising within THR    Frequency:  2-3x/wk in SD, 2-5x/wk home activity    Intensity:  3-5 on [place] Dyspnea/ Fatigue scale    Time:  30-45 total minutes up to 55 minutes max    Type:  Aerobic (TM, AD, UBE, NuStep), 6 ST, RT 1-10# 8-15 reps    Progression:  Increase 30s - 2 min/mode for Aerobic activity up to 35 minutes, and increase Intensity 2-5% each week if RPE <5, RPD <5, SpO2 >87% and pt is within [facility] above.  Exercise Prescription    () Pt exercising within THR    Frequency:  2-3x/wk in SD, 2-5x/wk home activity    Intensity:  3-5 on [place] Dyspnea/ Fatigue scale    Time: 30-45 total minutes up to 55 minutes max    Type:  Aerobic (TM, AD, UBE, NuStep), 6 ST, RT 1-10# 8-15 reps    Progression:  Increase 30s - 2 min/mode for Aerobic activity up to 35 minutes, and increase Intensity 2-5% each week if RPE <5, RPD <5, SpO2 >87% and pt is within [facility] above. Home Program          (x) Given to patient with current levels, recommendation and guidelines.                                        Initial Levels:  TM:1.9mph,6min  AD:0.7level,6min  NuStep:46 W, 6 min  UBE:0.3level, 5 min  RT 2#, 8-15 reps   Current Levels:  TM:  mph,  min  AD:  level,  min  NuStep:  W,  min  UBE:  level, 5 min  RT  #, 8-15 reps   Current Levels:  TM:  mph,  min  AD:  level,  min  NuStep:  W,  min  UBE:  level, 5 min  RT  #, 8-15 reps Current Levels:  TM:  mph,  min  AD:  level,  min  NuStep:  W,  min  UBE:  level, 5 min  RT  #, 8-15 reps Current Levels:  TM:  mph,  min  AD:  level,  min  NuStep:  W,  min  UBE:  level, 5 min  RT  #, 8-15 reps   Final Levels:  TM:  mph,  min  AD:  level,  min  NuStep:  W,  min  UBE:  level, 5 min  RT  #, 8-15 reps     Physical Limitations  Initial Assessment    (x) Weakness  () Inflexible  () Poor posture  () Gait abnormality  () Balance  () Orthopedic Issues   Physical Limitation  Plan      (x) Strengthen through squats and RT  (x) Appropriate stretches shown  (x)Verbal cues and reminders for posture and gait given  (x) Proper modalities chosen for ortho issues  (x) Give Home activity / stretches/ Warmup/ [place] info   Physical Limitations Reassessment      () Pt progressing  () Pt not progressing Physical Limitations Reassessment      () Pt progressing  () Pt not progressing Physical Limitations Reassessment      () Pt progressing  () Pt not progressing     Physical Limitations  Discharge      () Issues Addressed  () PT/OT suggested       Exercise Goals   Initial Assessment:    (x) Start to, and commit to exercising regularly     (x) Learn about home activity recommendations        (x) Increase PF shown by increasing 6MWD   Exercise Goals   Plan      -Initiate RI 2x/week  -Encourage home exercise    -Attend Home Activity EDUCATION class      -Slowly, safely, progress in RI    Exercise Goals   Reassessment      () Pt is coming regularly  () Pt is sporadic    () Pt has had class  () Pt has not had class        () Pt is progressing  () Pt is not progressing Exercise Goals   Reassessment      () Pt is coming regularly  () Pt is sporadic    () Pt has had class  () Pt has not had class        () Pt is progressing  () Pt is not progressing Exercise Goals   Reassessment      () Pt is coming regularly  () Pt is sporadic    () Pt has had class  () Pt has not had class        () Pt is progressing  () Pt is not progressing   Exercise Goals Discharge      () GOAL Met?  () GOAL not met -      () Pt had EDUCATION class  Date:   () Pt did not have class    () GOAL Met  See 6MWD above  () GOAL not Met:     Exercise Intervention Initial Assessment      () Transportation needed        (x) EDUCATION needed          (x) Motivation needed   Exercise Intervention/ Education   Plan      () [facility] set up        () EDUCATION:  Home activity class to be given        () Positive encouragement, support and motivation to be given   Exercise Intervention/ Education Reassessment      () Pt is attending  () Pt is not attending      () Pt has had class  () Pt has not had class      () Pt is progressing  () Pt not progressing Exercise Intervention/ Education Reassessment      () Pt is attending  () Pt is not attending      () Pt has had class  () Pt has not had class      () Pt is progressing  () Pt not progressing Exercise Intervention/ Education Reassessment      () Pt is attending  () Pt is not attending      () Pt has had class  () Pt has not had class      () Pt is progressing  () Pt not progressing Exercise Intervention/ Edcuation Discharge      () Pt attended most  () Pt cancelled a lot    () Pt has had class  Date: See above  () Pt did not have class    () Pt progressed and did well  () Pt had difficulty-               NUTRITION   INITIAL ASSESSMENT      Height:  53   Weight: 138.8 lbs  BMI: 24.6    30 day goal: 138 Lbs (2-4lbs)    () Overweight  () Underweight  (x) Normal  () Teeth issues  () GI issues  (x) Nutrition knowledge needed  () DM   NUTRITION   PLAN        Current Weight:  lbs      Goal achieved?:  Next 30 day goal: Lbs   NUTRITION  RE ASSESSMENT      Current Weight:  lbs      Goal achieved?:  Next 30 day goal: Lbs NUTRITION  RE ASSESSMENT      Current Weight:  lbs      Goal achieved?:  Next 30 day goal: Lbs NUTRITION  RE ASSESSMENT      Current Weight:  lbs      Goal achieved?:  Next 30 day goal: Lbs   NUTRITION DISCHARGE        Current Weight:  lbs  BMI:    Goal achieved?:     Nutrition Goals  Initial Assessment    () Pt is DM.   Increase nutrition knowledge and glucose control through diet and exercise      (x) Learn weight strategies to maintain weight        (x) Learn about general nutrition          -Achievable weight goal for the end of the program:  138 Lbs (2-4lbs per month recommended)   Nutrition Goals   Plan      () DM:  Attend nutrition class and learn about quality carbohydrates and exercises role in DM    () Attend nutrition class          () Attend nutrition class          () Initiate exercise and give pt hints and tips for weight and will have class for information   Nutrition Goals  Reassessment      () DM: Pt has had class  () DM: Pt has not had class           () Pt has had class  () Pt has not had class       () Pt has had class  () Pt has not had class       () Pt progressing  () Pt not progressing     Nutrition Goals  Reassessment      () DM: Pt has had class  () DM: Pt has not had class           () Pt has had class  () Pt has not had class       () Pt has had class  () Pt has not had class       () Pt progressing  () Pt not progressing     Nutrition Goals  Reassessment      () DM: Pt has had class  () DM: Pt has not had class           () Pt has had class  () Pt has not had class       () Pt has had class  () Pt has not had class       () Pt progressing  () Pt not progressing     Nutrition Goals  Discharge      () Pt had nutrition class  Date:  () Pt has not had class        () Pt has had class  Date: See above  () Pt has not had class    () Pt has class  Date: See above  () Pt has not had class      () Final weight goal achieved  () Pt did not reach desired weight goal - (readdressed nutrition and exercise strategies for future goal attainment)        Nutrition Intervention/ Education   Initial Assessment    (x) Pt needs Nutrition education class        () Pt states does not need class       Nutrition Intervention/ Education  Plan      () Pt will have Nutrition class          () Encourage pt to continue to learn and incorporate self knowledge in to diet choices   Nutrition Intervention/ Education  Reassessment      () Pt has had class  () Pt has not had class      () Pt had questions  () Pt denies having questions Nutrition Intervention/ Education  Reassessment      () Pt has had class  () Pt has not had class      () Pt had questions  () Pt denies having questions Nutrition Intervention/ Education  Reassessment      () Pt has had class  () Pt has not had class      () Pt had questions  () Pt denies having questions Nutrition Intervention/ Education   Discharge      () Pt has had class  Date: See above  () Pt has not had class - Reason:    () Pt had questions that were answered   () Pt denies having questions             PSYCHO SOCIAL INITIAL ASSESSMENT      Depression:  () Self Reported  () In History  () S/Sx noted  () Denies  (x) On Medication  (x) Follows physician      (x) Give PHQ-9 Depression screening tool                  Dyspnea:  (x) Give pt UCSD SOB survey      (x) Pt gets SOB during activity and ADLs.           (x) Pt has support from home for the program        () Pt does not have support for the program   PSYCHO SOCIAL  PLAN      () Attend Stress/ Depression/ Anxiety Class                Pre Rehab PHQ-9   Score:   1-4 Normal  5-9 Mild  10-14 Mod  15-19 Mod-Sev  >19 Severe        Pre Rehab UCSD SOB Score:       (x) Attend classes and attend rehab to increase strength and endurance      -Attend Psychosocial class          () Speak with the patient/ family about ability to commit to the program with undue stress/ anxiety   PSYCHO SOCIAL  RE ASSESSMENT    () Pt scored >10 on PHQ-9.   Spoke with pt privately about issues and *             () Pt recommended to contact physician for assistance                See below for ADLs        () Pt has had class  () Pt has not had class        Re assessment of support:  Good, pt has been attending PSYCHO SOCIAL  RE ASSESSMENT    () Pt is coping well  () Pt needs more assistance-              () Pt recommended to contact physician for assistance                See below          () Pt has had class  () Pt has not had           Re assessment of support:  Good, pt has been attending PSYCHO SOCIAL  RE ASSESSMENT    () Pt is coping well  () Pt needs more assistance-              () Pt recommended to contact physician for assistance                See below          () Pt has had class  () Pt has not had class        Re assessment of support:  Good, pt has been attending   PSYCHO SOCIAL DISCHARGE      (x) Pt attended class     Date:              Post Rehab PHQ-9   Depression Score:                Post Rehab UCSD SOB  Score:      (x) Pt is less SOB with ADLs            () Pt had class  Date: See above  () Pt did not have class        () Pt completed program  () Pt had to stop         Psychosocial Goals   Initial Assessment    (x) Maintain/ Decrease Depression Levels      (x) Maintain/ Decrease Anxiety Levels        (x) Learn about Emotional Health          (x) Increase QoL   (CAT tool)          (x) Give the CAT tool for HR QoL      Pre Rehab  CAT score:  / 40       Psychosocial Goals  Plan      () Attend Stress/ Anxiety/ Dyspnea - Panic control class      () Attend Stress/ Anxiety/ Dyspnea - Panic control class      () Attend Stress/ Anxiety/ Dyspnea - Panic control class      () Initiate WI, get stronger, more endurance and more confidence              30 day CAT score:  / 40     Psychosocial Goals  Reassessment      () Pt has had class  () Pt has not had class      () Pt has had class  () Pt has not had class      () Pt has had class  () Pt has not had class      () Pt is progressing  () Pt is not progressing              60 day CAT score:  / 40 Psychosocial Goals  Reassessment      () Pt has had class  () Pt has not had class      () Pt has had class  () Pt has not had class      () Pt has had class  () Pt has not had class      () Pt is progressing  () Pt is not progressing              90 day CAT score:  / 40 Psychosocial Goals  Reassessment      () Pt has had class  () Pt has not had class      () Pt has had class  () Pt has not had class      () Pt has had class  () Pt has not had class      () Pt is progressing  () Pt is not progressing              120 day CAT score:  / 40   Psychosocial Goals   Discharge      (x) Pt had class            (x) Pt had class            (x) Pt had class  Date: See above  () Pt did not have class      Post rehab CAT below                  Post Rehab   CAT score:  / 40              OUTCOMES    PHQ-9:  Did pt drop a severity level or maintain in the minimal range?  () Y  () N    UCSD SOB  Did pt decrease their number by 5 or more?  () Y  () N    CAT scores:  Did pt drop by 2 or more points from Pre to Post?  () Y  () N        Psychosocial Intervention/ Education   Initial Assessment    (x) Pt is being treated for depression/ anxiety        (x) Pt would benefit from WIs Psychosocial Issues Class (Stress, Depression, Anxiety, and Intimacy   Psychosocial  Intervention/ Education  Plan      () Pt to contact physician if any severe changes occur in mood        () Pt will attend WIs class   Psychosocial Intervention/ Education Reassessment      () Pt is coping well  () Pt is not coping well         () Pt has had class  () Pt has not had class Psychosocial Intervention/ Education Reassessment      () Pt is coping well  () Pt is not coping well         () Pt has had class  () Pt has not had class Psychosocial Intervention/ Education Reassessment      () Pt is coping well  () Pt is not coping well         () Pt has had class  () Pt has not had class   Psychosocial Intervention/ Education Discharge      () Pt did not need any changes   () Pt needed changes to treatment      (x) Pt had class  Date: See above  () Pt did not have class         Pain   Initial Assessment    (x) N/A  Location:   Duration:   Intensity:  /10  Type:    Pain   Plan      () N/A    () Pts pain is under control  () Pt encouraged to contact physician for pain control   Pain   Reassessment      () N/A    () Pts pain is under control  () Pt encouraged to contact physician for pain control Pain   Reassessment      () N/A    () Pts pain is under control  () Pt encouraged to contact physician for pain control Pain   Reassessment      () N/A    () Pts pain is under control  () Pt encouraged to contact physician for pain control Pain   Discharge      () N/A    () Pts pain is under control  () Pt encouraged to contact physician for pain control           OXYGEN   INITIAL ASSESSMENT    RESTING:  (x) RA  () O2:  L/min  () Continuous  () Breath Actuated  92% SpO2 / 20 bpm    Prescribed Oxygen Use:  2 L/min at Rest and with exertion    DME Company for O2:  [facility]     OXYGEN   PLAN      RESTING:  (x) Monitor needs, administer supplemental oxygen if SpO2 <88% OXYGEN   RE ASSESSMENT    RESTING:  (x) Pt SpO2 >87%  () Pt needs higher flow  () Pt needs less flow OXYGEN   RE ASSESSMENT    RESTING:  (x) Pt SpO2 >87%  () Pt needs higher flow  () Pt needs less flow OXYGEN   RE ASSESSMENT    RESTING:  (x) Pt SpO2 >87%  () Pt needs higher flow  () Pt needs less flow OXYGEN   DISCHARGE      RESTING:  () Pt SpO2 remained >87% on * L/min at rest    () Pts doctor and company contacted for change in Rx   Oxygen Goals   Initial Assessment    (x) Wean, Titrate down, or get off O2 if possible   Oxygen Goals   Plan      (x) Closely monitor SpO2 and HR using pulse oximetry for saturation and HR response, and titrate if appropriate   Oxygen Goals  Reassessment    () RA    On exertion:  () Pt maintaining FiO2  () Pt tolerating lower O2 needs  () Pt needing more O2   Oxygen Goals  Reassessment    () RA    On exertion:  () Pt maintaining FiO2  () Pt tolerating lower O2 needs  () Pt needing more O2 Oxygen Goals  Reassessment    () RA    On exertion:  () Pt maintaining FiO2  () Pt tolerating lower O2 needs  () Pt needing more O2     Oxygen Goals   Discharge    () RA    With Exertion:  () Pt maintained FiO2  () Pt was found to need less O2 - notification sent to physician  () Pt was found to need more O2, notification sent to physician     Oxygen Intervention/ Education  Initial Assessment    (x) Learn / Review about O2 use, safety and travel      () Pt does not wear or have home oxygen    Oxygen Intervention/ Education  Plan      () Attend O2 Use, safety and travel class        () Assess for SpO2 with each exercise   Oxygen Intervention/ Education  Reassessment      () Pt has had class  () Pt has not had class      () Pt is >87%  () Pt has been found to desaturate - physician notified Oxygen Intervention/ Education  Reassessment      () Pt has had class  () Pt has not had class      () Pt is >87%  () Pt has been found to desaturate - physician notified Oxygen Intervention/ Education  Reassessment      () Pt has had class  () Pt has not had class      () Pt is >87%  () Pt has been found to desaturate - physician notified   Oxygen Intervention/ Education  Discharge      () Pt had class  Date: () Pt did not have class      () Pt did well  () Pt needed to be put on oxygen           TOBACCO USE  INITIAL ASSESSMENT    (x) Pt currently not smoking    () Pt currently smoking        () Pt needs Tobacco Cessation counseling          Smoked 1.25 ppd for 40 years  Pt quit in 2015.    TOBACCO USE  PLAN      () N/A      Does pt want to quit:   Does pt have a quit date:    () Tobacco Cessation counseling Education if applicable        () Encouraged to contact physician for tools/ nicotine replacement etc.   TOBACCO USE  RE ASSESSMENT    () N/A      Any change in Tobacco use status () N  ()Y      () Pt attended counseling education session            () Pt has contacted physician TOBACCO USE  RE ASSESSMENT    () N/A      Any change in Tobacco use status () N  ()Y      () Pt attended counseling education session            () Pt has contacted physician TOBACCO USE  RE ASSESSMENT    () N/A      Any change in Tobacco use status () N  ()Y      () Pt attended counseling education session            () Pt has contacted physician TOBACCO USE  DISCHARGE            Current Tobacco Use Status:         () Pt attended TC counseling education session  Date:           () Pt contacted physician        NRT product/ medication  :     Tobacco Use Goal  Initial Assessment      (x) Complete Cessation or Maintain Cessation of tobacco products   Tobacco Use Goal Intervention and Education Plan    (x Encourage pt to fight the urge, call quit line, use techniques learned from friends/ class    () Attend Tobacco Cessation class if needed   Tobacco Use  Reassessment          () Pt remains tobacco free            () Pt has had TC counseling session        () Pt still smoking Tobacco Use  Reassessment          () Pt remains tobacco free            () Pt has had TC counseling session        () Pt still smoking Tobacco Use  Reassessment          () Pt remains tobacco free            () Pt has had TC counseling session        () Pt still smoking Tobacco Use  Discharge          () Pt remains tobacco free            () Pt had TC counseling  Date:  See above        () Pt still smoking - gave resources for quitting             MEDICATIONS  (Oral & Inhaled)  INITIAL ASSESSMENT    Pt reports taking his meds properly 50% of the time(pt does take Anoro every day as ordered but does not take the Qvar that is listed on drs last notes from [facility].)      (x) Pt is on inhaled medications   MEDICATIONS  PLAN        -Review Rxs purpose, schedule, side effects and importance of compliance during Medication class. Also address Cpap, Vents.        MEDICATIONS  RE ASSESSMENT      Pt reports taking their meds properly  % of the time        () Pt has had class  () Pt has not had class MEDICATIONS  RE ASSESSMENT      Pt reports taking their meds properly  % of the time        () Pt has had class  () Pt has not had class MEDICATIONS  RE ASSESSMENT      Pt reports taking their meds properly  % of the time        () Pt has had class  () Pt has not had class MEDICATIONS  DISCHARGE        Pt reports taking their meds properly  % of the time        ()Pt had med class  Date:  () Pt has not had class       Pt has:  (x) Metered Dose Inhaler  (x) Dry Powder Inhaler  (x) Nebulizer   -Review correct use, timing, technique and cleaning of equipment during Medication class () Pt has had class  () Pt has not had class () Pt has had class  () Pt has not had class () Pt has had class  () Pt has not had class () Pt had class  Date: See above  () Pt has not had class   Medication Goals  Initial Assessment        (x) Take meds properly 100% of the time    (x) Learn about / Review Rxs, and devices Medication Goals  Intervention & Education  Plan      -Follow Rx instructions and ask if questions    -Attend Medication Education class   Medication Goals  Re assessment          () Readdressed questions on any medications    () Pt has had class  () Pt has not had class Medication Goals  Re assessment          () Readdressed questions on any medications    () Pt has had class  () Pt has not had class Medication Goals  Re assessment          () Readdressed questions on any medications    () Pt has had class  () Pt has not had class Medication Goals  Discharge          % proper med usage see above      () Pt had class  Date: See above  () Pt has not had class             ADL  INITIAL ASSESSMENT      (x) Impaired ADL ability  () Need for Assist Devices  (x) Generalized weakness, low functional capacity  () Stairs in house               ADL  PLAN        -Initiate MN, RT, and chair squats  -Breathing retraining, PLB, and pacing      -Encourage home activity               ADL  RE ASSESSMENT      () Pt is progressing  () Pt is not progressing        () Pt has initiated home activity  () Pt has not yet initiated  home activity-      () ADLs getting easier  () ADLs not getting easier   ADL  RE ASSESSMENT      () Pt is progressing  () Pt is not progressing        () Pt has initiated home activity  () Pt has not yet initiated  home activity-      () ADLs getting easier  () ADLs not getting easier ADL  RE ASSESSMENT      () Pt is progressing  () Pt is not progressing        () Pt has initiated home activity  () Pt has not yet initiated  home activity-      () ADLs getting easier  () ADLs not getting easier ADL  DISCHARGE        () Pt showed strength and endurance gains  () Pt did not progress      () Pt doing recommended home activity  () Pt not doing home activitiy         ADL Goals   Initial Assessment      (x) Decrease SOB with ADLs              (x) Decreased SOB overall      ADL Goals Intervention/ Education Plan      -Initiate MN, RT and chair squats and increase pts strength and endurance        -Pacing, Breathing retraining       ADL Goals Reassessment        () ADLs getting easier  () ADLs not getting easier          () Pt states activities are getting easier/ able to go longer/ not get as SOB   ADL Goals Reassessment        () ADLs getting easier  () ADLs not getting easier          () Pt states activities are getting easier/ able to go longer/ not get as SOB ADL Goals Reassessment        () ADLs getting easier  () ADLs not getting easier          () Pt states activities are getting easier/ able to go longer/ not get as SOB   ADL Goals   Discharge        Goal achieved?  () Yes  () No            Goal achieved?  () Yes  () No          Outcomes:  See Health and Functioning from the CAT tool and Strength and Endurance from 6 MWD above             EXACERBAT'N PREVENTION & MANAGEMENT  INITIAL ASSESSMENT      Pt reports;  () 0 respiratory infections  () 1   (x) 2  () Hospitalized in last 12 months   EXACERBAT'N PREVENTION & MANAGEMENT  PLAN        Address via Disease Self Management and Exacerbation prevention class:    -Hydration for mucus    -Hand Hygiene          -Evaluation of Sputum    -When to call MD  -S/Sx to report  -Decrease exposure to irritants/ exacerbators    -Cleaning of respiratory equipment   EXACERBAT'N PREVENTION & MANAGEMENT  RE ASSESSMENT      () Pt has had class  () Pt has not had class        () Improved hydration    () Correct hand washing techs and alcohol gel usage    () Sputum assessment    () Ability to recognize exacerbation and when to call MD        () Cleaning of respiratory equipment EXACERBAT'N PREVENTION & MANAGEMENT  RE ASSESSMENT      () Pt has had class  () Pt has not had class        () Improved hydration    () Correct hand washing techs and alcohol gel usage    () Sputum assessment    () Ability to recognize exacerbation and when to call MD        () Cleaning of respiratory equipment EXACERBAT'N PREVENTION & MANAGEMENT  RE ASSESSMENT      () Pt has had class  () Pt has not had class        () Improved hydration    () Correct hand washing techs and alcohol gel usage    () Sputum assessment    () Ability to recognize exacerbation and when to call MD        () Cleaning of respiratory equipment EXACERBAT'N PREVENTION & MANAGEMENT  DISCHARGE        () Pt has had class  Date:  () Pt did not have class              () Addressed questions and pt feels comfortable with hydration, hand washing, sputum assessment, exacerbation knowledge, and cleaning of equipment   Exacerbation Prevention & Management Goals  Initial Assessment      (x) Learn ways to stay healthy and not get admitted          (x) Increase knowledge of Lungs, Breathing, Exercise, Nutrition, Mood and controlling anxiety, and stopping the Dyspnea Cycle by attending classes   Exacerbation Prevention & Management Goals Intervention/ Education  Plan    -Attend class and demonstrate disease self management strategies      -Attend all appropriate classes                   Exacerbation Prevention & Management Goals  Reassessment        () Pt has had class  () Pt has not had class        () Pt has had all classes  () Pt has had some classes  () Pt has had little/ not staying for education Exacerbation Prevention & Management Goals  Reassessment        () Pt has had class  () Pt has not had class        () Pt has had all classes  () Pt has had some classes  () Pt has had little/ not staying for education Exacerbation Prevention & Management Goals  Reassessment        () Pt has had class  () Pt has not had class        () Pt has had all classes  () Pt has had most classes  () Pt has had some of the classes  () Pt has had little/ not staying for education Exacerbation Prevention & Management Goals  Discharge        () Pt had class  Date:  See above  () Pt did not have class        () Pt attended all relevant classes and all questions were answered                   PHYSICIAN INTERACTION    MD Initial Assessment Review    (x) Initial  Assessment Reviewed  (x) Treatment Plan and Goals support patient needs/ abilities                  Cosigned and dated below:   PHYSICIAN INTERACTION    MD 30 day and Plan Review:      (x) I have seen the patient in rehab during this 30 day reassessment  (x) I agree with the plan  (x) Continue with progression and instruction  () Continue, but with the following changes:      Cosigned and dated below: Saritha Lopez MD 30 day Reassessment Review:    (x) I have seen the patient in rehab during this 30 day reassessment  (x) Continue with the current program  () Continue, but with the following changes:  () Discharge patient      Cosigned and dated below: Saritha Lopez MD 30 day Reassessment Review:    (x) I have seen the patient in rehab during this 30 day reassessment  (x) Continue with the current program  () Continue, but with the following changes:  () Discharge patient      Cosigned and dated below: Saritha Lopez MD 30 day Reassessment Review:    (x) I have seen the patient in rehab during this 30 day reassessment  (x) Continue with the current program  () Continue, but with the following changes:  () Discharge patient      Cosigned and dated below: Saritha Lopez MD 30 day   Discharge Review:    (x) Discharge patient                            Cosigned and dated below:

## 2022-03-22 NOTE — PROGRESS NOTES
[address]  INITIAL EVALUATION INTERVIEW    Name:  aCrli Han, : 1950    COVID -19 Screen  Do you have any of the following symptoms:  [] Fever [] Cough [] SOB [] Muscle/Body Ache [] Loss of taste/smell [x] None    Have you traveled outside of the [address] [place] [address] Rd[address]? [] Yes      [x] No    Have you been around anyone who has tested Positive for COVID 19?  [] Yes      [x] No        MEDICAL HISTORY  Diagnosis: Moderate COPD, Gold Stage 2. Patients medical history:  Please see H&P from ordering physician (which is in the chart)  Patients family history:  Please see H&P   Respiratory History:  First diagnosed approx 3-4 years. Last Hospitalization/ Urgent Care/ ED: 2022  Medications reviewed in [facility]:  Yes  Vaccinations:  Please see H&P  Allergies:  Please see H&P  Oxygen Rx at Rest:  2L/min (x) Continuous  () OCD. With Exercise:  2L/min   (x) Continuous  () OCD     Home CPAP or BIPAP:   No.  Pack Year Smoking history ([email or web address]): 48   Occupational, environmental or recreational exposure: no.   Alcohol or other substance abuse issues: Please see H&P. Does Marty Rivera have social support from family/friends: Yes    PHYSICAL ASSESSMENT          BP: 120/78. SpO2: 92% on room air. Pt did not wear her home O2 in for this appt. HR: 94. Breathing Pattern:  Regular/Normal (<12): ()Y (x)N. Ineffective (fast/shallow/using accessory muscles ()Y. Respiratory Rate: 20/min. For Chest Exam, Cardiac Exam, Finger Clubbing, UB and LB evaluation, please see Physicians H&P which is included in the chart. DIAGNOSTIC TESTS                        PFT:  For PFT and physicians interpretation, please see the test results which are included in the chart. SYMPTOM ASSESSMENT  Dyspnea:  1= Not troubled by breathlessness except on strenuous exercise. 2= SOB when hurrying/ walking up hill/ stairs.   3= Walks slower than peers on the level because of breathlessness or has to stop for breath when walking at own pace. 4= Stops for breath after 100m or after a few minutes. 5= Too breathless to leave the house or breathless when dressing/ undressing:  -What level is patient:  3. What brings dyspnea on:  exertion. How often does this happen?: not every day, depends on the activity for the day. How intense out of 5 is it: 2. How long does it last?: 5 min. Fatigue:  none (), mild (), moderate (x), high (). Cough/ Sputum Production:  (x) No cough    () Non productive cough  (x) Productive cough only w/ infection    () Productive cough daily <1 Tbsp    () Productive cough daily >1Tbsp    () Nasal congestion    Wheeze:  () never, () rarely, (x) occasionally, () often, () only during activity  Blood in phlegm:  (x) never, () rarely, () occasionally, () often  Chest Pain:  (x) never, () rarely, () occasionally, () often. Postnasal drainage:  () never, () rarely, () occasionally, (x) often  Reflux/ Heartburn: (x) never, () rarely, () occasionally, () often  Swelling in ankles:  (x) never, () rarely, () occasionally, () often  Trouble swallowing:  (x) never, () rarely, () occasionally, () often  Extremity pain:  (x) never, () rarely, () occasionally, () often  Feelings of Anxiety, Panic, Fear or Isolation:  () never, () rarely, () occasionally, (x) often  Symptoms of Depression per pt:  () never, (x) rarely, () occasionally, () often    MUSCULOSKELETAL and EXERCISE ASSESSMENT      Any Physical Limitations/ Problems?: (x) Strength, () ROM, () Posture, () Functional ability, () Orthopedic limits, () Transferring abilities, (x) Exercise Tolerance, () Exercise Hypoxia, () Gait and Balance    PAIN ASSESSMENT  Location: none    ADL ASSESSMENT   Please see ADL's assessed using [facility] SOBQ in the chart    NUTRITION ASSESSMENT  Height: 5'3\" . Weight:  138.8 lbs. BMI: 24.6.   Any recent weight changes?: No.  Diet habits (type, patterns, etc.); Normal diet. Who does the shopping/ preparing?: patient. Fluid intake:  8 cups/day. Does patient take supplemental nutrition or vitamins?: Yes. EDUCATIONAL ASSESSMENT  Knowledge of disease and its treatment via the Health Knowledge Test and self report:  (x) lacking  () good  () very good. Barriers to learning: () Speech  () Hearing  () Vision  () Literacy  () Cognitive  (X) Ready to Learn. Cultural Diversity issues?   No        REHABILITATION POTENTIAL:  excellent (poor, fair, good, excellent)

## 2022-03-30 RX ORDER — SIMVASTATIN 10 MG
TABLET ORAL
Qty: 90 TABLET | Refills: 3 | Status: SHIPPED | OUTPATIENT
Start: 2022-03-30

## 2022-03-31 ENCOUNTER — HOSPITAL ENCOUNTER (OUTPATIENT)
Dept: CARDIAC REHAB | Age: 72
Setting detail: THERAPIES SERIES
End: 2022-03-31
Payer: MEDICARE

## 2022-04-05 ENCOUNTER — HOSPITAL ENCOUNTER (OUTPATIENT)
Dept: CARDIAC REHAB | Age: 72
Setting detail: THERAPIES SERIES
Discharge: HOME | End: 2022-04-05
Payer: MEDICARE

## 2022-04-05 PROCEDURE — 94626 PHY/QHP OP PULM RHB W/MNTR: CPT

## 2022-04-07 ENCOUNTER — HOSPITAL ENCOUNTER (OUTPATIENT)
Dept: CARDIAC REHAB | Age: 72
Setting detail: THERAPIES SERIES
Discharge: HOME | End: 2022-04-07
Payer: MEDICARE

## 2022-04-07 PROCEDURE — 94626 PHY/QHP OP PULM RHB W/MNTR: CPT

## 2022-04-12 ENCOUNTER — HOSPITAL ENCOUNTER (OUTPATIENT)
Dept: CARDIAC REHAB | Age: 72
Setting detail: THERAPIES SERIES
Discharge: HOME | End: 2022-04-12
Payer: MEDICARE

## 2022-04-12 PROCEDURE — 94626 PHY/QHP OP PULM RHB W/MNTR: CPT

## 2022-04-14 ENCOUNTER — TELEPHONE (OUTPATIENT)
Dept: FAMILY MEDICINE CLINIC | Age: 72
End: 2022-04-14

## 2022-04-14 ENCOUNTER — HOSPITAL ENCOUNTER (OUTPATIENT)
Dept: CARDIAC REHAB | Age: 72
Setting detail: THERAPIES SERIES
Discharge: HOME | End: 2022-04-14
Payer: MEDICARE

## 2022-04-14 ENCOUNTER — OFFICE VISIT (OUTPATIENT)
Dept: FAMILY MEDICINE CLINIC | Age: 72
End: 2022-04-14
Payer: MEDICARE

## 2022-04-14 VITALS
OXYGEN SATURATION: 95 % | HEART RATE: 116 BPM | BODY MASS INDEX: 24.52 KG/M2 | RESPIRATION RATE: 16 BRPM | WEIGHT: 138.4 LBS | SYSTOLIC BLOOD PRESSURE: 136 MMHG | DIASTOLIC BLOOD PRESSURE: 70 MMHG | TEMPERATURE: 97.8 F

## 2022-04-14 DIAGNOSIS — R19.7 DIARRHEA, UNSPECIFIED TYPE: ICD-10-CM

## 2022-04-14 DIAGNOSIS — R41.89 COGNITIVE IMPAIRMENT: ICD-10-CM

## 2022-04-14 DIAGNOSIS — F17.210 SMOKING GREATER THAN 40 PACK YEARS: ICD-10-CM

## 2022-04-14 DIAGNOSIS — N64.52 DISCHARGE FROM BREAST: Primary | ICD-10-CM

## 2022-04-14 DIAGNOSIS — R91.1 PULMONARY NODULE, LEFT: ICD-10-CM

## 2022-04-14 DIAGNOSIS — J43.9 PULMONARY EMPHYSEMA, UNSPECIFIED EMPHYSEMA TYPE (HCC): ICD-10-CM

## 2022-04-14 DIAGNOSIS — R19.5 DARK STOOLS: ICD-10-CM

## 2022-04-14 DIAGNOSIS — Z87.2 HISTORY OF ABSCESS OF BREAST: ICD-10-CM

## 2022-04-14 DIAGNOSIS — E03.9 HYPOTHYROIDISM, UNSPECIFIED TYPE: ICD-10-CM

## 2022-04-14 DIAGNOSIS — J96.11 CHRONIC RESPIRATORY FAILURE WITH HYPOXIA (HCC): ICD-10-CM

## 2022-04-14 PROCEDURE — G8427 DOCREV CUR MEDS BY ELIG CLIN: HCPCS | Performed by: NURSE PRACTITIONER

## 2022-04-14 PROCEDURE — 1090F PRES/ABSN URINE INCON ASSESS: CPT | Performed by: NURSE PRACTITIONER

## 2022-04-14 PROCEDURE — 4040F PNEUMOC VAC/ADMIN/RCVD: CPT | Performed by: NURSE PRACTITIONER

## 2022-04-14 PROCEDURE — 3023F SPIROM DOC REV: CPT | Performed by: NURSE PRACTITIONER

## 2022-04-14 PROCEDURE — 1123F ACP DISCUSS/DSCN MKR DOCD: CPT | Performed by: NURSE PRACTITIONER

## 2022-04-14 PROCEDURE — G8420 CALC BMI NORM PARAMETERS: HCPCS | Performed by: NURSE PRACTITIONER

## 2022-04-14 PROCEDURE — 94626 PHY/QHP OP PULM RHB W/MNTR: CPT

## 2022-04-14 PROCEDURE — 99214 OFFICE O/P EST MOD 30 MIN: CPT | Performed by: NURSE PRACTITIONER

## 2022-04-14 PROCEDURE — 1036F TOBACCO NON-USER: CPT | Performed by: NURSE PRACTITIONER

## 2022-04-14 PROCEDURE — 3017F COLORECTAL CA SCREEN DOC REV: CPT | Performed by: NURSE PRACTITIONER

## 2022-04-14 PROCEDURE — G8399 PT W/DXA RESULTS DOCUMENT: HCPCS | Performed by: NURSE PRACTITIONER

## 2022-04-14 RX ORDER — FLUTICASONE PROPIONATE 250 UG/1
POWDER, METERED RESPIRATORY (INHALATION)
Qty: 60 EACH | Refills: 11 | Status: SHIPPED | OUTPATIENT
Start: 2022-04-14 | End: 2022-05-03

## 2022-04-14 RX ORDER — ALBUTEROL SULFATE 90 UG/1
2 AEROSOL, METERED RESPIRATORY (INHALATION) 4 TIMES DAILY PRN
Qty: 18 G | Refills: 2 | Status: SHIPPED | OUTPATIENT
Start: 2022-04-14

## 2022-04-14 ASSESSMENT — ENCOUNTER SYMPTOMS
COUGH: 0
CHEST TIGHTNESS: 0
DIARRHEA: 1
SHORTNESS OF BREATH: 0

## 2022-04-14 NOTE — PROGRESS NOTES
Subjective:      Patient ID: Juan Severino is a 70 y.o. female. Diarrhea   Pertinent negatives include no coughing.   : ED Follow up    Chief Complaint   Patient presents with    Breast Pain     left side with occasional drainage- patient had breast sx for abscess of bilateral breasts in the 1980's     Diarrhea     with black stool, living on pepto     Discuss Medications     inhaler use     Medication Refill       White discharge from breast in previous area of abscess. Denies redness or pain or swelling or cyst formation. No longer discharge. No nipple discharge. COLONOSCOPY - 2019. Diarrhea ongoing x 1 week. Denies appetite change. No N/V. No cramping. Vitals:    04/14/22 1129   BP: 136/70   Pulse: 116   Resp: 16   Temp: 97.8 °F (36.6 °C)   SpO2: 95%       Denies CP, SOB or chest tightness. ECHO 2019 EF 60% with LV function normal.  PFT 2019 showed moderate emphysema. 40 pack year hx. Anoro and NEB tx. Has O2 at home she uses with activity and PRN at night. Following with PULM at [facility]. Marc Zarco     3/23/22 CT Chest  IMPRESSION:     New spiculated noncalcified nodule superior segment left lower lobe.     Consider PET CT scan or biopsy.          COPD.          Small hiatal hernia.           Patient Active Problem List   Diagnosis    Hyperlipemia    Hypothyroidism    Depression    Chronic anxiety    Osteopenia    Medication monitoring encounter    H/O: CVA (cerebrovascular accident), aneurysm bleed, Jan 2015    Abnormality of gait following cerebrovascular accident    GERD (gastroesophageal reflux disease)    Cognitive impairment due to CVA.     OAB (overactive bladder)    Chronic nausea    SOB (shortness of breath)    Hypoxia    Major depressive disorder, single episode, in full remission ([facility] [address]    Smoking greater than 40 pack years    Pulmonary emphysema (HCC)    Syncope and collapse    Concussion with loss of consciousness    Laceration of head    Alcohol abuse    Spinal stenosis, cervical region    Normal pressure hydrocephalus (HCC)    Michael's edema of vocal folds       MAMMO - 2019  COLONOSCOPY - 2009  DEXA - 2020 Osteoporosis  CT Lung Screen - 2020    NEURO - Dr. Gladies Spurling - Dr. Trinh Northern Dr. Desirae Wagner    She is on Cymbalta 60 mg, Straterra 40 mg and Aricept 10 mg. .  Mood stable. Take off Seroquel due to next morning drowsinesss. Placed on hydroxyzine with benefit sleep and more energy during the day. Likes benefit of medication changes     She is following with counselor via [facility]. [address] [facility] meetings for alcohol abuse. She was drinking a glass of wine in evening. On camparell 2 tabs in evening to decrease alcohol cravings. It is doing well. No wine since last visit. Feeling well. Patient is status post CVA but doing well subsequently. Follows with NEURO. Her speech is essentially back to normal also. She is on Strattera and Aricept due to cognitive changes s/p CVA. Seen Dr. Janet Varela regarding potential hydrocephalus. MRA June 2020 and CT Head June 2020 were unremarkable    Vitals:    04/14/22 1129   BP: 136/70   Pulse: 116   Resp: 16   Temp: 97.8 °F (36.6 °C)   SpO2: 95%       Her hypothyroidism continues to respond well to her current thyroid dose. She is on 50 µg daily. Lab Results   Component Value Date    TSH 2.260 11/08/2021     BP wnl    BP Readings from Last 3 Encounters:   04/14/22 136/70   03/01/22 120/76   01/19/22 134/78     Labs reviewed.       Lab Results   Component Value Date    LABA1C 5.5 06/28/2021    LABA1C 5.1 06/16/2020    LABA1C 5.3 04/13/2019     No results found for: EAG    No components found for: CHLPL  Lab Results   Component Value Date    TRIG 80 06/28/2021    TRIG 110 06/16/2020    TRIG 81 04/13/2019     Lab Results   Component Value Date     06/28/2021     (A) 06/16/2020     (A) 04/13/2019     Lab Results   Component Value Date    LDLCALC 109 06/28/2021    LDLCALC 85 06/16/2020    LDLCALC 98 04/13/2019     No results found for: LABVLDL      Chemistry        Component Value Date/Time     12/19/2021 1425    K 3.3 (L) 12/19/2021 1425     12/19/2021 1425    CO2 24 12/19/2021 1425    BUN 19 12/19/2021 1425    CREATININE 0.9 12/19/2021 1425        Component Value Date/Time    CALCIUM 9.0 12/19/2021 1425    ALKPHOS 46 12/19/2021 1425    AST 24 12/19/2021 1425    ALT 15 12/19/2021 1425    BILITOT 0.3 12/19/2021 1425          Lab Results   Component Value Date    TSH 2.260 11/08/2021       Lab Results   Component Value Date    WBC 6.9 12/19/2021    HGB 14.2 12/19/2021    HCT 42.6 12/19/2021    MCV 90.3 12/19/2021     12/19/2021         Health Maintenance   Topic Date Due    Annual Wellness Visit (AWV)  03/02/2022    Breast cancer screen  06/18/2022    Lipid screen  06/28/2022    TSH testing  11/08/2022    Depression Monitoring  01/19/2023    Low dose CT lung screening  03/23/2023    DTaP/Tdap/Td vaccine (2 - Td or Tdap) 04/08/2024    Colorectal Cancer Screen  08/29/2029    DEXA (modify frequency per FRAX score)  Completed    Flu vaccine  Completed    Shingles Vaccine  Completed    Pneumococcal 65+ years Vaccine  Completed    COVID-19 Vaccine  Completed    Hepatitis C screen  Completed    Hepatitis A vaccine  Aged Out    Hepatitis B vaccine  Aged Out    Hib vaccine  Aged Out    Meningococcal (ACWY) vaccine  Aged [employer] History   Administered Date(s) Administered    COVID-19, Moderna, Primary or Immunocompromised, PF, 100mcg/0.5mL 02/08/2021, 03/08/2021, 11/07/2021    Influenza A ([id number]) Vaccine PF IM 12/19/2009    Influenza Vaccine, unspecified formulation 12/06/2017    Influenza Virus Vaccine 12/06/2017    Influenza, High Dose (Fluzone 65 yrs and older) 12/06/2017, 11/07/2018, 10/13/2020    Influenza, High-dose, Quadv, 65 yrs +, IM (Fluzone) 10/13/2020    Influenza, Quadv, adjuvanted, 65 yrs +, IM, PF (Fluad) 10/06/2021    Influenza, Triv, inactivated, subunit, adjuvanted, IM (Fluad 65 yrs and older) 10/30/2019    Pneumococcal Conjugate 13-valent ([id number]) 07/21/2015, 07/26/2017    Pneumococcal Polysaccharide ([id number]) 11/13/2018    Pneumococcal Vaccine 11/14/2016    Tdap (Boostrix, Adacel) 04/08/2014    Zoster Recombinant (Shingrix) 10/13/2020, 12/12/2020       Review of Systems   HENT: Negative. Respiratory: Negative for cough, chest tightness and shortness of breath. Gastrointestinal: Positive for diarrhea. Genitourinary: Negative for frequency. Neurological: Negative for dizziness and light-headedness. Psychiatric/Behavioral: Positive for confusion. Negative for dysphoric mood. The patient is nervous/anxious. The patient is not hyperactive. Objective:   Physical Exam  Constitutional:       General: She is not in acute distress. HENT:      Head: Normocephalic. Right Ear: Tympanic membrane normal.      Left Ear: Tympanic membrane normal.      Nose: Nose normal.   Eyes:      Pupils: Pupils are equal, round, and reactive to light. Neck:      Vascular: No carotid bruit. Cardiovascular:      Rate and Rhythm: Normal rate and regular rhythm. Pulses: Normal pulses. Heart sounds: Normal heart sounds. No murmur heard. Pulmonary:      Effort: Pulmonary effort is normal.      Breath sounds: Normal breath sounds. Decreased air movement present. No wheezing. Chest:   Breasts:      Right: No inverted nipple, nipple discharge, skin change or tenderness. Left: No inverted nipple, mass, nipple discharge, skin change or tenderness. Abdominal:      General: Bowel sounds are normal. There is no distension. Palpations: Abdomen is soft. Tenderness: There is no abdominal tenderness. Musculoskeletal:         General: No tenderness. Normal range of motion. Cervical back: Normal range of motion and neck supple.    Skin:     General: Skin is warm and dry.      Findings: No rash. Neurological:      Mental Status: She is alert and oriented to person, place, and time. Psychiatric:         Attention and Perception: She is inattentive. Mood and Affect: Mood is anxious and depressed. Behavior: Behavior is cooperative. Thought Content: Thought content normal.         Cognition and Memory: She exhibits impaired recent memory. Judgment: Judgment normal.         Assessment:       Diagnosis Orders   1. Discharge from breast  [facility] DIGITAL DIAGNOSTIC W OR WO CAD BILATERAL    US BREAST LIMITED LEFT   2. History of abscess of breast     3. Dark stools, PEPTO intake     4. Diarrhea, unspecified type     5. Pulmonary emphysema, unspecified emphysema type (HCC)  albuterol sulfate HFA (VENTOLIN HFA) 108 (90 Base) MCG/ACT inhaler    beclomethasone (QVAR) 80 MCG/ACT inhaler   6. Chronic respiratory failure with hypoxia (HCC)     7. Smoking greater than 40 pack years     8. Pulmonary nodule, left     9. Cognitive impairment due to CVA.      10. Hypothyroidism, unspecified type             Plan:       MAMMO and US  Exam Benign  Refill on INHALERS  Follow up with PULM regarding new nodule on CT  OTC Probiotic for diarrhea  PEPTO reason for dark stools  Chronic conditions stable  MAINOR Cole, APRN - CNP

## 2022-04-14 NOTE — TELEPHONE ENCOUNTER
Fax received from [facility] DR MACARIO GONZÁLES requesting a PA on Qvar. Fax does list alternatives that do not require a PA as, Advair HFA, Arnuity Ellipta, Breo Ellipta, Flovent Diskus, Flovent HFA, Sprivia Respimat, Symbicort. Do you want to proceed with the PA?

## 2022-04-15 DIAGNOSIS — Z86.73 H/O: CVA (CEREBROVASCULAR ACCIDENT): ICD-10-CM

## 2022-04-15 DIAGNOSIS — R41.89 COGNITIVE IMPAIRMENT: ICD-10-CM

## 2022-04-15 RX ORDER — DONEPEZIL HYDROCHLORIDE 10 MG/1
TABLET, FILM COATED ORAL
Qty: 90 TABLET | Refills: 1 | Status: SHIPPED | OUTPATIENT
Start: 2022-04-15

## 2022-04-19 ENCOUNTER — HOSPITAL ENCOUNTER (OUTPATIENT)
Dept: CARDIAC REHAB | Age: 72
Setting detail: THERAPIES SERIES
Discharge: HOME | End: 2022-04-19
Payer: MEDICARE

## 2022-04-19 PROCEDURE — 94626 PHY/QHP OP PULM RHB W/MNTR: CPT

## 2022-04-19 PROCEDURE — 94625 PHY/QHP OP PULM RHB W/O MNTR: CPT

## 2022-04-19 NOTE — PROGRESS NOTES
PULMONARY REHABILITATION / RTR PROGRAM  EDUCATION SESSION      Juan R Galdamez  Session: _____      ANATOMY/LIVING WITH CHRONIC LUNG DISEASE -  I went over how our lungs work and what happens in our body when we breathe. I then further explained what is happening in their lungs due to chronic lung disease and we discussed the several different types of chronic lung disease. BREATHING RETRAINING - I went over pursed lip breathing and diaphragmatic breathing with the pt. I explained to him/her the importance and benefits of these 2 breathing techniques. DYSPNEA CYCLE -  I explained to the Dyspnea cycle to the pt and gave them tips on how to break the cycle. PREVENTING INFECTION - I went over proper handwashing technique and its importance. We also discussed the importance of the flu and pneumonia vaccine and the warning signs of an infection. Pt had no questions at this time. Time spent: 32 Minutes.

## 2022-04-21 ENCOUNTER — HOSPITAL ENCOUNTER (OUTPATIENT)
Dept: CARDIAC REHAB | Age: 72
Setting detail: THERAPIES SERIES
Discharge: HOME | End: 2022-04-21
Payer: MEDICARE

## 2022-04-21 PROCEDURE — 94626 PHY/QHP OP PULM RHB W/MNTR: CPT

## 2022-04-21 NOTE — PROGRESS NOTES
[facility] Facility-Based Therapy for COPD   INDIVIDUAL TREATMENT PLAN (ITP)     Name: Katherine Granados   :  1950  Acct Number: [[id number]]   AGE: 70 y.o. Diagnosis:  Moderate COPD, which is GOLD Stage 2                                               PFT:  Post Bronchodilator FEV1/FVC: 59  FEV1: 59    Patient Goals:  (x) Breathe better  (x) Increase my endurance (x) Have more energy  (x) Rely less on others  (x) Ease ADLs  (x) Understand and use meds correctly  (x) Control cough  (x) Make fewer visits to hospital  () Quit smoking  (x) Feel less anxious / have more confidence    Glossary:  WI=Pulmonary Rehab  PF=Physical Fitness TM=Treadmill  AD=Schwinn Airdyne  UBE=UpperBody Ergometer  RT=Resistance Training  PLB=Pursed Lip Breathing      COVID -19 Screen  Do you have any of the following symptoms:  [] Fever [] Cough [] SOB [] Muscle/Body Ache [] Loss of taste/smell [x] None    Have you traveled outside of the [place]? [] Yes      [x] No    Have you been around anyone who has tested Positive for COVID 19?  [] Yes      [x] No    The following Education has been completed with patient. [x] Wait in the lobby until we call you back to rehab. [x] You must wear a mask while in the medical center and in pulmonary rehab unless exercising. Please bring your own. [x] Bring your own bottle of water with you. [x] You can not bring anyone with you in to the rehab clinic at this time. They are welcome to sit in the lobby or wait in the car.       INDIVIDUAL TREATMENT PLAN    INITIAL ASSESSMENT    Day #1 INDIVIDUAL TREATMENT PLAN    PLAN      Day #2-30 INDIVIDUAL TREATMENT PLAN    RE ASSESSMENT    Day #31-60 INDIVIDUAL TREATMENT PLAN    RE ASSESSMENT    Day #61-90 INDIVIDUAL TREATMENT PLAN    RE ASSESSMENT    Day # INDIVIDUAL TREATMENT PLAN    DISCHARGE      Last Day        Date: 3/22/2022     Date: 2022   Date:    Date:    Date:    Date:    EXERCISE   INITIAL ASSESSMENT      Prescribed Oxygen Use  Activity:2L/min  (x) Continuous  () Breath Actuated      Oxygen Titration  (x) Assess for desaturation            Current Home Exercise:  None   EXERCISE  PLAN        Current Oxygen Use  Activity:  2L/min  (x) Continuous  () Breath Actuated      Oxygen Titration  (x) Maintaining >87% Spo2  () Not maintaining -  L/min needed on       Current Home Exercise:  none   EXERCISE  RE ASSESSMENT      Current Oxygen Use  Activity:  L/min  () Continuous  () Breath Actuated      Oxygen Titration  () Maintaining >87% Spo2  () Not maintaining -  L/min needed on       Current Home Exercise:  Frequency:  x/wk  Intensity:  /10  Duration:  min  Mode: EXERCISE  RE ASSESSMENT      Current Oxygen Use  Activity:  L/min  () Continuous  () Breath Actuated      Oxygen Titration  () Maintaining >87% Spo2  () Not maintaining -  L/min needed on       Current Home Exercise:  Frequency:  x/wk  Intensity:  /10  Duration:  min  Mode: EXERCISE  RE ASSESSMENT      Current Oxygen Use  Activity:  L/min  () Continuous  () Breath Actuated      Oxygen Titration  () Maintaining >87% Spo2  () Not maintaining -  L/min needed on       Current Home Exercise:  Frequency:  x/wk  Intensity:  /10  Duration:  min  Mode:   EXERCISE  DISCHARGE        Final Oxygen Use  Activity:  L/min  () Continuous  () Breath Actuated      Oxygen Titration  () Maintaining >87% Spo2  () Not maintaining -  L/min needed on       Current Home Exercise:  Frequency:  x/wk  Intensity:  /10  Duration:  min  Mode:       6 Minute Walk Test (6MWT):  O2 used: 2LPM nasal cannula   1100 ft walked = 2.5 METs  SpO2: 90-96%  HR:    RPD: 2  RPE: 1  Number of Breaks:  none   Avg time for break:  n/a  Assist device used: none         6 Minute Walk Test (6MWT):  O2 used:   ft walked =  METs  SpO2:  %  HR:    RPD:   RPE:  Number of Breaks:    Avg time for break:  secs  Assist device used:             OUTCOMES:  6MWD Improvement:  > 98'?  () Yes  () No Exercise Prescription    THR: 74 - 119 bpm    Frequency:  2-3x/wk in [place], 1-3x/wk home activity    Intensity:  METs (from 6MWT)      Time:  15-30 total minutes up to 55 minutes max    Type:  Aerobic (TM, AD, UBE, NuStep), 6 ST, RT 1-10# 8-15 reps    Progression:  Increase 30s - 2 min/mode for Aerobic activity, and increase Intensity 2-5% each week if RPE <5, RPD <5, SpO2 >87% and pt is within [facility] above. Exercise Prescription    (x) Pt exercising within THR    Frequency:  2-3x/wk in MT, 1-3x/wk home activity    Intensity:  3-5 on [place] Dyspnea/ Fatigue scale    Time:  15-30 total minutes up to 55 minutes max    Type:  Aerobic (TM, AD, UBE, NuStep), 6 ST, RT 1-10# 8-15 reps    Progression:  Increase 30s - 2 min/mode for Aerobic activity, and increase Intensity 2-5% each week if RPE <5, RPD <5, SpO2 >87% and pt is within [facility] above. Exercise Prescription    () Pt exercising within THR    Frequency:  2-3x/wk in [place], 2-5x/wk home activity    Intensity:  3-5 on [place] Dyspnea/ Fatigue scale    Time:  30-45 total minutes up to 55 minutes max    Type:  Aerobic (TM, AD, UBE, NuStep), 6 ST, RT 1-10# 8-15 reps    Progression:  Increase 30s - 2 min/mode for Aerobic activity, and increase Intensity 2-5% each week if RPE <5, RPD <5, SpO2 >87% and pt is within [facility] above. Exercise Prescription    () Pt exercising within THR    Frequency:  2-3x/wk in [place], 2-5x/wk home activity    Intensity:  3-5 on [place] Dyspnea/ Fatigue scale    Time:  30-45 total minutes up to 55 minutes max    Type:  Aerobic (TM, AD, UBE, NuStep), 6 ST, RT 1-10# 8-15 reps    Progression:  Increase 30s - 2 min/mode for Aerobic activity up to 35 minutes, and increase Intensity 2-5% each week if RPE <5, RPD <5, SpO2 >87% and pt is within [facility] above.  Exercise Prescription    () Pt exercising within THR    Frequency:  2-3x/wk in MT, 2-5x/wk home activity    Intensity:  3-5 on [place] Dyspnea/ Fatigue scale    Time:  30-45 total minutes up to 55 minutes max    Type: Goals   Plan      -Initiate CT 2x/week  -Encourage home exercise    -Attend Home Activity EDUCATION class      -Slowly, safely, progress in CT    Exercise Goals   Reassessment      () Pt is coming regularly  () Pt is sporadic    () Pt has had class  () Pt has not had class        () Pt is progressing  () Pt is not progressing Exercise Goals   Reassessment      () Pt is coming regularly  () Pt is sporadic    () Pt has had class  () Pt has not had class        () Pt is progressing  () Pt is not progressing Exercise Goals   Reassessment      () Pt is coming regularly  () Pt is sporadic    () Pt has had class  () Pt has not had class        () Pt is progressing  () Pt is not progressing   Exercise Goals Discharge      () GOAL Met?  () GOAL not met -      () Pt had EDUCATION class  Date:   () Pt did not have class    () GOAL Met  See 6MWD above  () GOAL not Met:     Exercise Intervention Initial Assessment      () Transportation needed        (x) EDUCATION needed          (x) Motivation needed   Exercise Intervention/ Education   Plan      () [facility] set up        (x) EDUCATION:  Home activity class to be given        (x) Positive encouragement, support and motivation to be given   Exercise Intervention/ Education Reassessment      () Pt is attending  () Pt is not attending      () Pt has had class  () Pt has not had class      () Pt is progressing  () Pt not progressing Exercise Intervention/ Education Reassessment      () Pt is attending  () Pt is not attending      () Pt has had class  () Pt has not had class      () Pt is progressing  () Pt not progressing Exercise Intervention/ Education Reassessment      () Pt is attending  () Pt is not attending      () Pt has had class  () Pt has not had class      () Pt is progressing  () Pt not progressing Exercise Intervention/ Edcuation Discharge      () Pt attended most  () Pt cancelled a lot    () Pt has had class  Date: See above  () Pt did not have class    () Pt progressed and did well  () Pt had difficulty-               NUTRITION   INITIAL ASSESSMENT      Height:  53   Weight: 138.8 lbs  BMI: 24.6    30 day goal: 138 Lbs (2-4lbs)    () Overweight  () Underweight  (x) Normal  () Teeth issues  () GI issues  (x) Nutrition knowledge needed  () DM   NUTRITION   PLAN        Current Weight:  138.8lbs         NUTRITION  RE ASSESSMENT      Current Weight:  lbs      Goal achieved?:  Next 30 day goal: Lbs NUTRITION  RE ASSESSMENT      Current Weight:  lbs      Goal achieved?:  Next 30 day goal: Lbs NUTRITION  RE ASSESSMENT      Current Weight:  lbs      Goal achieved?:  Next 30 day goal: Lbs   NUTRITION DISCHARGE        Current Weight:  lbs  BMI:    Goal achieved?:     Nutrition Goals  Initial Assessment    () Pt is DM.   Increase nutrition knowledge and glucose control through diet and exercise      (x) Learn weight strategies to maintain weight        (x) Learn about general nutrition          -Achievable weight goal for the end of the program:  138 Lbs (2-4lbs per month recommended)   Nutrition Goals   Plan      () DM:  Attend nutrition class and learn about quality carbohydrates and exercises role in DM    (x) Attend nutrition class          (x) Attend nutrition class          (x) Initiate exercise and give pt hints and tips for weight and will have class for information   Nutrition Goals  Reassessment      () DM: Pt has had class  () DM: Pt has not had class           () Pt has had class  () Pt has not had class       () Pt has had class  () Pt has not had class       () Pt progressing  () Pt not progressing     Nutrition Goals  Reassessment      () DM: Pt has had class  () DM: Pt has not had class           () Pt has had class  () Pt has not had class       () Pt has had class  () Pt has not had class       () Pt progressing  () Pt not progressing     Nutrition Goals  Reassessment      () DM: Pt has had class  () DM: Pt has not had class           () Pt has had class  () Pt Depression/ Anxiety Class                Pre Rehab PHQ-9   Score:   1-4 Normal  5-9 Mild  10-14 Mod  15-19 Mod-Sev  >19 Severe        Pre Rehab UCSD SOB Score:  47      (x) Attend classes and attend rehab to increase strength and endurance      -Attend Psychosocial class          () Speak with the patient/ family about ability to commit to the program with undue stress/ anxiety   PSYCHO SOCIAL  RE ASSESSMENT    () Pt scored >10 on PHQ-9.   Spoke with pt privately about issues and *             () Pt recommended to contact physician for assistance                See below for ADLs        () Pt has had class  () Pt has not had class        Re assessment of support:  Good, pt has been attending PSYCHO SOCIAL  RE ASSESSMENT    () Pt is coping well  () Pt needs more assistance-              () Pt recommended to contact physician for assistance                See below          () Pt has had class  () Pt has not had           Re assessment of support:  Good, pt has been attending PSYCHO SOCIAL  RE ASSESSMENT    () Pt is coping well  () Pt needs more assistance-              () Pt recommended to contact physician for assistance                See below          () Pt has had class  () Pt has not had class        Re assessment of support:  Good, pt has been attending   PSYCHO SOCIAL DISCHARGE      (x) Pt attended class     Date:              Post Rehab PHQ-9   Depression Score:                Post Rehab UCSD SOB  Score:      (x) Pt is less SOB with ADLs            () Pt had class  Date: See above  () Pt did not have class        () Pt completed program  () Pt had to stop         Psychosocial Goals   Initial Assessment    (x) Maintain/ Decrease Depression Levels      (x) Maintain/ Decrease Anxiety Levels        (x) Learn about Emotional Health          (x) Increase QoL   (CAT tool)          (x) Give the CAT tool for HR QoL      Pre Rehab  CAT score:  28/ 40       Psychosocial Goals  Plan      (x) Attend Stress/ Anxiety/ Dyspnea - Panic control class      (x) Attend Stress/ Anxiety/ Dyspnea - Panic control class      (x) Attend Stress/ Anxiety/ Dyspnea - Panic control class      (x) Initiate WA, get stronger, more endurance and more confidence              30 day CAT score:  24/ 40     Psychosocial Goals  Reassessment      () Pt has had class  () Pt has not had class      () Pt has had class  () Pt has not had class      () Pt has had class  () Pt has not had class      () Pt is progressing  () Pt is not progressing              60 day CAT score:  / 40 Psychosocial Goals  Reassessment      () Pt has had class  () Pt has not had class      () Pt has had class  () Pt has not had class      () Pt has had class  () Pt has not had class      () Pt is progressing  () Pt is not progressing              90 day CAT score:  / 40 Psychosocial Goals  Reassessment      () Pt has had class  () Pt has not had class      () Pt has had class  () Pt has not had class      () Pt has had class  () Pt has not had class      () Pt is progressing  () Pt is not progressing              120 day CAT score:  / 40   Psychosocial Goals   Discharge      (x) Pt had class            (x) Pt had class            (x) Pt had class  Date: See above  () Pt did not have class      Post rehab CAT below                  Post Rehab   CAT score:  / 40              OUTCOMES    PHQ-9:  Did pt drop a severity level or maintain in the minimal range?  () Y  () N    UCSD SOB  Did pt decrease their number by 5 or more?  () Y  () N    CAT scores:  Did pt drop by 2 or more points from Pre to Post?  () Y  () N        Psychosocial Intervention/ Education   Initial Assessment    (x) Pt is being treated for depression/ anxiety        (x) Pt would benefit from [facility]s Psychosocial Issues Class (Stress, Depression, Anxiety, and Intimacy   Psychosocial  Intervention/ Education  Plan      (x) Pt to contact physician if any severe changes occur in mood        (x) Pt will attend WAs class Psychosocial Intervention/ Education Reassessment      () Pt is coping well  () Pt is not coping well         () Pt has had class  () Pt has not had class Psychosocial Intervention/ Education Reassessment      () Pt is coping well  () Pt is not coping well         () Pt has had class  () Pt has not had class Psychosocial Intervention/ Education Reassessment      () Pt is coping well  () Pt is not coping well         () Pt has had class  () Pt has not had class   Psychosocial Intervention/ Education Discharge      () Pt did not need any changes   () Pt needed changes to treatment      (x) Pt had class  Date: See above  () Pt did not have class         Pain   Initial Assessment    (x) N/A  Location:   Duration:   Intensity:  /10  Type:    Pain   Plan      (x) N/A    () Pts pain is under control  () Pt encouraged to contact physician for pain control   Pain   Reassessment      () N/A    () Pts pain is under control  () Pt encouraged to contact physician for pain control Pain   Reassessment      () N/A    () Pts pain is under control  () Pt encouraged to contact physician for pain control Pain   Reassessment      () N/A    () Pts pain is under control  () Pt encouraged to contact physician for pain control Pain   Discharge      () N/A    () Pts pain is under control  () Pt encouraged to contact physician for pain control           OXYGEN   INITIAL ASSESSMENT    RESTING:  (x) RA  () O2:  L/min  () Continuous  () Breath Actuated  92% SpO2 / 20 bpm    Prescribed Oxygen Use:  2 L/min at Rest and with exertion    DME Company for O2:  [facility]     OXYGEN   PLAN      RESTING:  (x) Monitor needs, administer supplemental oxygen if SpO2 <88% OXYGEN   RE ASSESSMENT    RESTING:  (x) Pt SpO2 >87%  () Pt needs higher flow  () Pt needs less flow OXYGEN   RE ASSESSMENT    RESTING:  (x) Pt SpO2 >87%  () Pt needs higher flow  () Pt needs less flow OXYGEN   RE ASSESSMENT    RESTING:  (x) Pt SpO2 >87%  () Pt needs higher flow  () Pt needs less flow OXYGEN   DISCHARGE      RESTING:  () Pt SpO2 remained >87% on * L/min at rest    () Pts doctor and company contacted for change in Rx   Oxygen Goals   Initial Assessment    (x) Wean, Titrate down, or get off O2 if possible   Oxygen Goals   Plan      (x) Closely monitor SpO2 and HR using pulse oximetry for saturation and HR response, and titrate if appropriate   Oxygen Goals  Reassessment    () RA    On exertion:  () Pt maintaining FiO2  () Pt tolerating lower O2 needs  () Pt needing more O2   Oxygen Goals  Reassessment    () RA    On exertion:  () Pt maintaining FiO2  () Pt tolerating lower O2 needs  () Pt needing more O2 Oxygen Goals  Reassessment    () RA    On exertion:  () Pt maintaining FiO2  () Pt tolerating lower O2 needs  () Pt needing more O2     Oxygen Goals   Discharge    () RA    With Exertion:  () Pt maintained FiO2  () Pt was found to need less O2 - notification sent to physician  () Pt was found to need more O2, notification sent to physician     Oxygen Intervention/ Education  Initial Assessment    (x) Learn / Review about O2 use, safety and travel      () Pt does not wear or have home oxygen    Oxygen Intervention/ Education  Plan      (x) Attend O2 Use, safety and travel class        (x) Assess for SpO2 with each exercise   Oxygen Intervention/ Education  Reassessment      () Pt has had class  () Pt has not had class      () Pt is >87%  () Pt has been found to desaturate - physician notified Oxygen Intervention/ Education  Reassessment      () Pt has had class  () Pt has not had class      () Pt is >87%  () Pt has been found to desaturate - physician notified Oxygen Intervention/ Education  Reassessment      () Pt has had class  () Pt has not had class      () Pt is >87%  () Pt has been found to desaturate - physician notified   Oxygen Intervention/ Education  Discharge      () Pt had class  Date:   () Pt did not have class      () Pt did well  () Pt needed to be put on oxygen TOBACCO USE  INITIAL ASSESSMENT    (x) Pt currently not smoking    () Pt currently smoking        () Pt needs Tobacco Cessation counseling          Smoked 1.25 ppd for 40 years  Pt quit in 2015.    TOBACCO USE  PLAN      (x) N/A      Does pt want to quit:   Does pt have a quit date:    () Tobacco Cessation counseling Education if applicable        () Encouraged to contact physician for tools/ nicotine replacement etc.   TOBACCO USE  RE ASSESSMENT    () N/A      Any change in Tobacco use status () N  ()Y      () Pt attended counseling education session            () Pt has contacted physician TOBACCO USE  RE ASSESSMENT    () N/A      Any change in Tobacco use status () N  ()Y      () Pt attended counseling education session            () Pt has contacted physician TOBACCO USE  RE ASSESSMENT    () N/A      Any change in Tobacco use status () N  ()Y      () Pt attended counseling education session            () Pt has contacted physician TOBACCO USE  DISCHARGE            Current Tobacco Use Status:         () Pt attended TC counseling education session  Date:           () Pt contacted physician        NRT product/ medication  :     Tobacco Use Goal  Initial Assessment      (x) Complete Cessation or Maintain Cessation of tobacco products   Tobacco Use Goal Intervention and Education Plan    (x Encourage pt to fight the urge, call quit line, use techniques learned from friends/ class    () Attend Tobacco Cessation class if needed   Tobacco Use  Reassessment          () Pt remains tobacco free            () Pt has had TC counseling session        () Pt still smoking Tobacco Use  Reassessment          () Pt remains tobacco free            () Pt has had TC counseling session        () Pt still smoking Tobacco Use  Reassessment          () Pt remains tobacco free            () Pt has had TC counseling session        () Pt still smoking Tobacco Use  Discharge          () Pt remains tobacco free            () Pt had TC not had class Medication Goals  Re assessment          () Readdressed questions on any medications    () Pt has had class  () Pt has not had class Medication Goals  Discharge          % proper med usage see above      () Pt had class  Date: See above  () Pt has not had class             ADL  INITIAL ASSESSMENT      (x) Impaired ADL ability  () Need for Assist Devices  (x) Generalized weakness, low functional capacity  () Stairs in house               ADL  PLAN        -Initiate MI, RT, and chair squats  -Breathing retraining, PLB, and pacing      -Encourage home activity               ADL  RE ASSESSMENT      () Pt is progressing  () Pt is not progressing        () Pt has initiated home activity  () Pt has not yet initiated  home activity-      () ADLs getting easier  () ADLs not getting easier   ADL  RE ASSESSMENT      () Pt is progressing  () Pt is not progressing        () Pt has initiated home activity  () Pt has not yet initiated  home activity-      () ADLs getting easier  () ADLs not getting easier ADL  RE ASSESSMENT      () Pt is progressing  () Pt is not progressing        () Pt has initiated home activity  () Pt has not yet initiated  home activity-      () ADLs getting easier  () ADLs not getting easier ADL  DISCHARGE        () Pt showed strength and endurance gains  () Pt did not progress      () Pt doing recommended home activity  () Pt not doing home activitiy         ADL Goals   Initial Assessment      (x) Decrease SOB with ADLs              (x) Decreased SOB overall      ADL Goals Intervention/ Education Plan      -Initiate MI, RT and chair squats and increase pts strength and endurance        -Pacing, Breathing retraining       ADL Goals Reassessment        () ADLs getting easier  () ADLs not getting easier          () Pt states activities are getting easier/ able to go longer/ not get as SOB   ADL Goals Reassessment        () ADLs getting easier  () ADLs not getting easier          () Pt states activities are getting easier/ able to go longer/ not get as SOB ADL Goals Reassessment        () ADLs getting easier  () ADLs not getting easier          () Pt states activities are getting easier/ able to go longer/ not get as SOB   ADL Goals   Discharge        Goal achieved?  () Yes  () No            Goal achieved?  () Yes  () No          Outcomes:  See Health and Functioning from the CAT tool and Strength and Endurance from 6 MWD above             EXACERBAT'N PREVENTION & MANAGEMENT  INITIAL ASSESSMENT      Pt reports;  () 0 respiratory infections  () 1   (x) 2  () Hospitalized in last 12 months   EXACERBAT'N PREVENTION & MANAGEMENT  PLAN        Address via Disease Self Management and Exacerbation prevention class:    -Hydration for mucus    -Hand Hygiene          -Evaluation of Sputum    -When to call MD  -S/Sx to report  -Decrease exposure to irritants/ exacerbators    -Cleaning of respiratory equipment   EXACERBAT'N PREVENTION & MANAGEMENT  RE ASSESSMENT      () Pt has had class  () Pt has not had class        () Improved hydration    () Correct hand washing techs and alcohol gel usage    () Sputum assessment    () Ability to recognize exacerbation and when to call MD        () Cleaning of respiratory equipment EXACERBAT'N PREVENTION & MANAGEMENT  RE ASSESSMENT      () Pt has had class  () Pt has not had class        () Improved hydration    () Correct hand washing techs and alcohol gel usage    () Sputum assessment    () Ability to recognize exacerbation and when to call MD        () Cleaning of respiratory equipment EXACERBAT'N PREVENTION & MANAGEMENT  RE ASSESSMENT      () Pt has had class  () Pt has not had class        () Improved hydration    () Correct hand washing techs and alcohol gel usage    () Sputum assessment    () Ability to recognize exacerbation and when to call MD        () Cleaning of respiratory equipment EXACERBAT'N PREVENTION & MANAGEMENT  DISCHARGE        () Pt has had class  Date:  () Pt did not have class              () Addressed questions and pt feels comfortable with hydration, hand washing, sputum assessment, exacerbation knowledge, and cleaning of equipment   Exacerbation Prevention & Management Goals  Initial Assessment      (x) Learn ways to stay healthy and not get admitted          (x) Increase knowledge of Lungs, Breathing, Exercise, Nutrition, Mood and controlling anxiety, and stopping the Dyspnea Cycle by attending classes   Exacerbation Prevention & Management Goals Intervention/ Education  Plan    -Attend class and demonstrate disease self management strategies      -Attend all appropriate classes                   Exacerbation Prevention & Management Goals  Reassessment        () Pt has had class  () Pt has not had class        () Pt has had all classes  () Pt has had some classes  () Pt has had little/ not staying for education Exacerbation Prevention & Management Goals  Reassessment        () Pt has had class  () Pt has not had class        () Pt has had all classes  () Pt has had some classes  () Pt has had little/ not staying for education Exacerbation Prevention & Management Goals  Reassessment        () Pt has had class  () Pt has not had class        () Pt has had all classes  () Pt has had most classes  () Pt has had some of the classes  () Pt has had little/ not staying for education Exacerbation Prevention & Management Goals  Discharge        () Pt had class  Date:  See above  () Pt did not have class        () Pt attended all relevant classes and all questions were answered                   PHYSICIAN INTERACTION    MD Initial Assessment Review    (x) Initial  Assessment Reviewed  (x) Treatment Plan and Goals support patient needs/ abilities                  Cosigned and dated below:   PHYSICIAN INTERACTION    MD 30 day and Plan Review:      (x) I have seen the patient in rehab during this 30 day reassessment  (x) I agree with the plan  (x) Continue with progression and instruction  () Continue, but with the following changes:      Cosigned and dated below: Guanakito Sims MD 30 day Reassessment Review:    (x) I have seen the patient in rehab during this 30 day reassessment  (x) Continue with the current program  () Continue, but with the following changes:  () Discharge patient      Cosigned and dated below: Guanakito Sims MD 30 day Reassessment Review:    (x) I have seen the patient in rehab during this 30 day reassessment  (x) Continue with the current program  () Continue, but with the following changes:  () Discharge patient      Cosigned and dated below: Guanakito Sims MD 30 day Reassessment Review:    (x) I have seen the patient in rehab during this 30 day reassessment  (x) Continue with the current program  () Continue, but with the following changes:  () Discharge patient      Cosigned and dated below: Guanakito Sims MD 30 day   Discharge Review:    (x) Discharge patient                            Cosigned and dated below:     Cosigned by: Shana Agosto MD at 3/23/2022  1:26 PM       Revision History    Date/Time User Provider Type Action   3/23/2022  1:26 PM Shana Agosto MD Physician Cosign   3/22/2022  5:41 PM Claire Davis RCP Respiratory Therapist Sign

## 2022-04-26 ENCOUNTER — HOSPITAL ENCOUNTER (OUTPATIENT)
Dept: CARDIAC REHAB | Age: 72
Setting detail: THERAPIES SERIES
Discharge: HOME | End: 2022-04-26
Payer: MEDICARE

## 2022-04-26 PROCEDURE — 94626 PHY/QHP OP PULM RHB W/MNTR: CPT

## 2022-04-27 ENCOUNTER — TELEPHONE (OUTPATIENT)
Dept: FAMILY MEDICINE CLINIC | Age: 72
End: 2022-04-27

## 2022-04-27 NOTE — TELEPHONE ENCOUNTER
Patient asking if she should get her third COVID booster with her dx of COPD. Patient asking if she can try Breztri inhaler would this be a good option for her? Patient would like a call back with advice, okay to leave a message if NA.

## 2022-04-28 ENCOUNTER — HOSPITAL ENCOUNTER (OUTPATIENT)
Dept: CARDIAC REHAB | Age: 72
Setting detail: THERAPIES SERIES
Discharge: HOME | End: 2022-04-28
Payer: MEDICARE

## 2022-04-28 ENCOUNTER — TELEPHONE (OUTPATIENT)
Dept: FAMILY MEDICINE CLINIC | Age: 72
End: 2022-04-28

## 2022-04-28 PROCEDURE — 94626 PHY/QHP OP PULM RHB W/MNTR: CPT

## 2022-04-28 NOTE — TELEPHONE ENCOUNTER
Yes recommend Booster.   Would defer that question to her Pulmonologist - was to see Dr. Lelia Meyer in [place]

## 2022-04-28 NOTE — TELEPHONE ENCOUNTER
Patient has had several days of diarrhea 2 bouts per day she also says she goes back and forth from constipation then to diarrhea. Patient is taking OTC pepto without relief. DENIES color change to stool or any other symptoms. Pharmacy is [facility] [address]   Patient would like a call back with advice, please leave a message if NA

## 2022-05-02 ENCOUNTER — HOSPITAL ENCOUNTER (OUTPATIENT)
Dept: WOMENS IMAGING | Age: 72
Discharge: HOME | End: 2022-05-02
Payer: MEDICARE

## 2022-05-02 DIAGNOSIS — N64.52 DISCHARGE FROM BREAST: ICD-10-CM

## 2022-05-02 PROCEDURE — 76642 ULTRASOUND BREAST LIMITED: CPT

## 2022-05-02 PROCEDURE — G0279 TOMOSYNTHESIS, MAMMO: HCPCS

## 2022-05-03 ENCOUNTER — TELEPHONE (OUTPATIENT)
Dept: FAMILY MEDICINE CLINIC | Age: 72
End: 2022-05-03

## 2022-05-03 ENCOUNTER — HOSPITAL ENCOUNTER (OUTPATIENT)
Dept: CARDIAC REHAB | Age: 72
Setting detail: THERAPIES SERIES
Discharge: HOME | End: 2022-05-03
Payer: MEDICARE

## 2022-05-03 DIAGNOSIS — J96.11 CHRONIC RESPIRATORY FAILURE WITH HYPOXIA (HCC): Primary | ICD-10-CM

## 2022-05-03 DIAGNOSIS — J43.9 PULMONARY EMPHYSEMA, UNSPECIFIED EMPHYSEMA TYPE (HCC): Primary | ICD-10-CM

## 2022-05-03 DIAGNOSIS — J43.9 PULMONARY EMPHYSEMA, UNSPECIFIED EMPHYSEMA TYPE (HCC): ICD-10-CM

## 2022-05-03 PROCEDURE — 94626 PHY/QHP OP PULM RHB W/MNTR: CPT

## 2022-05-03 PROCEDURE — 94625 PHY/QHP OP PULM RHB W/O MNTR: CPT

## 2022-05-03 RX ORDER — BUDESONIDE AND FORMOTEROL FUMARATE DIHYDRATE 80; 4.5 UG/1; UG/1
2 AEROSOL RESPIRATORY (INHALATION) 2 TIMES DAILY
Qty: 10.2 G | Refills: 5 | Status: SHIPPED | OUTPATIENT
Start: 2022-05-03

## 2022-05-03 NOTE — TELEPHONE ENCOUNTER
Message received by Respiratory Therapist whom patient seen today. Took recommendation and changes her inhalers to be more effective and easier of use. Rx were sent. Also sent in spacer for her albuterol to be easier to use.   Follow up with [facility]

## 2022-05-03 NOTE — PROGRESS NOTES
PULMONARY REHABILITATION / RTR PROGRAM  EDUCATION SESSION      Jodie Leiva  Session: _____    RESPIRATORY MEDICATIONS - I discussed with Mara Hawkins the purpose, procedure and side effects of all their respiratory medications. The pts home respiratory medications per Epic are Qvar times 1 puff BID, Flovent discus times 1 puff bid, Ventolin MDI times 2 puffs PRN, Ventolin aerosols PRN and Anoro Ellipta times 1 puff daily. Pt states she does not have aerosols at home and also states she does not have the Qvar. I explained to pt that she needs to rinse out her mouth after the Flovent and Qvar if she is taking both or either of them due to side effect of thrust.       MDI / AEROSOLS -  We went over the proper technique for the pts MDI's. Pt does not have home aerosols. I also discussed the importance of using a spacer with the MDI's. Pt does not have a spacer at home and I told pt I would message her PCP about getting a spacer to be used with the Ventolin MDI and the Qvar if the pt is supposed to be doing the qvar(see above, pt states she does not have Qvar at home). I checked pts inspiratory flow rates on the Incheck Dial for her specific inhalers. Pt unable to generate adequate inspiratory flow rates on the incheck dial for Ellipta and Discus inhalers despite several attempts. I feel pt would do better on MDI or respimat, like Stiolto or Bevespi for the Anoro and Flovent would be better if not discus. I told pt I would send note to her PCP about home med suggestions. I sent note to Dr Mahogany Barillas the pts PCP as it appears he is the one who ordered who home meds, even though rehab ordered by Dr. Sherren Rosenthal. Pt had good technique/inspiratory flow rates on incheck dial for her ventolin MDI. SECRETION CLEARANCE - I went over techniques to help clear secretions. Also explained the importance of drinking enough fluids/water to keep secretions thin, which makes it easier to cough it out.      Pt had no questions at this time. SHe is aware to be waiting on call from her dr or pharmacy if any meds are changed. Time Spent:  32 minutes.

## 2022-05-04 NOTE — TELEPHONE ENCOUNTER
Patient notified of above and understanding voiced. She is requesting order for portable oxygen concentrator (small) be faxed to [facility] for her. She knows they are about 1 year out before this will be available for her but wants it there for when it does become available.   Please advise

## 2022-05-05 ENCOUNTER — HOSPITAL ENCOUNTER (OUTPATIENT)
Dept: CARDIAC REHAB | Age: 72
Setting detail: THERAPIES SERIES
End: 2022-05-05
Payer: MEDICARE

## 2022-05-05 ENCOUNTER — HOSPITAL ENCOUNTER (OUTPATIENT)
Dept: CARDIAC REHAB | Age: 72
Setting detail: THERAPIES SERIES
Discharge: HOME | End: 2022-05-05
Payer: MEDICARE

## 2022-05-05 PROCEDURE — 94626 PHY/QHP OP PULM RHB W/MNTR: CPT

## 2022-05-06 ENCOUNTER — CARE COORDINATION (OUTPATIENT)
Dept: CARE COORDINATION | Age: 72
End: 2022-05-06

## 2022-05-06 ENCOUNTER — TELEPHONE (OUTPATIENT)
Dept: FAMILY MEDICINE CLINIC | Age: 72
End: 2022-05-06

## 2022-05-06 NOTE — TELEPHONE ENCOUNTER
Pt LM on [employer], unable to afford medications that were recently ordered. She couldn't remember which one was the expensive one and wanted to know if she has to use them all.  Pt requesting a call back [phone]

## 2022-05-06 NOTE — CARE COORDINATION
I left a message asking Ernie Augustine to please call me back so we can discuss medication assistance.         [facility]   Medication Assistance  Carlotta [address]ch[phone] (T) [phone] (e) [phone]

## 2022-05-09 DIAGNOSIS — E03.9 HYPOTHYROIDISM, UNSPECIFIED TYPE: ICD-10-CM

## 2022-05-09 RX ORDER — LEVOTHYROXINE SODIUM 0.05 MG/1
TABLET ORAL
Qty: 90 TABLET | Refills: 3 | Status: SHIPPED | OUTPATIENT
Start: 2022-05-09

## 2022-05-09 NOTE — CARE COORDINATION
I was returning Magan's message she had left me on Saturday, I left her another message to call me back.

## 2022-05-10 ENCOUNTER — HOSPITAL ENCOUNTER (OUTPATIENT)
Dept: CARDIAC REHAB | Age: 72
Setting detail: THERAPIES SERIES
End: 2022-05-10
Payer: MEDICARE

## 2022-05-10 NOTE — CARE COORDINATION
I spoke with Fabian Saldana in regards to getting her some assistance on her high cost medication, after speaking with her we have determined she is over most PAP's income guidelines.

## 2022-05-12 ENCOUNTER — HOSPITAL ENCOUNTER (OUTPATIENT)
Dept: CARDIAC REHAB | Age: 72
Setting detail: THERAPIES SERIES
End: 2022-05-12
Payer: MEDICARE

## 2022-05-17 ENCOUNTER — OFFICE VISIT (OUTPATIENT)
Dept: UROLOGY | Age: 72
End: 2022-05-17
Payer: MEDICARE

## 2022-05-17 ENCOUNTER — HOSPITAL ENCOUNTER (OUTPATIENT)
Dept: CARDIAC REHAB | Age: 72
Setting detail: THERAPIES SERIES
End: 2022-05-17
Payer: MEDICARE

## 2022-05-17 VITALS
WEIGHT: 136 LBS | HEIGHT: 63 IN | SYSTOLIC BLOOD PRESSURE: 124 MMHG | DIASTOLIC BLOOD PRESSURE: 70 MMHG | BODY MASS INDEX: 24.1 KG/M2

## 2022-05-17 DIAGNOSIS — R39.15 URINARY URGENCY: Primary | ICD-10-CM

## 2022-05-17 LAB — POST VOID RESIDUAL (PVR): 55 ML

## 2022-05-17 PROCEDURE — 1090F PRES/ABSN URINE INCON ASSESS: CPT | Performed by: NURSE PRACTITIONER

## 2022-05-17 PROCEDURE — G8427 DOCREV CUR MEDS BY ELIG CLIN: HCPCS | Performed by: NURSE PRACTITIONER

## 2022-05-17 PROCEDURE — 4040F PNEUMOC VAC/ADMIN/RCVD: CPT | Performed by: NURSE PRACTITIONER

## 2022-05-17 PROCEDURE — 99214 OFFICE O/P EST MOD 30 MIN: CPT | Performed by: NURSE PRACTITIONER

## 2022-05-17 PROCEDURE — 81003 URINALYSIS AUTO W/O SCOPE: CPT | Performed by: NURSE PRACTITIONER

## 2022-05-17 PROCEDURE — G8399 PT W/DXA RESULTS DOCUMENT: HCPCS | Performed by: NURSE PRACTITIONER

## 2022-05-17 PROCEDURE — 1036F TOBACCO NON-USER: CPT | Performed by: NURSE PRACTITIONER

## 2022-05-17 PROCEDURE — 51798 US URINE CAPACITY MEASURE: CPT | Performed by: NURSE PRACTITIONER

## 2022-05-17 PROCEDURE — G8420 CALC BMI NORM PARAMETERS: HCPCS | Performed by: NURSE PRACTITIONER

## 2022-05-17 PROCEDURE — 3017F COLORECTAL CA SCREEN DOC REV: CPT | Performed by: NURSE PRACTITIONER

## 2022-05-17 PROCEDURE — 1123F ACP DISCUSS/DSCN MKR DOCD: CPT | Performed by: NURSE PRACTITIONER

## 2022-05-17 ASSESSMENT — ENCOUNTER SYMPTOMS
NAUSEA: 0
ABDOMINAL PAIN: 0
BACK PAIN: 0
VOMITING: 0

## 2022-05-17 NOTE — PROGRESS NOTES
[facility] [address] [place] [address].  [address]  [facility] [address]  Dept: [phone]  Loc: [phone]    Visit Date: 5/17/2022        HPI:     Rocio Jackson is a 70 y.o. female who presents today for:  Chief Complaint   Patient presents with    Follow-up     Urinary urgency     Urinary Frequency       HPI   Pt seen in office today for urinary frequency and urgency. Garrison Garcia has a OAB and urge incontinence not on medication currently. She previously had improvement in symptoms on Myrbetriq 50 mg. Reports was on oxybutynin 15 mg XL daily and it was stopped during a hospitalization. Having significant frequency and urgency at night for which she wears a depends. Intermittently has trouble with constipation and most recently having diarrhea. Current Outpatient Medications   Medication Sig Dispense Refill    levothyroxine (EUTHYROX) 50 MCG tablet Take 1 tablet by mouth once daily 90 tablet 3    Misc. Devices MISC Portable Oxygen Concentrator 1 each 0    tiotropium (SPIRIVA RESPIMAT) 2.5 MCG/ACT AERS inhaler Inhale 2 puffs into the lungs daily 4 g 5    budesonide-formoterol (SYMBICORT) 80-4.5 MCG/ACT AERO Inhale 2 puffs into the lungs 2 times daily Rinse mouth after use. 10.2 g 5    Spacer/Aero-Holding Chambers ALLEN 1 Device by Does not apply route daily as needed (Albuterol for Shortness of breath) 1 each 0    donepezil (ARICEPT) 10 MG tablet Take 1 tablet by mouth in the evening 90 tablet 1    albuterol sulfate HFA (VENTOLIN HFA) 108 (90 Base) MCG/ACT inhaler Inhale 2 puffs into the lungs 4 times daily as needed for Wheezing 18 g 2    simvastatin (ZOCOR) 10 MG tablet Take 1 tablet by mouth nightly 90 tablet 3    atomoxetine (STRATTERA) 40 MG capsule Take 1 capsule by mouth once daily 30 capsule 5    Misc.  Devices (PULSE OXIMETER FOR FINGER) MISC Daily PRN 1 each 0    hydrOXYzine (ATARAX) 25 MG tablet TAKE 1/2 TO 1 (ONE-HALF TO ONE) TABLET BY MOUTH EVERY 8 HOURS AS NEEDED FOR ANXIETY 60 tablet 0    DULoxetine (CYMBALTA) 60 MG extended release capsule Take 1 capsule by mouth once daily 90 capsule 3    valACYclovir (VALTREX) 500 MG tablet Take 1 tablet by mouth daily      acamprosate (CAMPRAL) 333 MG tablet TAKE 2 TABLETS BY MOUTH NIGHTLY 60 tablet 11    oxybutynin (DITROPAN XL) 15 MG extended release tablet Take 1 tablet by mouth nightly      Methylsulfonylmethane (MSM) 1000 MG CAPS Take 2 capsules by mouth 2 times daily      alendronate (FOSAMAX) 70 MG tablet Take 1 tablet by mouth every 7 days Take with water on an empty stomach- wait 30 minutes before eating or taking other meds. Avoid lying down for 30 minutes after dose. 12 tablet 3    albuterol (PROVENTIL) (2.5 MG/3ML) 0.083% nebulizer solution Take 3 mLs by nebulization every 6 hours as needed for Wheezing 120 each 3    Multiple Vitamin (MULTI-VITAMIN PO) Take  by mouth.  Calcium Carbonate-Vitamin D (CALCIUM-VITAMIN D) 600-200 MG-UNIT CAPS Take 1 tablet by mouth 2 times daily       fish oil-omega-3 fatty acids 1000 MG capsule Take 1 g by mouth. One tablet daily along with red yeast rice       No current facility-administered medications for this visit. Past Medical History  Mara Hawkins  has a past medical history of Aneurysm, cerebral, COPD (chronic obstructive pulmonary disease) ([facility] [address] Hyperlipidemia, Osteoarthritis, Pneumonia, Thyroid disease, and Unspecified cerebral artery occlusion with cerebral infarction. Past Surgical History  The patient  has a past surgical history that includes Colonoscopy (43608904); External ear surgery (2000); External ear surgery (1999); External ear surgery (1997); Cosmetic surgery (4415,0194); Cosmetic surgery (2013); Brain aneurysm surgery; pr biopsy of breast, incisional (Left, 1984); and pr biopsy of breast, incisional (Right, 1986).     Family History  This patient's family history includes Cancer in her mother; Diabetes in her father; Heart Disease in her maternal aunt; Parkinsonism in her maternal uncle; Stroke in her mother. Social History  Venecia Painting  reports that she quit smoking about 7 years ago. Her smoking use included cigarettes. She has a 40.00 pack-year smoking history. She has never used smokeless tobacco. She reports current alcohol use of about 2.0 standard drinks of alcohol per week. She reports that she does not use drugs. Subjective:      Review of Systems   Constitutional: Negative for activity change, appetite change, chills, diaphoresis, fatigue, fever and unexpected weight change. Gastrointestinal: Negative for abdominal pain, nausea and vomiting. Genitourinary: Positive for frequency and urgency. Negative for decreased urine volume, difficulty urinating, dysuria, flank pain and hematuria. Urge incontinence. + stress urinary incontinence   Musculoskeletal: Negative for back pain. Objective: There were no vitals taken for this visit. Physical Exam  Vitals reviewed. Constitutional:       General: She is not in acute distress. Appearance: Normal appearance. She is well-developed. She is not ill-appearing or diaphoretic. HENT:      Head: Normocephalic and atraumatic. Right Ear: External ear normal.      Left Ear: External ear normal.      Nose: Nose normal.      Mouth/Throat:      Mouth: Mucous membranes are moist.   Eyes:      General: No scleral icterus. Right eye: No discharge. Left eye: No discharge. Neck:      Vascular: No JVD. Trachea: No tracheal deviation. Cardiovascular:      Rate and Rhythm: Normal rate and regular rhythm. Pulmonary:      Effort: Pulmonary effort is normal. No respiratory distress. Breath sounds: Normal breath sounds. Abdominal:      General: There is no distension. Tenderness: There is no abdominal tenderness. There is no right CVA tenderness or left CVA tenderness. Musculoskeletal:         General: No tenderness. Normal range of motion. Neurological:      Mental Status: She is alert and oriented to person, place, and time. Mental status is at baseline. Psychiatric:         Mood and Affect: Mood normal.         Behavior: Behavior normal.         Thought Content: Thought content normal.         POC  No results found for this visit on 05/17/22. Patients recent PSA values are as follows  No results found for: PSA, PSADIA     Recent BUN/Creatinine:  Lab Results   Component Value Date    BUN 19 12/19/2021    CREATININE 0.9 12/19/2021       Assessment:   OAB  Mixed incontinence    Plan:     Pt failed oxybutynin in the past.  Previously completed PFT. Having mixed incontinence and OAB. Trial Myrbetriq 50 mg daily. F/u in 2-3 months with PVR.

## 2022-05-17 NOTE — PLAN OF CARE
[facility] Facility-Based Therapy for COPD   INDIVIDUAL TREATMENT PLAN (ITP)     Name: Linnette Aceves   :  1950  Acct Number: [[id number]]   AGE: 70 y.o. Diagnosis:  Moderate COPD, which is GOLD Stage 2                                               PFT:  Post Bronchodilator FEV1/FVC: 59  FEV1: 59    Patient Goals:  (x) Breathe better  (x) Increase my endurance (x) Have more energy  (x) Rely less on others  (x) Ease ADLs  (x) Understand and use meds correctly  (x) Control cough  (x) Make fewer visits to hospital  () Quit smoking  (x) Feel less anxious / have more confidence    Glossary:  KS=Pulmonary Rehab  PF=Physical Fitness TM=Treadmill  AD=Schwinn Airdyne  UBE=UpperBody Ergometer  RT=Resistance Training  PLB=Pursed Lip Breathing      COVID -19 Screen  Do you have any of the following symptoms:  [] Fever [] Cough [] SOB [] Muscle/Body Ache [] Loss of taste/smell [x] None    Have you traveled outside of the [place]? [] Yes      [x] No    Have you been around anyone who has tested Positive for COVID 19?  [] Yes      [x] No    The following Education has been completed with patient. [x] Wait in the lobby until we call you back to rehab. [x] You must wear a mask while in the medical center and in pulmonary rehab unless exercising. Please bring your own. [x] Bring your own bottle of water with you. [x] You can not bring anyone with you in to the rehab clinic at this time. They are welcome to sit in the lobby or wait in the car.       INDIVIDUAL TREATMENT PLAN    INITIAL ASSESSMENT    Day #1 INDIVIDUAL TREATMENT PLAN    PLAN      Day #2-30 INDIVIDUAL TREATMENT PLAN    RE ASSESSMENT    Day #31-60 INDIVIDUAL TREATMENT PLAN    RE ASSESSMENT    Day #61-90 INDIVIDUAL TREATMENT PLAN    RE ASSESSMENT    Day # INDIVIDUAL TREATMENT PLAN    DISCHARGE      Last Day        Date: 3/22/2022     Date: 2022   Date: 22  *Patient not in attendance for reassessment Date:    Date:    Date:    EXERCISE   INITIAL ASSESSMENT      Prescribed Oxygen Use  Activity:2L/min  (x) Continuous  () Breath Actuated      Oxygen Titration  (x) Assess for desaturation            Current Home Exercise:  None   EXERCISE  PLAN        Current Oxygen Use  Activity:  2L/min  (x) Continuous  () Breath Actuated      Oxygen Titration  (x) Maintaining >87% Spo2  () Not maintaining   L/min needed on       Current Home Exercise:  none   EXERCISE  RE ASSESSMENT      Current Oxygen Use  Activity: 3L/min  (x) Continuous  () Breath Actuated      Oxygen Titration  (x) Maintaining >87% Spo2  () Not maintaining   L/min needed on       Current Home Exercise:  Unknown EXERCISE  RE ASSESSMENT      Current Oxygen Use  Activity:  L/min  () Continuous  () Breath Actuated      Oxygen Titration  () Maintaining >87% Spo2  () Not maintaining   L/min needed on       Current Home Exercise:  Frequency:  x/wk  Intensity:  /10  Duration:  min  Mode: EXERCISE  RE ASSESSMENT      Current Oxygen Use  Activity:  L/min  () Continuous  () Breath Actuated      Oxygen Titration  () Maintaining >87% Spo2  () Not maintaining   L/min needed on       Current Home Exercise:  Frequency:  x/wk  Intensity:  /10  Duration:  min  Mode:   EXERCISE  DISCHARGE        Final Oxygen Use  Activity:  L/min  () Continuous  () Breath Actuated      Oxygen Titration  () Maintaining >87% Spo2  () Not maintaining   L/min needed on       Current Home Exercise:  Frequency:  x/wk  Intensity:  /10  Duration:  min  Mode:       6 Minute Walk Test (6MWT):  O2 used: 2LPM nasal cannula   1100 ft walked = 2.5 METs  SpO2: 90-96%  HR:    RPD: 2  RPE: 1  Number of Breaks:  none   Avg time for break:  n/a  Assist device used: none         6 Minute Walk Test (6MWT):  O2 used:   ft walked =  METs  SpO2:  %  HR:    RPD:   RPE:  Number of Breaks:    Avg time for break:  secs  Assist device used:             OUTCOMES:  6MWD Improvement:  > 98'?  () Yes  () No Exercise Prescription    THR: 74 - 119 bpm    Frequency:  2-3x/wk in [place], 1-3x/wk home activity    Intensity:  METs (from 6MWT)      Time:  15-30 total minutes up to 55 minutes max    Type:  Aerobic (TM, AD, UBE, NuStep), 6 ST, RT 1-10# 8-15 reps    Progression:  Increase 30s - 2 min/mode for Aerobic activity, and increase Intensity 2-5% each week if RPE <5, RPD <5, SpO2 >87% and pt is within [facility] above. Exercise Prescription    (x) Pt exercising within THR    Frequency:  2-3x/wk in [place], 1-3x/wk home activity    Intensity:  3-5 on [place] Dyspnea/ Fatigue scale    Time:  15-30 total minutes up to 55 minutes max    Type:  Aerobic (TM, AD, UBE, NuStep), 6 ST, RT 1-10# 8-15 reps    Progression:  Increase 30s - 2 min/mode for Aerobic activity, and increase Intensity 2-5% each week if RPE <5, RPD <5, SpO2 >87% and pt is within [facility] above. Exercise Prescription    (x) Pt exercising within THR    Frequency:  2-3x/wk in [place], 2-5x/wk home activity    Intensity:  3-5 on [place] Dyspnea/ Fatigue scale    Time:  30-45 total minutes up to 55 minutes max    Type:  Aerobic (TM, AD, UBE, NuStep), 6 ST, RT 1-10# 8-15 reps    Progression:  Increase 30s - 2 min/mode for Aerobic activity, and increase Intensity 2-5% each week if RPE <5, RPD <5, SpO2 >87% and pt is within [facility] above. Exercise Prescription    () Pt exercising within THR    Frequency:  2-3x/wk in [place], 2-5x/wk home activity    Intensity:  3-5 on [place] Dyspnea/ Fatigue scale    Time:  30-45 total minutes up to 55 minutes max    Type:  Aerobic (TM, AD, UBE, NuStep), 6 ST, RT 1-10# 8-15 reps    Progression:  Increase 30s - 2 min/mode for Aerobic activity up to 35 minutes, and increase Intensity 2-5% each week if RPE <5, RPD <5, SpO2 >87% and pt is within [facility] above.  Exercise Prescription    () Pt exercising within THR    Frequency:  2-3x/wk in [place], 2-5x/wk home activity    Intensity:  3-5 on [place] Dyspnea/ Fatigue scale    Time:  30-45 total minutes up to 55 minutes max    Type: Plan      -Initiate [place] 2x/week  -Encourage home exercise    -Attend Home Activity EDUCATION class      -Slowly, safely, progress in [place]    Exercise Goals   Reassessment      (x) Pt is coming regularly  () Pt is sporadic    () Pt has had class  (x) Pt has not had class        (x) Pt is progressing  () Pt is not progressing Exercise Goals   Reassessment      () Pt is coming regularly  () Pt is sporadic    () Pt has had class  () Pt has not had class        () Pt is progressing  () Pt is not progressing Exercise Goals   Reassessment      () Pt is coming regularly  () Pt is sporadic    () Pt has had class  () Pt has not had class        () Pt is progressing  () Pt is not progressing   Exercise Goals Discharge      () GOAL Met?  () GOAL not met -      () Pt had EDUCATION class  Date:   () Pt did not have class    () GOAL Met  See 6MWD above  () GOAL not Met:     Exercise Intervention Initial Assessment      () Transportation needed        (x) EDUCATION needed          (x) Motivation needed   Exercise Intervention/ Education   Plan      () [facility] set up        (x) EDUCATION:  Home activity class to be given        (x) Positive encouragement, support and motivation to be given   Exercise Intervention/ Education Reassessment      (x) Pt is attending  () Pt is not attending      () Pt has had class  (x) Pt has not had class      (x) Pt is progressing  () Pt not progressing Exercise Intervention/ Education Reassessment      () Pt is attending  () Pt is not attending      () Pt has had class  () Pt has not had class      () Pt is progressing  () Pt not progressing Exercise Intervention/ Education Reassessment      () Pt is attending  () Pt is not attending      () Pt has had class  () Pt has not had class      () Pt is progressing  () Pt not progressing Exercise Intervention/ Edcuation Discharge      () Pt attended most  () Pt cancelled a lot    () Pt has had class  Date: See above  () Pt did not have class    () Pt progressed and did well  () Pt had difficulty-               NUTRITION   INITIAL ASSESSMENT      Height:  53   Weight: 138.8 lbs  BMI: 24.6    30 day goal: 138 Lbs (2-4lbs)    () Overweight  () Underweight  (x) Normal  () Teeth issues  () GI issues  (x) Nutrition knowledge needed  () DM   NUTRITION   PLAN        Current Weight:  138.8lbs         NUTRITION  RE ASSESSMENT      Current Weight:  Not taken        Goal achieved?:  Next 30 day goal: maintain NUTRITION  RE ASSESSMENT      Current Weight:  lbs      Goal achieved?:  Next 30 day goal: Lbs NUTRITION  RE ASSESSMENT      Current Weight:  lbs      Goal achieved?:  Next 30 day goal: Lbs   NUTRITION DISCHARGE        Current Weight:  lbs  BMI:    Goal achieved?:     Nutrition Goals  Initial Assessment    () Pt is DM.   Increase nutrition knowledge and glucose control through diet and exercise      (x) Learn weight strategies to maintain weight        (x) Learn about general nutrition          -Achievable weight goal for the end of the program:  138 Lbs (2-4lbs per month recommended)   Nutrition Goals   Plan      () DM:  Attend nutrition class and learn about quality carbohydrates and exercises role in DM    (x) Attend nutrition class          (x) Attend nutrition class          (x) Initiate exercise and give pt hints and tips for weight and will have class for information   Nutrition Goals  Reassessment      () DM: Pt has had class  (x) DM: Pt has not had class           () Pt has had class  (x) Pt has not had class       () Pt has had class  (x) Pt has not had class       (x) Pt progressing  () Pt not progressing     Nutrition Goals  Reassessment      () DM: Pt has had class  () DM: Pt has not had class           () Pt has had class  () Pt has not had class       () Pt has had class  () Pt has not had class       () Pt progressing  () Pt not progressing     Nutrition Goals  Reassessment      () DM: Pt has had class  () DM: Pt has not had class           () Pt has had class  () Pt has not had class       () Pt has had class  () Pt has not had class       () Pt progressing  () Pt not progressing     Nutrition Goals  Discharge      () Pt had nutrition class  Date:  () Pt has not had class        () Pt has had class  Date: See above  () Pt has not had class    () Pt has class  Date: See above  () Pt has not had class      () Final weight goal achieved  () Pt did not reach desired weight goal  (readdressed nutrition and exercise strategies for future goal attainment)        Nutrition Intervention/ Education   Initial Assessment    (x) Pt needs Nutrition education class        () Pt states does not need class       Nutrition Intervention/ Education  Plan      (x) Pt will have Nutrition class          (x) Encourage pt to continue to learn and incorporate self knowledge in to diet choices   Nutrition Intervention/ Education  Reassessment      () Pt has had class  (x) Pt has not had class      () Pt had questions  (x) Pt denies having questions Nutrition Intervention/ Education  Reassessment      () Pt has had class  () Pt has not had class      () Pt had questions  () Pt denies having questions Nutrition Intervention/ Education  Reassessment      () Pt has had class  () Pt has not had class      () Pt had questions  () Pt denies having questions Nutrition Intervention/ Education   Discharge      () Pt has had class  Date: See above  () Pt has not had class  Reason:    () Pt had questions that were answered   () Pt denies having questions             PSYCHO SOCIAL INITIAL ASSESSMENT      Depression:  () Self Reported  () In History  () S/Sx noted  () Denies  (x) On Medication  (x) Follows physician      (x) Give PHQ-9 Depression screening tool                  Dyspnea:  (x) Give pt UCSD SOB survey      (x) Pt gets SOB during activity and ADLs.           (x) Pt has support from home for the program        () Pt does not have support for the program   PSYCHO SOCIAL  PLAN      (x) Attend Stress/ Depression/ Anxiety Class                Pre Rehab PHQ-9   Score: 19  1-4 Normal  5-9 Mild  10-14 Mod  15-19 Mod-Sev  >19 Severe        Pre Rehab UCSD SOB Score:  47      (x) Attend classes and attend rehab to increase strength and endurance      -Attend Psychosocial class          () Speak with the patient/ family about ability to commit to the program with undue stress/ anxiety   PSYCHO SOCIAL  RE ASSESSMENT    (x) Pt scored >10 on PHQ-9. Will speak with patient at next visit.  Currently followed by physician             () Pt recommended to contact physician for assistance                See below for ADLs        () Pt has had class  () Pt has not had class        Re assessment of support:  Good, pt has been attending PSYCHO SOCIAL  RE ASSESSMENT    () Pt is coping well  () Pt needs more assistance-              () Pt recommended to contact physician for assistance                See below          () Pt has had class  () Pt has not had           Re assessment of support:  Good, pt has been attending PSYCHO SOCIAL  RE ASSESSMENT    () Pt is coping well  () Pt needs more assistance-              () Pt recommended to contact physician for assistance                See below          () Pt has had class  () Pt has not had class        Re assessment of support:  Good, pt has been attending   PSYCHO SOCIAL DISCHARGE      (x) Pt attended class     Date:              Post Rehab PHQ-9   Depression Score:                [facility]  Score:      (x) Pt is less SOB with ADLs            () Pt had class  Date: See above  () Pt did not have class        () Pt completed program  () Pt had to stop         Psychosocial Goals   Initial Assessment    (x) Maintain/ Decrease Depression Levels      (x) Maintain/ Decrease Anxiety Levels        (x) Learn about Emotional Health          (x) Increase QoL   (CAT tool)          (x) Give the CAT tool for HR QoL      Pre Rehab  CAT score:  28/ 40       Psychosocial Goals  Plan      (x) Attend Stress/ Anxiety/ Dyspnea  Panic control class      (x) Attend Stress/ Anxiety/ Dyspnea  Panic control class      (x) Attend Stress/ Anxiety/ Dyspnea  Panic control class      (x) Initiate [place], get stronger, more endurance and more confidence              30 day CAT score:  24/ 40     Psychosocial Goals  Reassessment      () Pt has had class  (x) Pt has not had class      () Pt has had class  (x) Pt has not had class      () Pt has had class  (x) Pt has not had class      () Pt is progressing  () Pt is not progressing              60 day CAT score:  Reassessment not taken Psychosocial Goals  Reassessment      () Pt has had class  () Pt has not had class      () Pt has had class  () Pt has not had class      () Pt has had class  () Pt has not had class      () Pt is progressing  () Pt is not progressing              90 day CAT score:  / 40 Psychosocial Goals  Reassessment      () Pt has had class  () Pt has not had class      () Pt has had class  () Pt has not had class      () Pt has had class  () Pt has not had class      () Pt is progressing  () Pt is not progressing              120 day CAT score:  / 40   Psychosocial Goals   Discharge      (x) Pt had class            (x) Pt had class            (x) Pt had class  Date: See above  () Pt did not have class      Post rehab CAT below                  Post Rehab   CAT score:  / 40              OUTCOMES    PHQ-9:  Did pt drop a severity level or maintain in the minimal range?  () Y  () N    UCSD SOB  Did pt decrease their number by 5 or more?  () Y  () N    CAT scores:  Did pt drop by 2 or more points from Pre to Post?  () Y  () N        Psychosocial Intervention/ Education   Initial Assessment    (x) Pt is being treated for depression/ anxiety        (x) Pt would benefit from [place]s Psychosocial Issues Class (Stress, Depression, Anxiety, and Intimacy   Psychosocial  Intervention/ Education  Plan      (x) Pt to contact physician if any severe changes occur in mood        (x) Pt will attend [facility]s class   Psychosocial Intervention/ Education Reassessment      (x) Pt is coping well  () Pt is not coping well         () Pt has had class  (x) Pt has not had class Psychosocial Intervention/ Education Reassessment      () Pt is coping well  () Pt is not coping well         () Pt has had class  () Pt has not had class Psychosocial Intervention/ Education Reassessment      () Pt is coping well  () Pt is not coping well         () Pt has had class  () Pt has not had class   Psychosocial Intervention/ Education Discharge      () Pt did not need any changes   () Pt needed changes to treatment      (x) Pt had class  Date: See above  () Pt did not have class         Pain   Initial Assessment    (x) N/A  Location:   Duration:   Intensity:  /10  Type:    Pain   Plan      (x) N/A    () Pts pain is under control  () Pt encouraged to contact physician for pain control   Pain   Reassessment      (x) N/A    () Pts pain is under control  () Pt encouraged to contact physician for pain control Pain   Reassessment      () N/A    () Pts pain is under control  () Pt encouraged to contact physician for pain control Pain   Reassessment      () N/A    () Pts pain is under control  () Pt encouraged to contact physician for pain control Pain   Discharge      () N/A    () Pts pain is under control  () Pt encouraged to contact physician for pain control           OXYGEN   INITIAL ASSESSMENT    RESTING:  (x) RA  () O2:  L/min  () Continuous  () Breath Actuated  92% SpO2 / 20 bpm    Prescribed Oxygen Use:  2 L/min at Rest and with exertion    DME Company for O2:  [facility]     OXYGEN   PLAN      RESTING:  (x) Monitor needs, administer supplemental oxygen if SpO2 <88% OXYGEN   RE ASSESSMENT    RESTING:  () Pt SpO2 >87%  (x) Pt needs higher flow  () Pt needs less flow OXYGEN   RE ASSESSMENT    RESTING:  (x) Pt SpO2 >87%  () Pt needs higher flow  () Pt needs less flow OXYGEN   RE ASSESSMENT    RESTING:  (x) Pt SpO2 >87%  () Pt needs higher flow  () Pt needs less flow OXYGEN   DISCHARGE      RESTING:  () Pt SpO2 remained >87% on * L/min at rest    () Pts doctor and company contacted for change in Rx   Oxygen Goals   Initial Assessment    (x) Wean, Titrate down, or get off O2 if possible   Oxygen Goals   Plan      (x) Closely monitor SpO2 and HR using pulse oximetry for saturation and HR response, and titrate if appropriate   Oxygen Goals  Reassessment    () RA    On exertion:  () Pt maintaining FiO2  () Pt tolerating lower O2 needs  (x) Pt needing more O2-3L   Oxygen Goals  Reassessment    () RA    On exertion:  () Pt maintaining FiO2  () Pt tolerating lower O2 needs  () Pt needing more O2 Oxygen Goals  Reassessment    () RA    On exertion:  () Pt maintaining FiO2  () Pt tolerating lower O2 needs  () Pt needing more O2     Oxygen Goals   Discharge    () RA    With Exertion:  () Pt maintained FiO2  () Pt was found to need less O2  notification sent to physician  () Pt was found to need more O2, notification sent to physician     Oxygen Intervention/ Education  Initial Assessment    (x) Learn / Review about O2 use, safety and travel      () Pt does not wear or have home oxygen    Oxygen Intervention/ Education  Plan      (x) Attend O2 Use, safety and travel class        (x) Assess for SpO2 with each exercise   Oxygen Intervention/ Education  Reassessment      () Pt has had class  (x) Pt has not had class      () Pt is >87%  (x) Pt has been found to desaturate  physician notified Oxygen Intervention/ Education  Reassessment      () Pt has had class  () Pt has not had class      () Pt is >87%  () Pt has been found to desaturate  physician notified Oxygen Intervention/ Education  Reassessment      () Pt has had class  () Pt has not had class      () Pt is >87%  () Pt has been found to desaturate  physician notified   Oxygen Intervention/ Education  Discharge      () Pt had class  Date:   () Pt did not have class      () Pt did well  () Pt needed to be put on oxygen           TOBACCO USE  INITIAL ASSESSMENT    (x) Pt currently not smoking    () Pt currently smoking        () Pt needs Tobacco Cessation counseling          Smoked 1.25 ppd for 40 years  Pt quit in 2015.    TOBACCO USE  PLAN      (x) N/A      Does pt want to quit:   Does pt have a quit date:    () Tobacco Cessation counseling Education if applicable        () Encouraged to contact physician for tools/ nicotine replacement etc.   TOBACCO USE  RE ASSESSMENT    (x) N/A      Any change in Tobacco use status () N  ()Y      () Pt attended counseling education session            () Pt has contacted physician TOBACCO USE  RE ASSESSMENT    () N/A      Any change in Tobacco use status () N  ()Y      () Pt attended counseling education session            () Pt has contacted physician TOBACCO USE  RE ASSESSMENT    () N/A      Any change in Tobacco use status () N  ()Y      () Pt attended counseling education session            () Pt has contacted physician TOBACCO USE  DISCHARGE            Current Tobacco Use Status:         () Pt attended TC counseling education session  Date:           () Pt contacted physician        NRT product/ medication  :     Tobacco Use Goal  Initial Assessment      (x) Complete Cessation or Maintain Cessation of tobacco products   Tobacco Use Goal Intervention and Education Plan    (x Encourage pt to fight the urge, call quit line, use techniques learned from friends/ class    () Attend Tobacco Cessation class if needed   Tobacco Use  Reassessment          (x) Pt remains tobacco free            () Pt has had TC counseling session        () Pt still smoking Tobacco Use  Reassessment          () Pt remains tobacco free            () Pt has had TC counseling session        () Pt still smoking Tobacco Use  Reassessment          () Pt remains tobacco free            () Pt has had TC counseling session        () Pt still smoking Tobacco Use  Discharge          () Pt remains tobacco free            () Pt had TC counseling  Date:  See above        () Pt still smoking  gave resources for quitting             MEDICATIONS  (Oral & Inhaled)  INITIAL ASSESSMENT    Pt reports taking his meds properly 50% of the time(pt does take Anoro every day as ordered but does not take the Qvar that is listed on drs last notes from [facility].)      (x) Pt is on inhaled medications   MEDICATIONS  PLAN        -Review Rxs purpose, schedule, side effects and importance of compliance during Medication class. Also address Cpap, Vents.        MEDICATIONS  RE ASSESSMENT      Pt reports taking their meds properly  % of the time        (x) Pt has had class-5/3/22  () Pt has not had class MEDICATIONS  RE ASSESSMENT      Pt reports taking their meds properly  % of the time        () Pt has had class  () Pt has not had class MEDICATIONS  RE ASSESSMENT      Pt reports taking their meds properly  % of the time        () Pt has had class  () Pt has not had class MEDICATIONS  DISCHARGE        Pt reports taking their meds properly  % of the time        (x)Pt had med class  Date:5/3/22  () Pt has not had class       Pt has:  (x) Metered Dose Inhaler  (x) Dry Powder Inhaler  (x) Nebulizer   -Review correct use, timing, technique and cleaning of equipment during Medication class (x) Pt has had class  () Pt has not had class () Pt has had class  () Pt has not had class () Pt has had class  () Pt has not had class () Pt had class  Date: See above  () Pt has not had class   Medication Goals  Initial Assessment        (x) Take meds properly 100% of the time    (x) Learn about / Review Rxs, and devices Medication Goals  Intervention & Education  Plan      -Follow Rx instructions and ask if questions    -Attend Medication Education class   Medication Goals  Re assessment          (x) Readdressed questions on any medications    (x) Pt has had class  () Pt has not had class Medication SOB   ADL Goals Reassessment        () ADLs getting easier  () ADLs not getting easier          () Pt states activities are getting easier/ able to go longer/ not get as SOB ADL Goals Reassessment        () ADLs getting easier  () ADLs not getting easier          () Pt states activities are getting easier/ able to go longer/ not get as SOB   ADL Goals   Discharge        Goal achieved?  () Yes  () No            Goal achieved?  () Yes  () No          Outcomes:  See Health and Functioning from the CAT tool and Strength and Endurance from 6 MWD above             EXACERBAT'N PREVENTION & MANAGEMENT  INITIAL ASSESSMENT      Pt reports;  () 0 respiratory infections  () 1   (x) 2  () Hospitalized in last 12 months   EXACERBAT'N PREVENTION & MANAGEMENT  PLAN        Address via Disease Self Management and Exacerbation prevention class:    -Hydration for mucus    -Hand Hygiene          -Evaluation of Sputum    -When to call MD  -S/Sx to report  -Decrease exposure to irritants/ exacerbators    -Cleaning of respiratory equipment   EXACERBAT'N PREVENTION & MANAGEMENT  RE ASSESSMENT      (x) Pt has had class  () Pt has not had class        () Improved hydration    () Correct hand washing techs and alcohol gel usage    () Sputum assessment    () Ability to recognize exacerbation and when to call MD        () Cleaning of respiratory equipment EXACERBAT'N PREVENTION & MANAGEMENT  RE ASSESSMENT      () Pt has had class  () Pt has not had class        () Improved hydration    () Correct hand washing techs and alcohol gel usage    () Sputum assessment    () Ability to recognize exacerbation and when to call MD        () Cleaning of respiratory equipment EXACERBAT'N PREVENTION & MANAGEMENT  RE ASSESSMENT      () Pt has had class  () Pt has not had class        () Improved hydration    () Correct hand washing techs and alcohol gel usage    () Sputum assessment    () Ability to recognize exacerbation and when to call MD        () Cleaning of respiratory equipment [address]        () Pt has had class  Date:4/19/22  () Pt did not have class              () Addressed questions and pt feels comfortable with hydration, hand washing, sputum assessment, exacerbation knowledge, and cleaning of equipment   Exacerbation Prevention & Management Goals  Initial Assessment      (x) Learn ways to stay healthy and not get admitted          (x) Increase knowledge of Lungs, Breathing, Exercise, Nutrition, Mood and controlling anxiety, and stopping the Dyspnea Cycle by attending classes   Exacerbation Prevention & Management Goals Intervention/ Education  Plan    -Attend class and demonstrate disease self management strategies      -Attend all appropriate classes                   Exacerbation Prevention & Management Goals  Reassessment        (x) Pt has had class  () Pt has not had class        () Pt has had all classes  (x) Pt has had some classes  () Pt has had little/ not staying for education Exacerbation Prevention & Management Goals  Reassessment        () Pt has had class  () Pt has not had class        () Pt has had all classes  () Pt has had some classes  () Pt has had little/ not staying for education Exacerbation Prevention & Management Goals  Reassessment        () Pt has had class  () Pt has not had class        () Pt has had all classes  () Pt has had most classes  () Pt has had some of the classes  () Pt has had little/ not staying for education Exacerbation Prevention & Management Goals  Discharge        () Pt had class  Date:  See above  () Pt did not have class        () Pt attended all relevant classes and all questions were answered                   PHYSICIAN INTERACTION    MD Initial Assessment Review    (x) Initial  Assessment Reviewed  (x) Treatment Plan and Goals support patient needs/ abilities                  Cosigned and dated below:   PHYSICIAN INTERACTION    MD 30 day and Plan Review:      (x) I have seen the patient in rehab during this 30 day reassessment  (x) I agree with the plan  (x) Continue with progression and instruction  () Continue, but with the following changes:      Cosigned and dated below: Kellen Elise MD 30 day Reassessment Review:    (x) I have seen the patient in rehab during this 30 day reassessment  (x) Continue with the current program  () Continue, but with the following changes:  () Discharge patient      Cosigned and dated below: Kellen Elise MD 30 day Reassessment Review:    (x) I have seen the patient in rehab during this 30 day reassessment  (x) Continue with the current program  () Continue, but with the following changes:  () Discharge patient      Cosigned and dated below: Kellen Elise MD 30 day Reassessment Review:    (x) I have seen the patient in rehab during this 30 day reassessment  (x) Continue with the current program  () Continue, but with the following changes:  () Discharge patient      Cosigned and dated below: Kellen Elise MD 30 day   Discharge Review:    (x) Discharge patient                            Cosigned and dated below:     Cosigned by: Jessica Esquivel MD at 3/23/2022  1:26 PM   Revision History  Date/Time User Provider Type Action   3/23/2022  1:26 PM Jessica Esquivel MD Physician Cosign   3/22/2022  5:41 PM Chriss Valdez RCP Respiratory Therapist Sign     Cosigned by: Jessica Esquivel MD at 4/22/2022 11:20 AM   Revision History  Date/Time User Provider Type Action   4/22/2022 11:20 AM Jessica Esquivel MD Physician Cosign   4/21/2022  3:18 PM Nasra Ruth Exercise Physiologist Sign

## 2022-05-17 NOTE — PROGRESS NOTES
Pt stated about 1 year ago she was in the hospital and they took her off the oxybutynin because they were contributing to the UTI she was getting. Pt stated also she is having bowel problems- she typically is constipated and wants to know if that is a problem with her urinary problems she is having.

## 2022-05-18 ENCOUNTER — TELEPHONE (OUTPATIENT)
Dept: UROLOGY | Age: 72
End: 2022-05-18

## 2022-05-18 RX ORDER — SOLIFENACIN SUCCINATE 10 MG/1
10 TABLET, FILM COATED ORAL DAILY
Qty: 30 TABLET | Refills: 3 | Status: SHIPPED | OUTPATIENT
Start: 2022-05-18 | End: 2022-08-03

## 2022-05-18 NOTE — TELEPHONE ENCOUNTER
The myrbetriq will still cost over 200 after it goes through insurance. She can't afford it. Please advise.  Thank you

## 2022-05-19 ENCOUNTER — HOSPITAL ENCOUNTER (OUTPATIENT)
Dept: CARDIAC REHAB | Age: 72
Setting detail: THERAPIES SERIES
Discharge: HOME | End: 2022-05-19
Payer: MEDICARE

## 2022-05-19 PROCEDURE — 94626 PHY/QHP OP PULM RHB W/MNTR: CPT

## 2022-05-24 ENCOUNTER — HOSPITAL ENCOUNTER (OUTPATIENT)
Dept: CARDIAC REHAB | Age: 72
Setting detail: THERAPIES SERIES
End: 2022-05-24
Payer: MEDICARE

## 2022-05-26 ENCOUNTER — HOSPITAL ENCOUNTER (OUTPATIENT)
Dept: CARDIAC REHAB | Age: 72
Setting detail: THERAPIES SERIES
Discharge: HOME | End: 2022-05-26
Payer: MEDICARE

## 2022-05-26 ENCOUNTER — HOSPITAL ENCOUNTER (OUTPATIENT)
Dept: CARDIAC REHAB | Age: 72
Setting detail: THERAPIES SERIES
End: 2022-05-26
Payer: MEDICARE

## 2022-05-26 PROCEDURE — 94626 PHY/QHP OP PULM RHB W/MNTR: CPT

## 2022-05-31 ENCOUNTER — HOSPITAL ENCOUNTER (OUTPATIENT)
Dept: CARDIAC REHAB | Age: 72
Setting detail: THERAPIES SERIES
Discharge: HOME | End: 2022-05-31
Payer: MEDICARE

## 2022-05-31 PROCEDURE — 94626 PHY/QHP OP PULM RHB W/MNTR: CPT

## 2022-06-02 ENCOUNTER — APPOINTMENT (OUTPATIENT)
Dept: CARDIAC REHAB | Age: 72
End: 2022-06-02
Payer: MEDICARE

## 2022-06-06 ENCOUNTER — TELEPHONE (OUTPATIENT)
Dept: FAMILY MEDICINE CLINIC | Age: 72
End: 2022-06-06

## 2022-06-06 ENCOUNTER — TELEMEDICINE (OUTPATIENT)
Dept: FAMILY MEDICINE CLINIC | Age: 72
End: 2022-06-06
Payer: MEDICARE

## 2022-06-06 DIAGNOSIS — R11.2 NAUSEA AND VOMITING, INTRACTABILITY OF VOMITING NOT SPECIFIED, UNSPECIFIED VOMITING TYPE: Primary | ICD-10-CM

## 2022-06-06 PROCEDURE — 99213 OFFICE O/P EST LOW 20 MIN: CPT | Performed by: FAMILY MEDICINE

## 2022-06-06 PROCEDURE — 3017F COLORECTAL CA SCREEN DOC REV: CPT | Performed by: FAMILY MEDICINE

## 2022-06-06 PROCEDURE — 1090F PRES/ABSN URINE INCON ASSESS: CPT | Performed by: FAMILY MEDICINE

## 2022-06-06 PROCEDURE — G8428 CUR MEDS NOT DOCUMENT: HCPCS | Performed by: FAMILY MEDICINE

## 2022-06-06 PROCEDURE — 1123F ACP DISCUSS/DSCN MKR DOCD: CPT | Performed by: FAMILY MEDICINE

## 2022-06-06 PROCEDURE — G8399 PT W/DXA RESULTS DOCUMENT: HCPCS | Performed by: FAMILY MEDICINE

## 2022-06-06 RX ORDER — ONDANSETRON 4 MG/1
4 TABLET, ORALLY DISINTEGRATING ORAL EVERY 8 HOURS PRN
Qty: 15 TABLET | Refills: 0 | Status: SHIPPED | OUTPATIENT
Start: 2022-06-06 | End: 2022-07-13

## 2022-06-06 NOTE — TELEPHONE ENCOUNTER
Pt called office stating she had a VV with you today and wants to know what her diagnosis is. Says you had mentioned what you \"thought\" she has because it's going around. Call pt back, if no answer leave a vm. Please advise.

## 2022-06-06 NOTE — PROGRESS NOTES
Bebe Garcia (:  1950) is a Established patient, here for evaluation of the following:         Subjective   HPI:   Chief Complaint   Patient presents with    Nausea & Vomiting     Pt presents with c/o NV for the last 3 days. No fevers. Very fatigued. Last emesis last night. Denies loose stools. No sick contacts. Energy very low. No recent travels. Patient Active Problem List   Diagnosis    Hyperlipemia    Hypothyroidism    Depression    Chronic anxiety    Osteopenia    Medication monitoring encounter    H/O: CVA (cerebrovascular accident), aneurysm bleed, 2015    Abnormality of gait following cerebrovascular accident    GERD (gastroesophageal reflux disease)    Cognitive impairment due to CVA.     OAB (overactive bladder)    Chronic nausea    SOB (shortness of breath)    Hypoxia    Major depressive disorder, single episode, in full remission (HCC)    Smoking greater than 40 pack years    Pulmonary emphysema (HCC)    Syncope and collapse    Concussion with loss of consciousness    Laceration of head    Alcohol abuse    Spinal stenosis, cervical region    Normal pressure hydrocephalus (HCC)    Michael's edema of vocal folds     Past Surgical History:   Procedure Laterality Date   Ervin Mancilla BRAIN ANEURYSM SURGERY      COLONOSCOPY  [id number]    COSMETIC SURGERY  ,    juvederm,botox    COSMETIC SURGERY  2013    Restylane, Botox    EXTERNAL EAR SURGERY      reconstruction of lft ear w/re-opening of canal & pinning back of superior ear    EXTERNAL EAR SURGERY      abscess lft ear    EXTERNAL EAR SURGERY      hematoma left ear    FL BIOPSY OF BREAST, INCISIONAL Left     benign    FL BIOPSY OF BREAST, INCISIONAL Right     benign     Social History     Tobacco Use    Smoking status: Former Smoker     Packs/day: 1.00     Years: 40.00     Pack years: 40.00     Types: Cigarettes     Quit date: 2015     Years since quittin.4    Smokeless tobacco: Never Used   Vaping Use    Vaping Use: Never used   Substance Use Topics    Alcohol use: Yes     Alcohol/week: 2.0 standard drinks     Types: 2 Glasses of wine per week     Comment: 2 glass of vodka every other day     Drug use: No     Prior to Admission medications    Medication Sig Start Date End Date Taking? Authorizing Provider   ondansetron (ZOFRAN ODT) 4 MG disintegrating tablet Take 1 tablet by mouth every 8 hours as needed for Nausea or Vomiting 6/6/22  Yes Tom Bhardwaj DO   solifenacin (VESICARE) 10 MG tablet Take 1 tablet by mouth daily 5/18/22   Kelsey Landrum, APRN - CNP   levothyroxine (EUTHYROX) 50 MCG tablet Take 1 tablet by mouth once daily 5/9/22   Tavares Half, APRN - CNP   Misc. Devices MISC Portable Oxygen Concentrator 5/4/22   Tavares Half, APRN - CNP   tiotropium (SPIRIVA RESPIMAT) 2.5 MCG/ACT AERS inhaler Inhale 2 puffs into the lungs daily 5/3/22   Tavares Half, APRN - CNP   budesonide-formoterol (SYMBICORT) 80-4.5 MCG/ACT AERO Inhale 2 puffs into the lungs 2 times daily Rinse mouth after use. 5/3/22   Tavares Half, APRN - CNP   Spacer/Aero-Holding Floramerrill Ware ALLEN 1 Device by Does not apply route daily as needed (Albuterol for Shortness of breath) 5/3/22   Tavares Half, APRN - CNP   donepezil (ARICEPT) 10 MG tablet Take 1 tablet by mouth in the evening 4/15/22   Tavares Half, APRN - CNP   albuterol sulfate HFA (VENTOLIN HFA) 108 (90 Base) MCG/ACT inhaler Inhale 2 puffs into the lungs 4 times daily as needed for Wheezing 4/14/22   Tavares Half, APRN - CNP   simvastatin (ZOCOR) 10 MG tablet Take 1 tablet by mouth nightly 3/30/22   Tavares Half, APRN - CNP   atomoxetine (STRATTERA) 40 MG capsule Take 1 capsule by mouth once daily 3/21/22   Tavares Half, APRN - CNP   Misc.  Devices (PULSE OXIMETER FOR FINGER) MISC Daily PRN 12/22/21   Tavares Half, APRN - CNP   hydrOXYzine (ATARAX) 25 MG tablet TAKE 1/2 TO 1 (ONE-HALF TO ONE) TABLET BY MOUTH EVERY 8 HOURS AS NEEDED FOR ANXIETY 12/9/21   Adron Jeff, APRN - CNP   DULoxetine (CYMBALTA) 60 MG extended release capsule Take 1 capsule by mouth once daily 10/21/21   Adron Jeff, APRN - CNP   valACYclovir (VALTREX) 500 MG tablet Take 1 tablet by mouth daily 9/18/21   Historical Provider, MD   acamprosate (CAMPRAL) 333 MG tablet TAKE 2 TABLETS BY MOUTH NIGHTLY 8/26/21   Adron Jeff, APRN - CNP   Methylsulfonylmethane (MSM) 1000 MG CAPS Take 2 capsules by mouth 2 times daily    Historical Provider, MD   alendronate (FOSAMAX) 70 MG tablet Take 1 tablet by mouth every 7 days Take with water on an empty stomach- wait 30 minutes before eating or taking other meds. Avoid lying down for 30 minutes after dose. 6/28/21   Adron Jeff, APRN - CNP   albuterol (PROVENTIL) (2.5 MG/3ML) 0.083% nebulizer solution Take 3 mLs by nebulization every 6 hours as needed for Wheezing 12/30/20   Adron Jeff, APRN - CNP   Multiple Vitamin (MULTI-VITAMIN PO) Take  by mouth. Historical Provider, MD   Calcium Carbonate-Vitamin D (CALCIUM-VITAMIN D) 600-200 MG-UNIT CAPS Take 1 tablet by mouth 2 times daily     Historical Provider, MD   fish oil-omega-3 fatty acids 1000 MG capsule Take 1 g by mouth. One tablet daily along with red yeast rice    Historical Provider, MD       Review of Systems   Constitutional: Positive for fatigue. Negative for fever. HENT: Negative. Respiratory: Negative. Cardiovascular: Negative. Gastrointestinal: Positive for nausea and vomiting. Negative for diarrhea. Musculoskeletal: Negative. All other systems reviewed and are negative. Objective   Patient-Reported Vitals  No data recorded     Physical Exam  Constitutional:       General: She is not in acute distress. Appearance: Normal appearance. She is well-developed. She is not ill-appearing. HENT:      Head: Normocephalic and atraumatic.       Right Ear: External ear normal.      Left Ear: External ear normal. Eyes:      Conjunctiva/sclera: Conjunctivae normal.   Pulmonary:      Effort: Pulmonary effort is normal. No respiratory distress. Skin:     Findings: No rash (on exposed surfaces). Neurological:      Mental Status: She is alert and oriented to person, place, and time. Psychiatric:         Mood and Affect: Mood normal.         Behavior: Behavior normal.         Thought Content: Thought content normal.         Judgment: Judgment normal.     Assessment & Plan   Below is the assessment and plan developed based on review of pertinent history, physical exam, labs, studies, and medications. 1. Nausea and vomiting, intractability of vomiting not specified, unspecified vomiting type  -     ondansetron (ZOFRAN ODT) 4 MG disintegrating tablet; Take 1 tablet by mouth every 8 hours as needed for Nausea or Vomiting, Disp-15 tablet, R-0Normal    -  Viral likely  -  BRAT diet, fluids  -  Zofran prn    Return if symptoms worsen or fail to improve. Rocio Jackosn, was evaluated through a synchronous (real-time) audio-video encounter. The patient (or guardian if applicable) is aware that this is a billable service, which includes applicable co-pays. This Virtual Visit was conducted with patient's (and/or legal guardian's) consent. The visit was conducted pursuant to the emergency declaration under the [facility], [address] [place] [address] authority and the [employer] and [employer] Act. Patient identification was verified, and a caregiver was present when appropriate. The patient was located at Home: [address] Provider was located at [facility] (Appt Dept): [address]  [facility] [place] [address],  [address].         --Leeanne Hurt DO

## 2022-06-07 ENCOUNTER — TELEPHONE (OUTPATIENT)
Dept: FAMILY MEDICINE CLINIC | Age: 72
End: 2022-06-07

## 2022-06-07 ENCOUNTER — HOSPITAL ENCOUNTER (OUTPATIENT)
Dept: CARDIAC REHAB | Age: 72
Setting detail: THERAPIES SERIES
End: 2022-06-07
Payer: MEDICARE

## 2022-06-07 ASSESSMENT — ENCOUNTER SYMPTOMS
VOMITING: 1
DIARRHEA: 0
NAUSEA: 1
RESPIRATORY NEGATIVE: 1

## 2022-06-07 NOTE — TELEPHONE ENCOUNTER
Patient calling with update on condition. Vomiting stopped on Sunday night. She is taking the Zofran for the nausea that occurs when she gets up and moving around--mild relief with Zofran. She spoke with her daughter and daughter believes she needs a CT Scan and further evaluation at ED. Patient denies worsening s/s, diarrhea or abdominal pain at this time. Viral s/s can last 7-10 days discussed with patient.   Please advise

## 2022-06-07 NOTE — TELEPHONE ENCOUNTER
No need for additional work up based on that story but if she feels that she needs a work up then recommend ED for evaluation.

## 2022-06-08 DIAGNOSIS — F32.5 MAJOR DEPRESSIVE DISORDER, SINGLE EPISODE, IN FULL REMISSION (HCC): ICD-10-CM

## 2022-06-08 RX ORDER — HYDROXYZINE HYDROCHLORIDE 25 MG/1
TABLET, FILM COATED ORAL
Qty: 90 TABLET | Refills: 5 | Status: SHIPPED | OUTPATIENT
Start: 2022-06-08

## 2022-06-09 ENCOUNTER — HOSPITAL ENCOUNTER (OUTPATIENT)
Dept: CARDIAC REHAB | Age: 72
Setting detail: THERAPIES SERIES
Discharge: HOME | End: 2022-06-09
Payer: MEDICARE

## 2022-06-09 PROCEDURE — 94626 PHY/QHP OP PULM RHB W/MNTR: CPT

## 2022-06-14 ENCOUNTER — HOSPITAL ENCOUNTER (OUTPATIENT)
Dept: CARDIAC REHAB | Age: 72
Setting detail: THERAPIES SERIES
Discharge: HOME | End: 2022-06-14
Payer: MEDICARE

## 2022-06-14 PROCEDURE — 94626 PHY/QHP OP PULM RHB W/MNTR: CPT

## 2022-06-14 NOTE — PLAN OF CARE
[facility] for COPD   INDIVIDUAL TREATMENT PLAN (ITP)     Name: Juan Severino   :  1950  Acct Number: [[id number]]   AGE: 67 y.o. Diagnosis:  Moderate COPD, which is GOLD Stage 2                                               PFT:  Post Bronchodilator FEV1/FVC: 59  FEV1: 59    Patient Goals:  (x) Breathe better  (x) Increase my endurance (x) Have more energy  (x) Rely less on others  (x) Ease ADLs  (x) Understand and use meds correctly  (x) Control cough  (x) Make fewer visits to hospital  () Quit smoking  (x) Feel less anxious / have more confidence    Glossary:  TX=Pulmonary Rehab  PF=Physical Fitness TM=Treadmill  AD=Schwinn Airdyne  UBE=UpperBody Ergometer  RT=Resistance Training  PLB=Pursed Lip Breathing      COVID -19 Screen  Do you have any of the following symptoms:  [] Fever [] Cough [] SOB [] Muscle/Body Ache [] Loss of taste/smell [x] None    Have you traveled outside of the [place]? [] Yes      [x] No    Have you been around anyone who has tested Positive for COVID 19?  [] Yes      [x] No    The following Education has been completed with patient. [x] Wait in the lobby until we call you back to rehab. [x] You must wear a mask while in the medical center and in pulmonary rehab unless exercising. Please bring your own. [x] Bring your own bottle of water with you. [x] You can not bring anyone with you in to the rehab clinic at this time. They are welcome to sit in the lobby or wait in the car.       INDIVIDUAL TREATMENT PLAN    INITIAL ASSESSMENT    Day #1 INDIVIDUAL TREATMENT PLAN    PLAN      Day #2-30 INDIVIDUAL TREATMENT PLAN    RE ASSESSMENT    Day #31-60 INDIVIDUAL TREATMENT PLAN    RE ASSESSMENT    Day #61-90 INDIVIDUAL TREATMENT PLAN    RE ASSESSMENT    Day # INDIVIDUAL TREATMENT PLAN    DISCHARGE      Last Day        Date: 3/22/2022     Date: 2022   Date: 22  *Patient not in attendance for reassessment Date: 6/14/22   Date:    Date:    EXERCISE   INITIAL ASSESSMENT      Prescribed Oxygen Use  Activity:2L/min  (x) Continuous  () Breath Actuated      Oxygen Titration  (x) Assess for desaturation            Current Home Exercise:  None   EXERCISE  PLAN        Current Oxygen Use  Activity:  2L/min  (x) Continuous  () Breath Actuated      Oxygen Titration  (x) Maintaining >87% Spo2  () Not maintaining -  L/min needed on       Current Home Exercise:  none   EXERCISE  RE ASSESSMENT      Current Oxygen Use  Activity: 3L/min  (x) Continuous  () Breath Actuated      Oxygen Titration  (x) Maintaining >87% Spo2  () Not maintaining -  L/min needed on       Current Home Exercise:  Unknown EXERCISE  RE ASSESSMENT      Current Oxygen Use  Activity: 3L/min  (x) Continuous  () Breath Actuated      Oxygen Titration  (x) Maintaining >87% Spo2  () Not maintaining -  L/min needed on       Current Home Exercise:  None EXERCISE  RE ASSESSMENT      Current Oxygen Use  Activity:  L/min  () Continuous  () Breath Actuated      Oxygen Titration  () Maintaining >87% Spo2  () Not maintaining -  L/min needed on       Current Home Exercise:  Frequency:  x/wk  Intensity:  /10  Duration:  min  Mode:   EXERCISE  DISCHARGE        Final Oxygen Use  Activity:  L/min  () Continuous  () Breath Actuated      Oxygen Titration  () Maintaining >87% Spo2  () Not maintaining -  L/min needed on       Current Home Exercise:  Frequency:  x/wk  Intensity:  /10  Duration:  min  Mode:       6 Minute Walk Test (6MWT):  O2 used: 2LPM nasal cannula   1100 ft walked = 2.5 METs  SpO2: 90-96%  HR:    RPD: 2  RPE: 1  Number of Breaks:  none   Avg time for break:  n/a  Assist device used: none         6 Minute Walk Test (6MWT):  O2 used:   ft walked =  METs  SpO2:  %  HR:    RPD:   RPE:  Number of Breaks:    Avg time for break:  secs  Assist device used:             OUTCOMES:  6MWD Improvement:  > 98'?  () Yes  () No     Exercise Prescription    THR: 74 - 119 bpm    Frequency:  2-3x/wk in [place], 1-3x/wk home activity    Intensity:  METs (from 6MWT)      Time:  15-30 total minutes up to 55 minutes max    Type:  Aerobic (TM, AD, UBE, NuStep), 6 ST, RT 1-10# 8-15 reps    Progression:  Increase 30s - 2 min/mode for Aerobic activity, and increase Intensity 2-5% each week if RPE <5, RPD <5, SpO2 >87% and pt is within [facility] above. Exercise Prescription    (x) Pt exercising within THR    Frequency:  2-3x/wk in [place], 1-3x/wk home activity    Intensity:  3-5 on [place] Dyspnea/ Fatigue scale    Time:  15-30 total minutes up to 55 minutes max    Type:  Aerobic (TM, AD, UBE, NuStep), 6 ST, RT 1-10# 8-15 reps    Progression:  Increase 30s - 2 min/mode for Aerobic activity, and increase Intensity 2-5% each week if RPE <5, RPD <5, SpO2 >87% and pt is within [facility] above. Exercise Prescription    (x) Pt exercising within THR    Frequency:  2-3x/wk in [place], 2-5x/wk home activity    Intensity:  3-5 on [place] Dyspnea/ Fatigue scale    Time:  30-45 total minutes up to 55 minutes max    Type:  Aerobic (TM, AD, UBE, NuStep), 6 ST, RT 1-10# 8-15 reps    Progression:  Increase 30s - 2 min/mode for Aerobic activity, and increase Intensity 2-5% each week if RPE <5, RPD <5, SpO2 >87% and pt is within [facility] above. Exercise Prescription    (x) Pt exercising within THR    Frequency:  2-3x/wk in [place], 2-5x/wk home activity    Intensity:  3-5 on [place] Dyspnea/ Fatigue scale    Time:  30-45 total minutes up to 55 minutes max    Type:  Aerobic (TM, AD, UBE, NuStep), 6 ST, RT 1-10# 8-15 reps    Progression:  Increase 30s - 2 min/mode for Aerobic activity up to 35 minutes, and increase Intensity 2-5% each week if RPE <5, RPD <5, SpO2 >87% and pt is within [facility] above.  Exercise Prescription    () Pt exercising within THR    Frequency:  2-3x/wk in [place], 2-5x/wk home activity    Intensity:  3-5 on [place] Dyspnea/ Fatigue scale    Time:  30-45 total minutes up to 55 minutes max    Type:  Aerobic (TM, AD, UBE, NuStep), 6 ST, RT 1-10# 8-15 reps    Progression:  Increase 30s - 2 min/mode for Aerobic activity up to 35 minutes, and increase Intensity 2-5% each week if RPE <5, RPD <5, SpO2 >87% and pt is within [facility] above. Home Program          (x) Given to patient with current levels, recommendation and guidelines.                                        Initial Levels:  TM:1.9mph,6min  AD:0.7level,6min  NuStep:46 W, 6 min  UBE:0.3level, 5 min  RT 2#, 8-15 reps   Current Levels:  TM: 1.2 mph, 10 min  AD: 1.2 level, 10 mi  UBE:  10watts, 5 min  RT 2 #, 8-15 reps   Current Levels:  TM: 1.5mph, 10 min  AD: 20W, 10 min  UBE: 10W, 5 min  RT 2#, 8-15 reps  Current Levels:  TM: 1.5mph, 10 min  AD: 20W, 10 min  UBE: 10W, 5 min  RT 2#, 8-15 reps  Current Levels:  TM:  mph,  min  AD:  level,  min  NuStep:  W,  min  UBE:  level, 5 min  RT  #, 8-15 reps   Final Levels:  TM:  mph,  min  AD:  level,  min  NuStep:  W,  min  UBE:  level, 5 min  RT  #, 8-15 reps     Physical Limitations  Initial Assessment    (x) Weakness  () Inflexible  () Poor posture  () Gait abnormality  () Balance  () Orthopedic Issues   Physical Limitation  Plan      (x) Strengthen through squats and RT  (x) Appropriate stretches shown  (x)Verbal cues and reminders for posture and gait given  (x) Proper modalities chosen for ortho issues  (x) Give Home activity / stretches/ Warmup/ [place] info   Physical Limitations Reassessment      (x) Pt progressing  () Pt not progressing Physical Limitations Reassessment      () Pt progressing  (x) Pt not progressing Physical Limitations Reassessment      () Pt progressing  () Pt not progressing     Physical Limitations  Discharge      () Issues Addressed  () PT/OT suggested       Exercise Goals   Initial Assessment:    (x) Start to, and commit to exercising regularly     (x) Learn about home activity recommendations        (x) Increase PF shown by increasing 6MWD   Exercise Goals   Plan      -Initiate SD 2x/week  -Encourage home exercise    -Attend Home Activity EDUCATION class      -Slowly, safely, progress in [place]    Exercise Goals   Reassessment      (x) Pt is coming regularly  () Pt is sporadic    () Pt has had class  (x) Pt has not had class        (x) Pt is progressing  () Pt is not progressing Exercise Goals   Reassessment      (x) Pt is coming regularly  () Pt is sporadic    () Pt has had class  () Pt has not had class        () Pt is progressing  (x) Pt is not progressing Exercise Goals   Reassessment      () Pt is coming regularly  () Pt is sporadic    () Pt has had class  () Pt has not had class        () Pt is progressing  () Pt is not progressing   Exercise Goals Discharge      () GOAL Met?  () GOAL not met -      () Pt had EDUCATION class  Date:   () Pt did not have class    () GOAL Met  See 6MWD above  () GOAL not Met:     Exercise Intervention Initial Assessment      () Transportation needed        (x) EDUCATION needed          (x) Motivation needed   Exercise Intervention/ Education   Plan      () [facility] set up        (x) EDUCATION:  Home activity class to be given        (x) Positive encouragement, support and motivation to be given   Exercise Intervention/ Education Reassessment      (x) Pt is attending  () Pt is not attending      () Pt has had class  (x) Pt has not had class      (x) Pt is progressing  () Pt not progressing Exercise Intervention/ Education Reassessment      (x) Pt is attending  () Pt is not attending      () Pt has had class  (x) Pt has not had class      (x) Pt is progressing  () Pt not progressing Exercise Intervention/ Education Reassessment      () Pt is attending  () Pt is not attending      () Pt has had class  () Pt has not had class      () Pt is progressing  () Pt not progressing Exercise Intervention/ Edcuation Discharge      () Pt attended most  () Pt cancelled a lot    () Pt has had class  Date: See above  () Pt did not have class    () Pt progressed and did well  () Pt had difficulty- NUTRITION   INITIAL ASSESSMENT      Height:  53   Weight: 138.8 lbs  BMI: 24.6    30 day goal: 138 Lbs (2-4lbs)    () Overweight  () Underweight  (x) Normal  () Teeth issues  () GI issues  (x) Nutrition knowledge needed  () DM   NUTRITION   PLAN        Current Weight:  138.8lbs         NUTRITION  RE ASSESSMENT      Current Weight:  Not taken        Goal achieved?:  Next 30 day goal: maintain NUTRITION  RE ASSESSMENT      Current Weight:  136.2lbs      Goal achieved?:Y  Next 30 day goal: 135Lbs NUTRITION  RE ASSESSMENT      Current Weight:  lbs      Goal achieved?:  Next 30 day goal: Lbs   NUTRITION DISCHARGE        Current Weight:  lbs  BMI:    Goal achieved?:     Nutrition Goals  Initial Assessment    () Pt is DM.   Increase nutrition knowledge and glucose control through diet and exercise      (x) Learn weight strategies to maintain weight        (x) Learn about general nutrition          -Achievable weight goal for the end of the program:  138 Lbs (2-4lbs per month recommended)   Nutrition Goals   Plan      () DM:  Attend nutrition class and learn about quality carbohydrates and exercises role in DM    (x) Attend nutrition class          (x) Attend nutrition class          (x) Initiate exercise and give pt hints and tips for weight and will have class for information   Nutrition Goals  Reassessment      () DM: Pt has had class  (x) DM: Pt has not had class           () Pt has had class  (x) Pt has not had class       () Pt has had class  (x) Pt has not had class       (x) Pt progressing  () Pt not progressing     Nutrition Goals  Reassessment      () DM: Pt has had class  (x) DM: Pt has not had class           () Pt has had class  (x) Pt has not had class       () Pt has had class  (x) Pt has not had class       () Pt progressing  (x) Pt not progressing     Nutrition Goals  Reassessment      () DM: Pt has had class  () DM: Pt has not had class           () Pt has had class  () Pt has not had class       () Pt has had class  () Pt has not had class       () Pt progressing  () Pt not progressing     Nutrition Goals  Discharge      () Pt had nutrition class  Date:  () Pt has not had class        () Pt has had class  Date: See above  () Pt has not had class    () Pt has class  Date: See above  () Pt has not had class      () Final weight goal achieved  () Pt did not reach desired weight goal - (readdressed nutrition and exercise strategies for future goal attainment)        Nutrition Intervention/ Education   Initial Assessment    (x) Pt needs Nutrition education class        () Pt states does not need class       Nutrition Intervention/ Education  Plan      (x) Pt will have Nutrition class          (x) Encourage pt to continue to learn and incorporate self knowledge in to diet choices   Nutrition Intervention/ Education  Reassessment      () Pt has had class  (x) Pt has not had class      () Pt had questions  (x) Pt denies having questions Nutrition Intervention/ Education  Reassessment      () Pt has had class  (x) Pt has not had class      () Pt had questions  (x) Pt denies having questions Nutrition Intervention/ Education  Reassessment      () Pt has had class  () Pt has not had class      () Pt had questions  () Pt denies having questions Nutrition Intervention/ Education   Discharge      () Pt has had class  Date: See above  () Pt has not had class - Reason:    () Pt had questions that were answered   () Pt denies having questions             PSYCHO SOCIAL INITIAL ASSESSMENT      Depression:  () Self Reported  () In History  () S/Sx noted  () Denies  (x) On Medication  (x) Follows physician      (x) Give PHQ-9 Depression screening tool                  Dyspnea:  (x) Give pt [employer] SOB survey      (x) Pt gets SOB during activity and ADLs.           (x) Pt has support from home for the program        () Pt does not have support for the program   PSYCHO SOCIAL  PLAN      (x) Attend Stress/ Depression/ Anxiety Anxiety/ Dyspnea - Panic control class      (x) Attend Stress/ Anxiety/ Dyspnea - Panic control class      (x) Attend Stress/ Anxiety/ Dyspnea - Panic control class      (x) Initiate OH, get stronger, more endurance and more confidence              30 day CAT score:  24/ 40     Psychosocial Goals  Reassessment      () Pt has had class  (x) Pt has not had class      () Pt has had class  (x) Pt has not had class      () Pt has had class  (x) Pt has not had class      () Pt is progressing  () Pt is not progressing              60 day CAT score:  Reassessment not taken Psychosocial Goals  Reassessment      () Pt has had class  (x) Pt has not had class      () Pt has had class  (x) Pt has not had class      () Pt has had class  (x) Pt has not had class      () Pt is progressing  (x) Pt is not progressing              90 day CAT score:  25/ 40 Psychosocial Goals  Reassessment      () Pt has had class  () Pt has not had class      () Pt has had class  () Pt has not had class      () Pt has had class  () Pt has not had class      () Pt is progressing  () Pt is not progressing              120 day CAT score:  / 40   Psychosocial Goals   Discharge      (x) Pt had class            (x) Pt had class            (x) Pt had class  Date: See above  () Pt did not have class      Post rehab CAT below                  Post Rehab   CAT score:  / 40              OUTCOMES    PHQ-9:  Did pt drop a severity level or maintain in the minimal range?  () Y  () N    [employer] SOB  Did pt decrease their number by 5 or more?  () Y  () N    CAT scores:  Did pt drop by 2 or more points from Pre to Post?  () Y  () N        Psychosocial Intervention/ Education   Initial Assessment    (x) Pt is being treated for depression/ anxiety        (x) Pt would benefit from OHs Psychosocial Issues Class (Stress, Depression, Anxiety, and Intimacy   Psychosocial  Intervention/ Education  Plan      (x) Pt to contact physician if any severe changes occur in mood        (x) Pt will attend MSs class   Psychosocial Intervention/ Education Reassessment      (x) Pt is coping well  () Pt is not coping well         () Pt has had class  (x) Pt has not had class Psychosocial Intervention/ Education Reassessment      (x) Pt is coping well  () Pt is not coping well         () Pt has had class  (x) Pt has not had class Psychosocial Intervention/ Education Reassessment      () Pt is coping well  () Pt is not coping well         () Pt has had class  () Pt has not had class   Psychosocial Intervention/ Education Discharge      () Pt did not need any changes   () Pt needed changes to treatment      (x) Pt had class  Date: See above  () Pt did not have class         Pain   Initial Assessment    (x) N/A  Location:   Duration:   Intensity:  /10  Type:    Pain   Plan      (x) N/A    () Pts pain is under control  () Pt encouraged to contact physician for pain control   Pain   Reassessment      (x) N/A    () Pts pain is under control  () Pt encouraged to contact physician for pain control Pain   Reassessment      (x) N/A    () Pts pain is under control  () Pt encouraged to contact physician for pain control Pain   Reassessment      () N/A    () Pts pain is under control  () Pt encouraged to contact physician for pain control Pain   Discharge      () N/A    () Pts pain is under control  () Pt encouraged to contact physician for pain control           OXYGEN   INITIAL ASSESSMENT    RESTING:  (x) RA  () O2:  L/min  () Continuous  () Breath Actuated  92% SpO2 / 20 bpm    Prescribed Oxygen Use:  2 L/min at Rest and with exertion    DME Company for O2:  [facility]     OXYGEN   PLAN      RESTING:  (x) Monitor needs, administer supplemental oxygen if SpO2 <88% OXYGEN   RE ASSESSMENT    RESTING:  () Pt SpO2 >87%  (x) Pt needs higher flow  () Pt needs less flow OXYGEN   RE ASSESSMENT    RESTING:  (x) Pt SpO2 >87%  () Pt needs higher flow  () Pt needs less flow OXYGEN   RE ASSESSMENT    RESTING:  (x) Pt SpO2 >87%  () Pt needs higher flow  () Pt needs less flow OXYGEN   DISCHARGE      RESTING:  () Pt SpO2 remained >87% on * L/min at rest    () Pts doctor and company contacted for change in Rx   Oxygen Goals   Initial Assessment    (x) Wean, Titrate down, or get off O2 if possible   Oxygen Goals   Plan      (x) Closely monitor SpO2 and HR using pulse oximetry for saturation and HR response, and titrate if appropriate   Oxygen Goals  Reassessment    () RA    On exertion:  () Pt maintaining FiO2  () Pt tolerating lower O2 needs  (x) Pt needing more O2-3L   Oxygen Goals  Reassessment    () RA    On exertion:  () Pt maintaining FiO2  () Pt tolerating lower O2 needs  (x) Pt needing more O2 Oxygen Goals  Reassessment    () RA    On exertion:  () Pt maintaining FiO2  () Pt tolerating lower O2 needs  () Pt needing more O2     Oxygen Goals   Discharge    () RA    With Exertion:  () Pt maintained FiO2  () Pt was found to need less O2 - notification sent to physician  () Pt was found to need more O2, notification sent to physician     Oxygen Intervention/ Education  Initial Assessment    (x) Learn / Review about O2 use, safety and travel      () Pt does not wear or have home oxygen    Oxygen Intervention/ Education  Plan      (x) Attend O2 Use, safety and travel class        (x) Assess for SpO2 with each exercise   Oxygen Intervention/ Education  Reassessment      () Pt has had class  (x) Pt has not had class      () Pt is >87%  (x) Pt has been found to desaturate - physician notified Oxygen Intervention/ Education  Reassessment      () Pt has had class  (x) Pt has not had class      () Pt is >87%  (x) Pt has been found to desaturate - physician notified Oxygen Intervention/ Education  Reassessment      () Pt has had class  () Pt has not had class      () Pt is >87%  () Pt has been found to desaturate - physician notified   Oxygen Intervention/ Education  Discharge      () Pt had class  Date:   () Pt did not have class      () Pt did well  () Pt needed to be put on oxygen           TOBACCO USE  INITIAL ASSESSMENT    (x) Pt currently not smoking    () Pt currently smoking        () Pt needs Tobacco Cessation counseling          Smoked 1.25 ppd for 40 years  Pt quit in 2015.    TOBACCO USE  PLAN      (x) N/A      Does pt want to quit:   Does pt have a quit date:    () Tobacco Cessation counseling Education if applicable        () Encouraged to contact physician for tools/ nicotine replacement etc.   TOBACCO USE  RE ASSESSMENT    (x) N/A      Any change in Tobacco use status () N  ()Y      () Pt attended counseling education session            () Pt has contacted physician TOBACCO USE  RE ASSESSMENT    (x) N/A      Any change in Tobacco use status () N  ()Y      () Pt attended counseling education session            () Pt has contacted physician TOBACCO USE  RE ASSESSMENT    () N/A      Any change in Tobacco use status () N  ()Y      () Pt attended counseling education session            () Pt has contacted physician TOBACCO USE  DISCHARGE            Current Tobacco Use Status:         () Pt attended TC counseling education session  Date:           () Pt contacted physician        NRT product/ medication  :     Tobacco Use Goal  Initial Assessment      (x) Complete Cessation or Maintain Cessation of tobacco products   Tobacco Use Goal Intervention and Education Plan    (x Encourage pt to fight the urge, call quit line, use techniques learned from friends/ class    () Attend Tobacco Cessation class if needed   Tobacco Use  Reassessment          (x) Pt remains tobacco free            () Pt has had TC counseling session        () Pt still smoking Tobacco Use  Reassessment          (x) Pt remains tobacco free            () Pt has had TC counseling session        () Pt still smoking Tobacco Use  Reassessment          () Pt remains tobacco free            () Pt has had TC counseling session        () Pt still smoking Tobacco Use  Discharge          () Pt remains tobacco free            () Pt had TC counseling  Date:  See above        () Pt still smoking - gave resources for quitting             MEDICATIONS  (Oral & Inhaled)  INITIAL ASSESSMENT    Pt reports taking his meds properly 50% of the time(pt does take Anoro every day as ordered but does not take the Qvar that is listed on drs last notes from [facility].)      (x) Pt is on inhaled medications   MEDICATIONS  PLAN        -Review Rxs purpose, schedule, side effects and importance of compliance during Medication class. Also address Cpap, Vents.        MEDICATIONS  RE ASSESSMENT      Pt reports taking their meds properly  % of the time        (x) Pt has had class-5/3/22  () Pt has not had class MEDICATIONS  RE ASSESSMENT      Pt reports taking their meds properly 100% of the time        (x) Pt has had class  () Pt has not had class MEDICATIONS  RE ASSESSMENT      Pt reports taking their meds properly  % of the time        () Pt has had class  () Pt has not had class MEDICATIONS  DISCHARGE        Pt reports taking their meds properly  % of the time        (x)Pt had med class  Date:5/3/22  () Pt has not had class       Pt has:  (x) Metered Dose Inhaler  (x) Dry Powder Inhaler  (x) Nebulizer   -Review correct use, timing, technique and cleaning of equipment during Medication class (x) Pt has had class  () Pt has not had class (x) Pt has had class  () Pt has not had class () Pt has had class  () Pt has not had class () Pt had class  Date: See above  () Pt has not had class   Medication Goals  Initial Assessment        (x) Take meds properly 100% of the time    (x) Learn about / Review Rxs, and devices Medication Goals  Intervention & Education  Plan      -Follow Rx instructions and ask if questions    -Attend Medication Education class   Medication Goals  Re assessment          (x) Readdressed questions on any medications    (x) Pt has had class  () Pt has not had class Medication Goals  Re assessment          (x) Readdressed questions on any medications    (x) Pt has had class  () Pt has not had class Medication Goals  Re assessment          () Readdressed questions on any medications    () Pt has had class  () Pt has not had class Medication Goals  Discharge          % proper med usage see above      () Pt had class  Date: See above  () Pt has not had class             ADL  INITIAL ASSESSMENT      (x) Impaired ADL ability  () Need for Assist Devices  (x) Generalized weakness, low functional capacity  () Stairs in house               ADL  PLAN        -Initiate [place], RT, and chair squats  -Breathing retraining, PLB, and pacing      -Encourage home activity               ADL  RE ASSESSMENT      (x) Pt is progressing  () Pt is not progressing        () Pt has initiated home activity  () Pt has not yet initiated  home activity-      () ADLs getting easier  () ADLs not getting easier   ADL  RE ASSESSMENT      (x) Pt is progressing  () Pt is not progressing        () Pt has initiated home activity  (x) Pt has not yet initiated  home activity-      () ADLs getting easier  (x) ADLs not getting easier ADL  RE ASSESSMENT      () Pt is progressing  () Pt is not progressing        () Pt has initiated home activity  () Pt has not yet initiated  home activity-      () ADLs getting easier  () ADLs not getting easier ADL  DISCHARGE        () Pt showed strength and endurance gains  () Pt did not progress      () Pt doing recommended home activity  () Pt not doing home activitiy         ADL Goals   Initial Assessment      (x) Decrease SOB with ADLs              (x) Decreased SOB overall      ADL Goals Intervention/ Education Plan      -Initiate [place], RT and chair squats and increase pts strength and endurance        -Pacing, Breathing retraining       ADL Goals Reassessment        () ADLs getting easier  () ADLs not getting easier          () Pt states activities are getting easier/ able to go longer/ not get as SOB ADL Goals Reassessment        () ADLs getting easier  (x) ADLs not getting easier          (x) Pt states activities are getting easier/ able to go longer/ not get as SOB ADL Goals Reassessment        () ADLs getting easier  () ADLs not getting easier          () Pt states activities are getting easier/ able to go longer/ not get as SOB   ADL Goals   Discharge        Goal achieved?  () Yes  () No            Goal achieved?  () Yes  () No          Outcomes:  See Health and Functioning from the CAT tool and Strength and Endurance from 6 MWD above             EXACERBAT'N PREVENTION & MANAGEMENT  INITIAL ASSESSMENT      Pt reports;  () 0 respiratory infections  () 1   (x) 2  () Hospitalized in last 12 months   EXACERBAT'N PREVENTION & MANAGEMENT  PLAN        Address via Disease Self Management and Exacerbation prevention class:    -Hydration for mucus    -Hand Hygiene          -Evaluation of Sputum    -When to call MD  -S/Sx to report  -Decrease exposure to irritants/ exacerbators    -Cleaning of respiratory equipment   EXACERBAT'N PREVENTION & MANAGEMENT  RE ASSESSMENT      (x) Pt has had class  () Pt has not had class        () Improved hydration    () Correct hand washing techs and alcohol gel usage    () Sputum assessment    () Ability to recognize exacerbation and when to call MD        () Cleaning of respiratory equipment EXACERBAT'N PREVENTION & MANAGEMENT  RE ASSESSMENT      (x) Pt has had class  () Pt has not had class        () Improved hydration    () Correct hand washing techs and alcohol gel usage    () Sputum assessment    () Ability to recognize exacerbation and when to call MD        () Cleaning of respiratory equipment EXACERBAT'N PREVENTION & MANAGEMENT  RE ASSESSMENT      () Pt has had class  () Pt has not had class        () Improved hydration    () Correct hand washing techs and alcohol gel usage    () Sputum assessment    () Ability to recognize exacerbation and when to call MD        () Cleaning of respiratory equipment [address]        () Pt has had class  Date:4/19/22  () Pt did not have class              () Addressed questions and pt feels comfortable with hydration, hand washing, sputum assessment, exacerbation knowledge, and cleaning of equipment   Exacerbation Prevention & Management Goals  Initial Assessment      (x) Learn ways to stay healthy and not get admitted          (x) Increase knowledge of Lungs, Breathing, Exercise, Nutrition, Mood and controlling anxiety, and stopping the Dyspnea Cycle by attending classes   Exacerbation Prevention & Management Goals Intervention/ Education  Plan    -Attend class and demonstrate disease self management strategies      -Attend all appropriate classes                   Exacerbation Prevention & Management Goals  Reassessment        (x) Pt has had class  () Pt has not had class        () Pt has had all classes  (x) Pt has had some classes  () Pt has had little/ not staying for education Exacerbation Prevention & Management Goals  Reassessment        (x) Pt has had class  () Pt has not had class        () Pt has had all classes  (x) Pt has had some classes  () Pt has had little/ not staying for education Exacerbation Prevention & Management Goals  Reassessment        () Pt has had class  () Pt has not had class        () Pt has had all classes  () Pt has had most classes  () Pt has had some of the classes  () Pt has had little/ not staying for education Exacerbation Prevention & Management Goals  Discharge        () Pt had class  Date:  See above  () Pt did not have class        () Pt attended all relevant classes and all questions were answered                   PHYSICIAN INTERACTION    MD Initial Assessment Review    (x) Initial  Assessment Reviewed  (x) Treatment Plan and Goals support patient needs/ abilities                  Cosigned and dated below:   PHYSICIAN INTERACTION    MD 30 day and Plan Review:      (x) I have seen the patient in rehab during this 30 day reassessment  (x) I agree with the plan  (x) Continue with progression and instruction  () Continue, but with the following changes:      Cosigned and dated below: Gloria Ramirez MD 30 day Reassessment Review:    (x) I have seen the patient in rehab during this 30 day reassessment  (x) Continue with the current program  () Continue, but with the following changes:  () Discharge patient      Cosigned and dated below: Gloria Ramirez MD 30 day Reassessment Review:    (x) I have seen the patient in rehab during this 30 day reassessment  (x) Continue with the current program  () Continue, but with the following changes:  () Discharge patient      Cosigned and dated below: Gloria Ramirez MD 30 day Reassessment Review:    (x) I have seen the patient in rehab during this 30 day reassessment  (x) Continue with the current program  () Continue, but with the following changes:  () Discharge patient      Cosigned and dated below: Gloria Ramirez MD 30 day   Discharge Review:    (x) Discharge patient                            Cosigned and dated below:     Cosigned by: Tom Medel MD at 3/23/2022  1:26 PM   Revision History  Date/Time User Provider Type Action   3/23/2022  1:26 PM Tom Medel MD Physician Cosign   3/22/2022  5:41 PM Ena Blunt RCP Respiratory Therapist Sign     Cosigned by: Tom Medel MD at 4/22/2022 11:20 AM   Revision History  Date/Time User Provider Type Action   4/22/2022 11:20 AM Tom Medel MD Physician Cosign   4/21/2022  3:18 PM Theodore Flores Exercise Physiologist Sign       Cosigned by: Tom Medel MD at 5/24/2022 10:27 AM   Revision History  Date/Time User Provider Type Action   5/24/2022 10:27 AM Tom Medel MD Physician Cosign   5/17/2022  3:47 PM Saige Robbins Exercise Physiologist Sign

## 2022-06-16 ENCOUNTER — HOSPITAL ENCOUNTER (OUTPATIENT)
Dept: CARDIAC REHAB | Age: 72
Setting detail: THERAPIES SERIES
End: 2022-06-16
Payer: MEDICARE

## 2022-06-21 ENCOUNTER — APPOINTMENT (OUTPATIENT)
Dept: CARDIAC REHAB | Age: 72
End: 2022-06-21
Payer: MEDICARE

## 2022-06-23 ENCOUNTER — HOSPITAL ENCOUNTER (OUTPATIENT)
Dept: CARDIAC REHAB | Age: 72
Setting detail: THERAPIES SERIES
End: 2022-06-23
Payer: MEDICARE

## 2022-06-28 ENCOUNTER — HOSPITAL ENCOUNTER (OUTPATIENT)
Dept: CARDIAC REHAB | Age: 72
Setting detail: THERAPIES SERIES
End: 2022-06-28
Payer: MEDICARE

## 2022-06-30 ENCOUNTER — HOSPITAL ENCOUNTER (OUTPATIENT)
Dept: CARDIAC REHAB | Age: 72
Setting detail: THERAPIES SERIES
Discharge: HOME | End: 2022-06-30
Payer: MEDICARE

## 2022-06-30 PROCEDURE — 94626 PHY/QHP OP PULM RHB W/MNTR: CPT

## 2022-07-05 ENCOUNTER — HOSPITAL ENCOUNTER (OUTPATIENT)
Dept: CARDIAC REHAB | Age: 72
Setting detail: THERAPIES SERIES
End: 2022-07-05
Payer: MEDICARE

## 2022-07-07 ENCOUNTER — HOSPITAL ENCOUNTER (OUTPATIENT)
Dept: CARDIAC REHAB | Age: 72
Setting detail: THERAPIES SERIES
Discharge: HOME | End: 2022-07-07
Payer: MEDICARE

## 2022-07-07 PROCEDURE — 94626 PHY/QHP OP PULM RHB W/MNTR: CPT

## 2022-07-12 ENCOUNTER — HOSPITAL ENCOUNTER (OUTPATIENT)
Dept: CARDIAC REHAB | Age: 72
Setting detail: THERAPIES SERIES
End: 2022-07-12
Payer: MEDICARE

## 2022-07-12 NOTE — PLAN OF CARE
[facility] for COPD   INDIVIDUAL TREATMENT PLAN (ITP)     Name: Irwin Brand   :  1950  Acct Number: [[id number]]   AGE: 67 y.o. Diagnosis:  Moderate COPD, which is GOLD Stage 2                                               PFT:  Post Bronchodilator FEV1/FVC: 59  FEV1: 59    Patient Goals:  (x) Breathe better  (x) Increase my endurance (x) Have more energy  (x) Rely less on others  (x) Ease ADLs  (x) Understand and use meds correctly  (x) Control cough  (x) Make fewer visits to hospital  () Quit smoking  (x) Feel less anxious / have more confidence    Glossary:  [place]=Pulmonary Rehab  PF=Physical Fitness TM=Treadmill  AD=Schwinn Airdyne  UBE=UpperBody Ergometer  RT=Resistance Training  PLB=Pursed Lip Breathing      COVID -19 Screen  Do you have any of the following symptoms:  [] Fever [] Cough [] SOB [] Muscle/Body Ache [] Loss of taste/smell [x] None    Have you traveled outside of the [place]? [] Yes      [x] No    Have you been around anyone who has tested Positive for COVID 19?  [] Yes      [x] No    The following Education has been completed with patient. [x] Wait in the lobby until we call you back to rehab. [x] You must wear a mask while in the medical center and in pulmonary rehab unless exercising. Please bring your own. [x] Bring your own bottle of water with you. [x] You can not bring anyone with you in to the rehab clinic at this time. They are welcome to sit in the lobby or wait in the car.       INDIVIDUAL TREATMENT PLAN    INITIAL ASSESSMENT    Day #1 INDIVIDUAL TREATMENT PLAN    PLAN      Day #2-30 INDIVIDUAL TREATMENT PLAN    RE ASSESSMENT    Day #31-60 INDIVIDUAL TREATMENT PLAN    RE ASSESSMENT    Day #61-90 INDIVIDUAL TREATMENT PLAN    RE ASSESSMENT    Day # INDIVIDUAL TREATMENT PLAN    DISCHARGE      Last Day        Date: 3/22/2022     Date: 2022   Date: 22  *Patient not in attendance for reassessment Date: 6/14/22   Date: 7/12/22    Patient not in attendance for reassessment   Date:    EXERCISE   INITIAL ASSESSMENT      Prescribed Oxygen Use  Activity:2L/min  (x) Continuous  () Breath Actuated      Oxygen Titration  (x) Assess for desaturation            Current Home Exercise:  None   EXERCISE  PLAN        Current Oxygen Use  Activity:  2L/min  (x) Continuous  () Breath Actuated      Oxygen Titration  (x) Maintaining >87% Spo2  () Not maintaining -  L/min needed on       Current Home Exercise:  none   EXERCISE  RE ASSESSMENT      Current Oxygen Use  Activity: 3L/min  (x) Continuous  () Breath Actuated      Oxygen Titration  (x) Maintaining >87% Spo2  () Not maintaining -  L/min needed on       Current Home Exercise:  Unknown EXERCISE  RE ASSESSMENT      Current Oxygen Use  Activity: 3L/min  (x) Continuous  () Breath Actuated      Oxygen Titration  (x) Maintaining >87% Spo2  () Not maintaining -  L/min needed on       Current Home Exercise:  None EXERCISE  RE ASSESSMENT      Current Oxygen Use  Activity: 3 L/min  (x) Continuous  () Breath Actuated      Oxygen Titration  () Maintaining >87% Spo2  () Not maintaining -  L/min needed on       Current Home Exercise:  Unknown EXERCISE  DISCHARGE        Final Oxygen Use  Activity:  L/min  () Continuous  () Breath Actuated      Oxygen Titration  () Maintaining >87% Spo2  () Not maintaining -  L/min needed on       Current Home Exercise:  Frequency:  x/wk  Intensity:  /10  Duration:  min  Mode:       6 Minute Walk Test (6MWT):  O2 used: 2LPM nasal cannula   1100 ft walked = 2.5 METs  SpO2: 90-96%  HR:    RPD: 2  RPE: 1  Number of Breaks:  none   Avg time for break:  n/a  Assist device used: none         6 Minute Walk Test (6MWT):  O2 used:   ft walked =  METs  SpO2:  %  HR:    RPD:   RPE:  Number of Breaks:    Avg time for break:  secs  Assist device used:             OUTCOMES:  6MWD Improvement:  > 98'?  () Yes  () No     Exercise Prescription    THR: 74 - 119 bpm    Frequency:  2-3x/wk in [place], 1-3x/wk home activity    Intensity:  METs (from 6MWT)      Time:  15-30 total minutes up to 55 minutes max    Type:  Aerobic (TM, AD, UBE, NuStep), 6 ST, RT 1-10# 8-15 reps    Progression:  Increase 30s - 2 min/mode for Aerobic activity, and increase Intensity 2-5% each week if RPE <5, RPD <5, SpO2 >87% and pt is within [facility] above. Exercise Prescription    (x) Pt exercising within THR    Frequency:  2-3x/wk in [place], 1-3x/wk home activity    Intensity:  3-5 on [place] Dyspnea/ Fatigue scale    Time:  15-30 total minutes up to 55 minutes max    Type:  Aerobic (TM, AD, UBE, NuStep), 6 ST, RT 1-10# 8-15 reps    Progression:  Increase 30s - 2 min/mode for Aerobic activity, and increase Intensity 2-5% each week if RPE <5, RPD <5, SpO2 >87% and pt is within [facility] above. Exercise Prescription    (x) Pt exercising within THR    Frequency:  2-3x/wk in [place], 2-5x/wk home activity    Intensity:  3-5 on [place] Dyspnea/ Fatigue scale    Time:  30-45 total minutes up to 55 minutes max    Type:  Aerobic (TM, AD, UBE, NuStep), 6 ST, RT 1-10# 8-15 reps    Progression:  Increase 30s - 2 min/mode for Aerobic activity, and increase Intensity 2-5% each week if RPE <5, RPD <5, SpO2 >87% and pt is within [facility] above. Exercise Prescription    (x) Pt exercising within THR    Frequency:  2-3x/wk in [place], 2-5x/wk home activity    Intensity:  3-5 on [place] Dyspnea/ Fatigue scale    Time:  30-45 total minutes up to 55 minutes max    Type:  Aerobic (TM, AD, UBE, NuStep), 6 ST, RT 1-10# 8-15 reps    Progression:  Increase 30s - 2 min/mode for Aerobic activity up to 35 minutes, and increase Intensity 2-5% each week if RPE <5, RPD <5, SpO2 >87% and pt is within [facility] above.  Exercise Prescription    (x) Pt exercising within THR    Frequency:  2-3x/wk in [place], 2-5x/wk home activity    Intensity:  3-5 on [place] Dyspnea/ Fatigue scale    Time:  30-45 total minutes up to 55 minutes max    Type:  Aerobic (TM, AD, UBE, NuStep), 6 ST, RT 1-10# 8-15 reps    Progression:  Increase 30s - 2 min/mode for Aerobic activity up to 35 minutes, and increase Intensity 2-5% each week if RPE <5, RPD <5, SpO2 >87% and pt is within [facility] above. Home Program          (x) Given to patient with current levels, recommendation and guidelines.                                        Initial Levels:  TM:1.9mph,6min  AD:0.7level,6min  NuStep:46 W, 6 min  UBE:0.3level, 5 min  RT 2#, 8-15 reps   Current Levels:  TM: 1.2 mph, 10 min  AD: 1.2 level, 10 mi  UBE:  10watts, 5 min  RT 2 #, 8-15 reps   Current Levels:  TM: 1.5mph, 10 min  AD: 20W, 10 min  UBE: 10W, 5 min  RT 2#, 8-15 reps  Current Levels:  TM: 1.5mph, 10 min  AD: 20W, 10 min  UBE: 10W, 5 min  RT 2#, 8-15 reps  Current Levels:  TM: 1.5mph, 10 min  AD: 26W, 15 min  UBE: 20W, 5 min  RT 2#, 8-15 reps    Final Levels:  TM:  mph,  min  AD:  level,  min  NuStep:  W,  min  UBE:  level, 5 min  RT  #, 8-15 reps     Physical Limitations  Initial Assessment    (x) Weakness  () Inflexible  () Poor posture  () Gait abnormality  () Balance  () Orthopedic Issues   Physical Limitation  Plan      (x) Strengthen through squats and RT  (x) Appropriate stretches shown  (x)Verbal cues and reminders for posture and gait given  (x) Proper modalities chosen for ortho issues  (x) Give Home activity / stretches/ Warmup/ [place] info   Physical Limitations Reassessment      (x) Pt progressing  () Pt not progressing Physical Limitations Reassessment      () Pt progressing  (x) Pt not progressing Physical Limitations Reassessment      (x) Pt progressing  () Pt not progressing     Physical Limitations  Discharge      () Issues Addressed  () PT/OT suggested       Exercise Goals   Initial Assessment:    (x) Start to, and commit to exercising regularly     (x) Learn about home activity recommendations        (x) Increase PF shown by increasing 6MWD   Exercise Goals   Plan      -Initiate MA 2x/week  -Encourage home exercise    -Attend Home Activity EDUCATION class      -Slowly, safely, progress in AR    Exercise Goals   Reassessment      (x) Pt is coming regularly  () Pt is sporadic    () Pt has had class  (x) Pt has not had class        (x) Pt is progressing  () Pt is not progressing Exercise Goals   Reassessment      (x) Pt is coming regularly  () Pt is sporadic    () Pt has had class  () Pt has not had class        () Pt is progressing  (x) Pt is not progressing Exercise Goals   Reassessment      (x) Pt is coming regularly  () Pt is sporadic    () Pt has had class  (x) Pt has not had class        (x) Pt is progressing slowly  () Pt is not progressing   Exercise Goals Discharge      () GOAL Met?  () GOAL not met -      () Pt had EDUCATION class  Date:   () Pt did not have class    () GOAL Met  See 6MWD above  () GOAL not Met:     Exercise Intervention Initial Assessment      () Transportation needed        (x) EDUCATION needed          (x) Motivation needed   Exercise Intervention/ Education   Plan      () [facility] set up        (x) EDUCATION:  Home activity class to be given        (x) Positive encouragement, support and motivation to be given   Exercise Intervention/ Education Reassessment      (x) Pt is attending  () Pt is not attending      () Pt has had class  (x) Pt has not had class      (x) Pt is progressing  () Pt not progressing Exercise Intervention/ Education Reassessment      (x) Pt is attending  () Pt is not attending      () Pt has had class  (x) Pt has not had class      (x) Pt is progressing  () Pt not progressing Exercise Intervention/ Education Reassessment      (x) Pt is attending  () Pt is not attending      () Pt has had class  (x) Pt has not had class      (x) Pt is progressing  () Pt not progressing Exercise Intervention/ Edcuation Discharge      () Pt attended most  () Pt cancelled a lot    () Pt has had class  Date: See above  () Pt did not have class    () Pt progressed and did well  () Pt had difficulty- NUTRITION   INITIAL ASSESSMENT      Height:  53   Weight: 138.8 lbs  BMI: 24.6    30 day goal: 138 Lbs (2-4lbs)    () Overweight  () Underweight  (x) Normal  () Teeth issues  () GI issues  (x) Nutrition knowledge needed  () DM   NUTRITION   PLAN        Current Weight:  138.8lbs         NUTRITION  RE ASSESSMENT      Current Weight:  Not taken        Goal achieved?:  Next 30 day goal: maintain NUTRITION  RE ASSESSMENT      Current Weight:  136.2lbs      Goal achieved?:Y  Next 30 day goal: 135Lbs NUTRITION  RE ASSESSMENT      Current Weight:  unknown    Goal achieved?:  Next 30 day goal: Lbs   NUTRITION DISCHARGE        Current Weight:  lbs  BMI:    Goal achieved?:     Nutrition Goals  Initial Assessment    () Pt is DM.   Increase nutrition knowledge and glucose control through diet and exercise      (x) Learn weight strategies to maintain weight        (x) Learn about general nutrition          -Achievable weight goal for the end of the program:  138 Lbs (2-4lbs per month recommended)   Nutrition Goals   Plan      () DM:  Attend nutrition class and learn about quality carbohydrates and exercises role in DM    (x) Attend nutrition class          (x) Attend nutrition class          (x) Initiate exercise and give pt hints and tips for weight and will have class for information   Nutrition Goals  Reassessment      () DM: Pt has had class  (x) DM: Pt has not had class           () Pt has had class  (x) Pt has not had class       () Pt has had class  (x) Pt has not had class       (x) Pt progressing  () Pt not progressing     Nutrition Goals  Reassessment      () DM: Pt has had class  (x) DM: Pt has not had class           () Pt has had class  (x) Pt has not had class       () Pt has had class  (x) Pt has not had class       () Pt progressing  (x) Pt not progressing     Nutrition Goals  Reassessment      () DM: Pt has had class  (x) DM: Pt has not had class           () Pt has had class  (x) Pt has not had class () Pt has had class  (x) Pt has not had class       () Pt progressing  (x) Pt not progressing     Nutrition Goals  Discharge      () Pt had nutrition class  Date:  () Pt has not had class        () Pt has had class  Date: See above  () Pt has not had class    () Pt has class  Date: See above  () Pt has not had class      () Final weight goal achieved  () Pt did not reach desired weight goal - (readdressed nutrition and exercise strategies for future goal attainment)        Nutrition Intervention/ Education   Initial Assessment    (x) Pt needs Nutrition education class        () Pt states does not need class       Nutrition Intervention/ Education  Plan      (x) Pt will have Nutrition class          (x) Encourage pt to continue to learn and incorporate self knowledge in to diet choices   Nutrition Intervention/ Education  Reassessment      () Pt has had class  (x) Pt has not had class      () Pt had questions  (x) Pt denies having questions Nutrition Intervention/ Education  Reassessment      () Pt has had class  (x) Pt has not had class      () Pt had questions  (x) Pt denies having questions Nutrition Intervention/ Education  Reassessment      () Pt has had class  (x) Pt has not had class      () Pt had questions  (x) Pt denies having questions Nutrition Intervention/ Education   Discharge      () Pt has had class  Date: See above  () Pt has not had class - Reason:    () Pt had questions that were answered   () Pt denies having questions             PSYCHO SOCIAL INITIAL ASSESSMENT      Depression:  () Self Reported  () In History  () S/Sx noted  () Denies  (x) On Medication  (x) Follows physician      (x) Give PHQ-9 Depression screening tool                  Dyspnea:  (x) Give pt [facility] SOB survey      (x) Pt gets SOB during activity and ADLs.           (x) Pt has support from home for the program        () Pt does not have support for the program   PSYCHO SOCIAL  PLAN      (x) Attend Stress/ Depression/ Anxiety Class                Pre Rehab PHQ-9   Score: 19  1-4 Normal  5-9 Mild  10-14 Mod  15-19 Mod-Sev  >19 Severe        Pre Rehab UCSD SOB Score:  47      (x) Attend classes and attend rehab to increase strength and endurance      -Attend Psychosocial class          () Speak with the patient/ family about ability to commit to the program with undue stress/ anxiety   PSYCHO SOCIAL  RE ASSESSMENT    (x) Pt scored >10 on PHQ-9. Will speak with patient at next visit.  Currently followed by physician             () Pt recommended to contact physician for assistance                See below for ADLs        () Pt has had class  () Pt has not had class        Re assessment of support:  Good, pt has been attending PSYCHO SOCIAL  RE ASSESSMENT    (x) Pt is coping well  () Pt needs more assistance-              () Pt recommended to contact physician for assistance                See below          () Pt has had class  (x) Pt has not had           Re assessment of support:  Good, pt has been attending PSYCHO SOCIAL  RE ASSESSMENT    (x) Pt is coping well  () Pt needs more assistance-              () Pt recommended to contact physician for assistance                See below          () Pt has had class  (x) Pt has not had class        Re assessment of support:  Good, pt has been attending   PSYCHO SOCIAL DISCHARGE      (x) Pt attended class     Date:              Post Rehab PHQ-9   Depression Score:                [facility]  Score:      (x) Pt is less SOB with ADLs            () Pt had class  Date: See above  () Pt did not have class        () Pt completed program  () Pt had to stop         Psychosocial Goals   Initial Assessment    (x) Maintain/ Decrease Depression Levels      (x) Maintain/ Decrease Anxiety Levels        (x) Learn about Emotional Health          (x) Increase QoL   (CAT tool)          (x) Give the CAT tool for HR QoL      Pre Rehab  CAT score:  28/ 40       Psychosocial Goals  Plan      (x) Attend Stress/ Anxiety/ Dyspnea - Panic control class      (x) Attend Stress/ Anxiety/ Dyspnea - Panic control class      (x) Attend Stress/ Anxiety/ Dyspnea - Panic control class      (x) Initiate AR, get stronger, more endurance and more confidence              30 day CAT score:  24/ 40     Psychosocial Goals  Reassessment      () Pt has had class  (x) Pt has not had class      () Pt has had class  (x) Pt has not had class      () Pt has had class  (x) Pt has not had class      () Pt is progressing  () Pt is not progressing              60 day CAT score:  Reassessment not taken Psychosocial Goals  Reassessment      () Pt has had class  (x) Pt has not had class      () Pt has had class  (x) Pt has not had class      () Pt has had class  (x) Pt has not had class      () Pt is progressing  (x) Pt is not progressing              90 day CAT score:  25/ 40 Psychosocial Goals  Reassessment      () Pt has had class  (x) Pt has not had class      () Pt has had class  (x) Pt has not had class      () Pt has had class  (x) Pt has not had class      () Pt is progressing  (x) Pt is not progressing              120 day CAT score:  Reassessment not taken   Psychosocial Goals   Discharge      (x) Pt had class            (x) Pt had class            (x) Pt had class  Date: See above  () Pt did not have class      Post rehab CAT below                  Post Rehab   CAT score:  / 40              OUTCOMES    PHQ-9:  Did pt drop a severity level or maintain in the minimal range?  () Y  () N    [facility] SOB  Did pt decrease their number by 5 or more?  () Y  () N    CAT scores:  Did pt drop by 2 or more points from Pre to Post?  () Y  () N        Psychosocial Intervention/ Education   Initial Assessment    (x) Pt is being treated for depression/ anxiety        (x) Pt would benefit from ARs Psychosocial Issues Class (Stress, Depression, Anxiety, and Intimacy   Psychosocial  Intervention/ Education  Plan      (x) Pt to contact physician if any severe changes occur in mood        (x) Pt will attend [facility]s class   Psychosocial Intervention/ Education Reassessment      (x) Pt is coping well  () Pt is not coping well         () Pt has had class  (x) Pt has not had class Psychosocial Intervention/ Education Reassessment      (x) Pt is coping well  () Pt is not coping well         () Pt has had class  (x) Pt has not had class Psychosocial Intervention/ Education Reassessment      (x) Pt is coping well  () Pt is not coping well         () Pt has had class  (x) Pt has not had class   Psychosocial Intervention/ Education Discharge      () Pt did not need any changes   () Pt needed changes to treatment      (x) Pt had class  Date: See above  () Pt did not have class         Pain   Initial Assessment    (x) N/A  Location:   Duration:   Intensity:  /10  Type:    Pain   Plan      (x) N/A    () Pts pain is under control  () Pt encouraged to contact physician for pain control   Pain   Reassessment      (x) N/A    () Pts pain is under control  () Pt encouraged to contact physician for pain control Pain   Reassessment      (x) N/A    () Pts pain is under control  () Pt encouraged to contact physician for pain control Pain   Reassessment      (x) N/A    () Pts pain is under control  () Pt encouraged to contact physician for pain control Pain   Discharge      () N/A    () Pts pain is under control  () Pt encouraged to contact physician for pain control           OXYGEN   INITIAL ASSESSMENT    RESTING:  (x) RA  () O2:  L/min  () Continuous  () Breath Actuated  92% SpO2 / 20 bpm    Prescribed Oxygen Use:  2 L/min at Rest and with exertion    DME Company for O2:  [facility]     OXYGEN   PLAN      RESTING:  (x) Monitor needs, administer supplemental oxygen if SpO2 <88% OXYGEN   RE ASSESSMENT    RESTING:  () Pt SpO2 >87%  (x) Pt needs higher flow  () Pt needs less flow OXYGEN   RE ASSESSMENT    RESTING:  (x) Pt SpO2 >87%  () Pt needs higher flow  () Pt needs less flow OXYGEN   RE ASSESSMENT    RESTING:  (x) Pt SpO2 >87%  () Pt needs higher flow  () Pt needs less flow OXYGEN   DISCHARGE      RESTING:  () Pt SpO2 remained >87% on * L/min at rest    () Pts doctor and company contacted for change in Rx   Oxygen Goals   Initial Assessment    (x) Wean, Titrate down, or get off O2 if possible   Oxygen Goals   Plan      (x) Closely monitor SpO2 and HR using pulse oximetry for saturation and HR response, and titrate if appropriate   Oxygen Goals  Reassessment    () RA    On exertion:  () Pt maintaining FiO2  () Pt tolerating lower O2 needs  (x) Pt needing more O2-3L   Oxygen Goals  Reassessment    () RA    On exertion:  () Pt maintaining FiO2  () Pt tolerating lower O2 needs  (x) Pt needing more O2 Oxygen Goals  Reassessment    () RA    On exertion:  (x) Pt maintaining FiO2  () Pt tolerating lower O2 needs  () Pt needing more O2     Oxygen Goals   Discharge    () RA    With Exertion:  () Pt maintained FiO2  () Pt was found to need less O2 - notification sent to physician  () Pt was found to need more O2, notification sent to physician     Oxygen Intervention/ Education  Initial Assessment    (x) Learn / Review about O2 use, safety and travel      () Pt does not wear or have home oxygen    Oxygen Intervention/ Education  Plan      (x) Attend O2 Use, safety and travel class        (x) Assess for SpO2 with each exercise   Oxygen Intervention/ Education  Reassessment      () Pt has had class  (x) Pt has not had class      () Pt is >87%  (x) Pt has been found to desaturate - physician notified Oxygen Intervention/ Education  Reassessment      () Pt has had class  (x) Pt has not had class      () Pt is >87%  (x) Pt has been found to desaturate - physician notified Oxygen Intervention/ Education  Reassessment      () Pt has had class  (x) Pt has not had class      (x) Pt is >87%  () Pt has been found to desaturate - physician notified   Oxygen Intervention/ Education  Discharge      () Pt had class  Date: () Pt did not have class      () Pt did well  () Pt needed to be put on oxygen           TOBACCO USE  INITIAL ASSESSMENT    (x) Pt currently not smoking    () Pt currently smoking        () Pt needs Tobacco Cessation counseling          Smoked 1.25 ppd for 40 years  Pt quit in 2015.    TOBACCO USE  PLAN      (x) N/A      Does pt want to quit:   Does pt have a quit date:    () Tobacco Cessation counseling Education if applicable        () Encouraged to contact physician for tools/ nicotine replacement etc.   TOBACCO USE  RE ASSESSMENT    (x) N/A      Any change in Tobacco use status () N  ()Y      () Pt attended counseling education session            () Pt has contacted physician TOBACCO USE  RE ASSESSMENT    (x) N/A      Any change in Tobacco use status () N  ()Y      () Pt attended counseling education session            () Pt has contacted physician TOBACCO USE  RE ASSESSMENT    (x) N/A      Any change in Tobacco use status () N  ()Y      () Pt attended counseling education session            () Pt has contacted physician TOBACCO USE  DISCHARGE            Current Tobacco Use Status:         () Pt attended TC counseling education session  Date:           () Pt contacted physician        NRT product/ medication  :     Tobacco Use Goal  Initial Assessment      (x) Complete Cessation or Maintain Cessation of tobacco products   Tobacco Use Goal Intervention and Education Plan    (x Encourage pt to fight the urge, call quit line, use techniques learned from friends/ class    () Attend Tobacco Cessation class if needed   Tobacco Use  Reassessment          (x) Pt remains tobacco free            () Pt has had TC counseling session        () Pt still smoking Tobacco Use  Reassessment          (x) Pt remains tobacco free            () Pt has had TC counseling session        () Pt still smoking Tobacco Use  Reassessment          (x) Pt remains tobacco free            () Pt has had TC counseling session        () Pt still smoking Tobacco Use  Discharge          () Pt remains tobacco free            () Pt had TC counseling  Date:  See above        () Pt still smoking - gave resources for quitting             MEDICATIONS  (Oral & Inhaled)  INITIAL ASSESSMENT    Pt reports taking his meds properly 50% of the time(pt does take Anoro every day as ordered but does not take the Qvar that is listed on drs last notes from [facility].)      (x) Pt is on inhaled medications   MEDICATIONS  PLAN        -Review Rxs purpose, schedule, side effects and importance of compliance during Medication class. Also address Cpap, Vents.        MEDICATIONS  RE ASSESSMENT      Pt reports taking their meds properly  % of the time        (x) Pt has had class-5/3/22  () Pt has not had class MEDICATIONS  RE ASSESSMENT      Pt reports taking their meds properly 100% of the time        (x) Pt has had class  () Pt has not had class MEDICATIONS  RE ASSESSMENT      Pt reports taking their meds properly  % of the time        (x) Pt has had class  () Pt has not had class MEDICATIONS  DISCHARGE        Pt reports taking their meds properly  % of the time        (x)Pt had med class  Date:5/3/22  () Pt has not had class       Pt has:  (x) Metered Dose Inhaler  (x) Dry Powder Inhaler  (x) Nebulizer   -Review correct use, timing, technique and cleaning of equipment during Medication class (x) Pt has had class  () Pt has not had class (x) Pt has had class  () Pt has not had class (x) Pt has had class  () Pt has not had class () Pt had class  Date: See above  () Pt has not had class   Medication Goals  Initial Assessment        (x) Take meds properly 100% of the time    (x) Learn about / Review Rxs, and devices Medication Goals  Intervention & Education  Plan      -Follow Rx instructions and ask if questions    -Attend Medication Education class   Medication Goals  Re assessment          (x) Readdressed questions on any medications    (x) Pt has had class  () Pt has not had class longer/ not get as SOB   ADL Goals Reassessment        () ADLs getting easier  (x) ADLs not getting easier          (x) Pt states activities are getting easier/ able to go longer/ not get as SOB ADL Goals Reassessment        () ADLs getting easier  () ADLs not getting easier          (x) Pt states activities are getting easier/ able to go longer/ not get as SOB   ADL Goals   Discharge        Goal achieved?  () Yes  () No            Goal achieved?  () Yes  () No          Outcomes:  See Health and Functioning from the CAT tool and Strength and Endurance from 6 MWD above             EXACERBAT'N PREVENTION & MANAGEMENT  INITIAL ASSESSMENT      Pt reports;  () 0 respiratory infections  () 1   (x) 2  () Hospitalized in last 12 months   EXACERBAT'N PREVENTION & MANAGEMENT  PLAN        Address via Disease Self Management and Exacerbation prevention class:    -Hydration for mucus    -Hand Hygiene          -Evaluation of Sputum    -When to call MD  -S/Sx to report  -Decrease exposure to irritants/ exacerbators    -Cleaning of respiratory equipment   EXACERBAT'N PREVENTION & MANAGEMENT  RE ASSESSMENT      (x) Pt has had class  () Pt has not had class        () Improved hydration    () Correct hand washing techs and alcohol gel usage    () Sputum assessment    () Ability to recognize exacerbation and when to call MD        () Cleaning of respiratory equipment EXACERBAT'N PREVENTION & MANAGEMENT  RE ASSESSMENT      (x) Pt has had class  () Pt has not had class        () Improved hydration    () Correct hand washing techs and alcohol gel usage    () Sputum assessment    () Ability to recognize exacerbation and when to call MD        () Cleaning of respiratory equipment EXACERBAT'N PREVENTION & MANAGEMENT  RE ASSESSMENT      (x) Pt has had class  () Pt has not had class        () Improved hydration    () Correct hand washing techs and alcohol gel usage    () Sputum assessment    () Ability to recognize exacerbation and when to call MD        () Cleaning of respiratory equipment EXACERBAT'N PREVENTION & MANAGEMENT  DISCHARGE        () Pt has had class  Date:4/19/22  () Pt did not have class              () Addressed questions and pt feels comfortable with hydration, hand washing, sputum assessment, exacerbation knowledge, and cleaning of equipment   Exacerbation Prevention & Management Goals  Initial Assessment      (x) Learn ways to stay healthy and not get admitted          (x) Increase knowledge of Lungs, Breathing, Exercise, Nutrition, Mood and controlling anxiety, and stopping the Dyspnea Cycle by attending classes   Exacerbation Prevention & Management Goals Intervention/ Education  Plan    -Attend class and demonstrate disease self management strategies      -Attend all appropriate classes                   Exacerbation Prevention & Management Goals  Reassessment        (x) Pt has had class  () Pt has not had class        () Pt has had all classes  (x) Pt has had some classes  () Pt has had little/ not staying for education Exacerbation Prevention & Management Goals  Reassessment        (x) Pt has had class  () Pt has not had class        () Pt has had all classes  (x) Pt has had some classes  () Pt has had little/ not staying for education Exacerbation Prevention & Management Goals  Reassessment        (x) Pt has had class  () Pt has not had class        () Pt has had all classes  () Pt has had most classes  (x) Pt has had some of the classes  () Pt has had little/ not staying for education Exacerbation Prevention & Management Goals  Discharge        () Pt had class  Date:  See above  () Pt did not have class        () Pt attended all relevant classes and all questions were answered                   PHYSICIAN INTERACTION    MD Initial Assessment Review    (x) Initial  Assessment Reviewed  (x) Treatment Plan and Goals support patient needs/ abilities                  Cosigned and dated below:   PHYSICIAN INTERACTION    MD 30 day and Plan Review:      (x) I have seen the patient in rehab during this 30 day reassessment  (x) I agree with the plan  (x) Continue with progression and instruction  () Continue, but with the following changes:      Cosigned and dated below: Radha Ham MD 30 day Reassessment Review:    (x) I have seen the patient in rehab during this 30 day reassessment  (x) Continue with the current program  () Continue, but with the following changes:  () Discharge patient      Cosigned and dated below: Radha Ham MD 30 day Reassessment Review:    (x) I have seen the patient in rehab during this 30 day reassessment  (x) Continue with the current program  () Continue, but with the following changes:  () Discharge patient      Cosigned and dated below: Radha Ham MD 30 day Reassessment Review:    (x) I have seen the patient in rehab during this 30 day reassessment  (x) Continue with the current program  () Continue, but with the following changes:  () Discharge patient      Cosigned and dated below: Radha Ham MD 30 day   Discharge Review:    (x) Discharge patient                            Cosigned and dated below:     Cosigned by: Carolee James MD at 3/23/2022  1:26 PM   Revision History  Date/Time User Provider Type Action   3/23/2022  1:26 PM Carolee James MD Physician Cosign   3/22/2022  5:41 PM Raúl Greenwood RCP Respiratory Therapist Sign     Cosigned by: Carolee James MD at 4/22/2022 11:20 AM   Revision History  Date/Time User Provider Type Action   4/22/2022 11:20 AM Carolee James MD Physician Cosign   4/21/2022  3:18 PM Sunita Neumann Exercise Physiologist Sign       Cosigned by: Carolee James MD at 5/24/2022 10:27 AM   Revision History  Date/Time User Provider Type Action   5/24/2022 10:27 AM Carolee James MD Physician Cosign   5/17/2022  3:47 PM Saige Robbins Exercise Physiologist Sign       Cosigned by: Carolee James MD at 6/15/2022  1:06 PM   Revision History  Date/Time User Provider Type Action   6/15/2022  1:06 PM Marshia Gosselin, MD Physician Cosign   6/14/2022  2:49 PM Saige Robbins Exercise Physiologist Sign

## 2022-07-13 ENCOUNTER — OFFICE VISIT (OUTPATIENT)
Dept: FAMILY MEDICINE CLINIC | Age: 72
End: 2022-07-13
Payer: MEDICARE

## 2022-07-13 VITALS
RESPIRATION RATE: 18 BRPM | WEIGHT: 135.5 LBS | DIASTOLIC BLOOD PRESSURE: 70 MMHG | HEART RATE: 100 BPM | BODY MASS INDEX: 24 KG/M2 | OXYGEN SATURATION: 90 % | SYSTOLIC BLOOD PRESSURE: 110 MMHG

## 2022-07-13 DIAGNOSIS — J96.11 CHRONIC RESPIRATORY FAILURE WITH HYPOXIA (HCC): ICD-10-CM

## 2022-07-13 DIAGNOSIS — R05.9 COUGH: ICD-10-CM

## 2022-07-13 DIAGNOSIS — J20.9 ACUTE BRONCHITIS WITH COPD (HCC): Primary | ICD-10-CM

## 2022-07-13 DIAGNOSIS — G91.2 NORMAL PRESSURE HYDROCEPHALUS (HCC): ICD-10-CM

## 2022-07-13 DIAGNOSIS — J43.2 CENTRILOBULAR EMPHYSEMA (HCC): ICD-10-CM

## 2022-07-13 DIAGNOSIS — J44.0 ACUTE BRONCHITIS WITH COPD (HCC): Primary | ICD-10-CM

## 2022-07-13 DIAGNOSIS — F17.210 SMOKING GREATER THAN 40 PACK YEARS: ICD-10-CM

## 2022-07-13 PROBLEM — R50.9 FEVER: Status: ACTIVE | Noted: 2022-01-26

## 2022-07-13 PROBLEM — Z20.822 EXPOSURE TO SEVERE ACUTE RESPIRATORY SYNDROME CORONAVIRUS 2 (SARS-COV-2): Status: ACTIVE | Noted: 2022-01-26

## 2022-07-13 PROCEDURE — 99214 OFFICE O/P EST MOD 30 MIN: CPT | Performed by: NURSE PRACTITIONER

## 2022-07-13 PROCEDURE — 1123F ACP DISCUSS/DSCN MKR DOCD: CPT | Performed by: NURSE PRACTITIONER

## 2022-07-13 PROCEDURE — 1036F TOBACCO NON-USER: CPT | Performed by: NURSE PRACTITIONER

## 2022-07-13 PROCEDURE — 3023F SPIROM DOC REV: CPT | Performed by: NURSE PRACTITIONER

## 2022-07-13 PROCEDURE — G8399 PT W/DXA RESULTS DOCUMENT: HCPCS | Performed by: NURSE PRACTITIONER

## 2022-07-13 PROCEDURE — G8420 CALC BMI NORM PARAMETERS: HCPCS | Performed by: NURSE PRACTITIONER

## 2022-07-13 PROCEDURE — 3017F COLORECTAL CA SCREEN DOC REV: CPT | Performed by: NURSE PRACTITIONER

## 2022-07-13 PROCEDURE — G8427 DOCREV CUR MEDS BY ELIG CLIN: HCPCS | Performed by: NURSE PRACTITIONER

## 2022-07-13 PROCEDURE — 1090F PRES/ABSN URINE INCON ASSESS: CPT | Performed by: NURSE PRACTITIONER

## 2022-07-13 RX ORDER — PREDNISONE 20 MG/1
20 TABLET ORAL 2 TIMES DAILY
Qty: 10 TABLET | Refills: 0 | Status: SHIPPED | OUTPATIENT
Start: 2022-07-13 | End: 2022-07-18

## 2022-07-13 SDOH — ECONOMIC STABILITY: FOOD INSECURITY: WITHIN THE PAST 12 MONTHS, YOU WORRIED THAT YOUR FOOD WOULD RUN OUT BEFORE YOU GOT MONEY TO BUY MORE.: NEVER TRUE

## 2022-07-13 SDOH — ECONOMIC STABILITY: FOOD INSECURITY: WITHIN THE PAST 12 MONTHS, THE FOOD YOU BOUGHT JUST DIDN'T LAST AND YOU DIDN'T HAVE MONEY TO GET MORE.: NEVER TRUE

## 2022-07-13 ASSESSMENT — ENCOUNTER SYMPTOMS
SHORTNESS OF BREATH: 1
RHINORRHEA: 1
NAUSEA: 0
ABDOMINAL PAIN: 0
COUGH: 1
CHEST TIGHTNESS: 0
WHEEZING: 0

## 2022-07-13 ASSESSMENT — SOCIAL DETERMINANTS OF HEALTH (SDOH): HOW HARD IS IT FOR YOU TO PAY FOR THE VERY BASICS LIKE FOOD, HOUSING, MEDICAL CARE, AND HEATING?: NOT HARD AT ALL

## 2022-07-13 NOTE — PROGRESS NOTES
Geraldo Masterson (1950) 67 y.o. female here for evaluation of the following chief complaint(s):      HPI:  Chief Complaint   Patient presents with    Cough    Chest Congestion    Head Congestion    Fatigue       Onset of 2 weeks with cough and congestion. Cough has improved but still frequent and productive. Sinus congestion and head fullness. No fever or chills. Head pressure. Increase use of oxygen at home. On INH and NEB tx    Chronic COPD. ECHO 2019 EF 60% with LV function normal.  PFT 2019 showed moderate emphysema. 40 pack year hx. Anoro and NEB tx. Has O2 at home she uses with activity and PRN at night. Following with PULM at [facility]. Jen Ledbetter:    07/13/22 1404   BP: 110/70   Pulse: 100   Resp: 18   SpO2: 90%       Patient Active Problem List   Diagnosis    Hyperlipemia    Hypothyroidism    Depression    Chronic anxiety    Osteopenia    Medication monitoring encounter    H/O: CVA (cerebrovascular accident), aneurysm bleed, Jan 2015    Abnormality of gait following cerebrovascular accident    GERD (gastroesophageal reflux disease)    Cognitive impairment due to CVA.  OAB (overactive bladder)    Chronic nausea    SOB (shortness of breath)    Hypoxia    Major depressive disorder, single episode, in full remission ([address]    Smoking greater than 40 pack years    Pulmonary emphysema ([address]    Syncope and collapse    Concussion with loss of consciousness    Laceration of head    Alcohol abuse    Spinal stenosis, cervical region    Normal pressure hydrocephalus (HCC)    Michael's edema of vocal folds    Exposure to severe acute respiratory syndrome coronavirus 2 (SARS-CoV-2)    Fever       SUBJECTIVE/OBJECTIVE:  Review of Systems   Constitutional: Positive for fatigue. Negative for chills and fever. HENT: Positive for congestion, postnasal drip and rhinorrhea. Respiratory: Positive for cough and shortness of breath.  Negative for chest tightness and wheezing. Cardiovascular: Negative for chest pain. Gastrointestinal: Negative for abdominal pain and nausea. Skin: Negative for rash. Neurological: Negative for dizziness, light-headedness and headaches. Psychiatric/Behavioral: Negative. Physical Exam  Constitutional:       General: She is not in acute distress. HENT:      Nose: Mucosal edema, congestion and rhinorrhea present. Eyes:      Pupils: Pupils are equal, round, and reactive to light. Cardiovascular:      Rate and Rhythm: Normal rate and regular rhythm. Heart sounds: No murmur heard. Pulmonary:      Effort: Accessory muscle usage present. No respiratory distress. Breath sounds: Normal breath sounds. Decreased air movement present. No wheezing. Abdominal:      General: Bowel sounds are normal. There is no distension. Palpations: Abdomen is soft. Tenderness: There is no abdominal tenderness. Musculoskeletal:         General: No tenderness. Normal range of motion. Cervical back: Normal range of motion and neck supple. Skin:     General: Skin is warm and dry. Findings: No rash. ASSESSMENT/PLAN:   Diagnosis Orders   1. Acute bronchitis with COPD ([address]     2. Cough  predniSONE (DELTASONE) 20 MG tablet   3. Centrilobular emphysema (HCC)     4. Smoking greater than 40 pack years     5. Chronic respiratory failure with hypoxia (HCC)     6. Normal pressure hydrocephalus (HCC)           MDM: Orders as above   Continue with INH and NEB    Follow up with PULM   Fluids and rest   RTO PRN      An electronic signature was used to authenticate this note.     --Huel Cockayne, SARITHA - CNP

## 2022-07-14 ENCOUNTER — HOSPITAL ENCOUNTER (OUTPATIENT)
Dept: CARDIAC REHAB | Age: 72
Setting detail: THERAPIES SERIES
Discharge: HOME | End: 2022-07-14
Payer: MEDICARE

## 2022-07-14 PROCEDURE — 94626 PHY/QHP OP PULM RHB W/MNTR: CPT

## 2022-07-19 ENCOUNTER — HOSPITAL ENCOUNTER (OUTPATIENT)
Dept: CARDIAC REHAB | Age: 72
Setting detail: THERAPIES SERIES
Discharge: HOME | End: 2022-07-19
Payer: MEDICARE

## 2022-07-19 PROCEDURE — 94626 PHY/QHP OP PULM RHB W/MNTR: CPT

## 2022-07-21 ENCOUNTER — HOSPITAL ENCOUNTER (OUTPATIENT)
Dept: CARDIAC REHAB | Age: 72
Setting detail: THERAPIES SERIES
Discharge: HOME | End: 2022-07-21
Payer: MEDICARE

## 2022-07-21 PROCEDURE — 97150 GROUP THERAPEUTIC PROCEDURES: CPT

## 2022-07-26 ENCOUNTER — HOSPITAL ENCOUNTER (OUTPATIENT)
Dept: CARDIAC REHAB | Age: 72
Setting detail: THERAPIES SERIES
Discharge: HOME | End: 2022-07-26
Payer: MEDICARE

## 2022-07-26 PROCEDURE — 94626 PHY/QHP OP PULM RHB W/MNTR: CPT

## 2022-07-26 NOTE — PLAN OF CARE
[facility] for COPD   INDIVIDUAL TREATMENT PLAN (ITP)     Name: Dinah Negrete   :  1950  Acct Number: [[id number]]   AGE: 67 y.o. Diagnosis:  Moderate COPD, which is GOLD Stage 2                                               PFT:  Post Bronchodilator FEV1/FVC: 59  FEV1: 59    Patient Goals:  (x) Breathe better  (x) Increase my endurance (x) Have more energy  (x) Rely less on others  (x) Ease ADLs  (x) Understand and use meds correctly  (x) Control cough  (x) Make fewer visits to hospital  () Quit smoking  (x) Feel less anxious / have more confidence    Glossary:  FL=Pulmonary Rehab  PF=Physical Fitness TM=Treadmill  AD=Schwinn Airdyne  UBE=UpperBody Ergometer  RT=Resistance Training  PLB=Pursed Lip Breathing      COVID -19 Screen  Do you have any of the following symptoms:  [] Fever [] Cough [] SOB [] Muscle/Body Ache [] Loss of taste/smell [x] None    Have you traveled outside of the [place]? [] Yes      [x] No    Have you been around anyone who has tested Positive for COVID 19?  [] Yes      [x] No    The following Education has been completed with patient. [x] Wait in the lobby until we call you back to rehab. [x] You must wear a mask while in the medical center and in pulmonary rehab unless exercising. Please bring your own. [x] Bring your own bottle of water with you. [x] You can not bring anyone with you in to the rehab clinic at this time. They are welcome to sit in the lobby or wait in the car.       INDIVIDUAL TREATMENT PLAN    INITIAL ASSESSMENT    Day #1 INDIVIDUAL TREATMENT PLAN    PLAN      Day #2-30 INDIVIDUAL TREATMENT PLAN    RE ASSESSMENT    Day #31-60 INDIVIDUAL TREATMENT PLAN    RE ASSESSMENT    Day #61-90 INDIVIDUAL TREATMENT PLAN    RE ASSESSMENT    Day # INDIVIDUAL TREATMENT PLAN    DISCHARGE      Last Day        Date: 3/22/2022     Date: 2022   Date: 22  *Patient not in attendance for reassessment Date: 6/14/22   Date: 7/12/22    Patient not in attendance for reassessment   Date: 7/26/22   EXERCISE   INITIAL ASSESSMENT      Prescribed Oxygen Use  Activity:2L/min  (x) Continuous  () Breath Actuated      Oxygen Titration  (x) Assess for desaturation            Current Home Exercise:  None   EXERCISE  PLAN        Current Oxygen Use  Activity:  2L/min  (x) Continuous  () Breath Actuated      Oxygen Titration  (x) Maintaining >87% Spo2  () Not maintaining -  L/min needed on       Current Home Exercise:  none   EXERCISE  RE ASSESSMENT      Current Oxygen Use  Activity: 3L/min  (x) Continuous  () Breath Actuated      Oxygen Titration  (x) Maintaining >87% Spo2  () Not maintaining -  L/min needed on       Current Home Exercise:  Unknown EXERCISE  RE ASSESSMENT      Current Oxygen Use  Activity: 3L/min  (x) Continuous  () Breath Actuated      Oxygen Titration  (x) Maintaining >87% Spo2  () Not maintaining -  L/min needed on       Current Home Exercise:  None EXERCISE  RE ASSESSMENT      Current Oxygen Use  Activity: 3 L/min  (x) Continuous  () Breath Actuated      Oxygen Titration  () Maintaining >87% Spo2  () Not maintaining -  L/min needed on       Current Home Exercise:  Unknown EXERCISE  DISCHARGE        Final Oxygen Use  Activity:  3 L/min  (x) Continuous  () Breath Actuated      Oxygen Titration  (x) Maintaining >87% Spo2  () Not maintaining -  L/min needed on       Current Home Exercise:  none       6 Minute Walk Test (6MWT):  O2 used: 2LPM nasal cannula   1100 ft walked = 2.5 METs  SpO2: 90-96%  HR:    RPD: 2  RPE: 1  Number of Breaks:  none   Avg time for break:  n/a  Assist device used: none         6 Minute Walk Test (6MWT):  O2 used:3LO2   ft walked =  1200 METs  SpO2:  95%  HR:  108  RPD: 2    RPE:2    Number of Breaks: 0    Assist device used: none            OUTCOMES:  6MWD Improvement:  > 98'?   (x) Yes  () No     Exercise Prescription    THR: 74 - 119 bpm    Frequency:  2-3x/wk in [place], 1-3x/wk 30s - 2 min/mode for Aerobic activity up to 35 minutes, and increase Intensity 2-5% each week if RPE <5, RPD <5, SpO2 >87% and pt is within [facility] above. Home Program          (x) Given to patient with current levels, recommendation and guidelines.                                        Initial Levels:  TM:1.9mph,6min  AD:0.7level,6min  NuStep:46 W, 6 min  UBE:0.3level, 5 min  RT 2#, 8-15 reps   Current Levels:  TM: 1.2 mph, 10 min  AD: 1.2 level, 10 mi  UBE:  10watts, 5 min  RT 2 #, 8-15 reps   Current Levels:  TM: 1.5mph, 10 min  AD: 20W, 10 min  UBE: 10W, 5 min  RT 2#, 8-15 reps  Current Levels:  TM: 1.5mph, 10 min  AD: 20W, 10 min  UBE: 10W, 5 min  RT 2#, 8-15 reps  Current Levels:  TM: 1.5mph, 10 min  AD: 26W, 15 min  UBE: 20W, 5 min  RT 2#, 8-15 reps    Final Levels:    NuStep:  30W,  15 min x2  UBE:  24 hill, 5 min       Physical Limitations  Initial Assessment    (x) Weakness  () Inflexible  () Poor posture  () Gait abnormality  () Balance  () Orthopedic Issues   Physical Limitation  Plan      (x) Strengthen through squats and RT  (x) Appropriate stretches shown  (x)Verbal cues and reminders for posture and gait given  (x) Proper modalities chosen for ortho issues  (x) Give Home activity / stretches/ Warmup/ [place] info   Physical Limitations Reassessment      (x) Pt progressing  () Pt not progressing Physical Limitations Reassessment      () Pt progressing  (x) Pt not progressing Physical Limitations Reassessment      (x) Pt progressing  () Pt not progressing     Physical Limitations  Discharge      () Issues Addressed  () PT/OT suggested       Exercise Goals   Initial Assessment:    (x) Start to, and commit to exercising regularly     (x) Learn about home activity recommendations        (x) Increase PF shown by increasing 6MWD   Exercise Goals   Plan      -Initiate [place] 2x/week  -Encourage home exercise    -Attend Home Activity EDUCATION class      -Slowly, safely, progress in [place]    Exercise Goals lbs  BMI: 24.6    30 day goal: 138 Lbs (2-4lbs)    () Overweight  () Underweight  (x) Normal  () Teeth issues  () GI issues  (x) Nutrition knowledge needed  () DM   NUTRITION   PLAN        Current Weight:  138.8lbs         NUTRITION  RE ASSESSMENT      Current Weight:  Not taken        Goal achieved?:  Next 30 day goal: maintain NUTRITION  RE ASSESSMENT      Current Weight:  136.2lbs      Goal achieved?:Y  Next 30 day goal: 135Lbs NUTRITION  RE ASSESSMENT      Current Weight:  unknown    Goal achieved?:  Next 30 day goal: Lbs   NUTRITION DISCHARGE        Current Weight:  134 lbs  BMI: 24    Goal achieved?:  yes   Nutrition Goals  Initial Assessment    () Pt is DM.   Increase nutrition knowledge and glucose control through diet and exercise      (x) Learn weight strategies to maintain weight        (x) Learn about general nutrition          -Achievable weight goal for the end of the program:  138 Lbs (2-4lbs per month recommended)   Nutrition Goals   Plan      () DM:  Attend nutrition class and learn about quality carbohydrates and exercises role in DM    (x) Attend nutrition class          (x) Attend nutrition class          (x) Initiate exercise and give pt hints and tips for weight and will have class for information   Nutrition Goals  Reassessment      () DM: Pt has had class  (x) DM: Pt has not had class           () Pt has had class  (x) Pt has not had class       () Pt has had class  (x) Pt has not had class       (x) Pt progressing  () Pt not progressing     Nutrition Goals  Reassessment      () DM: Pt has had class  (x) DM: Pt has not had class           () Pt has had class  (x) Pt has not had class       () Pt has had class  (x) Pt has not had class       () Pt progressing  (x) Pt not progressing     Nutrition Goals  Reassessment      () DM: Pt has had class  (x) DM: Pt has not had class           () Pt has had class  (x) Pt has not had class       () Pt has had class  (x) Pt has not had class       () Pt progressing  (x) Pt not progressing     Nutrition Goals  Discharge      () Pt had nutrition class  Date:  (x) Pt has not had class        () Pt has had class  Date: See above  (x) Pt has not had class    () Pt has class  Date: See above  (x) Pt has not had class      () Final weight goal achieved  (x) Pt did not reach desired weight goal - (readdressed nutrition and exercise strategies for future goal attainment)        Nutrition Intervention/ Education   Initial Assessment    (x) Pt needs Nutrition education class        () Pt states does not need class       Nutrition Intervention/ Education  Plan      (x) Pt will have Nutrition class          (x) Encourage pt to continue to learn and incorporate self knowledge in to diet choices   Nutrition Intervention/ Education  Reassessment      () Pt has had class  (x) Pt has not had class      () Pt had questions  (x) Pt denies having questions Nutrition Intervention/ Education  Reassessment      () Pt has had class  (x) Pt has not had class      () Pt had questions  (x) Pt denies having questions Nutrition Intervention/ Education  Reassessment      () Pt has had class  (x) Pt has not had class      () Pt had questions  (x) Pt denies having questions Nutrition Intervention/ Education   Discharge      () Pt has had class  Date: See above  (x) Pt has not had class - Reason: not offered    () Pt had questions that were answered   () Pt denies having questions             PSYCHO SOCIAL INITIAL ASSESSMENT      Depression:  () Self Reported  () In History  () S/Sx noted  () Denies  (x) On Medication  (x) Follows physician      (x) Give PHQ-9 Depression screening tool                  Dyspnea:  (x) Give pt UCSD SOB survey      (x) Pt gets SOB during activity and ADLs.           (x) Pt has support from home for the program        () Pt does not have support for the program   PSYCHO SOCIAL  PLAN      (x) Attend Stress/ Depression/ Anxiety Class                Pre Rehab PHQ-9   Score: class      (x) Attend Stress/ Anxiety/ Dyspnea - Panic control class      (x) Attend Stress/ Anxiety/ Dyspnea - Panic control class      (x) Initiate TN, get stronger, more endurance and more confidence              30 day CAT score:  24/ 40     Psychosocial Goals  Reassessment      () Pt has had class  (x) Pt has not had class      () Pt has had class  (x) Pt has not had class      () Pt has had class  (x) Pt has not had class      () Pt is progressing  () Pt is not progressing              60 day CAT score:  Reassessment not taken Psychosocial Goals  Reassessment      () Pt has had class  (x) Pt has not had class      () Pt has had class  (x) Pt has not had class      () Pt has had class  (x) Pt has not had class      () Pt is progressing  (x) Pt is not progressing              90 day CAT score:  25/ 40 Psychosocial Goals  Reassessment      () Pt has had class  (x) Pt has not had class      () Pt has had class  (x) Pt has not had class      () Pt has had class  (x) Pt has not had class      () Pt is progressing  (x) Pt is not progressing              120 day CAT score:  Reassessment not taken   Psychosocial Goals   Discharge      (x) Pt had class            (x) Pt had class            (x) Pt had class  Date: See above  () Pt did not have class      Post rehab CAT below                  Post Rehab   CAT score:  20/ 40              OUTCOMES    PHQ-9:  Did pt drop a severity level or maintain in the minimal range?  (x) Y  () N    UCSD SOB  Did pt decrease their number by 5 or more?  ()x Y  () N    CAT scores:  Did pt drop by 2 or more points from Pre to Post?  ()x Y  () N        Psychosocial Intervention/ Education   Initial Assessment    (x) Pt is being treated for depression/ anxiety        (x) Pt would benefit from TNs Psychosocial Issues Class (Stress, Depression, Anxiety, and Intimacy   Psychosocial  Intervention/ Education  Plan      (x) Pt to contact physician if any severe changes occur in mood        (x) Pt will attend MEs class   Psychosocial Intervention/ Education Reassessment      (x) Pt is coping well  () Pt is not coping well         () Pt has had class  (x) Pt has not had class Psychosocial Intervention/ Education Reassessment      (x) Pt is coping well  () Pt is not coping well         () Pt has had class  (x) Pt has not had class Psychosocial Intervention/ Education Reassessment      (x) Pt is coping well  () Pt is not coping well         () Pt has had class  (x) Pt has not had class   Psychosocial Intervention/ Education Discharge      () Pt did not need any changes   () Pt needed changes to treatment      (x) Pt had class  Date: See above  (x) Pt did not have class         Pain   Initial Assessment    (x) N/A  Location:   Duration:   Intensity:  /10  Type:    Pain   Plan      (x) N/A    () Pts pain is under control  () Pt encouraged to contact physician for pain control   Pain   Reassessment      (x) N/A    () Pts pain is under control  () Pt encouraged to contact physician for pain control Pain   Reassessment      (x) N/A    () Pts pain is under control  () Pt encouraged to contact physician for pain control Pain   Reassessment      (x) N/A    () Pts pain is under control  () Pt encouraged to contact physician for pain control Pain   Discharge      (x) N/A    () Pts pain is under control  () Pt encouraged to contact physician for pain control           OXYGEN   INITIAL ASSESSMENT    RESTING:  (x) RA  () O2:  L/min  () Continuous  () Breath Actuated  92% SpO2 / 20 bpm    Prescribed Oxygen Use:  2 L/min at Rest and with exertion    DME Company for O2:  [facility]     OXYGEN   PLAN      RESTING:  (x) Monitor needs, administer supplemental oxygen if SpO2 <88% OXYGEN   RE ASSESSMENT    RESTING:  () Pt SpO2 >87%  (x) Pt needs higher flow  () Pt needs less flow OXYGEN   RE ASSESSMENT    RESTING:  (x) Pt SpO2 >87%  () Pt needs higher flow  () Pt needs less flow OXYGEN   RE ASSESSMENT    RESTING:  (x) Pt SpO2 >87%  () Pt needs higher flow  () Pt needs less flow OXYGEN   DISCHARGE      RESTING:  (x) Pt SpO2 remained >87% on  0 L/min at rest    () Pts doctor and company contacted for change in Rx   Oxygen Goals   Initial Assessment    (x) Wean, Titrate down, or get off O2 if possible   Oxygen Goals   Plan      (x) Closely monitor SpO2 and HR using pulse oximetry for saturation and HR response, and titrate if appropriate   Oxygen Goals  Reassessment    () RA    On exertion:  () Pt maintaining FiO2  () Pt tolerating lower O2 needs  (x) Pt needing more O2-3L   Oxygen Goals  Reassessment    () RA    On exertion:  () Pt maintaining FiO2  () Pt tolerating lower O2 needs  (x) Pt needing more O2 Oxygen Goals  Reassessment    () RA    On exertion:  (x) Pt maintaining FiO2  () Pt tolerating lower O2 needs  () Pt needing more O2     Oxygen Goals   Discharge    () RA    With Exertion:  (x) Pt maintained FiO2  () Pt was found to need less O2 - notification sent to physician  () Pt was found to need more O2, notification sent to physician     Oxygen Intervention/ Education  Initial Assessment    (x) Learn / Review about O2 use, safety and travel      () Pt does not wear or have home oxygen    Oxygen Intervention/ Education  Plan      (x) Attend O2 Use, safety and travel class        (x) Assess for SpO2 with each exercise   Oxygen Intervention/ Education  Reassessment      () Pt has had class  (x) Pt has not had class      () Pt is >87%  (x) Pt has been found to desaturate - physician notified Oxygen Intervention/ Education  Reassessment      () Pt has had class  (x) Pt has not had class      () Pt is >87%  (x) Pt has been found to desaturate - physician notified Oxygen Intervention/ Education  Reassessment      () Pt has had class  (x) Pt has not had class      (x) Pt is >87%  () Pt has been found to desaturate - physician notified   Oxygen Intervention/ Education  Discharge      () Pt had class  Date:   (x) Pt did not have class      () Pt did well  ()x Pt needed to be put on oxygen           TOBACCO USE  INITIAL ASSESSMENT    (x) Pt currently not smoking    () Pt currently smoking        () Pt needs Tobacco Cessation counseling          Smoked 1.25 ppd for 40 years  Pt quit in 2015.    TOBACCO USE  PLAN      (x) N/A      Does pt want to quit:   Does pt have a quit date:    () Tobacco Cessation counseling Education if applicable        () Encouraged to contact physician for tools/ nicotine replacement etc.   TOBACCO USE  RE ASSESSMENT    (x) N/A      Any change in Tobacco use status () N  ()Y      () Pt attended counseling education session            () Pt has contacted physician TOBACCO USE  RE ASSESSMENT    (x) N/A      Any change in Tobacco use status () N  ()Y      () Pt attended counseling education session            () Pt has contacted physician TOBACCO USE  RE ASSESSMENT    (x) N/A      Any change in Tobacco use status () N  ()Y      () Pt attended counseling education session            () Pt has contacted physician TOBACCO USE  DISCHARGE            Current Tobacco Use Status:         () Pt attended TC counseling education session  Date:           () Pt contacted physician        NRT product/ medication  :     Tobacco Use Goal  Initial Assessment      (x) Complete Cessation or Maintain Cessation of tobacco products   Tobacco Use Goal Intervention and Education Plan    (x Encourage pt to fight the urge, call quit line, use techniques learned from friends/ class    () Attend Tobacco Cessation class if needed   Tobacco Use  Reassessment          (x) Pt remains tobacco free            () Pt has had TC counseling session        () Pt still smoking Tobacco Use  Reassessment          (x) Pt remains tobacco free            () Pt has had TC counseling session        () Pt still smoking Tobacco Use  Reassessment          (x) Pt remains tobacco free            () Pt has had TC counseling session        () Pt still smoking Tobacco Use  Discharge          ()x Pt remains tobacco free            () Pt had TC counseling  Date:  See above        () Pt still smoking - gave resources for quitting             MEDICATIONS  (Oral & Inhaled)  INITIAL ASSESSMENT    Pt reports taking his meds properly 50% of the time(pt does take Anoro every day as ordered but does not take the Qvar that is listed on drs last notes from [facility].)      (x) Pt is on inhaled medications   MEDICATIONS  PLAN        -Review Rxs purpose, schedule, side effects and importance of compliance during Medication class. Also address Cpap, Vents.        MEDICATIONS  RE ASSESSMENT      Pt reports taking their meds properly  % of the time        (x) Pt has had class-5/3/22  () Pt has not had class MEDICATIONS  RE ASSESSMENT      Pt reports taking their meds properly 100% of the time        (x) Pt has had class  () Pt has not had class MEDICATIONS  RE ASSESSMENT      Pt reports taking their meds properly  % of the time        (x) Pt has had class  () Pt has not had class MEDICATIONS  DISCHARGE        Pt reports taking their meds properly  100% of the time        (x)Pt had med class  Date:5/3/22  () Pt has not had class       Pt has:  (x) Metered Dose Inhaler  (x) Dry Powder Inhaler  (x) Nebulizer   -Review correct use, timing, technique and cleaning of equipment during Medication class (x) Pt has had class  () Pt has not had class (x) Pt has had class  () Pt has not had class (x) Pt has had class  () Pt has not had class (x) Pt had class  Date: See above  () Pt has not had class   Medication Goals  Initial Assessment        (x) Take meds properly 100% of the time    (x) Learn about / Review Rxs, and devices Medication Goals  Intervention & Education  Plan      -Follow Rx instructions and ask if questions    -Attend Medication Education class   Medication Goals  Re assessment          (x) Readdressed questions on any medications    (x) Pt has had class  () Pt has not had class Medication Goals  Re assessment          (x) Readdressed questions on any medications    (x) Pt has had class  () Pt has not had class Medication Goals  Re assessment          (x) Readdressed questions on any medications    (x) Pt has had class  () Pt has not had class Medication Goals  Discharge          100% proper med usage see above      (x) Pt had class  Date: See above  () Pt has not had class             ADL  INITIAL ASSESSMENT      (x) Impaired ADL ability  () Need for Assist Devices  (x) Generalized weakness, low functional capacity  () Stairs in house               ADL  PLAN        -Initiate IL, RT, and chair squats  -Breathing retraining, PLB, and pacing      -Encourage home activity               ADL  RE ASSESSMENT      (x) Pt is progressing  () Pt is not progressing        () Pt has initiated home activity  () Pt has not yet initiated  home activity-      () ADLs getting easier  () ADLs not getting easier   ADL  RE ASSESSMENT      (x) Pt is progressing  () Pt is not progressing        () Pt has initiated home activity  (x) Pt has not yet initiated  home activity-      () ADLs getting easier  (x) ADLs not getting easier ADL  RE ASSESSMENT      (x) Pt is progressing  () Pt is not progressing        () Pt has initiated home activity  () Pt has not yet initiated  home activity-      () ADLs getting easier  () ADLs not getting easier ADL  DISCHARGE        () Pt showed strength and endurance gains  (x) Pt did not progress      () Pt doing recommended home activity  (x) Pt not doing home activitiy         ADL Goals   Initial Assessment      (x) Decrease SOB with ADLs              (x) Decreased SOB overall      ADL Goals Intervention/ Education Plan      -Initiate IL, RT and chair squats and increase pts strength and endurance        -Pacing, Breathing retraining       ADL Goals Reassessment        () ADLs getting easier  () ADLs not getting easier          () Pt states activities are getting easier/ able to go longer/ not get as SOB   ADL Goals Reassessment        () ADLs getting easier  (x) ADLs not getting easier          (x) Pt states activities are getting easier/ able to go longer/ not get as SOB ADL Goals Reassessment        () ADLs getting easier  () ADLs not getting easier          (x) Pt states activities are getting easier/ able to go longer/ not get as SOB   ADL Goals   Discharge        Goal achieved?  () Yes  (x) No            Goal achieved?  () Yes  (x) No          Outcomes:  See Health and Functioning from the CAT tool and Strength and Endurance from 6 MWD above             EXACERBAT'N PREVENTION & MANAGEMENT  INITIAL ASSESSMENT      Pt reports;  () 0 respiratory infections  () 1   (x) 2  () Hospitalized in last 12 months   EXACERBAT'N PREVENTION & MANAGEMENT  PLAN        Address via Disease Self Management and Exacerbation prevention class:    -Hydration for mucus    -Hand Hygiene          -Evaluation of Sputum    -When to call MD  -S/Sx to report  -Decrease exposure to irritants/ exacerbators    -Cleaning of respiratory equipment   EXACERBAT'N PREVENTION & MANAGEMENT  RE ASSESSMENT      (x) Pt has had class  () Pt has not had class        () Improved hydration    () Correct hand washing techs and alcohol gel usage    () Sputum assessment    () Ability to recognize exacerbation and when to call MD        () Cleaning of respiratory equipment EXACERBAT'N PREVENTION & MANAGEMENT  RE ASSESSMENT      (x) Pt has had class  () Pt has not had class        () Improved hydration    () Correct hand washing techs and alcohol gel usage    () Sputum assessment    () Ability to recognize exacerbation and when to call MD        () Cleaning of respiratory equipment EXACERBAT'N PREVENTION & MANAGEMENT  RE ASSESSMENT      (x) Pt has had class  () Pt has not had class        () Improved hydration    () Correct hand washing techs and alcohol gel usage    () Sputum assessment    () Ability to recognize exacerbation and when to and Plan Review:      (x) I have seen the patient in rehab during this 30 day reassessment  (x) I agree with the plan  (x) Continue with progression and instruction  () Continue, but with the following changes:      Cosigned and dated below: Queen Marcela LOJA 30 day Reassessment Review:    (x) I have seen the patient in rehab during this 30 day reassessment  (x) Continue with the current program  () Continue, but with the following changes:  () Discharge patient      Cosigned and dated below: Queen Marcela LOJA 30 day Reassessment Review:    (x) I have seen the patient in rehab during this 30 day reassessment  (x) Continue with the current program  () Continue, but with the following changes:  () Discharge patient      Cosigned and dated below: Queen Marcela LOJA 30 day Reassessment Review:    (x) I have seen the patient in rehab during this 30 day reassessment  (x) Continue with the current program  () Continue, but with the following changes:  () Discharge patient      Cosigned and dated below: Queen Marcela LOJA 30 day   Discharge Review:    (x) Discharge patient                            Cosigned and dated below:     Cosigned by: Radha Black MD at 3/23/2022  1:26 PM   Revision History  Date/Time User Provider Type Action   3/23/2022  1:26 PM Radha Black MD Physician Cosign   3/22/2022  5:41 PM Precious Gil RCP Respiratory Therapist Sign     Cosigned by: Radha Black MD at 4/22/2022 11:20 AM   Revision History  Date/Time User Provider Type Action   4/22/2022 11:20 AM Radha Black MD Physician Cosign   4/21/2022  3:18 PM Belkis Murphy Exercise Physiologist Sign       Cosigned by: Radha Black MD at 5/24/2022 10:27 AM   Revision History  Date/Time User Provider Type Action   5/24/2022 10:27 AM Radha Black MD Physician Cosign   5/17/2022  3:47 PM Saige Robbins Exercise Physiologist Sign       Cosigned by: Radha Black MD at 6/15/2022  1:06 PM   Revision History  Date/Time User Provider Type Action   6/15/2022  1:06 PM Elizabeth Rodriguez MD Physician Cosign   6/14/2022  2:49 PM Saige Robbins Exercise Physiologist Sign       Cosigned by: Elizabeth Rodriguez MD at 7/12/2022  5:41 PM       Revision History    Date/Time User Provider Type Action   7/12/2022  5:41 PM Elizabeth Rodriguez MD Physician Cosign   7/12/2022  2:32 PM Saige Robbins Exercise Physiologist Sign

## 2022-08-03 ENCOUNTER — OFFICE VISIT (OUTPATIENT)
Dept: UROLOGY | Age: 72
End: 2022-08-03
Payer: MEDICARE

## 2022-08-03 VITALS
WEIGHT: 135 LBS | SYSTOLIC BLOOD PRESSURE: 99 MMHG | DIASTOLIC BLOOD PRESSURE: 58 MMHG | HEART RATE: 54 BPM | BODY MASS INDEX: 23.92 KG/M2 | HEIGHT: 63 IN

## 2022-08-03 DIAGNOSIS — R39.15 URINARY URGENCY: Primary | ICD-10-CM

## 2022-08-03 LAB
BILIRUBIN URINE: NEGATIVE
BLOOD URINE, POC: NEGATIVE
CHARACTER, URINE: CLEAR
COLOR, URINE: YELLOW
GLUCOSE URINE: NEGATIVE MG/DL
KETONES, URINE: NEGATIVE
LEUKOCYTE CLUMPS, URINE: ABNORMAL
NITRITE, URINE: NEGATIVE
PH, URINE: 6 (ref 5–9)
POST VOID RESIDUAL (PVR): 28 ML
PROTEIN, URINE: NEGATIVE MG/DL
SPECIFIC GRAVITY, URINE: 1.02 (ref 1–1.03)
UROBILINOGEN, URINE: 0.2 EU/DL (ref 0–1)

## 2022-08-03 PROCEDURE — G8427 DOCREV CUR MEDS BY ELIG CLIN: HCPCS | Performed by: NURSE PRACTITIONER

## 2022-08-03 PROCEDURE — 81003 URINALYSIS AUTO W/O SCOPE: CPT | Performed by: NURSE PRACTITIONER

## 2022-08-03 PROCEDURE — 3017F COLORECTAL CA SCREEN DOC REV: CPT | Performed by: NURSE PRACTITIONER

## 2022-08-03 PROCEDURE — G8399 PT W/DXA RESULTS DOCUMENT: HCPCS | Performed by: NURSE PRACTITIONER

## 2022-08-03 PROCEDURE — 1036F TOBACCO NON-USER: CPT | Performed by: NURSE PRACTITIONER

## 2022-08-03 PROCEDURE — 99214 OFFICE O/P EST MOD 30 MIN: CPT | Performed by: NURSE PRACTITIONER

## 2022-08-03 PROCEDURE — G8420 CALC BMI NORM PARAMETERS: HCPCS | Performed by: NURSE PRACTITIONER

## 2022-08-03 PROCEDURE — 1123F ACP DISCUSS/DSCN MKR DOCD: CPT | Performed by: NURSE PRACTITIONER

## 2022-08-03 PROCEDURE — 1090F PRES/ABSN URINE INCON ASSESS: CPT | Performed by: NURSE PRACTITIONER

## 2022-08-03 PROCEDURE — 51798 US URINE CAPACITY MEASURE: CPT | Performed by: NURSE PRACTITIONER

## 2022-08-03 RX ORDER — TROSPIUM CHLORIDE 20 MG/1
20 TABLET, FILM COATED ORAL 2 TIMES DAILY
Qty: 60 TABLET | Refills: 3 | Status: SHIPPED | OUTPATIENT
Start: 2022-08-03 | End: 2022-09-14 | Stop reason: SDUPTHER

## 2022-08-03 ASSESSMENT — ENCOUNTER SYMPTOMS
BACK PAIN: 0
ABDOMINAL PAIN: 0
NAUSEA: 0
VOMITING: 0

## 2022-08-03 NOTE — PROGRESS NOTES
[facility] [address] [place] [address].  [address]  [facility] [address]  Dept: [phone]  Loc: [phone]    Visit Date: 8/3/2022        HPI:     Buell Lanes is a 67 y.o. female who presents today for:  Chief Complaint   Patient presents with    Urinary Urgency       HPI  Pt seen in follow up for urinary frequency and urgency. Pt recently having a worsening in OAB and mixed incontinence. Oxybutynin failed. Myrbetriq helped but unable to afford cost.  Started on Vesicare 10 mg daily 5/2022. Reports overall her symptoms are improved. Not wearing pad routinely and now only waking 2-3 x per night to urinate. However at least once a week she is still having episodes of complete incontinence. Current Outpatient Medications   Medication Sig Dispense Refill    hydrOXYzine HCl (ATARAX) 25 MG tablet TAKE 1/2-1 (ONE-HALF TO ONE) TABLET BY MOUTH EVERY 8 HOURS AS NEEDED FOR ANXIETY 90 tablet 5    solifenacin (VESICARE) 10 MG tablet Take 1 tablet by mouth daily 30 tablet 3    levothyroxine (EUTHYROX) 50 MCG tablet Take 1 tablet by mouth once daily 90 tablet 3    Misc. Devices MISC Portable Oxygen Concentrator 1 each 0    tiotropium (SPIRIVA RESPIMAT) 2.5 MCG/ACT AERS inhaler Inhale 2 puffs into the lungs daily 4 g 5    budesonide-formoterol (SYMBICORT) 80-4.5 MCG/ACT AERO Inhale 2 puffs into the lungs 2 times daily Rinse mouth after use.  10.2 g 5    Spacer/Aero-Holding Chambers ALLEN 1 Device by Does not apply route daily as needed (Albuterol for Shortness of breath) 1 each 0    donepezil (ARICEPT) 10 MG tablet Take 1 tablet by mouth in the evening 90 tablet 1    albuterol sulfate HFA (VENTOLIN HFA) 108 (90 Base) MCG/ACT inhaler Inhale 2 puffs into the lungs 4 times daily as needed for Wheezing 18 g 2    simvastatin (ZOCOR) 10 MG tablet Take 1 tablet by mouth nightly 90 tablet 3    atomoxetine (STRATTERA) 40 MG capsule Take 1 capsule by mouth once daily 30 mother. Social History  Saige Castaneda  reports that she quit smoking about 7 years ago. Her smoking use included cigarettes. She has a 40.00 pack-year smoking history. She has never used smokeless tobacco. She reports current alcohol use of about 2.0 standard drinks per week. She reports that she does not use drugs. Subjective:      Review of Systems   Constitutional:  Negative for activity change, appetite change, chills, diaphoresis, fatigue, fever and unexpected weight change. Gastrointestinal:  Negative for abdominal pain, nausea and vomiting. Genitourinary:  Positive for frequency and urgency. Negative for decreased urine volume, difficulty urinating, dysuria, flank pain and hematuria. Mixed incontinence   Musculoskeletal:  Negative for back pain. Objective:   Ht 5' 3\" (1.6 m)   Wt 135 lb (61.2 kg)   BMI 23.91 kg/m²     Physical Exam  Vitals reviewed. Constitutional:       General: She is not in acute distress. Appearance: Normal appearance. She is well-developed. She is not ill-appearing or diaphoretic. HENT:      Head: Normocephalic and atraumatic. Right Ear: External ear normal.      Left Ear: External ear normal.      Nose: Nose normal.      Mouth/Throat:      Mouth: Mucous membranes are moist.   Eyes:      General: No scleral icterus. Right eye: No discharge. Left eye: No discharge. Neck:      Vascular: No JVD. Trachea: No tracheal deviation. Pulmonary:      Effort: Pulmonary effort is normal. No respiratory distress. Musculoskeletal:         General: No tenderness. Normal range of motion. Neurological:      Mental Status: She is alert and oriented to person, place, and time. Mental status is at baseline. Psychiatric:         Mood and Affect: Mood normal.         Behavior: Behavior normal.         Thought Content: Thought content normal.       POC  No results found for this visit on 08/03/22.       Patients recent PSA values are as follows  No results found for: PSA, PSADIA     Recent BUN/Creatinine:  Lab Results   Component Value Date/Time    BUN 19 12/19/2021 02:25 PM    CREATININE 0.9 12/19/2021 02:25 PM       Assessment:   OAB  Mixed incontinence    Plan:     Vesicare helping symptoms but still having episodes of complete incontinence. Stop vesicare. Trial trospium 20 mg BID. F/u in 6-8 weeks with PVR.   If fails to have improvement in symptoms may need to consider advanced therapies for OAB/incontinence

## 2022-08-25 ENCOUNTER — TELEPHONE (OUTPATIENT)
Dept: UROLOGY | Age: 72
End: 2022-08-25

## 2022-08-25 ENCOUNTER — TELEPHONE (OUTPATIENT)
Dept: FAMILY MEDICINE CLINIC | Age: 72
End: 2022-08-25

## 2022-08-25 DIAGNOSIS — R35.0 URINARY FREQUENCY: ICD-10-CM

## 2022-08-25 DIAGNOSIS — R39.15 URGENCY OF URINATION: Primary | ICD-10-CM

## 2022-08-25 NOTE — TELEPHONE ENCOUNTER
Patient thinks she has UTI. She has a lot of urgency and frequency. It started yesterday. She denies burning, fever, pain or chills. She is on trospium. Order is placed for urine. She wants called with the results and I can leave a voicemail.  She wanted the results today and explained the culture will take 3-5 days to be final.

## 2022-08-25 NOTE — TELEPHONE ENCOUNTER
[facility] notified and voiced understanding. She will call urology for a UA order.

## 2022-08-25 NOTE — TELEPHONE ENCOUNTER
Neda from [facility], patient is there stating provider placed a UA order ? Please advise for a call back to [facility].

## 2022-08-26 NOTE — TELEPHONE ENCOUNTER
Attempted to call the patient to check if she gave the urine sample to the lab and which lab was used. Awaiting a return call.

## 2022-08-26 NOTE — TELEPHONE ENCOUNTER
The lab was unable to use the specimen that was given yesterday due to patient going to lab prior to calling the office first for an order. Patient advised to give another specimen to lab.

## 2022-08-26 NOTE — TELEPHONE ENCOUNTER
I don't see any urinalysis results. Please find out what lab pt had UA completed at and see if we can get at least the results from UA.

## 2022-08-29 NOTE — TELEPHONE ENCOUNTER
Patient still has to go to the lab to give a urine sample. She is getting up 4-5 times at night. She will go to the lab today or tomorrow.

## 2022-09-06 DIAGNOSIS — F10.10 ALCOHOL ABUSE: ICD-10-CM

## 2022-09-07 RX ORDER — ACAMPROSATE CALCIUM 333 MG/1
666 TABLET, DELAYED RELEASE ORAL NIGHTLY
Qty: 60 TABLET | Refills: 5 | Status: SHIPPED | OUTPATIENT
Start: 2022-09-07

## 2022-09-09 DIAGNOSIS — J43.2 CENTRILOBULAR EMPHYSEMA (HCC): ICD-10-CM

## 2022-09-13 ENCOUNTER — HOSPITAL ENCOUNTER (EMERGENCY)
Age: 72
Discharge: HOME | End: 2022-09-13
Attending: EMERGENCY MEDICINE
Payer: MEDICARE

## 2022-09-13 VITALS
DIASTOLIC BLOOD PRESSURE: 72 MMHG | BODY MASS INDEX: 23.92 KG/M2 | SYSTOLIC BLOOD PRESSURE: 109 MMHG | WEIGHT: 135 LBS | RESPIRATION RATE: 16 BRPM | TEMPERATURE: 97.2 F | HEIGHT: 63 IN | HEART RATE: 104 BPM | OXYGEN SATURATION: 96 %

## 2022-09-13 DIAGNOSIS — H11.32 CONJUNCTIVAL HEMORRHAGE, LEFT EYE: Primary | ICD-10-CM

## 2022-09-13 PROCEDURE — 99213 OFFICE O/P EST LOW 20 MIN: CPT

## 2022-09-13 PROCEDURE — 99213 OFFICE O/P EST LOW 20 MIN: CPT | Performed by: EMERGENCY MEDICINE

## 2022-09-13 ASSESSMENT — ENCOUNTER SYMPTOMS
SORE THROAT: 0
BACK PAIN: 0
SINUS PRESSURE: 0
EYE DISCHARGE: 0
EYE REDNESS: 1
CHOKING: 0
NAUSEA: 0
VOMITING: 0
SHORTNESS OF BREATH: 0
TROUBLE SWALLOWING: 0
CONSTIPATION: 0
VOICE CHANGE: 0
WHEEZING: 0
COUGH: 0
ABDOMINAL PAIN: 0
EYE PAIN: 0
BLOOD IN STOOL: 0
DIARRHEA: 0
STRIDOR: 0
FACIAL SWELLING: 0
ROS SKIN COMMENTS: NO RASH OR BRUISING

## 2022-09-13 ASSESSMENT — PAIN - FUNCTIONAL ASSESSMENT: PAIN_FUNCTIONAL_ASSESSMENT: NONE - DENIES PAIN

## 2022-09-13 ASSESSMENT — VISUAL ACUITY
OD: 20/40
OU: 20/30

## 2022-09-13 NOTE — ED PROVIDER NOTES
[address]       Chief Complaint   Patient presents with    Eye Problem     Left eye red       Nurses Notes reviewed and I agree except as noted in the HPI. HISTORY OF PRESENT ILLNESS   Lance Callaway is a 67 y.o. female who presents with 16-hour history of left eye redness. Noted last night as a small area of erythema and this morning spread more diffusely. Slight itching. No pain. No visual loss. No eye trauma. No anticoagulant use. No headache, neck pain or neck stiffness. Status post CVA. No history of hypertension. Quit smoking    REVIEW OF SYSTEMS     Review of Systems   Constitutional:  Negative for appetite change, chills, fatigue, fever and unexpected weight change. Normal appetite no fever   HENT:  Negative for congestion, ear discharge, ear pain, facial swelling, hearing loss, nosebleeds, postnasal drip, sinus pressure, sore throat, trouble swallowing and voice change. No upper respiratory symptoms   Eyes:  Positive for redness. Negative for pain, discharge and visual disturbance. Left eye redness no pain   Respiratory:  Negative for cough, choking, shortness of breath, wheezing and stridor. no cough or shortness of breath   Cardiovascular:  Negative for chest pain and leg swelling. No chest pain or syncope   Gastrointestinal:  Negative for abdominal pain, blood in stool, constipation, diarrhea, nausea and vomiting. No abdominal pain or vomiting   Genitourinary:  Negative for dysuria, flank pain, frequency, hematuria, urgency, vaginal bleeding and vaginal discharge. Musculoskeletal:  Negative for arthralgias, back pain, neck pain and neck stiffness. Skin:  Negative for rash. No rash or bruising   Neurological:  Negative for dizziness, seizures, syncope, weakness, light-headedness and headaches. No Headache or lethargy   Hematological:  Negative for adenopathy.  Does not bruise/bleed easily. Psychiatric/Behavioral:  Negative for confusion, sleep disturbance and suicidal ideas. The patient is not nervous/anxious. Red and bold elements reviewed  PAST MEDICAL HISTORY         Diagnosis Date    Aneurysm, cerebral     COPD (chronic obstructive pulmonary disease) (HCC)     Hyperlipidemia     Osteoarthritis     Pneumonia     Thyroid disease     Unspecified cerebral artery occlusion with cerebral infarction        SURGICAL HISTORY     Patient  has a past surgical history that includes Colonoscopy ([id number]); External ear surgery (2000); External ear surgery (1999); External ear surgery (1997); Cosmetic surgery (7365,8206); Cosmetic surgery (2013); Brain aneurysm surgery; pr biopsy of breast, incisional (Left, 1984); and pr biopsy of breast, incisional (Right, 1986). CURRENT MEDICATIONS       Discharge Medication List as of 9/13/2022  2:12 PM        CONTINUE these medications which have NOT CHANGED    Details   ANORO ELLIPTA 62.5-25 MCG/INH AEPB inhaler Inhale 1 puff by mouth once daily, Disp-60 each, R-11Normal      acamprosate (CAMPRAL) 333 MG tablet TAKE 2 TABLETS BY MOUTH NIGHTLY, Disp-60 tablet, R-5Normal      trospium (SANCTURA) 20 MG tablet Take 1 tablet by mouth in the morning and 1 tablet before bedtime. , Disp-60 tablet, R-3Stop vesicareNormal      hydrOXYzine HCl (ATARAX) 25 MG tablet TAKE 1/2-1 (ONE-HALF TO ONE) TABLET BY MOUTH EVERY 8 HOURS AS NEEDED FOR ANXIETY, Disp-90 tablet, R-5Normal      levothyroxine (EUTHYROX) 50 MCG tablet Take 1 tablet by mouth once daily, Disp-90 tablet, R-3Normal      !! Misc.  Devices MISC Disp-1 each, R-0, PrintPortable Oxygen Concentrator      tiotropium (SPIRIVA RESPIMAT) 2.5 MCG/ACT AERS inhaler Inhale 2 puffs into the lungs daily, Disp-4 g, R-5Normal      budesonide-formoterol (SYMBICORT) 80-4.5 MCG/ACT AERO Inhale 2 puffs into the lungs 2 times daily Rinse mouth after use., Disp-10.2 g, R-5Normal      Spacer/Aero-Holding Jasmin Russo DAILY PRN Starting Tue 5/3/2022, Disp-1 each, R-0, Normal      donepezil (ARICEPT) 10 MG tablet Take 1 tablet by mouth in the evening, Disp-90 tablet, R-1Normal      albuterol sulfate HFA (VENTOLIN HFA) 108 (90 Base) MCG/ACT inhaler Inhale 2 puffs into the lungs 4 times daily as needed for Wheezing, Disp-18 g, R-2Normal      simvastatin (ZOCOR) 10 MG tablet Take 1 tablet by mouth nightly, Disp-90 tablet, R-3Normal      atomoxetine (STRATTERA) 40 MG capsule Take 1 capsule by mouth once daily, Disp-30 capsule, R-5Normal      !! Misc. Devices (PULSE OXIMETER FOR FINGER) MISC Daily PRN, Disp-1 each, R-0Print      DULoxetine (CYMBALTA) 60 MG extended release capsule Take 1 capsule by mouth once daily, Disp-90 capsule, R-3Normal      valACYclovir (VALTREX) 500 MG tablet Take 1 tablet by mouth dailyHistorical Med      Methylsulfonylmethane (MSM) 1000 MG CAPS Take 2 capsules by mouth 2 times dailyHistorical Med      alendronate (FOSAMAX) 70 MG tablet Take 1 tablet by mouth every 7 days Take with water on an empty stomach- wait 30 minutes before eating or taking other meds. Avoid lying down for 30 minutes after dose., Disp-12 tablet, R-3Normal      albuterol (PROVENTIL) (2.5 MG/3ML) 0.083% nebulizer solution Take 3 mLs by nebulization every 6 hours as needed for Wheezing, Disp-120 each, R-3Normal      Multiple Vitamin (MULTI-VITAMIN PO) Take  by mouth. Calcium Carbonate-Vitamin D (CALCIUM-VITAMIN D) 600-200 MG-UNIT CAPS Take 1 tablet by mouth 2 times daily Historical Med      fish oil-omega-3 fatty acids 1000 MG capsule Take 1 g by mouth. One tablet daily along with red yeast rice       ! ! - Potential duplicate medications found. Please discuss with provider. ALLERGIES     Patient is is allergic to macrobid [nitrofurantoin] and sulfa antibiotics.     FAMILY HISTORY     Patient'sfamily history includes Cancer in her mother; Diabetes in her father; Heart Disease in her maternal aunt; Parkinsonism in her maternal uncle; Stroke in her mother. SOCIAL HISTORY     Patient  reports that she quit smoking about 7 years ago. Her smoking use included cigarettes. She has a 40.00 pack-year smoking history. She has never used smokeless tobacco. She reports current alcohol use of about 2.0 standard drinks per week. She reports that she does not use drugs. PHYSICAL EXAM     ED TRIAGE VITALS  BP: 109/72, Temp: 97.2 °F (36.2 °C), Heart Rate: (!) 104, Resp: 16, SpO2: 96 %  Physical Exam  Vitals and nursing note reviewed. Constitutional:       General: She is not in acute distress. Appearance: She is well-developed. She is not ill-appearing. Comments: Moist membranes   HENT:      Head: Normocephalic and atraumatic. Right Ear: Tympanic membrane and external ear normal.      Left Ear: Tympanic membrane and external ear normal.      Nose: Nose normal.      Mouth/Throat:      Pharynx: No oropharyngeal exudate. Comments: Oropharynx normal  Eyes:      General: No scleral icterus. Right eye: No discharge. Left eye: No discharge. Extraocular Movements:      Right eye: Normal extraocular motion. Left eye: Normal extraocular motion. Conjunctiva/sclera: Conjunctivae normal.      Pupils: Pupils are equal, round, and reactive to light. Comments: Left subconjunctival hemorrhage. No photophobia. Visual acuity at baseline. No purulent discharge   Neck:      Thyroid: No thyromegaly. Vascular: No JVD. Comments: No meningismus  Cardiovascular:      Rate and Rhythm: Regular rhythm. Tachycardia present. Pulses: Normal pulses. Heart sounds: Normal heart sounds, S1 normal and S2 normal. No murmur heard. No friction rub. No gallop. Comments: No murmur  Pulmonary:      Effort: Pulmonary effort is normal. No tachypnea or respiratory distress. Breath sounds: Normal breath sounds. No stridor. No decreased breath sounds, wheezing, rhonchi or rales.       Comments: No cough lungs clear  Chest:      Chest wall: No tenderness. Abdominal:      General: Bowel sounds are normal. There is no distension. Palpations: Abdomen is soft. There is no mass. Tenderness: There is no abdominal tenderness. There is no guarding or rebound. Musculoskeletal:         General: No tenderness. Normal range of motion. Cervical back: Normal range of motion. Comments: Extremities normal   Lymphadenopathy:      Cervical: No cervical adenopathy. Right cervical: No superficial cervical adenopathy. Left cervical: No superficial cervical adenopathy. Skin:     General: Skin is warm and dry. Findings: No erythema or rash. Comments: No rash or bruising   Neurological:      Mental Status: She is alert and oriented to person, place, and time. Cranial Nerves: No cranial nerve deficit. Motor: No abnormal muscle tone. Coordination: Coordination normal.      Deep Tendon Reflexes: Reflexes are normal and symmetric. Reflexes normal.      Comments: Appropriate no focal finding   Psychiatric:         Behavior: Behavior normal.         Thought Content: Thought content normal.         Judgment: Judgment normal.       DIAGNOSTIC RESULTS   Labs: No results found for this visit on 09/13/22. IMAGING:  No orders to display     URGENT CARE COURSE:     Vitals:    09/13/22 1355   BP: 109/72   Pulse: (!) 104   Resp: 16   Temp: 97.2 °F (36.2 °C)   TempSrc: Temporal   SpO2: 96%   Weight: 135 lb (61.2 kg)   Height: 5' 3\" (1.6 m)       Medications - No data to display  PROCEDURES:  None  FINALIMPRESSION      1. Conjunctival hemorrhage, left eye        DISPOSITION/PLAN   DISPOSITION Decision To Discharge 09/13/2022 02:10:58 PM  Nontoxic, well-hydrated, normal airway. Patient has left subconjunctival hemorrhage. No infectious process. No increased intraocular pressure. Will treat with Tylenol, rest with head elevation.   Discussed with patient regarding keeping the venous pressure in the head and neck down with avoidance of blowing nose, strenuous activities and lifting, sneezing through mouth. Patient to keep appointment as planned with eye doctor in 7 days, and she understands to go to ED if worse.   PATIENT REFERRED TO:  Keep appointment with your eye doctor    In 7 days  Keep appointment your eye doctor    DISCHARGE MEDICATIONS:  Discharge Medication List as of 9/13/2022  2:12 PM        Discharge Medication List as of 9/13/2022  2:12 PM          MD Docras Quick MD  09/13/22 7825

## 2022-09-14 ENCOUNTER — OFFICE VISIT (OUTPATIENT)
Dept: UROLOGY | Age: 72
End: 2022-09-14
Payer: MEDICARE

## 2022-09-14 ENCOUNTER — TELEPHONE (OUTPATIENT)
Dept: FAMILY MEDICINE CLINIC | Age: 72
End: 2022-09-14

## 2022-09-14 VITALS
SYSTOLIC BLOOD PRESSURE: 90 MMHG | TEMPERATURE: 55 F | BODY MASS INDEX: 24.45 KG/M2 | HEIGHT: 63 IN | WEIGHT: 138 LBS | DIASTOLIC BLOOD PRESSURE: 55 MMHG

## 2022-09-14 DIAGNOSIS — R35.0 URINARY FREQUENCY: Primary | ICD-10-CM

## 2022-09-14 LAB
BILIRUBIN URINE: NEGATIVE
BLOOD URINE, POC: NEGATIVE
CHARACTER, URINE: ABNORMAL
COLOR, URINE: YELLOW
GLUCOSE URINE: NEGATIVE MG/DL
KETONES, URINE: ABNORMAL
LEUKOCYTE CLUMPS, URINE: ABNORMAL
NITRITE, URINE: POSITIVE
PH, URINE: 7 (ref 5–9)
POST VOID RESIDUAL (PVR): 14 ML
PROTEIN, URINE: 30 MG/DL
SPECIFIC GRAVITY, URINE: 1.02 (ref 1–1.03)
UROBILINOGEN, URINE: 0.2 EU/DL (ref 0–1)

## 2022-09-14 PROCEDURE — 51798 US URINE CAPACITY MEASURE: CPT | Performed by: NURSE PRACTITIONER

## 2022-09-14 PROCEDURE — 3017F COLORECTAL CA SCREEN DOC REV: CPT | Performed by: NURSE PRACTITIONER

## 2022-09-14 PROCEDURE — G8420 CALC BMI NORM PARAMETERS: HCPCS | Performed by: NURSE PRACTITIONER

## 2022-09-14 PROCEDURE — 81003 URINALYSIS AUTO W/O SCOPE: CPT | Performed by: NURSE PRACTITIONER

## 2022-09-14 PROCEDURE — 1036F TOBACCO NON-USER: CPT | Performed by: NURSE PRACTITIONER

## 2022-09-14 PROCEDURE — G8427 DOCREV CUR MEDS BY ELIG CLIN: HCPCS | Performed by: NURSE PRACTITIONER

## 2022-09-14 PROCEDURE — 1090F PRES/ABSN URINE INCON ASSESS: CPT | Performed by: NURSE PRACTITIONER

## 2022-09-14 PROCEDURE — 1123F ACP DISCUSS/DSCN MKR DOCD: CPT | Performed by: NURSE PRACTITIONER

## 2022-09-14 PROCEDURE — 99214 OFFICE O/P EST MOD 30 MIN: CPT | Performed by: NURSE PRACTITIONER

## 2022-09-14 PROCEDURE — G8399 PT W/DXA RESULTS DOCUMENT: HCPCS | Performed by: NURSE PRACTITIONER

## 2022-09-14 RX ORDER — CEFDINIR 300 MG/1
300 CAPSULE ORAL 2 TIMES DAILY
Qty: 14 CAPSULE | Refills: 0 | Status: SHIPPED | OUTPATIENT
Start: 2022-09-14 | End: 2022-09-19

## 2022-09-14 RX ORDER — TROSPIUM CHLORIDE 20 MG/1
20 TABLET, FILM COATED ORAL 2 TIMES DAILY
Qty: 60 TABLET | Refills: 5 | Status: SHIPPED | OUTPATIENT
Start: 2022-09-14 | End: 2022-09-19

## 2022-09-14 ASSESSMENT — ENCOUNTER SYMPTOMS
BACK PAIN: 0
NAUSEA: 0
VOMITING: 0
ABDOMINAL PAIN: 0

## 2022-09-14 NOTE — PROGRESS NOTES
[address]  [address]  [address]  [facility] [address]  Dept: [phone]  Loc: [phone]    Visit Date: 9/14/2022        HPI:     Anjali Beck is a 67 y.o. female who presents today for:  Chief Complaint   Patient presents with    Urinary Urgency       HPI  Pt seen in follow up for urinary frequency and urgency. Blossom Durant has a hx of OAB with mixed incontinence with recent worsening in symptoms. She failed to have improvement on oxybutynin. Myrbetriq worked but she was unable to afford cost of medication. Vesicare improved symptoms but still having episodes of complete incontinence. At appt 8/2022 Vesicare was stopped and she was started on trospium 20 mg BID. Completed pelvic floor therapy previously. Here today in follow-up. Reports urinary symptoms improved on the trospium but still having significant urgency and occasional complete incontinence which is very embarrassing for her. Nocturia 3-4 x per night. Current Outpatient Medications   Medication Sig Dispense Refill    ANORO ELLIPTA 62.5-25 MCG/INH AEPB inhaler Inhale 1 puff by mouth once daily 60 each 11    acamprosate (CAMPRAL) 333 MG tablet TAKE 2 TABLETS BY MOUTH NIGHTLY 60 tablet 5    trospium (SANCTURA) 20 MG tablet Take 1 tablet by mouth in the morning and 1 tablet before bedtime. 60 tablet 3    hydrOXYzine HCl (ATARAX) 25 MG tablet TAKE 1/2-1 (ONE-HALF TO ONE) TABLET BY MOUTH EVERY 8 HOURS AS NEEDED FOR ANXIETY 90 tablet 5    levothyroxine (EUTHYROX) 50 MCG tablet Take 1 tablet by mouth once daily 90 tablet 3    Misc. Devices MISC Portable Oxygen Concentrator 1 each 0    budesonide-formoterol (SYMBICORT) 80-4.5 MCG/ACT AERO Inhale 2 puffs into the lungs 2 times daily Rinse mouth after use.  10.2 g 5    Spacer/Aero-Holding Chambers ALLEN 1 Device by Does not apply route daily as needed (Albuterol for Shortness of breath) 1 each 0    donepezil (ARICEPT) 10 MG tablet Take 1 tablet by mouth in the evening 90 tablet 1    albuterol sulfate HFA (VENTOLIN HFA) 108 (90 Base) MCG/ACT inhaler Inhale 2 puffs into the lungs 4 times daily as needed for Wheezing 18 g 2    simvastatin (ZOCOR) 10 MG tablet Take 1 tablet by mouth nightly 90 tablet 3    atomoxetine (STRATTERA) 40 MG capsule Take 1 capsule by mouth once daily 30 capsule 5    Misc. Devices (PULSE OXIMETER FOR FINGER) MISC Daily PRN 1 each 0    DULoxetine (CYMBALTA) 60 MG extended release capsule Take 1 capsule by mouth once daily 90 capsule 3    valACYclovir (VALTREX) 500 MG tablet Take 1 tablet by mouth daily      Methylsulfonylmethane (MSM) 1000 MG CAPS Take 2 capsules by mouth 2 times daily      alendronate (FOSAMAX) 70 MG tablet Take 1 tablet by mouth every 7 days Take with water on an empty stomach- wait 30 minutes before eating or taking other meds. Avoid lying down for 30 minutes after dose. 12 tablet 3    albuterol (PROVENTIL) (2.5 MG/3ML) 0.083% nebulizer solution Take 3 mLs by nebulization every 6 hours as needed for Wheezing 120 each 3    Multiple Vitamin (MULTI-VITAMIN PO) Take  by mouth. Calcium Carbonate-Vitamin D (CALCIUM-VITAMIN D) 600-200 MG-UNIT CAPS Take 1 tablet by mouth 2 times daily       fish oil-omega-3 fatty acids 1000 MG capsule Take 1 g by mouth. One tablet daily along with red yeast rice       No current facility-administered medications for this visit. Past Medical History  Marcello Finley  has a past medical history of Aneurysm, cerebral, COPD (chronic obstructive pulmonary disease) ([facility] [address] Hyperlipidemia, Osteoarthritis, Pneumonia, Thyroid disease, and Unspecified cerebral artery occlusion with cerebral infarction. Past Surgical History  The patient  has a past surgical history that includes Colonoscopy ([id number]); External ear surgery (2000); External ear surgery (1999); External ear surgery (1997); Cosmetic surgery ([id number],4728); Cosmetic surgery (2013);  Brain aneurysm surgery; pr biopsy of breast, incisional (Left, 1984); and pr biopsy of breast, incisional (Right, 1986). Family History  This patient's family history includes Cancer in her mother; Diabetes in her father; Heart Disease in her maternal aunt; Parkinsonism in her maternal uncle; Stroke in her mother. Social History  Elizabet Apodaca  reports that she quit smoking about 7 years ago. Her smoking use included cigarettes. She has a 40.00 pack-year smoking history. She has never used smokeless tobacco. She reports current alcohol use of about 2.0 standard drinks per week. She reports that she does not use drugs. Subjective:      Review of Systems   Constitutional:  Negative for activity change, appetite change, chills, diaphoresis, fatigue, fever and unexpected weight change. Gastrointestinal:  Negative for abdominal pain, nausea and vomiting. Genitourinary:  Negative for decreased urine volume, difficulty urinating, dysuria, flank pain, frequency, hematuria and urgency. Musculoskeletal:  Negative for back pain. Objective:   BP (!) 90/55   Temp (!) 55 °F (12.8 °C)   Ht 5' 3\" (1.6 m)   Wt 138 lb (62.6 kg)   BMI 24.45 kg/m²     Physical Exam  Vitals reviewed. Constitutional:       General: She is not in acute distress. Appearance: Normal appearance. She is well-developed. She is not ill-appearing or diaphoretic. HENT:      Head: Normocephalic and atraumatic. Right Ear: External ear normal.      Left Ear: External ear normal.      Nose: Nose normal.      Mouth/Throat:      Mouth: Mucous membranes are moist.   Eyes:      General: No scleral icterus. Right eye: No discharge. Left eye: No discharge. Neck:      Vascular: No JVD. Trachea: No tracheal deviation. Pulmonary:      Effort: Pulmonary effort is normal. No respiratory distress. Musculoskeletal:         General: No tenderness. Normal range of motion. Neurological:      Mental Status: She is alert and oriented to person, place, and time. Mental status is at baseline. Psychiatric:         Mood and Affect: Mood normal.         Behavior: Behavior normal.         Thought Content: Thought content normal.       POC  Results for POC orders placed in visit on 09/14/22   POCT Urinalysis No Micro (Auto)   Result Value Ref Range    Glucose, Ur Negative NEGATIVE mg/dl    Bilirubin Urine Negative     Ketones, Urine Trace (A) NEGATIVE    Specific Gravity, Urine 1.020 1.002 - 1.030    Blood, UA POC Negative NEGATIVE    pH, Urine 7.00 5.0 - 9.0    Protein, Urine 30 (A) NEGATIVE mg/dl    Urobilinogen, Urine 0.20 0.0 - 1.0 eu/dl    Nitrite, Urine Positive (A) NEGATIVE    Leukocyte Clumps, Urine Large (A) NEGATIVE    Color, Urine Yellow YELLOW-STRAW    Character, Urine Cloudy (A) CLR-SL.[id number]   poct post void residual   Result Value Ref Range    post void residual 14 ml         Patients recent PSA values are as follows  No results found for: PSA, PSADIA     Recent BUN/Creatinine:  Lab Results   Component Value Date/Time    BUN 19 12/19/2021 02:25 PM    CREATININE 0.9 12/19/2021 02:25 PM         Assessment:   OAB  Mixed incontinence    Plan:     Continue trospium. Urine dip consistent with infection. Send urine for culture. Start omnicef empirically. Call results of culture. Discussed pt may benefit from advanced therapies for incontinence and pt is interested in bladder botox. Schedule appt with physician to discuss advanced therapies for OAB/incontinence.

## 2022-09-15 LAB
ORGANISM: ABNORMAL
URINE CULTURE, ROUTINE: ABNORMAL

## 2022-09-16 ENCOUNTER — TELEPHONE (OUTPATIENT)
Dept: UROLOGY | Age: 72
End: 2022-09-16

## 2022-09-16 NOTE — TELEPHONE ENCOUNTER
----- Message from [facility] - SARITHA HODGES - CNP sent at 9/16/2022  7:26 AM EDT -----  Pt's urine culture significant for infection. Continue Omnicef to completion.

## 2022-09-28 ENCOUNTER — TELEPHONE (OUTPATIENT)
Dept: UROLOGY | Age: 72
End: 2022-09-28

## 2022-09-29 NOTE — TELEPHONE ENCOUNTER
Voicemail left stating patient was on myrbetiq in the past and it was to expensive.  Patient to call back for any questions or concerns

## 2022-10-11 ENCOUNTER — TELEPHONE (OUTPATIENT)
Dept: UROLOGY | Age: 72
End: 2022-10-11

## 2022-10-11 NOTE — TELEPHONE ENCOUNTER
Miracle Mackenzie called today regarding her tropsium bid not working. I left a voicemail to  her appointment up to oct 18 at 9:15 with dr. Tonia Camejo to discuss botox option. Please advise.

## 2022-10-18 ENCOUNTER — OFFICE VISIT (OUTPATIENT)
Dept: UROLOGY | Age: 72
End: 2022-10-18
Payer: MEDICARE

## 2022-10-18 VITALS
WEIGHT: 138 LBS | SYSTOLIC BLOOD PRESSURE: 132 MMHG | HEIGHT: 63 IN | BODY MASS INDEX: 24.45 KG/M2 | DIASTOLIC BLOOD PRESSURE: 80 MMHG

## 2022-10-18 DIAGNOSIS — N39.46 MIXED INCONTINENCE: ICD-10-CM

## 2022-10-18 DIAGNOSIS — N39.0 RECURRENT UTI: Primary | ICD-10-CM

## 2022-10-18 PROCEDURE — 1123F ACP DISCUSS/DSCN MKR DOCD: CPT | Performed by: UROLOGY

## 2022-10-18 PROCEDURE — 1090F PRES/ABSN URINE INCON ASSESS: CPT | Performed by: UROLOGY

## 2022-10-18 PROCEDURE — G8484 FLU IMMUNIZE NO ADMIN: HCPCS | Performed by: UROLOGY

## 2022-10-18 PROCEDURE — 0509F URINE INCON PLAN DOCD: CPT | Performed by: UROLOGY

## 2022-10-18 PROCEDURE — G8420 CALC BMI NORM PARAMETERS: HCPCS | Performed by: UROLOGY

## 2022-10-18 PROCEDURE — 1036F TOBACCO NON-USER: CPT | Performed by: UROLOGY

## 2022-10-18 PROCEDURE — G8427 DOCREV CUR MEDS BY ELIG CLIN: HCPCS | Performed by: UROLOGY

## 2022-10-18 PROCEDURE — 3017F COLORECTAL CA SCREEN DOC REV: CPT | Performed by: UROLOGY

## 2022-10-18 PROCEDURE — G8399 PT W/DXA RESULTS DOCUMENT: HCPCS | Performed by: UROLOGY

## 2022-10-18 PROCEDURE — 99214 OFFICE O/P EST MOD 30 MIN: CPT | Performed by: UROLOGY

## 2022-10-18 NOTE — PROGRESS NOTES
[address] [facility] [address]  Dept: [phone]  Dept Fax: [phone] : [phone] [facility] Urology Office Note -     Patient:  Maira Valdez  YOB: 1950    The patient is a 67 y.o. female who presents today for evaluation of the following problems:   Chief Complaint   Patient presents with    Urinary Frequency        History of Present Illness:    Mixed incontinence  Worsening symptoms  Failed multiple medication  Here to discuss botox    Recurrent uti  Stable. Being managed by [address] [place] [address]      Summary of Previous Records:  Continue trospium. Urine dip consistent with infection. Send urine for culture. Start omnicef empirically. Call results of culture. Discussed pt may benefit from advanced therapies for incontinence and pt is interested in bladder botox. Schedule appt with physician to discuss advanced therapies for OAB/incontinence. Requested/reviewed records from SARITHA Muñoz CNP office and/or outside physician/[facility]    (Patient's old records have been requested, reviewed and pertinent findings summarized in today's note.)    Procedures Today: N/A    Last several PSA's:  No results found for: PSA    Last total testosterone:  No results found for: TESTOSTERONE    Urinalysis today:  No results found for this visit on 10/18/22. Last BUN and creatinine:  Lab Results   Component Value Date    BUN 19 12/19/2021     Lab Results   Component Value Date    CREATININE 0.9 12/19/2021       Imaging Reviewed during this Office Visit:   Anatoly Montejo MD independently reviewed the images and verified the radiology reports from:    No results found.     PAST MEDICAL, FAMILY AND SOCIAL HISTORY:  Past Medical History:   Diagnosis Date    Aneurysm, cerebral     COPD (chronic obstructive pulmonary disease) (HCC)     Hyperlipidemia     Osteoarthritis     Pneumonia     Stroke [facility])     Thyroid disease     Unspecified cerebral artery occlusion with cerebral infarction      Past Surgical History:   Procedure Laterality Date    BRAIN ANEURYSM SURGERY      COLONOSCOPY  06/18/2009 2020    COSMETIC SURGERY  2009,2010    juvederm,botox    COSMETIC SURGERY  2013    Restylane, Botox    EGD      1991    EXTERNAL EAR SURGERY  2000    reconstruction of lft ear w/re-opening of canal & pinning back of superior ear    EXTERNAL EAR SURGERY  1999    abscess lft ear    EXTERNAL EAR SURGERY  1997    hematoma left ear    MA BIOPSY OF BREAST, INCISIONAL Left 1984    benign    MA BIOPSY OF BREAST, INCISIONAL Right 1986    benign     Family History   Problem Relation Age of Onset    Cancer Mother         Female Cancer    Stroke Mother         Possible    Diabetes Father     Heart Disease Maternal Aunt     Parkinsonism Maternal Uncle     Colon Cancer Neg Hx     Esophageal Cancer Neg Hx     Liver Cancer Neg Hx     Rectal Cancer Neg Hx     Stomach Cancer Neg Hx      Outpatient Medications Marked as Taking for the 10/18/22 encounter (Office Visit) with Erica Bonilla MD   Medication Sig Dispense Refill    cetirizine (ZYRTEC) 10 MG tablet Take 10 mg by mouth daily      ANORO ELLIPTA 62.5-25 MCG/INH AEPB inhaler Inhale 1 puff by mouth once daily 60 each 11    acamprosate (CAMPRAL) 333 MG tablet TAKE 2 TABLETS BY MOUTH NIGHTLY 60 tablet 5    hydrOXYzine HCl (ATARAX) 25 MG tablet TAKE 1/2-1 (ONE-HALF TO ONE) TABLET BY MOUTH EVERY 8 HOURS AS NEEDED FOR ANXIETY 90 tablet 5    levothyroxine (EUTHYROX) 50 MCG tablet Take 1 tablet by mouth once daily 90 tablet 3    Misc. Devices MISC Portable Oxygen Concentrator 1 each 0    budesonide-formoterol (SYMBICORT) 80-4.5 MCG/ACT AERO Inhale 2 puffs into the lungs 2 times daily Rinse mouth after use.  10.2 g 5    Spacer/Aero-Holding Chambers ALLEN 1 Device by Does not apply route daily as needed (Albuterol for Shortness of breath) 1 each 0    donepezil (ARICEPT) 10 MG tablet Take 1 tablet by mouth in the evening 90 tablet 1    albuterol sulfate HFA (VENTOLIN HFA) 108 (90 Base) MCG/ACT inhaler Inhale 2 puffs into the lungs 4 times daily as needed for Wheezing 18 g 2    simvastatin (ZOCOR) 10 MG tablet Take 1 tablet by mouth nightly 90 tablet 3    atomoxetine (STRATTERA) 40 MG capsule Take 1 capsule by mouth once daily 30 capsule 5    Misc. Devices (PULSE OXIMETER FOR FINGER) MISC Daily PRN 1 each 0    DULoxetine (CYMBALTA) 60 MG extended release capsule Take 1 capsule by mouth once daily 90 capsule 3    valACYclovir (VALTREX) 500 MG tablet Take 1 tablet by mouth daily      Methylsulfonylmethane (MSM) 1000 MG CAPS Take 2 capsules by mouth 2 times daily      alendronate (FOSAMAX) 70 MG tablet Take 1 tablet by mouth every 7 days Take with water on an empty stomach- wait 30 minutes before eating or taking other meds. Avoid lying down for 30 minutes after dose. 12 tablet 3    albuterol (PROVENTIL) (2.5 MG/3ML) 0.083% nebulizer solution Take 3 mLs by nebulization every 6 hours as needed for Wheezing 120 each 3    Multiple Vitamin (MULTI-VITAMIN PO) Take  by mouth. Calcium Carbonate-Vitamin D (CALCIUM-VITAMIN D) 600-200 MG-UNIT CAPS Take 1 tablet by mouth 2 times daily       fish oil-omega-3 fatty acids 1000 MG capsule Take 1 g by mouth.  One tablet daily along with red yeast rice         Macrobid [nitrofurantoin] and Sulfa antibiotics  Social History     Tobacco Use   Smoking Status Former    Packs/day: 1.00    Years: 40.00    Pack years: 40.00    Types: Cigarettes    Quit date: 2015    Years since quittin.8   Smokeless Tobacco Never      (If patient a smoker, smoking cessation counseling offered)   Social History     Substance and Sexual Activity   Alcohol Use Yes    Alcohol/week: 2.0 standard drinks    Types: 2 Glasses of wine per week    Comment: 2 glass of vodka every other day        REVIEW OF SYSTEMS:  Constitutional: negative  Eyes: negative  Respiratory: negative  Cardiovascular: negative  Gastrointestinal: negative  Genitourinary: see HPI  Musculoskeletal: negative  Skin: negative   Neurological: negative  Hematological/Lymphatic: negative  Psychological: negative      Physical Exam:    This a 67 y.o. female  Vitals:    10/18/22 1009   BP: 132/80     Body mass index is 24.45 kg/m². Constitutional: Patient in no acute distress;         Assessment and Plan        1. Recurrent UTI    2. Mixed incontinence               Plan:      Recurrent uti- stable. Finished abx course recently    Mixed incontinence- very bothered. Failed many meds. discussed third line therapies for oab. Urge component >> stress. No hematuria on UA. Discussed botox today risks of retention. Prescriptions Ordered:  No orders of the defined types were placed in this encounter. Orders Placed:  No orders of the defined types were placed in this encounter.            Aisha Medley MD

## 2022-10-24 ENCOUNTER — TELEPHONE (OUTPATIENT)
Dept: UROLOGY | Age: 72
End: 2022-10-24

## 2022-10-24 NOTE — TELEPHONE ENCOUNTER
Patient called to get procedure codes to call insurance to find out how much her out of pocket would be. Wants to check on this prior to scheduling surgery.

## 2022-11-17 ENCOUNTER — TELEPHONE (OUTPATIENT)
Dept: UROLOGY | Age: 72
End: 2022-11-17

## 2022-11-17 NOTE — TELEPHONE ENCOUNTER
[employer]ics is a brand name for the sacral neuromodulation therapy for OAB/incontinence. This is a type of nerve stimulator therapy. We have previously discussed the interstim brand of this. If she wants to discuss this option more we can set her up for an office visit or virtual visit with myself or Dr. Konstantin Patricio.

## 2022-11-21 NOTE — TELEPHONE ENCOUNTER
Attempted to call the patient and offer appointment. Voicemail left to return the call to the office.

## 2022-11-22 NOTE — TELEPHONE ENCOUNTER
Made 3rd attempt to contact the patient. Voicemail left to offer a follow up appointment to discuss and to return the call to the office.

## 2022-12-12 ENCOUNTER — TELEPHONE (OUTPATIENT)
Dept: FAMILY MEDICINE CLINIC | Age: 72
End: 2022-12-12

## 2022-12-12 DIAGNOSIS — R73.01 IFG (IMPAIRED FASTING GLUCOSE): ICD-10-CM

## 2022-12-12 DIAGNOSIS — F10.10 ALCOHOL ABUSE: ICD-10-CM

## 2022-12-12 DIAGNOSIS — R53.83 OTHER FATIGUE: ICD-10-CM

## 2022-12-12 DIAGNOSIS — E03.9 HYPOTHYROIDISM, UNSPECIFIED TYPE: Primary | ICD-10-CM

## 2022-12-12 DIAGNOSIS — E55.9 VITAMIN D DEFICIENCY: ICD-10-CM

## 2022-12-12 NOTE — TELEPHONE ENCOUNTER
Pt called office c/o being \"very tired with no energy\". Pt was previously on Fe and feels it really increased her energy. Pt says she knows something is off and requests labs to be done. Pt would like her Fe checked, A1c and \"more comprehensive\" blood work to find out what's going on. Pt has a family history of diabetes. Call pt back. She will use [facility] on . Please advise.

## 2022-12-20 ENCOUNTER — NURSE ONLY (OUTPATIENT)
Dept: LAB | Age: 72
End: 2022-12-20

## 2022-12-20 DIAGNOSIS — F10.10 ALCOHOL ABUSE: ICD-10-CM

## 2022-12-20 DIAGNOSIS — R73.01 IFG (IMPAIRED FASTING GLUCOSE): ICD-10-CM

## 2022-12-20 DIAGNOSIS — R53.83 OTHER FATIGUE: ICD-10-CM

## 2022-12-20 DIAGNOSIS — E55.9 VITAMIN D DEFICIENCY: ICD-10-CM

## 2022-12-20 DIAGNOSIS — E03.9 HYPOTHYROIDISM, UNSPECIFIED TYPE: ICD-10-CM

## 2022-12-20 LAB
ALBUMIN SERPL-MCNC: 4.9 G/DL (ref 3.5–5.1)
ALP BLD-CCNC: 56 U/L (ref 38–126)
ALT SERPL-CCNC: 21 U/L (ref 11–66)
ANION GAP SERPL CALCULATED.3IONS-SCNC: 11 MEQ/L (ref 8–16)
AST SERPL-CCNC: 29 U/L (ref 5–40)
BILIRUB SERPL-MCNC: 0.3 MG/DL (ref 0.3–1.2)
BUN BLDV-MCNC: 12 MG/DL (ref 7–22)
CALCIUM SERPL-MCNC: 9.5 MG/DL (ref 8.5–10.5)
CHLORIDE BLD-SCNC: 104 MEQ/L (ref 98–111)
CO2: 29 MEQ/L (ref 23–33)
CREAT SERPL-MCNC: 0.7 MG/DL (ref 0.4–1.2)
ERYTHROCYTE [DISTWIDTH] IN BLOOD BY AUTOMATED COUNT: 13.2 % (ref 11.5–14.5)
ERYTHROCYTE [DISTWIDTH] IN BLOOD BY AUTOMATED COUNT: 43.5 FL (ref 35–45)
GFR SERPL CREATININE-BSD FRML MDRD: > 60 ML/MIN/1.73M2
GLUCOSE BLD-MCNC: 89 MG/DL (ref 70–108)
HCT VFR BLD CALC: 47.4 % (ref 37–47)
HEMOGLOBIN: 14.9 GM/DL (ref 12–16)
MCH RBC QN AUTO: 28.4 PG (ref 26–33)
MCHC RBC AUTO-ENTMCNC: 31.4 GM/DL (ref 32.2–35.5)
MCV RBC AUTO: 90.3 FL (ref 81–99)
PLATELET # BLD: 312 THOU/MM3 (ref 130–400)
PMV BLD AUTO: 11.7 FL (ref 9.4–12.4)
POTASSIUM SERPL-SCNC: 4.2 MEQ/L (ref 3.5–5.2)
RBC # BLD: 5.25 MILL/MM3 (ref 4.2–5.4)
SODIUM BLD-SCNC: 144 MEQ/L (ref 135–145)
T4 FREE: 1.23 NG/DL (ref 0.93–1.76)
TOTAL PROTEIN: 7.1 G/DL (ref 6.1–8)
TSH SERPL DL<=0.05 MIU/L-ACNC: 1.47 UIU/ML (ref 0.4–4.2)
WBC # BLD: 6 THOU/MM3 (ref 4.8–10.8)

## 2022-12-21 LAB
AVERAGE GLUCOSE: 111 MG/DL (ref 70–126)
FERRITIN: 46 NG/ML (ref 10–291)
FOLATE: > 20 NG/ML (ref 4.8–24.2)
HBA1C MFR BLD: 5.7 % (ref 4.4–6.4)
VITAMIN B-12: 575 PG/ML (ref 211–911)
VITAMIN D 25-HYDROXY: 57 NG/ML (ref 30–100)

## 2022-12-22 ENCOUNTER — OFFICE VISIT (OUTPATIENT)
Dept: FAMILY MEDICINE CLINIC | Age: 72
End: 2022-12-22

## 2022-12-22 VITALS
BODY MASS INDEX: 24.64 KG/M2 | RESPIRATION RATE: 12 BRPM | OXYGEN SATURATION: 94 % | HEIGHT: 63 IN | HEART RATE: 76 BPM | DIASTOLIC BLOOD PRESSURE: 70 MMHG | SYSTOLIC BLOOD PRESSURE: 118 MMHG | WEIGHT: 139.1 LBS

## 2022-12-22 DIAGNOSIS — M81.6 LOCALIZED OSTEOPOROSIS WITHOUT CURRENT PATHOLOGICAL FRACTURE: ICD-10-CM

## 2022-12-22 DIAGNOSIS — R00.0 TACHYCARDIA: ICD-10-CM

## 2022-12-22 DIAGNOSIS — Z86.73 H/O: CVA (CEREBROVASCULAR ACCIDENT): ICD-10-CM

## 2022-12-22 DIAGNOSIS — R41.89 COGNITIVE IMPAIRMENT: ICD-10-CM

## 2022-12-22 DIAGNOSIS — F32.5 MAJOR DEPRESSIVE DISORDER, SINGLE EPISODE, IN FULL REMISSION (HCC): ICD-10-CM

## 2022-12-22 DIAGNOSIS — E78.2 MIXED HYPERLIPIDEMIA: ICD-10-CM

## 2022-12-22 DIAGNOSIS — Z78.0 POST-MENOPAUSAL: ICD-10-CM

## 2022-12-22 DIAGNOSIS — R73.01 IFG (IMPAIRED FASTING GLUCOSE): ICD-10-CM

## 2022-12-22 DIAGNOSIS — E03.9 HYPOTHYROIDISM, UNSPECIFIED TYPE: ICD-10-CM

## 2022-12-22 DIAGNOSIS — J96.11 CHRONIC RESPIRATORY FAILURE WITH HYPOXIA (HCC): ICD-10-CM

## 2022-12-22 DIAGNOSIS — E55.9 VITAMIN D DEFICIENCY: ICD-10-CM

## 2022-12-22 DIAGNOSIS — J43.2 CENTRILOBULAR EMPHYSEMA (HCC): ICD-10-CM

## 2022-12-22 DIAGNOSIS — Z00.00 MEDICARE ANNUAL WELLNESS VISIT, SUBSEQUENT: Primary | ICD-10-CM

## 2022-12-22 DIAGNOSIS — F17.210 SMOKING GREATER THAN 40 PACK YEARS: ICD-10-CM

## 2022-12-22 RX ORDER — ALBUTEROL SULFATE 2.5 MG/3ML
2.5 SOLUTION RESPIRATORY (INHALATION) EVERY 6 HOURS PRN
Qty: 120 EACH | Refills: 3 | Status: SHIPPED | OUTPATIENT
Start: 2022-12-22

## 2022-12-22 ASSESSMENT — PATIENT HEALTH QUESTIONNAIRE - PHQ9
10. IF YOU CHECKED OFF ANY PROBLEMS, HOW DIFFICULT HAVE THESE PROBLEMS MADE IT FOR YOU TO DO YOUR WORK, TAKE CARE OF THINGS AT HOME, OR GET ALONG WITH OTHER PEOPLE: 0
2. FEELING DOWN, DEPRESSED OR HOPELESS: 0
5. POOR APPETITE OR OVEREATING: 0
6. FEELING BAD ABOUT YOURSELF - OR THAT YOU ARE A FAILURE OR HAVE LET YOURSELF OR YOUR FAMILY DOWN: 0
3. TROUBLE FALLING OR STAYING ASLEEP: 0
SUM OF ALL RESPONSES TO PHQ QUESTIONS 1-9: 0
8. MOVING OR SPEAKING SO SLOWLY THAT OTHER PEOPLE COULD HAVE NOTICED. OR THE OPPOSITE, BEING SO FIGETY OR RESTLESS THAT YOU HAVE BEEN MOVING AROUND A LOT MORE THAN USUAL: 0
1. LITTLE INTEREST OR PLEASURE IN DOING THINGS: 0
SUM OF ALL RESPONSES TO PHQ QUESTIONS 1-9: 0
9. THOUGHTS THAT YOU WOULD BE BETTER OFF DEAD, OR OF HURTING YOURSELF: 0
7. TROUBLE CONCENTRATING ON THINGS, SUCH AS READING THE NEWSPAPER OR WATCHING TELEVISION: 0
SUM OF ALL RESPONSES TO PHQ9 QUESTIONS 1 & 2: 0
4. FEELING TIRED OR HAVING LITTLE ENERGY: 0
SUM OF ALL RESPONSES TO PHQ QUESTIONS 1-9: 0
SUM OF ALL RESPONSES TO PHQ QUESTIONS 1-9: 0

## 2022-12-22 ASSESSMENT — ENCOUNTER SYMPTOMS
CHEST TIGHTNESS: 0
SHORTNESS OF BREATH: 0

## 2022-12-22 ASSESSMENT — LIFESTYLE VARIABLES
HOW MANY STANDARD DRINKS CONTAINING ALCOHOL DO YOU HAVE ON A TYPICAL DAY: 1 OR 2
HOW OFTEN DO YOU HAVE A DRINK CONTAINING ALCOHOL: 2-3 TIMES A WEEK

## 2022-12-22 NOTE — PROGRESS NOTES
Subjective:      Patient ID: Cee Ramirez is a 67 y.o. female. HPI: 6 month Follow Up    Chief Complaint   Patient presents with    Medicare AWV    Results       Patient Active Problem List   Diagnosis    Hyperlipemia    Hypothyroidism    Depression    Chronic anxiety    Osteopenia    Medication monitoring encounter    H/O: CVA (cerebrovascular accident), aneurysm bleed, Jan 2015    Abnormality of gait following cerebrovascular accident    GERD (gastroesophageal reflux disease)    Cognitive impairment due to CVA. OAB (overactive bladder)    Chronic nausea    SOB (shortness of breath)    Hypoxia    Major depressive disorder, single episode, in full remission (HCC)    Smoking greater than 40 pack years    Pulmonary emphysema (HCC)    Syncope and collapse    Concussion with loss of consciousness    Laceration of head    Alcohol abuse    Spinal stenosis, cervical region    Normal pressure hydrocephalus (HCC)    Michael's edema of vocal folds    Exposure to severe acute respiratory syndrome coronavirus 2 (SARS-CoV-2)    Fever       MAMMO - 2022  COLONOSCOPY - 2022  DEXA - 2020 Osteoporosis  CT Lung Screen - 2022    NEURO - Dr. Ma Osvaldo - Dr. Thania Herrera - Dr. Ronald Garrett    Denies CP, SOB or chest tightness. ECHO 2019 EF 60% with LV function normal.  PFT 2019 showed moderate emphysema. 40 pack year hx. Symbicort and Anoro and NEB tx. Has O2 at home she uses with activity and PRN at night. Following with PULM at [facility].      07/27/22 CT Chest  IMPRESSION:          1. The previously identified spiculated nodule within the left lower lobe     is no longer demonstrated likely represented an area of atelectasis on the     previous study. Bibasilar linear platelike atelectasis/scarring. Hyperinflated lungs suggestive of COPD. No focal consolidation. She is on Cymbalta 60 mg, Straterra 40 mg and Aricept 10 mg. .  Mood stable.   Take off Seroquel due to next morning drowsinesss. Placed on hydroxyzine with benefit sleep and more energy during the day. Likes benefit of medication changes     She is following with counselor via [facility] Final meetings for alcohol abuse. She was drinking a glass of wine in evening. On camparell 2 tabs in evening to decrease alcohol cravings. It is doing well. No wine since last visit. Feeling well. Patient is status post CVA but doing well subsequently. Follows with [facility]UR[facility]. Her speech is essentially back to normal also. She is on Strattera and Aricept due to cognitive changes s/p CVA. Seen Dr. Jack Dimas regarding potential hydrocephalus. MRA June 2020 and CT Head June 2020 were unremarkable    Vitals:    12/22/22 1402   BP: 118/70   Pulse: 76   Resp: 12   SpO2: 94%       Her hypothyroidism continues to respond well to her current thyroid dose. She is on 50 µg daily. Lab Results   Component Value Date    TSH 1.470 12/20/2022     BP wnl    BP Readings from Last 3 Encounters:   12/22/22 118/70   10/18/22 132/80   09/19/22 121/67     Labs reviewed.       Lab Results   Component Value Date    LABA1C 5.7 12/20/2022    LABA1C 5.5 06/28/2021    LABA1C 5.1 06/16/2020     No results found for: EAG    No components found for: CHLPL  Lab Results   Component Value Date    TRIG 80 06/28/2021    TRIG 110 06/16/2020    TRIG 81 04/13/2019     Lab Results   Component Value Date     06/28/2021     (A) 06/16/2020     (A) 04/13/2019     Lab Results   Component Value Date    LDLCALC 109 06/28/2021    LDLCALC 85 06/16/2020    LDLCALC 98 04/13/2019     No results found for: LABVLDL      Chemistry        Component Value Date/Time     12/20/2022 1635    K 4.2 12/20/2022 1635    K 3.3 (L) 12/19/2021 1425     12/20/2022 1635    CO2 29 12/20/2022 1635    BUN 12 12/20/2022 1635    CREATININE 0.7 12/20/2022 1635        Component Value Date/Time    CALCIUM 9.5 12/20/2022 1635    ALKPHOS 56 12/20/2022 1635    AST 29 12/20/2022 Pneumococcal Vaccine 11/14/2016    Tdap (Boostrix, Adacel) 04/08/2014    Zoster Recombinant (Shingrix) 10/13/2020, 12/12/2020       Review of Systems   HENT: Negative. Respiratory:  Negative for chest tightness and shortness of breath. Genitourinary:  Negative for frequency. Neurological:  Negative for dizziness and light-headedness. Psychiatric/Behavioral:  Positive for confusion. Negative for dysphoric mood. The patient is nervous/anxious. The patient is not hyperactive. Objective:   Physical Exam  Constitutional:       General: She is not in acute distress. HENT:      Head: Normocephalic. Right Ear: Tympanic membrane normal.      Left Ear: Tympanic membrane normal.      Nose: Nose normal.   Eyes:      Pupils: Pupils are equal, round, and reactive to light. Neck:      Vascular: No carotid bruit. Cardiovascular:      Rate and Rhythm: Normal rate and regular rhythm. Pulses: Normal pulses. Heart sounds: Normal heart sounds. No murmur heard. Pulmonary:      Effort: Pulmonary effort is normal.      Breath sounds: Normal breath sounds. Decreased air movement present. No wheezing. Chest:   Breasts:     Right: No inverted nipple, nipple discharge, skin change or tenderness. Left: No inverted nipple, mass, nipple discharge, skin change or tenderness. Abdominal:      General: Bowel sounds are normal. There is no distension. Palpations: Abdomen is soft. Tenderness: There is no abdominal tenderness. Musculoskeletal:         General: No tenderness. Normal range of motion. Cervical back: Normal range of motion and neck supple. Skin:     General: Skin is warm and dry. Findings: No rash. Neurological:      Mental Status: She is alert and oriented to person, place, and time. Psychiatric:         Attention and Perception: She is inattentive. Mood and Affect: Mood is anxious and depressed. Behavior: Behavior is cooperative.          Thought Content: Thought content normal.         Cognition and Memory: She exhibits impaired recent memory. Judgment: Judgment normal.       Assessment:       Diagnosis Orders   1. Medicare annual wellness visit, subsequent        2. Centrilobular emphysema (HCC)  albuterol (PROVENTIL) (2.5 MG/3ML) 0.083% nebulizer solution    DME Order for Nebulizer as OP      3. Chronic respiratory failure with hypoxia (HCC)        4. Smoking greater than 40 pack years        5. Tachycardia  Holter Monitor 48 Hour      6. Hypothyroidism, unspecified type        7. Mixed hyperlipidemia        8. IFG (impaired fasting glucose)        9. Vitamin D deficiency        10. Major depressive disorder, single episode, in full remission ([facility] [address]        11. H/O: CVA (cerebrovascular accident), aneurysm bleed, Jan 2015        12. Cognitive impairment due to CVA. 13. Localized osteoporosis without current pathological fracture        14.  Post-menopausal  DEXA BONE DENSITY 2 SITES              Plan:       Chronic conditions stable  Labs reviewed  Refills as above  DME NEB rx   Follow up with Specialists  Repeat DEXA  48 Hour Holter for tachycardia complain  Alcohol and smoking cessaiton  RTO in 6 months              SARITHA Costa - CNP

## 2022-12-22 NOTE — PATIENT INSTRUCTIONS
You may receive a survey about your visit with us today. The feedback from our patients helps us identify what is working well and where the service to all patients can be enhanced. Thank you! Learning About Being Active as an Older Adult  Why is being active important as you get older? Being active is one of the best things you can do for your health. And it's never too late to start. Being active--or getting active, if you aren't already--has definite benefits. It can:  Give you more energy,  Keep your mind sharp. Improve balance to reduce your risk of falls. Help you manage chronic illness with fewer medicines. No matter how old you are, how fit you are, or what health problems you have, there is a form of activity that will work for you. And the more physical activity you can do, the better your overall health will be. What kinds of activity can help you stay healthy? Being more active will make your daily activities easier. Physical activity includes planned exercise and things you do in daily life. There are four types of activity:  Aerobic. Doing aerobic activity makes your heart and lungs strong. Includes walking, dancing, and gardening. Aim for at least 2½ hours spread throughout the week. It improves your energy and can help you sleep better. Muscle-strengthening. This type of activity can help maintain muscle and strengthen bones. Includes climbing stairs, using resistance bands, and lifting or carrying heavy loads. Aim for at least twice a week. It can help protect the knees and other joints. Stretching. Stretching gives you better range of motion in joints and muscles. Includes upper arm stretches, calf stretches, and gentle yoga. Aim for at least twice a week, preferably after your muscles are warmed up from other activities. It can help you function better in daily life. Balancing. This helps you stay coordinated and have good posture.   Includes heel-to-toe walking, roney chi, and certain types of yoga. Aim for at least 3 days a week. It can reduce your risk of falling. Even if you have a hard time meeting the recommendations, it's better to be more active than less active. All activity done in each category counts toward your weekly total. You'd be surprised how daily things like carrying groceries, keeping up with grandchildren, and taking the stairs can add up. What keeps you from being active? If you've had a hard time being more active, you're not alone. Maybe you remember being able to do more. Or maybe you've never thought of yourself as being active. It's frustrating when you can't do the things you want. Being more active can help. What's holding you back? Getting started. Have a goal, but break it into easy tasks. Small steps build into big accomplishments. Staying motivated. If you feel like skipping your activity, remember your goal. Maybe you want to move better and stay independent. Every activity gets you one step closer. Not feeling your best.  Start with 5 minutes of an activity you enjoy. Prove to yourself you can do it. As you get comfortable, increase your time. You may not be where you want to be. But you're in the process of getting there. Everyone starts somewhere. How can you find safe ways to stay active? Talk with your doctor about any physical challenges you're facing. Make a plan with your doctor if you have a health problem or aren't sure how to get started with activity. If you're already active, ask your doctor if there is anything you should change to stay safe as your body and health change. If you tend to feel dizzy after you take medicine, avoid activity at that time. Try being active before you take your medicine. This will reduce your risk of falls. If you plan to be active at home, make sure to clear your space before you get started. Remove things like TV cords, coffee tables, and throw rugs.  It's safest to have plenty of space to move freely. The key to getting more active is to take it slow and steady. Try to improve only a little bit at a time. Pick just one area to improve on at first. And if an activity hurts, stop and talk to your doctor. Where can you learn more? Go to http://www.wilhelm.com/ and enter P600 to learn more about \"Learning About Being Active as an Older Adult. \"  Current as of: October 10, 2022               Content Version: 13.5  © 7971-3666 Healthwise, [place]. Care instructions adapted under license by [facility] ([facility]). If you have questions about a medical condition or this instruction, always ask your healthcare professional. [place] [address] any warranty or liability for your use of this information. Advance Directives: Care Instructions  Overview  An advance directive is a legal way to state your wishes at the end of your life. It tells your family and your doctor what to do if you can't say what you want. There are two main types of advance directives. You can change them any time your wishes change. Living will. This form tells your family and your doctor your wishes about life support and other treatment. The form is also called a declaration. Medical power of . This form lets you name a person to make treatment decisions for you when you can't speak for yourself. This person is called a health care agent (health care proxy, health care surrogate). The form is also called a durable power of  for health care. If you do not have an advance directive, decisions about your medical care may be made by a family member, or by a doctor or a  who doesn't know you. It may help to think of an advance directive as a gift to the people who care for you. If you have one, they won't have to make tough decisions by themselves.   For more information, including forms for your state, see the [address] website (www.caringinfo.org/planning/advance-directives/). Follow-up care is a key part of your treatment and safety. Be sure to make and go to all appointments, and call your doctor if you are having problems. It's also a good idea to know your test results and keep a list of the medicines you take. What should you include in an advance directive? Many states have a unique advance directive form. (It may ask you to address specific issues.) Or you might use a universal form that's approved by many states. If your form doesn't tell you what to address, it may be hard to know what to include in your advance directive. Use the questions below to help you get started. Who do you want to make decisions about your medical care if you are not able to? What life-support measures do you want if you have a serious illness that gets worse over time or can't be cured? What are you most afraid of that might happen? (Maybe you're afraid of having pain, losing your independence, or being kept alive by machines.)  Where would you prefer to die? (Your home? A hospital? A nursing home?)  Do you want to donate your organs when you die? Do you want certain Ep[employer] practices performed before you die? When should you call for help? Be sure to contact your doctor if you have any questions. Where can you learn more? Go to http://www.wilhelm.com/ and enter R264 to learn more about \"Advance Directives: Care Instructions. \"  Current as of: June 16, 2022               Content Version: 13.5  © 3346-2337 Healthwise, [employer]. Care instructions adapted under license by [facility] ([facility]). If you have questions about a medical condition or this instruction, always ask your healthcare professional. [place] [address] any warranty or liability for your use of this information. Personalized Preventive Plan for Jennyfer Wright - 12/22/2022  Medicare offers a range of preventive health benefits.  Some of the tests and screenings are paid in full while other may be subject to a deductible, co-insurance, and/or copay. Some of these benefits include a comprehensive review of your medical history including lifestyle, illnesses that may run in your family, and various assessments and screenings as appropriate. After reviewing your medical record and screening and assessments performed today your provider may have ordered immunizations, labs, imaging, and/or referrals for you. A list of these orders (if applicable) as well as your Preventive Care list are included within your After Visit Summary for your review. Other Preventive Recommendations:    A preventive eye exam performed by an eye specialist is recommended every 1-2 years to screen for glaucoma; cataracts, macular degeneration, and other eye disorders. A preventive dental visit is recommended every 6 months. Try to get at least 150 minutes of exercise per week or 10,000 steps per day on a pedometer . Order or download the FREE \"Exercise & Physical Activity: Your Everyday Guide\" from [employer]. Call 7-161.947.4652 or search [employer] online. You need 2638-9047 mg of calcium and 2991-7878 IU of vitamin D per day. It is possible to meet your calcium requirement with diet alone, but a vitamin D supplement is usually necessary to meet this goal.  When exposed to the sun, use a sunscreen that protects against both UVA and UVB radiation with an SPF of 30 or greater. Reapply every 2 to 3 hours or after sweating, drying off with a towel, or swimming. Always wear a seat belt when traveling in a car. Always wear a helmet when riding a bicycle or motorcycle.

## 2022-12-22 NOTE — PROGRESS NOTES
Medicare Annual Wellness Visit    Gela Christopher is here for Medicare AWV and Results    Assessment & Plan   Medicare annual wellness visit, subsequent    Recommendations for Preventive Services Due: see orders and patient instructions/AVS.  Recommended screening schedule for the next 5-10 years is provided to the patient in written form: see Patient Instructions/AVS.     Return for Medicare Annual Wellness Visit in 1 year. Subjective       Patient's complete Health Risk Assessment and screening values have been reviewed and are found in Flowsheets. The following problems were reviewed today and where indicated follow up appointments were made and/or referrals ordered. Positive Risk Factor Screenings with Interventions:                 Weight and Activity:  Physical Activity: Inactive    Days of Exercise per Week: 0 days    Minutes of Exercise per Session: 0 min     On average, how many days per week do you engage in moderate to strenuous exercise (like a brisk walk)?: 0 days  Have you lost any weight without trying in the past 3 months?: No  Body mass index: 24.64    Inactivity Interventions:  See AVS for additional education material  See A/P for plan and any pertinent orders       Hearing Screen:  Do you or your family notice any trouble with your hearing that hasn't been managed with hearing aids?: (!) Yes (\"don't hear the greatest\")    Interventions:  Patient declines any further evaluation or treatment       Lung Cancer Screening:          Objective   Vitals:    12/22/22 1402   BP: 118/70   Site: Left Upper Arm   Position: Sitting   Cuff Size: Medium Adult   Pulse: 76   Resp: 12   SpO2: 94%   Weight: 139 lb 1.6 oz (63.1 kg)   Height: 5' 3\" (1.6 m)      Body mass index is 24.64 kg/m². Allergies   Allergen Reactions    Macrobid [Nitrofurantoin] Diarrhea    Sulfa Antibiotics Nausea And Vomiting     Prior to Visit Medications    Medication Sig Taking?  Authorizing Provider   cetirizine (ZYRTEC) 10 MG tablet Take 10 mg by mouth daily Yes Historical Provider, MD   ANOCONSTANTINE ELLIPTA 62.5-25 MCG/INH AEPB inhaler Inhale 1 puff by mouth once daily Yes SARITHA Loredo CNP   acamprosate (CAMPRAL) 333 MG tablet TAKE 2 TABLETS BY MOUTH NIGHTLY Yes SARITHA Loredo CNP   hydrOXYzine HCl (ATARAX) 25 MG tablet TAKE 1/2-1 (ONE-HALF TO ONE) TABLET BY MOUTH EVERY 8 HOURS AS NEEDED FOR ANXIETY Yes SARITHA Loredo CNP   levothyroxine (EUTHYROX) 50 MCG tablet Take 1 tablet by mouth once daily Yes SARITHA Loredo CNP   Misc. Devices MISC Portable Oxygen Concentrator Yes SARITHA Loredo CNP   budesonide-formoterol (SYMBICORT) 80-4.5 MCG/ACT AERO Inhale 2 puffs into the lungs 2 times daily Rinse mouth after use. Yes SARITHA Loredo CNP   Spacer/Aero-Holding [place] 1 Device by Does not apply route daily as needed (Albuterol for Shortness of breath) Yes SARITHA Loredo CNP   donepezil (ARICEPT) 10 MG tablet Take 1 tablet by mouth in the evening Yes SARITHA Loredo CNP   albuterol sulfate HFA (VENTOLIN HFA) 108 (90 Base) MCG/ACT inhaler Inhale 2 puffs into the lungs 4 times daily as needed for Wheezing Yes SARITHA Loredo CNP   simvastatin (ZOCOR) 10 MG tablet Take 1 tablet by mouth nightly Yes SARITHA Loredo CNP   atomoxetine (STRATTERA) 40 MG capsule Take 1 capsule by mouth once daily Yes SARITHA Loredo CNP   Misc.  Devices (PULSE OXIMETER FOR FINGER) MISC Daily PRN Yes SARITHA Loredo CNP   DULoxetine (CYMBALTA) 60 MG extended release capsule Take 1 capsule by mouth once daily Yes SARITHA Loredo CNP   valACYclovir (VALTREX) 500 MG tablet Take 1 tablet by mouth daily Yes Historical Provider, MD   Methylsulfonylmethane (MSM) 1000 MG CAPS Take 2 capsules by mouth 2 times daily Yes Historical Provider, MD   alendronate (FOSAMAX) 70 MG tablet Take 1 tablet by mouth every 7 days Take with water on an empty stomach- wait 30 minutes before eating or taking other meds. Avoid lying down for 30 minutes after dose. Yes SARITHA Santamaria CNP   albuterol (PROVENTIL) (2.5 MG/3ML) 0.083% nebulizer solution Take 3 mLs by nebulization every 6 hours as needed for Wheezing Yes SARITHA Santamaria CNP   Multiple Vitamin (MULTI-VITAMIN PO) Take  by mouth. Yes Historical Provider, MD   Calcium Carbonate-Vitamin D (CALCIUM-VITAMIN D) 600-200 MG-UNIT CAPS Take 1 tablet by mouth 2 times daily  Yes Historical Provider, MD   fish oil-omega-3 fatty acids 1000 MG capsule Take 1 g by mouth.  One tablet daily along with red yeast rice Yes Historical Provider, MD       [facility] (Including outside providers/suppliers regularly involved in providing care):   Patient Care Team:  SARITHA Santamaria CNP as PCP - General (Family Nurse Practitioner)  SARITHA Santamaria CNP as PCP - [facility] Empaneled Provider  Nancy Tyson MD as Consulting Physician (Ophthalmology)     Reviewed and updated this visit:  Tobacco  Allergies  Meds  Problems  Med Hx  Surg Hx  Soc Hx  Fam Hx

## 2023-01-05 ENCOUNTER — HOSPITAL ENCOUNTER (OUTPATIENT)
Dept: WOMENS IMAGING | Age: 73
Discharge: HOME | End: 2023-01-05
Payer: MEDICARE

## 2023-01-05 ENCOUNTER — HOSPITAL ENCOUNTER (OUTPATIENT)
Dept: NON INVASIVE DIAGNOSTICS | Age: 73
Discharge: HOME | End: 2023-01-05
Payer: MEDICARE

## 2023-01-05 DIAGNOSIS — Z78.0 POST-MENOPAUSAL: ICD-10-CM

## 2023-01-05 DIAGNOSIS — R00.0 TACHYCARDIA: ICD-10-CM

## 2023-01-05 PROCEDURE — 77080 DXA BONE DENSITY AXIAL: CPT

## 2023-01-05 PROCEDURE — 93225 XTRNL ECG REC<48 HRS REC: CPT

## 2023-01-05 PROCEDURE — 93226 XTRNL ECG REC<48 HR SCAN A/R: CPT

## 2023-01-05 NOTE — PROCEDURES
48 hour Holter Monitor was applied to patient.  Patient was instructed to remove monitor on 1/7 at 1517 and return to  of hospital. The serial number on the Holter Monitor is [id number]

## 2023-01-07 DIAGNOSIS — F32.9 REACTIVE DEPRESSION: ICD-10-CM

## 2023-01-09 RX ORDER — DULOXETIN HYDROCHLORIDE 60 MG/1
CAPSULE, DELAYED RELEASE ORAL
Qty: 90 CAPSULE | Refills: 3 | Status: SHIPPED | OUTPATIENT
Start: 2023-01-09

## 2023-01-10 DIAGNOSIS — M81.6 LOCALIZED OSTEOPOROSIS WITHOUT CURRENT PATHOLOGICAL FRACTURE: ICD-10-CM

## 2023-01-10 RX ORDER — ALENDRONATE SODIUM 70 MG/1
70 TABLET ORAL
Qty: 12 TABLET | Refills: 3 | Status: SHIPPED | OUTPATIENT
Start: 2023-01-10

## 2023-01-12 ENCOUNTER — TELEPHONE (OUTPATIENT)
Dept: FAMILY MEDICINE CLINIC | Age: 73
End: 2023-01-12

## 2023-01-12 NOTE — TELEPHONE ENCOUNTER
Fax received from [facility] stating that the patients Albuterol HFA is not on their formulary and that they have supplied the patient with a 30 day supply but will no long pay for this. PA submitted through CoverMyMeds, waiting on determination.

## 2023-01-13 NOTE — TELEPHONE ENCOUNTER
Approvedon January 12  PA Case: [id number], Status: Approved, Coverage Starts on: 10/13/2022 12:00:00 AM, Coverage Ends on: 1/12/2024 12:00:00 AM.

## 2023-03-04 ENCOUNTER — HOSPITAL ENCOUNTER (EMERGENCY)
Age: 73
Discharge: HOME | End: 2023-03-04
Attending: FAMILY MEDICINE
Payer: MEDICARE

## 2023-03-04 VITALS — HEIGHT: 63 IN | TEMPERATURE: 97.8 F | HEART RATE: 101 BPM | BODY MASS INDEX: 23.04 KG/M2 | WEIGHT: 130 LBS

## 2023-03-04 DIAGNOSIS — N30.01 ACUTE CYSTITIS WITH HEMATURIA: Primary | ICD-10-CM

## 2023-03-04 DIAGNOSIS — R31.9 HEMATURIA, UNSPECIFIED TYPE: ICD-10-CM

## 2023-03-04 LAB
AMORPH SED URNS QL MICRO: ABNORMAL
AMPHETAMINES UR QL SCN: NEGATIVE
BACTERIA URNS QL MICRO: ABNORMAL /HPF
BARBITURATES UR QL SCN: NEGATIVE
BENZODIAZ UR QL SCN: NEGATIVE
BILIRUB UR QL STRIP.AUTO: ABNORMAL
BZE UR QL SCN: NEGATIVE
CANNABINOIDS UR QL SCN: NEGATIVE
CASTS #/AREA URNS LPF: ABNORMAL /LPF
CHARACTER UR: ABNORMAL
COLOR: ABNORMAL
CRYSTALS URNS MICRO: ABNORMAL
EPITHELIAL CELLS, UA: ABNORMAL /HPF
FENTANYL: NEGATIVE
GLUCOSE UR QL STRIP.AUTO: NEGATIVE MG/DL
HGB UR QL STRIP.AUTO: ABNORMAL
ICTOTEST: NEGATIVE
KETONES UR QL STRIP.AUTO: ABNORMAL
MUCOUS THREADS URNS QL MICRO: ABNORMAL
NITRITE UR QL STRIP: NEGATIVE
OPIATES UR QL SCN: NEGATIVE
OXYCODONE: NEGATIVE
PCP UR QL SCN: NEGATIVE
PH UR STRIP.AUTO: 6.5 [PH] (ref 5–9)
PROT UR STRIP.AUTO-MCNC: 30 MG/DL
RBC URINE: > 100 /HPF
RENAL EPI CELLS #/AREA URNS HPF: ABNORMAL /[HPF]
SP GR UR REFRACT.AUTO: 1.02 (ref 1–1.03)
UROBILINOGEN, URINE: 0.2 EU/DL (ref 0–1)
WBC #/AREA URNS HPF: ABNORMAL /HPF
WBC #/AREA URNS HPF: ABNORMAL /[HPF]
YEAST LIKE FUNGI URNS QL MICRO: ABNORMAL

## 2023-03-04 PROCEDURE — 87186 SC STD MICRODIL/AGAR DIL: CPT

## 2023-03-04 PROCEDURE — 87077 CULTURE AEROBIC IDENTIFY: CPT

## 2023-03-04 PROCEDURE — 80307 DRUG TEST PRSMV CHEM ANLYZR: CPT

## 2023-03-04 PROCEDURE — 99283 EMERGENCY DEPT VISIT LOW MDM: CPT

## 2023-03-04 PROCEDURE — 87086 URINE CULTURE/COLONY COUNT: CPT

## 2023-03-04 PROCEDURE — 81001 URINALYSIS AUTO W/SCOPE: CPT

## 2023-03-04 RX ORDER — CEPHALEXIN 500 MG/1
500 CAPSULE ORAL 2 TIMES DAILY
Qty: 10 CAPSULE | Refills: 0 | Status: SHIPPED | OUTPATIENT
Start: 2023-03-04 | End: 2023-03-09

## 2023-03-04 ASSESSMENT — PAIN SCALES - GENERAL: PAINLEVEL_OUTOF10: 0

## 2023-03-04 ASSESSMENT — PAIN - FUNCTIONAL ASSESSMENT: PAIN_FUNCTIONAL_ASSESSMENT: 0-10

## 2023-03-04 NOTE — ED NOTES
Pt to bathroom in stable condition. Urine sample collected at this time.      Saba Sharma RN  03/04/23 [id number]

## 2023-03-04 NOTE — ED TRIAGE NOTES
Pt to the ED via personal  transport. Pt presents with complaints of blood in urine. Patient states that this has happened one time. States the blood was in the toilet and not on the toilet paper. Denies abd pain. Denies any pain, burning, or urgency. Provided pt with collection container and advised pt of bathroom locations. Patient is alert and oriented x 4. Respirations are regular and unlabored. Patient provided blanket. Call light within reach.

## 2023-03-04 NOTE — ED PROVIDER NOTES
EMERGENCY DEPARTMENT ENCOUNTER     CHIEF COMPLAINT   Chief Complaint   Patient presents with    Hematuria        HPI   Dl Guido is a 67 y.o. female with a hx of COPD, hiatal hernia, who presents with two episodes of blood-tinged urine, without lower abdominal pain, onset was this morning, while she was on the toilet. The duration has been intermittent since the onset. The patient has no associated flank pain, fever, chills or vomiting. There are no alleviating factors. The context is that the symptoms started spontaneously, without any known precipitants. PAST MEDICAL & SURGICAL HISTORY   Past Medical History:   Diagnosis Date    Aneurysm, cerebral     COPD (chronic obstructive pulmonary disease) ([facility] [address]     Hiatal hernia     Hyperlipidemia     Osteoarthritis     Pneumonia     Stroke [facility])     Thyroid disease     Unspecified cerebral artery occlusion with cerebral infarction      Past Surgical History:   Procedure Laterality Date    BRAIN ANEURYSM SURGERY      COLONOSCOPY      2009, 2020, 2022    COSMETIC SURGERY  2009,2010    juvederm,botox    COSMETIC SURGERY  2013    Restylane, Botox    EGD      1991    EXTERNAL EAR SURGERY  2000    reconstruction of lft ear w/re-opening of canal & pinning back of superior ear    EXTERNAL EAR SURGERY  1999    abscess lft ear    EXTERNAL EAR SURGERY  1997    hematoma left ear    AK BIOPSY BREAST OPEN INCISIONAL Left 1984    benign    AK BIOPSY BREAST OPEN INCISIONAL Right 1986    benign        CURRENT MEDICATIONS   Current Outpatient Rx   Medication Sig Dispense Refill    cephALEXin (KEFLEX) 500 MG capsule Take 1 capsule by mouth 2 times daily for 5 days 10 capsule 0    alendronate (FOSAMAX) 70 MG tablet Take 1 tablet by mouth every 7 days Take with water on an empty stomach- wait 30 minutes before eating or taking other meds. Avoid lying down for 30 minutes after dose.  12 tablet 3    DULoxetine (CYMBALTA) 60 MG extended release capsule Take 1 capsule by mouth once daily 90 capsule 3    albuterol (PROVENTIL) (2.5 MG/3ML) 0.083% nebulizer solution Take 3 mLs by nebulization every 6 hours as needed for Wheezing 120 each 3    cetirizine (ZYRTEC) 10 MG tablet Take 10 mg by mouth daily      ANORO ELLIPTA 62.5-25 MCG/INH AEPB inhaler Inhale 1 puff by mouth once daily 60 each 11    acamprosate (CAMPRAL) 333 MG tablet TAKE 2 TABLETS BY MOUTH NIGHTLY 60 tablet 5    hydrOXYzine HCl (ATARAX) 25 MG tablet TAKE 1/2-1 (ONE-HALF TO ONE) TABLET BY MOUTH EVERY 8 HOURS AS NEEDED FOR ANXIETY 90 tablet 5    levothyroxine (EUTHYROX) 50 MCG tablet Take 1 tablet by mouth once daily 90 tablet 3    Misc. Devices MISC Portable Oxygen Concentrator 1 each 0    budesonide-formoterol (SYMBICORT) 80-4.5 MCG/ACT AERO Inhale 2 puffs into the lungs 2 times daily Rinse mouth after use. 10.2 g 5    Spacer/Aero-Holding [place] 1 Device by Does not apply route daily as needed (Albuterol for Shortness of breath) 1 each 0    donepezil (ARICEPT) 10 MG tablet Take 1 tablet by mouth in the evening 90 tablet 1    albuterol sulfate HFA (VENTOLIN HFA) 108 (90 Base) MCG/ACT inhaler Inhale 2 puffs into the lungs 4 times daily as needed for Wheezing 18 g 2    simvastatin (ZOCOR) 10 MG tablet Take 1 tablet by mouth nightly 90 tablet 3    atomoxetine (STRATTERA) 40 MG capsule Take 1 capsule by mouth once daily 30 capsule 5    Misc. Devices (PULSE OXIMETER FOR FINGER) MISC Daily PRN 1 each 0    valACYclovir (VALTREX) 500 MG tablet Take 1 tablet by mouth daily      Methylsulfonylmethane (MSM) 1000 MG CAPS Take 2 capsules by mouth 2 times daily      Multiple Vitamin (MULTI-VITAMIN PO) Take  by mouth. Calcium Carbonate-Vitamin D (CALCIUM-VITAMIN D) 600-200 MG-UNIT CAPS Take 1 tablet by mouth 2 times daily       fish oil-omega-3 fatty acids 1000 MG capsule Take 1 g by mouth.  One tablet daily along with red yeast rice          ALLERGIES   Allergies   Allergen Reactions    Macrobid [Nitrofurantoin] Diarrhea    Sulfa Antibiotics Nausea And Vomiting        SOCIAL AND FAMILY HISTORY   Social History     Socioeconomic History    Marital status:      Spouse name: None    Number of children: 3    Years of education: None    Highest education level: None   Tobacco Use    Smoking status: Former     Packs/day: 1.00     Years: 40.00     Pack years: 40.00     Types: Cigarettes     Quit date: 10/1/2015     Years since quittin.4    Smokeless tobacco: Never   Vaping Use    Vaping Use: Never used   Substance and Sexual Activity    Alcohol use: Yes     Alcohol/week: 2.0 standard drinks     Types: 2 Glasses of wine per week     Comment: 2 glass of vodka every other day     Drug use: No    Sexual activity: Yes     Social Determinants of Health     Financial Resource Strain: Low Risk     Difficulty of Paying Living Expenses: Not hard at all   Food Insecurity: No Food Insecurity    Worried About Running Out of Food in the Last Year: Never true    Ran Out of Food in the Last Year: Never true   Physical Activity: Inactive    Days of Exercise per Week: 0 days    Minutes of Exercise per Session: 0 min     Family History   Problem Relation Age of Onset    Cancer Mother         Female Cancer    Stroke Mother         Possible    Diabetes Father     Heart Disease Maternal Aunt     Parkinsonism Maternal Uncle     Colon Cancer Neg Hx     Esophageal Cancer Neg Hx     Liver Cancer Neg Hx     Rectal Cancer Neg Hx     Stomach Cancer Neg Hx         I reviewed all pertinent nursing notes and am in agreement. I reviewed patient's social, medical and surgical histories.     PHYSICAL EXAM   VITAL SIGNS: Pulse (!) 101   Temp 97.8 °F (36.6 °C) (Oral)   Ht 5' 3\" (1.6 m)   Wt 130 lb (59 kg)   BMI 23.03 kg/m²   Constitutional: Well developed, well nourished, cooperative  Eyes: Sclera nonicteric, conjunctiva moist  Respiratory: No retractions, no accessory muscle use, normal respiratory effort  Cardiovascular: Normal rate, normal rhythm, no murmurs  GI: Soft, no suprapubic or other abdominal tenderness, no guarding, bowel sounds present, no audible bruits or palpable pulsatile masses  Musculoskeletal: No edema, no deformity, no CVA tenderness to palpation   Integument: No rash, dry skin  Neurologic: Alert & oriented, normal speech    RADIOLOGY/PROCEDURES   No orders to display       LABS  Labs Reviewed   URINE WITH REFLEXED MICRO - Abnormal; Notable for the following components:       Result Value    Bilirubin Urine SMALL (*)     Ketones, Urine TRACE (*)     Blood, Urine LARGE (*)     Protein, UA 30 (*)     Leukocyte Esterase, Urine LARGE (*)     Color, UA BABITA (*)     Character, Urine CLOUDY (*)     All other components within normal limits   CULTURE, URINE   CULTURE, REFLEXED, URINE    Narrative:     Source: urine, clean catch       Site:           Current Antibiotics: not stated   BILE ACIDS, TOTAL   URINE DRUG SCREEN       INITIAL IMPRESSION:  Hematuria vs vaginal spotting of unknown significance without abdominal tenderness. No toxicity or vital sign abnormalities. PLAN  UA will be ordered. Vitals:    03/04/23 1431   Pulse: (!) 101   Temp: 97.8 °F (36.6 °C)       Differential diagnosis: non-specific hematuria, UTI      FINAL IMPRESSION   1. Acute cystitis with hematuria    2. Hematuria, unspecified type            ED COURSE & MEDICAL DECISION MAKING   This is an acute stable problem presented with hematuria. Pt appears to have positive mild to moderate LE, will need treatment with Keflex. Pt was given Rx for Keflex. Pt will not need admission, and benefits from close follow-up, especially if symptoms persist or worsen. Pertinent Labs & Imaging studies reviewed and interpreted.  (See chart for details)   See EMR for medications prescribed    (This note was completed with a voice recognition program)        Priscila Young MD  03/04/23 [phone]

## 2023-03-04 NOTE — DISCHARGE INSTRUCTIONS
WE DID CONFIRM BLOOD IN YOUR URINE SAMPLE, WHICH COULD BE CAUSED BY AN INFECTION. TAKE KEFLEX ANTIBIOTICS FOR NEXT 5 DAYS TO TREAT IT. RETURN TO THE ED IF YOUR SYMPTOMS WORSEN. ALTERNATIVELY, SEE YOUR PCP NEXT WEEK FOR FOLLOW-UP.

## 2023-03-05 LAB
BACTERIA UR CULT: ABNORMAL
ORGANISM: ABNORMAL

## 2023-03-06 ENCOUNTER — PATIENT MESSAGE (OUTPATIENT)
Dept: FAMILY MEDICINE CLINIC | Age: 73
End: 2023-03-06

## 2023-03-06 LAB
BACTERIA UR CULT: ABNORMAL
ORGANISM: ABNORMAL

## 2023-03-07 NOTE — PROGRESS NOTES
Pharmacy Note  ED Culture Follow-up    Tiana Hunt is a 67 y.o. female. Allergies: Macrobid [nitrofurantoin] and Sulfa antibiotics     Current antimicrobials:   Reviewed patient's urine culture - culture positive for E. coli. Patient was discharged on cephalexin 500 mg BID x 5 days, and culture is sensitive to prescribed medication. Antibiotic prescribed at discharge is appropriate - no changes made to antibiotic regimen. Please call with any questions.  Saumya Street, [facility], PharmD 2:37 PM 3/7/2023

## 2023-04-03 DIAGNOSIS — R41.89 COGNITIVE IMPAIRMENT: ICD-10-CM

## 2023-04-03 DIAGNOSIS — Z86.73 H/O: CVA (CEREBROVASCULAR ACCIDENT): ICD-10-CM

## 2023-04-03 RX ORDER — ATOMOXETINE 40 MG/1
CAPSULE ORAL
Qty: 90 CAPSULE | Refills: 3 | Status: SHIPPED | OUTPATIENT
Start: 2023-04-03

## 2023-04-03 RX ORDER — DONEPEZIL HYDROCHLORIDE 10 MG/1
TABLET, FILM COATED ORAL
Qty: 90 TABLET | Refills: 3 | Status: SHIPPED | OUTPATIENT
Start: 2023-04-03

## 2023-04-14 DIAGNOSIS — J43.9 PULMONARY EMPHYSEMA, UNSPECIFIED EMPHYSEMA TYPE (HCC): ICD-10-CM

## 2023-04-17 RX ORDER — ALBUTEROL SULFATE 90 UG/1
AEROSOL, METERED RESPIRATORY (INHALATION)
Qty: 18 G | Refills: 0 | Status: SHIPPED | OUTPATIENT
Start: 2023-04-17

## 2023-04-23 DIAGNOSIS — F32.5 MAJOR DEPRESSIVE DISORDER, SINGLE EPISODE, IN FULL REMISSION (HCC): ICD-10-CM

## 2023-04-24 RX ORDER — HYDROXYZINE HYDROCHLORIDE 25 MG/1
TABLET, FILM COATED ORAL
Qty: 90 TABLET | Refills: 0 | Status: SHIPPED | OUTPATIENT
Start: 2023-04-24

## 2023-04-25 ENCOUNTER — OFFICE VISIT (OUTPATIENT)
Dept: UROLOGY | Age: 73
End: 2023-04-25
Payer: MEDICARE

## 2023-04-25 VITALS
BODY MASS INDEX: 25.69 KG/M2 | SYSTOLIC BLOOD PRESSURE: 138 MMHG | WEIGHT: 145 LBS | HEIGHT: 63 IN | DIASTOLIC BLOOD PRESSURE: 82 MMHG

## 2023-04-25 DIAGNOSIS — R35.0 URINARY FREQUENCY: Primary | ICD-10-CM

## 2023-04-25 LAB
BILIRUBIN URINE: NEGATIVE
BLOOD URINE, POC: NEGATIVE
CHARACTER, URINE: CLEAR
COLOR, URINE: YELLOW
GLUCOSE URINE: NEGATIVE MG/DL
KETONES, URINE: NEGATIVE
LEUKOCYTE CLUMPS, URINE: ABNORMAL
NITRITE, URINE: POSITIVE
PH, URINE: 7 (ref 5–9)
POST VOID RESIDUAL (PVR): 0 ML
PROTEIN, URINE: NEGATIVE MG/DL
SPECIFIC GRAVITY, URINE: 1.02 (ref 1–1.03)
UROBILINOGEN, URINE: 0.2 EU/DL (ref 0–1)

## 2023-04-25 PROCEDURE — G8399 PT W/DXA RESULTS DOCUMENT: HCPCS | Performed by: NURSE PRACTITIONER

## 2023-04-25 PROCEDURE — G8427 DOCREV CUR MEDS BY ELIG CLIN: HCPCS | Performed by: NURSE PRACTITIONER

## 2023-04-25 PROCEDURE — 99214 OFFICE O/P EST MOD 30 MIN: CPT | Performed by: NURSE PRACTITIONER

## 2023-04-25 PROCEDURE — G8417 CALC BMI ABV UP PARAM F/U: HCPCS | Performed by: NURSE PRACTITIONER

## 2023-04-25 PROCEDURE — 1123F ACP DISCUSS/DSCN MKR DOCD: CPT | Performed by: NURSE PRACTITIONER

## 2023-04-25 PROCEDURE — 3017F COLORECTAL CA SCREEN DOC REV: CPT | Performed by: NURSE PRACTITIONER

## 2023-04-25 PROCEDURE — 51798 US URINE CAPACITY MEASURE: CPT | Performed by: NURSE PRACTITIONER

## 2023-04-25 PROCEDURE — 1036F TOBACCO NON-USER: CPT | Performed by: NURSE PRACTITIONER

## 2023-04-25 PROCEDURE — 1090F PRES/ABSN URINE INCON ASSESS: CPT | Performed by: NURSE PRACTITIONER

## 2023-04-25 PROCEDURE — 81003 URINALYSIS AUTO W/O SCOPE: CPT | Performed by: NURSE PRACTITIONER

## 2023-04-25 RX ORDER — TROSPIUM CHLORIDE 20 MG/1
20 TABLET, FILM COATED ORAL 2 TIMES DAILY
Qty: 180 TABLET | Refills: 3 | Status: SHIPPED | OUTPATIENT
Start: 2023-04-25

## 2023-04-25 ASSESSMENT — ENCOUNTER SYMPTOMS
VOMITING: 0
ABDOMINAL PAIN: 0
NAUSEA: 0
BACK PAIN: 0

## 2023-04-25 NOTE — PROGRESS NOTES
[address] [address]  [address]  [facility] [address]  Dept: [phone]  Loc: [phone]    Visit Date: 4/25/2023        HPI:     Jania Wheatley is a 67 y.o. female who presents today for:  Chief Complaint   Patient presents with    Urinary Frequency    Urinary Tract Infection       HPI    Pt seen in follow up for urinary frequency and urgency. Val Singh has a hx of OAB with mixed incontinence with recent worsening in symptoms. She failed to have improvement on oxybutynin. Myrbetriq worked but she was unable to afford cost of medication. Vesicare improved symptoms but still having episodes of complete incontinence. At appt 8/2022 Vesicare was stopped and she was started on trospium 20 mg BID. Completed pelvic floor therapy previously. Advanced therapies for OAB/incontinence previously discussed with myself and discussed botox at OV 10/2022 with Dr. Morris Zhou. She reports she is interested in possible SNS therapy instead of botox. Notes symptoms better with the trospium but still has episodes of complete incontinence. Current Outpatient Medications   Medication Sig Dispense Refill    hydrOXYzine HCl (ATARAX) 25 MG tablet TAKE 1/2-1 TABLET BY MOUTH EVERY 8 HOURS AS NEEDED FOR ANXIETY 90 tablet 0    albuterol sulfate HFA (PROVENTIL;VENTOLIN;PROAIR) 108 (90 Base) MCG/ACT inhaler INHALE 2 PUFFS INTO LUNGS 4 TIMES DAILY AS NEEDED FOR WHEEZING 18 g 0    atomoxetine (STRATTERA) 40 MG capsule Take 1 capsule by mouth once daily 90 capsule 3    donepezil (ARICEPT) 10 MG tablet Take 1 tablet by mouth in the evening 90 tablet 3    alendronate (FOSAMAX) 70 MG tablet Take 1 tablet by mouth every 7 days Take with water on an empty stomach- wait 30 minutes before eating or taking other meds. Avoid lying down for 30 minutes after dose.  12 tablet 3    DULoxetine (CYMBALTA) 60 MG extended release capsule Take 1 capsule by mouth once daily 90 capsule

## 2023-04-26 ENCOUNTER — TELEPHONE (OUTPATIENT)
Dept: FAMILY MEDICINE CLINIC | Age: 73
End: 2023-04-26

## 2023-04-26 DIAGNOSIS — N39.46 MIXED STRESS AND URGE INCONTINENCE: ICD-10-CM

## 2023-04-26 DIAGNOSIS — N39.46 MIXED STRESS AND URGE URINARY INCONTINENCE: Primary | ICD-10-CM

## 2023-04-26 LAB
BACTERIA UR CULT: ABNORMAL
ORGANISM: ABNORMAL

## 2023-04-26 NOTE — TELEPHONE ENCOUNTER
Patient requesting to restart pelvic therapy at [facility] for LES. Please advise.  She is aware they will contact her to schedule appt

## 2023-04-27 ENCOUNTER — TELEPHONE (OUTPATIENT)
Dept: UROLOGY | Age: 73
End: 2023-04-27

## 2023-04-27 RX ORDER — CEPHALEXIN 500 MG/1
500 CAPSULE ORAL 4 TIMES DAILY
Qty: 28 CAPSULE | Refills: 0 | Status: SHIPPED | OUTPATIENT
Start: 2023-04-27 | End: 2023-05-04

## 2023-04-27 NOTE — TELEPHONE ENCOUNTER
Attempted to call the patient.  Voicemail left to  the prescription from the pharmacy and to confirm message was received

## 2023-05-09 ENCOUNTER — HOSPITAL ENCOUNTER (OUTPATIENT)
Dept: PHYSICAL THERAPY | Age: 73
Setting detail: THERAPIES SERIES
Discharge: HOME | End: 2023-05-09
Payer: MEDICARE

## 2023-05-09 PROCEDURE — 97163 PT EVAL HIGH COMPLEX 45 MIN: CPT | Performed by: PHYSICAL THERAPIST

## 2023-05-09 PROCEDURE — 97530 THERAPEUTIC ACTIVITIES: CPT | Performed by: PHYSICAL THERAPIST

## 2023-05-09 PROCEDURE — 97112 NEUROMUSCULAR REEDUCATION: CPT | Performed by: PHYSICAL THERAPIST

## 2023-05-09 NOTE — PROGRESS NOTES
** PLEASE SIGN, DATE AND TIME CERTIFICATION BELOW AND RETURN TO [facility] OUTPATIENT REHABILITATION (FAX #: [phone]). ATTEST/CO-SIGN IF ACCESSING VIA INOcision. THANK YOU.**    I certify that I have examined the patient below and determined that Physical Medicine and Rehabilitation service is necessary and that I approve the established plan of care for up to 90 days or as specifically noted.   Attestation, signature or co-signature of physician indicates approval of certification requirements.    ________________________ ____________ __________  Physician Signature   Date   Time  [facility]  PHYSICAL THERAPY  OUTPATIENT REHABILITATION - SPECIALIZED THERAPY SERVICES  [x] PELVIC HEALTH EVALUATION  [] DAILY NOTE  [] PROGRESS NOTE [] DISCHARGE NOTE    Date: 2023  Patient Name:  Carie Dunne  : 1950  MRN: [id number]  CSN: [id number]    Referring Practitioner SARITHA Becker -*   Diagnosis Mixed stress and urge urinary incontinence [N39.46]    Treatment Diagnosis M62.58 - Muscle Wasting and Atrophy, nec, other site  M62.89 - Other Specified Disorders of Muscle  N39.41 - Urge Incontinence  N81.84 - Pelvic Muscle Wasting (Female), R39.15 - Urgency of Urination  R35.0 - Frequency of Micturition  N39.44 - Nocturnal Enuresis  N39.42 - Incontinence Without Sensory Awareness, and R15.9 - Full Incontinence of Feces   Date of Evaluation 23    Additional Pertinent History CVA due to aneurysm, COPD, osteopenia, anxiety, depression, GERD, OAB, normal pressure hydrocephalus, spinal stenosis, hypothyroid, hiatal hernia, OA      Functional Outcome Measure Used IIQ and YUVAL   Functional Outcome Score 14 and 8 (23)       Insurance: Primary: Payor: MEDICARE /  /  / ,   Secondary: BCBS   Authorization Information: No pre-cert req'd   Visit # 1, 1/10 for progress note   Visits Allowed: Unlimited based on med nec   Recertification Date: 18   Physician Follow-Up:    Physician

## 2023-05-16 ENCOUNTER — TELEPHONE (OUTPATIENT)
Dept: FAMILY MEDICINE CLINIC | Age: 73
End: 2023-05-16

## 2023-05-16 ENCOUNTER — NURSE ONLY (OUTPATIENT)
Dept: LAB | Age: 73
End: 2023-05-16

## 2023-05-16 DIAGNOSIS — R35.0 URINARY FREQUENCY: ICD-10-CM

## 2023-05-16 DIAGNOSIS — R35.0 URINARY FREQUENCY: Primary | ICD-10-CM

## 2023-05-16 LAB
BACTERIA: ABNORMAL
BILIRUB UR QL STRIP: NEGATIVE
CASTS #/AREA URNS LPF: ABNORMAL /LPF
CASTS #/AREA URNS LPF: ABNORMAL /LPF
CHARACTER UR: ABNORMAL
CHARCOAL URNS QL MICRO: ABNORMAL
COLOR UR: YELLOW
CRYSTALS URNS QL MICRO: ABNORMAL
EPITHELIAL CELLS, UA: ABNORMAL /HPF
GLUCOSE UR QL STRIP.AUTO: NEGATIVE MG/DL
HGB UR QL STRIP.AUTO: NEGATIVE
KETONES UR QL STRIP.AUTO: NEGATIVE
LEUKOCYTE ESTERASE UR QL STRIP.AUTO: NEGATIVE
NITRITE UR QL STRIP.AUTO: NEGATIVE
PH UR STRIP.AUTO: 8.5 [PH] (ref 5–9)
PROT UR STRIP.AUTO-MCNC: NEGATIVE MG/DL
RBC #/AREA URNS HPF: ABNORMAL /HPF
RENAL EPI CELLS #/AREA URNS HPF: ABNORMAL /[HPF]
SPECIFIC GRAVITY UA: 1.02 (ref 1–1.03)
UROBILINOGEN, URINE: 0.2 EU/DL (ref 0–1)
WBC #/AREA URNS HPF: ABNORMAL /HPF
YEAST LIKE FUNGI URNS QL MICRO: ABNORMAL

## 2023-05-16 NOTE — TELEPHONE ENCOUNTER
Pt was notified. She will go to the [facility] and have them pull orders from [employer].

## 2023-05-16 NOTE — TELEPHONE ENCOUNTER
Pt called office requesting a lab order for a UA to be sent to [facility]. Pt started with urinary frequency, body aches and feeling lethargic since yesterday. Pt is leaving to go out of town Friday and wants to make sure if she has an infection starting, she gets started on treatment. Call pt back. Please advise.

## 2023-05-23 ENCOUNTER — APPOINTMENT (OUTPATIENT)
Dept: PHYSICAL THERAPY | Age: 73
End: 2023-05-23
Payer: MEDICARE

## 2023-06-10 DIAGNOSIS — F32.5 MAJOR DEPRESSIVE DISORDER, SINGLE EPISODE, IN FULL REMISSION (HCC): ICD-10-CM

## 2023-06-12 RX ORDER — SIMVASTATIN 10 MG
TABLET ORAL
Qty: 90 TABLET | Refills: 3 | Status: SHIPPED | OUTPATIENT
Start: 2023-06-12

## 2023-06-12 RX ORDER — HYDROXYZINE HYDROCHLORIDE 25 MG/1
TABLET, FILM COATED ORAL
Qty: 90 TABLET | Refills: 0 | Status: SHIPPED | OUTPATIENT
Start: 2023-06-12 | End: 2023-07-31

## 2023-06-13 ENCOUNTER — APPOINTMENT (OUTPATIENT)
Dept: PHYSICAL THERAPY | Age: 73
End: 2023-06-13
Payer: MEDICARE

## 2023-06-20 DIAGNOSIS — E03.9 HYPOTHYROIDISM, UNSPECIFIED TYPE: ICD-10-CM

## 2023-06-20 RX ORDER — LEVOTHYROXINE SODIUM 0.05 MG/1
TABLET ORAL
Qty: 90 TABLET | Refills: 3 | Status: SHIPPED | OUTPATIENT
Start: 2023-06-20

## 2023-06-21 ENCOUNTER — OFFICE VISIT (OUTPATIENT)
Dept: FAMILY MEDICINE CLINIC | Age: 73
End: 2023-06-21
Payer: MEDICARE

## 2023-06-21 VITALS
RESPIRATION RATE: 20 BRPM | BODY MASS INDEX: 25.51 KG/M2 | DIASTOLIC BLOOD PRESSURE: 82 MMHG | HEART RATE: 104 BPM | SYSTOLIC BLOOD PRESSURE: 138 MMHG | WEIGHT: 144 LBS

## 2023-06-21 DIAGNOSIS — R00.0 TACHYCARDIA: Primary | ICD-10-CM

## 2023-06-21 DIAGNOSIS — E03.9 HYPOTHYROIDISM, UNSPECIFIED TYPE: ICD-10-CM

## 2023-06-21 DIAGNOSIS — Z86.73 H/O: CVA (CEREBROVASCULAR ACCIDENT): ICD-10-CM

## 2023-06-21 DIAGNOSIS — J96.11 CHRONIC RESPIRATORY FAILURE WITH HYPOXIA (HCC): ICD-10-CM

## 2023-06-21 DIAGNOSIS — E78.2 MIXED HYPERLIPIDEMIA: ICD-10-CM

## 2023-06-21 DIAGNOSIS — F32.9 REACTIVE DEPRESSION: ICD-10-CM

## 2023-06-21 DIAGNOSIS — R73.01 IFG (IMPAIRED FASTING GLUCOSE): ICD-10-CM

## 2023-06-21 DIAGNOSIS — E55.9 VITAMIN D DEFICIENCY: ICD-10-CM

## 2023-06-21 DIAGNOSIS — J43.2 CENTRILOBULAR EMPHYSEMA (HCC): ICD-10-CM

## 2023-06-21 DIAGNOSIS — F17.210 SMOKING GREATER THAN 40 PACK YEARS: ICD-10-CM

## 2023-06-21 DIAGNOSIS — K59.00 CONSTIPATION, UNSPECIFIED CONSTIPATION TYPE: ICD-10-CM

## 2023-06-21 DIAGNOSIS — M81.6 LOCALIZED OSTEOPOROSIS WITHOUT CURRENT PATHOLOGICAL FRACTURE: ICD-10-CM

## 2023-06-21 DIAGNOSIS — R41.89 COGNITIVE IMPAIRMENT: ICD-10-CM

## 2023-06-21 DIAGNOSIS — F32.5 MAJOR DEPRESSIVE DISORDER, SINGLE EPISODE, IN FULL REMISSION (HCC): ICD-10-CM

## 2023-06-21 DIAGNOSIS — G91.2 NORMAL PRESSURE HYDROCEPHALUS (HCC): ICD-10-CM

## 2023-06-21 PROCEDURE — 1090F PRES/ABSN URINE INCON ASSESS: CPT | Performed by: NURSE PRACTITIONER

## 2023-06-21 PROCEDURE — 1123F ACP DISCUSS/DSCN MKR DOCD: CPT | Performed by: NURSE PRACTITIONER

## 2023-06-21 PROCEDURE — G8399 PT W/DXA RESULTS DOCUMENT: HCPCS | Performed by: NURSE PRACTITIONER

## 2023-06-21 PROCEDURE — 1036F TOBACCO NON-USER: CPT | Performed by: NURSE PRACTITIONER

## 2023-06-21 PROCEDURE — 3017F COLORECTAL CA SCREEN DOC REV: CPT | Performed by: NURSE PRACTITIONER

## 2023-06-21 PROCEDURE — 99214 OFFICE O/P EST MOD 30 MIN: CPT | Performed by: NURSE PRACTITIONER

## 2023-06-21 PROCEDURE — G8427 DOCREV CUR MEDS BY ELIG CLIN: HCPCS | Performed by: NURSE PRACTITIONER

## 2023-06-21 PROCEDURE — G8417 CALC BMI ABV UP PARAM F/U: HCPCS | Performed by: NURSE PRACTITIONER

## 2023-06-21 PROCEDURE — 3023F SPIROM DOC REV: CPT | Performed by: NURSE PRACTITIONER

## 2023-06-21 RX ORDER — METOPROLOL SUCCINATE 25 MG/1
25 TABLET, EXTENDED RELEASE ORAL DAILY
Qty: 90 TABLET | Refills: 3 | Status: SHIPPED | OUTPATIENT
Start: 2023-06-21

## 2023-06-21 SDOH — ECONOMIC STABILITY: HOUSING INSECURITY
IN THE LAST 12 MONTHS, WAS THERE A TIME WHEN YOU DID NOT HAVE A STEADY PLACE TO SLEEP OR SLEPT IN A SHELTER (INCLUDING NOW)?: NO

## 2023-06-21 SDOH — ECONOMIC STABILITY: FOOD INSECURITY: WITHIN THE PAST 12 MONTHS, THE FOOD YOU BOUGHT JUST DIDN'T LAST AND YOU DIDN'T HAVE MONEY TO GET MORE.: NEVER TRUE

## 2023-06-21 SDOH — ECONOMIC STABILITY: INCOME INSECURITY: HOW HARD IS IT FOR YOU TO PAY FOR THE VERY BASICS LIKE FOOD, HOUSING, MEDICAL CARE, AND HEATING?: NOT HARD AT ALL

## 2023-06-21 SDOH — ECONOMIC STABILITY: FOOD INSECURITY: WITHIN THE PAST 12 MONTHS, YOU WORRIED THAT YOUR FOOD WOULD RUN OUT BEFORE YOU GOT MONEY TO BUY MORE.: NEVER TRUE

## 2023-06-21 ASSESSMENT — PATIENT HEALTH QUESTIONNAIRE - PHQ9
2. FEELING DOWN, DEPRESSED OR HOPELESS: 0
1. LITTLE INTEREST OR PLEASURE IN DOING THINGS: 0
4. FEELING TIRED OR HAVING LITTLE ENERGY: 1
10. IF YOU CHECKED OFF ANY PROBLEMS, HOW DIFFICULT HAVE THESE PROBLEMS MADE IT FOR YOU TO DO YOUR WORK, TAKE CARE OF THINGS AT HOME, OR GET ALONG WITH OTHER PEOPLE: 0
9. THOUGHTS THAT YOU WOULD BE BETTER OFF DEAD, OR OF HURTING YOURSELF: 0
SUM OF ALL RESPONSES TO PHQ QUESTIONS 1-9: 5
SUM OF ALL RESPONSES TO PHQ QUESTIONS 1-9: 5
8. MOVING OR SPEAKING SO SLOWLY THAT OTHER PEOPLE COULD HAVE NOTICED. OR THE OPPOSITE, BEING SO FIGETY OR RESTLESS THAT YOU HAVE BEEN MOVING AROUND A LOT MORE THAN USUAL: 0
6. FEELING BAD ABOUT YOURSELF - OR THAT YOU ARE A FAILURE OR HAVE LET YOURSELF OR YOUR FAMILY DOWN: 0
SUM OF ALL RESPONSES TO PHQ QUESTIONS 1-9: 5
3. TROUBLE FALLING OR STAYING ASLEEP: 2
7. TROUBLE CONCENTRATING ON THINGS, SUCH AS READING THE NEWSPAPER OR WATCHING TELEVISION: 1
SUM OF ALL RESPONSES TO PHQ QUESTIONS 1-9: 5
5. POOR APPETITE OR OVEREATING: 1
SUM OF ALL RESPONSES TO PHQ9 QUESTIONS 1 & 2: 0

## 2023-06-21 ASSESSMENT — ENCOUNTER SYMPTOMS
CHEST TIGHTNESS: 0
SHORTNESS OF BREATH: 0
CONSTIPATION: 1

## 2023-06-21 NOTE — PROGRESS NOTES
Subjective:      Patient ID: Jake Berry is a 68 y.o. female. HPI: 6 month Follow Up    Chief Complaint   Patient presents with    6 Month Follow-Up     HTN    Other     Pt says her HR has been elevated at her COPD doctor's appt, was told to discuss this with PCP       Patient Active Problem List   Diagnosis    Hyperlipemia    Hypothyroidism    Depression    Chronic anxiety    Osteopenia    Medication monitoring encounter    H/O: CVA (cerebrovascular accident), aneurysm bleed, Jan 2015    Abnormality of gait following cerebrovascular accident    GERD (gastroesophageal reflux disease)    Cognitive impairment due to CVA. OAB (overactive bladder)    Chronic nausea    SOB (shortness of breath)    Hypoxia    Major depressive disorder, single episode, in full remission (HCC)    Smoking greater than 40 pack years    Pulmonary emphysema (HCC)    Syncope and collapse    Concussion with loss of consciousness    Laceration of head    Alcohol abuse    Spinal stenosis, cervical region    Normal pressure hydrocephalus (HCC)    Michael's edema of vocal folds    Exposure to severe acute respiratory syndrome coronavirus 2 (SARS-CoV-2)    Fever       MAMMO - 2022  COLONOSCOPY - 2022   DEXA - 2020 Osteoporosis  CT Lung Screen - 2022 per Glenna Blanco - Dr. Tara So - Dr. Leonard Ards - Dr. Kalen Walters    Denies CP, SOB or chest tightness. ECHO 2019 EF 60% with LV function normal.  HOLTER MONITOR done in January 2023 for tachycardia. Avg HR 95. HR been high at specialists. PFT 2019 showed moderate emphysema. 40 pack year hx. Symbicort and Anoro and NEB tx. Has O2 at home she uses with activity and PRN at night. Following with PULM at [facility] and gets yearly CT Chest through PULM    07/27/22 CT Chest  IMPRESSION:          1.   The previously identified spiculated nodule within the left lower lobe     is no longer demonstrated likely represented an area of atelectasis on the

## 2023-06-22 ENCOUNTER — TELEPHONE (OUTPATIENT)
Dept: FAMILY MEDICINE CLINIC | Age: 73
End: 2023-06-22

## 2023-06-22 DIAGNOSIS — K59.00 CONSTIPATION, UNSPECIFIED CONSTIPATION TYPE: Primary | ICD-10-CM

## 2023-06-22 RX ORDER — LUBIPROSTONE 8 UG/1
8 CAPSULE ORAL 2 TIMES DAILY WITH MEALS
Qty: 60 CAPSULE | Refills: 5 | Status: SHIPPED | OUTPATIENT
Start: 2023-06-22

## 2023-06-22 NOTE — TELEPHONE ENCOUNTER
Patient calling to state that Yary Socks is not covered by insurance. Requesting alternative medication.   Please advise and leave VM if patient does not answer

## 2023-06-23 ENCOUNTER — HOSPITAL ENCOUNTER (EMERGENCY)
Age: 73
Discharge: HOME | End: 2023-06-23
Payer: MEDICARE

## 2023-06-23 ENCOUNTER — TELEPHONE (OUTPATIENT)
Dept: FAMILY MEDICINE CLINIC | Age: 73
End: 2023-06-23

## 2023-06-23 VITALS
TEMPERATURE: 97.3 F | RESPIRATION RATE: 22 BRPM | OXYGEN SATURATION: 92 % | DIASTOLIC BLOOD PRESSURE: 86 MMHG | SYSTOLIC BLOOD PRESSURE: 126 MMHG | WEIGHT: 140 LBS | HEART RATE: 116 BPM | BODY MASS INDEX: 24.8 KG/M2

## 2023-06-23 DIAGNOSIS — N39.0 RECURRENT UTI: Primary | ICD-10-CM

## 2023-06-23 LAB
BILIRUB UR STRIP.AUTO-MCNC: ABNORMAL MG/DL
CHARACTER UR: ABNORMAL
COLOR: ABNORMAL
GLUCOSE UR QL STRIP.AUTO: NEGATIVE MG/DL
KETONES UR QL STRIP.AUTO: ABNORMAL
NITRITE UR QL STRIP.AUTO: POSITIVE
PH UR STRIP.AUTO: 7 [PH] (ref 5–9)
PROT UR STRIP.AUTO-MCNC: 30 MG/DL
RBC #/AREA URNS HPF: NEGATIVE /[HPF]
SP GR UR STRIP.AUTO: 1.02 (ref 1–1.03)
UROBILINOGEN, URINE: 0.2 EU/DL (ref 0.2–1)
WBC #/AREA URNS HPF: ABNORMAL /[HPF]

## 2023-06-23 PROCEDURE — 87186 SC STD MICRODIL/AGAR DIL: CPT

## 2023-06-23 PROCEDURE — 81003 URINALYSIS AUTO W/O SCOPE: CPT

## 2023-06-23 PROCEDURE — 99213 OFFICE O/P EST LOW 20 MIN: CPT

## 2023-06-23 PROCEDURE — 87077 CULTURE AEROBIC IDENTIFY: CPT

## 2023-06-23 PROCEDURE — 87086 URINE CULTURE/COLONY COUNT: CPT

## 2023-06-23 RX ORDER — CEPHALEXIN 500 MG/1
500 CAPSULE ORAL 4 TIMES DAILY
Qty: 28 CAPSULE | Refills: 0 | Status: SHIPPED | OUTPATIENT
Start: 2023-06-23 | End: 2023-06-30

## 2023-06-23 ASSESSMENT — PAIN - FUNCTIONAL ASSESSMENT: PAIN_FUNCTIONAL_ASSESSMENT: NONE - DENIES PAIN

## 2023-06-23 ASSESSMENT — ENCOUNTER SYMPTOMS
TROUBLE SWALLOWING: 0
ABDOMINAL PAIN: 0
NAUSEA: 0
VOMITING: 0
SHORTNESS OF BREATH: 0
BACK PAIN: 0

## 2023-06-23 NOTE — ED TRIAGE NOTES
Patient to room with c/o urinary frequency beginning three days ago. C/o chronic nasal drainage. Urine specimen obtained.

## 2023-06-23 NOTE — ED PROVIDER NOTES
[facility]  Urgent Care Encounter      CHIEF COMPLAINT       Chief Complaint   Patient presents with    Urinary Frequency       Nurses Notes reviewed and I agree except as noted in the HPI. HISTORY OF PRESENT ILLNESS   Collins Hearn is a 68 y.o. female who presents for evaluation of possible UTI. Onset of symptoms over the last 24 hours, worsening. History of recurrent UTI. Patient notes of urinary frequency with minimal dysuria. No fever, hematuria. No back pain. No changes in diet. Currently followed by urology. REVIEW OF SYSTEMS     Review of Systems   Constitutional:  Negative for fever. HENT:  Negative for trouble swallowing. Respiratory:  Negative for shortness of breath. Cardiovascular:  Negative for chest pain. Gastrointestinal:  Negative for abdominal pain, nausea and vomiting. Genitourinary:  Positive for dysuria and frequency. Negative for decreased urine volume, difficulty urinating, flank pain, genital sores, hematuria, menstrual problem, pelvic pain, urgency, vaginal bleeding, vaginal discharge and vaginal pain. Musculoskeletal:  Negative for back pain, neck pain and neck stiffness. Skin:  Negative for rash. Neurological:  Negative for headaches. Hematological:  Negative for adenopathy. Psychiatric/Behavioral:  Negative for sleep disturbance. PAST MEDICAL HISTORY         Diagnosis Date    Aneurysm, cerebral     COPD (chronic obstructive pulmonary disease) ([facility] [address]     Hiatal hernia     Hyperlipidemia     Osteoarthritis     Pneumonia     Stroke [facility])     Thyroid disease     Unspecified cerebral artery occlusion with cerebral infarction        SURGICAL HISTORY     Patient  has a past surgical history that includes Colonoscopy; External ear surgery (2000); External ear surgery (1999); External ear surgery (1997); Cosmetic surgery (4171,[id number]); Cosmetic surgery (2013);  Brain aneurysm surgery; pr biopsy breast open incisional (Left, 1984); pr

## 2023-06-23 NOTE — TELEPHONE ENCOUNTER
Pt called office stating she thinks she has another UTI. Pt is having a lot of urgency and a little dysuria. Pt is also having some hesitation when she tries to urinate. Pt also has some incontinence. Please advise.

## 2023-06-25 LAB
BACTERIA UR CULT: ABNORMAL
ORGANISM: ABNORMAL

## 2023-06-26 ENCOUNTER — TELEPHONE (OUTPATIENT)
Dept: FAMILY MEDICINE CLINIC | Age: 73
End: 2023-06-26

## 2023-06-28 ENCOUNTER — NURSE ONLY (OUTPATIENT)
Dept: LAB | Age: 73
End: 2023-06-28

## 2023-06-28 ENCOUNTER — HOSPITAL ENCOUNTER (OUTPATIENT)
Dept: PHYSICAL THERAPY | Age: 73
Setting detail: THERAPIES SERIES
Discharge: HOME | End: 2023-06-28
Payer: MEDICARE

## 2023-06-28 DIAGNOSIS — E03.9 HYPOTHYROIDISM, UNSPECIFIED TYPE: ICD-10-CM

## 2023-06-28 LAB
ANION GAP SERPL CALC-SCNC: 11 MEQ/L (ref 8–16)
BUN SERPL-MCNC: 25 MG/DL (ref 7–22)
CALCIUM SERPL-MCNC: 9.7 MG/DL (ref 8.5–10.5)
CHLORIDE SERPL-SCNC: 99 MEQ/L (ref 98–111)
CO2 SERPL-SCNC: 30 MEQ/L (ref 23–33)
CREAT SERPL-MCNC: 0.8 MG/DL (ref 0.4–1.2)
DEPRECATED RDW RBC AUTO: 43.9 FL (ref 35–45)
ERYTHROCYTE [DISTWIDTH] IN BLOOD BY AUTOMATED COUNT: 13 % (ref 11.5–14.5)
GFR SERPL CREATININE-BSD FRML MDRD: > 60 ML/MIN/1.73M2
GLUCOSE SERPL-MCNC: 141 MG/DL (ref 70–108)
HCT VFR BLD AUTO: 47.8 % (ref 37–47)
HGB BLD-MCNC: 15 GM/DL (ref 12–16)
MCH RBC QN AUTO: 28.9 PG (ref 26–33)
MCHC RBC AUTO-ENTMCNC: 31.4 GM/DL (ref 32.2–35.5)
MCV RBC AUTO: 92.1 FL (ref 81–99)
PLATELET # BLD AUTO: 297 THOU/MM3 (ref 130–400)
PMV BLD AUTO: 12 FL (ref 9.4–12.4)
POTASSIUM SERPL-SCNC: 3.8 MEQ/L (ref 3.5–5.2)
RBC # BLD AUTO: 5.19 MILL/MM3 (ref 4.2–5.4)
SODIUM SERPL-SCNC: 140 MEQ/L (ref 135–145)
T4 FREE SERPL-MCNC: 1.21 NG/DL (ref 0.93–1.76)
TSH SERPL DL<=0.005 MIU/L-ACNC: 1.98 UIU/ML (ref 0.4–4.2)
WBC # BLD AUTO: 7.7 THOU/MM3 (ref 4.8–10.8)

## 2023-06-28 PROCEDURE — 97110 THERAPEUTIC EXERCISES: CPT

## 2023-06-28 PROCEDURE — 97530 THERAPEUTIC ACTIVITIES: CPT

## 2023-06-29 ENCOUNTER — CARE COORDINATION (OUTPATIENT)
Dept: CARE COORDINATION | Age: 73
End: 2023-06-29

## 2023-07-05 ENCOUNTER — CARE COORDINATION (OUTPATIENT)
Dept: CARE COORDINATION | Age: 73
End: 2023-07-05

## 2023-07-05 ENCOUNTER — HOSPITAL ENCOUNTER (OUTPATIENT)
Dept: PHYSICAL THERAPY | Age: 73
Setting detail: THERAPIES SERIES
Discharge: HOME | End: 2023-07-05
Payer: MEDICARE

## 2023-07-05 PROCEDURE — 97530 THERAPEUTIC ACTIVITIES: CPT

## 2023-07-05 PROCEDURE — 97110 THERAPEUTIC EXERCISES: CPT

## 2023-07-05 NOTE — PROGRESS NOTES
[facility]  PHYSICAL THERAPY  OUTPATIENT REHABILITATION - SPECIALIZED THERAPY SERVICES  [] PELVIC HEALTH EVALUATION  [x] DAILY NOTE  [] PROGRESS NOTE [] DISCHARGE NOTE    Date: 2023  Patient Name:  Yordy Morfin  : 1950  MRN: [id number]  CSN: [id number]    Referring Practitioner SARITHA Lomas -*   Diagnosis No admission diagnoses are documented for this encounter. Treatment Diagnosis M62.58 - Muscle Wasting and Atrophy, nec, other site  M62.89 - Other Specified Disorders of Muscle  N39.41 - Urge Incontinence  N81.84 - Pelvic Muscle Wasting (Female), R39.15 - Urgency of Urination  R35.0 - Frequency of Micturition  N39.44 - Nocturnal Enuresis  N39.42 - Incontinence Without Sensory Awareness, and R15.9 - Full Incontinence of Feces   Date of Evaluation 23    Additional Pertinent History CVA due to aneurysm, COPD, osteopenia, anxiety, depression, GERD, OAB, normal pressure hydrocephalus, spinal stenosis, hypothyroid, hiatal hernia, OA      Functional Outcome Measure Used IIQ and YUVAL   Functional Outcome Score 14 and 8 (23)       Insurance: Primary: Payor: MEDICARE /  /  / ,   Secondary: [facility]   Authorization Information: No pre-cert req'd   Visit # 3, 3/10 for progress note   Visits Allowed: Unlimited based on med nec   Recertification Date: 9/3/78   Physician Follow-Up:    Physician Orders: Eval and treat   History of Present Illness: Pt is 68 yo female with c/c urinary incont. Pt has struggled with this for many years, and she has done past PT. Pt has not stayed consistent with doing kegels and had limited recall of instruction on those. Pt currently is taking Vesicare which helps partially. Pt struggles with urgency, and she wears a depends for sleep which she can occas overflow. Pt must wear a pad daytime with less freq overflow. Pt reports she occas needs to \"run\" to the bathroom, struggles with freq urination, and often limits fluid intake.   Pt reports

## 2023-07-05 NOTE — CARE COORDINATION
Attempted to reach [facility] today for care coordination enrollment. No answer. Message left.

## 2023-07-12 ENCOUNTER — CARE COORDINATION (OUTPATIENT)
Dept: CARE COORDINATION | Age: 73
End: 2023-07-12

## 2023-07-12 NOTE — CARE COORDINATION
Attempted to reach [facility] today for enrollment in care coordination. Final outreach attempt. No answer. My Chart message sent.

## 2023-07-18 ENCOUNTER — APPOINTMENT (OUTPATIENT)
Dept: PHYSICAL THERAPY | Age: 73
End: 2023-07-18
Payer: MEDICARE

## 2023-07-31 DIAGNOSIS — F32.5 MAJOR DEPRESSIVE DISORDER, SINGLE EPISODE, IN FULL REMISSION (HCC): ICD-10-CM

## 2023-07-31 RX ORDER — HYDROXYZINE HYDROCHLORIDE 25 MG/1
TABLET, FILM COATED ORAL
Qty: 90 TABLET | Refills: 0 | Status: SHIPPED | OUTPATIENT
Start: 2023-07-31

## 2023-08-01 ENCOUNTER — OFFICE VISIT (OUTPATIENT)
Dept: UROLOGY | Age: 73
End: 2023-08-01
Payer: MEDICARE

## 2023-08-01 VITALS
TEMPERATURE: 97.9 F | HEIGHT: 63 IN | DIASTOLIC BLOOD PRESSURE: 78 MMHG | BODY MASS INDEX: 25.87 KG/M2 | SYSTOLIC BLOOD PRESSURE: 130 MMHG | WEIGHT: 146 LBS

## 2023-08-01 DIAGNOSIS — N39.0 RECURRENT UTI: ICD-10-CM

## 2023-08-01 DIAGNOSIS — R35.0 URINARY FREQUENCY: Primary | ICD-10-CM

## 2023-08-01 LAB
BILIRUBIN URINE: NEGATIVE
BLOOD URINE, POC: NEGATIVE
CHARACTER, URINE: CLEAR
COLOR, URINE: YELLOW
GLUCOSE URINE: NEGATIVE MG/DL
KETONES, URINE: ABNORMAL
LEUKOCYTE CLUMPS, URINE: NEGATIVE
NITRITE, URINE: NEGATIVE
PH, URINE: 6.5 (ref 5–9)
PROTEIN, URINE: NEGATIVE MG/DL
SPECIFIC GRAVITY, URINE: 1.02 (ref 1–1.03)
UROBILINOGEN, URINE: 0.2 EU/DL (ref 0–1)

## 2023-08-01 PROCEDURE — 81003 URINALYSIS AUTO W/O SCOPE: CPT | Performed by: NURSE PRACTITIONER

## 2023-08-01 PROCEDURE — 99214 OFFICE O/P EST MOD 30 MIN: CPT | Performed by: NURSE PRACTITIONER

## 2023-08-01 PROCEDURE — 1123F ACP DISCUSS/DSCN MKR DOCD: CPT | Performed by: NURSE PRACTITIONER

## 2023-08-01 PROCEDURE — 1036F TOBACCO NON-USER: CPT | Performed by: NURSE PRACTITIONER

## 2023-08-01 PROCEDURE — G8399 PT W/DXA RESULTS DOCUMENT: HCPCS | Performed by: NURSE PRACTITIONER

## 2023-08-01 PROCEDURE — G8417 CALC BMI ABV UP PARAM F/U: HCPCS | Performed by: NURSE PRACTITIONER

## 2023-08-01 PROCEDURE — 3017F COLORECTAL CA SCREEN DOC REV: CPT | Performed by: NURSE PRACTITIONER

## 2023-08-01 PROCEDURE — 1090F PRES/ABSN URINE INCON ASSESS: CPT | Performed by: NURSE PRACTITIONER

## 2023-08-01 PROCEDURE — G8427 DOCREV CUR MEDS BY ELIG CLIN: HCPCS | Performed by: NURSE PRACTITIONER

## 2023-08-01 ASSESSMENT — ENCOUNTER SYMPTOMS
NAUSEA: 0
BACK PAIN: 0
VOMITING: 0
ABDOMINAL PAIN: 0

## 2023-08-01 NOTE — PROGRESS NOTES
Went to urgent care last month for numerous UTI and was suggested a cysto. Patient is concerned for covid.
Continue Trospium.

## 2023-08-03 ENCOUNTER — HOSPITAL ENCOUNTER (OUTPATIENT)
Dept: CT IMAGING | Age: 73
Discharge: HOME | End: 2023-08-03
Attending: INTERNAL MEDICINE
Payer: MEDICARE

## 2023-08-03 DIAGNOSIS — Z12.2 ENCOUNTER FOR SCREENING FOR MALIGNANT NEOPLASM OF RESPIRATORY ORGANS: ICD-10-CM

## 2023-08-03 DIAGNOSIS — F17.211 CIGARETTE NICOTINE DEPENDENCE IN REMISSION: ICD-10-CM

## 2023-08-03 PROCEDURE — 71271 CT THORAX LUNG CANCER SCR C-: CPT

## 2023-08-14 ENCOUNTER — OFFICE VISIT (OUTPATIENT)
Dept: FAMILY MEDICINE CLINIC | Age: 73
End: 2023-08-14
Payer: MEDICARE

## 2023-08-14 VITALS
OXYGEN SATURATION: 90 % | WEIGHT: 145.2 LBS | DIASTOLIC BLOOD PRESSURE: 60 MMHG | HEART RATE: 108 BPM | SYSTOLIC BLOOD PRESSURE: 128 MMHG | BODY MASS INDEX: 25.72 KG/M2 | RESPIRATION RATE: 20 BRPM

## 2023-08-14 DIAGNOSIS — J44.1 COPD EXACERBATION (HCC): Primary | ICD-10-CM

## 2023-08-14 DIAGNOSIS — J43.2 CENTRILOBULAR EMPHYSEMA (HCC): ICD-10-CM

## 2023-08-14 DIAGNOSIS — F17.210 SMOKING GREATER THAN 40 PACK YEARS: ICD-10-CM

## 2023-08-14 PROCEDURE — 99214 OFFICE O/P EST MOD 30 MIN: CPT | Performed by: NURSE PRACTITIONER

## 2023-08-14 PROCEDURE — G8427 DOCREV CUR MEDS BY ELIG CLIN: HCPCS | Performed by: NURSE PRACTITIONER

## 2023-08-14 PROCEDURE — G8417 CALC BMI ABV UP PARAM F/U: HCPCS | Performed by: NURSE PRACTITIONER

## 2023-08-14 PROCEDURE — G8399 PT W/DXA RESULTS DOCUMENT: HCPCS | Performed by: NURSE PRACTITIONER

## 2023-08-14 PROCEDURE — 1123F ACP DISCUSS/DSCN MKR DOCD: CPT | Performed by: NURSE PRACTITIONER

## 2023-08-14 PROCEDURE — 1036F TOBACCO NON-USER: CPT | Performed by: NURSE PRACTITIONER

## 2023-08-14 PROCEDURE — 3017F COLORECTAL CA SCREEN DOC REV: CPT | Performed by: NURSE PRACTITIONER

## 2023-08-14 PROCEDURE — 3023F SPIROM DOC REV: CPT | Performed by: NURSE PRACTITIONER

## 2023-08-14 PROCEDURE — 1090F PRES/ABSN URINE INCON ASSESS: CPT | Performed by: NURSE PRACTITIONER

## 2023-08-14 RX ORDER — AZITHROMYCIN 250 MG/1
TABLET, FILM COATED ORAL
Qty: 6 TABLET | Refills: 0 | Status: SHIPPED | OUTPATIENT
Start: 2023-08-14 | End: 2023-08-24

## 2023-08-14 RX ORDER — PREDNISONE 20 MG/1
20 TABLET ORAL 2 TIMES DAILY
Qty: 10 TABLET | Refills: 0 | Status: SHIPPED | OUTPATIENT
Start: 2023-08-14 | End: 2023-08-19

## 2023-08-14 RX ORDER — PREDNISONE 20 MG/1
40 TABLET ORAL 2 TIMES DAILY
Qty: 10 TABLET | Refills: 0 | Status: SHIPPED | OUTPATIENT
Start: 2023-08-14 | End: 2023-08-14 | Stop reason: SDUPTHER

## 2023-08-14 ASSESSMENT — ENCOUNTER SYMPTOMS
WHEEZING: 1
COUGH: 1
CONSTIPATION: 1

## 2023-08-14 NOTE — PROGRESS NOTES
Subjective:      Patient ID: Kristi Gross is a 68 y.o. female. HPI: 6 month Follow Up    Chief Complaint   Patient presents with    Wheezing    Cough    Chest Congestion       Onset of 3-4 days with chest and nasal congesion. Wheezing. Cough that is productive. SOB. Increase use of O2 at home. No fever or chills. Vitals:    08/14/23 1346   BP: 128/60   Pulse: (!) 108   Resp: 20   SpO2: 90%       PFT 2019 showed moderate emphysema. 40 pack year hx. Symbicort and Anoro and NEB tx. Has O2 at home she uses with activity and PRN at night. Following with PULM at [facility] and gets yearly CT Chest through PULM    8/3/23 CT Chest  FINDINGS:  LUNGS NODULES:  1. There are no suspicious masses or nodules. LYMPHADENOPATHY:  1. There are no pathologically enlarged lymph nodes. OTHER (LUNGS/MEDIASTINUM/MUSCULOSKELETAL/ABDOMEN):  1. Unremarkable. IMPRESSION:  1. There are no suspicious masses or nodules within the lung fields. 2. There are no pathologically enlarged lymph nodes. 3. LUNGRADS ASSESSMENT VALUE: 1,      Patient Active Problem List   Diagnosis    Hyperlipemia    Hypothyroidism    Depression    Chronic anxiety    Osteopenia    Medication monitoring encounter    H/O: CVA (cerebrovascular accident), aneurysm bleed, Jan 2015    Abnormality of gait following cerebrovascular accident    GERD (gastroesophageal reflux disease)    Cognitive impairment due to CVA.     OAB (overactive bladder)    Chronic nausea    SOB (shortness of breath)    Hypoxia    Major depressive disorder, single episode, in full remission (HCC)    Smoking greater than 40 pack years    Pulmonary emphysema (HCC)    Syncope and collapse    Concussion with loss of consciousness    Laceration of head    Alcohol abuse    Spinal stenosis, cervical region    Normal pressure hydrocephalus (HCC)    Michael's edema of vocal folds    Exposure to severe acute respiratory syndrome coronavirus 2 (SARS-CoV-2)    Fever       MAMMO -

## 2023-08-22 ENCOUNTER — HOSPITAL ENCOUNTER (OUTPATIENT)
Dept: ULTRASOUND IMAGING | Age: 73
Discharge: HOME | End: 2023-08-22
Payer: MEDICARE

## 2023-08-22 DIAGNOSIS — N39.0 RECURRENT UTI: ICD-10-CM

## 2023-08-22 PROCEDURE — 76770 US EXAM ABDO BACK WALL COMP: CPT

## 2023-08-29 ENCOUNTER — PROCEDURE VISIT (OUTPATIENT)
Dept: UROLOGY | Age: 73
End: 2023-08-29
Payer: MEDICARE

## 2023-08-29 VITALS — HEIGHT: 63 IN | BODY MASS INDEX: 25.98 KG/M2 | WEIGHT: 146.6 LBS | RESPIRATION RATE: 16 BRPM

## 2023-08-29 DIAGNOSIS — R35.0 URINARY FREQUENCY: ICD-10-CM

## 2023-08-29 DIAGNOSIS — N39.46 MIXED INCONTINENCE: Primary | ICD-10-CM

## 2023-08-29 DIAGNOSIS — N13.30 HYDRONEPHROSIS, RIGHT: ICD-10-CM

## 2023-08-29 PROCEDURE — 52000 CYSTOURETHROSCOPY: CPT | Performed by: UROLOGY

## 2023-08-29 NOTE — PROGRESS NOTES
Cystoscopy    Operative Note    Patient:  Carlton Solorzano  MRN: [id number]  YOB: 1950    Date: 08/29/23  Surgeon: Marisol Andrews MD  Anesthesia: Sia Cam  Indications:       Pt continues with multiple UTIs. Notes also having trouble with loose bowel movements and thinks this is contributing. Discussed hygiene. Wipe front to back. Increase oral fluid intake. Pt would like to undergo cystoscopy. Will facilitate scheduling with Dr. Santy Riddle with renal US prior to appt. Pt may still like to consider SNS. Provided with handout today. Can discuss further with Dr. Santy Riddle at upcoming appt. Continue Trospium. imaging independently reviewed by Marisol Andrews MD and radiology report verified demonstrating     US RENAL COMPLETE    Result Date: 8/22/2023  PROCEDURE: US RENAL COMPLETE CLINICAL INFORMATION: Recurrent UTI TECHNIQUE: Ultrasound of the kidneys and urinary bladder was performed. Grayscale and color images were obtained. COMPARISON: None FINDINGS: The right kidney measures 9.8 x 4.6 x 4.9 cm and the left kidney measures 10.4 x 5.9 x 4.4 cm. Renal cortical thickness is normal bilaterally. There is suggestion of mild right-sided hydronephrosis. No renal calculi are identified. Color Doppler demonstrates expected wave forms in the bilateral renal arteries. Arcuate resistive indices are normal bilaterally. The urinary bladder is incompletely distended and cannot be evaluated on the current study. The patient did not void at the conclusion of the study. 1. Mild right-sided hydronephrosis. 2. Incompletely distended urinary bladder. Final report electronically signed by Dr. Shelly Draper on 8/22/2023 4:41 PM        Position: Dorsal Lithotomy  EBL: 0 ml    Findings:   The patient was prepped and draped in the usual sterile fashion. The cystoscope was advanced through the urethra and into the bladder.   The bladder was thoroughly inspected and the following was noted:    Vagina: normal

## 2023-09-05 ENCOUNTER — HOSPITAL ENCOUNTER (OUTPATIENT)
Dept: PHYSICAL THERAPY | Age: 73
Setting detail: THERAPIES SERIES
Discharge: HOME | End: 2023-09-05
Payer: MEDICARE

## 2023-09-05 PROCEDURE — 97530 THERAPEUTIC ACTIVITIES: CPT | Performed by: PHYSICAL THERAPIST

## 2023-09-05 NOTE — PROGRESS NOTES
[as pt states she does a lot of daytime snacking]   Kegel-hold 5 sec 10 x    Tummy tuck quick 1 sec 10 x TC's req'd   Tummy tuck hold 5 sec 10 x    Kegel with ball 5 sec 10 x    Kegel with band 5 sec 10 x    Sit to stands  10 x           PPT 3 sec 10 x    Clamshells  10 x    Pelvic tilt with ball, bridge, kegel                Specific Interventions Next Treatment: overflow exercises, instruct optimal bladder habits, urge control, urge causes; progress program as per symptoms    Activity/Treatment Tolerance:  [x]  Patient tolerated treatment well    Patient Education:   [x]  HEP/Education Completed:  Rev'd all prior instructions with pt. I suggested pt start daily Benefiber to assist with both bowel motility and stool consistency. Patient to continue doing 10 reps of kegels every 2 hours and progressed kegel endur to 5 sec today. Instructed pelvic tilt with ball/bridge/kegel. Encouraged consistent home compliance and stressed her \"basic routine\" as kegels, ball and band, bridge as the ones she never misses in order to simplify home routine and assist home compliance. [x]  Reviewed Prior HEP      [x]  Patient verbalized and/or demonstrated understanding of education provided. [x]  Barriers to learning: Pt is a vague historian and frequently alters her responses to questions asked in clinic today. Pt has limited memory. Assessment:  Pt REPORTS that she has had a 50% improvement in her bladder control since starting PT, and she reports no longer having fecal incont; HOWEVER, upon exam today, pt exhibited some fecal soiling. Pt also is still struggling with freq UTI which are often e-coli. Pt reports she is no longer overflowing pads and that her depends for sleep is only damp. Her urgency is reported to be less severe, primarily only now occurring when she arrives home with a very full bladder. She states she now uses 2-3 pads for day rather than 3-4.   Pt's memory has limitations, and her home compliance is

## 2023-09-19 ENCOUNTER — TELEPHONE (OUTPATIENT)
Dept: FAMILY MEDICINE CLINIC | Age: 73
End: 2023-09-19

## 2023-09-19 DIAGNOSIS — G91.2 NORMAL PRESSURE HYDROCEPHALUS (HCC): ICD-10-CM

## 2023-09-19 DIAGNOSIS — R41.89 COGNITIVE IMPAIRMENT: Primary | ICD-10-CM

## 2023-09-19 DIAGNOSIS — Z86.73 H/O: CVA (CEREBROVASCULAR ACCIDENT): ICD-10-CM

## 2023-09-19 NOTE — TELEPHONE ENCOUNTER
Patient requesting referral to [facility]--Chely LILLYSTON JOSIAH Walters. Patient aware they will contact her to schedule appt.

## 2023-09-22 ENCOUNTER — HOSPITAL ENCOUNTER (OUTPATIENT)
Dept: CT IMAGING | Age: 73
Discharge: HOME | End: 2023-09-22
Attending: UROLOGY
Payer: MEDICARE

## 2023-09-22 DIAGNOSIS — N13.30 HYDRONEPHROSIS, RIGHT: ICD-10-CM

## 2023-09-22 PROCEDURE — 74176 CT ABD & PELVIS W/O CONTRAST: CPT

## 2023-09-23 DIAGNOSIS — F32.5 MAJOR DEPRESSIVE DISORDER, SINGLE EPISODE, IN FULL REMISSION (HCC): ICD-10-CM

## 2023-09-25 ENCOUNTER — TELEPHONE (OUTPATIENT)
Dept: UROLOGY | Age: 73
End: 2023-09-25

## 2023-09-25 RX ORDER — HYDROXYZINE HYDROCHLORIDE 25 MG/1
TABLET, FILM COATED ORAL
Qty: 90 TABLET | Refills: 0 | Status: SHIPPED | OUTPATIENT
Start: 2023-09-25

## 2023-09-25 NOTE — TELEPHONE ENCOUNTER
Patient would like the ct scan results. She has a televisit on 09/29/2023 to review. She does not want to wait on results.

## 2023-09-28 ENCOUNTER — APPOINTMENT (OUTPATIENT)
Dept: GENERAL RADIOLOGY | Age: 73
End: 2023-09-28
Payer: MEDICARE

## 2023-09-28 ENCOUNTER — HOSPITAL ENCOUNTER (EMERGENCY)
Age: 73
Discharge: HOME | End: 2023-09-28
Payer: MEDICARE

## 2023-09-28 VITALS
TEMPERATURE: 98.2 F | SYSTOLIC BLOOD PRESSURE: 147 MMHG | OXYGEN SATURATION: 96 % | HEART RATE: 102 BPM | DIASTOLIC BLOOD PRESSURE: 67 MMHG | RESPIRATION RATE: 18 BRPM

## 2023-09-28 DIAGNOSIS — R07.81 RIB PAIN ON LEFT SIDE: ICD-10-CM
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
## 2023-09-28 DIAGNOSIS — S81.012A LACERATION OF LEFT KNEE, INITIAL ENCOUNTER: ICD-10-CM

## 2023-09-28 PROCEDURE — 6360000002 HC RX W HCPCS: Performed by: PHYSICIAN ASSISTANT

## 2023-09-28 PROCEDURE — 71101 X-RAY EXAM UNILAT RIBS/CHEST: CPT

## 2023-09-28 PROCEDURE — 90715 TDAP VACCINE 7 YRS/> IM: CPT | Performed by: PHYSICIAN ASSISTANT

## 2023-09-28 PROCEDURE — 99284 EMERGENCY DEPT VISIT MOD MDM: CPT

## 2023-09-28 PROCEDURE — 6370000000 HC RX 637 (ALT 250 FOR IP): Performed by: PHYSICIAN ASSISTANT

## 2023-09-28 PROCEDURE — 90471 IMMUNIZATION ADMIN: CPT | Performed by: PHYSICIAN ASSISTANT

## 2023-09-28 RX ORDER — TRAMADOL HYDROCHLORIDE 50 MG/1
50 TABLET ORAL EVERY 8 HOURS PRN
Qty: 10 TABLET | Refills: 0 | Status: SHIPPED | OUTPATIENT
Start: 2023-09-28 | End: 2023-10-03

## 2023-09-28 RX ORDER — LIDOCAINE 4 G/G
1 PATCH TOPICAL ONCE
Status: DISCONTINUED | OUTPATIENT
Start: 2023-09-28 | End: 2023-09-28 | Stop reason: HOSPADM

## 2023-09-28 RX ORDER — LIDOCAINE 50 MG/G
1 PATCH TOPICAL DAILY
Qty: 10 PATCH | Refills: 0 | Status: SHIPPED | OUTPATIENT
Start: 2023-09-28 | End: 2023-10-08

## 2023-09-28 RX ADMIN — TETANUS TOXOID, REDUCED DIPHTHERIA TOXOID AND ACELLULAR PERTUSSIS VACCINE, ADSORBED 0.5 ML: 5; 2.5; 8; 8; 2.5 SUSPENSION INTRAMUSCULAR at 16:09

## 2023-09-28 ASSESSMENT — PAIN - FUNCTIONAL ASSESSMENT: PAIN_FUNCTIONAL_ASSESSMENT: 0-10

## 2023-09-28 ASSESSMENT — PAIN SCALES - GENERAL: PAINLEVEL_OUTOF10: 5

## 2023-09-28 ASSESSMENT — PAIN DESCRIPTION - ORIENTATION: ORIENTATION: LEFT

## 2023-09-28 ASSESSMENT — PAIN DESCRIPTION - LOCATION: LOCATION: RIB CAGE

## 2023-09-28 NOTE — ED NOTES
Pt in through ED lobby. She states last night she was bringing something out to the garage and fell on her left side. Today she is having left sided rib pain.       Caryn Peters RN  09/28/23 [id number]

## 2023-09-29 ENCOUNTER — SCHEDULED TELEPHONE ENCOUNTER (OUTPATIENT)
Dept: UROLOGY | Age: 73
End: 2023-09-29

## 2023-09-29 ENCOUNTER — TELEPHONE (OUTPATIENT)
Dept: FAMILY MEDICINE CLINIC | Age: 73
End: 2023-09-29

## 2023-09-29 DIAGNOSIS — N13.30 HYDRONEPHROSIS, UNSPECIFIED HYDRONEPHROSIS TYPE: Primary | ICD-10-CM

## 2023-10-02 ENCOUNTER — TELEPHONE (OUTPATIENT)
Dept: UROLOGY | Age: 73
End: 2023-10-02

## 2023-10-02 NOTE — TELEPHONE ENCOUNTER
Pt had a 9/29 appt with Luisa Rudolph it was a telephone visit- She didn't answer her phone- LVM for pt to call back and Make a office or Telephone visit-

## 2023-10-03 ENCOUNTER — TELEPHONE (OUTPATIENT)
Dept: FAMILY MEDICINE CLINIC | Age: 73
End: 2023-10-03

## 2023-10-03 ENCOUNTER — OFFICE VISIT (OUTPATIENT)
Dept: UROLOGY | Age: 73
End: 2023-10-03
Payer: MEDICARE

## 2023-10-03 VITALS
BODY MASS INDEX: 26.05 KG/M2 | DIASTOLIC BLOOD PRESSURE: 86 MMHG | WEIGHT: 147 LBS | SYSTOLIC BLOOD PRESSURE: 138 MMHG | HEIGHT: 63 IN

## 2023-10-03 DIAGNOSIS — N13.30 HYDRONEPHROSIS, UNSPECIFIED HYDRONEPHROSIS TYPE: Primary | ICD-10-CM

## 2023-10-03 DIAGNOSIS — K59.00 CONSTIPATION, UNSPECIFIED CONSTIPATION TYPE: Primary | ICD-10-CM

## 2023-10-03 PROCEDURE — 1123F ACP DISCUSS/DSCN MKR DOCD: CPT | Performed by: NURSE PRACTITIONER

## 2023-10-03 PROCEDURE — 1036F TOBACCO NON-USER: CPT | Performed by: NURSE PRACTITIONER

## 2023-10-03 PROCEDURE — 3017F COLORECTAL CA SCREEN DOC REV: CPT | Performed by: NURSE PRACTITIONER

## 2023-10-03 PROCEDURE — G8399 PT W/DXA RESULTS DOCUMENT: HCPCS | Performed by: NURSE PRACTITIONER

## 2023-10-03 PROCEDURE — G8484 FLU IMMUNIZE NO ADMIN: HCPCS | Performed by: NURSE PRACTITIONER

## 2023-10-03 PROCEDURE — G8427 DOCREV CUR MEDS BY ELIG CLIN: HCPCS | Performed by: NURSE PRACTITIONER

## 2023-10-03 PROCEDURE — 99214 OFFICE O/P EST MOD 30 MIN: CPT | Performed by: NURSE PRACTITIONER

## 2023-10-03 PROCEDURE — G8417 CALC BMI ABV UP PARAM F/U: HCPCS | Performed by: NURSE PRACTITIONER

## 2023-10-03 PROCEDURE — 81003 URINALYSIS AUTO W/O SCOPE: CPT | Performed by: NURSE PRACTITIONER

## 2023-10-03 PROCEDURE — 1090F PRES/ABSN URINE INCON ASSESS: CPT | Performed by: NURSE PRACTITIONER

## 2023-10-03 ASSESSMENT — ENCOUNTER SYMPTOMS
NAUSEA: 0
VOMITING: 0
BACK PAIN: 0
ABDOMINAL PAIN: 0

## 2023-10-03 NOTE — TELEPHONE ENCOUNTER
----- Message from Jenelle Espinosa sent at 10/3/2023  2:57 PM EDT -----  Subject: Message to Provider    QUESTIONS  Information for Provider? Pt's daughter called to see if there's any way   to have pt's PCP contact [facility] to see if they can get her   in any sooner than 3/12/24. Pt's daughter is concerned that she is getting   worse.   ---------------------------------------------------------------------------  --------------  Karrie Johnson INFO  [phone]; OK to leave message on voicemail  ---------------------------------------------------------------------------  --------------  SCRIPT ANSWERS  Relationship to Patient? Other/Third Party  Representative Name? July Cleaning/daughter  Is the representative on the Communication Release of Information (TETO)   form in Epic?  Yes

## 2023-10-03 NOTE — PROGRESS NOTES
Medication list was not fully complete and patient wasn't fully sure when verbally verified, patient did state fall lately. Patient couldn't give urine, said she was going to try before appointment was over, was going to do pvr after drained urine.

## 2023-10-03 NOTE — PROGRESS NOTES
[address] [facility].  [address]  [facility] [address]  Dept: [phone]  Loc: [phone]    Visit Date: 10/3/2023        HPI:     Mirta Funez is a 68 y.o. female who presents today for:  Chief Complaint   Patient presents with    Other     Hydronephrosis    Urinary Frequency    Results     CT done prior       HPI    Pt seen in follow up for urinary frequency and urgency. Daughter accompanies pt to appt today. Bret Corey has a hx of OAB with mixed incontinence. She previously failed to have improvement on oxybutynin. Myrbetriq worked but she was unable to afford cost of medication. Vesicare improved symptoms but still had episodes of complete incontinence. At appt 8/2022 Zahra Villeda was stopped and she was started on trospium 20 mg BID which she continues at this time. Completed pelvic floor therapy previously. Advanced therapies for OAB/incontinence previously discussed with myself and Dr. Annmarie Sherwood. Pt noted several UTIs recently. Cystoscopy by Dr. Annmarie Sherwood 8/29/23 with moderate bladder trabeculation and severe residual urine. Interstim was discussed for pt's urinary retention and possible overflow incontinence. Renal US 8/22/23 with mild R sided hydronephrosis and incompletely distended urinary bladder. CT abd/pelvis completed prior to appt today. Pt admits to chronic constipation. Had recent fall and rib pain. Rib xray negative for fracture. Current Outpatient Medications   Medication Sig Dispense Refill    lidocaine (LIDODERM) 5 % Place 1 patch onto the skin daily for 10 days 12 hours on, 12 hours off. 10 patch 0    traMADol (ULTRAM) 50 MG tablet Take 1 tablet by mouth every 8 hours as needed for Pain for up to 5 days.  Max Daily Amount: 150 mg 10 tablet 0    hydrOXYzine HCl (ATARAX) 25 MG tablet TAKE 1/2 TO 1 TABLET BY MOUTH EVERY 8 HOURS AS NEEDED FOR ANXIETY 90 tablet 0    metoprolol succinate (TOPROL XL) 25 MG

## 2023-10-03 NOTE — TELEPHONE ENCOUNTER
----- Message from Callie Tran sent at 10/3/2023  2:51 PM EDT -----  Subject: Referral Request    Reason for referral request? They are looking for a referral for home   health care. Provider patient wants to be referred to(if known):     Provider Phone Number(if known):     Additional Information for Provider?   ---------------------------------------------------------------------------  --------------  [address]    [phone]; OK to leave message on voicemail  ---------------------------------------------------------------------------  --------------

## 2023-10-04 NOTE — TELEPHONE ENCOUNTER
Attempted 3 times to call Cornelio Solid office back. When pressing option 2 for medical assistant/nurse call gets disconnected each time. Patient has been added to our wait list. Office will reach out to patient if sooner appointment becomes available.  Thanks

## 2023-10-06 ENCOUNTER — CARE COORDINATION (OUTPATIENT)
Dept: CARE COORDINATION | Age: 73
End: 2023-10-06

## 2023-10-06 ENCOUNTER — OFFICE VISIT (OUTPATIENT)
Dept: FAMILY MEDICINE CLINIC | Age: 73
End: 2023-10-06

## 2023-10-06 VITALS
WEIGHT: 149.2 LBS | HEIGHT: 63 IN | RESPIRATION RATE: 18 BRPM | SYSTOLIC BLOOD PRESSURE: 122 MMHG | BODY MASS INDEX: 26.44 KG/M2 | DIASTOLIC BLOOD PRESSURE: 70 MMHG | HEART RATE: 90 BPM

## 2023-10-06 DIAGNOSIS — K59.00 CONSTIPATION, UNSPECIFIED CONSTIPATION TYPE: ICD-10-CM

## 2023-10-06 DIAGNOSIS — G91.2 NORMAL PRESSURE HYDROCEPHALUS (HCC): ICD-10-CM

## 2023-10-06 DIAGNOSIS — J43.2 CENTRILOBULAR EMPHYSEMA (HCC): ICD-10-CM

## 2023-10-06 DIAGNOSIS — E78.2 MIXED HYPERLIPIDEMIA: ICD-10-CM

## 2023-10-06 DIAGNOSIS — E03.9 HYPOTHYROIDISM, UNSPECIFIED TYPE: ICD-10-CM

## 2023-10-06 DIAGNOSIS — F32.5 MAJOR DEPRESSIVE DISORDER, SINGLE EPISODE, IN FULL REMISSION (HCC): Primary | ICD-10-CM

## 2023-10-06 DIAGNOSIS — R41.89 COGNITIVE IMPAIRMENT: ICD-10-CM

## 2023-10-06 DIAGNOSIS — J96.11 CHRONIC RESPIRATORY FAILURE WITH HYPOXIA (HCC): ICD-10-CM

## 2023-10-06 DIAGNOSIS — R73.01 IFG (IMPAIRED FASTING GLUCOSE): ICD-10-CM

## 2023-10-06 DIAGNOSIS — M81.6 LOCALIZED OSTEOPOROSIS WITHOUT CURRENT PATHOLOGICAL FRACTURE: ICD-10-CM

## 2023-10-06 DIAGNOSIS — F32.9 REACTIVE DEPRESSION: ICD-10-CM

## 2023-10-06 DIAGNOSIS — M81.0 OSTEOPOROSIS, UNSPECIFIED OSTEOPOROSIS TYPE, UNSPECIFIED PATHOLOGICAL FRACTURE PRESENCE: ICD-10-CM

## 2023-10-06 DIAGNOSIS — F17.210 SMOKING GREATER THAN 40 PACK YEARS: ICD-10-CM

## 2023-10-06 RX ORDER — CETIRIZINE HYDROCHLORIDE 10 MG/1
10 TABLET ORAL DAILY
Qty: 90 TABLET | Refills: 3 | Status: SHIPPED | OUTPATIENT
Start: 2023-10-06

## 2023-10-06 RX ORDER — PHENOL 1.4 %
1 AEROSOL, SPRAY (ML) MUCOUS MEMBRANE DAILY
COMMUNITY

## 2023-10-06 RX ORDER — ALENDRONATE SODIUM 70 MG/1
70 TABLET ORAL
Qty: 12 TABLET | Refills: 3 | Status: SHIPPED | OUTPATIENT
Start: 2023-10-06

## 2023-10-06 RX ORDER — OXYBUTYNIN CHLORIDE 15 MG/1
15 TABLET, EXTENDED RELEASE ORAL DAILY
COMMUNITY

## 2023-10-06 RX ORDER — ONDANSETRON 4 MG/1
4 TABLET, ORALLY DISINTEGRATING ORAL EVERY 8 HOURS PRN
COMMUNITY

## 2023-10-06 ASSESSMENT — ENCOUNTER SYMPTOMS
FACIAL SWELLING: 0
COUGH: 1
CONSTIPATION: 1
VOMITING: 0
SHORTNESS OF BREATH: 0
ABDOMINAL PAIN: 0
WHEEZING: 1
NAUSEA: 0
BACK PAIN: 0

## 2023-10-06 NOTE — ED PROVIDER NOTES
[facility] EMERGENCY DEPT      Pt Name: Deena Hilliard  MRN: [id number]  [facility] 1950  Date of evaluation: 9/28/2023  Provider: Antonio Arana PA-C    CHIEF COMPLAINT       Chief Complaint   Patient presents with    Fall       Nurses Notes reviewed and I agree except as noted in the HPI. HISTORY OF PRESENT ILLNESS    Deena Hilliard is a 68 y.o. female who presents from home with her  for left rib pain. Last night patient was taking something out to the garage when she tripped landing on her left knee then ribs. The knee suffered a small abrasion but is not painful. Her left rib pain is worsened with deep breaths. The patient denies head injury, loss of consciousness, shortness of breath, neck pain, back pain, numbness, dizziness, blurred vision, or other complaints. The patient does not use blood thinners. Last tetanus is unknown. REVIEW OF SYSTEMS     Review of Systems   Constitutional:  Negative for activity change, appetite change, chills, diaphoresis and fever. HENT:  Negative for facial swelling. Eyes:  Negative for visual disturbance. Respiratory:  Negative for shortness of breath. Cardiovascular:  Negative for chest pain. Gastrointestinal:  Negative for abdominal pain, nausea and vomiting. Genitourinary:  Negative for hematuria. Musculoskeletal:  Negative for back pain, gait problem and neck pain. Left rib pain   Skin:  Positive for wound. Negative for rash. Neurological:  Negative for dizziness, weakness, light-headedness, numbness and headaches. Psychiatric/Behavioral:  Negative for confusion. PAST MEDICAL HISTORY    has a past medical history of Aneurysm, cerebral, COPD (chronic obstructive pulmonary disease) ([address]), Hiatal hernia, Hyperlipidemia, Osteoarthritis, Pneumonia, Stroke ([address]), Thyroid disease, and Unspecified cerebral artery occlusion with cerebral infarction.     SURGICAL HISTORY      has a past surgical history that includes

## 2023-10-06 NOTE — PROGRESS NOTES
tablet    Ambulatory Referral to Care Management      12. Constipation, unspecified constipation type        13.  IFG (impaired fasting glucose)  Hemoglobin A1C                Plan:       ED report reviewed  DB and COUGH with splinting left chest  Medications reviewed  Set up with   Chronic conditions stable  Labs reviewed  Refills as above  Follow up with Specialists  Immunizations discussed  Labs as above in 3 months  RTO in 3 months                      SARITHA Tello - CNP

## 2023-10-06 NOTE — CARE COORDINATION
Attempted to reach Camille Juarez for enrollment in care coordination. BPA referral received today. No answer. Message left to return call.

## 2023-10-10 ENCOUNTER — TELEPHONE (OUTPATIENT)
Dept: FAMILY MEDICINE CLINIC | Age: 73
End: 2023-10-10

## 2023-10-10 DIAGNOSIS — F32.5 MAJOR DEPRESSIVE DISORDER, SINGLE EPISODE, IN FULL REMISSION (HCC): ICD-10-CM

## 2023-10-10 NOTE — TELEPHONE ENCOUNTER
Loyda Demarco daughter not on HIPPA calling due to [facility] needs additional information for referral to be valid. She three-way called HH with me on the phone. HH needs the visit note and referral to match. Needs emphysema added and discussed why HH is needed in the note. Cher's number is [phone]. Please call her when chart notes are updated.   Please advise

## 2023-10-11 ENCOUNTER — CARE COORDINATION (OUTPATIENT)
Dept: CARE COORDINATION | Age: 73
End: 2023-10-11

## 2023-10-11 ENCOUNTER — TELEPHONE (OUTPATIENT)
Dept: FAMILY MEDICINE CLINIC | Age: 73
End: 2023-10-11

## 2023-10-11 DIAGNOSIS — J43.2 CENTRILOBULAR EMPHYSEMA (HCC): Primary | ICD-10-CM

## 2023-10-11 DIAGNOSIS — J43.9 PULMONARY EMPHYSEMA, UNSPECIFIED EMPHYSEMA TYPE (HCC): ICD-10-CM

## 2023-10-11 DIAGNOSIS — J43.2 CENTRILOBULAR EMPHYSEMA (HCC): ICD-10-CM

## 2023-10-11 RX ORDER — ALBUTEROL SULFATE 2.5 MG/3ML
2.5 SOLUTION RESPIRATORY (INHALATION) EVERY 6 HOURS PRN
Qty: 120 EACH | Refills: 3 | Status: SHIPPED | OUTPATIENT
Start: 2023-10-11

## 2023-10-11 RX ORDER — ALBUTEROL SULFATE 90 UG/1
1-2 AEROSOL, METERED RESPIRATORY (INHALATION) EVERY 6 HOURS PRN
Qty: 18 G | Refills: 1 | Status: SHIPPED | OUTPATIENT
Start: 2023-10-11

## 2023-10-11 RX ORDER — HYDROXYZINE HYDROCHLORIDE 25 MG/1
TABLET, FILM COATED ORAL
Qty: 90 TABLET | Refills: 2 | Status: SHIPPED | OUTPATIENT
Start: 2023-10-11

## 2023-10-11 RX ORDER — FLUTICASONE PROPIONATE 250 UG/1
1 POWDER, METERED RESPIRATORY (INHALATION) 2 TIMES DAILY
Qty: 60 EACH | Refills: 11 | Status: SHIPPED | OUTPATIENT
Start: 2023-10-11

## 2023-10-11 NOTE — TELEPHONE ENCOUNTER
Albin Dunn daughter notified and she will follow-up with [facility] later today regarding.

## 2023-10-11 NOTE — TELEPHONE ENCOUNTER
----- Message from Adrianna Noel sent at 10/10/2023  4:05 PM EDT -----  Subject: Refill Request    QUESTIONS  Name of Medication? hydrOXYzine HCl (ATARAX) 25 MG tablet  Patient-reported dosage and instructions? 1 in the morning 1 at night  How many days do you have left? 0  Preferred Pharmacy? [address] [facility] phone number (if available)? [phone]  ---------------------------------------------------------------------------  --------------,  Name of Medication? Other - fluticasone propionate (FLOVENT DISKUS) 250   MCG/BLIST AEPB inhaler   Patient-reported dosage and instructions? Take 1 puff 2 times per day. Rinse mouth after using. How many days do you have left? 0  Preferred Pharmacy? [address] [facility] phone number (if available)? [phone]  ---------------------------------------------------------------------------  --------------  CALL BACK INFO  What is the best way for the office to contact you? OK to leave message on   voicemail  Preferred Call Back Phone Number? [phone]  ---------------------------------------------------------------------------  --------------  SCRIPT ANSWERS  Relationship to Patient? Other/Third Party  Representative Name? Ramon De Leon  Is the representative on the Communication Release of Information (TETO)   form in Epic?  Yes

## 2023-10-11 NOTE — TELEPHONE ENCOUNTER
----- Message from Tamra Montoya sent at 10/11/2023  1:24 PM EDT -----  Subject: Refill Request    QUESTIONS  Name of Medication? Other - Albuterol Sulfate Inhaler   Patient-reported dosage and instructions? Inhale 1 puff by mouth once   daily  How many days do you have left? 0  Preferred Pharmacy? [facility] phone number (if available)? [phone]  ---------------------------------------------------------------------------  --------------  CALL BACK INFO  What is the best way for the office to contact you? OK to leave message on   voicemail  Preferred Call Back Phone Number? [phone]  ---------------------------------------------------------------------------  --------------  SCRIPT ANSWERS  Relationship to Patient? Other/Third Party  Representative Name? PURVI  Is the representative on the Communication Release of Information (TETO)   form in Epic?  Yes

## 2023-10-11 NOTE — TELEPHONE ENCOUNTER
The patients daughter called and is requesting a refill on her albuterol neb solution to be sent to [address] [place] [address]. Order pended for your signature. The patients daughter will check with the pharmacy this afternoon.

## 2023-10-12 SDOH — ECONOMIC STABILITY: INCOME INSECURITY: IN THE LAST 12 MONTHS, WAS THERE A TIME WHEN YOU WERE NOT ABLE TO PAY THE MORTGAGE OR RENT ON TIME?: NO

## 2023-10-12 SDOH — ECONOMIC STABILITY: HOUSING INSECURITY: IN THE LAST 12 MONTHS, HOW MANY PLACES HAVE YOU LIVED?: 1

## 2023-10-12 ASSESSMENT — ENCOUNTER SYMPTOMS: DYSPNEA ASSOCIATED WITH: MINIMAL EXERTION

## 2023-10-12 NOTE — CARE COORDINATION
floor therapy. Following with urology. Offered patient enrollment in the Remote Patient Monitoring (RPM) program for in-home monitoring:  discussed. Provided daughter with CPT codes as they would like to check with insurance re: coverage . Plan of care:  Weekly ACM calls   to mail out COPD zones  F/u with pulmonary today. Discuss inhalers. Will refer to Kalyan Cano if pt remains on inhalers or is switched to high cost inhaler. Lisa Matson shares that Anoro was costing her $150 each fill. SR HH   Daughter to contact ins. Co re: RPM service. Will f/u with upcoming calls  Wear oxygen at all times. Continue monitoring SPO2 readings  Take medication as prescribed. Discussed automated pill dispensers as pt forgets to take her meds. Encouraged Frequent rest breaks  Educated on same day appts  Will discuss ACP with upcoming calls  Daughter to look into automated pill dispenser  Daughter to contact COA to get pt signed up with transport. COPD Assessment              Symptoms:  None: Yes      Symptom course: improving  Breathlessness: minimal exertion  Increase use of rapid acting/rescue inhaled medications?: No  Change in chronic cough?: No/At Baseline  Change in sputum?: No/At Baseline  Self Monitoring - SaO2: Yes  Baseline SaO2 Readin (Comment: after deep breathing and oxygen at 4 liters)  Have you had a recent diagnosis of pneumonia either by PCP or at a hospital?: No         Ambulatory Care Coordination Assessment    Care Coordination Protocol  Referral from Primary Care Provider: No  Week 1 - Initial Assessment                             Suggested Interventions and Community Resources   Transportation Services: In Process (Comment: daughter to f/u wt COA)   Zone Management Tools:  In Process (Comment: COPD)         Other Interventions: Set up/Review an Education Plan, Set up/Review Goals              Future Appointments   Date Time Provider [facility] [address]   10/25/2023  2:00 PM Carlos De La Vega

## 2023-10-17 ENCOUNTER — TELEPHONE (OUTPATIENT)
Dept: FAMILY MEDICINE CLINIC | Age: 73
End: 2023-10-17

## 2023-10-17 ENCOUNTER — CARE COORDINATION (OUTPATIENT)
Dept: CARE COORDINATION | Age: 73
End: 2023-10-17

## 2023-10-17 DIAGNOSIS — Z78.0 POST-MENOPAUSAL: Primary | ICD-10-CM

## 2023-10-17 RX ORDER — ROFLUMILAST 250 UG/1
250 TABLET ORAL DAILY
COMMUNITY

## 2023-10-17 RX ORDER — BUDESONIDE, GLYCOPYRROLATE, AND FORMOTEROL FUMARATE 160; 9; 4.8 UG/1; UG/1; UG/1
AEROSOL, METERED RESPIRATORY (INHALATION)
COMMUNITY

## 2023-10-17 ASSESSMENT — ENCOUNTER SYMPTOMS: DYSPNEA ASSOCIATED WITH: EXERTION

## 2023-10-17 NOTE — CARE COORDINATION
Ambulatory Care Coordination Note  10/17/2023    Patient Current Location: [place]     ACM contacted the  daughter Vani Jay  by telephone. Verified name and  with  Vani Jay  as identifiers. Provided introduction to self, and explanation of the ACM role. Challenges to be reviewed by the provider   Additional needs identified to be addressed with provider: Yes  Daughter Vani Jay requesting referral to [facility] for evaluation of left ear cartilage. Has had injury to this area previously and had cartilage removed. D/t this oxygen tubing does not stay on. Stickers do not help to keep her oxygen on. Daughter reports that PULM suggested getting ENR referral to see if anything can be done. Please advise               Method of communication with provider: chart routing. ACM: Damián Degroot RN    Carolyn Rizo was referred to care coordination by PCP for education and assistance in managing her chronic conditions. Pt has h/o: COPD, HLD, depression, CVA. Attempted to reach Carolyn Rizo. No answer. Contacted daughter Vani Jay  COPD: recent pulm appt. Anoro and Flovent was stopped. Pt was started on Breztri. Pulmonary had pt complete financial application. Vani Jay will f/u with their office to see if this is for assistance through  or if through another resource. Pt will f/u with pulm again on 11/15  Now wearing home oxygen 3 liters at rest and 4 liters with activity. Wearing oxygen more consistently. SPO2 readings mid 90's. Pt taking medication consistently. Now has MDI with spacer to use as well. Plan is for pt to be re-evaluated for pulsed oxygen as pulmonary felt pt may qualify since her breathing has improved since first being checked. Daughter reports since she has been wearing her oxygen more consistently her clarity has improved and so has her handwriting. SR HH nsg. Recent orders placed for PT/OT. Vani Jay requesting specialized therapy be placed on hold at this time until she completes [facility] therapy.

## 2023-10-17 NOTE — TELEPHONE ENCOUNTER
Received a call from Duane Stapleton with [facility]. She stated that patient family was wanting a referral to ENT. She stated that patient is currently on oxygen and she is having trouble keeping the tubing up as she has part of the top of the ear removed. Also mentioned that they would like to discuss patient pulmonary stuff. We can call kari back at [phone]. She stated that her voicemail is a secured voicemail so we can leave a detailed message.

## 2023-10-17 NOTE — TELEPHONE ENCOUNTER
Received a call from [address] with [facility] requesting a verbal order for OT and PT to eval and treat the patient. Verbal order given to Alyce Mujica, she will fax hard copy of the order for your signature.

## 2023-10-18 ENCOUNTER — TELEPHONE (OUTPATIENT)
Dept: FAMILY MEDICINE CLINIC | Age: 73
End: 2023-10-18

## 2023-10-18 NOTE — TELEPHONE ENCOUNTER
Care Coordinator sent message regarding as well - message was given to Care Coordinator to respond to patient.

## 2023-10-18 NOTE — TELEPHONE ENCOUNTER
The patient called in and stated that she has had pneumonia in the past and is asking if she is due for another pneumonia vaccine. She is also asking if she should get the RSV vaccine too. Please advise.

## 2023-10-18 NOTE — TELEPHONE ENCOUNTER
Recommend looking into a specialized headband that can hold the tubing - [facility] has options for this.   Do not feel ENT will provide much benefit other than similar recommendation

## 2023-10-18 NOTE — CARE COORDINATION
Daughter/HIPPA contact, Vani Jay, updated of PCP recommendations re: O2 tubing concerns, and she verbalized understanding. Vani Jay shared she was also waiting for f/u re: [facility] referral for PT/OT thru [facility] (states HH was started and they were going to inquire about PT/OT), no orders/notes found and ACM will f/u with [facility]. Vani Jay also shared patient is in need of a Dexa scan - will f/u with PCP. Vanieric Jay denied any other questions, concerns, or needs at this time. ACM spoke with Cele Pedroza at [facility] and she shared they have received PT/OT referrals and PT initial eval is scheduled to be completed 10/20/2023 and OT initial eval on 10/23/2023. Daughter called and updated of dexa scan orders and therapy information. Vani Jay denied any further questions, concerns, or needs.

## 2023-10-19 NOTE — TELEPHONE ENCOUNTER
She is UTD on pneumonia. Hold on RSV vaccine at this time - wanting to see more data to show effectiveness.

## 2023-10-26 ENCOUNTER — CARE COORDINATION (OUTPATIENT)
Dept: CARE COORDINATION | Age: 73
End: 2023-10-26

## 2023-10-26 ASSESSMENT — ENCOUNTER SYMPTOMS: DYSPNEA ASSOCIATED WITH: EXERTION

## 2023-10-26 NOTE — CARE COORDINATION
space with inhaled medications?: Yes            Symptoms:  None: Yes      Symptom course: stable  Breathlessness: exertion  Increase use of rapid acting/rescue inhaled medications?: No  Change in chronic cough?: No/At Baseline  Change in sputum?: No/At Baseline  Self Monitoring - SaO2: Yes  Baseline SaO2 Readin (Comment: 3 liters at rest and 4 liters with activity)  Have you had a recent diagnosis of pneumonia either by PCP or at a hospital?: No         Lab Results       None            Care Coordination Interventions    Referral from Primary Care Provider: Yes  Suggested Interventions and LEXII Smith Support: In Process (Comment: daughter to f/u wt COA)  Zone Management Tools:  In Process (Comment: COPD)          Goals Addressed    None         Future Appointments   Date Time Provider [address]   10/31/2023  1:00 PM MD ALICE PotterGASL [facility]   2023  2:00 PM Molly Ramirez RCP [facility]   2023  1:00 PM Eliazar Maynard, PT [address]   2023  2:15 PM Nataliia Baird, APRN - [facility]   2024  2:30 PM Nataliia Baird, [facility]   1/10/2024  2:00 PM SARITHA Whyte - CNP [address] [facility]   3/12/2024  3:00 PM oMrelia Sena APRN - [address],[address] Neurology -

## 2023-10-31 ENCOUNTER — FOLLOWUP TELEPHONE ENCOUNTER (OUTPATIENT)
Dept: CARDIAC REHAB | Age: 73
End: 2023-10-31

## 2023-10-31 NOTE — TELEPHONE ENCOUNTER
PULMONARY REHABILITATION REFERRAL  COPD Exacerbation    Pulmonary Rehab Evaluation order received. Called pt at this time, there was no answer, so left a message asking pt to call us back.

## 2023-11-07 ENCOUNTER — CARE COORDINATION (OUTPATIENT)
Dept: CARE COORDINATION | Age: 73
End: 2023-11-07

## 2023-11-13 ENCOUNTER — TELEPHONE (OUTPATIENT)
Dept: CARDIAC REHAB | Age: 73
End: 2023-11-13

## 2023-11-13 NOTE — TELEPHONE ENCOUNTER
Yumiko Person from OT, home health called for Aaron Ramirez to see about setting up [facility] Aaron Ramirez will be finished with Gem this week.   Yanelis (RT) will call Magan's daughter tomorrow to get scheduled for a Pulmonary Rehab eval.  Electronically signed by VILMA Gray on 11/13/2023 at 1:26 PM

## 2023-11-14 NOTE — PLAN OF CARE
PHYSICIAN PULMONARY REHABILITATION ORDER  Medical Director:  Dr. Ronak Santiago MD    (X)  Phase II Pulmonary [address] [place] [address], supervised exercise with SpO2 / HR monitoring and Oxygen Titration (if necessary) each session, twice weekly, with individualized education sessions. Patient Name: Reyes Vines  : 1950  Referring Physician: Anabela Chaudhary CNP  Date: 2023    Medically Necessary Pulmonary Rehab for:  59,59 COPD (from my dictation or the PFT report), which is GOLD Stage 2. Physician Prescribed Exercise:  Length of program:  18 weeks, up to 36 sessions, subject to insurance limitations. Program to include aerobic endurance conditioning, resistance training (RT), and flexibility training, and education (all relevant topics including psychosocial assessment). FITT Principles + Progression for Exercise Prescription (also found on the ITP):     Frequency: 2x / wk for 36 sessions    Intensity:   Initial MET level calculated from Initial 6MWT (Feet achieved converted to METs)   Type:        Aerobic and Strength (Treadmill, AD, NuStep, UBE, RT)   Time:      Each session:  31-91 min. of Treatment; Aerobic, RT, Rest breaks and Education  Progression:  1-2 minute increase in Time, per Type, per session, 5-20% increase in Intensity per week if SpO2 >87% AND [place] RPE/RPD is <5      Note:  These are guidelines, the Pulmonary Rehab staff may adjust the treatment to suit the patient's individual needs, goals, oxygen saturation and functional level. Plan of Care:  Pulmonary Rehab aerobic endurance and strength training sessions for a total of 31-91 min/day, including Education time, 2 days/week with suggested supplemented matching minutes of walking at home on most days not participating in Pulmonary Rehab. Patient is willing to cooperate and participate in the plan of care.  Patient is physically able, motivated, and willing to participate in SC, and I expect this

## 2023-11-15 ENCOUNTER — CARE COORDINATION (OUTPATIENT)
Dept: CARE COORDINATION | Age: 73
End: 2023-11-15

## 2023-11-24 ENCOUNTER — CARE COORDINATION (OUTPATIENT)
Dept: CARE COORDINATION | Age: 73
End: 2023-11-24

## 2023-11-24 NOTE — CARE COORDINATION
Attempted to reach patient for continued Care Coordination follow up and education. Patient was unavailable at the time of my call, and a generic voicemail message was left asking patient to return my call at [phone].

## 2023-11-29 ENCOUNTER — TELEPHONE (OUTPATIENT)
Dept: FAMILY MEDICINE CLINIC | Age: 73
End: 2023-11-29

## 2023-11-29 DIAGNOSIS — I49.5 BRADY-TACHY SYNDROME (HCC): Primary | ICD-10-CM

## 2023-11-29 NOTE — TELEPHONE ENCOUNTER
----- Message from Gretchen Green sent at 11/29/2023 12:21 PM EST -----  Subject: Appointment Request    Reason for Call:     QUESTIONS    Reason for appointment request? Other - referral     Additional Information for Provider? patient would like a referral to a   cardiologist, please contact patient, she would like an afternoon  ---------------------------------------------------------------------------  --------------  Rosaline [place]  [phone]; OK to leave message on voicemail  ---------------------------------------------------------------------------  --------------  SCRIPT ANSWERS

## 2023-11-30 NOTE — TELEPHONE ENCOUNTER
Pt returned call stating her heartbeat increases sometimes and she has some chest pain with deep breaths. Her daughter in the background says her heart rate fluctuates and they just want to get her checked out. If no call back they are aware Cardiology will contact her to schedule. Pt has no preference on doctor. Please advise.

## 2023-12-04 ENCOUNTER — CARE COORDINATION (OUTPATIENT)
Dept: CARE COORDINATION | Age: 73
End: 2023-12-04

## 2023-12-04 ASSESSMENT — ENCOUNTER SYMPTOMS: DYSPNEA ASSOCIATED WITH: EXERTION

## 2023-12-05 ENCOUNTER — HOSPITAL ENCOUNTER (EMERGENCY)
Age: 73
Discharge: HOME | End: 2023-12-05
Attending: EMERGENCY MEDICINE
Payer: MEDICARE

## 2023-12-05 ENCOUNTER — APPOINTMENT (OUTPATIENT)
Dept: GENERAL RADIOLOGY | Age: 73
End: 2023-12-05
Payer: MEDICARE

## 2023-12-05 ENCOUNTER — HOSPITAL ENCOUNTER (OUTPATIENT)
Dept: CARDIAC REHAB | Age: 73
Setting detail: THERAPIES SERIES
Discharge: HOME | End: 2023-12-05

## 2023-12-05 VITALS
TEMPERATURE: 98.2 F | OXYGEN SATURATION: 97 % | SYSTOLIC BLOOD PRESSURE: 126 MMHG | WEIGHT: 140 LBS | BODY MASS INDEX: 24.8 KG/M2 | HEART RATE: 86 BPM | RESPIRATION RATE: 24 BRPM | DIASTOLIC BLOOD PRESSURE: 66 MMHG

## 2023-12-05 DIAGNOSIS — R53.1 GENERAL WEAKNESS: Primary | ICD-10-CM

## 2023-12-05 DIAGNOSIS — R19.7 DIARRHEA, UNSPECIFIED TYPE: ICD-10-CM

## 2023-12-05 LAB
ALBUMIN SERPL BCG-MCNC: 4.4 G/DL (ref 3.5–5.1)
ALP SERPL-CCNC: 53 U/L (ref 38–126)
ALT SERPL W/O P-5'-P-CCNC: 15 U/L (ref 11–66)
ANION GAP SERPL CALC-SCNC: 9 MEQ/L (ref 8–16)
AST SERPL-CCNC: 25 U/L (ref 5–40)
BACTERIA URNS QL MICRO: ABNORMAL /HPF
BASOPHILS ABSOLUTE: 0.1 THOU/MM3 (ref 0–0.1)
BASOPHILS NFR BLD AUTO: 1.2 %
BILIRUB SERPL-MCNC: 0.3 MG/DL (ref 0.3–1.2)
BILIRUB UR QL STRIP.AUTO: NEGATIVE
BUN SERPL-MCNC: 17 MG/DL (ref 7–22)
CALCIUM SERPL-MCNC: 9.3 MG/DL (ref 8.5–10.5)
CASTS #/AREA URNS LPF: ABNORMAL /LPF
CASTS 2: ABNORMAL /LPF
CHARACTER UR: ABNORMAL
CHLORIDE SERPL-SCNC: 106 MEQ/L (ref 98–111)
CO2 SERPL-SCNC: 26 MEQ/L (ref 23–33)
COLOR: ABNORMAL
CREAT SERPL-MCNC: 0.6 MG/DL (ref 0.4–1.2)
CRYSTALS URNS MICRO: ABNORMAL
DEPRECATED RDW RBC AUTO: 40.1 FL (ref 35–45)
EOSINOPHIL NFR BLD AUTO: 2.6 %
EOSINOPHILS ABSOLUTE: 0.2 THOU/MM3 (ref 0–0.4)
EPITHELIAL CELLS, UA: ABNORMAL /HPF
ERYTHROCYTE [DISTWIDTH] IN BLOOD BY AUTOMATED COUNT: 12.2 % (ref 11.5–14.5)
FLUAV RNA RESP QL NAA+PROBE: NOT DETECTED
FLUBV RNA RESP QL NAA+PROBE: NOT DETECTED
GFR SERPL CREATININE-BSD FRML MDRD: > 60 ML/MIN/1.73M2
GLUCOSE SERPL-MCNC: 96 MG/DL (ref 70–108)
GLUCOSE UR QL STRIP.AUTO: NEGATIVE MG/DL
HCT VFR BLD AUTO: 41.4 % (ref 37–47)
HGB BLD-MCNC: 13.3 GM/DL (ref 12–16)
HGB UR QL STRIP.AUTO: NEGATIVE
IMM GRANULOCYTES # BLD AUTO: 0.02 THOU/MM3 (ref 0–0.07)
IMM GRANULOCYTES NFR BLD AUTO: 0.3 %
KETONES UR QL STRIP.AUTO: NEGATIVE
LIPASE SERPL-CCNC: 101.9 U/L (ref 5.6–51.3)
LYMPHOCYTES ABSOLUTE: 1.9 THOU/MM3 (ref 1–4.8)
LYMPHOCYTES NFR BLD AUTO: 24.9 %
MAGNESIUM SERPL-MCNC: 2.1 MG/DL (ref 1.6–2.4)
MCH RBC QN AUTO: 28.9 PG (ref 26–33)
MCHC RBC AUTO-ENTMCNC: 32.1 GM/DL (ref 32.2–35.5)
MCV RBC AUTO: 89.8 FL (ref 81–99)
MISCELLANEOUS 2: ABNORMAL
MONOCYTES ABSOLUTE: 0.7 THOU/MM3 (ref 0.4–1.3)
MONOCYTES NFR BLD AUTO: 9.6 %
NEUTROPHILS NFR BLD AUTO: 61.4 %
NITRITE UR QL STRIP: NEGATIVE
NRBC BLD AUTO-RTO: 0 /100 WBC
OSMOLALITY SERPL CALC.SUM OF ELEC: 282.7 MOSMOL/KG (ref 275–300)
PH UR STRIP.AUTO: 7.5 [PH] (ref 5–9)
PLATELET # BLD AUTO: 251 THOU/MM3 (ref 130–400)
PMV BLD AUTO: 11.3 FL (ref 9.4–12.4)
POTASSIUM SERPL-SCNC: 4.6 MEQ/L (ref 3.5–5.2)
PROT SERPL-MCNC: 7 G/DL (ref 6.1–8)
PROT UR STRIP.AUTO-MCNC: NEGATIVE MG/DL
RBC # BLD AUTO: 4.61 MILL/MM3 (ref 4.2–5.4)
RBC URINE: ABNORMAL /HPF
RENAL EPI CELLS #/AREA URNS HPF: ABNORMAL /[HPF]
SARS-COV-2 RNA RESP QL NAA+PROBE: NOT DETECTED
SEGMENTED NEUTROPHILS ABSOLUTE COUNT: 4.7 THOU/MM3 (ref 1.8–7.7)
SODIUM SERPL-SCNC: 141 MEQ/L (ref 135–145)
SP GR UR REFRACT.AUTO: 1.02 (ref 1–1.03)
T4 FREE SERPL-MCNC: 1.14 NG/DL (ref 0.93–1.76)
TROPONIN, HIGH SENSITIVITY: < 6 NG/L (ref 0–12)
TSH SERPL DL<=0.005 MIU/L-ACNC: 1.3 UIU/ML (ref 0.4–4.2)
UROBILINOGEN, URINE: 0.2 EU/DL (ref 0–1)
WBC # BLD AUTO: 7.6 THOU/MM3 (ref 4.8–10.8)
WBC #/AREA URNS HPF: ABNORMAL /HPF
WBC #/AREA URNS HPF: NEGATIVE /[HPF]
YEAST LIKE FUNGI URNS QL MICRO: ABNORMAL

## 2023-12-05 PROCEDURE — 94640 AIRWAY INHALATION TREATMENT: CPT

## 2023-12-05 PROCEDURE — 87636 SARSCOV2 & INF A&B AMP PRB: CPT

## 2023-12-05 PROCEDURE — 93010 ELECTROCARDIOGRAM REPORT: CPT | Performed by: INTERNAL MEDICINE

## 2023-12-05 PROCEDURE — 83735 ASSAY OF MAGNESIUM: CPT

## 2023-12-05 PROCEDURE — 36415 COLL VENOUS BLD VENIPUNCTURE: CPT

## 2023-12-05 PROCEDURE — 84484 ASSAY OF TROPONIN QUANT: CPT

## 2023-12-05 PROCEDURE — 85025 COMPLETE CBC W/AUTO DIFF WBC: CPT

## 2023-12-05 PROCEDURE — 2580000003 HC RX 258

## 2023-12-05 PROCEDURE — 71046 X-RAY EXAM CHEST 2 VIEWS: CPT

## 2023-12-05 PROCEDURE — 2700000000 HC OXYGEN THERAPY PER DAY

## 2023-12-05 PROCEDURE — 6370000000 HC RX 637 (ALT 250 FOR IP): Performed by: EMERGENCY MEDICINE

## 2023-12-05 PROCEDURE — 83690 ASSAY OF LIPASE: CPT

## 2023-12-05 PROCEDURE — 81001 URINALYSIS AUTO W/SCOPE: CPT

## 2023-12-05 PROCEDURE — 80053 COMPREHEN METABOLIC PANEL: CPT

## 2023-12-05 PROCEDURE — 84443 ASSAY THYROID STIM HORMONE: CPT

## 2023-12-05 PROCEDURE — 93005 ELECTROCARDIOGRAM TRACING: CPT

## 2023-12-05 PROCEDURE — 84439 ASSAY OF FREE THYROXINE: CPT

## 2023-12-05 PROCEDURE — 99285 EMERGENCY DEPT VISIT HI MDM: CPT

## 2023-12-05 RX ORDER — 0.9 % SODIUM CHLORIDE 0.9 %
1000 INTRAVENOUS SOLUTION INTRAVENOUS ONCE
Status: COMPLETED | OUTPATIENT
Start: 2023-12-05 | End: 2023-12-05

## 2023-12-05 RX ORDER — IPRATROPIUM BROMIDE AND ALBUTEROL SULFATE 2.5; .5 MG/3ML; MG/3ML
2 SOLUTION RESPIRATORY (INHALATION) ONCE
Status: COMPLETED | OUTPATIENT
Start: 2023-12-05 | End: 2023-12-05

## 2023-12-05 RX ORDER — FLUTICASONE PROPIONATE 50 MCG
1 SPRAY, SUSPENSION (ML) NASAL DAILY
Qty: 16 G | Refills: 0 | Status: SHIPPED | OUTPATIENT
Start: 2023-12-05

## 2023-12-05 RX ADMIN — SODIUM CHLORIDE 1000 ML: 9 INJECTION, SOLUTION INTRAVENOUS at 16:33

## 2023-12-05 RX ADMIN — IPRATROPIUM BROMIDE AND ALBUTEROL SULFATE 2 DOSE: .5; 3 SOLUTION RESPIRATORY (INHALATION) at 16:00

## 2023-12-05 NOTE — ED NOTES
Patient resting in bed. Respirations easy and unlabored. No distress noted. Call light within reach.        Danna Frost RN  12/05/23 8839

## 2023-12-05 NOTE — DISCHARGE INSTR - COC
(Video Swallowing Test): {Done Not Done PMDE:399366215}    Treatments at the Time of Hospital Discharge:   Respiratory Treatments: ***  Oxygen Therapy:  {Therapy; copd oxygen:23147}  Ventilator:    {MH CC Vent NJAD:126274820}    Rehab Therapies: {THERAPEUTIC INTERVENTION:4255452231}  Weight Bearing Status/Restrictions: [address] CC Weight Bearin}  Other Medical Equipment (for information only, NOT a DME order):  {EQUIPMENT:871892030}  Other Treatments: ***    Patient's personal belongings (please select all that are sent with patient):  {P DME Belongings:827859826}    RN SIGNATURE:  {Esignature:853619326}    CASE MANAGEMENT/SOCIAL WORK SECTION    Inpatient Status Date: ***    Readmission Risk Assessment Score:  Readmission Risk              Risk of Unplanned Readmission:  0           Discharging to Facility/ Agency   Name:   Address:  Phone:  Fax:    Dialysis Facility (if applicable)   Name:  Address:  Dialysis Schedule:  Phone:  Fax:    / signature: {Esignature:985805812}    PHYSICIAN SECTION    Prognosis: {Prognosis:4287694034}    Condition at Discharge: [address] Patient Condition:250768775}    Rehab Potential (if transferring to Rehab): {Prognosis:9515279360}    Recommended Labs or Other Treatments After Discharge: ***    Physician Certification: I certify the above information and transfer of Shaista Walters  is necessary for the continuing treatment of the diagnosis listed and that she requires {Admit to Appropriate Level of Care:89642} for {GREATER/LESS:581588455} 30 days.      Update Admission H&P: {CHP DME Changes in QPBAP:681907911}    PHYSICIAN SIGNATURE:  {Esignature:745707131}

## 2023-12-05 NOTE — ED NOTES
Pt to ED via intake from home with c/o confusion, fatigue, and diarrhea. Pt reports they havent felt good in several days. Pt daughter who is at bedside reports pt was at home without their O2 on all night. Pt usually wears 4L. Upon arrival pt is A&Ox4. Pt HR tachycardic. EKG completed. IV inserted.      Ishan Manley RN  12/05/23 8482

## 2023-12-05 NOTE — DISCHARGE INSTRUCTIONS
Follow up with PCP as needed  Use Flonase and Zyrtec as prescribed  Continue to use home oxygen as prescribed    Avoid drinking alcohol or drinks that have caffeine it. Drink plenty of water or fluids like Gatorade. Avoid eating spicy foods. You should eat bland foods like bananas, rice, apple sauce and toast / crackers. Using Pepto-Bismol may be beneficial, but long-term use can turn your stools black. PLEASE RETURN TO THE EMERGENCY DEPARTMENT IMMEDIATELY for worsening symptoms, increase in the amount of diarrhea you are having, abdominal pain, blood in your stool, vomiting up blood, persistent nausea and/or vomiting, or if you develop any concerning symptoms such as: high fever not relieved by acetaminophen (Tylenol) and/or ibuprofen (Motrin / Advil), chills, shortness of breath, chest pain, feeling of your heart fluttering or racing, numbness, loss of consciousness, weakness or tingling in the arms or legs or change in color of the extremities, changes in mental status, persistent headache, blurry vision, loss of bladder / bowel control, unable to follow up with your physician, or other any other care or concern.

## 2023-12-06 ENCOUNTER — TELEPHONE (OUTPATIENT)
Dept: FAMILY MEDICINE CLINIC | Age: 73
End: 2023-12-06

## 2023-12-06 ENCOUNTER — CARE COORDINATION (OUTPATIENT)
Dept: CARE COORDINATION | Age: 73
End: 2023-12-06

## 2023-12-06 LAB
EKG ATRIAL RATE: 104 BPM
EKG P AXIS: 87 DEGREES
EKG P-R INTERVAL: 136 MS
EKG Q-T INTERVAL: 346 MS
EKG QRS DURATION: 72 MS
EKG QTC CALCULATION (BAZETT): 454 MS
EKG R AXIS: 31 DEGREES
EKG T AXIS: 80 DEGREES
EKG VENTRICULAR RATE: 104 BPM

## 2023-12-11 ENCOUNTER — TELEPHONE (OUTPATIENT)
Dept: FAMILY MEDICINE CLINIC | Age: 73
End: 2023-12-11

## 2023-12-11 DIAGNOSIS — R73.01 IFG (IMPAIRED FASTING GLUCOSE): ICD-10-CM

## 2023-12-11 DIAGNOSIS — E78.2 MIXED HYPERLIPIDEMIA: Primary | ICD-10-CM

## 2023-12-11 NOTE — TELEPHONE ENCOUNTER
----- Message from Fidel Calderon sent at 12/11/2023  3:00 PM EST -----  Subject: Message to Provider    QUESTIONS  Information for Provider? Patient has her medicare wellness DEc 27 and   needs blood work before fax over to [employer] on . Call her when   sent over. ---------------------------------------------------------------------------  --------------  Beronica Lugo [facility]  [phone]; OK to leave message on voicemail  ---------------------------------------------------------------------------  --------------  SCRIPT ANSWERS  Relationship to Patient?  Self

## 2023-12-15 RX ORDER — TROSPIUM CHLORIDE 20 MG/1
20 TABLET, FILM COATED ORAL 2 TIMES DAILY
Qty: 60 TABLET | Refills: 0 | Status: SHIPPED | OUTPATIENT
Start: 2023-12-15

## 2023-12-15 NOTE — TELEPHONE ENCOUNTER
Red Mensah called requesting a refill on the following medications:  Requested Prescriptions     Pending Prescriptions Disp Refills    trospium (SANCTURA) 20 MG tablet [Pharmacy Med Name: Trospium Chloride 20 MG Oral Tablet] 60 tablet 0     Sig: Take 1 tablet by mouth twice daily     Pharmacy verified:  .[email or web address]      Date of last visit: 10/03/2023  Date of next visit (if applicable): 9/76/3555

## 2023-12-18 ENCOUNTER — NURSE ONLY (OUTPATIENT)
Dept: LAB | Age: 73
End: 2023-12-18

## 2023-12-18 DIAGNOSIS — R19.7 DIARRHEA, UNSPECIFIED TYPE: ICD-10-CM

## 2023-12-18 DIAGNOSIS — R73.01 IFG (IMPAIRED FASTING GLUCOSE): ICD-10-CM

## 2023-12-18 DIAGNOSIS — E03.9 HYPOTHYROIDISM, UNSPECIFIED TYPE: ICD-10-CM

## 2023-12-18 DIAGNOSIS — E78.2 MIXED HYPERLIPIDEMIA: ICD-10-CM

## 2023-12-18 LAB
ALBUMIN SERPL BCG-MCNC: 4.3 G/DL (ref 3.5–5.1)
ALP SERPL-CCNC: 60 U/L (ref 38–126)
ALT SERPL W/O P-5'-P-CCNC: 17 U/L (ref 11–66)
ANION GAP SERPL CALC-SCNC: 13 MEQ/L (ref 8–16)
AST SERPL-CCNC: 28 U/L (ref 5–40)
BILIRUB SERPL-MCNC: 0.3 MG/DL (ref 0.3–1.2)
BUN SERPL-MCNC: 13 MG/DL (ref 7–22)
C DIFF GDH STL QL: NEGATIVE
CALCIUM SERPL-MCNC: 9.9 MG/DL (ref 8.5–10.5)
CHLORIDE SERPL-SCNC: 103 MEQ/L (ref 98–111)
CHOLEST SERPL-MCNC: 177 MG/DL (ref 100–199)
CO2 SERPL-SCNC: 27 MEQ/L (ref 23–33)
CREAT SERPL-MCNC: 0.9 MG/DL (ref 0.4–1.2)
DEPRECATED MEAN GLUCOSE BLD GHB EST-ACNC: 105 MG/DL (ref 70–126)
DEPRECATED RDW RBC AUTO: 41.1 FL (ref 35–45)
ERYTHROCYTE [DISTWIDTH] IN BLOOD BY AUTOMATED COUNT: 12.4 % (ref 11.5–14.5)
GFR SERPL CREATININE-BSD FRML MDRD: > 60 ML/MIN/1.73M2
GLUCOSE SERPL-MCNC: 108 MG/DL (ref 70–108)
HBA1C MFR BLD HPLC: 5.5 % (ref 4.4–6.4)
HCT VFR BLD AUTO: 43.1 % (ref 37–47)
HDLC SERPL-MCNC: 77 MG/DL
HGB BLD-MCNC: 13.8 GM/DL (ref 12–16)
LDLC SERPL CALC-MCNC: 83 MG/DL
MCH RBC QN AUTO: 28.9 PG (ref 26–33)
MCHC RBC AUTO-ENTMCNC: 32 GM/DL (ref 32.2–35.5)
MCV RBC AUTO: 90.4 FL (ref 81–99)
PLATELET # BLD AUTO: 342 THOU/MM3 (ref 130–400)
PMV BLD AUTO: 11.6 FL (ref 9.4–12.4)
POTASSIUM SERPL-SCNC: 3.9 MEQ/L (ref 3.5–5.2)
PROT SERPL-MCNC: 7.6 G/DL (ref 6.1–8)
RBC # BLD AUTO: 4.77 MILL/MM3 (ref 4.2–5.4)
RV AG STL QL: NEGATIVE
SODIUM SERPL-SCNC: 143 MEQ/L (ref 135–145)
T4 FREE SERPL-MCNC: 1.22 NG/DL (ref 0.93–1.76)
TRIGL SERPL-MCNC: 83 MG/DL (ref 0–199)
TSH SERPL DL<=0.005 MIU/L-ACNC: 1.69 UIU/ML (ref 0.4–4.2)
WBC # BLD AUTO: 8.9 THOU/MM3 (ref 4.8–10.8)

## 2023-12-19 LAB
G LAMBLIA AG STL QL: NEGATIVE
SPECIMEN SOURCE: NORMAL

## 2023-12-20 LAB
BACTERIA STL CULT: NORMAL
CALPROTECTIN STL-MCNT: 25 UG/G

## 2023-12-27 ENCOUNTER — TELEPHONE (OUTPATIENT)
Dept: FAMILY MEDICINE CLINIC | Age: 73
End: 2023-12-27

## 2023-12-27 ENCOUNTER — NURSE ONLY (OUTPATIENT)
Dept: LAB | Age: 73
End: 2023-12-27

## 2023-12-27 DIAGNOSIS — R35.0 URINARY FREQUENCY: ICD-10-CM

## 2023-12-27 DIAGNOSIS — R30.0 DYSURIA: ICD-10-CM

## 2023-12-27 DIAGNOSIS — R35.0 URINARY FREQUENCY: Primary | ICD-10-CM

## 2023-12-27 NOTE — TELEPHONE ENCOUNTER
The patient called in and stated that 2 days ago she started in with burning with urination and urgency. She is requesting an order for a UA with C&S be ordered and she will go to [facility] lab. Please advise.

## 2023-12-27 NOTE — TELEPHONE ENCOUNTER
Called and left a detailed msg updating the patient that the lab has been ordered and she can go to [facility] lab anytime.

## 2023-12-28 LAB
BACTERIA: ABNORMAL
BILIRUB UR QL STRIP: NEGATIVE
CASTS #/AREA URNS LPF: ABNORMAL /LPF
CASTS #/AREA URNS LPF: ABNORMAL /LPF
CHARACTER UR: ABNORMAL
CHARCOAL URNS QL MICRO: ABNORMAL
COLOR UR: ABNORMAL
CRYSTALS URNS QL MICRO: ABNORMAL
EPITHELIAL CELLS, UA: ABNORMAL /HPF
GLUCOSE UR QL STRIP.AUTO: NEGATIVE MG/DL
HGB UR QL STRIP.AUTO: ABNORMAL
KETONES UR QL STRIP.AUTO: ABNORMAL
LEUKOCYTE ESTERASE UR QL STRIP.AUTO: ABNORMAL
NITRITE UR QL STRIP.AUTO: NEGATIVE
PH UR STRIP.AUTO: 6 [PH] (ref 5–9)
PROT UR STRIP.AUTO-MCNC: 100 MG/DL
RBC #/AREA URNS HPF: ABNORMAL /HPF
RENAL EPI CELLS #/AREA URNS HPF: ABNORMAL /[HPF]
SPECIFIC GRAVITY UA: 1.02 (ref 1–1.03)
UROBILINOGEN, URINE: 0.2 EU/DL (ref 0–1)
WBC #/AREA URNS HPF: > 200 /HPF
YEAST LIKE FUNGI URNS QL MICRO: ABNORMAL

## 2023-12-29 DIAGNOSIS — N39.0 ACUTE UTI: Primary | ICD-10-CM

## 2023-12-29 RX ORDER — CIPROFLOXACIN 250 MG/1
250 TABLET, FILM COATED ORAL 2 TIMES DAILY
Qty: 14 TABLET | Refills: 0 | Status: SHIPPED | OUTPATIENT
Start: 2023-12-29 | End: 2024-01-05

## 2023-12-30 LAB
BACTERIA UR CULT: ABNORMAL
ORGANISM: ABNORMAL

## 2024-01-09 ENCOUNTER — OFFICE VISIT (OUTPATIENT)
Dept: FAMILY MEDICINE CLINIC | Age: 74
End: 2024-01-09
Payer: MEDICARE

## 2024-01-09 ENCOUNTER — CARE COORDINATION (OUTPATIENT)
Dept: CARE COORDINATION | Age: 74
End: 2024-01-09

## 2024-01-09 VITALS
HEIGHT: 63 IN | HEART RATE: 72 BPM | DIASTOLIC BLOOD PRESSURE: 76 MMHG | WEIGHT: 132.1 LBS | RESPIRATION RATE: 16 BRPM | BODY MASS INDEX: 23.41 KG/M2 | SYSTOLIC BLOOD PRESSURE: 128 MMHG

## 2024-01-09 DIAGNOSIS — F17.210 SMOKING GREATER THAN 40 PACK YEARS: ICD-10-CM

## 2024-01-09 DIAGNOSIS — E55.9 VITAMIN D DEFICIENCY: ICD-10-CM

## 2024-01-09 DIAGNOSIS — E78.2 MIXED HYPERLIPIDEMIA: ICD-10-CM

## 2024-01-09 DIAGNOSIS — E03.9 HYPOTHYROIDISM, UNSPECIFIED TYPE: ICD-10-CM

## 2024-01-09 DIAGNOSIS — I49.5 BRADY-TACHY SYNDROME (HCC): ICD-10-CM

## 2024-01-09 DIAGNOSIS — M81.6 LOCALIZED OSTEOPOROSIS WITHOUT CURRENT PATHOLOGICAL FRACTURE: ICD-10-CM

## 2024-01-09 DIAGNOSIS — Z00.00 MEDICARE ANNUAL WELLNESS VISIT, SUBSEQUENT: Primary | ICD-10-CM

## 2024-01-09 DIAGNOSIS — R73.01 IFG (IMPAIRED FASTING GLUCOSE): ICD-10-CM

## 2024-01-09 DIAGNOSIS — J43.2 CENTRILOBULAR EMPHYSEMA (HCC): ICD-10-CM

## 2024-01-09 DIAGNOSIS — J96.11 CHRONIC RESPIRATORY FAILURE WITH HYPOXIA (HCC): ICD-10-CM

## 2024-01-09 DIAGNOSIS — F32.5 MAJOR DEPRESSIVE DISORDER, SINGLE EPISODE, IN FULL REMISSION (HCC): ICD-10-CM

## 2024-01-09 DIAGNOSIS — F41.9 CHRONIC ANXIETY: ICD-10-CM

## 2024-01-09 DIAGNOSIS — G91.2 NORMAL PRESSURE HYDROCEPHALUS (HCC): ICD-10-CM

## 2024-01-09 DIAGNOSIS — R41.89 COGNITIVE IMPAIRMENT: ICD-10-CM

## 2024-01-09 PROCEDURE — 1123F ACP DISCUSS/DSCN MKR DOCD: CPT | Performed by: NURSE PRACTITIONER

## 2024-01-09 PROCEDURE — 1090F PRES/ABSN URINE INCON ASSESS: CPT | Performed by: NURSE PRACTITIONER

## 2024-01-09 PROCEDURE — 3023F SPIROM DOC REV: CPT | Performed by: NURSE PRACTITIONER

## 2024-01-09 PROCEDURE — G8399 PT W/DXA RESULTS DOCUMENT: HCPCS | Performed by: NURSE PRACTITIONER

## 2024-01-09 PROCEDURE — G8420 CALC BMI NORM PARAMETERS: HCPCS | Performed by: NURSE PRACTITIONER

## 2024-01-09 PROCEDURE — G8427 DOCREV CUR MEDS BY ELIG CLIN: HCPCS | Performed by: NURSE PRACTITIONER

## 2024-01-09 PROCEDURE — G0439 PPPS, SUBSEQ VISIT: HCPCS | Performed by: NURSE PRACTITIONER

## 2024-01-09 PROCEDURE — G8484 FLU IMMUNIZE NO ADMIN: HCPCS | Performed by: NURSE PRACTITIONER

## 2024-01-09 PROCEDURE — 99214 OFFICE O/P EST MOD 30 MIN: CPT | Performed by: NURSE PRACTITIONER

## 2024-01-09 PROCEDURE — 3017F COLORECTAL CA SCREEN DOC REV: CPT | Performed by: NURSE PRACTITIONER

## 2024-01-09 PROCEDURE — 1036F TOBACCO NON-USER: CPT | Performed by: NURSE PRACTITIONER

## 2024-01-09 RX ORDER — DULOXETIN HYDROCHLORIDE 30 MG/1
30 CAPSULE, DELAYED RELEASE ORAL DAILY
Qty: 90 CAPSULE | Refills: 1 | Status: SHIPPED | OUTPATIENT
Start: 2024-01-09

## 2024-01-09 ASSESSMENT — PATIENT HEALTH QUESTIONNAIRE - PHQ9
SUM OF ALL RESPONSES TO PHQ QUESTIONS 1-9: 0
SUM OF ALL RESPONSES TO PHQ QUESTIONS 1-9: 0
5. POOR APPETITE OR OVEREATING: 0
9. THOUGHTS THAT YOU WOULD BE BETTER OFF DEAD, OR OF HURTING YOURSELF: 0
8. MOVING OR SPEAKING SO SLOWLY THAT OTHER PEOPLE COULD HAVE NOTICED. OR THE OPPOSITE, BEING SO FIGETY OR RESTLESS THAT YOU HAVE BEEN MOVING AROUND A LOT MORE THAN USUAL: 0
2. FEELING DOWN, DEPRESSED OR HOPELESS: 0
7. TROUBLE CONCENTRATING ON THINGS, SUCH AS READING THE NEWSPAPER OR WATCHING TELEVISION: 0
1. LITTLE INTEREST OR PLEASURE IN DOING THINGS: 0
4. FEELING TIRED OR HAVING LITTLE ENERGY: 0
SUM OF ALL RESPONSES TO PHQ QUESTIONS 1-9: 0
SUM OF ALL RESPONSES TO PHQ QUESTIONS 1-9: 0
SUM OF ALL RESPONSES TO PHQ9 QUESTIONS 1 & 2: 0
10. IF YOU CHECKED OFF ANY PROBLEMS, HOW DIFFICULT HAVE THESE PROBLEMS MADE IT FOR YOU TO DO YOUR WORK, TAKE CARE OF THINGS AT HOME, OR GET ALONG WITH OTHER PEOPLE: 0
6. FEELING BAD ABOUT YOURSELF - OR THAT YOU ARE A FAILURE OR HAVE LET YOURSELF OR YOUR FAMILY DOWN: 0
3. TROUBLE FALLING OR STAYING ASLEEP: 0

## 2024-01-09 ASSESSMENT — ENCOUNTER SYMPTOMS
COUGH: 1
WHEEZING: 1
CONSTIPATION: 1

## 2024-01-09 ASSESSMENT — LIFESTYLE VARIABLES
HOW OFTEN DO YOU HAVE A DRINK CONTAINING ALCOHOL: MONTHLY OR LESS
HOW MANY STANDARD DRINKS CONTAINING ALCOHOL DO YOU HAVE ON A TYPICAL DAY: 1 OR 2

## 2024-01-09 NOTE — CARE COORDINATION
Attempted to reach Magan today for care coordination f/u. No answer.  Message left with this ACM's contact info.

## 2024-01-09 NOTE — PATIENT INSTRUCTIONS
These can increase your chances of quitting for good. Quitting smoking may be the most important step you can take to protect your heart. It is never too late to quit.     Limit alcohol to 2 drinks a day for men and 1 drink a day for women. Too much alcohol can cause health problems.     Manage other health problems such as diabetes, high blood pressure, and high cholesterol. If you think you may have a problem with alcohol or drug use, talk to your doctor.   Medicines    Take your medicines exactly as prescribed. Call your doctor if you think you are having a problem with your medicine.     If your doctor recommends aspirin, take the amount directed each day. Make sure you take aspirin and not another kind of pain reliever, such as acetaminophen (Tylenol).   When should you call for help?   Call 911 if you have symptoms of a heart attack. These may include:    Chest pain or pressure, or a strange feeling in the chest.     Sweating.     Shortness of breath.     Pain, pressure, or a strange feeling in the back, neck, jaw, or upper belly or in one or both shoulders or arms.     Lightheadedness or sudden weakness.     A fast or irregular heartbeat.   After you call 911, the  may tell you to chew 1 adult-strength or 2 to 4 low-dose aspirin. Wait for an ambulance. Do not try to drive yourself.  Watch closely for changes in your health, and be sure to contact your doctor if you have any problems.  Where can you learn more?  Go to https://www.[employer].net/patientEd and enter F075 to learn more about \"A Healthy Heart: Care Instructions.\"  Current as of: June 25, 2023               Content Version: 13.9  © 6374-7341 [email or web address].   Care instructions adapted under license by "[employer]. If you have questions about a medical condition or this instruction, always ask your healthcare professional. [employer] disclaims any warranty or liability for your use of this

## 2024-01-09 NOTE — PROGRESS NOTES
Medicare Annual Wellness Visit    Magan Oleary is here for Medicare AWV, 3 Month Follow-Up, and Results    Assessment & Plan   Medicare annual wellness visit, subsequent    Recommendations for Preventive Services Due: see orders and patient instructions/AVS.  Recommended screening schedule for the next 5-10 years is provided to the patient in written form: see Patient Instructions/AVS.     No follow-ups on file.     Subjective       Patient's complete Health Risk Assessment and screening values have been reviewed and are found in Flowsheets. The following problems were reviewed today and where indicated follow up appointments were made and/or referrals ordered.    Positive Risk Factor Screenings with Interventions:                Activity, Diet, and Weight:  On average, how many days per week do you engage in moderate to strenuous exercise (like a brisk walk)?: 0 days  On average, how many minutes do you engage in exercise at this level?: 0 min    Do you eat balanced/healthy meals regularly?: Yes    Body mass index is 23.78 kg/m².      Inactivity Interventions:  Recommendations: patient agrees to exercise for at least 150 minutes/week            ADL's:   Patient reports needing help with:  Select all that apply: (!) Transportation  Interventions:  See AVS for additional education material                  Objective   Vitals:    01/09/24 1515   BP: 128/76   Site: Left Upper Arm   Position: Sitting   Cuff Size: Medium Adult   Pulse: 72   Resp: 16   Weight: 59.9 kg (132 lb 1.6 oz)   Height: 1.588 m (5' 2.5\")      Body mass index is 23.78 kg/m².             Allergies   Allergen Reactions    Macrobid [Nitrofurantoin] Diarrhea    Sulfa Antibiotics Nausea And Vomiting     Prior to Visit Medications    Medication Sig Taking? Authorizing Provider   dicyclomine (BENTYL) 10 MG capsule Take 1 capsule by mouth 4 times daily as needed (abdominal cramping) Yes Virginia Wayne, APRN - CNP   trospium (SANCTURA) 20 MG tablet Take 
  Abdominal:      General: Bowel sounds are normal. There is no distension.      Palpations: Abdomen is soft.      Tenderness: There is no abdominal tenderness.   Musculoskeletal:         General: No tenderness. Normal range of motion.      Cervical back: Normal range of motion and neck supple.   Skin:     General: Skin is warm and dry.      Findings: No rash.   Neurological:      Mental Status: She is alert and oriented to person, place, and time.   Psychiatric:         Attention and Perception: She is inattentive.         Mood and Affect: Mood is anxious and depressed.         Behavior: Behavior is cooperative.         Thought Content: Thought content normal.         Cognition and Memory: She exhibits impaired recent memory.         Judgment: Judgment normal.         Assessment:       Diagnosis Orders   1. Medicare annual wellness visit, subsequent        2. Centrilobular emphysema (HCC)        3. Chronic respiratory failure with hypoxia (HCC)        4. Smoking greater than 40 pack years        5. Shane-tachy syndrome (HCC)        6. Chronic anxiety        7. Normal pressure hydrocephalus (HCC)        8. Major depressive disorder, single episode, in full remission (HCC)  DULoxetine (CYMBALTA) 30 MG extended release capsule      9. Cognitive impairment        10. Mixed hyperlipidemia        11. IFG (impaired fasting glucose)        12. Localized osteoporosis without current pathological fracture        13. Hypothyroidism, unspecified type        14. Vitamin D deficiency                  Plan:       Chronic conditions stable  Labs reviewed  Refills as above   - increase Cymbalta 90 mg  Follow up with [facility]  Immunizations discussed  Healthy Lifestyles discussed  DEXA pending  RTO in 6 months                      Óscar Baird, SARITHA - CNP

## 2024-01-16 ENCOUNTER — FOLLOWUP TELEPHONE ENCOUNTER (OUTPATIENT)
Dept: CARDIAC REHAB | Age: 74
End: 2024-01-16

## 2024-01-16 NOTE — TELEPHONE ENCOUNTER
Called pts daughter, Sil,  whose phone number is listed as primary number.  I spoke to Sil about pulmonary rehab and wondering if pt wants to reschedule.  The pts daughter states she is going to her mothers house today and will call us back to reschedule.

## 2024-01-18 ENCOUNTER — CARE COORDINATION (OUTPATIENT)
Dept: CARE COORDINATION | Age: 74
End: 2024-01-18

## 2024-01-18 NOTE — CARE COORDINATION
Attempted to reach Magan today for care coordination f/u.  No answer.  Message left with this ACM's contact number.

## 2024-01-19 ENCOUNTER — CARE COORDINATION (OUTPATIENT)
Dept: CARE COORDINATION | Age: 74
End: 2024-01-19

## 2024-01-19 NOTE — CARE COORDINATION
Received VM from daughter Sil requesting call back.  She stated in the VM that they are having issues getting pt's medications switched to mail order pharmacy.    Attempted to reach Sil.  No answer.  Message left with contact info.

## 2024-01-24 ENCOUNTER — HOSPITAL ENCOUNTER (OUTPATIENT)
Dept: RESPIRATORY THERAPY | Age: 74
Discharge: HOME | End: 2024-01-24
Payer: MEDICARE

## 2024-01-24 DIAGNOSIS — J44.9 CHRONIC OBSTRUCTIVE PULMONARY DISEASE, UNSPECIFIED COPD TYPE (HCC): ICD-10-CM

## 2024-01-24 PROCEDURE — 94762 N-INVAS EAR/PLS OXIMTRY CONT: CPT

## 2024-01-24 NOTE — PROGRESS NOTES
Instructions were given for an overnight nocturnal pulse oximetry study. The assigned GE number of the pulse oximetry was [id number].  A log sheet was completed. Guardian was instructed on documenting any events that occurred throughout the night on the log sheet. The procedure was explained to the learner(s). Guardian understanding of the procedure was good.     Guardian does have a mean of transportation to bring back the study the next day. A patient task was placed in the patient’s chart for the  to download the nocturnal study and fax the results to the ordering provider for interpretation. The pulse oximetry’s memory was cleared. Guardian had no questions and was sent home with the pulse oximeter.

## 2024-01-29 ENCOUNTER — CARE COORDINATION (OUTPATIENT)
Dept: CARE COORDINATION | Age: 74
End: 2024-01-29

## 2024-01-29 ASSESSMENT — ENCOUNTER SYMPTOMS: DYSPNEA ASSOCIATED WITH: EXERTION

## 2024-01-29 NOTE — CARE COORDINATION
Attempted to reach daughter Sil that lives locally.  No answer.  Message left with this ACM's contact info.   Attempted to reach Magan.  No answer.   Ambulatory Care Coordination Note  2024    Patient Current Location:  [place]     ACM contacted the family by telephone. Verified name and  with family as identifiers. Provided introduction to self, and explanation of the ACM role.     Challenges to be reviewed by the provider   Additional needs identified to be addressed with provider: No  none               Method of communication with provider: chart routing.    ACM: Ekta Noel RN    Magan was referred to care coordination by PCP for education and assistance in managing her chronic conditions.  Pt has h/o: COPD, HLD, depression, CVA.  Spoke with daughter Cher today.    Cher (HIPAA auth) reports that pt is doing ok. Her sister Sil continues to help with pt daily.    COPD: pt has pulm rehab eval. On   Cher reports that pt has [facility] Pulmonary appt on .  Cher reports pt is wearing oxygen but will often forget to put it on so she has to be reminded frequently.   Continues to not drive at this time. Daughter Sil runs errands for her and assists when needed.   Pt was having digestive issues.  Following with GI.  Now on fiber supplement and probiotic which she reports has helped.  Pt following with urology.  Next appt is on 2/15  Has cardio f/u on   Family continues to have discussions with pt regarding assisted living.    Plan of care:  Reinforce COPD zones  Will work to assist with getting medications switched over to [facility]  Pt has new pt appt with neuro but not until March. Daughter will check with Dr. Mckeon's office to see about earlier appt. If needing new referral she will reach out with request.    [facility]  They have pt's profile created.  They will need scripts sent to them next time pt is due.  I contacted Cher. She will check to see if pt is getting low

## 2024-02-02 ENCOUNTER — CARE COORDINATION (OUTPATIENT)
Dept: CARE COORDINATION | Age: 74
End: 2024-02-02

## 2024-02-02 DIAGNOSIS — F32.9 REACTIVE DEPRESSION: ICD-10-CM

## 2024-02-02 RX ORDER — DULOXETIN HYDROCHLORIDE 60 MG/1
60 CAPSULE, DELAYED RELEASE ORAL DAILY
Qty: 90 CAPSULE | Refills: 3 | Status: SHIPPED | OUTPATIENT
Start: 2024-02-02

## 2024-02-02 NOTE — TELEPHONE ENCOUNTER
Daughter requesting script for Cymbalta 60mg.  Dose was increased at pt's last visit.    I contacted [facility] pharmacy.  Spoke with Magan.  Informed that the only script they have on file is for 30mg. Please send in new script for Cymbalta 60mg.  Medication pended please advise.

## 2024-02-02 NOTE — CARE COORDINATION
Pt's daughter Cher is ordering pt's scripts through pharmacy.  They currently only have Cymbalta 30mg on file.  Medication was increased to 60mg at last visit. Refill encounter sent to PCP. Requesting script be sent to [facility] on [address].  Informed daughter Cher.

## 2024-02-12 ENCOUNTER — TELEPHONE (OUTPATIENT)
Dept: CARDIAC REHAB | Age: 74
End: 2024-02-12

## 2024-02-12 NOTE — TELEPHONE ENCOUNTER
Spoke with Magan to confirm pulmonary rehab evaluation on 2/13/24.  Magan plans to be here.  Directions given to our location.  Instructed to arrive 15 min early, and to check in at the heart center registration desk.  Magan expressed understanding.

## 2024-02-13 ENCOUNTER — HOSPITAL ENCOUNTER (OUTPATIENT)
Dept: CARDIAC REHAB | Age: 74
Setting detail: THERAPIES SERIES
Discharge: HOME | End: 2024-02-13
Payer: MEDICARE

## 2024-02-13 VITALS — WEIGHT: 142 LBS | BODY MASS INDEX: 25.16 KG/M2 | HEIGHT: 63 IN

## 2024-02-13 PROCEDURE — 94625 PHY/QHP OP PULM RHB W/O MNTR: CPT

## 2024-02-13 NOTE — PLAN OF CARE
HOSPITAL FACILITY-BASED   PULMONARY REHABILITATION  INITIAL EVALUATION INTERVIEW    Name:  Magan Oleary, : 1950      MEDICAL HISTORY  Diagnosis: Moderate COPD, Gold Stage 2.  Patient’s medical history:  Please see H&P from ordering physician (which is in the chart)  Patient’s family history:  Please see H&P   Respiratory History:  First diagnosed approx 5-6 years ago.   Last Hospitalization/ Urgent Care/ ED: 2022  Medications reviewed in [facility]:  Yes  Vaccinations:  Please see H&P  Allergies:  Please see H&P  Oxygen Rx per pt and daughters is  at Rest:  3L/min (x) Continuous  () OCD.  With Exercise:  4L/min   () Continuous  (x) OCD  and 4LPM continuous at night to sleep   Home CPAP or BIPAP:   No   Pack Year Smoking history (http://smokingpackyears.com): 50   Occupational, environmental or recreational exposure: no.   Alcohol or other substance abuse issues: Please see H&P.    Does Magan have social support from family/friends: Yes    PHYSICAL ASSESSMENT          BP: 124/80.  SpO2: 97% on 3 L/min () continuous O2 (x) OCD.  HR: 101.    Breathing Pattern:  Regular/Normal (<12): ()Y (x)N.     Ineffective (fast/shallow/using accessory muscles ()Y.   Respiratory Rate: 20/min.  For Chest Exam, Cardiac Exam, Finger Clubbing, UB and LB evaluation, please see Physician’s H&P which is included in the chart.                   DIAGNOSTIC TESTS                        PFT:  For PFT and physician’s interpretation, please see the test results which are included in the chart.    SYMPTOM ASSESSMENT  Dyspnea:  1= Not troubled by breathlessness except on strenuous exercise.  2= SOB when hurrying/ walking up hill/ stairs.  3= Walks slower than peers on the level because of breathlessness or has to stop for breath when walking at own pace.  4= Stops for breath after 100m or after a few minutes.  5= Too breathless to leave the house or breathless when dressing/ undressing:  -What level is patient:  4-5.  What 
ASSESSMENT    Pt reports taking his meds properly 100% of the time      (X) Pt is on inhaled medications   MEDICATIONS  PLAN        -Review Rx’s purpose, schedule, side effects and importance of compliance during Medication class.  Also address Cpap, Vents.       MEDICATIONS  RE ASSESSMENT      Pt reports taking their meds properly  % of the time        () Pt has had class  () Pt has not had class MEDICATIONS  RE ASSESSMENT      Pt reports taking their meds properly  % of the time        () Pt has had class  () Pt has not had class MEDICATIONS  RE ASSESSMENT      Pt reports taking their meds properly  % of the time        () Pt has had class  () Pt has not had class MEDICATIONS  DISCHARGE        Pt reports taking their meds properly  % of the time        ()Pt had med class  Date:  () Pt has not had class       Pt has:  (X) Metered Dose Inhaler  () Dry Powder Inhaler  (X) Nebulizer   -Review correct use, timing, technique and cleaning of equipment during Medication class () Pt has had class  () Pt has not had class () Pt has had class  () Pt has not had class () Pt has had class  () Pt has not had class () Pt had class  Date: See above  () Pt has not had class   Medication Goals  Initial Assessment        (x) Take meds properly 100% of the time    (x) Learn about / Review Rx’s, and devices Medication Goals  Intervention & Education  Plan      -Follow Rx instructions and ask if questions    -Attend Medication Education class   Medication Goals  Re assessment          () Readdressed questions on any medications    () Pt has had class  () Pt has not had class Medication Goals  Re assessment          () Readdressed questions on any medications    () Pt has had class  () Pt has not had class Medication Goals  Re assessment          () Readdressed questions on any medications    () Pt has had class  () Pt has not had class Medication Goals  Discharge          % proper med usage see above      () Pt had class  Date: See

## 2024-02-20 ENCOUNTER — HOSPITAL ENCOUNTER (OUTPATIENT)
Dept: CARDIAC REHAB | Age: 74
Setting detail: THERAPIES SERIES
End: 2024-02-20
Payer: MEDICARE

## 2024-02-22 ENCOUNTER — HOSPITAL ENCOUNTER (OUTPATIENT)
Dept: CARDIAC REHAB | Age: 74
Setting detail: THERAPIES SERIES
Discharge: HOME | End: 2024-02-22
Payer: MEDICARE

## 2024-02-22 PROCEDURE — 94626 PHY/QHP OP PULM RHB W/MNTR: CPT

## 2024-02-26 ENCOUNTER — CARE COORDINATION (OUTPATIENT)
Dept: CARE COORDINATION | Age: 74
End: 2024-02-26

## 2024-02-27 ENCOUNTER — HOSPITAL ENCOUNTER (OUTPATIENT)
Dept: CARDIAC REHAB | Age: 74
Setting detail: THERAPIES SERIES
Discharge: HOME | End: 2024-02-27
Payer: MEDICARE

## 2024-02-27 PROCEDURE — 94626 PHY/QHP OP PULM RHB W/MNTR: CPT

## 2024-02-28 NOTE — PATIENT INSTRUCTIONS
Your  Nurses  Today:  Kathe  Your Provider for Today: Dr. Williamson            You may receive a survey regarding the care you received during your visit.  Your input is valuable to us.  We encourage you to complete and return your survey.  We hope you will choose us in the future for your healthcare needs.

## 2024-02-29 ENCOUNTER — HOSPITAL ENCOUNTER (OUTPATIENT)
Dept: CARDIAC REHAB | Age: 74
Setting detail: THERAPIES SERIES
Discharge: HOME | End: 2024-02-29
Payer: MEDICARE

## 2024-02-29 ENCOUNTER — TELEPHONE (OUTPATIENT)
Dept: PULMONOLOGY | Age: 74
End: 2024-02-29

## 2024-02-29 ENCOUNTER — OFFICE VISIT (OUTPATIENT)
Dept: CARDIOLOGY CLINIC | Age: 74
End: 2024-02-29
Payer: MEDICARE

## 2024-02-29 VITALS
WEIGHT: 139 LBS | HEIGHT: 62 IN | DIASTOLIC BLOOD PRESSURE: 86 MMHG | BODY MASS INDEX: 25.58 KG/M2 | HEART RATE: 108 BPM | SYSTOLIC BLOOD PRESSURE: 140 MMHG

## 2024-02-29 DIAGNOSIS — R00.2 PALPITATIONS: Primary | ICD-10-CM

## 2024-02-29 DIAGNOSIS — I20.89 ANGINA OF EFFORT: ICD-10-CM

## 2024-02-29 DIAGNOSIS — R06.09 DOE (DYSPNEA ON EXERTION): ICD-10-CM

## 2024-02-29 DIAGNOSIS — R94.31 ABNORMAL EKG: ICD-10-CM

## 2024-02-29 PROCEDURE — 1036F TOBACCO NON-USER: CPT | Performed by: INTERNAL MEDICINE

## 2024-02-29 PROCEDURE — G8417 CALC BMI ABV UP PARAM F/U: HCPCS | Performed by: INTERNAL MEDICINE

## 2024-02-29 PROCEDURE — 94626 PHY/QHP OP PULM RHB W/MNTR: CPT

## 2024-02-29 PROCEDURE — G8399 PT W/DXA RESULTS DOCUMENT: HCPCS | Performed by: INTERNAL MEDICINE

## 2024-02-29 PROCEDURE — 99204 OFFICE O/P NEW MOD 45 MIN: CPT | Performed by: INTERNAL MEDICINE

## 2024-02-29 PROCEDURE — G8427 DOCREV CUR MEDS BY ELIG CLIN: HCPCS | Performed by: INTERNAL MEDICINE

## 2024-02-29 PROCEDURE — 3017F COLORECTAL CA SCREEN DOC REV: CPT | Performed by: INTERNAL MEDICINE

## 2024-02-29 PROCEDURE — 1090F PRES/ABSN URINE INCON ASSESS: CPT | Performed by: INTERNAL MEDICINE

## 2024-02-29 PROCEDURE — 1123F ACP DISCUSS/DSCN MKR DOCD: CPT | Performed by: INTERNAL MEDICINE

## 2024-02-29 PROCEDURE — G8484 FLU IMMUNIZE NO ADMIN: HCPCS | Performed by: INTERNAL MEDICINE

## 2024-02-29 NOTE — TELEPHONE ENCOUNTER
Received a fax from Elsa Castro CNP from [facility] asking if patient can have her split night studies with [facility] then follow the referring  To go over the results. I will  this information to this message and put copies on your desk. Please advise.

## 2024-02-29 NOTE — PROGRESS NOTES
New patient here for check up milla-tachy syndrome    Pt continues with sob on 3-4 liters O2, heart palpitations    
(PROVENTIL) (2.5 MG/3ML) 0.083% nebulizer solution, Take 3 mLs by nebulization every 6 hours as needed for Wheezing, Disp: 120 each, Rfl: 3    ondansetron (ZOFRAN-ODT) 4 MG disintegrating tablet, Take 1 tablet by mouth every 8 hours as needed for Nausea or Vomiting, Disp: , Rfl:     calcium carbonate 600 MG TABS tablet, Take 1 tablet by mouth daily, Disp: , Rfl:     cetirizine (ZYRTEC) 10 MG tablet, Take 1 tablet by mouth daily (Patient taking differently: Take 1 tablet by mouth daily Taking PRN), Disp: 90 tablet, Rfl: 3    alendronate (FOSAMAX) 70 MG tablet, Take 1 tablet by mouth every 7 days Take with water on an empty stomach- wait 30 minutes before eating or taking other meds.  Avoid lying down for 30 minutes after dose., Disp: 12 tablet, Rfl: 3    metoprolol succinate (TOPROL XL) 25 MG extended release tablet, Take 1 tablet by mouth daily, Disp: 90 tablet, Rfl: 3    levothyroxine (SYNTHROID) 50 MCG tablet, Take 1 tablet by mouth once daily, Disp: 90 tablet, Rfl: 3    simvastatin (ZOCOR) 10 MG tablet, Take 1 tablet by mouth nightly, Disp: 90 tablet, Rfl: 3    atomoxetine (STRATTERA) 40 MG capsule, Take 1 capsule by mouth once daily, Disp: 90 capsule, Rfl: 3    donepezil (ARICEPT) 10 MG tablet, Take 1 tablet by mouth in the evening, Disp: 90 tablet, Rfl: 3    valACYclovir (VALTREX) 500 MG tablet, Take 1 tablet by mouth daily, Disp: , Rfl:     Multiple Vitamin (MULTI-VITAMIN PO), Take  by mouth., Disp: , Rfl:     fish oil-omega-3 fatty acids 1000 MG capsule, Take 1 capsule by mouth One tablet daily along with red yeast rice, Disp: , Rfl:     Spacer/Aero-Holding Chambers ALLEN, 1 Device by Does not apply route daily as needed (Albuterol for Shortness of breath), Disp: 1 each, Rfl: 0    Past Medical History  Magan  has a past medical history of Aneurysm, cerebral, COPD (chronic obstructive pulmonary disease) (HCC), Hiatal hernia, Hyperlipidemia, Osteoarthritis, Pneumonia, Stroke (HCC), Thyroid disease, and

## 2024-03-01 ENCOUNTER — TELEPHONE (OUTPATIENT)
Dept: CARDIOLOGY CLINIC | Age: 74
End: 2024-03-01

## 2024-03-01 DIAGNOSIS — J44.9 CHRONIC OBSTRUCTIVE PULMONARY DISEASE, UNSPECIFIED COPD TYPE (HCC): ICD-10-CM

## 2024-03-01 DIAGNOSIS — G47.30 SLEEP APNEA, UNSPECIFIED TYPE: Primary | ICD-10-CM

## 2024-03-05 ENCOUNTER — HOSPITAL ENCOUNTER (OUTPATIENT)
Dept: CARDIAC REHAB | Age: 74
Setting detail: THERAPIES SERIES
Discharge: HOME | End: 2024-03-05
Payer: MEDICARE

## 2024-03-05 PROCEDURE — 94626 PHY/QHP OP PULM RHB W/MNTR: CPT

## 2024-03-06 ENCOUNTER — CARE COORDINATION (OUTPATIENT)
Dept: CARE COORDINATION | Age: 74
End: 2024-03-06

## 2024-03-07 ENCOUNTER — HOSPITAL ENCOUNTER (OUTPATIENT)
Dept: SLEEP CENTER | Age: 74
Discharge: HOME | End: 2024-03-09
Payer: MEDICARE

## 2024-03-07 ENCOUNTER — HOSPITAL ENCOUNTER (OUTPATIENT)
Dept: CARDIAC REHAB | Age: 74
Setting detail: THERAPIES SERIES
Discharge: HOME | End: 2024-03-07
Payer: MEDICARE

## 2024-03-07 DIAGNOSIS — G47.30 SLEEP APNEA, UNSPECIFIED TYPE: ICD-10-CM

## 2024-03-07 DIAGNOSIS — J44.9 CHRONIC OBSTRUCTIVE PULMONARY DISEASE, UNSPECIFIED COPD TYPE (HCC): ICD-10-CM

## 2024-03-07 PROCEDURE — 94626 PHY/QHP OP PULM RHB W/MNTR: CPT

## 2024-03-07 PROCEDURE — 95810 POLYSOM 6/> YRS 4/> PARAM: CPT

## 2024-03-12 ENCOUNTER — OFFICE VISIT (OUTPATIENT)
Dept: NEUROLOGY | Age: 74
End: 2024-03-12
Payer: MEDICARE

## 2024-03-12 ENCOUNTER — HOSPITAL ENCOUNTER (OUTPATIENT)
Dept: CARDIAC REHAB | Age: 74
Setting detail: THERAPIES SERIES
End: 2024-03-12
Payer: MEDICARE

## 2024-03-12 VITALS
OXYGEN SATURATION: 94 % | SYSTOLIC BLOOD PRESSURE: 118 MMHG | WEIGHT: 140 LBS | HEIGHT: 62 IN | DIASTOLIC BLOOD PRESSURE: 66 MMHG | BODY MASS INDEX: 25.76 KG/M2 | HEART RATE: 111 BPM

## 2024-03-12 DIAGNOSIS — M89.9 DISORDER OF BONE, UNSPECIFIED: ICD-10-CM

## 2024-03-12 DIAGNOSIS — R41.3 MEMORY PROBLEM: ICD-10-CM

## 2024-03-12 DIAGNOSIS — Z86.79 HISTORY OF CEREBRAL ANEURYSM REPAIR: Primary | ICD-10-CM

## 2024-03-12 DIAGNOSIS — I62.9 INTRACRANIAL HEMORRHAGE (HCC): ICD-10-CM

## 2024-03-12 DIAGNOSIS — Z98.890 HISTORY OF CEREBRAL ANEURYSM REPAIR: Primary | ICD-10-CM

## 2024-03-12 DIAGNOSIS — R25.1 TREMOR: ICD-10-CM

## 2024-03-12 PROCEDURE — G8417 CALC BMI ABV UP PARAM F/U: HCPCS | Performed by: PSYCHIATRY & NEUROLOGY

## 2024-03-12 PROCEDURE — 1036F TOBACCO NON-USER: CPT | Performed by: PSYCHIATRY & NEUROLOGY

## 2024-03-12 PROCEDURE — 1123F ACP DISCUSS/DSCN MKR DOCD: CPT | Performed by: PSYCHIATRY & NEUROLOGY

## 2024-03-12 PROCEDURE — 3017F COLORECTAL CA SCREEN DOC REV: CPT | Performed by: PSYCHIATRY & NEUROLOGY

## 2024-03-12 PROCEDURE — 99205 OFFICE O/P NEW HI 60 MIN: CPT | Performed by: PSYCHIATRY & NEUROLOGY

## 2024-03-12 PROCEDURE — 1090F PRES/ABSN URINE INCON ASSESS: CPT | Performed by: PSYCHIATRY & NEUROLOGY

## 2024-03-12 PROCEDURE — G8399 PT W/DXA RESULTS DOCUMENT: HCPCS | Performed by: PSYCHIATRY & NEUROLOGY

## 2024-03-12 PROCEDURE — G8484 FLU IMMUNIZE NO ADMIN: HCPCS | Performed by: PSYCHIATRY & NEUROLOGY

## 2024-03-12 PROCEDURE — G8427 DOCREV CUR MEDS BY ELIG CLIN: HCPCS | Performed by: PSYCHIATRY & NEUROLOGY

## 2024-03-12 NOTE — PATIENT INSTRUCTIONS
MRI brain WO contrast  EEG  Vitamin B12, folate  Vitamin B6 level  Vitamin D level  Copper level  Call with any new symptoms or concerns.   Follow up in 2 months.

## 2024-03-12 NOTE — PLAN OF CARE
not had class Psychosocial Intervention/ Education Reassessment      () Pt is coping well  () Pt is not coping well         () Pt has had class  () Pt has not had class Psychosocial Intervention/ Education Reassessment      () Pt is coping well  () Pt is not coping well         () Pt has had class  () Pt has not had class   Psychosocial Intervention/ Education Discharge      () Pt did not need any changes   () Pt needed changes to treatment      () Pt had class  Date: See above  () Pt did not have class         Pain   Initial Assessment    () N/A  Location: shoulders, Duration: not every day, more with movement/activity,   Intensity: 5/10, Type: dull ache   Pain   Plan      () N/A    (x) Pt’s pain is under control  () Pt encouraged to contact physician for pain control   Pain   Reassessment      () N/A    () Pt’s pain is under control  () Pt encouraged to contact physician for pain control Pain   Reassessment      () N/A    () Pt’s pain is under control  () Pt encouraged to contact physician for pain control Pain   Reassessment      () N/A    () Pt’s pain is under control  () Pt encouraged to contact physician for pain control Pain   Discharge      () N/A    () Pt’s pain is under control  () Pt encouraged to contact physician for pain control           OXYGEN   INITIAL ASSESSMENT    RESTING:  () RA  (x) O2: 3 L/min  () Continuous  (x) Breath Actuated  97% SpO2 / 20 bpm    Prescribed Oxygen Use per pt and daughters:  3LPM continuous flow at rest, 4LPM continuous flow at night and 4LPM pulse flow with activity    DME Company for O2:  [facility]     OXYGEN   PLAN      RESTING:  (x) Monitor needs, administer supplemental oxygen if SpO2 <88% OXYGEN   RE ASSESSMENT    RESTING:  (x) Pt SpO2 >87%  () Pt needs higher flow  () Pt needs less flow OXYGEN   RE ASSESSMENT    RESTING:  (x) Pt SpO2 >87%  () Pt needs higher flow  () Pt needs less flow OXYGEN   RE ASSESSMENT    RESTING:  (x) Pt SpO2 >87%  () Pt needs higher flow  () Pt

## 2024-03-14 ENCOUNTER — CARE COORDINATION (OUTPATIENT)
Dept: CARE COORDINATION | Age: 74
End: 2024-03-14

## 2024-03-14 ENCOUNTER — HOSPITAL ENCOUNTER (OUTPATIENT)
Dept: CARDIAC REHAB | Age: 74
Setting detail: THERAPIES SERIES
Discharge: HOME | End: 2024-03-14
Payer: MEDICARE

## 2024-03-14 PROCEDURE — 94626 PHY/QHP OP PULM RHB W/MNTR: CPT

## 2024-03-14 NOTE — CARE COORDINATION
Attempted to reach Magan today for care coordination f/u.  No answer.  Message left to return call.

## 2024-03-15 ENCOUNTER — TELEPHONE (OUTPATIENT)
Dept: CARDIAC REHAB | Age: 74
End: 2024-03-15

## 2024-03-15 NOTE — TELEPHONE ENCOUNTER
3/6/24- Patient authorized for staff to contact her referring provider in regards to her high HR during pulmonary rehab exercise sessions. Message left with Elsa Castro CNP at [facility].    3/15/24- Call returned from [facility] office. Patient will need to put AK on hold until she gets clearance from cardiology. [facility] stated they will also contact patient. Call placed by rehab staff to patient and message left on voicemail regarding status and asking for a call back.     3/15/24 1418- Patient returned call. Explained situation and call with her pulmonology office. Patient acknowledged that she will need to get clearance from cardiology. Patient is scheduled for a stress test on 3/22/24. Pulmonary rehab will be placed on hold until at least that date and until clearance is received.

## 2024-03-18 ENCOUNTER — TELEPHONE (OUTPATIENT)
Dept: CARDIOLOGY CLINIC | Age: 74
End: 2024-03-18
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
## 2024-03-18 NOTE — TELEPHONE ENCOUNTER
Pt needs ENT clearance   Testing is flores[facility] for 3/22     They will be faxing over a clearance form   Please send to Dr Williamson after testing is completed

## 2024-03-19 ENCOUNTER — HOSPITAL ENCOUNTER (OUTPATIENT)
Dept: CARDIAC REHAB | Age: 74
Setting detail: THERAPIES SERIES
End: 2024-03-19
Payer: MEDICARE

## 2024-03-21 ENCOUNTER — APPOINTMENT (OUTPATIENT)
Dept: CARDIAC REHAB | Age: 74
End: 2024-03-21
Payer: MEDICARE

## 2024-03-22 ENCOUNTER — HOSPITAL ENCOUNTER (OUTPATIENT)
Age: 74
End: 2024-03-22
Attending: INTERNAL MEDICINE
Payer: MEDICARE

## 2024-03-22 ENCOUNTER — HOSPITAL ENCOUNTER (OUTPATIENT)
Dept: NUCLEAR MEDICINE | Age: 74
Discharge: HOME | End: 2024-03-22
Attending: INTERNAL MEDICINE
Payer: MEDICARE

## 2024-03-22 VITALS
DIASTOLIC BLOOD PRESSURE: 66 MMHG | SYSTOLIC BLOOD PRESSURE: 118 MMHG | HEIGHT: 62 IN | WEIGHT: 140 LBS | BODY MASS INDEX: 25.76 KG/M2

## 2024-03-22 DIAGNOSIS — I20.89 ANGINA OF EFFORT (HCC): ICD-10-CM

## 2024-03-22 DIAGNOSIS — R94.31 ABNORMAL EKG: ICD-10-CM

## 2024-03-22 DIAGNOSIS — R00.2 PALPITATIONS: ICD-10-CM

## 2024-03-22 DIAGNOSIS — I20.89 ANGINA OF EFFORT: ICD-10-CM

## 2024-03-22 DIAGNOSIS — R06.09 DOE (DYSPNEA ON EXERTION): ICD-10-CM

## 2024-03-22 LAB
ECHO AV CUSP MM: 1.8 CM
ECHO AV PEAK GRADIENT: 5 MMHG
ECHO AV PEAK VELOCITY: 1.1 M/S
ECHO AV VELOCITY RATIO: 0.73
ECHO BSA: 1.67 M2
ECHO IVC PROX: 1.1 CM
ECHO LA AREA 2C: 14.2 CM2
ECHO LA AREA 4C: 12.3 CM2
ECHO LA DIAMETER INDEX: 1.83 CM/M2
ECHO LA DIAMETER: 3 CM
ECHO LA MAJOR AXIS: 4.6 CM
ECHO LA MINOR AXIS: 4.4 CM
ECHO LA VOL BP: 32 ML (ref 22–52)
ECHO LA VOL MOD A2C: 38 ML (ref 22–52)
ECHO LA VOL MOD A4C: 25 ML (ref 22–52)
ECHO LA VOL/BSA BIPLANE: 20 ML/M2 (ref 16–34)
ECHO LA VOLUME INDEX MOD A2C: 23 ML/M2 (ref 16–34)
ECHO LA VOLUME INDEX MOD A4C: 15 ML/M2 (ref 16–34)
ECHO LV E' LATERAL VELOCITY: 7 CM/S
ECHO LV E' SEPTAL VELOCITY: 8 CM/S
ECHO LV FRACTIONAL SHORTENING: 33 % (ref 28–44)
ECHO LV INTERNAL DIMENSION DIASTOLE INDEX: 2.38 CM/M2
ECHO LV INTERNAL DIMENSION DIASTOLIC: 3.9 CM (ref 3.9–5.3)
ECHO LV INTERNAL DIMENSION SYSTOLIC INDEX: 1.59 CM/M2
ECHO LV INTERNAL DIMENSION SYSTOLIC: 2.6 CM
ECHO LV ISOVOLUMETRIC RELAXATION TIME (IVRT): 70 MS
ECHO LV IVSD: 1.1 CM (ref 0.6–0.9)
ECHO LV MASS 2D: 131 G (ref 67–162)
ECHO LV MASS INDEX 2D: 79.9 G/M2 (ref 43–95)
ECHO LV POSTERIOR WALL DIASTOLIC: 1 CM (ref 0.6–0.9)
ECHO LV RELATIVE WALL THICKNESS RATIO: 0.51
ECHO LVOT PEAK GRADIENT: 3 MMHG
ECHO LVOT PEAK VELOCITY: 0.8 M/S
ECHO MV A VELOCITY: 0.98 M/S
ECHO MV E DECELERATION TIME (DT): 246 MS
ECHO MV E VELOCITY: 0.72 M/S
ECHO MV E/A RATIO: 0.73
ECHO MV E/E' LATERAL: 10.29
ECHO MV E/E' RATIO (AVERAGED): 9.64
ECHO PV MAX VELOCITY: 0.7 M/S
ECHO PV PEAK GRADIENT: 2 MMHG
ECHO RV INTERNAL DIMENSION: 2.6 CM
ECHO RV TAPSE: 2 CM (ref 1.7–?)
ECHO TV E WAVE: 0.5 M/S
NUC STRESS EJECTION FRACTION: 62 %
STRESS BASELINE DIAS BP: 70 MMHG
STRESS BASELINE HR: 91 BPM
STRESS BASELINE SYS BP: 140 MMHG
STRESS STAGE 1 BP: NORMAL MMHG
STRESS STAGE 1 DURATION: 1 MIN:SEC
STRESS STAGE 1 HR: 101 BPM
STRESS STAGE 2 BP: NORMAL MMHG
STRESS STAGE 2 DURATION: 1 MIN:SEC
STRESS STAGE 2 HR: 119 BPM
STRESS STAGE 3 BP: NORMAL MMHG
STRESS STAGE 3 DURATION: 1 MIN:SEC
STRESS STAGE 3 HR: 120 BPM
STRESS STAGE RECOVERY 1 BP: NORMAL MMHG
STRESS STAGE RECOVERY 1 DURATION: 1 MIN:SEC
STRESS STAGE RECOVERY 1 HR: 115 BPM
STRESS STAGE RECOVERY 2 BP: NORMAL MMHG
STRESS STAGE RECOVERY 2 DURATION: 1 MIN:SEC
STRESS STAGE RECOVERY 2 HR: 114 BPM
STRESS STAGE RECOVERY 3 BP: NORMAL MMHG
STRESS STAGE RECOVERY 3 DURATION: 1 MIN:SEC
STRESS STAGE RECOVERY 3 HR: 113 BPM
STRESS STAGE RECOVERY 4 BP: NORMAL MMHG
STRESS STAGE RECOVERY 4 DURATION: 2 MIN:SEC
STRESS STAGE RECOVERY 4 HR: 109 BPM
STRESS TARGET HR: 147 BPM
TID: 1

## 2024-03-22 PROCEDURE — 3430000000 HC RX DIAGNOSTIC RADIOPHARMACEUTICAL: Performed by: INTERNAL MEDICINE

## 2024-03-22 PROCEDURE — 6360000002 HC RX W HCPCS: Performed by: INTERNAL MEDICINE

## 2024-03-22 PROCEDURE — 93225 XTRNL ECG REC<48 HRS REC: CPT

## 2024-03-22 PROCEDURE — A9500 TC99M SESTAMIBI: HCPCS | Performed by: INTERNAL MEDICINE

## 2024-03-22 PROCEDURE — 93017 CV STRESS TEST TRACING ONLY: CPT

## 2024-03-22 PROCEDURE — 93306 TTE W/DOPPLER COMPLETE: CPT | Performed by: INTERNAL MEDICINE

## 2024-03-22 PROCEDURE — 78452 HT MUSCLE IMAGE SPECT MULT: CPT

## 2024-03-22 PROCEDURE — 93306 TTE W/DOPPLER COMPLETE: CPT

## 2024-03-22 RX ORDER — TETRAKIS(2-METHOXYISOBUTYLISOCYANIDE)COPPER(I) TETRAFLUOROBORATE 1 MG/ML
9.3 INJECTION, POWDER, LYOPHILIZED, FOR SOLUTION INTRAVENOUS
Status: COMPLETED | OUTPATIENT
Start: 2024-03-22 | End: 2024-03-22

## 2024-03-22 RX ORDER — REGADENOSON 0.08 MG/ML
0.4 INJECTION, SOLUTION INTRAVENOUS
Status: COMPLETED | OUTPATIENT
Start: 2024-03-22 | End: 2024-03-22

## 2024-03-22 RX ORDER — TETRAKIS(2-METHOXYISOBUTYLISOCYANIDE)COPPER(I) TETRAFLUOROBORATE 1 MG/ML
32.2 INJECTION, POWDER, LYOPHILIZED, FOR SOLUTION INTRAVENOUS
Status: COMPLETED | OUTPATIENT
Start: 2024-03-22 | End: 2024-03-22

## 2024-03-22 RX ADMIN — Medication 9.3 MILLICURIE: at 12:38

## 2024-03-22 RX ADMIN — Medication 32.2 MILLICURIE: at 13:33

## 2024-03-22 RX ADMIN — REGADENOSON 0.4 MG: 0.08 INJECTION, SOLUTION INTRAVENOUS at 13:50

## 2024-03-22 NOTE — TELEPHONE ENCOUNTER
Surgery scheduled 4/30/24.  Can route to Dr Williamson on Monday once testing has been completed and read.

## 2024-03-26 ENCOUNTER — HOSPITAL ENCOUNTER (OUTPATIENT)
Dept: CARDIAC REHAB | Age: 74
Setting detail: THERAPIES SERIES
End: 2024-03-26
Payer: MEDICARE

## 2024-03-26 ENCOUNTER — CARE COORDINATION (OUTPATIENT)
Dept: CARE COORDINATION | Age: 74
End: 2024-03-26

## 2024-03-26 NOTE — CARE COORDINATION
care.  I will notify my provider of any symptoms that indicate a worsening of my condition.    Barriers: lack of support, overwhelmed by complexity of regimen, stress, and lack of education  Plan for overcoming my barriers: education and support on COPD zones, inhalers, early symptom recognition and reporting.   Confidence: 9/10  Anticipated Goal Completion Date: 1/12/24                Future Appointments   Date Time Provider Department Center   3/29/2024 12:00 PM STR MRI RM1 [facility] MRI STR Rad/Card   3/29/2024  1:00 PM STR NEURODIAG ROOM 1 [facility] EEG [facility]   4/2/2024  2:00 PM STR CARDIAC EXERCISE RM [facility] CAR PUL [facility]   4/4/2024  2:00 PM STR CARDIAC EXERCISE RM [facility] CAR PUL [facility]   4/5/2024 11:45 AM Virginia Wayne, APRN - CNP AFLG[facility]   4/9/2024  2:00 PM STR CARDIAC EXERCISE RM [facility] CAR PUL [facility]   4/11/2024  2:00 PM STR CARDIAC EXERCISE RM [facility] CAR PUL [facility]   4/16/2024  2:00 PM STR CARDIAC EXERCISE RM [facility] CAR PUL [facility]   4/18/2024  2:00 PM STR CARDIAC EXERCISE RM [facility] CAR PUL [facility]   4/23/2024  2:00 PM STR CARDIAC EXERCISE RM [facility] CAR PUL [facility]   4/25/2024  2:00 PM STR CARDIAC EXERCISE RM [facility] CAR PUL [facility]   4/30/2024  2:00 PM STR CARDIAC EXERCISE RM [facility] CAR PUL [facility]   5/2/2024  2:00 PM STR CARDIAC EXERCISE RM [facility] CAR PUL [facility]   5/7/2024  2:00 PM STR CARDIAC EXERCISE RM [facility] CAR PUL [facility]   5/9/2024  2:00 PM STR CARDIAC EXERCISE RM [facility] CAR PUL [facility]   5/14/2024  2:00 PM STR CARDIAC EXERCISE RM [facility] CAR PUL [facility]   5/16/2024  2:00 PM STR CARDIAC EXERCISE RM [facility] CAR PUL [facility]   5/17/2024  2:45 PM Leopold, Paige L, APRN - CNP N SRPXNEURO Neurology -   5/21/2024  2:00 PM STR CARDIAC EXERCISE RM [facility] CAR PUL [facility]   5/23/2024  2:00 PM STR CARDIAC EXERCISE RM [facility] CAR PUL [facility]   5/28/2024  2:00 PM STR CARDIAC EXERCISE RM [facility] CAR PUL [facility]   5/30/2024  2:00 PM STR CARDIAC EXERCISE RM [facility] CAR PUL [facility]   6/4/2024  2:00 PM STR CARDIAC EXERCISE RM

## 2024-03-27 ENCOUNTER — TELEPHONE (OUTPATIENT)
Dept: CARDIOLOGY CLINIC | Age: 74
End: 2024-03-27

## 2024-03-27 NOTE — TELEPHONE ENCOUNTER
Message left with cardiac rehab informing them that Dr. Williamson has cleared pt to return to pulmonary rehab for cardiac standpoint.

## 2024-03-27 NOTE — TELEPHONE ENCOUNTER
Pt called asking for holter results   Spoke with EKG/Holter   And they said they just got it back on 3/25    Please keep open and call pt with results       [phone]

## 2024-03-28 ENCOUNTER — HOSPITAL ENCOUNTER (OUTPATIENT)
Dept: CARDIAC REHAB | Age: 74
Setting detail: THERAPIES SERIES
End: 2024-03-28
Payer: MEDICARE

## 2024-03-28 LAB
ACQUISITION DURATION: NORMAL S
AVERAGE HEART RATE: 90 BPM
ECHO BSA: 1.67 M2
HOOKUP DATE: NORMAL
HOOKUP TIME: NORMAL
MAX HEART RATE TIME/DATE: NORMAL
MAX HEART RATE: 134 BPM
MIN HEART RATE TIME/DATE: NORMAL
MIN HEART RATE: 71 BPM
NUMBER OF QRS COMPLEXES: NORMAL
NUMBER OF SUPRAVENTRICULAR COUPLETS: 0
NUMBER OF SUPRAVENTRICULAR ECTOPICS: 53
NUMBER OF SUPRAVENTRICULAR ISOLATED BEATS: 35
NUMBER OF VENTRICULAR BIGEMINAL CYCLES: 0
NUMBER OF VENTRICULAR COUPLETS: 0
NUMBER OF VENTRICULAR ECTOPICS: 8

## 2024-03-28 PROCEDURE — 93227 XTRNL ECG REC<48 HR R&I: CPT | Performed by: INTERNAL MEDICINE

## 2024-03-29 ENCOUNTER — HOSPITAL ENCOUNTER (OUTPATIENT)
Age: 74
Discharge: HOME | End: 2024-03-29
Payer: MEDICARE

## 2024-03-29 ENCOUNTER — HOSPITAL ENCOUNTER (OUTPATIENT)
Dept: NEUROLOGY | Age: 74
Discharge: HOME | End: 2024-03-29
Payer: MEDICARE

## 2024-03-29 ENCOUNTER — HOSPITAL ENCOUNTER (OUTPATIENT)
Dept: MRI IMAGING | Age: 74
Discharge: HOME | End: 2024-03-29
Payer: MEDICARE

## 2024-03-29 DIAGNOSIS — I62.9 INTRACRANIAL HEMORRHAGE (HCC): ICD-10-CM

## 2024-03-29 DIAGNOSIS — Z86.79 HISTORY OF CEREBRAL ANEURYSM REPAIR: ICD-10-CM

## 2024-03-29 DIAGNOSIS — R25.1 TREMOR: ICD-10-CM

## 2024-03-29 DIAGNOSIS — M89.9 DISORDER OF BONE, UNSPECIFIED: ICD-10-CM

## 2024-03-29 DIAGNOSIS — R41.3 MEMORY PROBLEM: ICD-10-CM

## 2024-03-29 DIAGNOSIS — Z98.890 HISTORY OF CEREBRAL ANEURYSM REPAIR: ICD-10-CM

## 2024-03-29 LAB
25(OH)D3 SERPL-MCNC: 55 NG/ML (ref 30–100)
FOLATE SERPL-MCNC: > 20 NG/ML (ref 4.8–24.2)
VIT B12 SERPL-MCNC: 1067 PG/ML (ref 211–911)

## 2024-03-29 PROCEDURE — 70551 MRI BRAIN STEM W/O DYE: CPT

## 2024-03-29 PROCEDURE — 82607 VITAMIN B-12: CPT

## 2024-03-29 PROCEDURE — 84207 ASSAY OF VITAMIN B-6: CPT

## 2024-03-29 PROCEDURE — 95816 EEG AWAKE AND DROWSY: CPT

## 2024-03-29 PROCEDURE — 36415 COLL VENOUS BLD VENIPUNCTURE: CPT

## 2024-03-29 PROCEDURE — 82746 ASSAY OF FOLIC ACID SERUM: CPT

## 2024-03-29 PROCEDURE — 82306 VITAMIN D 25 HYDROXY: CPT

## 2024-03-29 PROCEDURE — 82525 ASSAY OF COPPER: CPT

## 2024-03-29 NOTE — PROGRESS NOTES
[facility]     Neurodiagnostic Laboratory Technician Worksheet      EEG Date: 3/29/2024    Name: Magan Oleary   : 1950   Age: 73 y.o.  SEX: female    ROOM: OP MRN: [id number]           CSN: [id number]      Ordering Provider: Leopold, Paige, CNP  EEG Number: [id number] Time of Test:  1307    Hand: -   Sedation: no    H.V. Done: No  -  Photic: Yes    Sleep: Yes  Drowsy: Yes   Sleep Deprived: No    Seizures observed: no    Mentality: alert      Clinical History:hx of intracranial hemmorrhage, aneurysm, tremor, and memory problem    Past Medical History:       Diagnosis Date    Aneurysm, cerebral     COPD (chronic obstructive pulmonary disease) (HCC)     Dupuytren's contracture of both hands     Hiatal hernia     Hyperlipidemia     Osteoarthritis     Pneumonia     Stroke (HCC)     Thyroid disease     Unspecified cerebral artery occlusion with cerebral infarction        Scheduled Meds:  Continuous Infusions:  PRN Meds:.    Technician: Angela Schwarz RCP 3/29/2024

## 2024-03-29 NOTE — TELEPHONE ENCOUNTER
LM for pt to return call.    Results:  CONCLUSION:    Underlying rhythm is consistent with normal sinus rhythm with average heart rate of 90 bpm.   During the monitoring period, there was no prolonged pauses or profound bradycardia.   No evidence for sustained cardiac arrhythmia noted.

## 2024-04-01 LAB — COPPER SERPL-MCNC: 143.2 UG/DL (ref 80–155)

## 2024-04-02 ENCOUNTER — HOSPITAL ENCOUNTER (OUTPATIENT)
Dept: CARDIAC REHAB | Age: 74
Setting detail: THERAPIES SERIES
Discharge: HOME | End: 2024-04-02
Payer: MEDICARE

## 2024-04-02 PROCEDURE — 94626 PHY/QHP OP PULM RHB W/MNTR: CPT

## 2024-04-03 ENCOUNTER — TELEPHONE (OUTPATIENT)
Dept: NEUROLOGY | Age: 74
End: 2024-04-03

## 2024-04-03 LAB — PYRIDOXAL PHOS SERPL-SCNC: 181.5 NMOL/L (ref 20–125)

## 2024-04-03 NOTE — TELEPHONE ENCOUNTER
----- Message from Paige L Leopold, APRN - CNP sent at 4/3/2024 10:09 AM EDT -----  Please let patient know her vitamin B6 level is elevated. She needs to stop vitamin B6 supplements as elevated vitamin B6 level can be neurotoxic  Paige Leopold, CNP

## 2024-04-03 NOTE — TELEPHONE ENCOUNTER
Daughter Sil requesting results for MRI and EEG on Magan per HIPAA.     New patient appt : 3/12/24 - Dr. Sr

## 2024-04-04 ENCOUNTER — HOSPITAL ENCOUNTER (OUTPATIENT)
Dept: CARDIAC REHAB | Age: 74
Setting detail: THERAPIES SERIES
Discharge: HOME | End: 2024-04-04
Payer: MEDICARE

## 2024-04-04 DIAGNOSIS — R00.0 TACHYCARDIA: ICD-10-CM

## 2024-04-04 DIAGNOSIS — F32.5 MAJOR DEPRESSIVE DISORDER, SINGLE EPISODE, IN FULL REMISSION (HCC): ICD-10-CM

## 2024-04-04 PROCEDURE — 94626 PHY/QHP OP PULM RHB W/MNTR: CPT

## 2024-04-04 RX ORDER — METOPROLOL SUCCINATE 25 MG/1
25 TABLET, EXTENDED RELEASE ORAL DAILY
Qty: 90 TABLET | Refills: 3 | Status: SHIPPED | OUTPATIENT
Start: 2024-04-04

## 2024-04-04 RX ORDER — HYDROXYZINE HYDROCHLORIDE 25 MG/1
TABLET, FILM COATED ORAL
Qty: 90 TABLET | Refills: 2 | Status: SHIPPED | OUTPATIENT
Start: 2024-04-04

## 2024-04-04 NOTE — TELEPHONE ENCOUNTER
Daughter Sil called office requesting refills of the Metoprolol and Hydroxyzine to [facility] [address]. If no call back she will check with them after 5pm. Refill if appropriate.

## 2024-04-05 ENCOUNTER — TELEPHONE (OUTPATIENT)
Dept: CARDIOLOGY CLINIC | Age: 74
End: 2024-04-05

## 2024-04-08 ENCOUNTER — CARE COORDINATION (OUTPATIENT)
Dept: CARE COORDINATION | Age: 74
End: 2024-04-08

## 2024-04-08 ASSESSMENT — ENCOUNTER SYMPTOMS: DYSPNEA ASSOCIATED WITH: MINIMAL EXERTION

## 2024-04-08 NOTE — CARE COORDINATION
Attempted to reach Magan today for care coordination f/u.  No answer.  Message left to return call.   Pt returned call  Ambulatory Care Coordination Note  2024    Patient Current Location:  Home: [address] [place] [id number]     ACM contacted the patient by telephone. Verified name and  with patient as identifiers. Provided introduction to self, and explanation of the ACM role.     Challenges to be reviewed by the provider   Additional needs identified to be addressed with provider: No  none               Method of communication with provider: none.    ACM: Ekta Noel RN    Magan was referred to care coordination by PCP for education and assistance in managing her chronic conditions.  Pt has h/o: COPD, HLD, depression, CVA.  Spoke with Magan today for f/u.   Pt has upcoming ear surgery on .  Will have pre-op visit on 4/10.    Pt has returned to pulmonary rehab.  Was cleared to return to by cardiology. Has been participating in Pulm rehab.   COPD: breathing remains at baseline.  No new cough or congestion.    Pt reports that she is wearing her oxygen as prescribed.   Pulse ox reading 97%.  Continues to have trouble with oxygen staying on d/t ear cartilage issue.  Surgery to take place on .   Pt very forgetful. Friend comes over and helps her frequently.  Pt also had daughter, Sil helping her with medications and attending appts with her.    Recent labs revealed elevated B6 and B12 levels.  Pt reports that she has not been taking supplements for either one.  Pt will have repeat levels right before her  neuro appt.    Had recent MRI brain which will be reviewed in detail at neuro appt.   Pt reports that she had some diarrhea yesterday.  None so far today.  Encouraged to stay hydrated. Pt admits that she doesn't drink as much water as she should.  Encouraged to try increasing water intake.   No diarrhea today.  Advised if diarrhea returns and persists for more than 2 days to call.  Plan of

## 2024-04-09 ENCOUNTER — APPOINTMENT (OUTPATIENT)
Dept: CARDIAC REHAB | Age: 74
End: 2024-04-09
Payer: MEDICARE

## 2024-04-09 ENCOUNTER — HOSPITAL ENCOUNTER (OUTPATIENT)
Dept: CARDIAC REHAB | Age: 74
Discharge: HOME | End: 2024-04-09

## 2024-04-09 NOTE — PLAN OF CARE
Rylee Guevara EP  Exercise Physiologist  Pulmonary Rehab     Plan of Care     Signed     Date of Service: 3/14/2024  2:00 PM     Signed                   [facility] Facility-Based Therapy for COPD   INDIVIDUAL TREATMENT PLAN (ITP)      Name: Magan HOOD Anirudh RANDHAWA.:  1950  Acct Number: [id number]   AGE: 73 y.o.    Diagnosis:  moderate COPD, which is GOLD Stage 2                                               PFT:  Post Bronchodilator FEV1/FVC: 59  FEV1: 59    Patient Goals:  (x) Breathe better  (x) Increase my endurance (x) Have more energy  (x) Rely less on others  (x) Ease ADL’s  (x) Understand and use meds correctly  (x) Control cough  (x) Make fewer visits to hospital  () Quit smoking  (x) Feel less anxious / have more confidence    Glossary:  TX=Pulmonary Rehab  PF=Physical Fitness TM=Treadmill  AD=Schwinn Airdyne  UBE=UpperBody Ergometer  RT=Resistance Training  PLB=Pursed Lip Breathing           The following Education has been completed with patient.  [x] Wait in the lobby until we call you back to rehab.  [x] Bring your own bottle of water with you.  [x] You can not bring anyone with you in to the rehab clinic. They are welcome to sit in the lobby or wait in the car.        INDIVIDUAL TREATMENT PLAN     INITIAL ASSESSMENT     Day #1 INDIVIDUAL TREATMENT PLAN     RE ASSESSMENT     Day #2-30 INDIVIDUAL TREATMENT PLAN     RE ASSESSMENT     Day #31-60 INDIVIDUAL TREATMENT PLAN     RE ASSESSMENT     Day #61-90 INDIVIDUAL TREATMENT PLAN     RE ASSESSMENT     Day # INDIVIDUAL TREATMENT PLAN        DISCHARGE     Last Day          Date: 2024       Date: 3/12/24    Date: 24  Pt did not attend pulmonary rehab today    Date:     Date:     Date:    EXERCISE   INITIAL ASSESSMENT        Prescribed Oxygen Use  Activity: 4 L/min  () Continuous  (x) Breath Actuated        Oxygen Titration  (x) Assess for desaturation                 Current Home Exercise:  None

## 2024-04-11 ENCOUNTER — HOSPITAL ENCOUNTER (OUTPATIENT)
Dept: CARDIAC REHAB | Age: 74
Setting detail: THERAPIES SERIES
Discharge: HOME | End: 2024-04-11
Payer: MEDICARE

## 2024-04-11 PROCEDURE — 94626 PHY/QHP OP PULM RHB W/MNTR: CPT

## 2024-04-16 ENCOUNTER — HOSPITAL ENCOUNTER (OUTPATIENT)
Dept: CARDIAC REHAB | Age: 74
Setting detail: THERAPIES SERIES
Discharge: HOME | End: 2024-04-16
Payer: MEDICARE

## 2024-04-16 PROCEDURE — 94626 PHY/QHP OP PULM RHB W/MNTR: CPT

## 2024-04-18 ENCOUNTER — HOSPITAL ENCOUNTER (OUTPATIENT)
Dept: CARDIAC REHAB | Age: 74
Setting detail: THERAPIES SERIES
Discharge: HOME | End: 2024-04-18
Payer: MEDICARE

## 2024-04-18 PROCEDURE — 94626 PHY/QHP OP PULM RHB W/MNTR: CPT

## 2024-04-22 ENCOUNTER — APPOINTMENT (OUTPATIENT)
Dept: CT IMAGING | Age: 74
End: 2024-04-22
Payer: MEDICARE

## 2024-04-22 ENCOUNTER — APPOINTMENT (OUTPATIENT)
Dept: GENERAL RADIOLOGY | Age: 74
End: 2024-04-22
Payer: MEDICARE

## 2024-04-22 ENCOUNTER — HOSPITAL ENCOUNTER (INPATIENT)
Age: 74
LOS: 5 days | Discharge: NURSING, REHAB OR LONG-TERM CARE | End: 2024-04-27
Attending: EMERGENCY MEDICINE | Admitting: SURGERY
Payer: MEDICARE

## 2024-04-22 DIAGNOSIS — G89.11 ACUTE PAIN DUE TO TRAUMA: ICD-10-CM

## 2024-04-22 DIAGNOSIS — S22.41XA CLOSED FRACTURE OF MULTIPLE RIBS OF RIGHT SIDE, INITIAL ENCOUNTER: Primary | ICD-10-CM

## 2024-04-22 DIAGNOSIS — S22.41XD CLOSED FRACTURE OF MULTIPLE RIBS OF RIGHT SIDE WITH ROUTINE HEALING: ICD-10-CM

## 2024-04-22 PROBLEM — S22.49XA MULTIPLE RIB FRACTURES INVOLVING FOUR OR MORE RIBS: Status: ACTIVE | Noted: 2024-04-22

## 2024-04-22 LAB
ANION GAP SERPL CALC-SCNC: 17 MEQ/L (ref 8–16)
BASOPHILS ABSOLUTE: 0.1 THOU/MM3 (ref 0–0.1)
BASOPHILS NFR BLD AUTO: 0.7 %
BILIRUB UR QL STRIP.AUTO: NEGATIVE
BUN SERPL-MCNC: 20 MG/DL (ref 7–22)
CALCIUM SERPL-MCNC: 9.7 MG/DL (ref 8.5–10.5)
CHARACTER UR: CLEAR
CHLORIDE SERPL-SCNC: 100 MEQ/L (ref 98–111)
CO2 SERPL-SCNC: 25 MEQ/L (ref 23–33)
COLOR: YELLOW
CREAT SERPL-MCNC: 0.8 MG/DL (ref 0.4–1.2)
DEPRECATED RDW RBC AUTO: 40.8 FL (ref 35–45)
EOSINOPHIL NFR BLD AUTO: 0.5 %
EOSINOPHILS ABSOLUTE: 0.1 THOU/MM3 (ref 0–0.4)
ERYTHROCYTE [DISTWIDTH] IN BLOOD BY AUTOMATED COUNT: 12.2 % (ref 11.5–14.5)
GFR SERPL CREATININE-BSD FRML MDRD: 77 ML/MIN/1.73M2
GLUCOSE SERPL-MCNC: 128 MG/DL (ref 70–108)
GLUCOSE UR QL STRIP.AUTO: NEGATIVE MG/DL
HCT VFR BLD AUTO: 44.2 % (ref 37–47)
HGB BLD-MCNC: 14.1 GM/DL (ref 12–16)
HGB UR QL STRIP.AUTO: NEGATIVE
IMM GRANULOCYTES # BLD AUTO: 0.11 THOU/MM3 (ref 0–0.07)
IMM GRANULOCYTES NFR BLD AUTO: 0.7 %
KETONES UR QL STRIP.AUTO: ABNORMAL
LYMPHOCYTES ABSOLUTE: 1.6 THOU/MM3 (ref 1–4.8)
LYMPHOCYTES NFR BLD AUTO: 10.7 %
MAGNESIUM SERPL-MCNC: 2 MG/DL (ref 1.6–2.4)
MCH RBC QN AUTO: 28.8 PG (ref 26–33)
MCHC RBC AUTO-ENTMCNC: 31.9 GM/DL (ref 32.2–35.5)
MCV RBC AUTO: 90.2 FL (ref 81–99)
MONOCYTES ABSOLUTE: 0.7 THOU/MM3 (ref 0.4–1.3)
MONOCYTES NFR BLD AUTO: 4.7 %
NEUTROPHILS NFR BLD AUTO: 82.7 %
NITRITE UR QL STRIP: NEGATIVE
NRBC BLD AUTO-RTO: 0 /100 WBC
NT-PROBNP SERPL IA-MCNC: 188.5 PG/ML (ref 0–124)
OSMOLALITY SERPL CALC.SUM OF ELEC: 287.4 MOSMOL/KG (ref 275–300)
PH UR STRIP.AUTO: 5.5 [PH] (ref 5–9)
PLATELET # BLD AUTO: 230 THOU/MM3 (ref 130–400)
PLATELET BLD QL SMEAR: ADEQUATE
PMV BLD AUTO: 11.8 FL (ref 9.4–12.4)
POTASSIUM SERPL-SCNC: 4.4 MEQ/L (ref 3.5–5.2)
PROT UR STRIP.AUTO-MCNC: NEGATIVE MG/DL
RBC # BLD AUTO: 4.9 MILL/MM3 (ref 4.2–5.4)
SCAN OF BLOOD SMEAR: NORMAL
SEGMENTED NEUTROPHILS ABSOLUTE COUNT: 12.5 THOU/MM3 (ref 1.8–7.7)
SODIUM SERPL-SCNC: 142 MEQ/L (ref 135–145)
SP GR UR REFRACT.AUTO: 1.02 (ref 1–1.03)
T4 FREE SERPL-MCNC: 1.25 NG/DL (ref 0.93–1.68)
TROPONIN, HIGH SENSITIVITY: < 6 NG/L (ref 0–12)
TSH SERPL DL<=0.005 MIU/L-ACNC: 1.61 UIU/ML (ref 0.4–4.2)
UROBILINOGEN, URINE: 0.2 EU/DL (ref 0–1)
WBC # BLD AUTO: 15.1 THOU/MM3 (ref 4.8–10.8)
WBC #/AREA URNS HPF: NEGATIVE /[HPF]

## 2024-04-22 PROCEDURE — 81003 URINALYSIS AUTO W/O SCOPE: CPT

## 2024-04-22 PROCEDURE — 99285 EMERGENCY DEPT VISIT HI MDM: CPT

## 2024-04-22 PROCEDURE — 96374 THER/PROPH/DIAG INJ IV PUSH: CPT

## 2024-04-22 PROCEDURE — 70450 CT HEAD/BRAIN W/O DYE: CPT

## 2024-04-22 PROCEDURE — 74177 CT ABD & PELVIS W/CONTRAST: CPT

## 2024-04-22 PROCEDURE — 96372 THER/PROPH/DIAG INJ SC/IM: CPT

## 2024-04-22 PROCEDURE — 6360000004 HC RX CONTRAST MEDICATION

## 2024-04-22 PROCEDURE — 85025 COMPLETE CBC W/AUTO DIFF WBC: CPT

## 2024-04-22 PROCEDURE — 83880 ASSAY OF NATRIURETIC PEPTIDE: CPT

## 2024-04-22 PROCEDURE — 1200000000 HC SEMI PRIVATE

## 2024-04-22 PROCEDURE — 6370000000 HC RX 637 (ALT 250 FOR IP)

## 2024-04-22 PROCEDURE — 36415 COLL VENOUS BLD VENIPUNCTURE: CPT

## 2024-04-22 PROCEDURE — 71101 X-RAY EXAM UNILAT RIBS/CHEST: CPT

## 2024-04-22 PROCEDURE — 84443 ASSAY THYROID STIM HORMONE: CPT

## 2024-04-22 PROCEDURE — 6370000000 HC RX 637 (ALT 250 FOR IP): Performed by: NURSE PRACTITIONER

## 2024-04-22 PROCEDURE — 80048 BASIC METABOLIC PNL TOTAL CA: CPT

## 2024-04-22 PROCEDURE — 6360000002 HC RX W HCPCS

## 2024-04-22 PROCEDURE — 93010 ELECTROCARDIOGRAM REPORT: CPT | Performed by: NUCLEAR MEDICINE

## 2024-04-22 PROCEDURE — 84484 ASSAY OF TROPONIN QUANT: CPT

## 2024-04-22 PROCEDURE — 71260 CT THORAX DX C+: CPT

## 2024-04-22 PROCEDURE — 93005 ELECTROCARDIOGRAM TRACING: CPT | Performed by: EMERGENCY MEDICINE

## 2024-04-22 PROCEDURE — 83735 ASSAY OF MAGNESIUM: CPT

## 2024-04-22 PROCEDURE — 96375 TX/PRO/DX INJ NEW DRUG ADDON: CPT

## 2024-04-22 PROCEDURE — P9612 CATHETERIZE FOR URINE SPEC: HCPCS

## 2024-04-22 PROCEDURE — 84439 ASSAY OF FREE THYROXINE: CPT

## 2024-04-22 PROCEDURE — 6820000001 HC L2 TRAUMA SURGERY EVALUATION: Performed by: SURGERY

## 2024-04-22 PROCEDURE — 6360000002 HC RX W HCPCS: Performed by: NURSE PRACTITIONER

## 2024-04-22 RX ORDER — OXYCODONE HYDROCHLORIDE AND ACETAMINOPHEN 5; 325 MG/1; MG/1
1 TABLET ORAL ONCE
Status: COMPLETED | OUTPATIENT
Start: 2024-04-22 | End: 2024-04-22

## 2024-04-22 RX ORDER — LIDOCAINE 4 G/G
1 PATCH TOPICAL DAILY
Status: DISCONTINUED | OUTPATIENT
Start: 2024-04-22 | End: 2024-04-24

## 2024-04-22 RX ORDER — MORPHINE SULFATE 2 MG/ML
2 INJECTION, SOLUTION INTRAMUSCULAR; INTRAVENOUS ONCE
Status: COMPLETED | OUTPATIENT
Start: 2024-04-22 | End: 2024-04-22

## 2024-04-22 RX ORDER — HYDROCODONE BITARTRATE AND ACETAMINOPHEN 5; 325 MG/1; MG/1
1 TABLET ORAL ONCE
Status: COMPLETED | OUTPATIENT
Start: 2024-04-22 | End: 2024-04-22

## 2024-04-22 RX ORDER — HYDROXYZINE HYDROCHLORIDE 50 MG/ML
25 INJECTION, SOLUTION INTRAMUSCULAR ONCE
Status: COMPLETED | OUTPATIENT
Start: 2024-04-22 | End: 2024-04-22

## 2024-04-22 RX ADMIN — HYDROXYZINE HYDROCHLORIDE 25 MG: 50 INJECTION, SOLUTION INTRAMUSCULAR at 20:22

## 2024-04-22 RX ADMIN — HYDROCODONE BITARTRATE AND ACETAMINOPHEN 1 TABLET: 5; 325 TABLET ORAL at 16:23

## 2024-04-22 RX ADMIN — OXYCODONE HYDROCHLORIDE AND ACETAMINOPHEN 1 TABLET: 5; 325 TABLET ORAL at 23:36

## 2024-04-22 RX ADMIN — IOPAMIDOL 80 ML: 755 INJECTION, SOLUTION INTRAVENOUS at 19:10

## 2024-04-22 RX ADMIN — MORPHINE SULFATE 2 MG: 2 INJECTION, SOLUTION INTRAMUSCULAR; INTRAVENOUS at 19:26

## 2024-04-22 RX ADMIN — HYDROMORPHONE HYDROCHLORIDE 0.5 MG: 1 INJECTION, SOLUTION INTRAMUSCULAR; INTRAVENOUS; SUBCUTANEOUS at 21:16

## 2024-04-22 ASSESSMENT — ENCOUNTER SYMPTOMS
BACK PAIN: 0
VOMITING: 0
RHINORRHEA: 0
ABDOMINAL PAIN: 0
DIARRHEA: 0
SHORTNESS OF BREATH: 0
SORE THROAT: 0
NAUSEA: 0

## 2024-04-22 ASSESSMENT — PAIN SCALES - GENERAL
PAINLEVEL_OUTOF10: 8

## 2024-04-22 ASSESSMENT — PAIN - FUNCTIONAL ASSESSMENT: PAIN_FUNCTIONAL_ASSESSMENT: 0-10

## 2024-04-22 NOTE — ED PROVIDER NOTES
I performed a history and physical examination of the patient and discussed management with the resident. I reviewed the resident’s note and agree with the documented findings and plan of care. Any areas of disagreement are noted on the chart. I was personally present for the key portions of any procedures. I have documented in the chart those procedures where I was not present during the key portions. I have reviewed the emergency nurses triage note. I agree with the chief complaint, past medical history, past surgical history, allergies, medications, social and family history as documented unless otherwise noted below. Documentation of the HPI, Physical Exam and Medical Decision Making performed by medical students or scribes is based on my personal performance of the HPI, PE and MDM. For Phys Assistant/ Nurse Practitioner cases/documentation I have personally evaluated this patient and have completed at least one if not all key elements of the E/M (history, physical exam, and MDM). My findings are as noted below.    In other words, I personally saw and examined the patient I have reviewed and agreed with the resident findings including all diagnostic interpretations and treatment plans as written.  I was present for the key portion of any procedures performed and the inclusive time noted in any critical care statement.    Patient presents today for chest pain.  Apparently the patient stumbled forward and fell on her air concentrator.  Patient states she was not having any chest pain prior to that.  Patient is complaining of exquisite pain along the right rib area.  On to the right abdomen.  Patient is not having any shortness of breath.  Pain medications were given.  Examination of the patient's chest wall did not reveal any crepitus, no subcu emphysema, there was no evidence of flail chest.  Abdomen was sore, but soft.  Heart rate and regular regular S1-S2 lungs were clear to auscultation.    EKG reveals normal  sinus rhythm normal axis ventricular rate of 77 NE interval 138 QRS duration 74 QT interval 424 QTc 479      CT CHEST W CONTRAST   Final Result   1. Acute right rib fractures.      This document has been electronically signed by: Kiel Meehan MD on    04/22/2024 07:54 PM      All CTs at this facility use dose modulation techniques and iterative    reconstructions, and/or weight-based dosing   when appropriate to reduce radiation to a low as reasonably achievable.      CT ABDOMEN PELVIS W IV CONTRAST Additional Contrast? None   Final Result   No acute findings.      This document has been electronically signed by: Kiel Meehan MD on    04/22/2024 08:05 PM      All CTs at this facility use dose modulation techniques and iterative    reconstructions, and/or weight-based dosing   when appropriate to reduce radiation to a low as reasonably achievable.      CT HEAD WO CONTRAST   Final Result       1. Stable CT scan of the brain, no interval change since previous MRI scan dated 3/29/2024..               **This report has been created using voice recognition software. It may contain minor errors which are inherent in voice recognition technology.**      Final report electronically signed by DR STEVE MATTSON on 4/22/2024 4:55 PM      XR RIBS RIGHT INCLUDE CHEST (MIN 3 VIEWS)   Final Result   1. No acute intrathoracic findings.   2. No right rib fracture.      Final report electronically signed by Dr. Horacio Wayne on 4/22/2024 3:55 PM        Labs Reviewed   URINALYSIS WITH REFLEX TO CULTURE - Abnormal; Notable for the following components:       Result Value    Ketones, Urine TRACE (*)     All other components within normal limits   BASIC METABOLIC PANEL - Abnormal; Notable for the following components:    Glucose 128 (*)     All other components within normal limits   CBC WITH AUTO DIFFERENTIAL - Abnormal; Notable for the following components:    WBC 15.1 (*)     MCHC 31.9 (*)     Segs Absolute 12.5 (*)     Immature Grans

## 2024-04-22 NOTE — ED PROVIDER NOTES
[facility] EMERGENCY DEPARTMENT    EMERGENCY MEDICINE     Patient Name: Magan Oleary  MRN: [id number]  YOB: 1950  Date of Evaluation: 4/22/2024  Treating Resident Physician: Raad Patel DO  Supervising Physician: Dr. Brady Lopez DO    CHIEF COMPLAINT       Chief Complaint   Patient presents with    Chest Pain    Fall       HISTORY OF PRESENT ILLNESS      History obtained from spouse and child and the patient.    Magan Oleary is a 73 y.o. female who presents to the emergency department from home by private vehicle for evaluation of fall. PMH brain aneurysm, ischemic stroke hypothyroid.  Patient was facility with chest pain,  She felt lightheaded and supposedly fell on her oxygen concentrator.  Baseline on 4L NCL. Patient does not recall the fall, does not recall losing consciousness.  History provided with daughter, patient states she is not too much pain.  Patient denied any headache, vision changes, nause  a or vomiting, chest pain or shortness of breath.  She does have right upper anterior chest pain location of the fall. No seizure like activity or prior hx of seizures.      Pertinent previous and/or external records reviewed: Non-contributory    PAST MEDICAL AND SURGICAL HISTORY     Past Medical History:   Diagnosis Date    Aneurysm, cerebral     COPD (chronic obstructive pulmonary disease) (HCC)     Dupuytren's contracture of both hands     Hiatal hernia     Hyperlipidemia     Osteoarthritis     Pneumonia     Stroke (HCC)     Thyroid disease     Unspecified cerebral artery occlusion with cerebral infarction        Past Surgical History:   Procedure Laterality Date    BRAIN ANEURYSM SURGERY      CARPAL TUNNEL RELEASE      COLONOSCOPY      2009, 2020, 2022    COSMETIC SURGERY  2009,2010    juvederm,botox    COSMETIC SURGERY  2013    Restylane, Botox    EGD      1991    EXTERNAL EAR SURGERY  2000    reconstruction of lft ear w/re-opening of canal & pinning back of superior ear    EXTERNAL  IntraVENous Given 4/22/24 1926)   hydrOXYzine (VISTARIL) injection 25 mg (25 mg IntraMUSCular Given 4/22/24 2022)       ED Course as of 04/22/24 2042 Mon Apr 22, 2024   1637 XR RIBS RIGHT INCLUDE CHEST (MIN 3 VIEWS)  IMPRESSION:  1. No acute intrathoracic findings.  2. No right rib fracture.   [SJ]   1734 WBC(!): 15.1 []   1837 Urinalysis with Reflex to Culture(!):    Glucose, UA NEGATIVE   Bilirubin, Urine NEGATIVE   Ketones, Urine TRACE(!)   Specific Gravity, Urine 1.023   Blood, Urine NEGATIVE   pH, UA 5.5   Protein, UA NEGATIVE   Urobilinogen, Urine 0.2   Nitrite, Urine NEGATIVE   Leukocyte Esterase, Urine NEGATIVE   Color, UA YELLOW   Character, Urine CLEAR  negative []   2020 CT CHEST W CONTRAST  Acute fractures of the right anterior 4th, 5th, and 6th ribs, mildly   displaced.   []   2030 Spoke with Trauma Dr. Lowry regarding patient's acute multiple fractures are mildly displaced trauma will evaluate and admit patient []      ED Course User Index  [] Raad Patel DO       Procedures: (None if left blank)  Procedures:     Consultants:  STR ED TO IP CONSULT    Decision Rules/Clinical Scores utilized:  Not Applicable     CRITICAL CARE:  None    MEDICATION CHANGES     New Prescriptions    No medications on file       FINAL IMPRESSION     Final diagnoses:   Closed fracture of multiple ribs of right side, initial encounter       DISPOSITION   Condition: condition: fair  Dispo: Admit to trauma   DISPOSITION Decision To Admit 04/22/2024 08:40:10 PM        Outpatient Follow-Up:  No follow-up provider specified.    DISCHARGE MEDICATIONS:  New Prescriptions    No medications on file         This transcription was electronically signed. Parts of this transcriptions may have been dictated by use of voice recognition software and electronically transcribed. The transcription may contain errors not detected in proofreading. Please refer to my supervising physician's documentation if my documentation

## 2024-04-22 NOTE — ED TRIAGE NOTES
Pt to ED with chest pain after falling and landing on her oxygen concentrator. Pt states that she tripped. States that the middle/right side of her chest hurts. Hurts to take a deep breath. EKG done.

## 2024-04-23 ENCOUNTER — HOSPITAL ENCOUNTER (OUTPATIENT)
Dept: CARDIAC REHAB | Age: 74
Setting detail: THERAPIES SERIES
End: 2024-04-23
Payer: MEDICARE

## 2024-04-23 ENCOUNTER — APPOINTMENT (OUTPATIENT)
Dept: GENERAL RADIOLOGY | Age: 74
End: 2024-04-23
Payer: MEDICARE

## 2024-04-23 LAB
AMPHETAMINES UR QL SCN: NEGATIVE
ANION GAP SERPL CALC-SCNC: 13 MEQ/L (ref 8–16)
BARBITURATES UR QL SCN: NEGATIVE
BASOPHILS ABSOLUTE: 0.1 THOU/MM3 (ref 0–0.1)
BASOPHILS NFR BLD AUTO: 0.7 %
BENZODIAZ UR QL SCN: NEGATIVE
BUN SERPL-MCNC: 20 MG/DL (ref 7–22)
BZE UR QL SCN: NEGATIVE
CALCIUM SERPL-MCNC: 9.6 MG/DL (ref 8.5–10.5)
CANNABINOIDS UR QL SCN: NEGATIVE
CHLORIDE SERPL-SCNC: 104 MEQ/L (ref 98–111)
CO2 SERPL-SCNC: 26 MEQ/L (ref 23–33)
CREAT SERPL-MCNC: 0.7 MG/DL (ref 0.4–1.2)
DEPRECATED RDW RBC AUTO: 40.2 FL (ref 35–45)
EOSINOPHIL NFR BLD AUTO: 0.2 %
EOSINOPHILS ABSOLUTE: 0 THOU/MM3 (ref 0–0.4)
ERYTHROCYTE [DISTWIDTH] IN BLOOD BY AUTOMATED COUNT: 12.5 % (ref 11.5–14.5)
ETHANOL SERPL-MCNC: < 0.01 %
FENTANYL: NEGATIVE
GFR SERPL CREATININE-BSD FRML MDRD: > 90 ML/MIN/1.73M2
GLUCOSE BLD STRIP.AUTO-MCNC: 128 MG/DL (ref 70–108)
GLUCOSE SERPL-MCNC: 121 MG/DL (ref 70–108)
HCT VFR BLD AUTO: 41.5 % (ref 37–47)
HGB BLD-MCNC: 13.4 GM/DL (ref 12–16)
IMM GRANULOCYTES # BLD AUTO: 0.02 THOU/MM3 (ref 0–0.07)
IMM GRANULOCYTES NFR BLD AUTO: 0.2 %
LYMPHOCYTES ABSOLUTE: 1.9 THOU/MM3 (ref 1–4.8)
LYMPHOCYTES NFR BLD AUTO: 15.6 %
MCH RBC QN AUTO: 28.6 PG (ref 26–33)
MCHC RBC AUTO-ENTMCNC: 32.3 GM/DL (ref 32.2–35.5)
MCV RBC AUTO: 88.7 FL (ref 81–99)
MONOCYTES ABSOLUTE: 1.4 THOU/MM3 (ref 0.4–1.3)
MONOCYTES NFR BLD AUTO: 11.3 %
NEUTROPHILS NFR BLD AUTO: 72 %
NRBC BLD AUTO-RTO: 0 /100 WBC
OPIATES UR QL SCN: POSITIVE
OXYCODONE: NEGATIVE
PCP UR QL SCN: NEGATIVE
PLATELET # BLD AUTO: 229 THOU/MM3 (ref 130–400)
PMV BLD AUTO: 11.7 FL (ref 9.4–12.4)
POTASSIUM SERPL-SCNC: 4.4 MEQ/L (ref 3.5–5.2)
RBC # BLD AUTO: 4.68 MILL/MM3 (ref 4.2–5.4)
SEGMENTED NEUTROPHILS ABSOLUTE COUNT: 8.9 THOU/MM3 (ref 1.8–7.7)
SODIUM SERPL-SCNC: 143 MEQ/L (ref 135–145)
WBC # BLD AUTO: 12.3 THOU/MM3 (ref 4.8–10.8)

## 2024-04-23 PROCEDURE — 97166 OT EVAL MOD COMPLEX 45 MIN: CPT

## 2024-04-23 PROCEDURE — 36415 COLL VENOUS BLD VENIPUNCTURE: CPT

## 2024-04-23 PROCEDURE — 85025 COMPLETE CBC W/AUTO DIFF WBC: CPT

## 2024-04-23 PROCEDURE — 97129 THER IVNTJ 1ST 15 MIN: CPT

## 2024-04-23 PROCEDURE — 6370000000 HC RX 637 (ALT 250 FOR IP): Performed by: NURSE PRACTITIONER

## 2024-04-23 PROCEDURE — 80048 BASIC METABOLIC PNL TOTAL CA: CPT

## 2024-04-23 PROCEDURE — 94761 N-INVAS EAR/PLS OXIMETRY MLT: CPT

## 2024-04-23 PROCEDURE — APPSS30 APP SPLIT SHARED TIME 16-30 MINUTES: Performed by: PHYSICIAN ASSISTANT

## 2024-04-23 PROCEDURE — 1200000000 HC SEMI PRIVATE

## 2024-04-23 PROCEDURE — 94799 UNLISTED PULMONARY SVC/PX: CPT

## 2024-04-23 PROCEDURE — 92523 SPEECH SOUND LANG COMPREHEN: CPT

## 2024-04-23 PROCEDURE — 80307 DRUG TEST PRSMV CHEM ANLYZR: CPT

## 2024-04-23 PROCEDURE — 82077 ASSAY SPEC XCP UR&BREATH IA: CPT

## 2024-04-23 PROCEDURE — 97530 THERAPEUTIC ACTIVITIES: CPT

## 2024-04-23 PROCEDURE — 94010 BREATHING CAPACITY TEST: CPT

## 2024-04-23 PROCEDURE — 2580000003 HC RX 258: Performed by: NURSE PRACTITIONER

## 2024-04-23 PROCEDURE — 94640 AIRWAY INHALATION TREATMENT: CPT

## 2024-04-23 PROCEDURE — 2700000000 HC OXYGEN THERAPY PER DAY

## 2024-04-23 PROCEDURE — 71045 X-RAY EXAM CHEST 1 VIEW: CPT

## 2024-04-23 PROCEDURE — 6360000002 HC RX W HCPCS: Performed by: NURSE PRACTITIONER

## 2024-04-23 PROCEDURE — 82948 REAGENT STRIP/BLOOD GLUCOSE: CPT

## 2024-04-23 PROCEDURE — 97162 PT EVAL MOD COMPLEX 30 MIN: CPT

## 2024-04-23 RX ORDER — OXYCODONE HYDROCHLORIDE 5 MG/1
10 TABLET ORAL EVERY 4 HOURS PRN
Status: DISCONTINUED | OUTPATIENT
Start: 2024-04-23 | End: 2024-04-24

## 2024-04-23 RX ORDER — SODIUM CHLORIDE 9 MG/ML
INJECTION, SOLUTION INTRAVENOUS CONTINUOUS
Status: DISCONTINUED | OUTPATIENT
Start: 2024-04-23 | End: 2024-04-27 | Stop reason: HOSPADM

## 2024-04-23 RX ORDER — POLYETHYLENE GLYCOL 3350 17 G/17G
17 POWDER, FOR SOLUTION ORAL DAILY
Status: DISCONTINUED | OUTPATIENT
Start: 2024-04-23 | End: 2024-04-27 | Stop reason: HOSPADM

## 2024-04-23 RX ORDER — SODIUM CHLORIDE 9 MG/ML
INJECTION, SOLUTION INTRAVENOUS PRN
Status: DISCONTINUED | OUTPATIENT
Start: 2024-04-23 | End: 2024-04-27 | Stop reason: HOSPADM

## 2024-04-23 RX ORDER — ENOXAPARIN SODIUM 100 MG/ML
40 INJECTION SUBCUTANEOUS DAILY
Status: DISCONTINUED | OUTPATIENT
Start: 2024-04-23 | End: 2024-04-24

## 2024-04-23 RX ORDER — SODIUM CHLORIDE 0.9 % (FLUSH) 0.9 %
5-40 SYRINGE (ML) INJECTION PRN
Status: DISCONTINUED | OUTPATIENT
Start: 2024-04-23 | End: 2024-04-27 | Stop reason: HOSPADM

## 2024-04-23 RX ORDER — ATOMOXETINE 40 MG/1
40 CAPSULE ORAL DAILY
Status: DISCONTINUED | OUTPATIENT
Start: 2024-04-23 | End: 2024-04-27 | Stop reason: HOSPADM

## 2024-04-23 RX ORDER — METOPROLOL SUCCINATE 25 MG/1
25 TABLET, EXTENDED RELEASE ORAL DAILY
Status: DISCONTINUED | OUTPATIENT
Start: 2024-04-23 | End: 2024-04-27 | Stop reason: HOSPADM

## 2024-04-23 RX ORDER — MORPHINE SULFATE 2 MG/ML
2 INJECTION, SOLUTION INTRAMUSCULAR; INTRAVENOUS EVERY 4 HOURS PRN
Status: DISCONTINUED | OUTPATIENT
Start: 2024-04-23 | End: 2024-04-24

## 2024-04-23 RX ORDER — OXYCODONE HYDROCHLORIDE 5 MG/1
5 TABLET ORAL EVERY 4 HOURS PRN
Status: DISCONTINUED | OUTPATIENT
Start: 2024-04-23 | End: 2024-04-24

## 2024-04-23 RX ORDER — BISACODYL 10 MG
10 SUPPOSITORY, RECTAL RECTAL DAILY PRN
Status: DISCONTINUED | OUTPATIENT
Start: 2024-04-23 | End: 2024-04-27 | Stop reason: HOSPADM

## 2024-04-23 RX ORDER — POTASSIUM CHLORIDE 29.8 MG/ML
20 INJECTION INTRAVENOUS PRN
Status: DISCONTINUED | OUTPATIENT
Start: 2024-04-23 | End: 2024-04-27 | Stop reason: HOSPADM

## 2024-04-23 RX ORDER — DONEPEZIL HYDROCHLORIDE 10 MG/1
10 TABLET, FILM COATED ORAL EVERY EVENING
Status: DISCONTINUED | OUTPATIENT
Start: 2024-04-23 | End: 2024-04-27 | Stop reason: HOSPADM

## 2024-04-23 RX ORDER — ONDANSETRON 2 MG/ML
4 INJECTION INTRAMUSCULAR; INTRAVENOUS EVERY 6 HOURS PRN
Status: DISCONTINUED | OUTPATIENT
Start: 2024-04-23 | End: 2024-04-27 | Stop reason: HOSPADM

## 2024-04-23 RX ORDER — DULOXETIN HYDROCHLORIDE 60 MG/1
60 CAPSULE, DELAYED RELEASE ORAL DAILY
Status: DISCONTINUED | OUTPATIENT
Start: 2024-04-23 | End: 2024-04-27 | Stop reason: HOSPADM

## 2024-04-23 RX ORDER — FLUTICASONE PROPIONATE 50 MCG
1 SPRAY, SUSPENSION (ML) NASAL DAILY
Status: DISCONTINUED | OUTPATIENT
Start: 2024-04-23 | End: 2024-04-27 | Stop reason: HOSPADM

## 2024-04-23 RX ORDER — LEVOTHYROXINE SODIUM 0.05 MG/1
50 TABLET ORAL
Status: DISCONTINUED | OUTPATIENT
Start: 2024-04-23 | End: 2024-04-27 | Stop reason: HOSPADM

## 2024-04-23 RX ORDER — SODIUM CHLORIDE 0.9 % (FLUSH) 0.9 %
5-40 SYRINGE (ML) INJECTION EVERY 12 HOURS SCHEDULED
Status: DISCONTINUED | OUTPATIENT
Start: 2024-04-23 | End: 2024-04-27 | Stop reason: HOSPADM

## 2024-04-23 RX ORDER — POTASSIUM CHLORIDE 7.45 MG/ML
10 INJECTION INTRAVENOUS PRN
Status: DISCONTINUED | OUTPATIENT
Start: 2024-04-23 | End: 2024-04-27 | Stop reason: HOSPADM

## 2024-04-23 RX ORDER — ACETAMINOPHEN 325 MG/1
650 TABLET ORAL EVERY 4 HOURS PRN
Status: DISCONTINUED | OUTPATIENT
Start: 2024-04-23 | End: 2024-04-24

## 2024-04-23 RX ORDER — METHOCARBAMOL 500 MG/1
1000 TABLET, FILM COATED ORAL 4 TIMES DAILY
Status: DISCONTINUED | OUTPATIENT
Start: 2024-04-23 | End: 2024-04-27 | Stop reason: HOSPADM

## 2024-04-23 RX ORDER — SENNA AND DOCUSATE SODIUM 50; 8.6 MG/1; MG/1
1 TABLET, FILM COATED ORAL 2 TIMES DAILY
Status: DISCONTINUED | OUTPATIENT
Start: 2024-04-23 | End: 2024-04-27 | Stop reason: HOSPADM

## 2024-04-23 RX ORDER — BISACODYL 5 MG/1
5 TABLET, DELAYED RELEASE ORAL DAILY PRN
Status: DISCONTINUED | OUTPATIENT
Start: 2024-04-23 | End: 2024-04-27 | Stop reason: HOSPADM

## 2024-04-23 RX ORDER — ONDANSETRON 4 MG/1
4 TABLET, ORALLY DISINTEGRATING ORAL EVERY 8 HOURS PRN
Status: DISCONTINUED | OUTPATIENT
Start: 2024-04-23 | End: 2024-04-27 | Stop reason: HOSPADM

## 2024-04-23 RX ORDER — MAGNESIUM SULFATE IN WATER 40 MG/ML
2000 INJECTION, SOLUTION INTRAVENOUS PRN
Status: DISCONTINUED | OUTPATIENT
Start: 2024-04-23 | End: 2024-04-27 | Stop reason: HOSPADM

## 2024-04-23 RX ORDER — HYDROXYZINE HYDROCHLORIDE 25 MG/1
25 TABLET, FILM COATED ORAL 3 TIMES DAILY PRN
Status: DISCONTINUED | OUTPATIENT
Start: 2024-04-23 | End: 2024-04-27 | Stop reason: HOSPADM

## 2024-04-23 RX ADMIN — METHOCARBAMOL 1000 MG: 500 TABLET ORAL at 08:44

## 2024-04-23 RX ADMIN — SODIUM CHLORIDE: 9 INJECTION, SOLUTION INTRAVENOUS at 00:44

## 2024-04-23 RX ADMIN — DONEPEZIL HYDROCHLORIDE 10 MG: 10 TABLET, FILM COATED ORAL at 20:01

## 2024-04-23 RX ADMIN — HYDROXYZINE HYDROCHLORIDE 25 MG: 25 TABLET, FILM COATED ORAL at 08:44

## 2024-04-23 RX ADMIN — ENOXAPARIN SODIUM 40 MG: 100 INJECTION SUBCUTANEOUS at 08:45

## 2024-04-23 RX ADMIN — OXYCODONE 10 MG: 5 TABLET ORAL at 16:53

## 2024-04-23 RX ADMIN — MOMETASONE FUROATE AND FORMOTEROL FUMARATE DIHYDRATE 2 PUFF: 200; 5 AEROSOL RESPIRATORY (INHALATION) at 17:41

## 2024-04-23 RX ADMIN — METOPROLOL SUCCINATE 25 MG: 25 TABLET, FILM COATED, EXTENDED RELEASE ORAL at 08:44

## 2024-04-23 RX ADMIN — METHOCARBAMOL 1000 MG: 500 TABLET ORAL at 20:01

## 2024-04-23 RX ADMIN — METHOCARBAMOL 1000 MG: 500 TABLET ORAL at 04:39

## 2024-04-23 RX ADMIN — HYDROXYZINE HYDROCHLORIDE 25 MG: 25 TABLET, FILM COATED ORAL at 20:01

## 2024-04-23 RX ADMIN — MORPHINE SULFATE 2 MG: 2 INJECTION, SOLUTION INTRAMUSCULAR; INTRAVENOUS at 04:51

## 2024-04-23 RX ADMIN — DULOXETINE HYDROCHLORIDE 60 MG: 60 CAPSULE, DELAYED RELEASE ORAL at 08:44

## 2024-04-23 RX ADMIN — OXYCODONE 10 MG: 5 TABLET ORAL at 12:26

## 2024-04-23 RX ADMIN — OXYCODONE 10 MG: 5 TABLET ORAL at 23:41

## 2024-04-23 RX ADMIN — MORPHINE SULFATE 2 MG: 2 INJECTION, SOLUTION INTRAMUSCULAR; INTRAVENOUS at 15:34

## 2024-04-23 RX ADMIN — TIOTROPIUM BROMIDE INHALATION SPRAY 2 PUFF: 3.12 SPRAY, METERED RESPIRATORY (INHALATION) at 07:28

## 2024-04-23 RX ADMIN — MOMETASONE FUROATE AND FORMOTEROL FUMARATE DIHYDRATE 2 PUFF: 200; 5 AEROSOL RESPIRATORY (INHALATION) at 07:28

## 2024-04-23 RX ADMIN — MORPHINE SULFATE 2 MG: 2 INJECTION, SOLUTION INTRAMUSCULAR; INTRAVENOUS at 20:00

## 2024-04-23 RX ADMIN — LEVOTHYROXINE SODIUM 50 MCG: 0.05 TABLET ORAL at 06:09

## 2024-04-23 RX ADMIN — SENNOSIDES AND DOCUSATE SODIUM 1 TABLET: 50; 8.6 TABLET ORAL at 08:45

## 2024-04-23 RX ADMIN — DONEPEZIL HYDROCHLORIDE 10 MG: 10 TABLET, FILM COATED ORAL at 04:40

## 2024-04-23 RX ADMIN — SODIUM CHLORIDE, PRESERVATIVE FREE 10 ML: 5 INJECTION INTRAVENOUS at 08:45

## 2024-04-23 RX ADMIN — OXYCODONE 10 MG: 5 TABLET ORAL at 06:09

## 2024-04-23 RX ADMIN — SENNOSIDES AND DOCUSATE SODIUM 1 TABLET: 50; 8.6 TABLET ORAL at 04:40

## 2024-04-23 RX ADMIN — SENNOSIDES AND DOCUSATE SODIUM 1 TABLET: 50; 8.6 TABLET ORAL at 20:01

## 2024-04-23 RX ADMIN — OXYCODONE 10 MG: 5 TABLET ORAL at 00:56

## 2024-04-23 RX ADMIN — FLUTICASONE PROPIONATE 1 SPRAY: 50 SPRAY, METERED NASAL at 08:54

## 2024-04-23 RX ADMIN — MORPHINE SULFATE 2 MG: 2 INJECTION, SOLUTION INTRAMUSCULAR; INTRAVENOUS at 08:58

## 2024-04-23 RX ADMIN — METHOCARBAMOL 1000 MG: 500 TABLET ORAL at 14:04

## 2024-04-23 RX ADMIN — METHOCARBAMOL 1000 MG: 500 TABLET ORAL at 16:54

## 2024-04-23 RX ADMIN — ONDANSETRON 4 MG: 2 INJECTION INTRAMUSCULAR; INTRAVENOUS at 08:58

## 2024-04-23 RX ADMIN — ATOMOXETINE 40 MG: 40 CAPSULE ORAL at 08:44

## 2024-04-23 ASSESSMENT — PAIN SCALES - GENERAL
PAINLEVEL_OUTOF10: 8
PAINLEVEL_OUTOF10: 9
PAINLEVEL_OUTOF10: 8
PAINLEVEL_OUTOF10: 8
PAINLEVEL_OUTOF10: 9
PAINLEVEL_OUTOF10: 7
PAINLEVEL_OUTOF10: 8
PAINLEVEL_OUTOF10: 9
PAINLEVEL_OUTOF10: 8
PAINLEVEL_OUTOF10: 9
PAINLEVEL_OUTOF10: 9
PAINLEVEL_OUTOF10: 10
PAINLEVEL_OUTOF10: 7
PAINLEVEL_OUTOF10: 9

## 2024-04-23 ASSESSMENT — PAIN DESCRIPTION - PAIN TYPE
TYPE: ACUTE PAIN
TYPE: ACUTE PAIN

## 2024-04-23 ASSESSMENT — PATIENT HEALTH QUESTIONNAIRE - PHQ9
9. THOUGHTS THAT YOU WOULD BE BETTER OFF DEAD, OR OF HURTING YOURSELF: NOT AT ALL
2. FEELING DOWN, DEPRESSED OR HOPELESS: MORE THAN HALF THE DAYS
6. FEELING BAD ABOUT YOURSELF - OR THAT YOU ARE A FAILURE OR HAVE LET YOURSELF OR YOUR FAMILY DOWN: SEVERAL DAYS
10. IF YOU CHECKED OFF ANY PROBLEMS, HOW DIFFICULT HAVE THESE PROBLEMS MADE IT FOR YOU TO DO YOUR WORK, TAKE CARE OF THINGS AT HOME, OR GET ALONG WITH OTHER PEOPLE: EXTREMELY DIFFICULT
SUM OF ALL RESPONSES TO PHQ QUESTIONS 1-9: 16
SUM OF ALL RESPONSES TO PHQ9 QUESTIONS 1 & 2: 4
4. FEELING TIRED OR HAVING LITTLE ENERGY: MORE THAN HALF THE DAYS
8. MOVING OR SPEAKING SO SLOWLY THAT OTHER PEOPLE COULD HAVE NOTICED. OR THE OPPOSITE, BEING SO FIGETY OR RESTLESS THAT YOU HAVE BEEN MOVING AROUND A LOT MORE THAN USUAL: MORE THAN HALF THE DAYS
7. TROUBLE CONCENTRATING ON THINGS, SUCH AS READING THE NEWSPAPER OR WATCHING TELEVISION: MORE THAN HALF THE DAYS
1. LITTLE INTEREST OR PLEASURE IN DOING THINGS: MORE THAN HALF THE DAYS
3. TROUBLE FALLING OR STAYING ASLEEP: NEARLY EVERY DAY
SUM OF ALL RESPONSES TO PHQ QUESTIONS 1-9: 16
SUM OF ALL RESPONSES TO PHQ QUESTIONS 1-9: 16
5. POOR APPETITE OR OVEREATING: MORE THAN HALF THE DAYS
SUM OF ALL RESPONSES TO PHQ QUESTIONS 1-9: 16

## 2024-04-23 ASSESSMENT — PAIN DESCRIPTION - LOCATION
LOCATION: RIB CAGE
LOCATION: RIB CAGE;OTHER (COMMENT)
LOCATION: RIB CAGE
LOCATION: RIB CAGE
LOCATION: ABDOMEN
LOCATION: RIB CAGE

## 2024-04-23 ASSESSMENT — PAIN DESCRIPTION - DESCRIPTORS
DESCRIPTORS: ACHING;SHARP
DESCRIPTORS: ACHING
DESCRIPTORS: ACHING;SHARP
DESCRIPTORS: ACHING
DESCRIPTORS: ACHING;STABBING
DESCRIPTORS: ACHING;STABBING;SHARP

## 2024-04-23 ASSESSMENT — PAIN DESCRIPTION - ORIENTATION
ORIENTATION: RIGHT

## 2024-04-23 ASSESSMENT — PAIN DESCRIPTION - ONSET
ONSET: ON-GOING
ONSET: ON-GOING

## 2024-04-23 ASSESSMENT — PAIN DESCRIPTION - FREQUENCY
FREQUENCY: CONTINUOUS
FREQUENCY: CONTINUOUS

## 2024-04-23 ASSESSMENT — LIFESTYLE VARIABLES
HOW OFTEN DO YOU HAVE A DRINK CONTAINING ALCOHOL: 2-4 TIMES A MONTH
HOW MANY STANDARD DRINKS CONTAINING ALCOHOL DO YOU HAVE ON A TYPICAL DAY: 1 OR 2

## 2024-04-23 ASSESSMENT — PAIN - FUNCTIONAL ASSESSMENT
PAIN_FUNCTIONAL_ASSESSMENT: ACTIVITIES ARE NOT PREVENTED
PAIN_FUNCTIONAL_ASSESSMENT: ACTIVITIES ARE NOT PREVENTED
PAIN_FUNCTIONAL_ASSESSMENT: PREVENTS OR INTERFERES WITH ALL ACTIVE AND SOME PASSIVE ACTIVITIES
PAIN_FUNCTIONAL_ASSESSMENT: ACTIVITIES ARE NOT PREVENTED
PAIN_FUNCTIONAL_ASSESSMENT: PREVENTS OR INTERFERES SOME ACTIVE ACTIVITIES AND ADLS
PAIN_FUNCTIONAL_ASSESSMENT: ACTIVITIES ARE NOT PREVENTED

## 2024-04-23 NOTE — PROGRESS NOTES
Respiratory Care is following the rib fracture order set. Patient's position when testing was done was SEMI-FOWLERS.  A Negative Inspiratory Force (NIF) was performed with patient achieving a NIF of -32 cm H2O. The NIF was greater than 25 cm H2O. A Forced Vital Capacity (FVC) was obtained with patient achieving an FVC of 0.80 liters. The patient's calculated ideal body weight, (IBW) is  52.4 kg. 0.020 liters/kg of the patient's IBW is 1.04 liters. The patient's FVC was less than 0.020 liters/kg of IBW. Based on the spirometry measurement alone, patient does meet ICU admission criteria.  Last pain medication was given on  4/23 @ 1653. PATIENT WAS COMPLAINING OF BEING IN EXTREME PAIN AT THIS TIME, SHE WOULD NOT GIVE ME ANY MORE THAN 3 TRIES. BEST RESULT DOCUMENTED.

## 2024-04-23 NOTE — PLAN OF CARE
Problem: Discharge Planning  Goal: Discharge to home or other facility with appropriate resources  Flowsheets (Taken 4/23/2024 0243)  Discharge to home or other facility with appropriate resources:   Identify barriers to discharge with patient and caregiver   Arrange for needed discharge resources and transportation as appropriate   Identify discharge learning needs (meds, wound care, etc)     Problem: Pain  Goal: Verbalizes/displays adequate comfort level or baseline comfort level  Flowsheets (Taken 4/23/2024 0243)  Verbalizes/displays adequate comfort level or baseline comfort level:   Assess pain using appropriate pain scale   Encourage patient to monitor pain and request assistance     Problem: Safety - Adult  Goal: Free from fall injury  Flowsheets (Taken 4/23/2024 0243)  Free From Fall Injury: Instruct family/caregiver on patient safety   Care plan reviewed with patient .  Patient  verbalize understanding of the plan of care and contribute to goal setting.

## 2024-04-23 NOTE — H&P
Trauma H&P     Patient:  Magan Oleary  Admit date: 4/22/2024   YOB: 1950 Date of Evaluation: 4/22/2024  MRN: [id number]  Acct: [id number]    Injury Date:04/22/2024 Injury time: 1700    PCP: Óscar Baird APRN - CNP   Referring physician: Dr Patel    Time of Trauma Surgeon Notification:  2029 April 22, 2024  Time of Trauma MARY ELLEN Arrival: 2035  Time of Trauma Surgeon Arrival: 2115 April 22, 2024  Services Requested Within 30 Minutes: N/A Time Contacted:    Assessment:    Trauma secondary to ground level fall  Multiple right rib fractures, 4-6  Oxygen dependent COPD   Anxiety  Hypothyroidism  Plan:    Trauma secondary to ground level fall   - admit to trauma    - fall precautions    Multiple right rib fractures, 4-6   - pain control   - respiratory/rib fx protocol   - repeat CXR in AM     Oxygen dependent COPD    - wears 4 L O2 at baseline    - monitor oxygen saturations     Anxiety   - resume home medications     Hypothyroidism   - continue synthroid     Case discussed with Dr Lowry, on call trauma surgeon     Activation: []Level I (Trauma Alert) []Level II (Injury Call) [x]Level III (Trauma Consult) []Downgraded  Mode of Arrival: family  Referring Facility: N/A   Loss of Consciousness [x]No []Yes[]Unknown  Duration(min)  Mechanism of Injury:  []Motor Vehicle crash   []Single Vehicle [] []Passenger []Scene Fatality []Front Seat  []Restrained   []Air Bag Deployed   []Ejected []Rollover []Pedestrian []Trapped   Type of vehicle:   Protective Devices:   []Motorcycle  Wearing Helmet []Yes []No  []Bicycle  Wearing Helmet []Yes []No  [x]Fall   Distance - ground level fall    []Assault    Abuse Reported []Yes []No  []Gunshot  []Stabbing  []Work Related  []Burn: []Flame []Scald []Electrical []Chemical []Contact []Inhalation []House Fire  []Other:   Patient Active Problem List   Diagnosis    Hyperlipemia    Hypothyroidism    Depression    Chronic anxiety    Osteopenia    Medication monitoring

## 2024-04-23 NOTE — PROGRESS NOTES
[facility]  INPATIENT OCCUPATIONAL THERAPY  [facility] ORTHOPEDICS 7K  EVALUATION    Time:   Time In: 1030  Time Out: 1050  Timed Code Treatment Minutes: 12 Minutes  Minutes: 20          Date: 2024  Patient Name: Magan Oleary,   Gender: female      MRN: [id number]  : 1950  (73 y.o.)  Referring Practitioner: Guerline Blue, APRN - CNP  Diagnosis: Multiple rib fractures involving four more ribs  Additional Pertinent Hx: Ms Oleary is a 74 yo M who presents as a level III trauma consult following a ground level fall. She reports she was walking out of the [employer] when she tripped and fell onto her oxygen concentrator. She states she was not able to get up on her due to pain and needed to have assistance. She did not hit her head, did not have any LOC. She reports pain is not well controlled at this time (10/10) and is requesting additional pain medication. She denies chest pain, shortness of breath, nausea/vomiting, or difficulty with urination.    Restrictions/Precautions:  Restrictions/Precautions: Fall Risk    Subjective  Chart Reviewed: Yes, Orders, Progress Notes, History and Physical  Patient assessed for rehabilitation services?: Yes    Subjective: RN okayed OT session. Upon arrival patient sitting up in recliner. Pt was agreeable to OT session.    Pain: 9/10: Ribs     Vitals: Vitals not assessed per clinical judgement, see nursing flowsheet    Social/Functional History:  Lives With: Alone  Type of Home: House  Home Layout: One level  Home Access: Level entry  Home Equipment: Oxygen           ADL Assistance: Independent  Homemaking Assistance: Independent  Homemaking Responsibilities: Yes  Ambulation Assistance: Independent  Transfer Assistance: Independent    Active : No     Additional Comments: Pt ambulates without AD, 4 L at baseline, has goldendoodle dog at home    VISION:WFL    HEARING:  WFL    COGNITION: Decreased Insight, Decreased Problem Solving, and Decreased  complete showering task with SBA and min vcs for safety to increase indep in home  Additional Goals?: No    AM-PAC Inpatient Daily Activity Raw Score: 19  AM-PAC Inpatient ADL T-Scale Score : 40.22    Following session, patient left in safe position with all fall risk precautions in place.

## 2024-04-23 NOTE — PROGRESS NOTES
[facility]  INPATIENT PHYSICAL THERAPY  EVALUATION  [facility] ORTHOPEDICS 7K - 7K-05/005-A    Time In: 0800  Time Out: 0828  Timed Code Treatment Minutes: 15 Minutes  Minutes: 28          Date: 2024  Patient Name: Magan Oleary,  Gender:  female        MRN: [id number]  : 1950  (73 y.o.)      Referring Practitioner: Guerline Blue APRN - CNP  Diagnosis: Multiple rib fractures involving four or more ribs  Additional Pertinent Hx: Ms Oleary is a 74 yo M who presents as a level III trauma consult following a ground level fall. She reports she was [place] of the [employer] when she tripped and fell onto her oxygen concentrator.  Pt with R sided 4-6 rib fractures.  H/o COPD.     Restrictions/Precautions:  Restrictions/Precautions: Fall Risk    Subjective:  Chart Reviewed: Yes  Patient assessed for rehabilitation services?: Yes  Family / Caregiver Present: No  Subjective: RN approved session, pt is supine in bed, states she did not sleep at all last night, is anxious and restless due to pain.  Pt agreeable to mobility to chair to eat breakfast.  Pt provided with folded blanket at the end of session to use to brace R sided chest with mobility.    General:  Overall Orientation Status: Within Functional Limits  Vision: Within Functional Limits  Hearing: Within functional limits       Pain: 8-9/10: R ribs at rest, increases with activity    Vitals: Oxygen: 4 L O2 continuously    Social/Functional History:    Lives With: Alone  Type of Home: House  Home Layout: One level  Home Access: Level entry  Home Equipment: Oxygen      Ambulation Assistance: Independent  Transfer Assistance: Independent    Active : No     Additional Comments: Pt ambulates without AD, 4 L at baseline, has goldendoodle dog at home    OBJECTIVE:    Balance:  Static Sitting Balance:  Contact Guard Assistance, X 1  Static Standing Balance: Minimal Assistance, X 1  Dynamic Standing Balance: Minimal Assistance, X 1    Bed  Mobility:  Supine to Sit: Moderate Assistance, X 1, with head of bed raised, with rail, with verbal cues , with increased time for completion    Transfers:  Sit to Stand: Minimal Assistance, X 1, with verbal cues  Stand to Sit:Minimal Assistance, X 1, with verbal cues    Ambulation:  Minimal Assistance, X 1, with verbal cues   Distance: 2 feet to recliner  Surface: Level Tile  Device:Rolling Walker  Gait Deviations:  Forward Flexed Posture, Decreased Step Length Bilaterally, Decreased Weight Shift Bilaterally, Decreased Gait Speed, and Decreased Heel Strike Bilaterally  **Pt declines further ambulation due to pain, impulsive to mobilize to chair due to pain    Functional Outcome Measures: Completed  AM-PAC Inpatient Mobility without Stair Climbing Raw Score : 11  AM-PAC Inpatient without Stair Climbing T-Scale Score : 35.66  Modified Boyle Scale:  Not Applicable    ASSESSMENT:  Activity Tolerance:  Patient tolerance of  treatment: fair. Limited by pain, anxiety.      Treatment Initiated: Treatment and education initiated within context of evaluation.  Evaluation time included review of current medical information, gathering information related to past medical, social and functional history, completion of standardized testing, formal and informal observation of tasks, assessment of data and development of plan of care and goals.  Treatment time included skilled education and facilitation of tasks to increase safety and independence with functional mobility for improved independence and quality of life.    Assessment:  Body Structures, Functions, Activity Limitations Requiring Skilled Therapeutic Intervention: Decreased functional mobility , Decreased endurance, Increased pain, Decreased balance, Decreased strength  Assessment: Magan Oleary is a 73 y.o. female that presents with rib fractures s/p fall. Pt demonstrates a decrease in baseline by way of bed mobility, transfers and ambulation secondary to decreased

## 2024-04-23 NOTE — PROGRESS NOTES
[facility]  SPEECH THERAPY  [facility] ORTHOPEDICS 7K  Speech - Language - Cognitive Evaluation    SLP Individual Minutes  Time In: 0957  Time Out: 1014  Minutes: 17  Timed Code Treatment Minutes: 0 Minutes       Date: 2024  Patient Name: Magan Oleary      CSN: [id number]   : 1950  (73 y.o.)  Gender: female   Referring Physician:  Guerline Blue, SARITHA - CNP   Diagnosis: Closed fracture of multiple ribs of right side, initial encounter  Precautions: fall risk  History of Present Illness/Injury: Patient admitted to [facility] with above diagnosis: see physician H&P for further information. Per chart review, \"Ms Oleary is a 74 yo M who presents as a level III trauma consult following a ground level fall. She reports she [place] [employer] when she tripped and fell onto her oxygen concentrator. She states she was not able to get up on her due to pain and needed to have assistance. She did not hit her head, did not have any LOC. She reports pain is not well controlled at this time (10/10) and is requesting additional pain medication. She denies chest pain, shortness of breath, nausea/vomiting, or difficulty with urination.\"    Past Medical History:   Diagnosis Date    Aneurysm, cerebral     COPD (chronic obstructive pulmonary disease) (HCC)     Dupuytren's contracture of both hands     Hiatal hernia     Hyperlipidemia     Osteoarthritis     Pneumonia     Stroke (HCC)     Thyroid disease     Unspecified cerebral artery occlusion with cerebral infarction        Pain: 10/10 - Pain location: ribs - RN aware    Subjective:  SERENITY Webber approved completion of evaluation. Patient sitting in recliner upon ST arrival. Agreeable to skilled ST services. No family present. Pleasant and cooperative, however, obvious signs of pain noted. Patient also tangential throughout evaluation.    SOCIAL HISTORY:   Living Arrangements: home alone  Work History: Retired  Education Level: bachelors  Driving

## 2024-04-23 NOTE — PROGRESS NOTES
Brief Intervention and Referral to Treatment Summary    Patient was provided PHQ-9, AUDIT-C and DAST Screening:      PHQ-9 Score: 16  AUDIT-C Score:  2  DAST Score:  0    Patient’s substance use is considered     Low Risk/Healthy      Patient’s depression is considered:     Moderate Severe    Brief Education Was Provided    Patient was receptive      Brief Intervention Is Provided (Only for AUDIT-C or DAST)     Patient reports readiness to decrease and/or stop use and a plan was discussed         Injured Trauma Survivor Screening  1.  When you were injured or right afterward   Did you think you were going to die? RESPONSES; YES+1/NO: NO  Do you think this was done to you intentionally? NO    Since your injury  Have you felt more restless, tense or jumpy than usual? RESPONSES; YES+1/NO: NO  Have you found yourself unable to stop worrying? RESPONSES; YES+1/NO: NO  Do you find yourself thinking that the world is unsafe and that people are not to be trusted? RESPONSES; YES+1/NO: NO    TOTAL SCORE from ITSS Questions 1 and 2: 0  NOTE: A score of greater than or equal to 2 is considered positive for PTSD risk and is to receive a community resource packet to link with appropriate providers.    Recommendations/Referrals for Brief and/or Specialized Treatment Provided to Patient:

## 2024-04-23 NOTE — PROGRESS NOTES
Pt admitted to  [facility] via cart/stretcher.     Complaints: fall and chest pain.      IV normal saline infusing into the antecubital left, condition patent and no redness at a rate of 75 mls/ hour with about 1,000 mls in the bag still. IV site free of s/s of infection or infiltration. Vital signs obtained. Assessment and data collection initiated.     Two nurse skin assessment performed by [place]  RN and Vin RN. Oriented to room.       Policies and procedures for  explained. All questions answered with no further questions at this time. Fall prevention and safety brochure discussed with patient.  Bed alarm on. Call light in reach.

## 2024-04-23 NOTE — ED NOTES
Family in Pending sale to [facility]. Reports pt appears to be more agitated after medication admin. Dr. Patel made aware

## 2024-04-23 NOTE — CARE COORDINATION
Case Management Assessment Initial Evaluation    Date/Time of Evaluation: 2024 7:09 AM  Assessment Completed by: Megan Wayne RN    If patient is discharged prior to next notation, then this note serves as note for discharge by case management.    Patient Name: Magan Oleary                   YOB: 1950  Diagnosis: Closed fracture of multiple ribs of right side, initial encounter [S22.41XA]  Multiple rib fractures involving four or more ribs [S22.49XA]                   Date / Time: 2024  3:06 PM  Location: [facility]     Patient Admission Status: Inpatient   Readmission Risk Low 0-14, Mod 15-19), High > 20: No data recorded  Current PCP: Óscar Baird, APRN - CNP    Additional Case Management Notes: To ED following a fall. [place] [employer] when she [place]pped and fell onto her oxygen concentrator. CT chest revealed multiple rib fractures. Trauma sx following. PT/OT/SLP evals. Rib fx protocol. IVF. Lidocaine patch. Inhalers. IV morphine prn. Roxicodne prn. Bowel regimen. 94% on 4L NC- baseline.      Procedures: none    Imagin/22 XR Right Ribs: No acute fractures   CT Chest W: acute right rib fractures   CT A/P W: No acute findings   CXR:  Acute right rib fractures. No pneumothorax. 2. Scattered subsegmental atelectasis, new/increased since prior study    Patient Goals/Plan/Treatment Preferences: Met w/ Magan. Verified PCP and insurance. She lives at home independently. She has a  every other week. Her daughters provide transport. Has a wheelchair. 4-5L NC through [facility]. Declines needs for HH or DME. Magan plans to return home alone, however she may stay with her daughter for a few days.     24 1143   Service Assessment   Patient Orientation Alert and Oriented   Cognition Alert   History Provided By Patient   Primary Caregiver Self   Accompanied By/Relationship none   Support Systems Children;Friends/Neighbors   Patient's Healthcare Decision  Maker is: Patient Declined (Legal Next of Kin Remains as Decision Maker)   PCP Verified by CM Yes   Last Visit to PCP Within last 6 months   Prior Functional Level Assistance with the following:;Housework  ( every other week)   Current Functional Level Assistance with the following:;Housework   Can patient return to prior living arrangement Yes   Ability to make needs known: Good   Family able to assist with home care needs: Yes   Would you like for me to discuss the discharge plan with any other family members/significant others, and if so, who? No   Financial Resources Medicare;Other (Comment)  (Secondary: BCBS)   Community Resources None   CM/SW Referral ADLs/IADLs;DME   Social/Functional History   Lives With Alone   Type of Home House   Active  No   Patient's  Info Daughter   Discharge Planning   Type of Residence House   Living Arrangements Alone   Current Services Prior To Admission Durable Medical Equipment   Current DME Prior to Arrival Wheelchair;Oxygen Therapy (Comment)  (4-5L [facility])   Potential Assistance Needed N/A   DME Ordered? No   Potential Assistance Purchasing Medications No   Type of Home Care Services None   Patient expects to be discharged to: House   History of falls? 1   Services At/After Discharge   Confirm Follow Up Transport Family   Condition of Participation: Discharge Planning   The Plan for Transition of Care is related to the following treatment goals: Improve breathing and control pain

## 2024-04-23 NOTE — PROGRESS NOTES
[facility]  Trauma Surgery - Dr. Kiel Lowry  Daily Progress Note  Pt Name: Magan Oleary  Medical Record Number: [id number]  Date of Birth 1950   Today's Date: 4/23/2024    HD: # 1    CC: R chest wall pain     ASSESSMENT/PLAN:    Trauma secondary to ground level fall:              - admit to trauma               - fall precautions     Multiple right rib fractures, 4-6:              - pain control              - respiratory/rib fx protocol              - Antiemetics as needed   - PT/OT eval and treat     Oxygen dependent COPD:               - Wears 4 L O2 at baseline               - Monitor oxygen saturations      Anxiety:              - Resume home medications      Hypothyroidism:              - Continue synthroid    SUBJECTIVE  She is a 73 y.o. female who continues on 7K.  Patient rates her pain a 9/10 in severity.  She endorses shortness of breath but has a history of chronic COPD requiring 4 L of oxygen during the day.  Patient denies vomiting.  Patient reports she did not get any sleep last night.       Wt Readings from Last 3 Encounters:   04/23/24 61.2 kg (135 lb)   03/22/24 63.5 kg (140 lb)   03/12/24 63.5 kg (140 lb)     Temp Readings from Last 3 Encounters:   04/23/24 97.9 °F (36.6 °C) (Oral)   01/02/24 97.5 °F (36.4 °C)   12/20/23 97.7 °F (36.5 °C) (Oral)     BP Readings from Last 3 Encounters:   04/23/24 126/75   03/22/24 118/66   03/12/24 118/66     Pulse Readings from Last 3 Encounters:   04/23/24 79   03/12/24 (!) 111   02/29/24 (!) 108       24 HR INTAKE/OUTPUT :   Intake/Output Summary (Last 24 hours) at 4/23/2024 0717  Last data filed at 4/23/2024 0051  Gross per 24 hour   Intake 200 ml   Output --   Net 200 ml     ADULT DIET; Regular    OBJECTIVE  CURRENT VITALS /75   Pulse 79   Temp 97.9 °F (36.6 °C) (Oral)   Resp 20   Ht 1.6 m (5' 3\")   Wt 61.2 kg (135 lb)   SpO2 97%   BMI 23.91 kg/m²     Physical Exam  Constitutional:       Interventions: She is not  intubated.  Cardiovascular:      Rate and Rhythm: Normal rate and regular rhythm.   Pulmonary:      Effort: She is not intubated.   Chest:      Comments: Right chest wall tender to palpation  Neurological:      Mental Status: She is alert.   Psychiatric:         Speech: She is communicative.       LABS  CBC :   Recent Labs     04/22/24  1357 04/22/24  1654 04/23/24  0621   WBC 6.9 15.1* 12.3*   HGB 14.0 14.1 13.4   HCT 43.9 44.2 41.5   MCV 89.0 90.2 88.7    230 229     BMP:   Recent Labs     04/22/24  1357 04/22/24  1654 04/23/24  0621    142 143   K 3.6 4.4 4.4    100 104   CO2 27 25 26   BUN 17 20 20   CREATININE 0.8 0.8 0.7     COAGS: No results for input(s): \"APTT\", \"PROT\", \"INR\" in the last 72 hours.  Pancreas/HFP:  No results for input(s): \"LIPASE\", \"AMYLASE\" in the last 72 hours.  No results for input(s): \"AST\", \"ALT\", \"BILIDIR\", \"BILITOT\", \"ALKPHOS\" in the last 72 hours.  XR CHEST PORTABLE   Final Result   Impression:   1. Acute right rib fractures. No pneumothorax.   2. Scattered subsegmental atelectasis, new/increased since prior study.      This document has been electronically signed by: Hector Antoine MD on    04/23/2024 04:47 AM      CT CHEST W CONTRAST   Final Result   1. Acute right rib fractures.      This document has been electronically signed by: Kiel Meehan MD on    04/22/2024 07:54 PM      All CTs at this facility use dose modulation techniques and iterative    reconstructions, and/or weight-based dosing   when appropriate to reduce radiation to a low as reasonably achievable.      CT ABDOMEN PELVIS W IV CONTRAST Additional Contrast? None   Final Result   No acute findings.      This document has been electronically signed by: Kiel Meehan MD on    04/22/2024 08:05 PM      All CTs at this facility use dose modulation techniques and iterative    reconstructions, and/or weight-based dosing   when appropriate to reduce radiation to a low as reasonably achievable.      CT HEAD WO

## 2024-04-23 NOTE — ED NOTES
Infection appears viral.  Recommend symptomatic treatment.  Can take ibuprofen or tylenol as needed for pain or fever.  Over the counter cough and cold medications to help with symptoms.  Use salt water gargles for sore throat and throat lozenges.  Cough drops as needed.  Wash hands frequently to prevent the spread of infection.  If not improving over the next 7-10 days, follow up with PCP.  Symptoms may persist for 10-14 days.      Vitamin D3 2000 IU daily  Vitamin C 1g every 12 hours  Multivitamin daily  Fluids and rest     Pt very anxious. Pt sweating. VSS. Provider at bedside

## 2024-04-24 PROBLEM — S22.41XA MULTIPLE CLOSED FRACTURES OF RIBS OF RIGHT SIDE: Status: ACTIVE | Noted: 2024-04-22

## 2024-04-24 PROBLEM — S22.41XD CLOSED FRACTURE OF MULTIPLE RIBS OF RIGHT SIDE WITH ROUTINE HEALING: Status: ACTIVE | Noted: 2024-04-22

## 2024-04-24 PROBLEM — G89.11 ACUTE PAIN DUE TO TRAUMA: Status: ACTIVE | Noted: 2024-04-24

## 2024-04-24 LAB
ANION GAP SERPL CALC-SCNC: 13 MEQ/L (ref 8–16)
BASOPHILS ABSOLUTE: 0 THOU/MM3 (ref 0–0.1)
BASOPHILS NFR BLD AUTO: 0.4 %
BUN SERPL-MCNC: 22 MG/DL (ref 7–22)
CALCIUM SERPL-MCNC: 8.8 MG/DL (ref 8.5–10.5)
CHLORIDE SERPL-SCNC: 107 MEQ/L (ref 98–111)
CO2 SERPL-SCNC: 24 MEQ/L (ref 23–33)
CREAT SERPL-MCNC: 0.7 MG/DL (ref 0.4–1.2)
DEPRECATED RDW RBC AUTO: 43.1 FL (ref 35–45)
EKG ATRIAL RATE: 77 BPM
EKG P AXIS: 78 DEGREES
EKG P-R INTERVAL: 138 MS
EKG Q-T INTERVAL: 424 MS
EKG QRS DURATION: 74 MS
EKG QTC CALCULATION (BAZETT): 479 MS
EKG R AXIS: 4 DEGREES
EKG T AXIS: 80 DEGREES
EKG VENTRICULAR RATE: 77 BPM
EOSINOPHIL NFR BLD AUTO: 0.2 %
EOSINOPHILS ABSOLUTE: 0 THOU/MM3 (ref 0–0.4)
ERYTHROCYTE [DISTWIDTH] IN BLOOD BY AUTOMATED COUNT: 12.9 % (ref 11.5–14.5)
GFR SERPL CREATININE-BSD FRML MDRD: > 90 ML/MIN/1.73M2
GLUCOSE BLD STRIP.AUTO-MCNC: 127 MG/DL (ref 70–108)
GLUCOSE BLD STRIP.AUTO-MCNC: 133 MG/DL (ref 70–108)
GLUCOSE BLD STRIP.AUTO-MCNC: 135 MG/DL (ref 70–108)
GLUCOSE BLD STRIP.AUTO-MCNC: 195 MG/DL (ref 70–108)
GLUCOSE SERPL-MCNC: 144 MG/DL (ref 70–108)
HCT VFR BLD AUTO: 38 % (ref 37–47)
HGB BLD-MCNC: 12.2 GM/DL (ref 12–16)
IMM GRANULOCYTES # BLD AUTO: 0.06 THOU/MM3 (ref 0–0.07)
IMM GRANULOCYTES NFR BLD AUTO: 0.5 %
LYMPHOCYTES ABSOLUTE: 1.3 THOU/MM3 (ref 1–4.8)
LYMPHOCYTES NFR BLD AUTO: 10.6 %
MCH RBC QN AUTO: 29.3 PG (ref 26–33)
MCHC RBC AUTO-ENTMCNC: 32.1 GM/DL (ref 32.2–35.5)
MCV RBC AUTO: 91.3 FL (ref 81–99)
MONOCYTES ABSOLUTE: 1.4 THOU/MM3 (ref 0.4–1.3)
MONOCYTES NFR BLD AUTO: 11.5 %
NEUTROPHILS NFR BLD AUTO: 76.8 %
NRBC BLD AUTO-RTO: 0 /100 WBC
PLATELET # BLD AUTO: 195 THOU/MM3 (ref 130–400)
PMV BLD AUTO: 12.2 FL (ref 9.4–12.4)
POTASSIUM SERPL-SCNC: 4 MEQ/L (ref 3.5–5.2)
RBC # BLD AUTO: 4.16 MILL/MM3 (ref 4.2–5.4)
SEGMENTED NEUTROPHILS ABSOLUTE COUNT: 9.4 THOU/MM3 (ref 1.8–7.7)
SODIUM SERPL-SCNC: 144 MEQ/L (ref 135–145)
WBC # BLD AUTO: 12.2 THOU/MM3 (ref 4.8–10.8)

## 2024-04-24 PROCEDURE — 82948 REAGENT STRIP/BLOOD GLUCOSE: CPT

## 2024-04-24 PROCEDURE — 80048 BASIC METABOLIC PNL TOTAL CA: CPT

## 2024-04-24 PROCEDURE — 1200000000 HC SEMI PRIVATE

## 2024-04-24 PROCEDURE — 2700000000 HC OXYGEN THERAPY PER DAY

## 2024-04-24 PROCEDURE — 97530 THERAPEUTIC ACTIVITIES: CPT

## 2024-04-24 PROCEDURE — 85025 COMPLETE CBC W/AUTO DIFF WBC: CPT

## 2024-04-24 PROCEDURE — 6370000000 HC RX 637 (ALT 250 FOR IP): Performed by: NURSE PRACTITIONER

## 2024-04-24 PROCEDURE — 36415 COLL VENOUS BLD VENIPUNCTURE: CPT

## 2024-04-24 PROCEDURE — 6360000002 HC RX W HCPCS: Performed by: NURSE PRACTITIONER

## 2024-04-24 PROCEDURE — 94799 UNLISTED PULMONARY SVC/PX: CPT

## 2024-04-24 PROCEDURE — 94761 N-INVAS EAR/PLS OXIMETRY MLT: CPT

## 2024-04-24 PROCEDURE — 99223 1ST HOSP IP/OBS HIGH 75: CPT | Performed by: NURSE PRACTITIONER

## 2024-04-24 PROCEDURE — 2580000003 HC RX 258: Performed by: NURSE PRACTITIONER

## 2024-04-24 PROCEDURE — 94010 BREATHING CAPACITY TEST: CPT

## 2024-04-24 PROCEDURE — 97535 SELF CARE MNGMENT TRAINING: CPT

## 2024-04-24 PROCEDURE — 94640 AIRWAY INHALATION TREATMENT: CPT

## 2024-04-24 RX ORDER — LIDOCAINE 4 G/G
3 PATCH TOPICAL DAILY
Status: DISCONTINUED | OUTPATIENT
Start: 2024-04-24 | End: 2024-04-27 | Stop reason: HOSPADM

## 2024-04-24 RX ORDER — OXYCODONE HYDROCHLORIDE 5 MG/1
5 TABLET ORAL EVERY 4 HOURS PRN
Status: DISCONTINUED | OUTPATIENT
Start: 2024-04-24 | End: 2024-04-27 | Stop reason: HOSPADM

## 2024-04-24 RX ORDER — ACETAMINOPHEN 325 MG/1
650 TABLET ORAL EVERY 6 HOURS SCHEDULED
Status: DISCONTINUED | OUTPATIENT
Start: 2024-04-24 | End: 2024-04-27 | Stop reason: HOSPADM

## 2024-04-24 RX ORDER — OXYCODONE HYDROCHLORIDE 5 MG/1
10 TABLET ORAL EVERY 4 HOURS PRN
Status: DISCONTINUED | OUTPATIENT
Start: 2024-04-24 | End: 2024-04-27 | Stop reason: HOSPADM

## 2024-04-24 RX ORDER — NALOXONE HYDROCHLORIDE 0.4 MG/ML
0.4 INJECTION, SOLUTION INTRAMUSCULAR; INTRAVENOUS; SUBCUTANEOUS PRN
Status: DISCONTINUED | OUTPATIENT
Start: 2024-04-24 | End: 2024-04-27 | Stop reason: HOSPADM

## 2024-04-24 RX ORDER — ACETAMINOPHEN 325 MG/1
650 TABLET ORAL EVERY 6 HOURS PRN
Status: DISCONTINUED | OUTPATIENT
Start: 2024-04-24 | End: 2024-04-27 | Stop reason: HOSPADM

## 2024-04-24 RX ADMIN — SODIUM CHLORIDE, PRESERVATIVE FREE 10 ML: 5 INJECTION INTRAVENOUS at 21:49

## 2024-04-24 RX ADMIN — METHOCARBAMOL 1000 MG: 500 TABLET ORAL at 09:41

## 2024-04-24 RX ADMIN — MOMETASONE FUROATE AND FORMOTEROL FUMARATE DIHYDRATE 2 PUFF: 200; 5 AEROSOL RESPIRATORY (INHALATION) at 16:41

## 2024-04-24 RX ADMIN — METHOCARBAMOL 1000 MG: 500 TABLET ORAL at 12:56

## 2024-04-24 RX ADMIN — METHOCARBAMOL 1000 MG: 500 TABLET ORAL at 15:58

## 2024-04-24 RX ADMIN — OXYCODONE 10 MG: 5 TABLET ORAL at 09:45

## 2024-04-24 RX ADMIN — METHOCARBAMOL 1000 MG: 500 TABLET ORAL at 21:46

## 2024-04-24 RX ADMIN — MORPHINE SULFATE 2 MG: 2 INJECTION, SOLUTION INTRAMUSCULAR; INTRAVENOUS at 00:46

## 2024-04-24 RX ADMIN — FLUTICASONE PROPIONATE 1 SPRAY: 50 SPRAY, METERED NASAL at 09:41

## 2024-04-24 RX ADMIN — OXYCODONE 10 MG: 5 TABLET ORAL at 15:55

## 2024-04-24 RX ADMIN — MORPHINE SULFATE 2 MG: 2 INJECTION, SOLUTION INTRAMUSCULAR; INTRAVENOUS at 04:52

## 2024-04-24 RX ADMIN — DULOXETINE HYDROCHLORIDE 60 MG: 60 CAPSULE, DELAYED RELEASE ORAL at 09:41

## 2024-04-24 RX ADMIN — TIOTROPIUM BROMIDE INHALATION SPRAY 2 PUFF: 3.12 SPRAY, METERED RESPIRATORY (INHALATION) at 07:44

## 2024-04-24 RX ADMIN — ATOMOXETINE 40 MG: 40 CAPSULE ORAL at 09:41

## 2024-04-24 RX ADMIN — OXYCODONE 10 MG: 5 TABLET ORAL at 21:51

## 2024-04-24 RX ADMIN — ACETAMINOPHEN 650 MG: 325 TABLET ORAL at 21:43

## 2024-04-24 RX ADMIN — LEVOTHYROXINE SODIUM 50 MCG: 0.05 TABLET ORAL at 06:09

## 2024-04-24 RX ADMIN — POLYETHYLENE GLYCOL 3350 17 G: 17 POWDER, FOR SOLUTION ORAL at 09:45

## 2024-04-24 RX ADMIN — SENNOSIDES AND DOCUSATE SODIUM 1 TABLET: 50; 8.6 TABLET ORAL at 09:45

## 2024-04-24 RX ADMIN — ACETAMINOPHEN 650 MG: 325 TABLET ORAL at 06:12

## 2024-04-24 RX ADMIN — SENNOSIDES AND DOCUSATE SODIUM 1 TABLET: 50; 8.6 TABLET ORAL at 21:49

## 2024-04-24 RX ADMIN — DONEPEZIL HYDROCHLORIDE 10 MG: 10 TABLET, FILM COATED ORAL at 21:46

## 2024-04-24 RX ADMIN — MOMETASONE FUROATE AND FORMOTEROL FUMARATE DIHYDRATE 2 PUFF: 200; 5 AEROSOL RESPIRATORY (INHALATION) at 07:44

## 2024-04-24 RX ADMIN — OXYCODONE 10 MG: 5 TABLET ORAL at 03:26

## 2024-04-24 RX ADMIN — HYDROXYZINE HYDROCHLORIDE 25 MG: 25 TABLET, FILM COATED ORAL at 21:48

## 2024-04-24 RX ADMIN — METOPROLOL SUCCINATE 25 MG: 25 TABLET, FILM COATED, EXTENDED RELEASE ORAL at 09:41

## 2024-04-24 ASSESSMENT — PAIN SCALES - GENERAL
PAINLEVEL_OUTOF10: 9
PAINLEVEL_OUTOF10: 10
PAINLEVEL_OUTOF10: 10
PAINLEVEL_OUTOF10: 9
PAINLEVEL_OUTOF10: 9

## 2024-04-24 NOTE — CARE COORDINATION
DISCHARGE PLANNING EVALUATION  4/24/24, 2:38 PM EDT    Reason for Referral: family requesting snf  Decision Maker: makes own decisions   Current Services:  none   New Services Requested:  family requesting snf  Family/ Social/ Home environment: Magan lives at home alone.  She has been managing her care with support from daughterSil.  Family has noted changes in mental status/memory at times  Payment Source: medicare  Transportation at Discharge: undetermined  Post-acute (PAC) provider list was provided to patient. Patient was informed of their freedom to choose PAC provider. Discussed and offered to show the patient the relevant PAC Providers quality and resource use measures on Medicare Compare web site via computer based on patient's goals of care and treatment preferences. Questions regarding selection process were answered.      Teach Back Method used with daughterSil regarding care plan and discharge plan  Patient's daughterSil verbalized understanding of the plan of care and contribute to goal setting.       Patient preferences and discharge plan:  spoke with daughter Sil.  She and her siblings have been encouraging snf at discharge, patient has not refused but also has not been receptive to this yet.   Family previously considered [facility], list provided.  Patient is not ready to discuss discharge plan at this time.      Electronically signed by DL Stone on 4/24/2024 at 2:38 PM

## 2024-04-24 NOTE — PROGRESS NOTES
Physician Progress Note      PATIENT:               LUIS F MARTINEZ  CSN #:                  [id number]  :                       1950  ADMIT DATE:       2024 3:06 PM  DISCH DATE:  RESPONDING  PROVIDER #:        Kiel Lowry MD          QUERY TEXT:    Pt admitted with trauma from fall.  Pt noted to have COPD on baseline O2 of   4L/NC. If possible, please document in the progress notes and discharge   summary if you are evaluating and/or treating any of the following:      The medical record reflects the following:  Risk Factors: COPD  Clinical Indicators: 4L O2 baseline  Treatment: O2, labs, imaging, frequent monitoring, inhalers, breathing   treatments    Thank you!  Helga Serrato, RN, CRCR  Clinical   Options provided:  -- Chronic respiratory failure with hypoxia  -- Chronic respiratory failure with hypoxia ruled out  -- Other - I will add my own diagnosis  -- Disagree - Not applicable / Not valid  -- Disagree - Clinically unable to determine / Unknown  -- Refer to Clinical Documentation Reviewer    PROVIDER RESPONSE TEXT:    This patient has chronic respiratory failure with hypoxia.    Query created by: Nicole Serrato on 2024 10:18 PM      Electronically signed by:  Kiel Lowry MD 2024 6:17 AM

## 2024-04-24 NOTE — PROGRESS NOTES
2335: Walked into patients room and found patient had taken off nasal cannula despite respiratory taping nasal cannula with tegaderm to patients cheek to keep nasal cannula in place due to patients having a left ear deformity. When trying to put the nasal cannula back in patients nose patients starts flailing her arms and body around in bed about pain. This nurse checked patients 02 saturation. Patient 02 saturation dropped to 50% RA. This nurse finally got patient to be still enough to put nasal cannula back on and had to bump the oxygen up to 7 liters. It took patient a couple of minutes to bounce back to 91%. Patient weaned back to 5 liters of 02 and 02 saturation stable at 91%.    2340: Notified trauma surgeon on call of patients dip to 50% on Ra and having to bump patients 02 to 7 liters to recover but able to wean back down to 5 liters and was stable at 5 liters.     2341: 10mg of roxicodone administered.

## 2024-04-24 NOTE — CONSULTS
Pain Consult Note      Admitting Physician: Kiel Lowry MD    Primary Care Provider: Óscar Baird, APRN - CNP     Reason for Consult:  Right rib pain d/t fractures, trauma from fall     History of Present Illness:  Magan Oleary is a 73 y.o. female admitted to [facility] on 4/22/2024.  This patient with a medical history significant for COPD, cerebral aneurism, hyperlipidemia, osteoarthritis, pneumonia, anxiety, and CVA who presented to [facility] as a level III trama following a fall. She reports [place] [employer] when she tripped and fell onto her oxygen concentrator hitting her right side rib area only. She states she was not able to get up on her due to pain and needed to have assistance. She did not hit her head, did not have any LOC. She states that she had pain in her right side area immediately following the fall. Work up was completed and she had CT of her chest that showed acute right rib fractures of the 4th, 5th, and 6th rib. She has had complaints of continued pain in this area and pain control has been difficult. She has been receiving IV morphine and Oxy IR prn while in the hospital. Prior to admissions he was not on any opioid pain medications.     The patient was seen by the pain team today and at the time of our evaluation the patient complains of pain in her right side rib area \"my entire right side\" that started after her fall. She currently rates her pain at a 9-10/10 and describes it has a sharp and stabbing pain. States that pain is exacerbated with movement, coughing, and she is very hypersensitive to touch on that side. In the past 24 hours she has used 1 dose of prn tylenol, 4 doses of IV morphine (8 mg), and 4 doses of oxycodone (40 mg). She states that morphine is only lasting an hour at best. States the lowest her pain has decraesed since admission is 8/10. She voiced that tylenol helps some along with Lidoderm patches and  subsegmental atelectasis, new/increased since prior study.     CT of abdomen and pelvis:  Findings:  No consolidation or effusion.  Unremarkable gallbladder and solid organs. No urolithiasis.  No bowel obstruction, pneumoperitoneum, or pneumatosis.  Calcified uterine fibroid.  Uterus, ovaries, and urinary bladder otherwise normal.  No acute fracture.      Impression:       No acute findings.     CT of chest:   Findings:  Acute fractures of the right anterior 4th, 5th, and 6th ribs, mildly  displaced.  Additional healed right rib fractures.  No thoracic spine fracture.  No pleural effusion or pneumothorax.  No acute airspace opacity.  Normal heart size. No pericardial effusion.  No thoracic aortic aneurysm dissection.  No acute finding in the partially visualized upper.      Impression:       1. Acute right rib fractures.     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    There is mild atrophy and dilatation of the third and lateral ventricles.    There is diminished attenuation in the white matter most likely representing ischemic changes.    There is no hemorrhage. There are no intra-or extra-axial collections.  There is no  midline shift or mass effect.       The paranasal sinuses and mastoid air cells are normally aerated.  There is no suspicious calvarial abnormality.              Impression:          1. Stable CT scan of the brain, no interval change since previous MRI scan dated 3/29/2024..     Right rib xray:   FINDINGS:    Chest: Cardiac silhouette is within normal limits. Atherosclerotic calcifications are present in the thoracic aorta. Minimal stable reticular opacities in the left mid lung are likely secondary to scarring. There are no lung consolidations. Bones are  osteopenic. Degenerative changes in the thoracic spine are poorly visualized.    Right ribs: There is no displaced right rib fracture.      Impression:       1. No acute

## 2024-04-24 NOTE — PLAN OF CARE
Problem: Chronic Conditions and Co-morbidities  Goal: Patient's chronic conditions and co-morbidity symptoms are monitored and maintained or improved  Outcome: Progressing  Flowsheets (Taken 4/23/2024 1955)  Care Plan - Patient's Chronic Conditions and Co-Morbidity Symptoms are Monitored and Maintained or Improved: Monitor and assess patient's chronic conditions and comorbid symptoms for stability, deterioration, or improvement     Problem: Safety - Adult  Goal: Free from fall injury  Outcome: Progressing     Problem: Pain  Goal: Verbalizes/displays adequate comfort level or baseline comfort level  Outcome: Progressing

## 2024-04-24 NOTE — PROGRESS NOTES
[facility]  Trauma Surgery - Dr. Kiel Lowry  Daily Progress Note  Pt Name: Magan Oleary  Medical Record Number: [id number]  Date of Birth 1950   Today's Date: 4/24/2024    HD: # 2    CC: R chest wall pain     ASSESSMENT/PLAN:    Trauma secondary to ground level fall:              - admitted to trauma               - fall precautions   -Speech therapy with cognition evaluation completed.     Multiple right rib fractures, 4-6:              - pain control continues to be an issue.  Consulting pain management today.              - respiratory/rib fx protocol.  Respiratory therapy continues to follow.              - Antiemetics as needed   - PT/OT eval and treat completed.  Continue to work with therapy.     Oxygen dependent COPD:               - Wears 4 L O2 at baseline               - Monitor oxygen saturations      Anxiety:              - Resume home medications      Hypothyroidism:              - Continue synthroid    Disposition:     -Social work to follow.  Patient lives at home alone.  She is wishing to go back home at discharge however given therapy and respiratory notes there are significant concerns.  Final disposition pending patient improvement/hospital course.    SUBJECTIVE  She is a 73 y.o. female who continues on 7K.  Overnight events noted.  Issues with oxygenation but now back to her baseline this morning.  Afebrile.  Vital signs otherwise stable.  Patient rates her pain a 9/10 in severity.  She endorses shortness of breath but has a history of chronic COPD requiring 4 L of oxygen during the day.  Patient denies vomiting.  Tolerating diet.  Speech therapy completed cognition evaluation.  16/25 on the MOCA.  Respiratory therapy continuing to follow.  Spirometry measurements subpar.      Wt Readings from Last 3 Encounters:   04/23/24 61.2 kg (135 lb)   03/22/24 63.5 kg (140 lb)   03/12/24 63.5 kg (140 lb)     Temp Readings from Last 3 Encounters:   04/24/24 98.4 °F (36.9 °C)

## 2024-04-24 NOTE — PROGRESS NOTES
Respiratory Care is following the rib fracture order set. Patient's position when testing was done was semi fowlers.  A Negative Inspiratory Force (NIF) was performed with patient achieving a NIF of -25 cm H2O with multiple attempts.The NIF was greater than 25 cm H2O. A Forced Vital Capacity (FVC) was not obtained with patient due to her trying but complaining of having too much pain. The patient's calculated ideal body weight, (IBW) is  52.4 kg. 0.020 liters/kg of the patient's IBW is 1.04 liters. Based on the spirometry measurement alone, patient does not meet ICU admission criteria. Previous FVC was 0.8 liters and previous NIF was -32 cm H2O.  Patient's NIF and FVC show no change from previous screening(s). Last pain medication was given on  04/24/2024 @ 0945. Physician was not called regarding spirometry measurement.

## 2024-04-24 NOTE — PROGRESS NOTES
[facility]  INPATIENT PHYSICAL THERAPY  [facility] ORTHOPEDICS 7K - 7K-05/005-A  Daily Note    Time In: 1055  Time Out: 1124  Timed Code Treatment Minutes: 29 Minutes  Minutes: 29          Date: 2024  Patient Name: Magan Oleary,  Gender:  female        MRN: [id number]  : 1950  (73 y.o.)     Referring Practitioner: Guerline Blue APRN - CNP  Diagnosis: Multiple rib fractures involving four or more ribs  Additional Pertinent Hx: Ms Oleary is a 74 yo M who presents as a level III trauma consult following a ground level fall. She reports she was walking [place] of [place] [employer] when she tripped and fell onto her oxygen concentrator.  Pt with R sided 4-6 rib fractures.  H/o COPD.     Prior Level of Function:  Lives With: Alone  Type of Home: House  Home Layout: One level  Home Access: Level entry  Home Equipment: Oxygen        ADL Assistance: Independent  Homemaking Assistance: Independent  Homemaking Responsibilities: Yes  Ambulation Assistance: Independent  Transfer Assistance: Independent  Active : No  Additional Comments: Pt ambulates without AD, 4 L at baseline, has goldendoodle dog at home    Restrictions/Precautions:  Restrictions/Precautions: Fall Risk     SUBJECTIVE: Pt. Laying in her bed and RN approved therapy session. Pt required min encouragement to complete therapy session with daughter and grandson present giving increased motivation to the patient. Pt often yelling out in pain throwing body and arms around with little movement. Patient refusing to get out of bed and requested to return at end of session.    Pain: \"a lot\" in R ribs with movement. Ice pack given for pain management    Vitals:   Oxygen was at 85% upon arrival with nasal cannula out of patients nose with tubing taped to patients face due to ear deformity . Nasal cannula donned back on and at 5L upon arrival. Pt able to recover in 1-2 minutes with pursed lip breathing and remained at 90% throughout. RN aware  training, Gait training, Safety education & training, Therapeutic activities, Functional mobility training, Stair training, Transfer training, Patient/Caregiver education & training, Endurance training  General Plan:  (5x T)    Patient Education  Patient Education: Plan of Care, Functional Mobility, Reviewed Prior Education, Health Promotion and Wellness Education, Safety, Verbal Exercise Instruction, Bed Mobility    Goals:  Patient Goals : to feel better  Short Term Goals  Time Frame for Short Term Goals: by discharge  Short Term Goal 1: Pt to transfer supine <--> sit S to enable pt to get in/out of bed.  Short Term Goal 2: Pt to transfer sit <--> stand S for increased functional mobility.  Short Term Goal 3: Pt to ambulate >200 feet with LRAD or without AD S for community re-entry.  Long Term Goals  Time Frame for Long Term Goals : NA due to short length of stay.    Following session, patient left in safe position with all fall risk precautions in place.

## 2024-04-25 ENCOUNTER — APPOINTMENT (OUTPATIENT)
Dept: CARDIAC REHAB | Age: 74
End: 2024-04-25
Payer: MEDICARE

## 2024-04-25 LAB
ANION GAP SERPL CALC-SCNC: 12 MEQ/L (ref 8–16)
BASOPHILS ABSOLUTE: 0.1 THOU/MM3 (ref 0–0.1)
BASOPHILS NFR BLD AUTO: 0.6 %
BUN SERPL-MCNC: 20 MG/DL (ref 7–22)
CALCIUM SERPL-MCNC: 8.8 MG/DL (ref 8.5–10.5)
CHLORIDE SERPL-SCNC: 106 MEQ/L (ref 98–111)
CO2 SERPL-SCNC: 27 MEQ/L (ref 23–33)
CREAT SERPL-MCNC: 0.6 MG/DL (ref 0.4–1.2)
DEPRECATED RDW RBC AUTO: 43.1 FL (ref 35–45)
EOSINOPHIL NFR BLD AUTO: 0.8 %
EOSINOPHILS ABSOLUTE: 0.1 THOU/MM3 (ref 0–0.4)
ERYTHROCYTE [DISTWIDTH] IN BLOOD BY AUTOMATED COUNT: 13 % (ref 11.5–14.5)
GFR SERPL CREATININE-BSD FRML MDRD: > 90 ML/MIN/1.73M2
GLUCOSE BLD STRIP.AUTO-MCNC: 114 MG/DL (ref 70–108)
GLUCOSE BLD STRIP.AUTO-MCNC: 120 MG/DL (ref 70–108)
GLUCOSE SERPL-MCNC: 130 MG/DL (ref 70–108)
HCT VFR BLD AUTO: 37.5 % (ref 37–47)
HGB BLD-MCNC: 11.7 GM/DL (ref 12–16)
IMM GRANULOCYTES # BLD AUTO: 0.04 THOU/MM3 (ref 0–0.07)
IMM GRANULOCYTES NFR BLD AUTO: 0.4 %
LYMPHOCYTES ABSOLUTE: 1.2 THOU/MM3 (ref 1–4.8)
LYMPHOCYTES NFR BLD AUTO: 12.5 %
MCH RBC QN AUTO: 28.3 PG (ref 26–33)
MCHC RBC AUTO-ENTMCNC: 31.2 GM/DL (ref 32.2–35.5)
MCV RBC AUTO: 90.8 FL (ref 81–99)
MONOCYTES ABSOLUTE: 1.1 THOU/MM3 (ref 0.4–1.3)
MONOCYTES NFR BLD AUTO: 10.8 %
NEUTROPHILS NFR BLD AUTO: 74.9 %
NRBC BLD AUTO-RTO: 0 /100 WBC
PLATELET # BLD AUTO: 176 THOU/MM3 (ref 130–400)
PMV BLD AUTO: 12 FL (ref 9.4–12.4)
POTASSIUM SERPL-SCNC: 3.8 MEQ/L (ref 3.5–5.2)
RBC # BLD AUTO: 4.13 MILL/MM3 (ref 4.2–5.4)
SEGMENTED NEUTROPHILS ABSOLUTE COUNT: 7.4 THOU/MM3 (ref 1.8–7.7)
SODIUM SERPL-SCNC: 145 MEQ/L (ref 135–145)
WBC # BLD AUTO: 9.9 THOU/MM3 (ref 4.8–10.8)

## 2024-04-25 PROCEDURE — 97530 THERAPEUTIC ACTIVITIES: CPT

## 2024-04-25 PROCEDURE — 94010 BREATHING CAPACITY TEST: CPT

## 2024-04-25 PROCEDURE — 99232 SBSQ HOSP IP/OBS MODERATE 35: CPT | Performed by: NURSE PRACTITIONER

## 2024-04-25 PROCEDURE — 2580000003 HC RX 258: Performed by: NURSE PRACTITIONER

## 2024-04-25 PROCEDURE — 1200000000 HC SEMI PRIVATE

## 2024-04-25 PROCEDURE — 6370000000 HC RX 637 (ALT 250 FOR IP): Performed by: NURSE PRACTITIONER

## 2024-04-25 PROCEDURE — 97116 GAIT TRAINING THERAPY: CPT

## 2024-04-25 PROCEDURE — 85025 COMPLETE CBC W/AUTO DIFF WBC: CPT

## 2024-04-25 PROCEDURE — 2700000000 HC OXYGEN THERAPY PER DAY

## 2024-04-25 PROCEDURE — 36415 COLL VENOUS BLD VENIPUNCTURE: CPT

## 2024-04-25 PROCEDURE — 97129 THER IVNTJ 1ST 15 MIN: CPT

## 2024-04-25 PROCEDURE — 94799 UNLISTED PULMONARY SVC/PX: CPT

## 2024-04-25 PROCEDURE — 94761 N-INVAS EAR/PLS OXIMETRY MLT: CPT

## 2024-04-25 PROCEDURE — 97535 SELF CARE MNGMENT TRAINING: CPT

## 2024-04-25 PROCEDURE — 82948 REAGENT STRIP/BLOOD GLUCOSE: CPT

## 2024-04-25 PROCEDURE — 94640 AIRWAY INHALATION TREATMENT: CPT

## 2024-04-25 PROCEDURE — 80048 BASIC METABOLIC PNL TOTAL CA: CPT

## 2024-04-25 RX ADMIN — ACETAMINOPHEN 650 MG: 325 TABLET ORAL at 17:25

## 2024-04-25 RX ADMIN — METHOCARBAMOL 1000 MG: 500 TABLET ORAL at 17:25

## 2024-04-25 RX ADMIN — METHOCARBAMOL 1000 MG: 500 TABLET ORAL at 21:28

## 2024-04-25 RX ADMIN — POLYETHYLENE GLYCOL 3350 17 G: 17 POWDER, FOR SOLUTION ORAL at 08:40

## 2024-04-25 RX ADMIN — METOPROLOL SUCCINATE 25 MG: 25 TABLET, FILM COATED, EXTENDED RELEASE ORAL at 08:40

## 2024-04-25 RX ADMIN — ACETAMINOPHEN 650 MG: 325 TABLET ORAL at 12:13

## 2024-04-25 RX ADMIN — OXYCODONE 10 MG: 5 TABLET ORAL at 03:05

## 2024-04-25 RX ADMIN — METHOCARBAMOL 1000 MG: 500 TABLET ORAL at 08:40

## 2024-04-25 RX ADMIN — FLUTICASONE PROPIONATE 1 SPRAY: 50 SPRAY, METERED NASAL at 08:40

## 2024-04-25 RX ADMIN — SENNOSIDES AND DOCUSATE SODIUM 1 TABLET: 50; 8.6 TABLET ORAL at 08:40

## 2024-04-25 RX ADMIN — METHOCARBAMOL 1000 MG: 500 TABLET ORAL at 12:13

## 2024-04-25 RX ADMIN — OXYCODONE 5 MG: 5 TABLET ORAL at 16:43

## 2024-04-25 RX ADMIN — MOMETASONE FUROATE AND FORMOTEROL FUMARATE DIHYDRATE 2 PUFF: 200; 5 AEROSOL RESPIRATORY (INHALATION) at 07:38

## 2024-04-25 RX ADMIN — LEVOTHYROXINE SODIUM 50 MCG: 0.05 TABLET ORAL at 05:59

## 2024-04-25 RX ADMIN — TIOTROPIUM BROMIDE INHALATION SPRAY 2 PUFF: 3.12 SPRAY, METERED RESPIRATORY (INHALATION) at 07:37

## 2024-04-25 RX ADMIN — SENNOSIDES AND DOCUSATE SODIUM 1 TABLET: 50; 8.6 TABLET ORAL at 21:26

## 2024-04-25 RX ADMIN — ATOMOXETINE 40 MG: 40 CAPSULE ORAL at 08:40

## 2024-04-25 RX ADMIN — DONEPEZIL HYDROCHLORIDE 10 MG: 10 TABLET, FILM COATED ORAL at 17:25

## 2024-04-25 RX ADMIN — NALOXEGOL OXALATE 12.5 MG: 12.5 TABLET, FILM COATED ORAL at 06:00

## 2024-04-25 RX ADMIN — ACETAMINOPHEN 650 MG: 325 TABLET ORAL at 05:58

## 2024-04-25 RX ADMIN — MOMETASONE FUROATE AND FORMOTEROL FUMARATE DIHYDRATE 2 PUFF: 200; 5 AEROSOL RESPIRATORY (INHALATION) at 17:20

## 2024-04-25 RX ADMIN — SODIUM CHLORIDE, PRESERVATIVE FREE 10 ML: 5 INJECTION INTRAVENOUS at 21:28

## 2024-04-25 RX ADMIN — BISACODYL 5 MG: 5 TABLET, COATED ORAL at 17:25

## 2024-04-25 RX ADMIN — SODIUM CHLORIDE, PRESERVATIVE FREE 10 ML: 5 INJECTION INTRAVENOUS at 08:41

## 2024-04-25 RX ADMIN — DULOXETINE HYDROCHLORIDE 60 MG: 60 CAPSULE, DELAYED RELEASE ORAL at 08:40

## 2024-04-25 RX ADMIN — ACETAMINOPHEN 650 MG: 325 TABLET ORAL at 21:26

## 2024-04-25 ASSESSMENT — PAIN - FUNCTIONAL ASSESSMENT
PAIN_FUNCTIONAL_ASSESSMENT: PREVENTS OR INTERFERES WITH MANY ACTIVE NOT PASSIVE ACTIVITIES
PAIN_FUNCTIONAL_ASSESSMENT: PREVENTS OR INTERFERES SOME ACTIVE ACTIVITIES AND ADLS
PAIN_FUNCTIONAL_ASSESSMENT: PREVENTS OR INTERFERES SOME ACTIVE ACTIVITIES AND ADLS

## 2024-04-25 ASSESSMENT — PAIN DESCRIPTION - LOCATION
LOCATION: RIB CAGE
LOCATION: OTHER (COMMENT)
LOCATION: OTHER (COMMENT)
LOCATION: BACK
LOCATION: BACK;RIB CAGE

## 2024-04-25 ASSESSMENT — PAIN DESCRIPTION - DESCRIPTORS
DESCRIPTORS: ACHING
DESCRIPTORS: ACHING
DESCRIPTORS: ACHING;SHARP
DESCRIPTORS: ACHING
DESCRIPTORS: ACHING

## 2024-04-25 ASSESSMENT — PAIN SCALES - GENERAL
PAINLEVEL_OUTOF10: 0
PAINLEVEL_OUTOF10: 7
PAINLEVEL_OUTOF10: 9
PAINLEVEL_OUTOF10: 6
PAINLEVEL_OUTOF10: 7
PAINLEVEL_OUTOF10: 7
PAINLEVEL_OUTOF10: 6
PAINLEVEL_OUTOF10: 3
PAINLEVEL_OUTOF10: 9
PAINLEVEL_OUTOF10: 5

## 2024-04-25 ASSESSMENT — PAIN DESCRIPTION - PAIN TYPE
TYPE: ACUTE PAIN
TYPE: ACUTE PAIN

## 2024-04-25 ASSESSMENT — PAIN DESCRIPTION - ORIENTATION
ORIENTATION: RIGHT
ORIENTATION: RIGHT;LEFT
ORIENTATION: RIGHT

## 2024-04-25 ASSESSMENT — PAIN DESCRIPTION - FREQUENCY: FREQUENCY: CONTINUOUS

## 2024-04-25 ASSESSMENT — PAIN DESCRIPTION - ONSET: ONSET: ON-GOING

## 2024-04-25 NOTE — PROGRESS NOTES
[facility]  STR ORTHOPEDICS 7K  Occupational Therapy  Daily Note  Time:   Time In: 1102  Time Out: 1141  Timed Code Treatment Minutes: 39 Minutes  Minutes: 39          Date: 2024  Patient Name: Magan Oleary,   Gender: female      Room: [facility]05/005-A  MRN: [id number]  : 1950  (73 y.o.)  Referring Practitioner: Guerline Blue, SARITHA - CNP  Diagnosis: Multiple rib fractures involving four more ribs  Additional Pertinent Hx: Ms Oleary is a 74 yo M who presents as a level III trauma consult following a ground level fall. She reports she was [place] [employer] when she tripped and fell onto her oxygen concentrator. She states she was not able to get up on her due to pain and needed to have assistance. She did not hit her head, did not have any LOC. She reports pain is not well controlled at this time (10/10) and is requesting additional pain medication. She denies chest pain, shortness of breath, nausea/vomiting, or difficulty with urination.    Restrictions/Precautions:  Restrictions/Precautions: Fall Risk     Social/Functional History:  Lives With: Alone  Type of Home: House  Home Layout: One level  Home Access: Level entry  Home Equipment: Oxygen           ADL Assistance: Independent  Homemaking Assistance: Independent  Homemaking Responsibilities: Yes  Ambulation Assistance: Independent  Transfer Assistance: Independent    Active : No  Patient's  Info: Daughter     Additional Comments: Pt ambulates without AD, 4 L at baseline, has goldendoodle dog at home    SUBJECTIVE: RN okayed OT session.  Pt. In room sitting in chair agreeable to participate.    PAIN: rib pain- not rated    Vitals: Vitals not assessed per clinical judgement, see nursing flowsheet    COGNITION: Decreased Insight, Decreased Problem Solving, and Decreased Safety Awareness    ADL:   Grooming: Minimal Assistance, with set-up, with verbal cues , and with increased time for completion.     Bathing:

## 2024-04-25 NOTE — PROGRESS NOTES
Respiratory Care is following the rib fracture order set. Patient attempted NIF and only achieved -10cwp. Patient complaining of a lot of pain and did not use good effort despite coaching. Patient refused SVC.

## 2024-04-25 NOTE — PROGRESS NOTES
Pain Management   Progress Note      4/25/2024 3:02 PM     Chief Complaint: Right side and rib pain d/t fractures from fall    SUBJECTIVE:    Magan was seen in her room was resting in her bed. She had just been cleaned up by nursing staff. Appears much more relaxed and comfortable today in no distress at all at  rest. Continues to have pain in right side rib area as expected especially increased with movement. She states \"I am doing better and they tell me I have not been yelling and screaming like before\". Currently rates her pain at a 7/10 which is the lowest it has been.   She has used less prn pain medications in the past 24 hours- 3 doses of oxy IR and last dosage was 0305 so it has been a full 12 hours since last dosage.   Does feel that Lidoderm patches and scheduled tylenol is helping   She remains on 5 liters of oxygen per nasal cannula  Medications effective:  yes  Pain rating is 7/10 right ribs and side- sharp and stabbing especially with movement   Constipation: yes, states bowels have not moved since admission     Current medications:    lidocaine  3 patch TransDERmal Daily    acetaminophen  650 mg Oral 4 times per day    naloxegol  12.5 mg Oral QAM AC    atomoxetine  40 mg Oral Daily    donepezil  10 mg Oral QPM    DULoxetine  60 mg Oral Daily    fluticasone  1 spray Each Nostril Daily    levothyroxine  50 mcg Oral QAM AC    metoprolol succinate  25 mg Oral Daily    sodium chloride flush  5-40 mL IntraVENous 2 times per day    polyethylene glycol  17 g Oral Daily    methocarbamol  1,000 mg Oral 4x Daily    sennosides-docusate sodium  1 tablet Oral BID    tiotropium  2 puff Inhalation Daily RT    mometasone-formoterol  2 puff Inhalation BID RT                                        acetaminophen, oxyCODONE **OR** oxyCODONE, diclofenac sodium, naloxone, hydrOXYzine HCl, sodium chloride flush, sodium chloride, potassium chloride **OR** potassium chloride, magnesium sulfate, ondansetron **OR** ondansetron,

## 2024-04-25 NOTE — PROGRESS NOTES
Respiratory Care is following the rib fracture order set. Patient's position when testing was done was sitting.  A Negative Inspiratory Force (NIF) was performed with patient achieving a NIF of -25 cm H2O. The NIF was greater than 25 cm H2O. A Slow Vital Capacity (SVC) was obtained with patient achieving an SVC of 1.14 liters. The patient's calculated ideal body weight, (IBW) is  52.4 kg. 0.020 liters/kg of the patient's IBW is 1.04 liters. The patient's SVC was greater than 0.020 liters/kg of IBW.  José Manuel was unable to perform FVC due to pain. Based on the spirometry measurement alone, patient does not meet ICU admission criteria. Last pain medication was given on  03/25/2024 @ 0589.

## 2024-04-25 NOTE — PROGRESS NOTES
[facility]  INPATIENT SPEECH THERAPY  [facility] ORTHOPEDICS 7K  DAILY NOTE    TIME   SLP Individual Minutes  Time In: 1041  Time Out: 1056  Minutes: 15  Timed Code Treatment Minutes: 15 Minutes       Date: 2024  Patient Name: Magan Oleary      CSN: [id number]   : 1950  (73 y.o.)  Gender: female   Referring Physician:  Guerline Blue, APRN - CNP   Diagnosis: Closed fracture of multiple ris of right side, initial encounter  Precautions: fall risk  Current Diet: Regular, thin  Respiratory Status: Room Air  Swallowing Strategies: Standard Universal Swallow Precautions  Date of Last MBS/FEES: Not Applicable    Pain:  7/10 - Pain location: right side, ribs    Subjective:  SERENITY Combs approved ST session this date.  Patient resting in recliner after physical therapy this date.  Patient initially agreeable to ST services but later requested to discontinue session due to pain.     Short-Term Goals:  SHORT TERM GOAL #1:  Goal 1: Patient will complete delayed recall and working memory tasks with 80% accuracy with min cues to improve retention of novel information  INTERVENTIONS:Goal not formally addressed.     SHORT TERM GOAL #2:  Goal 2: Patient will complete executive functioning (finances, meds), verbal/visual reasoning, and thought organization (scheduling) tasks with 70% accuracy with min cues for potential return to ADL/IADL completion  INTERVENTIONS:    Verbal Reasoning- Home environment  100% independent      Long-Term Goals:   No long term goals established due to short ELOS.          Functional Oral Intake Scale: Functional oral intake testing not appropriate at this time    EDUCATION:  Learner: Patient  Education:  Reviewed ST goals and Plan of Care and Reviewed recommendations for follow-up  Evaluation of Education: Verbalizes understanding and Family not present    ASSESSMENT/PLAN:  Activity Tolerance:  Patient tolerance of  treatment: fair.      Assessment/Plan: Patient progressing

## 2024-04-25 NOTE — CARE COORDINATION
4/25/24, 3:17 PM EDT    DISCHARGE PLANNING EVALUATION    Spoke with patient and with daughter.  They are discussing discharge plans and possible snf options.  Discharge planning is ongoing.

## 2024-04-25 NOTE — CARE COORDINATION
4/25/24, 8:12 AM EDT    DISCHARGE ON GOING EVALUATION    Magan HOOD [facility] day: 3  Location: -05/005-A Reason for admit: Closed fracture of multiple ribs of right side, initial encounter [S22.41XA]  Multiple rib fractures involving four or more ribs [S22.49XA]     Procedures:   none    Imaging since last note:   none      Barriers to Discharge: pain control, PT/OT/SLP, incentive spirometry, Robaxin, Movantik, rib protocols, nasal cannula oxygen at 5 liters with saturations at 92% (baseline 4 liters)    PCP: Óscar Baird, SARITHA White CNP  Readmission Risk Score: 12.4    Patient Goals/Plan/Treatment Preferences: From home alone.SW following for possible SNF vs HH. Refer to SW note.

## 2024-04-25 NOTE — PROGRESS NOTES
Respiratory Care is following the rib fracture order set. Unable to complete rib fracture protocol at this time due to patient sleeping.

## 2024-04-25 NOTE — PLAN OF CARE
Problem: Discharge Planning  Goal: Discharge to home or other facility with appropriate resources  4/25/2024 1124 by Lauryn Holley RN  Outcome: Not Progressing  Flowsheets (Taken 4/25/2024 1124)  Discharge to home or other facility with appropriate resources:   Identify barriers to discharge with patient and caregiver   Arrange for needed discharge resources and transportation as appropriate   Identify discharge learning needs (meds, wound care, etc)   Refer to discharge planning if patient needs post-hospital services based on physician order or complex needs related to functional status, cognitive ability or social support system  Note: Patient discharge has not been determined at this time. Plan will be to go back to the facility she came from.  4/24/2024 2247 by Dimitry Arita RN  Outcome: Progressing     Problem: Pain  Goal: Verbalizes/displays adequate comfort level or baseline comfort level  4/25/2024 1124 by Lauryn Holley RN  Outcome: Not Progressing  Flowsheets (Taken 4/25/2024 1124)  Verbalizes/displays adequate comfort level or baseline comfort level:   Encourage patient to monitor pain and request assistance   Assess pain using appropriate pain scale   Administer analgesics based on type and severity of pain and evaluate response   Implement non-pharmacological measures as appropriate and evaluate response   Notify Licensed Independent Practitioner if interventions unsuccessful or patient reports new pain  Note: Patient states pain is not controlled with the current pain regimen so attending provider notified.  4/24/2024 2247 by Dimitry Arita, RN  Outcome: Progressing     Problem: Safety - Adult  Goal: Free from fall injury  4/25/2024 1124 by Lauryn Holley RN  Outcome: Progressing  Flowsheets (Taken 4/25/2024 1124)  Free From Fall Injury:   Instruct family/caregiver on patient safety   Based on caregiver fall risk screen, instruct family/caregiver to ask for assistance with

## 2024-04-25 NOTE — PROGRESS NOTES
[facility]  Trauma Surgery - Dr. Kiel Lowry  Daily Progress Note  Pt Name: Magan Oleary  Medical Record Number: [id number]  Date of Birth 1950   Today's Date: 4/25/2024    HD: # 3    CC: R chest wall pain     ASSESSMENT/PLAN:    Trauma secondary to ground level fall:              - admitted to trauma               - fall precautions   -Speech therapy with cognition evaluation completed. Concerns for return to home. Will need to continue discharge planning.      Multiple right rib fractures, 4-6:              - pain control continues to be an issue.  Consulting pain management today.              - respiratory/rib fx protocol.  Respiratory therapy continues to follow.              - Antiemetics as needed   - PT/OT eval and treat completed.  Continue to work with therapy.   - 04/25: Continue PT/OT. Has not been ambulating in rivas or out of bed except to bathroom. Encouraged to get up and move to help stretch her back muscles which she says are starting to hurt. Continue Rib fx protocol.      Oxygen dependent COPD:               - Wears 4 L O2 at baseline               - Monitor oxygen saturations      Anxiety:              - Resume home medications      Hypothyroidism:              - Continue synthroid    Disposition:     -Social work to follow. Patient lives at home alone and wants to go back home at discharge however given therapy and respiratory notes there are significant concerns. Final disposition pending patient improvement/hospital course.    SUBJECTIVE  She is a 73 y.o. female who continues on 7K. Difficulty maintiaing oxygen overngiht given patient repeatedly removing nasal cannula. Issues with oxygenation but now back to her baseline this morning.  Afebrile. Vital signs otherwise stable.  Patient rates her pain a 10/10 in severity.  Patient denies vomiting.  Tolerating diet. Respiratory therapy continuing to follow.  Spirometry measurements subpar.      Wt Readings from Last 3  hours.  No results for input(s): \"AST\", \"ALT\", \"BILIDIR\", \"BILITOT\", \"ALKPHOS\" in the last 72 hours.  XR CHEST PORTABLE   Final Result   Impression:   1. Acute right rib fractures. No pneumothorax.   2. Scattered subsegmental atelectasis, new/increased since prior study.      This document has been electronically signed by: Hector Antoine MD on    04/23/2024 04:47 AM      CT CHEST W CONTRAST   Final Result   1. Acute right rib fractures.      This document has been electronically signed by: Kiel Meehan MD on    04/22/2024 07:54 PM      All CTs at this facility use dose modulation techniques and iterative    reconstructions, and/or weight-based dosing   when appropriate to reduce radiation to a low as reasonably achievable.      CT ABDOMEN PELVIS W IV CONTRAST Additional Contrast? None   Final Result   No acute findings.      This document has been electronically signed by: Kiel Meehan MD on    04/22/2024 08:05 PM      All CTs at this facility use dose modulation techniques and iterative    reconstructions, and/or weight-based dosing   when appropriate to reduce radiation to a low as reasonably achievable.      CT HEAD WO CONTRAST   Final Result       1. Stable CT scan of the brain, no interval change since previous MRI scan dated 3/29/2024..               **This report has been created using voice recognition software. It may contain minor errors which are inherent in voice recognition technology.**      Final report electronically signed by DR STEVE MATTSON on 4/22/2024 4:55 PM      XR RIBS RIGHT INCLUDE CHEST (MIN 3 VIEWS)   Final Result   1. No acute intrathoracic findings.   2. No right rib fracture.      Final report electronically signed by Dr. Horacio Wayne on 4/22/2024 3:55 PM           Total time spent in care of patient:  10 minutes collectively between subjective/objective examination, chart review, documentation, clinical reasoning and discussion with hospital personnel regarding plan/interval

## 2024-04-25 NOTE — PROGRESS NOTES
[facility]  INPATIENT PHYSICAL THERAPY  [facility] ORTHOPEDICS 7K - 7K-05/005-A  Daily Note    Time In: 1018  Time Out: 1042  Timed Code Treatment Minutes: 24 Minutes  Minutes: 24          Date: 2024  Patient Name: Magan Oleary,  Gender:  female        MRN: [id number]  : 1950  (73 y.o.)     Referring Practitioner: Guerline Blue APRN - CNP  Diagnosis: Multiple rib fractures involving four or more ribs  Additional Pertinent Hx: Ms Oleary is a 74 yo M who presents as a level III trauma consult following a ground level fall. She reports she was walking out of the [employer] when she tripped and fell onto her oxygen concentrator.  Pt with R sided 4-6 rib fractures.  H/o COPD.     Prior Level of Function:  Lives With: Alone  Type of Home: House  Home Layout: One level  Home Access: Level entry  Home Equipment: Oxygen        ADL Assistance: Independent  Homemaking Assistance: Independent  Homemaking Responsibilities: Yes  Ambulation Assistance: Independent  Transfer Assistance: Independent  Active : No  Additional Comments: Pt ambulates without AD, 4 L at baseline, has goldendoodle dog at home    Restrictions/Precautions:  Restrictions/Precautions: Fall Risk     SUBJECTIVE: Pt. Laying in her bed and pleasantly agrees to therapy session.  RN approved therapy session. Patient A&Ox3. Pt demo increased anxiety with one episode of yelling out, pulling hair and throwing arms due to increased pain. Decreased anxiety with emotional support.     Pain: 7-8/10 in R ribs     Vitals:   Patient on 5L of O2 upon arrival with oxygen ranging grom 90-91%. Once patient on toilet oxygen dropped to 85% and increased to 6L due to decreased ability to self correct. After ambulation to chair and pursed lip breathing able to lower oxygen back to 5L at 95% O2    OBJECTIVE:  Bed Mobility:  Supine to Sit: Minimal Assistance, X 1, with head of bed raised slightly , with rail, with verbal cues , with increased  time for completion    Transfers:  Sit to Stand:Contact Guard Assistance, X 1, with increased time for completion, cues for hand placement  Stand to Sit:Contact Guard Assistance, X 1, with increased time for completion, cues for hand placement    Ambulation:  Contact Guard Assistance, X 1, with verbal cues , with increased time for completion  Distance: 10'x2  Surface: Level Tile  Device: Rolling Walker  Gait Deviations:  Forward Flexed Posture, Slow Maral, Decreased Step Length Bilaterally, Decreased Gait Speed, Decreased Heel Strike Bilaterally, Mild Path Deviations, and Decreased Terminal Knee Extension  *Pt demo decreased coordination with RW at times required cues for RW management   Stairs:  None    Balance:  Static Sitting Balance:  Contact Guard Assistance  Static Standing Balance: Contact Guard Assistance  Dynamic Standing Balance: Contact Guard Assistance  *pt required cues for erect posture and tends to lean to L side while sitting EOB   Neuromuscular Re-education  None    Exercise:  Patient was guided in 1 set(s) 10 reps of exercises: Ankle pumps,  Heelslides,  Exercises were completed for increased independence with functional mobility.    Functional Outcome Measures:   [facility] (6 CLICK) BASIC MOBILITY     AM-PAC Inpatient Mobility without Stair Climbing Raw Score : 15  AM-PAC Inpatient without Stair Climbing T-Scale Score : 43.03  Mobility Inpatient CMS 0-100% Score: 47.43  Mobility Inpatient without Stair CMS G-Code Modifier : CK  Modified [place] Scale:  Not Applicable     ASSESSMENT:  Assessment: Patient progressing toward established goals.  Activity Tolerance:  Patient tolerance of  treatment: good.     Equipment Recommendations:Other: will follow for needs pending progress- may need RW  Discharge Recommendations: Continue to assess pending progress and Patient would benefit from continued PT at discharge    PLAN: Current Treatment Recommendations: Strengthening, Balance training, Gait training,

## 2024-04-26 PROCEDURE — 94640 AIRWAY INHALATION TREATMENT: CPT

## 2024-04-26 PROCEDURE — 97530 THERAPEUTIC ACTIVITIES: CPT

## 2024-04-26 PROCEDURE — APPNB30 APP NON BILLABLE TIME 0-30 MINS: Performed by: OCCUPATIONAL THERAPIST

## 2024-04-26 PROCEDURE — 94761 N-INVAS EAR/PLS OXIMETRY MLT: CPT

## 2024-04-26 PROCEDURE — 2700000000 HC OXYGEN THERAPY PER DAY

## 2024-04-26 PROCEDURE — 2580000003 HC RX 258: Performed by: NURSE PRACTITIONER

## 2024-04-26 PROCEDURE — 6370000000 HC RX 637 (ALT 250 FOR IP): Performed by: NURSE PRACTITIONER

## 2024-04-26 PROCEDURE — 97116 GAIT TRAINING THERAPY: CPT

## 2024-04-26 PROCEDURE — 1200000000 HC SEMI PRIVATE

## 2024-04-26 PROCEDURE — 97535 SELF CARE MNGMENT TRAINING: CPT

## 2024-04-26 RX ORDER — TIZANIDINE 4 MG/1
4 TABLET ORAL EVERY 6 HOURS
Qty: 28 TABLET | Refills: 0 | DISCHARGE
Start: 2024-04-26 | End: 2024-05-03

## 2024-04-26 RX ORDER — SENNA AND DOCUSATE SODIUM 50; 8.6 MG/1; MG/1
1 TABLET, FILM COATED ORAL 2 TIMES DAILY
Qty: 20 TABLET | Refills: 0 | DISCHARGE
Start: 2024-04-26 | End: 2024-05-06

## 2024-04-26 RX ORDER — LIDOCAINE 4 G/G
3 PATCH TOPICAL DAILY
Qty: 15 EACH | Refills: 0 | DISCHARGE
Start: 2024-04-27

## 2024-04-26 RX ORDER — BISACODYL 10 MG
10 SUPPOSITORY, RECTAL RECTAL DAILY PRN
Qty: 10 SUPPOSITORY | Refills: 0 | DISCHARGE
Start: 2024-04-26 | End: 2024-05-06

## 2024-04-26 RX ORDER — OXYCODONE HYDROCHLORIDE 5 MG/1
5 TABLET ORAL EVERY 4 HOURS PRN
Qty: 30 TABLET | Refills: 0 | Status: SHIPPED | DISCHARGE
Start: 2024-04-26 | End: 2024-05-01

## 2024-04-26 RX ORDER — ONDANSETRON 4 MG/1
4 TABLET, ORALLY DISINTEGRATING ORAL EVERY 8 HOURS PRN
Qty: 6 TABLET | Refills: 0 | DISCHARGE
Start: 2024-04-26 | End: 2024-05-03

## 2024-04-26 RX ORDER — POLYETHYLENE GLYCOL 3350 17 G/17G
17 POWDER, FOR SOLUTION ORAL DAILY
Qty: 10 PACKET | Refills: 0 | DISCHARGE
Start: 2024-04-27 | End: 2024-05-07

## 2024-04-26 RX ADMIN — MOMETASONE FUROATE AND FORMOTEROL FUMARATE DIHYDRATE 2 PUFF: 200; 5 AEROSOL RESPIRATORY (INHALATION) at 07:55

## 2024-04-26 RX ADMIN — ATOMOXETINE 40 MG: 40 CAPSULE ORAL at 15:27

## 2024-04-26 RX ADMIN — FLUTICASONE PROPIONATE 1 SPRAY: 50 SPRAY, METERED NASAL at 08:50

## 2024-04-26 RX ADMIN — DULOXETINE HYDROCHLORIDE 60 MG: 60 CAPSULE, DELAYED RELEASE ORAL at 15:27

## 2024-04-26 RX ADMIN — BISACODYL 10 MG: 10 SUPPOSITORY RECTAL at 04:41

## 2024-04-26 RX ADMIN — MOMETASONE FUROATE AND FORMOTEROL FUMARATE DIHYDRATE 2 PUFF: 200; 5 AEROSOL RESPIRATORY (INHALATION) at 18:04

## 2024-04-26 RX ADMIN — HYDROXYZINE HYDROCHLORIDE 25 MG: 25 TABLET, FILM COATED ORAL at 08:51

## 2024-04-26 RX ADMIN — OXYCODONE 5 MG: 5 TABLET ORAL at 15:32

## 2024-04-26 RX ADMIN — DICLOFENAC SODIUM 4 G: 10 GEL TOPICAL at 22:00

## 2024-04-26 RX ADMIN — SENNOSIDES AND DOCUSATE SODIUM 1 TABLET: 50; 8.6 TABLET ORAL at 08:49

## 2024-04-26 RX ADMIN — SODIUM CHLORIDE, PRESERVATIVE FREE 10 ML: 5 INJECTION INTRAVENOUS at 08:54

## 2024-04-26 RX ADMIN — METHOCARBAMOL 1000 MG: 500 TABLET ORAL at 17:37

## 2024-04-26 RX ADMIN — POLYETHYLENE GLYCOL 3350 17 G: 17 POWDER, FOR SOLUTION ORAL at 08:47

## 2024-04-26 RX ADMIN — TIOTROPIUM BROMIDE INHALATION SPRAY 2 PUFF: 3.12 SPRAY, METERED RESPIRATORY (INHALATION) at 07:54

## 2024-04-26 RX ADMIN — ACETAMINOPHEN 650 MG: 325 TABLET ORAL at 17:36

## 2024-04-26 RX ADMIN — OXYCODONE 10 MG: 5 TABLET ORAL at 21:58

## 2024-04-26 RX ADMIN — OXYCODONE 10 MG: 5 TABLET ORAL at 04:38

## 2024-04-26 RX ADMIN — LEVOTHYROXINE SODIUM 50 MCG: 0.05 TABLET ORAL at 05:53

## 2024-04-26 RX ADMIN — OXYCODONE 5 MG: 5 TABLET ORAL at 08:48

## 2024-04-26 RX ADMIN — BISACODYL 5 MG: 5 TABLET, COATED ORAL at 04:41

## 2024-04-26 RX ADMIN — ACETAMINOPHEN 650 MG: 325 TABLET ORAL at 12:12

## 2024-04-26 RX ADMIN — METHOCARBAMOL 1000 MG: 500 TABLET ORAL at 12:13

## 2024-04-26 RX ADMIN — METHOCARBAMOL 1000 MG: 500 TABLET ORAL at 21:59

## 2024-04-26 RX ADMIN — METOPROLOL SUCCINATE 25 MG: 25 TABLET, FILM COATED, EXTENDED RELEASE ORAL at 08:52

## 2024-04-26 RX ADMIN — SODIUM CHLORIDE, PRESERVATIVE FREE 5 ML: 5 INJECTION INTRAVENOUS at 21:59

## 2024-04-26 RX ADMIN — ACETAMINOPHEN 650 MG: 325 TABLET ORAL at 04:48

## 2024-04-26 RX ADMIN — SENNOSIDES AND DOCUSATE SODIUM 1 TABLET: 50; 8.6 TABLET ORAL at 21:59

## 2024-04-26 RX ADMIN — DONEPEZIL HYDROCHLORIDE 10 MG: 10 TABLET, FILM COATED ORAL at 17:37

## 2024-04-26 RX ADMIN — NALOXEGOL OXALATE 12.5 MG: 12.5 TABLET, FILM COATED ORAL at 05:53

## 2024-04-26 RX ADMIN — METHOCARBAMOL 1000 MG: 500 TABLET ORAL at 08:51

## 2024-04-26 RX ADMIN — DICLOFENAC SODIUM 4 G: 10 GEL TOPICAL at 04:39

## 2024-04-26 ASSESSMENT — PAIN DESCRIPTION - DESCRIPTORS
DESCRIPTORS: ACHING;SHARP
DESCRIPTORS: ACHING;DULL
DESCRIPTORS: ACHING;SHARP
DESCRIPTORS: ACHING;SHARP;SHOOTING
DESCRIPTORS: ACHING;DULL
DESCRIPTORS: ACHING;SHARP;SHOOTING

## 2024-04-26 ASSESSMENT — PAIN DESCRIPTION - ONSET
ONSET: ON-GOING
ONSET: GRADUAL
ONSET: ON-GOING
ONSET: ON-GOING

## 2024-04-26 ASSESSMENT — PAIN SCALES - GENERAL
PAINLEVEL_OUTOF10: 6
PAINLEVEL_OUTOF10: 0
PAINLEVEL_OUTOF10: 8
PAINLEVEL_OUTOF10: 8
PAINLEVEL_OUTOF10: 7
PAINLEVEL_OUTOF10: 5
PAINLEVEL_OUTOF10: 7
PAINLEVEL_OUTOF10: 5
PAINLEVEL_OUTOF10: 10

## 2024-04-26 ASSESSMENT — PAIN DESCRIPTION - FREQUENCY
FREQUENCY: CONTINUOUS
FREQUENCY: CONTINUOUS
FREQUENCY: INTERMITTENT
FREQUENCY: CONTINUOUS

## 2024-04-26 ASSESSMENT — PAIN DESCRIPTION - ORIENTATION
ORIENTATION: RIGHT

## 2024-04-26 ASSESSMENT — PAIN DESCRIPTION - PAIN TYPE
TYPE: ACUTE PAIN

## 2024-04-26 ASSESSMENT — PAIN DESCRIPTION - LOCATION
LOCATION: RIB CAGE;BACK
LOCATION: FLANK
LOCATION: CHEST;RIB CAGE
LOCATION: RIB CAGE
LOCATION: RIB CAGE
LOCATION: BACK;RIB CAGE
LOCATION: CHEST;RIB CAGE
LOCATION: FLANK
LOCATION: RIB CAGE

## 2024-04-26 ASSESSMENT — PAIN DESCRIPTION - DIRECTION: RADIATING_TOWARDS: ALL OVER

## 2024-04-26 ASSESSMENT — PAIN - FUNCTIONAL ASSESSMENT
PAIN_FUNCTIONAL_ASSESSMENT: PREVENTS OR INTERFERES SOME ACTIVE ACTIVITIES AND ADLS
PAIN_FUNCTIONAL_ASSESSMENT: PREVENTS OR INTERFERES SOME ACTIVE ACTIVITIES AND ADLS
PAIN_FUNCTIONAL_ASSESSMENT: PREVENTS OR INTERFERES WITH MANY ACTIVE NOT PASSIVE ACTIVITIES
PAIN_FUNCTIONAL_ASSESSMENT: PREVENTS OR INTERFERES SOME ACTIVE ACTIVITIES AND ADLS
PAIN_FUNCTIONAL_ASSESSMENT: ACTIVITIES ARE NOT PREVENTED

## 2024-04-26 NOTE — PROGRESS NOTES
[facility]  INPATIENT PHYSICAL THERAPY  DAILY NOTE  [facility] ORTHOPEDICS 7K - 7K-05/005-A    Time In: 923  Time Out: 1002  Timed Code Treatment Minutes: 39 Minutes  Minutes: 39          Date: 2024  Patient Name: Magan Oleary,  Gender:  female        MRN: [id number]  : 1950  (73 y.o.)     Referring Practitioner: Guerline Blue APRN - CNP  Diagnosis: Multiple rib fractures involving four or more ribs  Additional Pertinent Hx: Ms Oleary is a 72 yo M who presents as a level III trauma consult following a ground level fall. She reports she was [place] [employer] when she tripped and fell onto her oxygen concentrator.  Pt with R sided 4-6 rib fractures.  H/o COPD.     Prior Level of Function:  Lives With: Alone  Type of Home: House  Home Layout: One level  Home Access: Level entry  Home Equipment: Oxygen        ADL Assistance: Independent  Homemaking Assistance: Independent  Homemaking Responsibilities: Yes  Ambulation Assistance: Independent  Transfer Assistance: Independent  Active : No  Additional Comments: Pt ambulates without AD, 4 L at baseline, has goldendoodle dog at home    Restrictions/Precautions:  Restrictions/Precautions: Fall Risk     SUBJECTIVE: RN approves therapy session. Patient resting in bed and pleasantly agrees to treatment. Emphasis placed on pursed lip breathing on this date.     PAIN: 6/10: R Ribs     Vitals: Oxygen: Patients O2 remained >90% throughout treatment     OBJECTIVE:  Bed Mobility:  Rolling to Left: Stand By Assistance, Contact Guard Assistance, with head of bed flat, with rail   Supine to Sit: Stand By Assistance, Contact Guard Assistance, with head of bed flat, with rail, with increased time for completion  Sit to Supine: Stand By Assistance, Contact Guard Assistance, with head of bed flat, with rail, with increased time for completion   Scooting: Contact Guard Assistance, with increased time for completion  Patient impulsive at  times and requires cues for sequencing causing increased time for task completion      Transfers:  Sit to Stand: Contact Guard Assistance, cues for hand placement  Stand to Sit:Contact Guard Assistance, cues for hand placement    Ambulation:  Minimal Assistance  Distance: 15 ft and 10 ft  Surface: Level Tile  Device:Rolling Walker  Gait Deviations:  Forward Flexed Posture, Slow Maral, Decreased Step Length Bilaterally, Decreased Gait Speed, Decreased Heel Strike Bilaterally, and Mild Path Deviations  Patient uses RW and no AD, patient requires Max cueing for O2 tubing management for increased safety awareness with RW and RW management. Patient states, \"This has been an issue at home with my oxygen\"     Balance:  Static Sitting Balance:  Stand By Assistance  Dynamic Sitting Balance: Stand By Assistance, with cues for safety, emphasis on pursed lip breathing facilitating increased core strength and improved postural stability   Dynamic Standing Balance: Contact Guard Assistance, unilateral UE support       Functional Outcome Measures: Completed  AM-PAC Inpatient Mobility without Stair Climbing Raw Score : 15  AM-PAC Inpatient without Stair Climbing T-Scale Score : 43.03  Modified [place] Scale:  Not Applicable    ASSESSMENT:  Assessment: Patient progressing toward established goals.  Activity Tolerance:  Patient tolerance of  treatment: fair.      Equipment Recommendations:Other: will follow for needs pending progress- may need RW  Discharge Recommendations: Continue to assess pending progress, Subacte/Skilled Nursing Facility, and Patient would benefit from continued PT at discharge  Plan: Current Treatment Recommendations: Strengthening, Balance training, Gait training, Safety education & training, Therapeutic activities, Functional mobility training, Stair training, Transfer training, Patient/Caregiver education & training, Endurance training  General Plan:  (5x T)    Education  Education:  Learners:

## 2024-04-26 NOTE — DISCHARGE SUMMARY
Discharge Summary   Trauma Services    Patient Identification:  Magan Oleary  : 1950  MRN: [id number]   Account: [id number]     Admit date: 2024  Discharge date: 24  Attending provider: Kiel Lowry MD        Primary care provider: Óscar Baird, APRN - CNP     Discharge Diagnoses:   Principal Problem:    Closed fracture of multiple ribs of right side with routine healing  Active Problems:    Multiple closed fractures of ribs of right side    Acute pain due to trauma  Resolved Problems:    * No resolved hospital problems. *       Hospital Course:   Magan Oleary is a 73 y.o. female admitted to [facility] on 2024 for multiple right rib fractures following a fall with no known loss of consciousness. Report stated patient was walking out of the close when she tripped over her oxygen concentrator and was unable to get up with immediate right chest wall pain.  Denies loss of consciousness, denies hitting her head.  Admitted under trauma services to . Inpatient management included as follows:    ASSESSMENT/PLAN:     Trauma secondary to ground level fall:              - admitted to trauma               - fall precautions              - Speech therapy with cognition evaluation completed. Concerns for return to home. Will need to continue discharge planning.      Multiple right rib fractures, 4-6:              - pain control continues to be an issue.  Consulting pain management today.              - respiratory/rib fx protocol.  Respiratory therapy continues to follow.              - antiemetics as needed              - PT/OT eval and treat completed.  Continue to work with therapy.              - : Continue PT/OT. Has not been ambulating in rivas or out of bed except to bathroom. Encouraged to get up and move to help stretch her back muscles which she says are starting to hurt. Continue Rib fx protocol. Lidoderm patch. Pain mgmt dc all IV meds, has tylenol and  noted    Significant Diagnostics:   Radiology: XR CHEST PORTABLE    Result Date: 4/23/2024  Chest X-ray: 1 view. Indication: Follow-up rib fractures. Comparison: CT/SR - CT CHEST W CONTRAST - 04/22/2024 07:11 PM EDT Findings: Scattered subsegmental atelectasis, new/increased since prior study. No focal consolidation, or pleural effusion. The cardiac silhouette is normal in size. Aortic atherosclerotic calcifications. Acute anterior lateral/lateral 3rd-7th rib fractures. No right pneumothorax. Endplate osteophytes.     Impression: 1. Acute right rib fractures. No pneumothorax. 2. Scattered subsegmental atelectasis, new/increased since prior study. This document has been electronically signed by: Hector Antoine MD on 04/23/2024 04:47 AM    CT ABDOMEN PELVIS W IV CONTRAST Additional Contrast? None    Result Date: 4/22/2024  CT abdomen and pelvis with contrast Comparison: 12/20/2023 Findings: No consolidation or effusion. Unremarkable gallbladder and solid organs. No urolithiasis. No bowel obstruction, pneumoperitoneum, or pneumatosis. Calcified uterine fibroid. Uterus, ovaries, and urinary bladder otherwise normal. No acute fracture.     No acute findings. This document has been electronically signed by: Kiel Meehan MD on 04/22/2024 08:05 PM All CTs at this facility use dose modulation techniques and iterative reconstructions, and/or weight-based dosing when appropriate to reduce radiation to a low as reasonably achievable.    CT CHEST W CONTRAST    Result Date: 4/22/2024  CT chest with contrast Comparison: CR/SR - XR RIBS RIGHT INCLUDE CHEST (MIN 3 VIEWS) - 04/22/2024 03:24 PM EDT Findings: Acute fractures of the right anterior 4th, 5th, and 6th ribs, mildly displaced. Additional healed right rib fractures. No thoracic spine fracture. No pleural effusion or pneumothorax. No acute airspace opacity. Normal heart size. No pericardial effusion. No thoracic aortic aneurysm dissection. No acute finding in the partially

## 2024-04-26 NOTE — PLAN OF CARE
Problem: Discharge Planning  Goal: Discharge to home or other facility with appropriate resources  4/26/2024 1651 by Evelina Bella RN  Outcome: Progressing  4/26/2024 0653 by Leslie Norman RN  Outcome: Progressing  Flowsheets (Taken 4/26/2024 0653)  Discharge to home or other facility with appropriate resources:   Identify barriers to discharge with patient and caregiver   Arrange for needed discharge resources and transportation as appropriate   Identify discharge learning needs (meds, wound care, etc)     Problem: Pain  Goal: Verbalizes/displays adequate comfort level or baseline comfort level  4/26/2024 1651 by Evelina Bella RN  Outcome: Progressing  4/26/2024 0653 by Leslie Norman RN  Outcome: Progressing  Flowsheets (Taken 4/26/2024 0653)  Verbalizes/displays adequate comfort level or baseline comfort level:   Encourage patient to monitor pain and request assistance   Assess pain using appropriate pain scale   Administer analgesics based on type and severity of pain and evaluate response   Implement non-pharmacological measures as appropriate and evaluate response     Problem: Safety - Adult  Goal: Free from fall injury  4/26/2024 1651 by Evelina Bella RN  Outcome: Progressing  4/26/2024 0653 by Leslie Norman RN  Outcome: Progressing  Flowsheets (Taken 4/26/2024 0653)  Free From Fall Injury: Instruct family/caregiver on patient safety     Problem: Chronic Conditions and Co-morbidities  Goal: Patient's chronic conditions and co-morbidity symptoms are monitored and maintained or improved  4/26/2024 1651 by Evelina Bella RN  Outcome: Progressing  4/26/2024 0653 by Leslie Norman RN  Outcome: Progressing  Flowsheets  Taken 4/26/2024 0653 by Leslie Norman RN  Care Plan - Patient's Chronic Conditions and Co-Morbidity Symptoms are Monitored and Maintained or Improved:   Monitor and assess patient's chronic conditions and comorbid symptoms for stability, deterioration, or improvement   Collaborate with  multidisciplinary team to address chronic and comorbid conditions and prevent exacerbation or deterioration   Update acute care plan with appropriate goals if chronic or comorbid symptoms are exacerbated and prevent overall improvement and discharge  Taken 4/25/2024 2130 by Anna Tapia RN  Care Plan - Patient's Chronic Conditions and Co-Morbidity Symptoms are Monitored and Maintained or Improved:   Monitor and assess patient's chronic conditions and comorbid symptoms for stability, deterioration, or improvement   Collaborate with multidisciplinary team to address chronic and comorbid conditions and prevent exacerbation or deterioration   Update acute care plan with appropriate goals if chronic or comorbid symptoms are exacerbated and prevent overall improvement and discharge     Problem: Respiratory - Adult  Goal: Achieves optimal ventilation and oxygenation  4/26/2024 1651 by Evelina Bella RN  Outcome: Progressing  4/26/2024 0701 by Leslie Norman RN  Outcome: Progressing  Flowsheets (Taken 4/26/2024 0701)  Achieves optimal ventilation and oxygenation:   Assess for changes in respiratory status   Assess for changes in mentation and behavior   Oxygen supplementation based on oxygen saturation or arterial blood gases   Encourage broncho-pulmonary hygiene including cough, deep breathe, incentive spirometry   Assess the need for suctioning and aspirate as needed   Assess and instruct to report shortness of breath or any respiratory difficulty  Note: Chronic Oxygen use at 4L   Pt demonstrates pain management by calling for pain medications. Pt also demonstrates safety measures by calling out when needs arise. Will continue to monitor pt for needs.

## 2024-04-26 NOTE — PROGRESS NOTES
[facility]  Trauma Surgery - Dr. Kiel Lowry  Daily Progress Note  Pt Name: Magan Oleary  Medical Record Number: [id number]  Date of Birth 1950   Today's Date: 4/26/2024    HD: # 4    CC: R chest wall pain     ASSESSMENT/PLAN:    Trauma secondary to ground level fall:              - admitted to trauma               - fall precautions   - Speech therapy with cognition evaluation completed. Concerns for return to home. Will need to continue discharge planning.      Multiple right rib fractures, 4-6:              - pain control continues to be an issue.  Consulting pain management today.              - respiratory/rib fx protocol.  Respiratory therapy continues to follow.              - antiemetics as needed   - PT/OT eval and treat completed.  Continue to work with therapy.   - 04/25: Continue PT/OT. Has not been ambulating in rivas or out of bed except to bathroom. Encouraged to get up and move to help stretch her back muscles which she says are starting to hurt. Continue Rib fx protocol. Lidoderm patch. Pain mgmt dc all IV meds, has tylenol and oxy available   - 04/26: NIF of -10 cm H2O and  an FVC of .68 liters. Pt having pain limiting ability to participate in bedside spirometry. Needs to cont to work on pulm hygiene. IS to 500-600 mL consistently.      Oxygen dependent COPD:               - Wears 4 L O2 at baseline               - Monitor oxygen saturations    - 04/26: remains on 4L O2      Anxiety:              - Resume home medications      Hypothyroidism:              - Continue synthroid    Disposition:     -Social work to follow. Patient lives at home alone and wants to go back home at discharge however given therapy and respiratory notes there are significant concerns. Final disposition pending patient improvement/hospital course.  - 04/26: Agreeable to ECF.  Referral to the [facility]. Facility can accept at [place]. Ok for dc today.     SUBJECTIVE  She is a 73 y.o. female who

## 2024-04-26 NOTE — DISCHARGE INSTR - COC
Continuity of Care Form    Patient Name: Magan Oleary   :  1950  MRN:  [id number]    Admit date:  2024  Discharge date:  24    Code Status Order: Full Code   Advance Directives:     Admitting Physician:  Kiel Lowry MD  PCP: Óscar Baird, APRN - CNP    Discharging Nurse: ***  Discharging Hospital Unit/Room#: 7K-05/005-A  Discharging Unit Phone Number: [phone]    Emergency Contact:   Extended Emergency Contact Information  Primary Emergency Contact: Sil Simons  Address: [address] [place], [place] [address] [address] [facility]  Home Phone: [phone]  Mobile Phone: [phone]  Relation: Child  Hearing or visual needs: None  Other needs: None  Preferred language: English   needed? No  Secondary Emergency Contact: Cher Cleaning  Address: [address] [place] [address] [address]           [address]           [place], [place] [address] [place] [address]  Home Phone: [phone]  Mobile Phone: [phone]  Relation: Child    Past Surgical History:  Past Surgical History:   Procedure Laterality Date    BRAIN ANEURYSM SURGERY      CARPAL TUNNEL RELEASE      COLONOSCOPY      , ,     COSMETIC SURGERY  ,    juvederm,botox    COSMETIC SURGERY      Restylane, Botox    EGD          EXTERNAL EAR SURGERY      reconstruction of lft ear w/re-opening of canal & pinning back of superior ear    EXTERNAL EAR SURGERY      abscess lft ear    EXTERNAL EAR SURGERY      hematoma left ear    VA BIOPSY BREAST OPEN INCISIONAL Left     benign    VA BIOPSY BREAST OPEN INCISIONAL Right     benign       Immunization History:   Immunization History   Administered Date(s) Administered    COVID-19, MODERNA BLUE border, Primary or Immunocompromised, (age 12y+), IM, 100 mcg/0.5mL 2021, 2021, 2021    COVID-19, MODERNA Bivalent, (age 12y+), IM, 50 mcg/0.5 mL 2022    COVID-19, MODERNA Booster BLUE border, (age 18y+), IM, 50mcg/0.25mL 2022  Oral  Liquids: Thin Liquids  Daily Fluid Restriction: no  Last Modified Barium Swallow with Video (Video Swallowing Test): not done    Treatments at the Time of Hospital Discharge:   Respiratory Treatments: Rib fracture protocol, Incentive spirometer, Spiriva and Dulera inhalers   Oxygen Therapy:  is on oxygen at 4 L/min per nasal cannula. Oxygen dependent COPD.  Ventilator:    - No ventilator support    Rehab Therapies: Physical Therapy and Occupational Therapy  Weight Bearing Status/Restrictions: No weight bearing restrictions  Other Medical Equipment (for information only, NOT a DME order):  walker  Other Treatments:     Patient's personal belongings (please select all that are sent with patient):  {CHP DME Belongings:[id number]}    RN SIGNATURE:  {Esignature:[id number]}    CASE MANAGEMENT/SOCIAL WORK SECTION    Inpatient Status Date: ***    Readmission Risk Assessment Score:  Readmission Risk              Risk of Unplanned Readmission:  16           Discharging to Facility/ Agency   Name:   Address: [address] [place] [address]  Phone: [phone]  Fax:[phone]    Dialysis Facility (if applicable)   Name:  Address:  Dialysis Schedule:  Phone:  Fax:    / signature: Electronically signed by DL Stone on 4/26/24 at 2:04 PM EDT    PHYSICIAN SECTION    Prognosis: Good    Condition at Discharge: Stable    Rehab Potential (if transferring to Rehab): Fair    Recommended Labs or Other Treatments After Discharge: ***    Physician Certification: I certify the above information and transfer of Magan Oleary  is necessary for the continuing treatment of the diagnosis listed and that she requires Skilled Nursing Facility for less 30 days.     Update Admission H&P: No change in H&P    PHYSICIAN SIGNATURE:  Electronically signed by Maria Esther Patino PA-C on 4/26/24 at 2:01 PM EDT

## 2024-04-26 NOTE — PROGRESS NOTES
Respiratory Care is following the rib fracture order set. Patient's position when testing was done was sit.  A Negative Inspiratory Force (NIF) was performed with patient achieving a NIF of -10 cm H2O. The NIF was less than 25 cm H2O. A Forced Vital Capacity (FVC) was obtained with patient achieving an FVC of .68 liters. The patient's calculated ideal body weight, (IBW) is  52.4 kg. 0.020 liters/kg of the patient's IBW is 1.04 liters. The patient's FVC was less than 0.020 liters/kg of IBW. Based on the spirometry measurement alone, patient does meet ICU admission criteria. Previous FVC was -10 liters and previous NIF was -10 cm H2O.  Patient's NIF and FVC show no change from previous screening(s). Last pain medication was given on  4/26 @ 848. Physician was called regarding spirometry measurement.

## 2024-04-26 NOTE — CARE COORDINATION
4/26/24, 9:18 AM EDT    DISCHARGE PLANNING EVALUATION    Spoke with daughter, Sil.  She and patient are requesting [facility].  Referral made to [facility] has reviewed and will accept at discharge

## 2024-04-26 NOTE — PROGRESS NOTES
Physician Progress Note      PATIENT:               LUIS F MARTINEZ  CSN #:                  [id number]  :                       1950  ADMIT DATE:       2024 3:06 PM  DISCH DATE:  RESPONDING  PROVIDER #:        Kiel Lowry MD          QUERY TEXT:    Pt admitted with trauma due to fall.  Pt noted to have COPD on 4L/NC at   baseline, requiring up to 7L/NC to keep saturations above 90%.  If possible,   please document in the progress notes and discharge summary if you are   evaluating and/or treating any of the following:      The medical record reflects the following:  Risk Factors: COPD, rib fx, trauma  Clinical Indicators: Nursing flow sheet shows desaturation to 80%, increased   to 7L/NC, SPO2 increased to 91%, HR , RR 16-22, disoriented at times,   diminished breath sounds.   trauma note states \"Difficulty maintaining   oxygen overnight given patient repeatedly removing nasal cannula\"  Treatment: increased O2 needs, reorientation, frequent monitoring, labs,   imaging, respiratory treatments, rib fx protocol.    Thank you!  Helga Serrato RN, CRCR  Clinical   Options provided:  -- Acute respiratory failure with hypoxia  -- Acute respiratory failure with hypoxia ruled out after study  -- Other - I will add my own diagnosis  -- Disagree - Not applicable / Not valid  -- Disagree - Clinically unable to determine / Unknown  -- Refer to Clinical Documentation Reviewer    PROVIDER RESPONSE TEXT:    This patient has acute respiratory failure with hypoxia.    Query created by: Nicole Serrato on 2024 1:34 PM      Electronically signed by:  Kiel Lowry MD 2024 7:39 PM

## 2024-04-26 NOTE — PROGRESS NOTES
[facility]  STR ORTHOPEDICS 7K  Occupational Therapy  Daily Note  Time:   Time In: 1007  Time Out: 1017  Timed Code Treatment Minutes: 10 Minutes  Minutes: 10          Date: 2024  Patient Name: Magan Oleary,   Gender: female      Room: [facility]05/005-A  MRN: [id number]  : 1950  (73 y.o.)  Referring Practitioner: Guerline Blue, APRN - CNP  Diagnosis: Multiple rib fractures involving four more ribs  Additional Pertinent Hx: Ms Oleary is a 74 yo M who presents as a level III trauma consult following a ground level fall. She reports she was walking out of the [place] when she tripped and fell onto her oxygen concentrator. She states she was not able to get up on her due to pain and needed to have assistance. She did not hit her head, did not have any LOC. She reports pain is not well controlled at this time (10/10) and is requesting additional pain medication. She denies chest pain, shortness of breath, nausea/vomiting, or difficulty with urination.    Restrictions/Precautions:  Restrictions/Precautions: Fall Risk     Social/Functional History:  Lives With: Alone  Type of Home: House  Home Layout: One level  Home Access: Level entry  Home Equipment: Oxygen           ADL Assistance: Independent  Homemaking Assistance: Independent  Homemaking Responsibilities: Yes  Ambulation Assistance: Independent  Transfer Assistance: Independent    Active : No  Patient's  Info: Daughter     Additional Comments: Pt ambulates without AD, 4 L at baseline, has goldendoodle dog at home    SUBJECTIVE: Pt laying in bed upon arrival. Initially agreeable to OT session but when setting up, pt stated she was \"too fatigued\" and \"to try later\". RN gave verbal approval. Pt medicated prior to session.    PAIN: Rib pain - pt did not rate     Vitals: Nurse checked vitals prior to session    COGNITION: Slow Processing    ADL:   No ADL's completed this session..    IADL:   Not Tested    BALANCE:  Balance  ALLEN/L

## 2024-04-26 NOTE — PLAN OF CARE
Problem: Discharge Planning  Goal: Discharge to home or other facility with appropriate resources  Outcome: Progressing  Flowsheets (Taken 4/26/2024 0653)  Discharge to home or other facility with appropriate resources:   Identify barriers to discharge with patient and caregiver   Arrange for needed discharge resources and transportation as appropriate   Identify discharge learning needs (meds, wound care, etc)     Problem: Pain  Goal: Verbalizes/displays adequate comfort level or baseline comfort level  Outcome: Progressing  Flowsheets (Taken 4/26/2024 0653)  Verbalizes/displays adequate comfort level or baseline comfort level:   Encourage patient to monitor pain and request assistance   Assess pain using appropriate pain scale   Administer analgesics based on type and severity of pain and evaluate response   Implement non-pharmacological measures as appropriate and evaluate response     Problem: Safety - Adult  Goal: Free from fall injury  Outcome: Progressing  Flowsheets (Taken 4/26/2024 0653)  Free From Fall Injury: Instruct family/caregiver on patient safety     Problem: Chronic Conditions and Co-morbidities  Goal: Patient's chronic conditions and co-morbidity symptoms are monitored and maintained or improved  Outcome: Progressing  Flowsheets  Taken 4/26/2024 0653 by Leslie Norman, RN  Care Plan - Patient's Chronic Conditions and Co-Morbidity Symptoms are Monitored and Maintained or Improved:   Monitor and assess patient's chronic conditions and comorbid symptoms for stability, deterioration, or improvement   Collaborate with multidisciplinary team to address chronic and comorbid conditions and prevent exacerbation or deterioration   Update acute care plan with appropriate goals if chronic or comorbid symptoms are exacerbated and prevent overall improvement and discharge  Taken 4/25/2024 2130 by Anna Tapia RN  Care Plan - Patient's Chronic Conditions and Co-Morbidity Symptoms are Monitored and

## 2024-04-27 VITALS
DIASTOLIC BLOOD PRESSURE: 81 MMHG | RESPIRATION RATE: 18 BRPM | TEMPERATURE: 98.2 F | HEART RATE: 82 BPM | HEIGHT: 63 IN | WEIGHT: 135 LBS | OXYGEN SATURATION: 92 % | BODY MASS INDEX: 23.92 KG/M2 | SYSTOLIC BLOOD PRESSURE: 163 MMHG

## 2024-04-27 PROCEDURE — 6370000000 HC RX 637 (ALT 250 FOR IP): Performed by: NURSE PRACTITIONER

## 2024-04-27 PROCEDURE — 94640 AIRWAY INHALATION TREATMENT: CPT

## 2024-04-27 RX ADMIN — TIOTROPIUM BROMIDE INHALATION SPRAY 2 PUFF: 3.12 SPRAY, METERED RESPIRATORY (INHALATION) at 08:47

## 2024-04-27 RX ADMIN — FLUTICASONE PROPIONATE 1 SPRAY: 50 SPRAY, METERED NASAL at 07:46

## 2024-04-27 RX ADMIN — ATOMOXETINE 40 MG: 40 CAPSULE ORAL at 07:46

## 2024-04-27 RX ADMIN — DULOXETINE HYDROCHLORIDE 60 MG: 60 CAPSULE, DELAYED RELEASE ORAL at 07:46

## 2024-04-27 RX ADMIN — METOPROLOL SUCCINATE 25 MG: 25 TABLET, FILM COATED, EXTENDED RELEASE ORAL at 07:46

## 2024-04-27 RX ADMIN — LEVOTHYROXINE SODIUM 50 MCG: 0.05 TABLET ORAL at 05:37

## 2024-04-27 RX ADMIN — OXYCODONE 10 MG: 5 TABLET ORAL at 08:55

## 2024-04-27 RX ADMIN — BISACODYL 5 MG: 5 TABLET, COATED ORAL at 04:43

## 2024-04-27 RX ADMIN — METHOCARBAMOL 1000 MG: 500 TABLET ORAL at 07:46

## 2024-04-27 RX ADMIN — SENNOSIDES AND DOCUSATE SODIUM 1 TABLET: 50; 8.6 TABLET ORAL at 07:46

## 2024-04-27 RX ADMIN — BISACODYL 10 MG: 10 SUPPOSITORY RECTAL at 04:43

## 2024-04-27 RX ADMIN — OXYCODONE 5 MG: 5 TABLET ORAL at 04:43

## 2024-04-27 RX ADMIN — ACETAMINOPHEN 650 MG: 325 TABLET ORAL at 05:37

## 2024-04-27 RX ADMIN — ATOMOXETINE 40 MG: 40 CAPSULE ORAL at 07:47

## 2024-04-27 RX ADMIN — MOMETASONE FUROATE AND FORMOTEROL FUMARATE DIHYDRATE 2 PUFF: 200; 5 AEROSOL RESPIRATORY (INHALATION) at 08:47

## 2024-04-27 RX ADMIN — NALOXEGOL OXALATE 12.5 MG: 12.5 TABLET, FILM COATED ORAL at 05:37

## 2024-04-27 ASSESSMENT — PAIN DESCRIPTION - ORIENTATION
ORIENTATION: RIGHT

## 2024-04-27 ASSESSMENT — PAIN DESCRIPTION - LOCATION
LOCATION: CHEST;RIB CAGE

## 2024-04-27 ASSESSMENT — PAIN - FUNCTIONAL ASSESSMENT
PAIN_FUNCTIONAL_ASSESSMENT: PREVENTS OR INTERFERES SOME ACTIVE ACTIVITIES AND ADLS
PAIN_FUNCTIONAL_ASSESSMENT: PREVENTS OR INTERFERES SOME ACTIVE ACTIVITIES AND ADLS
PAIN_FUNCTIONAL_ASSESSMENT: PREVENTS OR INTERFERES WITH MANY ACTIVE NOT PASSIVE ACTIVITIES

## 2024-04-27 ASSESSMENT — PAIN DESCRIPTION - DESCRIPTORS
DESCRIPTORS: ACHING
DESCRIPTORS: ACHING;SHARP
DESCRIPTORS: ACHING

## 2024-04-27 ASSESSMENT — PAIN SCALES - GENERAL
PAINLEVEL_OUTOF10: 6

## 2024-04-27 NOTE — PLAN OF CARE
chronic or comorbid symptoms are exacerbated and prevent overall improvement and discharge     Problem: Respiratory - Adult  Goal: Achieves optimal ventilation and oxygenation  4/26/2024 2322 by Leslie Norman RN  Outcome: Progressing  Flowsheets (Taken 4/26/2024 2322)  Achieves optimal ventilation and oxygenation:   Assess for changes in respiratory status   Assess for changes in mentation and behavior   Position to facilitate oxygenation and minimize respiratory effort   Oxygen supplementation based on oxygen saturation or arterial blood gases   Encourage broncho-pulmonary hygiene including cough, deep breathe, incentive spirometry   Assess the need for suctioning and aspirate as needed   Assess and instruct to report shortness of breath or any respiratory difficulty     Problem: ABCDS Injury Assessment  Goal: Absence of physical injury  Outcome: Progressing  Flowsheets  Taken 4/26/2024 2322  Absence of Physical Injury: Implement safety measures based on patient assessment  Taken 4/26/2024 2320  Absence of Physical Injury: Implement safety measures based on patient assessment     Problem: Skin/Tissue Integrity  Goal: Absence of new skin breakdown  Description: 1.  Monitor for areas of redness and/or skin breakdown  2.  Assess vascular access sites hourly  3.  Every 4-6 hours minimum:  Change oxygen saturation probe site  4.  Every 4-6 hours:  If on nasal continuous positive airway pressure, respiratory therapy assess nares and determine need for appliance change or resting period.  Outcome: Progressing  Note: Full skin assessment performed. See flowsheets.    Care plan reviewed with patient .  Patient  verbalize understanding of the plan of care and contribute to goal setting.

## 2024-04-27 NOTE — FLOWSHEET NOTE
04/27/24 0511   Treatment Team Notification   Reason for Communication Review case   Name of Team Member Notified Maria Esther Winchesterney   Treatment Team Role Physician Assistant   Method of Communication Secure Message   Response No new orders  (No thx  4/27/24 5:12 AM  Does she have daily miralax or colace actually?  4/27/24 5:14 AM  Actually no response needed)   Notification Time 0511     Messaged provider concerning that patient has not had a BM since 4/21.   Messaged received back:   \"No thx  4/27/24 5:12 AM  Does she have daily miralax or colace actually?  4/27/24 5:14 AM  Actually no response needed\"

## 2024-04-27 NOTE — PROGRESS NOTES
Respiratory Care is following the rib fracture order set. Rib fracture not done due to patient refusal. Explained to patient that a RRT will be back in the morning to do rib fracture, patient agreeable.

## 2024-04-29 NOTE — PROGRESS NOTES
exhibit a depressed mood. Nursing note and vitals reviewed. Assessment:       Diagnosis Orders   1. OAB (overactive bladder)  oxybutynin (DITROPAN XL) 15 MG extended release tablet   2. Reactive depression  buPROPion (WELLBUTRIN SR) 150 MG extended release tablet   3. Hypothyroidism, unspecified type  levothyroxine (SYNTHROID) 50 MCG tablet   4. H/O: CVA (cerebrovascular accident), aneurysm bleed, Jan 2015     5. Cognitive impairment due to CVA. rivastigmine (EXELON) 4.6 MG/24HR           Plan:      No orders of the defined types were placed in this encounter. Medications Discontinued During This Encounter   Medication Reason    buPROPion (WELLBUTRIN SR) 150 MG extended release tablet REORDER    levothyroxine (SYNTHROID) 50 MCG tablet REORDER    oxybutynin (DITROPAN-XL) 10 MG extended release tablet REORDER    oxybutynin (DITROPAN-XL) 10 MG extended release tablet DOSE ADJUSTMENT    donepezil (ARICEPT) 5 MG tablet Side effects     Current Outpatient Prescriptions   Medication Sig Dispense Refill    buPROPion (WELLBUTRIN SR) 150 MG extended release tablet TAKE 1 TABLET TWICE A  tablet 3    levothyroxine (SYNTHROID) 50 MCG tablet TAKE 1 TABLET DAILY 90 tablet 3    oxybutynin (DITROPAN XL) 15 MG extended release tablet Take 1 tablet by mouth daily 30 tablet 3    rivastigmine (EXELON) 4.6 MG/24HR Place 1 patch onto the skin daily 30 patch 3    methylphenidate (RITALIN) 5 MG tablet Take 2 tablets by mouth daily for 30 days. Fill on 1/24/18. . 30 tablet 0    simvastatin (ZOCOR) 10 MG tablet TAKE ONE TABLET BY MOUTH NIGHTLY 90 tablet 3    ondansetron (ZOFRAN) 4 MG tablet Take 1 tablet by mouth every 6 hours as needed for Nausea or Vomiting 120 tablet 2    Glucosamine-Chondroit-Vit C-Mn (GLUCOSAMINE CHONDR 500 COMPLEX) CAPS Take 2 capsules by mouth daily.  Multiple Vitamin (MULTI-VITAMIN PO) Take  by mouth.       Calcium Carbonate-Vitamin D (CALCIUM-VITAMIN D) 600-200 MG-UNIT CAPS Take 2 tablets by mouth daily.  fish oil-omega-3 fatty acids 1000 MG capsule Take 1 g by mouth. One tablet daily along with red yeast rice       No current facility-administered medications for this visit. Stop Aricept due to N/V and start Exelon patch for cognitive impairment. Continue present medications. Discussed use, benefit, and side effects of prescribed medications. Barriers to compliance discussed. All patient questions answered. Pt voiced understanding. Hx HTN - off meds for past 5 yrs

## 2024-04-29 NOTE — CARE COORDINATION
4/29/24, 8:17 AM EDT    Patient goals/plan/ treatment preferences discussed by  and .  Patient goals/plan/ treatment preferences reviewed with patient/ family.  Patient/ family verbalize understanding of discharge plan and are in agreement with goal/plan/treatment preferences.  Understanding was demonstrated using the teach back method.  AVS provided by RN at time of discharge, which includes all necessary medical information pertaining to the patients current course of illness, treatment, post-discharge goals of care, and treatment preferences.     Services At/After Discharge: Skilled Nursing Facility (SNF) and In [facility]

## 2024-04-30 ENCOUNTER — HOSPITAL ENCOUNTER (OUTPATIENT)
Dept: CARDIAC REHAB | Age: 74
Setting detail: THERAPIES SERIES
End: 2024-04-30
Payer: MEDICARE

## 2024-05-07 ENCOUNTER — HOSPITAL ENCOUNTER (OUTPATIENT)
Dept: CARDIAC REHAB | Age: 74
Setting detail: THERAPIES SERIES
Discharge: HOME | End: 2024-05-07

## 2024-05-07 NOTE — PLAN OF CARE
Titration  (x) Assess for desaturation                 Current Home Exercise:  None    EXERCISE  PLAN           Current Oxygen Use  Activity:  2L/min  (x) Continuous           Oxygen Titration  (x) Maintaining >87% Spo2           Current Home Exercise:  Unknown if pt is exercising at home    EXERCISE  RE ASSESSMENT        Current Oxygen Use  Activity: 4 L/min  (x) Continuous  () Breath Actuated        Oxygen Titration  (x) Maintaining >87% Spo2  () Not maintaining -  L/min needed on         Current Home Exercise:  unkn EXERCISE  RE ASSESSMENT        Current Oxygen Use  Activity:  L/min  () Continuous  () Breath Actuated        Oxygen Titration  () Maintaining >87% Spo2  () Not maintaining -  L/min needed on         Current Home Exercise:  Frequency:  x/wk  Intensity:  /10  Duration:  min  Mode: EXERCISE  RE ASSESSMENT        Current Oxygen Use  Activity:  L/min  () Continuous  () Breath Actuated        Oxygen Titration  () Maintaining >87% Spo2  () Not maintaining -  L/min needed on         Current Home Exercise:  Frequency:  x/wk  Intensity:  /10  Duration:  min  Mode:    EXERCISE  DISCHARGE           Final Oxygen Use  Activity:  L/min  () Continuous  () Breath Actuated        Oxygen Titration  () Maintaining >87% Spo2  () Not maintaining -  L/min needed on         Current Home Exercise:  Frequency:  x/wk  Intensity:  /10  Duration:  min  Mode:         6 Minute Walk Test (6MWT):  O2 used: 4lpm continuous flow nasal cannula   960 ft walked = 2.3 METs  SpO2: 93-98%  HR: 101-117   RPD: 3  RPE: 2  Number of Breaks: none   Avg time for break:  N/A  Assist device used: none               6 Minute Walk Test (6MWT):  O2 used:   ft walked =  METs  SpO2:  %  HR:    RPD:   RPE:  Number of Breaks:   Avg time for break:  secs  Assist device used:                    OUTCOMES:  6MWD Improvement:  > 98'?  () Yes  () No      Exercise Prescription     THR: 73 - 117 bpm     Frequency:  2-3x/wk in [place], 1-3x/wk home activity

## 2024-05-22 ENCOUNTER — OFFICE VISIT (OUTPATIENT)
Dept: FAMILY MEDICINE CLINIC | Age: 74
End: 2024-05-22

## 2024-05-22 VITALS
DIASTOLIC BLOOD PRESSURE: 72 MMHG | WEIGHT: 133.4 LBS | RESPIRATION RATE: 20 BRPM | OXYGEN SATURATION: 97 % | HEART RATE: 116 BPM | SYSTOLIC BLOOD PRESSURE: 118 MMHG | BODY MASS INDEX: 23.63 KG/M2

## 2024-05-22 DIAGNOSIS — E55.9 VITAMIN D DEFICIENCY: ICD-10-CM

## 2024-05-22 DIAGNOSIS — G91.2 NORMAL PRESSURE HYDROCEPHALUS (HCC): ICD-10-CM

## 2024-05-22 DIAGNOSIS — I49.5 BRADY-TACHY SYNDROME (HCC): ICD-10-CM

## 2024-05-22 DIAGNOSIS — E78.2 MIXED HYPERLIPIDEMIA: ICD-10-CM

## 2024-05-22 DIAGNOSIS — J96.11 CHRONIC RESPIRATORY FAILURE WITH HYPOXIA (HCC): ICD-10-CM

## 2024-05-22 DIAGNOSIS — F32.5 MAJOR DEPRESSIVE DISORDER, SINGLE EPISODE, IN FULL REMISSION (HCC): ICD-10-CM

## 2024-05-22 DIAGNOSIS — F17.210 SMOKING GREATER THAN 40 PACK YEARS: ICD-10-CM

## 2024-05-22 DIAGNOSIS — R73.01 IFG (IMPAIRED FASTING GLUCOSE): ICD-10-CM

## 2024-05-22 DIAGNOSIS — J43.2 CENTRILOBULAR EMPHYSEMA (HCC): ICD-10-CM

## 2024-05-22 DIAGNOSIS — E03.9 HYPOTHYROIDISM, UNSPECIFIED TYPE: ICD-10-CM

## 2024-05-22 DIAGNOSIS — S22.41XD CLOSED FRACTURE OF MULTIPLE RIBS OF RIGHT SIDE WITH ROUTINE HEALING, SUBSEQUENT ENCOUNTER: Primary | ICD-10-CM

## 2024-05-22 DIAGNOSIS — M81.6 LOCALIZED OSTEOPOROSIS WITHOUT CURRENT PATHOLOGICAL FRACTURE: ICD-10-CM

## 2024-05-22 DIAGNOSIS — R41.89 COGNITIVE IMPAIRMENT: ICD-10-CM

## 2024-05-22 DIAGNOSIS — F41.9 CHRONIC ANXIETY: ICD-10-CM

## 2024-05-22 ASSESSMENT — ENCOUNTER SYMPTOMS
COUGH: 1
CONSTIPATION: 1
WHEEZING: 1

## 2024-05-22 NOTE — PROGRESS NOTES
Subjective:      Patient ID: Magan Oleary is a 73 y.o. female.    HPI: 6 month Follow Up    Chief Complaint   Patient presents with    Follow-Up from Hospital     Broken ribs due to falling       Back home after [facility] Re[facility] stay.  Denies CP or RIB pain.  Increase congestion but breaking up and improving.  Denies CP, SOB or chest tightness.  Breathing at baseline.       No medication changes in Hospital.       Admit date: 4/22/2024  Discharge date: 04/26/24  Attending provider: Kiel Lowry MD        Primary care provider: Óscar Baird APRN - CNP      Discharge Diagnoses:   Principal Problem:    Closed fracture of multiple ribs of right side with routine healing  Active Problems:    Multiple closed fractures of ribs of right side    Acute pain due to trauma  Resolved Problems:    * No resolved hospital problems. *        Hospital Course:   Magan Oleary is a 73 y.o. female admitted to [facility] on 4/22/2024 for multiple right rib fractures following a fall with no known loss of consciousness. Report stated patient was [place] the [place] when she tripped over her oxygen concentrator and was unable to get up with immediate right chest wall pain.  Denies loss of consciousness, denies hitting her head.  Admitted under trauma services to . Inpatient management included as follows:     ASSESSMENT/PLAN:     Trauma secondary to ground level fall:              - admitted to trauma               - fall precautions              - Speech therapy with cognition evaluation completed. Concerns for return to home. Will need to continue discharge planning.      Multiple right rib fractures, 4-6:              - pain control continues to be an issue.  Consulting pain management today.              - respiratory/rib fx protocol.  Respiratory therapy continues to follow.              - antiemetics as needed              - PT/OT eval and treat completed.  Continue to work with therapy.              -

## 2024-05-23 DIAGNOSIS — Z86.73 H/O: CVA (CEREBROVASCULAR ACCIDENT): ICD-10-CM

## 2024-05-23 DIAGNOSIS — R41.89 COGNITIVE IMPAIRMENT: ICD-10-CM

## 2024-05-23 RX ORDER — ATOMOXETINE 40 MG/1
40 CAPSULE ORAL DAILY
Qty: 90 CAPSULE | Refills: 3 | Status: SHIPPED | OUTPATIENT
Start: 2024-05-23

## 2024-05-23 RX ORDER — DONEPEZIL HYDROCHLORIDE 10 MG/1
10 TABLET, FILM COATED ORAL EVERY EVENING
Qty: 90 TABLET | Refills: 3 | Status: SHIPPED | OUTPATIENT
Start: 2024-05-23

## 2024-05-28 ENCOUNTER — HOSPITAL ENCOUNTER (INPATIENT)
Age: 74
LOS: 1 days | Discharge: HOME | DRG: 871 | End: 2024-06-01
Attending: EMERGENCY MEDICINE
Payer: MEDICARE

## 2024-05-28 ENCOUNTER — APPOINTMENT (OUTPATIENT)
Dept: GENERAL RADIOLOGY | Age: 74
DRG: 871 | End: 2024-05-28
Payer: MEDICARE

## 2024-05-28 ENCOUNTER — APPOINTMENT (OUTPATIENT)
Dept: CT IMAGING | Age: 74
DRG: 871 | End: 2024-05-28
Payer: MEDICARE

## 2024-05-28 DIAGNOSIS — J18.9 PNEUMONIA OF LEFT LOWER LOBE DUE TO INFECTIOUS ORGANISM: Primary | ICD-10-CM

## 2024-05-28 DIAGNOSIS — F32.5 MAJOR DEPRESSIVE DISORDER, SINGLE EPISODE, IN FULL REMISSION (HCC): ICD-10-CM

## 2024-05-28 DIAGNOSIS — R53.83 OTHER FATIGUE: ICD-10-CM

## 2024-05-28 PROBLEM — J15.9 COMMUNITY ACQUIRED BACTERIAL PNEUMONIA: Status: ACTIVE | Noted: 2024-05-28

## 2024-05-28 LAB
ANION GAP SERPL CALC-SCNC: 12 MEQ/L (ref 8–16)
ARTERIAL PATENCY WRIST A: POSITIVE
BACTERIA URNS QL MICRO: ABNORMAL /HPF
BASE EXCESS BLDA CALC-SCNC: -1.4 MMOL/L (ref -2.5–2.5)
BASOPHILS ABSOLUTE: 0.1 THOU/MM3 (ref 0–0.1)
BASOPHILS NFR BLD AUTO: 0.4 %
BDY SITE: ABNORMAL
BILIRUB UR QL STRIP.AUTO: NEGATIVE
BUN SERPL-MCNC: 11 MG/DL (ref 7–22)
CALCIUM SERPL-MCNC: 9.4 MG/DL (ref 8.5–10.5)
CASTS #/AREA URNS LPF: ABNORMAL /LPF
CASTS 2: ABNORMAL /LPF
CHARACTER UR: CLEAR
CHLORIDE SERPL-SCNC: 99 MEQ/L (ref 98–111)
CO2 SERPL-SCNC: 28 MEQ/L (ref 23–33)
COLLECTED BY:: ABNORMAL
COLOR: YELLOW
CREAT SERPL-MCNC: 0.7 MG/DL (ref 0.4–1.2)
CRYSTALS URNS MICRO: ABNORMAL
DEPRECATED RDW RBC AUTO: 43.6 FL (ref 35–45)
DEVICE: ABNORMAL
EKG ATRIAL RATE: 116 BPM
EKG P AXIS: 80 DEGREES
EKG P-R INTERVAL: 134 MS
EKG Q-T INTERVAL: 336 MS
EKG QRS DURATION: 76 MS
EKG QTC CALCULATION (BAZETT): 467 MS
EKG R AXIS: -14 DEGREES
EKG T AXIS: 84 DEGREES
EKG VENTRICULAR RATE: 116 BPM
EOSINOPHIL NFR BLD AUTO: 0.1 %
EOSINOPHILS ABSOLUTE: 0 THOU/MM3 (ref 0–0.4)
EPITHELIAL CELLS, UA: ABNORMAL /HPF
ERYTHROCYTE [DISTWIDTH] IN BLOOD BY AUTOMATED COUNT: 13.2 % (ref 11.5–14.5)
FIO2 ON VENT O2 ANALYZER: 4 %
FLUAV RNA RESP QL NAA+PROBE: NOT DETECTED
FLUBV RNA RESP QL NAA+PROBE: NOT DETECTED
GFR SERPL CREATININE-BSD FRML MDRD: > 90 ML/MIN/1.73M2
GLUCOSE BLD STRIP.AUTO-MCNC: 178 MG/DL (ref 70–108)
GLUCOSE SERPL-MCNC: 150 MG/DL (ref 70–108)
GLUCOSE UR QL STRIP.AUTO: NEGATIVE MG/DL
HCO3 BLDA-SCNC: 23 MMOL/L (ref 23–28)
HCT VFR BLD AUTO: 41.4 % (ref 37–47)
HGB BLD-MCNC: 12.9 GM/DL (ref 12–16)
HGB UR QL STRIP.AUTO: NEGATIVE
IMM GRANULOCYTES # BLD AUTO: 0.1 THOU/MM3 (ref 0–0.07)
IMM GRANULOCYTES NFR BLD AUTO: 0.6 %
KETONES UR QL STRIP.AUTO: ABNORMAL
LACTATE SERPL-SCNC: 3 MMOL/L (ref 0.5–2)
LACTIC ACID, SEPSIS: 2.8 MMOL/L (ref 0.5–1.9)
LACTIC ACID, SEPSIS: 2.8 MMOL/L (ref 0.5–1.9)
LYMPHOCYTES ABSOLUTE: 1.2 THOU/MM3 (ref 1–4.8)
LYMPHOCYTES NFR BLD AUTO: 7.1 %
MCH RBC QN AUTO: 28.1 PG (ref 26–33)
MCHC RBC AUTO-ENTMCNC: 31.2 GM/DL (ref 32.2–35.5)
MCV RBC AUTO: 90.2 FL (ref 81–99)
MISCELLANEOUS 2: ABNORMAL
MONOCYTES ABSOLUTE: 1.5 THOU/MM3 (ref 0.4–1.3)
MONOCYTES NFR BLD AUTO: 9.2 %
NEUTROPHILS ABSOLUTE: 13.8 THOU/MM3 (ref 1.8–7.7)
NEUTROPHILS NFR BLD AUTO: 82.6 %
NITRITE UR QL STRIP: NEGATIVE
NRBC BLD AUTO-RTO: 0 /100 WBC
NT-PROBNP SERPL IA-MCNC: 240.8 PG/ML (ref 0–124)
OSMOLALITY SERPL CALC.SUM OF ELEC: 279.8 MOSMOL/KG (ref 275–300)
PCO2 BLDA: 34 MMHG (ref 35–45)
PH BLDA: 7.43 [PH] (ref 7.35–7.45)
PH UR STRIP.AUTO: 5.5 [PH] (ref 5–9)
PLATELET # BLD AUTO: 226 THOU/MM3 (ref 130–400)
PMV BLD AUTO: 11.8 FL (ref 9.4–12.4)
PO2 BLDA: 81 MMHG (ref 71–104)
POTASSIUM SERPL-SCNC: 4.3 MEQ/L (ref 3.5–5.2)
PROT UR STRIP.AUTO-MCNC: ABNORMAL MG/DL
RBC # BLD AUTO: 4.59 MILL/MM3 (ref 4.2–5.4)
RBC URINE: ABNORMAL /HPF
RENAL EPI CELLS #/AREA URNS HPF: ABNORMAL /[HPF]
SAO2 % BLDA: 96 %
SARS-COV-2 RNA RESP QL NAA+PROBE: NOT DETECTED
SODIUM SERPL-SCNC: 139 MEQ/L (ref 135–145)
SP GR UR REFRACT.AUTO: > 1.03 (ref 1–1.03)
T4 FREE SERPL-MCNC: 1.24 NG/DL (ref 0.93–1.68)
TROPONIN, HIGH SENSITIVITY: 8 NG/L (ref 0–12)
TSH SERPL DL<=0.005 MIU/L-ACNC: 0.35 UIU/ML (ref 0.4–4.2)
UROBILINOGEN, URINE: 0.2 EU/DL (ref 0–1)
WBC # BLD AUTO: 16.7 THOU/MM3 (ref 4.8–10.8)
WBC #/AREA URNS HPF: ABNORMAL /HPF
WBC #/AREA URNS HPF: ABNORMAL /[HPF]
YEAST LIKE FUNGI URNS QL MICRO: ABNORMAL

## 2024-05-28 PROCEDURE — 96361 HYDRATE IV INFUSION ADD-ON: CPT

## 2024-05-28 PROCEDURE — 93010 ELECTROCARDIOGRAM REPORT: CPT | Performed by: INTERNAL MEDICINE

## 2024-05-28 PROCEDURE — 96372 THER/PROPH/DIAG INJ SC/IM: CPT

## 2024-05-28 PROCEDURE — 93005 ELECTROCARDIOGRAM TRACING: CPT | Performed by: EMERGENCY MEDICINE

## 2024-05-28 PROCEDURE — 96375 TX/PRO/DX INJ NEW DRUG ADDON: CPT

## 2024-05-28 PROCEDURE — 2580000003 HC RX 258: Performed by: PHYSICIAN ASSISTANT

## 2024-05-28 PROCEDURE — 84443 ASSAY THYROID STIM HORMONE: CPT

## 2024-05-28 PROCEDURE — 36600 WITHDRAWAL OF ARTERIAL BLOOD: CPT

## 2024-05-28 PROCEDURE — 2580000003 HC RX 258: Performed by: STUDENT IN AN ORGANIZED HEALTH CARE EDUCATION/TRAINING PROGRAM

## 2024-05-28 PROCEDURE — 6360000002 HC RX W HCPCS: Performed by: PHYSICIAN ASSISTANT

## 2024-05-28 PROCEDURE — 99223 1ST HOSP IP/OBS HIGH 75: CPT | Performed by: PHYSICIAN ASSISTANT

## 2024-05-28 PROCEDURE — 81001 URINALYSIS AUTO W/SCOPE: CPT

## 2024-05-28 PROCEDURE — 6360000004 HC RX CONTRAST MEDICATION: Performed by: EMERGENCY MEDICINE

## 2024-05-28 PROCEDURE — 99285 EMERGENCY DEPT VISIT HI MDM: CPT

## 2024-05-28 PROCEDURE — 83605 ASSAY OF LACTIC ACID: CPT

## 2024-05-28 PROCEDURE — 87086 URINE CULTURE/COLONY COUNT: CPT

## 2024-05-28 PROCEDURE — 6360000002 HC RX W HCPCS: Performed by: STUDENT IN AN ORGANIZED HEALTH CARE EDUCATION/TRAINING PROGRAM

## 2024-05-28 PROCEDURE — 96365 THER/PROPH/DIAG IV INF INIT: CPT

## 2024-05-28 PROCEDURE — 87040 BLOOD CULTURE FOR BACTERIA: CPT

## 2024-05-28 PROCEDURE — G0378 HOSPITAL OBSERVATION PER HR: HCPCS

## 2024-05-28 PROCEDURE — 83880 ASSAY OF NATRIURETIC PEPTIDE: CPT

## 2024-05-28 PROCEDURE — 85025 COMPLETE CBC W/AUTO DIFF WBC: CPT

## 2024-05-28 PROCEDURE — 96368 THER/DIAG CONCURRENT INF: CPT

## 2024-05-28 PROCEDURE — 94640 AIRWAY INHALATION TREATMENT: CPT

## 2024-05-28 PROCEDURE — 82948 REAGENT STRIP/BLOOD GLUCOSE: CPT

## 2024-05-28 PROCEDURE — 6370000000 HC RX 637 (ALT 250 FOR IP): Performed by: STUDENT IN AN ORGANIZED HEALTH CARE EDUCATION/TRAINING PROGRAM

## 2024-05-28 PROCEDURE — 2700000000 HC OXYGEN THERAPY PER DAY

## 2024-05-28 PROCEDURE — 87636 SARSCOV2 & INF A&B AMP PRB: CPT

## 2024-05-28 PROCEDURE — 71275 CT ANGIOGRAPHY CHEST: CPT

## 2024-05-28 PROCEDURE — 82803 BLOOD GASES ANY COMBINATION: CPT

## 2024-05-28 PROCEDURE — 80048 BASIC METABOLIC PNL TOTAL CA: CPT

## 2024-05-28 PROCEDURE — 71045 X-RAY EXAM CHEST 1 VIEW: CPT

## 2024-05-28 PROCEDURE — 84439 ASSAY OF FREE THYROXINE: CPT

## 2024-05-28 PROCEDURE — 84484 ASSAY OF TROPONIN QUANT: CPT

## 2024-05-28 PROCEDURE — 94761 N-INVAS EAR/PLS OXIMETRY MLT: CPT

## 2024-05-28 PROCEDURE — 36415 COLL VENOUS BLD VENIPUNCTURE: CPT

## 2024-05-28 PROCEDURE — 96366 THER/PROPH/DIAG IV INF ADDON: CPT

## 2024-05-28 RX ORDER — IPRATROPIUM BROMIDE AND ALBUTEROL SULFATE 2.5; .5 MG/3ML; MG/3ML
1 SOLUTION RESPIRATORY (INHALATION) ONCE
Status: COMPLETED | OUTPATIENT
Start: 2024-05-28 | End: 2024-05-28

## 2024-05-28 RX ORDER — 0.9 % SODIUM CHLORIDE 0.9 %
500 INTRAVENOUS SOLUTION INTRAVENOUS ONCE
Status: COMPLETED | OUTPATIENT
Start: 2024-05-28 | End: 2024-05-28

## 2024-05-28 RX ORDER — ONDANSETRON 2 MG/ML
4 INJECTION INTRAMUSCULAR; INTRAVENOUS ONCE
Status: COMPLETED | OUTPATIENT
Start: 2024-05-28 | End: 2024-05-28

## 2024-05-28 RX ORDER — LORAZEPAM 2 MG/ML
1 INJECTION INTRAMUSCULAR ONCE
Status: COMPLETED | OUTPATIENT
Start: 2024-05-28 | End: 2024-05-28

## 2024-05-28 RX ORDER — ENOXAPARIN SODIUM 100 MG/ML
40 INJECTION SUBCUTANEOUS DAILY
Status: DISCONTINUED | OUTPATIENT
Start: 2024-05-28 | End: 2024-06-01 | Stop reason: HOSPADM

## 2024-05-28 RX ORDER — ONDANSETRON 4 MG/1
4 TABLET, ORALLY DISINTEGRATING ORAL EVERY 8 HOURS PRN
Status: DISCONTINUED | OUTPATIENT
Start: 2024-05-28 | End: 2024-06-01 | Stop reason: HOSPADM

## 2024-05-28 RX ORDER — MAGNESIUM SULFATE IN WATER 40 MG/ML
2000 INJECTION, SOLUTION INTRAVENOUS PRN
Status: DISCONTINUED | OUTPATIENT
Start: 2024-05-28 | End: 2024-06-01 | Stop reason: HOSPADM

## 2024-05-28 RX ORDER — POTASSIUM CHLORIDE 20 MEQ/1
40 TABLET, EXTENDED RELEASE ORAL PRN
Status: DISCONTINUED | OUTPATIENT
Start: 2024-05-28 | End: 2024-06-01 | Stop reason: HOSPADM

## 2024-05-28 RX ORDER — ACETAMINOPHEN 325 MG/1
650 TABLET ORAL EVERY 6 HOURS PRN
Status: DISCONTINUED | OUTPATIENT
Start: 2024-05-28 | End: 2024-06-01 | Stop reason: HOSPADM

## 2024-05-28 RX ORDER — SODIUM CHLORIDE 9 MG/ML
INJECTION, SOLUTION INTRAVENOUS CONTINUOUS
Status: ACTIVE | OUTPATIENT
Start: 2024-05-28 | End: 2024-05-30

## 2024-05-28 RX ORDER — SODIUM CHLORIDE 0.9 % (FLUSH) 0.9 %
5-40 SYRINGE (ML) INJECTION PRN
Status: DISCONTINUED | OUTPATIENT
Start: 2024-05-28 | End: 2024-06-01 | Stop reason: HOSPADM

## 2024-05-28 RX ORDER — SODIUM CHLORIDE 0.9 % (FLUSH) 0.9 %
5-40 SYRINGE (ML) INJECTION EVERY 12 HOURS SCHEDULED
Status: DISCONTINUED | OUTPATIENT
Start: 2024-05-28 | End: 2024-06-01 | Stop reason: HOSPADM

## 2024-05-28 RX ORDER — POLYETHYLENE GLYCOL 3350 17 G/17G
17 POWDER, FOR SOLUTION ORAL DAILY PRN
Status: DISCONTINUED | OUTPATIENT
Start: 2024-05-28 | End: 2024-06-01 | Stop reason: HOSPADM

## 2024-05-28 RX ORDER — SODIUM CHLORIDE 9 MG/ML
INJECTION, SOLUTION INTRAVENOUS PRN
Status: DISCONTINUED | OUTPATIENT
Start: 2024-05-28 | End: 2024-06-01 | Stop reason: HOSPADM

## 2024-05-28 RX ORDER — ACETAMINOPHEN 650 MG/1
650 SUPPOSITORY RECTAL EVERY 6 HOURS PRN
Status: DISCONTINUED | OUTPATIENT
Start: 2024-05-28 | End: 2024-06-01 | Stop reason: HOSPADM

## 2024-05-28 RX ORDER — ONDANSETRON 2 MG/ML
4 INJECTION INTRAMUSCULAR; INTRAVENOUS EVERY 6 HOURS PRN
Status: DISCONTINUED | OUTPATIENT
Start: 2024-05-28 | End: 2024-06-01 | Stop reason: HOSPADM

## 2024-05-28 RX ORDER — POTASSIUM CHLORIDE 7.45 MG/ML
10 INJECTION INTRAVENOUS PRN
Status: DISCONTINUED | OUTPATIENT
Start: 2024-05-28 | End: 2024-06-01 | Stop reason: HOSPADM

## 2024-05-28 RX ORDER — 0.9 % SODIUM CHLORIDE 0.9 %
1000 INTRAVENOUS SOLUTION INTRAVENOUS ONCE
Status: COMPLETED | OUTPATIENT
Start: 2024-05-28 | End: 2024-05-28

## 2024-05-28 RX ADMIN — SODIUM CHLORIDE 1000 ML: 9 INJECTION, SOLUTION INTRAVENOUS at 12:49

## 2024-05-28 RX ADMIN — LORAZEPAM 1 MG: 2 INJECTION INTRAMUSCULAR; INTRAVENOUS at 12:50

## 2024-05-28 RX ADMIN — AZITHROMYCIN MONOHYDRATE 500 MG: 500 INJECTION, POWDER, LYOPHILIZED, FOR SOLUTION INTRAVENOUS at 18:46

## 2024-05-28 RX ADMIN — IPRATROPIUM BROMIDE AND ALBUTEROL SULFATE 1 DOSE: .5; 3 SOLUTION RESPIRATORY (INHALATION) at 12:36

## 2024-05-28 RX ADMIN — CEFTRIAXONE SODIUM 1000 MG: 1 INJECTION, POWDER, FOR SOLUTION INTRAMUSCULAR; INTRAVENOUS at 16:46

## 2024-05-28 RX ADMIN — WATER 125 MG: 1 INJECTION INTRAMUSCULAR; INTRAVENOUS; SUBCUTANEOUS at 12:50

## 2024-05-28 RX ADMIN — ONDANSETRON 4 MG: 2 INJECTION INTRAMUSCULAR; INTRAVENOUS at 12:50

## 2024-05-28 RX ADMIN — SODIUM CHLORIDE 500 ML: 9 INJECTION, SOLUTION INTRAVENOUS at 16:45

## 2024-05-28 RX ADMIN — ENOXAPARIN SODIUM 40 MG: 100 INJECTION SUBCUTANEOUS at 21:49

## 2024-05-28 RX ADMIN — IPRATROPIUM BROMIDE AND ALBUTEROL SULFATE 1 DOSE: .5; 3 SOLUTION RESPIRATORY (INHALATION) at 12:35

## 2024-05-28 RX ADMIN — IPRATROPIUM BROMIDE AND ALBUTEROL SULFATE 1 DOSE: .5; 3 SOLUTION RESPIRATORY (INHALATION) at 12:33

## 2024-05-28 RX ADMIN — IOPAMIDOL 80 ML: 755 INJECTION, SOLUTION INTRAVENOUS at 15:48

## 2024-05-28 RX ADMIN — SODIUM CHLORIDE: 9 INJECTION, SOLUTION INTRAVENOUS at 18:42

## 2024-05-28 ASSESSMENT — PAIN - FUNCTIONAL ASSESSMENT
PAIN_FUNCTIONAL_ASSESSMENT: 0-10

## 2024-05-28 ASSESSMENT — PAIN SCALES - GENERAL
PAINLEVEL_OUTOF10: 5
PAINLEVEL_OUTOF10: 0
PAINLEVEL_OUTOF10: 5

## 2024-05-28 ASSESSMENT — ENCOUNTER SYMPTOMS
NAUSEA: 1
VOMITING: 1
SORE THROAT: 0
ABDOMINAL PAIN: 0
SHORTNESS OF BREATH: 1
EYE PAIN: 0
TROUBLE SWALLOWING: 0
CHEST TIGHTNESS: 1
DIARRHEA: 1
RHINORRHEA: 1
BLOOD IN STOOL: 0
COUGH: 1
BACK PAIN: 0

## 2024-05-28 NOTE — ED NOTES
**This is a Medical Student Note and is charted for educational purposes. The non-physician staff attested note is not to be used for billing purposes or to guide patient care. Please see the physician modifications/ attestation for treatment plan/suggestions. This note has been reviewed and feedback has been provided to the student. **    [facility]  EMERGENCY DEPARTMENT - Jack Good  Supervising physician, Dr. James  ED visit Note  Pt Name: Magan Oleary  Medical Record Number: [id number]  Date of Birth 1950   Today's Date: 5/28/2024    CHIEF COMPLAINT:     Chief Complaint   Patient presents with    Fatigue    Wheezing       HISTORY OF PRESENT ILLNESS     Magan is a 73 y.o. female with a PMHx of COPD (on Bretzri & albuterol), HLD, & SAH (2/2 cerebral aneurysm s/p EVD & coiling)  presenting to the ED c/o chest tightness. Patient and daughter at bedside states that patient has been having gradually worsening chest tightness & SOB for the past x1 day. Symptoms are associated with productive cough with clear sputum, fatigue, & confusion. Patient is on 4L of home O2 during the day and 5L at night. Patient denies any fever, chills, CP, palpitations, sore throat, leg swelling, calf pain, headache, dizziness, or lightheadedness. Patient is taking her Bretzri as prescribed daily. However, she has not used her nebulizer in x2-3 days. Patient also c/o nausea, non-bloody/non-bilious emesis, & non-bloody diarrhea onset x3 days ago. Vomiting and diarrhea have improved after loperamide. Patient has no other complaints today.        REVIEW OF SYSTEMS:    Review of Systems   Constitutional:  Positive for fatigue. Negative for chills and fever.   HENT:  Negative for sore throat and trouble swallowing.    Eyes:  Negative for pain and visual disturbance.   Respiratory:  Positive for cough, chest tightness and shortness of breath.    Cardiovascular:  Negative for chest pain, palpitations and leg  components:    PCO2 34 (*)     All other components within normal limits   LACTATE, SEPSIS - Abnormal; Notable for the following components:    Lactic Acid, Sepsis 2.8 (*)     All other components within normal limits   LACTATE, SEPSIS - Abnormal; Notable for the following components:    Lactic Acid, Sepsis 2.8 (*)     All other components within normal limits   URINE WITH REFLEXED MICRO - Abnormal; Notable for the following components:    Ketones, Urine TRACE (*)     Specific Gravity, UA >1.030 (*)     Protein, UA TRACE (*)     Leukocyte Esterase, Urine SMALL (*)     All other components within normal limits   COVID-19 & INFLUENZA COMBO   CULTURE, BLOOD 1   CULTURE, BLOOD 1   CULTURE, REFLEXED, URINE    Narrative:     Source: urine, clean catch       Site: clean void          Current Antibiotics: not stated   TROPONIN   T4, FREE   ANION GAP   OSMOLALITY   GLOMERULAR FILTRATION RATE, ESTIMATED   BASIC METABOLIC PANEL W/ REFLEX TO MG FOR LOW K   CBC WITH AUTO DIFFERENTIAL   LACTIC ACID       RADIOLOGY     CTA CHEST W WO CONTRAST   Final Result   Addendum (preliminary) 1 of 1   Addendum: There are multiple nondisplaced right rib fractures at various    stages   of healing.      Final   1. No pulmonary embolism.   2. Left lower lobe pneumonia.   3. Prominent mediastinal and left hilar lymph nodes, likely reactive. Metastatic   disease is considered less likely but not excluded.   4. Chronic lung disease.      XR CHEST PORTABLE   Final Result      No acute intrathoracic process.               **This report has been created using voice recognition software. It may contain   minor errors which are inherent in voice recognition technology.**                DIFFERENTIALS:   COPD exacerbation  H&P Supporting Data: chest tightness, cough, h/o COPD, wheezing, decreased breath sounds  H&P Against Data:   PNA  H&P Supporting Data: chest tightness, cough  H&P Against Data: no fever or chills  CHF  H&P Supporting Data: chest

## 2024-05-28 NOTE — ED PROVIDER NOTES
PATIENT NAME: Magan Oleary  MRN: [id number]  : 1950  GOODWIN: 2024    I performed a history and physical examination of the patient and discussed management with the Resident. I reviewed the Resident's note and agree with the documented findings and plan of care. Any areas of disagreement are noted on the chart. I was personally present for the key portions of any procedures and have documented in the chart those procedures where I was not present during the key portions. I have reviewed the emergency nurses triage note and agree with the chief complaint, past medical history, past surgical history, allergies, medications, social and family history as documented unless otherwise noted below.    MEDICAL DEDISION MAKING (MDM)     Magan Oleary is a 73 y.o. female who presents to Emergency Department with Fatigue and Wheezing     Increasing SOB over last 3 weeks duration.  PMH with COPD on home oxygen 4 L/min NC.  Patient has no fever or chills.  Feeling nauseated.  No abdominal pain.  No diarrhea.  No urinary symptoms.  No leg swelling    EKG interpreted by me as sinus tachycardia, ventricular rate 116 bpm, MS interval 134 ms, QRS duration 76 ms,  ms, no acute ischemic changes    ED workup reveal COPD exacerbation, acute on chronic hypoxemia, and left lower lobe pneumonia.  IV Rocephin and Zithromax are administered in the ED.  Admission is indicated.  Discussed with and admitted to hospital medicine service.    Vitals:    24 1446 24 1607 24 1647 24 1805   BP: 107/68 131/62 127/65 (!) 107/59   Pulse: (!) 117 (!) 118 (!) 115 (!) 108   Resp:    Temp:    98.5 °F (36.9 °C)   TempSrc:    Oral   SpO2: 95% 95% 94% 97%   Weight:       Height:         Labs Reviewed   CBC WITH AUTO DIFFERENTIAL - Abnormal; Notable for the following components:       Result Value    WBC 16.7 (*)     MCHC 31.2 (*)     Neutrophils Absolute 13.8 (*)     Monocytes Absolute 1.5 (*)     Immature  Grans (Abs) 0.10 (*)     All other components within normal limits   BASIC METABOLIC PANEL - Abnormal; Notable for the following components:    Glucose 150 (*)     All other components within normal limits   BRAIN NATRIURETIC PEPTIDE - Abnormal; Notable for the following components:    Pro-.8 (*)     All other components within normal limits   TSH - Abnormal; Notable for the following components:    TSH 0.353 (*)     All other components within normal limits   BLOOD GAS, ARTERIAL - Abnormal; Notable for the following components:    PCO2 34 (*)     All other components within normal limits   LACTATE, SEPSIS - Abnormal; Notable for the following components:    Lactic Acid, Sepsis 2.8 (*)     All other components within normal limits   LACTATE, SEPSIS - Abnormal; Notable for the following components:    Lactic Acid, Sepsis 2.8 (*)     All other components within normal limits   URINE WITH REFLEXED MICRO - Abnormal; Notable for the following components:    Ketones, Urine TRACE (*)     Specific Gravity, UA >1.030 (*)     Protein, UA TRACE (*)     Leukocyte Esterase, Urine SMALL (*)     All other components within normal limits   LACTIC ACID - Abnormal; Notable for the following components:    Lactic Acid 3.0 (*)     All other components within normal limits   COVID-19 & INFLUENZA COMBO   CULTURE, BLOOD 1   CULTURE, BLOOD 1   CULTURE, REFLEXED, URINE    Narrative:     Source: urine, clean catch       Site: clean void          Current Antibiotics: not stated   TROPONIN   T4, FREE   ANION GAP   OSMOLALITY   GLOMERULAR FILTRATION RATE, ESTIMATED   BASIC METABOLIC PANEL W/ REFLEX TO MG FOR LOW K   CBC WITH AUTO DIFFERENTIAL     CTA CHEST W WO CONTRAST   Final Result   Addendum (preliminary) 1 of 1   Addendum: There are multiple nondisplaced right rib fractures at various    stages   of healing.      Final   1. No pulmonary embolism.   2. Left lower lobe pneumonia.   3. Prominent mediastinal and left hilar lymph nodes,

## 2024-05-28 NOTE — H&P
History & Physical        Patient:  Magan Oleary  YOB: 1950    MRN: [id number]     Acct: [id number]    PCP: Óscar Baird APRN - CNP    Date of Admission: 5/28/2024    Date of Service: Pt seen/examined on 05/28/24  and Admitted to Observation with expected LOS less than two midnights due to medical therapy.     ASSESSMENT/PLAN:    Community Acquired Pneumonia:   -Continue Rocephin and azithromycin   -Wean supplemental O2 to home O2 levels as able   -Pulmonary hygiene, RT eval and treat   -Low threshold to initiate oral prednisone, literature supports prednisone with community-acquired pneumonia and COPD patients    Multiple subacute right sided rib fx's (4 through 6) 2/2 ground level fall:   -Patient reports pain is improved   -Oral analgesics as needed, topical lidocaine patches as needed    Chronic hypoxic respiratory failure 2/2 COPD:   -Wean supplemental O2 to home O2 levels as able   -Pulmonary hygiene, RT eval and treat   -Consider steroids as above     Anxiety:   -Continue home medications once reconciled    Hypothyroidism:   -Continue home Synthroid      Chief Complaint:  Fatigue, Wheezing      History Of Present Illness:    73 y.o. female who presented to [facility] with fatigue and wheezing.  Patient reports symptoms have been ongoing over the last 4-5 days.  Patient Dors is coughing.  Patient reports her pain is much better despite rib fractures on the right side following a mechanical fall.  Patient endorses a runny nose.  She reports nausea and vomiting.  She endorses feeling somewhat short of breath.  She denies fevers.  Patient uses inhalers at home with history of COPD.  She reports she has been using these.  She denies any leg swelling that is new.  Patient is on baseline 4 L of oxygen during the day and 5 L at night.  Patient was reportedly found to be satting in the 70s on baseline O2 at home.  Suspect patient's community-acquired pneumonia secondary to poor  and Sulfa antibiotics    Social History:   reports that she quit smoking about 8 years ago. Her smoking use included cigarettes. She started smoking about 48 years ago. She has a 40.0 pack-year smoking history. She has been exposed to tobacco smoke. She has never used smokeless tobacco. She reports current alcohol use of about 2.0 standard drinks of alcohol per week. She reports that she does not use drugs.    Family History:      Positive as follows:        Problem Relation Age of Onset    Cancer Mother         Female Cancer    Stroke Mother         Possible    Diabetes Father     Heart Disease Maternal Aunt     Parkinsonism Maternal Uncle     Colon Cancer Neg Hx     Esophageal Cancer Neg Hx     Liver Cancer Neg Hx     Rectal Cancer Neg Hx     Stomach Cancer Neg Hx        REVIEW OF SYSTEMS:       Review of Systems   Constitutional:  Negative for fever.   HENT:  Positive for rhinorrhea.    Respiratory:  Positive for shortness of breath.    Cardiovascular:  Negative for leg swelling.        Recent right-sided rib fractures 4-6   Gastrointestinal:  Positive for nausea and vomiting.        PHYSICAL EXAM:    /62   Pulse (!) 118   Temp 98 °F (36.7 °C) (Oral)   Resp 23   Ht 1.6 m (5' 3\")   Wt 59.9 kg (132 lb)   SpO2 95%   BMI 23.38 kg/m²     Physical Exam  Constitutional:       Interventions: She is not intubated.  HENT:      Nose:      Comments: Nasal cannula  Cardiovascular:      Rate and Rhythm: Regular rhythm. Tachycardia present.   Pulmonary:      Effort: She is not intubated.      Breath sounds: No wheezing.   Musculoskeletal:      Comments: No significant edema appreciated in bilateral lower extremities   Neurological:      Mental Status: She is alert.   Psychiatric:         Speech: She is communicative.        Labs:     Recent Labs     05/28/24  1226   WBC 16.7*   HGB 12.9   HCT 41.4        Recent Labs     05/28/24  1226      K 4.3   CL 99   CO2 28   BUN 11   CREATININE 0.7   CALCIUM 9.4

## 2024-05-28 NOTE — ED NOTES
Pt updated on POC. Pt resting in bed, respirations even and unlabored. Fluids completed. No needs expressed at this time. VSS

## 2024-05-28 NOTE — ED NOTES
Pt updated on POC. IV established and pt medicated per MAR. RT at bedside giving breathing treatment. No needs expressed at this time. VSS

## 2024-05-28 NOTE — ED PROVIDER NOTES
[facility] EMERGENCY DEPT    Pt Name: Magan Oleary  MRN: [id number]  Birthdate 1950  Date of evaluation: 5/28/2024  Resident Physician: Beth Morataya MD  Supervising Physician: Dr. Kameron James MD    CHIEF COMPLAINT       Chief Complaint   Patient presents with    Fatigue    Wheezing     HISTORY OF PRESENT ILLNESS   Magan Oleary is a 73 y.o. female with PMHx of COPD  who presents to the emergency department for evaluation of fatgiue.    1 day hx of sob, cough. Associated with fatigue and confusion according to daugther at bedside. baseline 4 L during day, 5L at night. Has not been taking her COPD inhalers at home for the past 2 days. Has not been eating or drinking much for 2-3 days as well.     Today, home health nurse stating o2 was 70% and adivsed to come to ED.    Pt lives at home alone. Family at bedside states that pt appears anxious.     Of note, patient most recently was admitted for closed fracture of multiple ribs on the left side on 4/22/2024.    The patient has no other acute complaints at this time.    Review of Systems    Negative Except as Documented Above.    PASTMEDICAL HISTORY     Past Medical History:   Diagnosis Date    Aneurysm, cerebral     COPD (chronic obstructive pulmonary disease) (HCC)     Dupuytren's contracture of both hands     Hiatal hernia     Hyperlipidemia     Osteoarthritis     Pneumonia     Stroke (HCC)     Thyroid disease     Unspecified cerebral artery occlusion with cerebral infarction        Patient Active Problem List   Diagnosis Code    Hyperlipemia E78.5    Hypothyroidism E03.9    Depression F32.A    Chronic anxiety F41.9    Osteopenia M85.80    Medication monitoring encounter Z51.81    H/O: CVA (cerebrovascular accident), aneurysm bleed, Jan 2015 Z86.73    Abnormality of gait following cerebrovascular accident I69.398, R26.9    GERD (gastroesophageal reflux disease) K21.9    Cognitive impairment due to CVA. R41.89    OAB (overactive bladder) N32.81    Chronic    Substance Use Topics    Alcohol use: Yes     Alcohol/week: 2.0 standard drinks of alcohol     Types: 2 Glasses of wine per week     Comment: occasionally    Drug use: No       PHYSICAL EXAM       Vitals:    05/28/24 1607   BP: 131/62   Pulse: (!) 118   Resp: 23   Temp:    SpO2: 95%       Physical Exam  Constitutional:       Appearance: She is ill-appearing.   HENT:      Head: Normocephalic and atraumatic.      Right Ear: External ear normal.      Left Ear: External ear normal.      Nose: Nose normal.      Mouth/Throat:      Mouth: Mucous membranes are moist.   Eyes:      Extraocular Movements: Extraocular movements intact.   Cardiovascular:      Rate and Rhythm: Regular rhythm. Tachycardia present.   Pulmonary:      Effort: Pulmonary effort is normal.      Breath sounds: Decreased air movement present. Examination of the right-upper field reveals decreased breath sounds. Examination of the left-upper field reveals decreased breath sounds. Examination of the right-middle field reveals decreased breath sounds. Examination of the left-middle field reveals decreased breath sounds. Examination of the right-lower field reveals decreased breath sounds. Examination of the left-lower field reveals decreased breath sounds, rhonchi and rales. Decreased breath sounds, rhonchi and rales present.   Abdominal:      General: Abdomen is flat.      Tenderness: There is no abdominal tenderness.   Musculoskeletal:         General: Normal range of motion.      Cervical back: Normal range of motion and neck supple.   Skin:     General: Skin is warm and dry.      Capillary Refill: Capillary refill takes less than 2 seconds.   Neurological:      Mental Status: She is alert and oriented to person, place, and time.   Psychiatric:         Mood and Affect: Mood normal.         Behavior: Behavior normal.         Thought Content: Thought content normal.       FORMAL DIAGNOSTIC RESULTS     RADIOLOGY: Interpretation per the Radiologist below, if  good pulmonary health.  On physical exam, diminished breath sounds as well as crackles and rhonchi left lowers.  Chest x-ray showing no acute abnormalities however due to the patient's hypoxia, did order CTA.  No PE but does show left lower lobe pneumonia.  WBC 16.7.  Will treat for pneumonia with azithromycin and ceftriaxone.  Was also given solumedrol and duoneb x3 during ED course. Patient remained tachycardic throughout ED visit.  Patient was given 1 L IV fluid bolus, will give additional 500cc.  At this time, would like to admit this patient for pneumonia.    FINAL IMPRESSION      1. Pneumonia of left lower lobe due to infectious organism          DISPOSITION/PLAN   Condition: condition: stable  Dispo: Admit to med/surg floor  DISPOSITION        Outpatient follow up (If applicable):  No follow-up provider specified.  Not applicable          DISCHARGE PRESCRIPTIONS: (None if blank)  New Prescriptions    No medications on file         This transcription was electronically signed. Parts of this transcriptions may have been dictated by use of voice recognition software and electronically transcribed, and parts may have been transcribed with the assistance of an ED scribe. The transcription may contain errors not detected in proofreading.  Please refer to my supervising physician's documentation if my documentation differs.    Electronically Signed: Beth Morataya MD, 05/28/24, 4:21 PM

## 2024-05-28 NOTE — ED NOTES
Pt updated on admission status. Pt medicated per MAR. No needs expressed at this time. Respirations even and unlabored, call light within reach. VSS

## 2024-05-28 NOTE — ED NOTES
Pt transported to [facility] in stable condition. Floor contacted before transport,spoke to Savanah

## 2024-05-28 NOTE — ED NOTES
Pt ambulated to bathroom with this RN for urine specimen, tolerated well. Updated on POC. No other needs expressed. VSS

## 2024-05-28 NOTE — ED TRIAGE NOTES
Pt arrives via wheelchair from [facility] with c/o fatigue, cough, wheezing. Pt normal is on 4 liters nasal cannula. Pt states home nurse came and she was satting in the 70% on her normal 4 liters nasal cannula. Pt states she has not been doing her breathing treatments the past 2 days.

## 2024-05-29 LAB
ANION GAP SERPL CALC-SCNC: 12 MEQ/L (ref 8–16)
BACTERIA UR CULT: ABNORMAL
BASOPHILS ABSOLUTE: 0 THOU/MM3 (ref 0–0.1)
BASOPHILS NFR BLD AUTO: 0.1 %
BUN SERPL-MCNC: 14 MG/DL (ref 7–22)
CALCIUM SERPL-MCNC: 8.8 MG/DL (ref 8.5–10.5)
CHLORIDE SERPL-SCNC: 107 MEQ/L (ref 98–111)
CO2 SERPL-SCNC: 25 MEQ/L (ref 23–33)
CREAT SERPL-MCNC: 0.7 MG/DL (ref 0.4–1.2)
DEPRECATED RDW RBC AUTO: 45.3 FL (ref 35–45)
EOSINOPHIL NFR BLD AUTO: 0 %
EOSINOPHILS ABSOLUTE: 0 THOU/MM3 (ref 0–0.4)
ERYTHROCYTE [DISTWIDTH] IN BLOOD BY AUTOMATED COUNT: 13.3 % (ref 11.5–14.5)
GFR SERPL CREATININE-BSD FRML MDRD: > 90 ML/MIN/1.73M2
GLUCOSE SERPL-MCNC: 250 MG/DL (ref 70–108)
HCT VFR BLD AUTO: 39.2 % (ref 37–47)
HGB BLD-MCNC: 12.4 GM/DL (ref 12–16)
IMM GRANULOCYTES # BLD AUTO: 0.12 THOU/MM3 (ref 0–0.07)
IMM GRANULOCYTES NFR BLD AUTO: 0.7 %
LYMPHOCYTES ABSOLUTE: 0.7 THOU/MM3 (ref 1–4.8)
LYMPHOCYTES NFR BLD AUTO: 4.1 %
MCH RBC QN AUTO: 29.1 PG (ref 26–33)
MCHC RBC AUTO-ENTMCNC: 31.6 GM/DL (ref 32.2–35.5)
MCV RBC AUTO: 92 FL (ref 81–99)
MONOCYTES ABSOLUTE: 0.7 THOU/MM3 (ref 0.4–1.3)
MONOCYTES NFR BLD AUTO: 4.5 %
NEUTROPHILS ABSOLUTE: 14.9 THOU/MM3 (ref 1.8–7.7)
NEUTROPHILS NFR BLD AUTO: 90.6 %
NRBC BLD AUTO-RTO: 0 /100 WBC
ORGANISM: ABNORMAL
PLATELET # BLD AUTO: 233 THOU/MM3 (ref 130–400)
PMV BLD AUTO: 12.4 FL (ref 9.4–12.4)
POTASSIUM SERPL-SCNC: 4.6 MEQ/L (ref 3.5–5.2)
RBC # BLD AUTO: 4.26 MILL/MM3 (ref 4.2–5.4)
SODIUM SERPL-SCNC: 144 MEQ/L (ref 135–145)
WBC # BLD AUTO: 16.4 THOU/MM3 (ref 4.8–10.8)

## 2024-05-29 PROCEDURE — 96372 THER/PROPH/DIAG INJ SC/IM: CPT

## 2024-05-29 PROCEDURE — 94669 MECHANICAL CHEST WALL OSCILL: CPT

## 2024-05-29 PROCEDURE — 85025 COMPLETE CBC W/AUTO DIFF WBC: CPT

## 2024-05-29 PROCEDURE — 94761 N-INVAS EAR/PLS OXIMETRY MLT: CPT

## 2024-05-29 PROCEDURE — 6370000000 HC RX 637 (ALT 250 FOR IP): Performed by: PHYSICIAN ASSISTANT

## 2024-05-29 PROCEDURE — 6360000002 HC RX W HCPCS: Performed by: PHYSICIAN ASSISTANT

## 2024-05-29 PROCEDURE — 2700000000 HC OXYGEN THERAPY PER DAY

## 2024-05-29 PROCEDURE — 6370000000 HC RX 637 (ALT 250 FOR IP)

## 2024-05-29 PROCEDURE — 99232 SBSQ HOSP IP/OBS MODERATE 35: CPT | Performed by: PHYSICIAN ASSISTANT

## 2024-05-29 PROCEDURE — G0378 HOSPITAL OBSERVATION PER HR: HCPCS

## 2024-05-29 PROCEDURE — 80048 BASIC METABOLIC PNL TOTAL CA: CPT

## 2024-05-29 PROCEDURE — 2580000003 HC RX 258: Performed by: PHYSICIAN ASSISTANT

## 2024-05-29 PROCEDURE — 36415 COLL VENOUS BLD VENIPUNCTURE: CPT

## 2024-05-29 PROCEDURE — 96361 HYDRATE IV INFUSION ADD-ON: CPT

## 2024-05-29 PROCEDURE — 96366 THER/PROPH/DIAG IV INF ADDON: CPT

## 2024-05-29 PROCEDURE — 83036 HEMOGLOBIN GLYCOSYLATED A1C: CPT

## 2024-05-29 PROCEDURE — 94640 AIRWAY INHALATION TREATMENT: CPT

## 2024-05-29 RX ORDER — METOPROLOL SUCCINATE 25 MG/1
25 TABLET, EXTENDED RELEASE ORAL DAILY
Status: DISCONTINUED | OUTPATIENT
Start: 2024-05-29 | End: 2024-06-01 | Stop reason: HOSPADM

## 2024-05-29 RX ORDER — ATORVASTATIN CALCIUM 10 MG/1
10 TABLET, FILM COATED ORAL DAILY
Status: DISCONTINUED | OUTPATIENT
Start: 2024-05-29 | End: 2024-06-01 | Stop reason: HOSPADM

## 2024-05-29 RX ORDER — DONEPEZIL HYDROCHLORIDE 10 MG/1
10 TABLET, FILM COATED ORAL EVERY EVENING
Status: DISCONTINUED | OUTPATIENT
Start: 2024-05-29 | End: 2024-06-01 | Stop reason: HOSPADM

## 2024-05-29 RX ORDER — VALACYCLOVIR HYDROCHLORIDE 500 MG/1
500 TABLET, FILM COATED ORAL DAILY
Status: DISCONTINUED | OUTPATIENT
Start: 2024-05-29 | End: 2024-06-01 | Stop reason: HOSPADM

## 2024-05-29 RX ORDER — DULOXETIN HYDROCHLORIDE 60 MG/1
60 CAPSULE, DELAYED RELEASE ORAL DAILY
Status: DISCONTINUED | OUTPATIENT
Start: 2024-05-29 | End: 2024-06-01 | Stop reason: HOSPADM

## 2024-05-29 RX ORDER — BENZONATATE 100 MG/1
200 CAPSULE ORAL 3 TIMES DAILY PRN
Status: DISCONTINUED | OUTPATIENT
Start: 2024-05-29 | End: 2024-06-01 | Stop reason: HOSPADM

## 2024-05-29 RX ORDER — LEVOTHYROXINE SODIUM 0.05 MG/1
50 TABLET ORAL DAILY
Status: DISCONTINUED | OUTPATIENT
Start: 2024-05-29 | End: 2024-06-01 | Stop reason: HOSPADM

## 2024-05-29 RX ORDER — TROSPIUM CHLORIDE 20 MG/1
20 TABLET, FILM COATED ORAL
Status: DISCONTINUED | OUTPATIENT
Start: 2024-05-29 | End: 2024-06-01 | Stop reason: HOSPADM

## 2024-05-29 RX ORDER — PREDNISONE 20 MG/1
40 TABLET ORAL DAILY
Status: DISCONTINUED | OUTPATIENT
Start: 2024-05-29 | End: 2024-06-01 | Stop reason: HOSPADM

## 2024-05-29 RX ORDER — IPRATROPIUM BROMIDE AND ALBUTEROL SULFATE 2.5; .5 MG/3ML; MG/3ML
1 SOLUTION RESPIRATORY (INHALATION) EVERY 4 HOURS PRN
Status: DISCONTINUED | OUTPATIENT
Start: 2024-05-29 | End: 2024-05-30

## 2024-05-29 RX ORDER — ATOMOXETINE 40 MG/1
40 CAPSULE ORAL DAILY
Status: DISCONTINUED | OUTPATIENT
Start: 2024-05-29 | End: 2024-06-01 | Stop reason: HOSPADM

## 2024-05-29 RX ORDER — FLUTICASONE PROPIONATE 50 MCG
1 SPRAY, SUSPENSION (ML) NASAL DAILY
Status: DISCONTINUED | OUTPATIENT
Start: 2024-05-29 | End: 2024-06-01 | Stop reason: HOSPADM

## 2024-05-29 RX ORDER — GUAIFENESIN/DEXTROMETHORPHAN 100-10MG/5
5 SYRUP ORAL EVERY 4 HOURS PRN
Status: DISCONTINUED | OUTPATIENT
Start: 2024-05-29 | End: 2024-06-01 | Stop reason: HOSPADM

## 2024-05-29 RX ADMIN — TROSPIUM CHLORIDE 20 MG: 20 TABLET, FILM COATED ORAL at 15:37

## 2024-05-29 RX ADMIN — BENZONATATE 200 MG: 100 CAPSULE ORAL at 04:16

## 2024-05-29 RX ADMIN — ATOMOXETINE 40 MG: 40 CAPSULE ORAL at 15:36

## 2024-05-29 RX ADMIN — VALACYCLOVIR HYDROCHLORIDE 500 MG: 500 TABLET, FILM COATED ORAL at 15:37

## 2024-05-29 RX ADMIN — IPRATROPIUM BROMIDE AND ALBUTEROL SULFATE 1 DOSE: .5; 3 SOLUTION RESPIRATORY (INHALATION) at 23:37

## 2024-05-29 RX ADMIN — ACETAMINOPHEN 650 MG: 325 TABLET ORAL at 03:13

## 2024-05-29 RX ADMIN — AZITHROMYCIN MONOHYDRATE 500 MG: 500 INJECTION, POWDER, LYOPHILIZED, FOR SOLUTION INTRAVENOUS at 18:38

## 2024-05-29 RX ADMIN — SODIUM CHLORIDE: 9 INJECTION, SOLUTION INTRAVENOUS at 16:06

## 2024-05-29 RX ADMIN — DONEPEZIL HYDROCHLORIDE 10 MG: 10 TABLET, FILM COATED ORAL at 18:38

## 2024-05-29 RX ADMIN — PREDNISONE 40 MG: 20 TABLET ORAL at 15:36

## 2024-05-29 RX ADMIN — GUAIFENESIN AND DEXTROMETHORPHAN 5 ML: 100; 10 SYRUP ORAL at 11:36

## 2024-05-29 RX ADMIN — CEFTRIAXONE SODIUM 1000 MG: 1 INJECTION, POWDER, FOR SOLUTION INTRAMUSCULAR; INTRAVENOUS at 16:07

## 2024-05-29 RX ADMIN — BENZONATATE 200 MG: 100 CAPSULE ORAL at 21:39

## 2024-05-29 RX ADMIN — LEVOTHYROXINE SODIUM 50 MCG: 0.05 TABLET ORAL at 15:38

## 2024-05-29 RX ADMIN — METOPROLOL SUCCINATE 25 MG: 25 TABLET, FILM COATED, EXTENDED RELEASE ORAL at 15:37

## 2024-05-29 RX ADMIN — ENOXAPARIN SODIUM 40 MG: 100 INJECTION SUBCUTANEOUS at 08:56

## 2024-05-29 RX ADMIN — FLUTICASONE PROPIONATE 1 SPRAY: 50 SPRAY, METERED NASAL at 15:37

## 2024-05-29 RX ADMIN — DULOXETINE HYDROCHLORIDE 60 MG: 60 CAPSULE, DELAYED RELEASE ORAL at 15:36

## 2024-05-29 RX ADMIN — ATORVASTATIN CALCIUM 10 MG: 10 TABLET, FILM COATED ORAL at 15:36

## 2024-05-29 ASSESSMENT — PAIN DESCRIPTION - LOCATION
LOCATION: BACK
LOCATION: BACK

## 2024-05-29 ASSESSMENT — PAIN SCALES - GENERAL
PAINLEVEL_OUTOF10: 0
PAINLEVEL_OUTOF10: 0
PAINLEVEL_OUTOF10: 3
PAINLEVEL_OUTOF10: 3
PAINLEVEL_OUTOF10: 0
PAINLEVEL_OUTOF10: 0

## 2024-05-29 ASSESSMENT — PAIN DESCRIPTION - DESCRIPTORS
DESCRIPTORS: ACHING;SORE
DESCRIPTORS: ACHING;TENDER;SORE

## 2024-05-29 NOTE — PROGRESS NOTES
Hospitalist Progress Note    Patient:  Magan Oleary      Unit/Bed:8B-32/032-A    YOB: 1950    MRN: [id number]       Acct: [id number]     PCP: Óscar Baird APRN - CNP    Date of Admission: 5/28/2024    Assessment/Plan:    Community Acquired Pneumonia:              -Continue Rocephin and azithromycin              -Wean supplemental O2 to home O2 levels as able              -Pulmonary hygiene, RT eval and treat              -5 day course of prednisone initiated     Multiple subacute right sided rib fx's (4 through 6) 2/2 ground level fall:              -Patient reports pain is improved              -Oral analgesics as needed, topical lidocaine patches as needed     Chronic hypoxic respiratory failure 2/2 COPD:              -Wean supplemental O2 to home O2 levels as able              -Pulmonary hygiene, RT eval and treat              -Consider steroids as above        Anxiety:              -Continue home medications once reconciled     HTN:   -Continue home metoprolol    Hypothyroidism:              -Continue home Synthroid      LDA: []CVC / []PICC / []Midline / []Han / []Drains / []Mediport / []None  Antibiotics: Yes  Steroids: No  Labs (still needed?): []Yes / [x]No  IVF (still needed?): []Yes / [x]No    Level of care: []Step Down / [x]Med-Surg  Bed Status: []Inpatient / [x]Observation  Telemetry: []Yes / [x]No  PT/OT: []Yes / [x]No    DVT Prophylaxis: [x] Lovenox / [] Heparin / [] SCDs / [] Already on Systemic Anticoagulation / [] None     Disposition:    [x] Home       [] TCU       [] Rehab       [] Psych       [] SNF       [] Long Term Care Facility       [] Other-    Chief Complaint: Fatigue, Wheezing      Subjective: 73 y.o. female admitted to the hospitalist service for pneumonia.  Patient denies chest pain.  She reports coughing.  She denies nausea or vomiting.      Medications:    Infusion Medications    sodium chloride      sodium chloride 75 mL/hr at 05/29/24 0141     Scheduled  right rib fractures at various    stages   of healing.      Final   1. No pulmonary embolism.   2. Left lower lobe pneumonia.   3. Prominent mediastinal and left hilar lymph nodes, likely reactive. Metastatic   disease is considered less likely but not excluded.   4. Chronic lung disease.      XR CHEST PORTABLE   Final Result      No acute intrathoracic process.               **This report has been created using voice recognition software. It may contain   minor errors which are inherent in voice recognition technology.**                Diet: ADULT DIET; Regular      Code Status: Full Code      Electronically signed by Moody Lucas PA-C on 5/29/2024 at 9:33 AM

## 2024-05-29 NOTE — CARE COORDINATION
DISCHARGE PLANNING EVALUATION  5/29/24, 3:14 PM EDT    Reason for Referral: Current with [facility]   Decision Maker: Independent   Current Services: [facility] for RN and PT services  New Services Requested: None  Family/ Social/ Home environment: Lives at home alone with supportive friends and family nearby.  Payment Source:Medicare  Transportation at Discharge: Daughter  Post-acute (PAC) provider list was provided to patient. Patient was informed of their freedom to choose PAC provider. Discussed and offered to show the patient the relevant PAC Providers quality and resource use measures on Medicare Compare web site via computer based on patient's goals of care and treatment preferences. Questions regarding selection process were answered.      Teach Back Method used with Magan regarding care plan and discharge planning  Patient verbalized understanding of the plan of care and contribute to goal setting.       Patient preferences and discharge plan: Return to home alone with support from family and current [facility] for RN and PT services. Left a message for [facility] to confirm services.     Electronically signed by DL Kowalski on 5/29/2024 at 3:14 PM

## 2024-05-29 NOTE — PLAN OF CARE
Problem: Discharge Planning  Goal: Discharge to home or other facility with appropriate resources  5/29/2024 1514 by Carolyn Olivia LSW  Outcome: Progressing       Consult received. Please see SW note dated 5/29.

## 2024-05-29 NOTE — CARE COORDINATION
Case Management Assessment Initial Evaluation    Date/Time of Evaluation: 2024 12:03 PM  Assessment Completed by: Kacey Emanuel RN    If patient is discharged prior to next notation, then this note serves as note for discharge by case management.    Patient Name: Magan Oleary                   YOB: 1950  Diagnosis: Community acquired bacterial pneumonia [J15.9]  Pneumonia of left lower lobe due to infectious organism [J18.9]  Other fatigue [R53.83]                   Date / Time: 2024 11:48 AM  Location: [address] [place] [address]     Patient Admission Status: Observation   Readmission Risk Low 0-14, Mod 15-19), High > 20: Readmission Risk Score: 18.7    Current PCP: Óscar Baird, APRN - CNP    Additional Case Management Notes: Admitted with fatigue and wheezing. Hx COPD. Dtr also reported some confusion. HH RN discovered O2 sat 70%. Also has multiple rib fx on left and was seen for this issue 2024. Imaging reveals pneumonia.     Procedures: No    Imagin24 CTA Chest  IMPRESSION:  1. No pulmonary embolism.  2. Left lower lobe pneumonia.  3. Prominent mediastinal and left hilar lymph nodes, likely reactive. Metastatic  disease is considered less likely but not excluded.  4. Chronic lung disease.    Patient Goals/Plan/Treatment Preferences: Met with Magan today. She resides alone but has family and friend support. She wears home oxygen from [facility] and has [facility] , RN and PT services. She has not been driving recently but her dtr has been providing her transportation. SW consulted for continuity of care at discharge.IVF at 75/hr.          24 1154   Service Assessment   Patient Orientation Alert and Oriented   Cognition Alert   History Provided By Patient   Primary Caregiver Self   Support Systems Children;Friends/Neighbors   Patient's Healthcare Decision Maker is: Legal Next of Kin   PCP Verified by CM Yes   Last Visit to PCP Within last 3 months   Prior Functional Level Assistance with the

## 2024-05-29 NOTE — PLAN OF CARE
Problem: Discharge Planning  Goal: Discharge to home or other facility with appropriate resources  5/29/2024 1045 by Janel Breen RN  Outcome: Progressing  Flowsheets (Taken 5/29/2024 0856)  Discharge to home or other facility with appropriate resources: Identify barriers to discharge with patient and caregiver  5/29/2024 0347 by Uzma Louis RN  Outcome: Progressing  Flowsheets  Taken 5/29/2024 0248  Discharge to home or other facility with appropriate resources: Identify barriers to discharge with patient and caregiver  Taken 5/28/2024 2008  Discharge to home or other facility with appropriate resources: Identify barriers to discharge with patient and caregiver     Problem: Pain  Goal: Verbalizes/displays adequate comfort level or baseline comfort level  5/29/2024 1045 by Janel Breen RN  Outcome: Progressing  5/29/2024 0347 by Uzma Louis RN  Outcome: Progressing  Flowsheets  Taken 5/29/2024 0300  Verbalizes/displays adequate comfort level or baseline comfort level: Encourage patient to monitor pain and request assistance  Taken 5/28/2024 2008  Verbalizes/displays adequate comfort level or baseline comfort level: Encourage patient to monitor pain and request assistance     Problem: Safety - Adult  Goal: Free from fall injury  5/29/2024 1045 by Janel Breen RN  Outcome: Progressing  5/29/2024 0347 by Uzma Louis RN  Outcome: Progressing     Problem: ABCDS Injury Assessment  Goal: Absence of physical injury  5/29/2024 1045 by Janel Breen RN  Outcome: Progressing  5/29/2024 0347 by Uzma Louis RN  Outcome: Progressing  Flowsheets (Taken 5/29/2024 0242)  Absence of Physical Injury: Implement safety measures based on patient assessment     Problem: Respiratory - Adult  Goal: Achieves optimal ventilation and oxygenation  5/29/2024 1045 by Janel Breen RN  Outcome: Progressing  Flowsheets (Taken 5/29/2024 0856)  Achieves optimal ventilation and oxygenation:   Assess  for changes in respiratory status   Oxygen supplementation based on oxygen saturation or arterial blood gases  5/29/2024 0347 by Uzma Louis, RN  Outcome: Progressing     Problem: Chronic Conditions and Co-morbidities  Goal: Patient's chronic conditions and co-morbidity symptoms are monitored and maintained or improved  Outcome: Progressing

## 2024-05-29 NOTE — PLAN OF CARE
Problem: Discharge Planning  Goal: Discharge to home or other facility with appropriate resources  Outcome: Progressing  Flowsheets  Taken 5/29/2024 0248  Discharge to home or other facility with appropriate resources: Identify barriers to discharge with patient and caregiver  Taken 5/28/2024 2008  Discharge to home or other facility with appropriate resources: Identify barriers to discharge with patient and caregiver     Problem: Pain  Goal: Verbalizes/displays adequate comfort level or baseline comfort level  Outcome: Progressing  Flowsheets  Taken 5/29/2024 0300  Verbalizes/displays adequate comfort level or baseline comfort level: Encourage patient to monitor pain and request assistance  Taken 5/28/2024 2008  Verbalizes/displays adequate comfort level or baseline comfort level: Encourage patient to monitor pain and request assistance     Problem: Safety - Adult  Goal: Free from fall injury  Outcome: Progressing     Problem: ABCDS Injury Assessment  Goal: Absence of physical injury  Outcome: Progressing  Flowsheets (Taken 5/29/2024 0242)  Absence of Physical Injury: Implement safety measures based on patient assessment     Problem: Respiratory - Adult  Goal: Achieves optimal ventilation and oxygenation  Outcome: Progressing

## 2024-05-30 LAB
DEPRECATED MEAN GLUCOSE BLD GHB EST-ACNC: 105 MG/DL (ref 70–126)
HBA1C MFR BLD HPLC: 5.5 % (ref 4.4–6.4)
LACTATE SERPL-SCNC: 1.1 MMOL/L (ref 0.5–2)

## 2024-05-30 PROCEDURE — 6360000002 HC RX W HCPCS: Performed by: PHYSICIAN ASSISTANT

## 2024-05-30 PROCEDURE — 96375 TX/PRO/DX INJ NEW DRUG ADDON: CPT

## 2024-05-30 PROCEDURE — 6370000000 HC RX 637 (ALT 250 FOR IP): Performed by: PHYSICIAN ASSISTANT

## 2024-05-30 PROCEDURE — 94640 AIRWAY INHALATION TREATMENT: CPT

## 2024-05-30 PROCEDURE — 2700000000 HC OXYGEN THERAPY PER DAY

## 2024-05-30 PROCEDURE — 99233 SBSQ HOSP IP/OBS HIGH 50: CPT | Performed by: PHYSICIAN ASSISTANT

## 2024-05-30 PROCEDURE — 94669 MECHANICAL CHEST WALL OSCILL: CPT

## 2024-05-30 PROCEDURE — 96372 THER/PROPH/DIAG INJ SC/IM: CPT

## 2024-05-30 PROCEDURE — 2580000003 HC RX 258: Performed by: PHYSICIAN ASSISTANT

## 2024-05-30 PROCEDURE — G0378 HOSPITAL OBSERVATION PER HR: HCPCS

## 2024-05-30 PROCEDURE — 83605 ASSAY OF LACTIC ACID: CPT

## 2024-05-30 PROCEDURE — 36415 COLL VENOUS BLD VENIPUNCTURE: CPT

## 2024-05-30 PROCEDURE — 96366 THER/PROPH/DIAG IV INF ADDON: CPT

## 2024-05-30 PROCEDURE — 6370000000 HC RX 637 (ALT 250 FOR IP)

## 2024-05-30 RX ORDER — LOPERAMIDE HYDROCHLORIDE 2 MG/1
2 CAPSULE ORAL 4 TIMES DAILY PRN
Status: DISCONTINUED | OUTPATIENT
Start: 2024-05-30 | End: 2024-06-01 | Stop reason: HOSPADM

## 2024-05-30 RX ORDER — IPRATROPIUM BROMIDE AND ALBUTEROL SULFATE 2.5; .5 MG/3ML; MG/3ML
1 SOLUTION RESPIRATORY (INHALATION)
Status: DISCONTINUED | OUTPATIENT
Start: 2024-05-30 | End: 2024-05-30

## 2024-05-30 RX ORDER — DIPHENHYDRAMINE HYDROCHLORIDE 50 MG/ML
25 INJECTION INTRAMUSCULAR; INTRAVENOUS EVERY 6 HOURS PRN
Status: DISCONTINUED | OUTPATIENT
Start: 2024-05-30 | End: 2024-06-01 | Stop reason: HOSPADM

## 2024-05-30 RX ORDER — LANOLIN ALCOHOL/MO/W.PET/CERES
6 CREAM (GRAM) TOPICAL NIGHTLY PRN
Status: DISCONTINUED | OUTPATIENT
Start: 2024-05-30 | End: 2024-06-01 | Stop reason: HOSPADM

## 2024-05-30 RX ORDER — IPRATROPIUM BROMIDE AND ALBUTEROL SULFATE 2.5; .5 MG/3ML; MG/3ML
1 SOLUTION RESPIRATORY (INHALATION)
Status: DISCONTINUED | OUTPATIENT
Start: 2024-05-30 | End: 2024-06-01

## 2024-05-30 RX ORDER — HYDROXYZINE HYDROCHLORIDE 25 MG/1
50 TABLET, FILM COATED ORAL ONCE
Status: COMPLETED | OUTPATIENT
Start: 2024-05-30 | End: 2024-05-30

## 2024-05-30 RX ADMIN — Medication 6 MG: at 20:49

## 2024-05-30 RX ADMIN — DIPHENHYDRAMINE HYDROCHLORIDE 25 MG: 50 INJECTION INTRAMUSCULAR; INTRAVENOUS at 20:49

## 2024-05-30 RX ADMIN — METOPROLOL SUCCINATE 25 MG: 25 TABLET, FILM COATED, EXTENDED RELEASE ORAL at 08:26

## 2024-05-30 RX ADMIN — LEVOTHYROXINE SODIUM 50 MCG: 0.05 TABLET ORAL at 08:26

## 2024-05-30 RX ADMIN — FLUTICASONE PROPIONATE 1 SPRAY: 50 SPRAY, METERED NASAL at 08:28

## 2024-05-30 RX ADMIN — ATORVASTATIN CALCIUM 10 MG: 10 TABLET, FILM COATED ORAL at 08:26

## 2024-05-30 RX ADMIN — IPRATROPIUM BROMIDE AND ALBUTEROL SULFATE 1 DOSE: .5; 3 SOLUTION RESPIRATORY (INHALATION) at 12:12

## 2024-05-30 RX ADMIN — IPRATROPIUM BROMIDE AND ALBUTEROL SULFATE 1 DOSE: .5; 3 SOLUTION RESPIRATORY (INHALATION) at 15:49

## 2024-05-30 RX ADMIN — ATOMOXETINE 40 MG: 40 CAPSULE ORAL at 08:28

## 2024-05-30 RX ADMIN — VALACYCLOVIR HYDROCHLORIDE 500 MG: 500 TABLET, FILM COATED ORAL at 08:26

## 2024-05-30 RX ADMIN — HYDROXYZINE HYDROCHLORIDE 50 MG: 25 TABLET, FILM COATED ORAL at 22:26

## 2024-05-30 RX ADMIN — DONEPEZIL HYDROCHLORIDE 10 MG: 10 TABLET, FILM COATED ORAL at 16:37

## 2024-05-30 RX ADMIN — ENOXAPARIN SODIUM 40 MG: 100 INJECTION SUBCUTANEOUS at 10:00

## 2024-05-30 RX ADMIN — GUAIFENESIN AND DEXTROMETHORPHAN 5 ML: 100; 10 SYRUP ORAL at 22:26

## 2024-05-30 RX ADMIN — BENZONATATE 200 MG: 100 CAPSULE ORAL at 06:38

## 2024-05-30 RX ADMIN — IPRATROPIUM BROMIDE AND ALBUTEROL SULFATE 1 DOSE: .5; 3 SOLUTION RESPIRATORY (INHALATION) at 22:14

## 2024-05-30 RX ADMIN — CEFTRIAXONE SODIUM 1000 MG: 1 INJECTION, POWDER, FOR SOLUTION INTRAMUSCULAR; INTRAVENOUS at 15:12

## 2024-05-30 RX ADMIN — AZITHROMYCIN MONOHYDRATE 500 MG: 500 INJECTION, POWDER, LYOPHILIZED, FOR SOLUTION INTRAVENOUS at 17:11

## 2024-05-30 RX ADMIN — PREDNISONE 40 MG: 20 TABLET ORAL at 08:26

## 2024-05-30 RX ADMIN — DULOXETINE HYDROCHLORIDE 60 MG: 60 CAPSULE, DELAYED RELEASE ORAL at 08:26

## 2024-05-30 RX ADMIN — LOPERAMIDE HYDROCHLORIDE 2 MG: 2 CAPSULE ORAL at 16:37

## 2024-05-30 RX ADMIN — TROSPIUM CHLORIDE 20 MG: 20 TABLET, FILM COATED ORAL at 06:38

## 2024-05-30 RX ADMIN — TROSPIUM CHLORIDE 20 MG: 20 TABLET, FILM COATED ORAL at 15:13

## 2024-05-30 ASSESSMENT — PAIN SCALES - GENERAL: PAINLEVEL_OUTOF10: 0

## 2024-05-30 NOTE — CARE COORDINATION
5/30/24, 1:36 PM EDT    DISCHARGE ON GOING EVALUATION    Magan HOOD [facility] day: 0  Location: [facility]32/032-A Reason for admit: Community acquired bacterial pneumonia [J15.9]  Pneumonia of left lower lobe due to infectious organism [J18.9]  Other fatigue [R53.83]     Procedures: No    Imaging since last note: 5-28-24 CTA Chest  IMPRESSION:  1. No pulmonary embolism.  2. Left lower lobe pneumonia.  3. Prominent mediastinal and left hilar lymph nodes, likely reactive. Metastatic  disease is considered less likely but not excluded.  4. Chronic lung disease.    Barriers to Discharge: Cont Zithromax and Rocephin. IVF at 75/hr. PO prednisone. Afebrile. O2 currently at her baseline of 4L.     PCP: Óscar Baird, SARITHA - CNP  Readmission Risk Score: 18.7    Patient Goals/Plan/Treatment Preferences: SW following. Return to home alone with support from family and current [facility] for RN and PT services.

## 2024-05-30 NOTE — PROGRESS NOTES
Hospitalist Progress Note    Patient:  Magan Oleary      Unit/Bed:8B-32/032-A    YOB: 1950    MRN: [id number]       Acct: [id number]     PCP: Óscar Baird APRN - CNP    Date of Admission: 5/28/2024    Assessment/Plan:    Community Acquired Pneumonia:              -Continue Rocephin and azithromycin              -Wean supplemental O2 to home O2 levels as able              -Pulmonary hygiene, RT eval and treat   -Duonebs q 4 hours while awake              -5 day course of prednisone initiated     Multiple subacute right sided rib fx's (4 through 6) 2/2 ground level fall:              -Patient reports pain is improved              -Oral analgesics as needed, topical lidocaine patches as needed     Chronic hypoxic respiratory failure 2/2 COPD:              -Wean supplemental O2 to home O2 levels as able              -Pulmonary hygiene   -Duonebs q 4 hours while awake, RT protocol             -Steroids initiated      Anxiety:              -Continue home medications once reconciled     Hyperglycemia:   -Hgb A1C ordered    HTN:   -Continue home metoprolol    Hypothyroidism:              -Continue home Synthroid      LDA: []CVC / []PICC / []Midline / []Han / []Drains / []Mediport / []None  Antibiotics: Yes  Steroids: No  Labs (still needed?): []Yes / [x]No  IVF (still needed?): []Yes / [x]No    Level of care: []Step Down / [x]Med-Surg  Bed Status: []Inpatient / [x]Observation  Telemetry: []Yes / [x]No  PT/OT: []Yes / [x]No    DVT Prophylaxis: [x] Lovenox / [] Heparin / [] SCDs / [] Already on Systemic Anticoagulation / [] None     Disposition:    [x] Home       [] TCU       [] Rehab       [] Psych       [] SNF       [] Long Term Care Facility       [] Other-    Chief Complaint: Fatigue, Wheezing      Subjective: 73 y.o. female admitted to the hospitalist service for pneumonia.  Patient denies chest pain.  She denies vomiting. She endorses coughing still.      Medications:    Infusion Medications

## 2024-05-30 NOTE — RT PROTOCOL NOTE
RT Inhaler-Nebulizer Bronchodilator Protocol Note    There is a bronchodilator order in the chart from a provider indicating to follow the RT Bronchodilator Protocol and there is an “Initiate RT Inhaler-Nebulizer Bronchodilator Protocol” order as well (see protocol at bottom of note).    CXR Findings:  XR CHEST PORTABLE    Result Date: 5/28/2024  No acute intrathoracic process. **This report has been created using voice recognition software. It may contain minor errors which are inherent in voice recognition technology.**       The findings from the last RT Protocol Assessment were as follows:   History Pulmonary Disease: Chronic pulmonary disease  Respiratory Pattern: Mild dyspnea at rest, irregular pattern, or RR 21-25 bpm  Breath Sounds: Inspiratory and expiratory or bilateral wheezing and/or rhonchi  Cough: Strong, productive  Indication for Bronchodilator Therapy: Decreased or absent breath sounds, On home bronchodilators (prn at home and uses when needed)  Bronchodilator Assessment Score: 13    Aerosolized bronchodilator medication orders have been revised according to the RT Inhaler-Nebulizer Bronchodilator Protocol below.    Respiratory Therapist to perform RT Therapy Protocol Assessment initially then follow the protocol.  Repeat RT Therapy Protocol Assessment PRN for score 0-3 or on second treatment, BID, and PRN for scores above 3.    No Indications - adjust the frequency to every 6 hours PRN wheezing or bronchospasm, if no treatments needed after 48 hours then discontinue using Per Protocol order mode.     If indication present, adjust the RT bronchodilator orders based on the Bronchodilator Assessment Score as indicated below.  Use Inhaler orders unless patient has one or more of the following: on home nebulizer, not able to hold breath for 10 seconds, is not alert and oriented, cannot activate and use MDI correctly, or respiratory rate 25 breaths per minute or more, then use the equivalent nebulizer

## 2024-05-30 NOTE — PLAN OF CARE
Problem: Discharge Planning  Goal: Discharge to home or other facility with appropriate resources  5/30/2024 0508 by Uzma Louis, RN  Outcome: Progressing  Flowsheets (Taken 5/29/2024 2110)  Discharge to home or other facility with appropriate resources: Identify barriers to discharge with patient and caregiver  5/29/2024 1514 by Carolyn Olivia LSW  Outcome: Progressing     Problem: Pain  Goal: Verbalizes/displays adequate comfort level or baseline comfort level  Outcome: Progressing  Flowsheets (Taken 5/29/2024 2110)  Verbalizes/displays adequate comfort level or baseline comfort level: Encourage patient to monitor pain and request assistance     Problem: Safety - Adult  Goal: Free from fall injury  Outcome: Progressing  Flowsheets (Taken 5/29/2024 2110)  Free From Fall Injury: Instruct family/caregiver on patient safety     Problem: ABCDS Injury Assessment  Goal: Absence of physical injury  Outcome: Progressing  Flowsheets (Taken 5/29/2024 2110)  Absence of Physical Injury: Implement safety measures based on patient assessment     Problem: Respiratory - Adult  Goal: Achieves optimal ventilation and oxygenation  Outcome: Progressing  Flowsheets (Taken 5/29/2024 2110)  Achieves optimal ventilation and oxygenation: Assess for changes in respiratory status     Problem: Chronic Conditions and Co-morbidities  Goal: Patient's chronic conditions and co-morbidity symptoms are monitored and maintained or improved  Outcome: Progressing  Flowsheets (Taken 5/29/2024 2110)  Care Plan - Patient's Chronic Conditions and Co-Morbidity Symptoms are Monitored and Maintained or Improved: Monitor and assess patient's chronic conditions and comorbid symptoms for stability, deterioration, or improvement

## 2024-05-31 LAB
ANION GAP SERPL CALC-SCNC: 9 MEQ/L (ref 8–16)
BASOPHILS ABSOLUTE: 0 THOU/MM3 (ref 0–0.1)
BASOPHILS NFR BLD AUTO: 0.2 %
BUN SERPL-MCNC: 10 MG/DL (ref 7–22)
CALCIUM SERPL-MCNC: 8.7 MG/DL (ref 8.5–10.5)
CHLORIDE SERPL-SCNC: 107 MEQ/L (ref 98–111)
CO2 SERPL-SCNC: 28 MEQ/L (ref 23–33)
CREAT SERPL-MCNC: 0.8 MG/DL (ref 0.4–1.2)
DEPRECATED RDW RBC AUTO: 45.6 FL (ref 35–45)
EOSINOPHIL NFR BLD AUTO: 0.1 %
EOSINOPHILS ABSOLUTE: 0 THOU/MM3 (ref 0–0.4)
ERYTHROCYTE [DISTWIDTH] IN BLOOD BY AUTOMATED COUNT: 13.5 % (ref 11.5–14.5)
GFR SERPL CREATININE-BSD FRML MDRD: 77 ML/MIN/1.73M2
GLUCOSE SERPL-MCNC: 81 MG/DL (ref 70–108)
HCT VFR BLD AUTO: 38 % (ref 37–47)
HGB BLD-MCNC: 11.8 GM/DL (ref 12–16)
IMM GRANULOCYTES # BLD AUTO: 0.12 THOU/MM3 (ref 0–0.07)
IMM GRANULOCYTES NFR BLD AUTO: 0.8 %
LYMPHOCYTES ABSOLUTE: 2.5 THOU/MM3 (ref 1–4.8)
LYMPHOCYTES NFR BLD AUTO: 17.6 %
MCH RBC QN AUTO: 28.4 PG (ref 26–33)
MCHC RBC AUTO-ENTMCNC: 31.1 GM/DL (ref 32.2–35.5)
MCV RBC AUTO: 91.6 FL (ref 81–99)
MONOCYTES ABSOLUTE: 1.4 THOU/MM3 (ref 0.4–1.3)
MONOCYTES NFR BLD AUTO: 9.9 %
NEUTROPHILS ABSOLUTE: 10.1 THOU/MM3 (ref 1.8–7.7)
NEUTROPHILS NFR BLD AUTO: 71.4 %
NRBC BLD AUTO-RTO: 0 /100 WBC
PLATELET # BLD AUTO: 378 THOU/MM3 (ref 130–400)
PMV BLD AUTO: 11.4 FL (ref 9.4–12.4)
POTASSIUM SERPL-SCNC: 4.2 MEQ/L (ref 3.5–5.2)
RBC # BLD AUTO: 4.15 MILL/MM3 (ref 4.2–5.4)
SODIUM SERPL-SCNC: 144 MEQ/L (ref 135–145)
WBC # BLD AUTO: 14.2 THOU/MM3 (ref 4.8–10.8)

## 2024-05-31 PROCEDURE — 85025 COMPLETE CBC W/AUTO DIFF WBC: CPT

## 2024-05-31 PROCEDURE — 80048 BASIC METABOLIC PNL TOTAL CA: CPT

## 2024-05-31 PROCEDURE — 6360000002 HC RX W HCPCS: Performed by: PHYSICIAN ASSISTANT

## 2024-05-31 PROCEDURE — 2700000000 HC OXYGEN THERAPY PER DAY

## 2024-05-31 PROCEDURE — 2580000003 HC RX 258: Performed by: PHYSICIAN ASSISTANT

## 2024-05-31 PROCEDURE — 94669 MECHANICAL CHEST WALL OSCILL: CPT

## 2024-05-31 PROCEDURE — 6370000000 HC RX 637 (ALT 250 FOR IP): Performed by: PHYSICIAN ASSISTANT

## 2024-05-31 PROCEDURE — 96372 THER/PROPH/DIAG INJ SC/IM: CPT

## 2024-05-31 PROCEDURE — 36415 COLL VENOUS BLD VENIPUNCTURE: CPT

## 2024-05-31 PROCEDURE — 94640 AIRWAY INHALATION TREATMENT: CPT

## 2024-05-31 PROCEDURE — 99232 SBSQ HOSP IP/OBS MODERATE 35: CPT | Performed by: PHYSICIAN ASSISTANT

## 2024-05-31 PROCEDURE — 1200000000 HC SEMI PRIVATE

## 2024-05-31 PROCEDURE — 94761 N-INVAS EAR/PLS OXIMETRY MLT: CPT

## 2024-05-31 RX ORDER — BENZONATATE 200 MG/1
200 CAPSULE ORAL 3 TIMES DAILY PRN
Qty: 30 CAPSULE | Refills: 0 | Status: SHIPPED | OUTPATIENT
Start: 2024-05-31 | End: 2024-06-10

## 2024-05-31 RX ORDER — HYDROXYZINE HYDROCHLORIDE 25 MG/1
25 TABLET, FILM COATED ORAL EVERY 8 HOURS PRN
Qty: 30 TABLET | Refills: 1 | Status: SHIPPED | OUTPATIENT
Start: 2024-05-31 | End: 2024-06-11

## 2024-05-31 RX ORDER — PREDNISONE 20 MG/1
40 TABLET ORAL DAILY
Qty: 6 TABLET | Refills: 0 | Status: SHIPPED | OUTPATIENT
Start: 2024-05-31 | End: 2024-06-03

## 2024-05-31 RX ORDER — AMOXICILLIN AND CLAVULANATE POTASSIUM 875; 125 MG/1; MG/1
1 TABLET, FILM COATED ORAL 2 TIMES DAILY
Qty: 14 TABLET | Refills: 0 | Status: SHIPPED | OUTPATIENT
Start: 2024-05-31 | End: 2024-06-07

## 2024-05-31 RX ADMIN — PREDNISONE 40 MG: 20 TABLET ORAL at 16:29

## 2024-05-31 RX ADMIN — BENZONATATE 200 MG: 100 CAPSULE ORAL at 08:17

## 2024-05-31 RX ADMIN — TROSPIUM CHLORIDE 20 MG: 20 TABLET, FILM COATED ORAL at 05:38

## 2024-05-31 RX ADMIN — ATORVASTATIN CALCIUM 10 MG: 10 TABLET, FILM COATED ORAL at 16:34

## 2024-05-31 RX ADMIN — METOPROLOL SUCCINATE 25 MG: 25 TABLET, FILM COATED, EXTENDED RELEASE ORAL at 16:29

## 2024-05-31 RX ADMIN — TROSPIUM CHLORIDE 20 MG: 20 TABLET, FILM COATED ORAL at 18:55

## 2024-05-31 RX ADMIN — IPRATROPIUM BROMIDE AND ALBUTEROL SULFATE 1 DOSE: .5; 3 SOLUTION RESPIRATORY (INHALATION) at 09:09

## 2024-05-31 RX ADMIN — SODIUM CHLORIDE, PRESERVATIVE FREE 10 ML: 5 INJECTION INTRAVENOUS at 20:26

## 2024-05-31 RX ADMIN — ENOXAPARIN SODIUM 40 MG: 100 INJECTION SUBCUTANEOUS at 08:15

## 2024-05-31 RX ADMIN — AZITHROMYCIN MONOHYDRATE 500 MG: 500 INJECTION, POWDER, LYOPHILIZED, FOR SOLUTION INTRAVENOUS at 17:52

## 2024-05-31 RX ADMIN — GUAIFENESIN AND DEXTROMETHORPHAN 5 ML: 100; 10 SYRUP ORAL at 08:16

## 2024-05-31 RX ADMIN — IPRATROPIUM BROMIDE AND ALBUTEROL SULFATE 1 DOSE: .5; 3 SOLUTION RESPIRATORY (INHALATION) at 20:44

## 2024-05-31 RX ADMIN — Medication 6 MG: at 20:29

## 2024-05-31 RX ADMIN — CEFTRIAXONE SODIUM 1000 MG: 1 INJECTION, POWDER, FOR SOLUTION INTRAMUSCULAR; INTRAVENOUS at 16:38

## 2024-05-31 RX ADMIN — DONEPEZIL HYDROCHLORIDE 10 MG: 10 TABLET, FILM COATED ORAL at 17:48

## 2024-05-31 RX ADMIN — DIPHENHYDRAMINE HYDROCHLORIDE 25 MG: 50 INJECTION INTRAMUSCULAR; INTRAVENOUS at 20:29

## 2024-05-31 RX ADMIN — SODIUM CHLORIDE, PRESERVATIVE FREE 10 ML: 5 INJECTION INTRAVENOUS at 08:16

## 2024-05-31 RX ADMIN — IPRATROPIUM BROMIDE AND ALBUTEROL SULFATE 1 DOSE: .5; 3 SOLUTION RESPIRATORY (INHALATION) at 16:16

## 2024-05-31 RX ADMIN — ACETAMINOPHEN 650 MG: 325 TABLET ORAL at 16:28

## 2024-05-31 RX ADMIN — GUAIFENESIN AND DEXTROMETHORPHAN 5 ML: 100; 10 SYRUP ORAL at 20:29

## 2024-05-31 RX ADMIN — BENZONATATE 200 MG: 100 CAPSULE ORAL at 20:29

## 2024-05-31 RX ADMIN — ATOMOXETINE 40 MG: 40 CAPSULE ORAL at 08:17

## 2024-05-31 ASSESSMENT — PAIN SCALES - GENERAL: PAINLEVEL_OUTOF10: 0

## 2024-05-31 NOTE — CARE COORDINATION
5/31/24, 10:42 AM EDT    DISCHARGE ON GOING EVALUATION    Magan HOOD [facility] day: 0  Location: -32/032-A Reason for admit: Community acquired bacterial pneumonia [J15.9]  Pneumonia of left lower lobe due to infectious organism [J18.9]  Other fatigue [R53.83]     Procedures: none    Imaging since last note: none    Barriers to Discharge: IV Rocephin and IV Zithromax. IVF. Tachycardic 103-106 this morning, temp 100. O2 is at baseline, 4L.     PCP: Óscar Baird, APRN - CNP  Readmission Risk Score: 18.7    Patient Goals/Plan/Treatment Preferences: Return to home alone with support from family and current [facility] for RN and PT services.     IMM delivered and signed. Patient does state if she is discharged today she plans to appeal. Primary RN contacted Jesse Lucas PA-C to meet with Magan to discuss POC. CM provided instruction on how to appeal should she feel she needs to. Denies further needs.

## 2024-05-31 NOTE — RT PROTOCOL NOTE
RT Inhaler-Nebulizer Bronchodilator Protocol Note    There is a bronchodilator order in the chart from a provider indicating to follow the RT Bronchodilator Protocol and there is an “Initiate RT Inhaler-Nebulizer Bronchodilator Protocol” order as well (see protocol at bottom of note).    CXR Findings:  No results found.    The findings from the last RT Protocol Assessment were as follows:   History Pulmonary Disease: Chronic pulmonary disease  Respiratory Pattern: Mild dyspnea at rest, irregular pattern, or RR 21-25 bpm  Breath Sounds: Intermittent or unilateral wheezes  Cough: Strong, productive  Indication for Bronchodilator Therapy: Decreased or absent breath sounds, On home bronchodilators  Bronchodilator Assessment Score: 11    Aerosolized bronchodilator medication orders have been revised according to the RT Inhaler-Nebulizer Bronchodilator Protocol below.    Respiratory Therapist to perform RT Therapy Protocol Assessment initially then follow the protocol.  Repeat RT Therapy Protocol Assessment PRN for score 0-3 or on second treatment, BID, and PRN for scores above 3.    No Indications - adjust the frequency to every 6 hours PRN wheezing or bronchospasm, if no treatments needed after 48 hours then discontinue using Per Protocol order mode.     If indication present, adjust the RT bronchodilator orders based on the Bronchodilator Assessment Score as indicated below.  Use Inhaler orders unless patient has one or more of the following: on home nebulizer, not able to hold breath for 10 seconds, is not alert and oriented, cannot activate and use MDI correctly, or respiratory rate 25 breaths per minute or more, then use the equivalent nebulizer order(s) with same Frequency and PRN reasons based on the score.  If a patient is on this medication at home then do not decrease Frequency below that used at home.    0-3 - enter or revise RT bronchodilator order(s) to equivalent RT Bronchodilator order with Frequency of

## 2024-05-31 NOTE — PLAN OF CARE
Problem: Discharge Planning  Goal: Discharge to home or other facility with appropriate resources  Outcome: Progressing     Problem: Pain  Goal: Verbalizes/displays adequate comfort level or baseline comfort level  Outcome: Progressing     Problem: Safety - Adult  Goal: Free from fall injury  Outcome: Progressing     Problem: ABCDS Injury Assessment  Goal: Absence of physical injury  Outcome: Progressing     Problem: Respiratory - Adult  Goal: Achieves optimal ventilation and oxygenation  Outcome: Progressing  Goal: Clear lung sounds  5/30/2024 1214 by Jessie Baer, VIDA  Outcome: Progressing  Note: Patient agrees to goals     Problem: Chronic Conditions and Co-morbidities  Goal: Patient's chronic conditions and co-morbidity symptoms are monitored and maintained or improved  Outcome: Progressing   Care plan reviewed with patient.  Patient verbalizes understanding of the plan of care and contribute to goal setting.

## 2024-05-31 NOTE — PROGRESS NOTES
Hospitalist Progress Note    Patient:  Magan Oleary      Unit/Bed:8B-32/032-A    YOB: 1950    MRN: [id number]       Acct: [id number]     PCP: Óscar Baird APRN - CNP    Date of Admission: 5/28/2024    Assessment/Plan:    Community Acquired Pneumonia:              -Continue Rocephin and azithromycin              -Wean supplemental O2 to home O2 levels as able              -Pulmonary hygiene, RT eval and treat   -Duonebs q 4 hours while awake              -5 day course of prednisone initiated     Multiple subacute right sided rib fx's (4 through 6) 2/2 ground level fall:              -Patient reports pain is improved              -Oral analgesics as needed, topical lidocaine patches as needed     Chronic hypoxic respiratory failure 2/2 COPD:              -Wean supplemental O2 to home O2 levels as able              -Pulmonary hygiene   -Duonebs q 4 hours while awake, RT protocol             -Steroids initiated      Anxiety:              -Continue home medications once reconciled     Hyperglycemia:   -Hgb A1C ordered    HTN:   -Continue home metoprolol    Hypothyroidism:              -Continue home Synthroid      LDA: []CVC / []PICC / []Midline / []Han / []Drains / []Mediport / []None  Antibiotics: Yes  Steroids: No  Labs (still needed?): []Yes / [x]No  IVF (still needed?): []Yes / [x]No    Level of care: []Step Down / [x]Med-Surg  Bed Status: []Inpatient / [x]Observation  Telemetry: []Yes / [x]No  PT/OT: []Yes / [x]No    DVT Prophylaxis: [x] Lovenox / [] Heparin / [] SCDs / [] Already on Systemic Anticoagulation / [] None     Disposition:    [x] Home       [] TCU       [] Rehab       [] Psych       [] SNF       [] Long Term Care Facility       [] Other-    Chief Complaint: Fatigue, Wheezing      Subjective: 73 y.o. female admitted to the hospitalist service for pneumonia.  Patient stable for discharge home, however she feels that she is not ready.  She reports some lightheadedness.  Patient

## 2024-06-01 VITALS
OXYGEN SATURATION: 96 % | BODY MASS INDEX: 23.39 KG/M2 | WEIGHT: 132 LBS | SYSTOLIC BLOOD PRESSURE: 117 MMHG | RESPIRATION RATE: 22 BRPM | DIASTOLIC BLOOD PRESSURE: 72 MMHG | HEIGHT: 63 IN | TEMPERATURE: 98.3 F | HEART RATE: 110 BPM

## 2024-06-01 PROCEDURE — 6370000000 HC RX 637 (ALT 250 FOR IP): Performed by: PHYSICIAN ASSISTANT

## 2024-06-01 PROCEDURE — 94640 AIRWAY INHALATION TREATMENT: CPT

## 2024-06-01 PROCEDURE — 2580000003 HC RX 258: Performed by: PHYSICIAN ASSISTANT

## 2024-06-01 PROCEDURE — 94669 MECHANICAL CHEST WALL OSCILL: CPT

## 2024-06-01 PROCEDURE — 2700000000 HC OXYGEN THERAPY PER DAY

## 2024-06-01 PROCEDURE — 6360000002 HC RX W HCPCS: Performed by: PHYSICIAN ASSISTANT

## 2024-06-01 RX ORDER — IPRATROPIUM BROMIDE AND ALBUTEROL SULFATE 2.5; .5 MG/3ML; MG/3ML
1 SOLUTION RESPIRATORY (INHALATION)
Status: DISCONTINUED | OUTPATIENT
Start: 2024-06-01 | End: 2024-06-01 | Stop reason: HOSPADM

## 2024-06-01 RX ORDER — 0.9 % SODIUM CHLORIDE 0.9 %
500 INTRAVENOUS SOLUTION INTRAVENOUS ONCE
Status: COMPLETED | OUTPATIENT
Start: 2024-06-01 | End: 2024-06-01

## 2024-06-01 RX ORDER — AMOXICILLIN AND CLAVULANATE POTASSIUM 875; 125 MG/1; MG/1
1 TABLET, FILM COATED ORAL EVERY 12 HOURS SCHEDULED
Status: DISCONTINUED | OUTPATIENT
Start: 2024-06-01 | End: 2024-06-01 | Stop reason: HOSPADM

## 2024-06-01 RX ADMIN — IPRATROPIUM BROMIDE AND ALBUTEROL SULFATE 1 DOSE: .5; 3 SOLUTION RESPIRATORY (INHALATION) at 08:04

## 2024-06-01 RX ADMIN — FLUTICASONE PROPIONATE 1 SPRAY: 50 SPRAY, METERED NASAL at 09:00

## 2024-06-01 RX ADMIN — DULOXETINE HYDROCHLORIDE 60 MG: 60 CAPSULE, DELAYED RELEASE ORAL at 09:00

## 2024-06-01 RX ADMIN — SODIUM CHLORIDE, PRESERVATIVE FREE 10 ML: 5 INJECTION INTRAVENOUS at 09:00

## 2024-06-01 RX ADMIN — IPRATROPIUM BROMIDE AND ALBUTEROL SULFATE 1 DOSE: .5; 3 SOLUTION RESPIRATORY (INHALATION) at 12:15

## 2024-06-01 RX ADMIN — PREDNISONE 40 MG: 20 TABLET ORAL at 09:00

## 2024-06-01 RX ADMIN — ENOXAPARIN SODIUM 40 MG: 100 INJECTION SUBCUTANEOUS at 09:04

## 2024-06-01 RX ADMIN — METOPROLOL SUCCINATE 25 MG: 25 TABLET, FILM COATED, EXTENDED RELEASE ORAL at 09:00

## 2024-06-01 RX ADMIN — AMOXICILLIN AND CLAVULANATE POTASSIUM 1 TABLET: 875; 125 TABLET, FILM COATED ORAL at 11:36

## 2024-06-01 RX ADMIN — TROSPIUM CHLORIDE 20 MG: 20 TABLET, FILM COATED ORAL at 06:12

## 2024-06-01 RX ADMIN — SODIUM CHLORIDE 500 ML: 9 INJECTION, SOLUTION INTRAVENOUS at 10:27

## 2024-06-01 RX ADMIN — LEVOTHYROXINE SODIUM 50 MCG: 0.05 TABLET ORAL at 05:00

## 2024-06-01 RX ADMIN — ATOMOXETINE 40 MG: 40 CAPSULE ORAL at 09:00

## 2024-06-01 RX ADMIN — VALACYCLOVIR HYDROCHLORIDE 500 MG: 500 TABLET, FILM COATED ORAL at 09:00

## 2024-06-01 RX ADMIN — ATORVASTATIN CALCIUM 10 MG: 10 TABLET, FILM COATED ORAL at 09:00

## 2024-06-01 ASSESSMENT — PAIN SCALES - GENERAL: PAINLEVEL_OUTOF10: 0

## 2024-06-01 NOTE — RT PROTOCOL NOTE
RT Nebulizer Bronchodilator Protocol Note    There is a bronchodilator order in the chart from a provider indicating to follow the RT Bronchodilator Protocol and there is an “Initiate RT Bronchodilator Protocol” order as well (see protocol at bottom of note).    CXR Findings:  No results found.    The findings from the last RT Protocol Assessment were as follows:  Smoking: Chronic pulmonary disease  Respiratory Pattern: Regular pattern and RR 12-20 bpm  Breath Sounds: Slightly diminished and/or crackles  Cough: Strong, productive  Indication for Bronchodilator Therapy: Decreased or absent breath sounds  Bronchodilator Assessment Score: 5    Aerosolized bronchodilator medication orders have been revised according to the RT Nebulizer Bronchodilator Protocol below.    Respiratory Therapist to perform RT Therapy Protocol Assessment initially then follow the protocol.  Repeat RT Therapy Protocol Assessment PRN for score 0-3 or on second treatment, BID, and PRN for scores above 3.    No Indications - adjust the frequency to every 6 hours PRN wheezing or bronchospasm, if no treatments needed after 48 hours then discontinue using Per Protocol order mode.     If indication present, adjust the RT bronchodilator orders based on the Bronchodilator Assessment Score as indicated below.  If a patient is on this medication at home then do not decrease Frequency below that used at home.    0-3 - enter or revise RT bronchodilator order(s) to equivalent RT Bronchodilator order with Frequency of every 4 hours PRN for wheezing or increased work of breathing using Per Protocol order mode.       4-6 - enter or revise RT Bronchodilator order(s) to two equivalent RT bronchodilator orders with one order with BID Frequency and one order with Frequency of every 4 hours PRN wheezing or increased work of breathing using Per Protocol order mode.         7-10 - enter or revise RT Bronchodilator order(s) to two equivalent RT bronchodilator orders  with one order with TID Frequency and one order with Frequency of every 4 hours PRN wheezing or increased work of breathing using Per Protocol order mode.       11-13 - enter or revise RT Bronchodilator order(s) to one equivalent RT bronchodilator order with QID Frequency and an Albuterol order with Frequency of every 4 hours PRN wheezing or increased work of breathing using Per Protocol order mode.      Greater than 13 - enter or revise RT Bronchodilator order(s) to one equivalent RT bronchodilator order with every 4 hours Frequency and an Albuterol order with Frequency of every 2 hours PRN wheezing or increased work of breathing using Per Protocol order mode.     RT to enter RT Home Evaluation for COPD & MDI Assessment order using Per Protocol order mode.    Electronically signed by Alina Johns RCP on 6/1/2024 at 8:16 AM

## 2024-06-01 NOTE — DISCHARGE SUMMARY
Hospital Medicine Discharge Summary      Patient Identification:   Magan Oleary   : 1950  MRN: [id number]   Account: [id number]      Patient's PCP: Óscar Baird APRN - CNP    Admit Date: 2024     Discharge Date: 24    Admitting Physician: No admitting provider for patient encounter.     Discharge Physician: Moody Lucas PA-C     Magan Oleary is a 73 y.o. female admitted to [facility] on 2024.      HPI On Admission From H&P:    ***    Assessment/Plan With Discharge Diagnoses:        Exam:     Vitals:  Vitals:    24 0359 24 0845 24 1138   BP: 129/62 133/75 (!) 150/83 117/72   Pulse: (!) 104 86 (!) 109 (!) 110   Resp:    Temp: 97.7 °F (36.5 °C) 98.1 °F (36.7 °C) 98.1 °F (36.7 °C) 98.3 °F (36.8 °C)   TempSrc: Axillary Oral Oral Oral   SpO2: 94% 96% 95% 96%   Weight:       Height:         Weight: Weight - Scale: 59.9 kg (132 lb)     24 hour intake/output:  Intake/Output Summary (Last 24 hours) at 2024 1350  Last data filed at 2024 1039  Gross per 24 hour   Intake 240 ml   Output --   Net 240 ml         Labs: For convenience and continuity at follow-up the following most recent labs are provided:    CBC:    Lab Results   Component Value Date/Time    WBC 14.2 2024 05:25 AM    HGB 11.8 2024 05:25 AM    HCT 38.0 2024 05:25 AM     2024 05:25 AM       Renal:    Lab Results   Component Value Date/Time     2024 05:25 AM    K 4.2 2024 05:25 AM     2024 05:25 AM    CO2 28 2024 05:25 AM    BUN 10 2024 05:25 AM    CREATININE 0.8 2024 05:25 AM    CALCIUM 8.7 2024 05:25 AM    PHOS 3.5 2019 08:00 PM         Significant Diagnostic Studies    Radiology:   CTA CHEST W WO CONTRAST   Final Result   Addendum (preliminary) 1 of 1   Addendum: There are multiple nondisplaced right rib fractures at various    stages   of healing.      Final   1. No  needed for Anxiety  What changed:   how much to take  how to take this  when to take this  reasons to take this  additional instructions            CONTINUE taking these medications      alendronate 70 MG tablet  Commonly known as: Fosamax  Take 1 tablet by mouth every 7 days Take with water on an empty stomach- wait 30 minutes before eating or taking other meds.  Avoid lying down for 30 minutes after dose.     atomoxetine 40 MG capsule  Commonly known as: STRATTERA  Take 1 capsule by mouth daily     Breztri Aerosphere 160-9-4.8 MCG/ACT Aero  Generic drug: Budeson-Glycopyrrol-Formoterol     donepezil 10 MG tablet  Commonly known as: ARICEPT  Take 1 tablet by mouth every evening     DULoxetine 60 MG extended release capsule  Commonly known as: CYMBALTA  Take 1 capsule by mouth daily     fish oil-omega-3 fatty acids 1000 MG capsule     fluticasone 50 MCG/ACT nasal spray  Commonly known as: FLONASE  1 spray by Each Nostril route daily     levothyroxine 50 MCG tablet  Commonly known as: SYNTHROID  Take 1 tablet by mouth once daily     metoprolol succinate 25 MG extended release tablet  Commonly known as: Toprol XL  Take 1 tablet by mouth daily     simvastatin 10 MG tablet  Commonly known as: ZOCOR  Take 1 tablet by mouth nightly     Spacer/Aero-Holding Chambers Joanna  1 Device by Does not apply route daily as needed (Albuterol for Shortness of breath)     trospium 20 MG tablet  Commonly known as: SANCTURA  Take 1 tablet by mouth twice daily     valACYclovir 500 MG tablet  Commonly known as: VALTREX            ASK your doctor about these medications      Daliresp 250 MCG tablet  Generic drug: Roflumilast               Where to Get Your Medications        These medications were sent to [facility] - [place], [place] - [address] - P [phone] - F [phone]  [address] [place] [address]      Phone: [phone]   amoxicillin-clavulanate 875-125 MG per tablet  benzonatate 200

## 2024-06-01 NOTE — PLAN OF CARE
Problem: Discharge Planning  Goal: Discharge to home or other facility with appropriate resources  6/1/2024 1259 by Lucrecia Anne RN  Outcome: Adequate for Discharge  6/1/2024 0028 by Dimitry Arita RN  Outcome: Progressing     Problem: Pain  Goal: Verbalizes/displays adequate comfort level or baseline comfort level  6/1/2024 1259 by Lucrecia Anne RN  Outcome: Adequate for Discharge  6/1/2024 0028 by Dimitry Arita RN  Outcome: Progressing     Problem: Safety - Adult  Goal: Free from fall injury  6/1/2024 1259 by Lucrecia Anne RN  Outcome: Adequate for Discharge  6/1/2024 0028 by Dimitry Arita RN  Outcome: Progressing     Problem: ABCDS Injury Assessment  Goal: Absence of physical injury  6/1/2024 1259 by Lucrecia Anne RN  Outcome: Adequate for Discharge  6/1/2024 0028 by Dimitry Arita RN  Outcome: Progressing     Problem: Respiratory - Adult  Goal: Achieves optimal ventilation and oxygenation  6/1/2024 1259 by Lucrecia Anne RN  Outcome: Adequate for Discharge  6/1/2024 0028 by Dimitry Arita RN  Outcome: Progressing     Problem: Chronic Conditions and Co-morbidities  Goal: Patient's chronic conditions and co-morbidity symptoms are monitored and maintained or improved  6/1/2024 1259 by Lucrecia Anne RN  Outcome: Adequate for Discharge  6/1/2024 0028 by Dimitry Arita RN  Outcome: Progressing  Flowsheets (Taken 5/31/2024 2021)  Care Plan - Patient's Chronic Conditions and Co-Morbidity Symptoms are Monitored and Maintained or Improved: Monitor and assess patient's chronic conditions and comorbid symptoms for stability, deterioration, or improvement

## 2024-06-01 NOTE — FLOWSHEET NOTE
Discharge teaching and instructions for diagnosis/procedure of pneumonia completed with patient using teachback method. AVS reviewed. Printed prescriptions given to patient. Patient voiced understanding regarding prescriptions, follow up appointments, and care of self at home. Discharged in a wheelchair to  home with support per family.

## 2024-06-02 LAB
BACTERIA BLD AEROBE CULT: NORMAL
BACTERIA BLD AEROBE CULT: NORMAL

## 2024-06-03 ENCOUNTER — TELEPHONE (OUTPATIENT)
Dept: FAMILY MEDICINE CLINIC | Age: 74
End: 2024-06-03

## 2024-06-03 NOTE — TELEPHONE ENCOUNTER
Care Transitions Initial Follow Up Call    Outreach made within 2 business days of discharge: Yes    Patient: Magan Oleary Patient : 1950   MRN: [id number]  Reason for Admission: There are no discharge diagnoses documented for the most recent discharge.  Discharge Date: 24       Spoke with: WILLIAN     Discharge department/facility: [facility]    Schedule appointment with PCP within 7-14 days    Follow Up  Future Appointments   Date Time Provider Department Center   2024  2:00 PM STR CARDIAC EXERCISE RM [facility] HOD   2024  2:00 PM STR CARDIAC EXERCISE RM [facility] HOD   2024  2:00 PM STR CARDIAC EXERCISE RM [facility] HOD   2024  1:00 PM Leopold, Paige L, APRN - CNP N [facility] Neurology -   2024  2:00 PM STR CARDIAC EXERCISE RM [facility] HOD   2024  2:00 PM STR CARDIAC EXERCISE RM [facility] HOD   2024  3:30 PM Óscar Baird, APRN - CNP [facility] - Carlee Ivory CMA ([facility])

## 2024-06-03 NOTE — CARE COORDINATION
6/3/24, 8:12 AM EDT  Late Entry  Patient goals/plan/ treatment preferences discussed by  and .  Patient goals/plan/ treatment preferences reviewed with patient/ family.  Patient/ family verbalize understanding of discharge plan and are in agreement with goal/plan/treatment preferences.  Understanding was demonstrated using the teach back method.  AVS provided by RN at time of discharge, which includes all necessary medical information pertaining to the patients current course of illness, treatment, post-discharge goals of care, and treatment preferences.     Services At/After Discharge: Nursing service and PT       Patient discharged 6/1 to home alone and resume [facility].  Left message for [facility] notified of discharge.

## 2024-06-04 ENCOUNTER — HOSPITAL ENCOUNTER (OUTPATIENT)
Dept: CARDIAC REHAB | Age: 74
Setting detail: THERAPIES SERIES
Discharge: HOME | End: 2024-06-04

## 2024-06-04 NOTE — TELEPHONE ENCOUNTER
Care Transitions Initial Follow Up Call    Outreach made within 2 business days of discharge: YES    Patient: Magan Oleary Patient : 1950   MRN: [id number]  Reason for Admission: There are no discharge diagnoses documented for the most recent discharge.  Discharge Date: 24       Spoke with: Patient    Discharge department/facility: [facility]    TCM Interactive Patient Contact:  Was patient able to fill all prescriptions: Yes  Was patient instructed to bring all medications to the follow-up visit: Yes  Is patient taking all medications as directed in the discharge summary? Yes  Does patient understand their discharge instructions: Yes  Does patient have questions or concerns that need addressed prior to 7-14 day follow up office visit: no    Scheduled appointment with PCP within 7-14 days    Follow Up  Future Appointments   Date Time Provider Department Center   2024  2:00 PM STR CARDIAC EXERCISE RM [facility] HOD   2024  2:00 PM STR CARDIAC EXERCISE RM [facility] HOD   2024  2:00 PM STR CARDIAC EXERCISE RM [facility] HOD   2024  1:00 PM Leopold, Paige L, APRN - CNP N [facility] Neurology -   2024  2:00 PM STR CARDIAC EXERCISE RM [facility] HOD   2024  2:00 PM STR CARDIAC EXERCISE RM [facility] HOD   2024  3:15 PM Óscar Baird, APRN - CNP [facility] Ivory CMA ([facility])

## 2024-06-04 NOTE — TELEPHONE ENCOUNTER
Care Transitions Initial Follow Up Call    Outreach made within 2 business days of discharge: Yes    Patient: Magan Oleary Patient : 1950   MRN: [id number]  Reason for Admission: There are no discharge diagnoses documented for the most recent discharge.  Discharge Date: 24       Spoke with: WILLIAN    Discharge department/facility: [facility]       Schedule appointment with PCP within 7-14 days    Follow Up  Future Appointments   Date Time Provider Department Center   2024  2:00 PM STR CARDIAC EXERCISE RM [facility] HOD   2024  2:00 PM STR CARDIAC EXERCISE RM [facility] HOD   2024  2:00 PM STR CARDIAC EXERCISE RM [facility] HOD   2024  1:00 PM Leopold, Paige L, APRN - CNP N [facility] Neurology -   2024  2:00 PM STR CARDIAC EXERCISE RM [facility] HOD   2024  2:00 PM STR CARDIAC EXERCISE RM [facility] HOD   2024  3:30 PM Óscar Baird, APRN - CNP [facility] - Carlee Ivory CMA ([facility])

## 2024-06-04 NOTE — PLAN OF CARE
PULMONARY [facility] Facility-Based Therapy for COPD   INDIVIDUAL TREATMENT PLAN (ITP)      Name: Magan Oleary   :  1950  Acct Number: [id number]   AGE: 73 y.o.    Diagnosis:  moderate COPD, which is GOLD Stage 2                                               PFT:  Post Bronchodilator FEV1/FVC: 59  FEV1: 59    Patient Goals:  (x) Breathe better  (x) Increase my endurance (x) Have more energy  (x) Rely less on others  (x) Ease ADL’s  (x) Understand and use meds correctly  (x) Control cough  (x) Make fewer visits to hospital  () Quit smoking  (x) Feel less anxious / have more confidence    Glossary:  NJ=Pulmonary Rehab  PF=Physical Fitness TM=Treadmill  AD=Schwinn Airdyne  UBE=UpperBody Ergometer  RT=Resistance Training  PLB=Pursed Lip Breathing           The following Education has been completed with patient.  [x] Wait in the lobby until we call you back to rehab.  [x] Bring your own bottle of water with you.  [x] You can not bring anyone with you in to the rehab clinic. They are welcome to sit in the lobby or wait in the car.        INDIVIDUAL TREATMENT PLAN     INITIAL ASSESSMENT     Day #1 INDIVIDUAL TREATMENT PLAN     RE ASSESSMENT     Day #2-30 INDIVIDUAL TREATMENT PLAN     RE ASSESSMENT     Day #31-60 INDIVIDUAL TREATMENT PLAN     RE ASSESSMENT     Day # INDIVIDUAL TREATMENT PLAN     RE ASSESSMENT     Day #121-150 INDIVIDUAL TREATMENT PLAN        DISCHARGE     Last Day          Date: 2024       Date: 3/12/24    Date: 24  Pt did not attend pulmonary rehab today    Date: 24 and 24  Patient not in attendance for reassessment today. Patient has not been in attendance since 24 due to hospitalization and other medical issues. There have been no changes, increase or updates to care plan since last visit.     Date:     Date:    EXERCISE   INITIAL ASSESSMENT        Prescribed Oxygen Use  Activity: 4 L/min  () Continuous  (x) Breath Actuated

## 2024-06-10 ENCOUNTER — TELEPHONE (OUTPATIENT)
Dept: NEUROLOGY | Age: 74
End: 2024-06-10

## 2024-06-10 DIAGNOSIS — I62.9 INTRACRANIAL HEMORRHAGE (HCC): ICD-10-CM

## 2024-06-10 DIAGNOSIS — R41.3 MEMORY PROBLEM: ICD-10-CM

## 2024-06-10 DIAGNOSIS — Z86.79 HISTORY OF CEREBRAL ANEURYSM REPAIR: ICD-10-CM

## 2024-06-10 DIAGNOSIS — Z98.890 HISTORY OF CEREBRAL ANEURYSM REPAIR: ICD-10-CM

## 2024-06-10 DIAGNOSIS — R25.1 TREMOR: Primary | ICD-10-CM

## 2024-06-10 NOTE — PROGRESS NOTES
Physician Progress Note      PATIENT:               LUIS F MARTINEZ  [facility] #:                  [id number]  :                       1950  ADMIT DATE:       2024 11:48 AM  DISCH DATE:        2024 2:10 PM  RESPONDING  PROVIDER #:        Moody Horta PA-C          QUERY TEXT:    Pt admitted with AECOPD with CAP. Pt noted to have elevated WBC, ST, elevated   Lactic. If possible, please document in the progress notes and discharge   summary if you are evaluating and /or treating any of the following:    The medical record reflects the following:  Risk Factors: CAP, AECOPD  Clinical Indicators: WBC 16.7-> 16.4-> 14.2; ST , LA 2.8-> 3.0 , CTA   multiple nondisplaced right rib fractures at various stages of healing, Left   lower lobe pneumonia.  Treatment: 1500 CC IVF bolus, Zithromax, Rocephin      Thank You, Jaquelin CDS  Options provided:  -- Sepsis due to CAP, present on admission  -- Sepsis was ruled out  -- Other - I will add my own diagnosis  -- Disagree - Not applicable / Not valid  -- Disagree - Clinically unable to determine / Unknown  -- Refer to Clinical Documentation Reviewer    PROVIDER RESPONSE TEXT:    This patient has sepsis due to CAP which was present on admission.    Query created by: Selene Marley on 2024 2:00 PM      Electronically signed by:  Moody Horta PA-C 6/10/2024 9:30   AM

## 2024-06-10 NOTE — TELEPHONE ENCOUNTER
Received call requesting Vitamin B6 and Vitamin B12 orders.  Patient has appointment on 6/12 ans was told to get labs done close to visit to make sure levels are trending down.  Orders pended. [facility].

## 2024-06-11 ENCOUNTER — NURSE ONLY (OUTPATIENT)
Dept: LAB | Age: 74
End: 2024-06-11

## 2024-06-11 DIAGNOSIS — Z98.890 HISTORY OF CEREBRAL ANEURYSM REPAIR: ICD-10-CM

## 2024-06-11 DIAGNOSIS — R41.3 MEMORY PROBLEM: ICD-10-CM

## 2024-06-11 DIAGNOSIS — R25.1 TREMOR: ICD-10-CM

## 2024-06-11 DIAGNOSIS — I62.9 INTRACRANIAL HEMORRHAGE (HCC): ICD-10-CM

## 2024-06-11 DIAGNOSIS — Z86.79 HISTORY OF CEREBRAL ANEURYSM REPAIR: ICD-10-CM

## 2024-06-11 LAB
FOLATE SERPL-MCNC: > 20 NG/ML (ref 4.8–24.2)
VIT B12 SERPL-MCNC: 690 PG/ML (ref 211–911)

## 2024-06-11 RX ORDER — HYDROXYZINE HYDROCHLORIDE 25 MG/1
TABLET, FILM COATED ORAL
Qty: 90 TABLET | Refills: 0 | Status: SHIPPED | OUTPATIENT
Start: 2024-06-11

## 2024-06-12 ENCOUNTER — OFFICE VISIT (OUTPATIENT)
Dept: NEUROLOGY | Age: 74
End: 2024-06-12
Payer: MEDICARE

## 2024-06-12 VITALS
WEIGHT: 130 LBS | OXYGEN SATURATION: 97 % | DIASTOLIC BLOOD PRESSURE: 70 MMHG | HEART RATE: 118 BPM | HEIGHT: 63 IN | SYSTOLIC BLOOD PRESSURE: 105 MMHG | BODY MASS INDEX: 23.04 KG/M2

## 2024-06-12 DIAGNOSIS — Z86.79 HISTORY OF CEREBRAL ANEURYSM REPAIR: Primary | ICD-10-CM

## 2024-06-12 DIAGNOSIS — R41.3 MEMORY PROBLEM: ICD-10-CM

## 2024-06-12 DIAGNOSIS — Z98.890 HISTORY OF CEREBRAL ANEURYSM REPAIR: Primary | ICD-10-CM

## 2024-06-12 DIAGNOSIS — I62.9 INTRACRANIAL HEMORRHAGE (HCC): ICD-10-CM

## 2024-06-12 PROCEDURE — 1036F TOBACCO NON-USER: CPT | Performed by: NURSE PRACTITIONER

## 2024-06-12 PROCEDURE — 1111F DSCHRG MED/CURRENT MED MERGE: CPT | Performed by: NURSE PRACTITIONER

## 2024-06-12 PROCEDURE — 1090F PRES/ABSN URINE INCON ASSESS: CPT | Performed by: NURSE PRACTITIONER

## 2024-06-12 PROCEDURE — G8420 CALC BMI NORM PARAMETERS: HCPCS | Performed by: NURSE PRACTITIONER

## 2024-06-12 PROCEDURE — 3017F COLORECTAL CA SCREEN DOC REV: CPT | Performed by: NURSE PRACTITIONER

## 2024-06-12 PROCEDURE — 99214 OFFICE O/P EST MOD 30 MIN: CPT | Performed by: NURSE PRACTITIONER

## 2024-06-12 PROCEDURE — G8427 DOCREV CUR MEDS BY ELIG CLIN: HCPCS | Performed by: NURSE PRACTITIONER

## 2024-06-12 PROCEDURE — G8399 PT W/DXA RESULTS DOCUMENT: HCPCS | Performed by: NURSE PRACTITIONER

## 2024-06-12 PROCEDURE — 1123F ACP DISCUSS/DSCN MKR DOCD: CPT | Performed by: NURSE PRACTITIONER

## 2024-06-12 NOTE — PATIENT INSTRUCTIONS
Continue with current medications.   Follow up in 6 months or sooner if needed  Call if any questions or concerns.

## 2024-06-12 NOTE — PROGRESS NOTES
NEUROLOGY OUT PATIENT FOLLOW UP NOTE:  6/12/20241:04 PM    Magan Oleary is here for follow up for history of cerebral aneurysm, ICH, memory trouble.           Allergies   Allergen Reactions    Macrobid [Nitrofurantoin] Diarrhea    Sulfa Antibiotics Nausea And Vomiting       Current Outpatient Medications:     hydrOXYzine HCl (ATARAX) 25 MG tablet, TAKE 1/2 TO 1 (ONE-HALF TO ONE) TABLET BY MOUTH EVERY 8 HOURS AS NEEDED FOR ANXIETY, Disp: 90 tablet, Rfl: 0    atomoxetine (STRATTERA) 40 MG capsule, Take 1 capsule by mouth daily, Disp: 90 capsule, Rfl: 3    donepezil (ARICEPT) 10 MG tablet, Take 1 tablet by mouth every evening, Disp: 90 tablet, Rfl: 3    metoprolol succinate (TOPROL XL) 25 MG extended release tablet, Take 1 tablet by mouth daily, Disp: 90 tablet, Rfl: 3    DULoxetine (CYMBALTA) 60 MG extended release capsule, Take 1 capsule by mouth daily, Disp: 90 capsule, Rfl: 3    trospium (SANCTURA) 20 MG tablet, Take 1 tablet by mouth twice daily, Disp: 60 tablet, Rfl: 0    fluticasone (FLONASE) 50 MCG/ACT nasal spray, 1 spray by Each Nostril route daily, Disp: 16 g, Rfl: 0    Budeson-Glycopyrrol-Formoterol (BREZTRI AEROSPHERE) 160-9-4.8 MCG/ACT AERO, Inhale into the lungs, Disp: , Rfl:     albuterol sulfate HFA (PROVENTIL;VENTOLIN;PROAIR) 108 (90 Base) MCG/ACT inhaler, Inhale 1-2 puffs into the lungs every 6 hours as needed for Wheezing, Disp: 18 g, Rfl: 1    alendronate (FOSAMAX) 70 MG tablet, Take 1 tablet by mouth every 7 days Take with water on an empty stomach- wait 30 minutes before eating or taking other meds.  Avoid lying down for 30 minutes after dose. (Patient taking differently: Take 1 tablet by mouth every 7 days Take with water on an empty stomach- wait 30 minutes before eating or taking other meds.  Avoid lying down for 30 minutes after dose.  Pt has a difficult time taking this medication), Disp: 12 tablet, Rfl: 3    levothyroxine (SYNTHROID) 50 MCG tablet, Take 1 tablet by mouth once daily,

## 2024-06-15 LAB — PYRIDOXAL PHOS SERPL-SCNC: 24.4 NMOL/L (ref 20–125)

## 2024-06-18 ENCOUNTER — HOSPITAL ENCOUNTER (OUTPATIENT)
Dept: CARDIAC REHAB | Age: 74
Setting detail: THERAPIES SERIES
Discharge: HOME | End: 2024-06-18

## 2024-07-08 ENCOUNTER — NURSE ONLY (OUTPATIENT)
Dept: LAB | Age: 74
End: 2024-07-08

## 2024-07-08 LAB
ANION GAP SERPL CALC-SCNC: 13 MEQ/L (ref 8–16)
BASOPHILS ABSOLUTE: 0.1 THOU/MM3 (ref 0–0.1)
BASOPHILS NFR BLD AUTO: 1.5 %
BUN SERPL-MCNC: 13 MG/DL (ref 7–22)
CALCIUM SERPL-MCNC: 9.9 MG/DL (ref 8.5–10.5)
CHLORIDE SERPL-SCNC: 103 MEQ/L (ref 98–111)
CO2 SERPL-SCNC: 27 MEQ/L (ref 23–33)
CREAT SERPL-MCNC: 0.6 MG/DL (ref 0.4–1.2)
DEPRECATED RDW RBC AUTO: 44.5 FL (ref 35–45)
EOSINOPHIL NFR BLD AUTO: 1.7 %
EOSINOPHILS ABSOLUTE: 0.1 THOU/MM3 (ref 0–0.4)
ERYTHROCYTE [DISTWIDTH] IN BLOOD BY AUTOMATED COUNT: 13.5 % (ref 11.5–14.5)
GFR SERPL CREATININE-BSD FRML MDRD: > 90 ML/MIN/1.73M2
GLUCOSE SERPL-MCNC: 96 MG/DL (ref 70–108)
HCT VFR BLD AUTO: 42 % (ref 37–47)
HGB BLD-MCNC: 13.3 GM/DL (ref 12–16)
IMM GRANULOCYTES # BLD AUTO: 0.02 THOU/MM3 (ref 0–0.07)
IMM GRANULOCYTES NFR BLD AUTO: 0.2 %
LYMPHOCYTES ABSOLUTE: 2.6 THOU/MM3 (ref 1–4.8)
LYMPHOCYTES NFR BLD AUTO: 32 %
MCH RBC QN AUTO: 28.5 PG (ref 26–33)
MCHC RBC AUTO-ENTMCNC: 31.7 GM/DL (ref 32.2–35.5)
MCV RBC AUTO: 89.9 FL (ref 81–99)
MONOCYTES ABSOLUTE: 0.5 THOU/MM3 (ref 0.4–1.3)
MONOCYTES NFR BLD AUTO: 6 %
NEUTROPHILS ABSOLUTE: 4.7 THOU/MM3 (ref 1.8–7.7)
NEUTROPHILS NFR BLD AUTO: 58.6 %
NRBC BLD AUTO-RTO: 0 /100 WBC
PLATELET # BLD AUTO: 282 THOU/MM3 (ref 130–400)
PMV BLD AUTO: 12.1 FL (ref 9.4–12.4)
POTASSIUM SERPL-SCNC: 4.8 MEQ/L (ref 3.5–5.2)
RBC # BLD AUTO: 4.67 MILL/MM3 (ref 4.2–5.4)
SODIUM SERPL-SCNC: 143 MEQ/L (ref 135–145)
WBC # BLD AUTO: 8.1 THOU/MM3 (ref 4.8–10.8)

## 2024-07-11 NOTE — PLAN OF CARE
Cosign   5/7/2024  8:48 AM Saige Robbins EP Exercise Physiologist Sign      Cosigned by: Óscar Ivy MD at 6/4/2024  4:09 PM   Electronically signed by Carolyn Miranda RCP at 6/4/2024 12:24 PM  Electronically signed by Óscar Ivy MD at 6/4/2024  4:09 PM        Cosigned by: Óscar Ivy MD at 7/2/2024  4:59 PM   Electronically signed by Carolyn Miranda RCP at 6/4/2024 12:24 PM  Electronically signed by Óscar Ivy MD at 6/4/2024  4:09 PM  Electronically signed by Martita Chapa EP at 7/2/2024  2:23 PM  Electronically signed by Óscar Ivy MD at 7/2/2024  4:59 PM    Author Note Status Last Update User Last Update Date/Time   Martita Chapa EP Addendum Martita Chapa EP 7/2/2024  2:23 PM

## 2024-07-15 ENCOUNTER — OFFICE VISIT (OUTPATIENT)
Dept: FAMILY MEDICINE CLINIC | Age: 74
End: 2024-07-15
Payer: MEDICARE

## 2024-07-15 VITALS
RESPIRATION RATE: 20 BRPM | OXYGEN SATURATION: 97 % | HEART RATE: 108 BPM | WEIGHT: 132.4 LBS | BODY MASS INDEX: 23.45 KG/M2 | SYSTOLIC BLOOD PRESSURE: 106 MMHG | DIASTOLIC BLOOD PRESSURE: 62 MMHG

## 2024-07-15 DIAGNOSIS — J43.2 CENTRILOBULAR EMPHYSEMA (HCC): ICD-10-CM

## 2024-07-15 DIAGNOSIS — F17.210 SMOKING GREATER THAN 40 PACK YEARS: ICD-10-CM

## 2024-07-15 DIAGNOSIS — I49.5 BRADY-TACHY SYNDROME (HCC): ICD-10-CM

## 2024-07-15 DIAGNOSIS — R73.01 IFG (IMPAIRED FASTING GLUCOSE): ICD-10-CM

## 2024-07-15 DIAGNOSIS — M81.6 LOCALIZED OSTEOPOROSIS WITHOUT CURRENT PATHOLOGICAL FRACTURE: ICD-10-CM

## 2024-07-15 DIAGNOSIS — E03.9 HYPOTHYROIDISM, UNSPECIFIED TYPE: ICD-10-CM

## 2024-07-15 DIAGNOSIS — E78.2 MIXED HYPERLIPIDEMIA: ICD-10-CM

## 2024-07-15 DIAGNOSIS — J96.11 CHRONIC RESPIRATORY FAILURE WITH HYPOXIA (HCC): Primary | ICD-10-CM

## 2024-07-15 PROBLEM — Z47.89 ENCOUNTER FOR OTHER ORTHOPEDIC AFTERCARE: Status: ACTIVE | Noted: 2024-07-15

## 2024-07-15 PROBLEM — M24.549 CONTRACTURE OF JOINT OF HAND: Status: ACTIVE | Noted: 2024-07-15

## 2024-07-15 PROCEDURE — G2211 COMPLEX E/M VISIT ADD ON: HCPCS | Performed by: NURSE PRACTITIONER

## 2024-07-15 PROCEDURE — 1090F PRES/ABSN URINE INCON ASSESS: CPT | Performed by: NURSE PRACTITIONER

## 2024-07-15 PROCEDURE — 3017F COLORECTAL CA SCREEN DOC REV: CPT | Performed by: NURSE PRACTITIONER

## 2024-07-15 PROCEDURE — G8427 DOCREV CUR MEDS BY ELIG CLIN: HCPCS | Performed by: NURSE PRACTITIONER

## 2024-07-15 PROCEDURE — G8420 CALC BMI NORM PARAMETERS: HCPCS | Performed by: NURSE PRACTITIONER

## 2024-07-15 PROCEDURE — G8399 PT W/DXA RESULTS DOCUMENT: HCPCS | Performed by: NURSE PRACTITIONER

## 2024-07-15 PROCEDURE — 1036F TOBACCO NON-USER: CPT | Performed by: NURSE PRACTITIONER

## 2024-07-15 PROCEDURE — 3023F SPIROM DOC REV: CPT | Performed by: NURSE PRACTITIONER

## 2024-07-15 PROCEDURE — 1123F ACP DISCUSS/DSCN MKR DOCD: CPT | Performed by: NURSE PRACTITIONER

## 2024-07-15 PROCEDURE — 99214 OFFICE O/P EST MOD 30 MIN: CPT | Performed by: NURSE PRACTITIONER

## 2024-07-15 RX ORDER — LEVOTHYROXINE SODIUM 0.05 MG/1
50 TABLET ORAL DAILY
Qty: 90 TABLET | Refills: 3 | Status: SHIPPED | OUTPATIENT
Start: 2024-07-15

## 2024-07-15 RX ORDER — DENOSUMAB 60 MG/ML
60 INJECTION SUBCUTANEOUS
Qty: 180 ML | Refills: 1 | Status: SHIPPED | OUTPATIENT
Start: 2024-07-15

## 2024-07-15 RX ORDER — SIMVASTATIN 10 MG
10 TABLET ORAL NIGHTLY
Qty: 90 TABLET | Refills: 3 | Status: SHIPPED | OUTPATIENT
Start: 2024-07-15

## 2024-07-15 SDOH — ECONOMIC STABILITY: FOOD INSECURITY: WITHIN THE PAST 12 MONTHS, YOU WORRIED THAT YOUR FOOD WOULD RUN OUT BEFORE YOU GOT MONEY TO BUY MORE.: NEVER TRUE

## 2024-07-15 SDOH — ECONOMIC STABILITY: FOOD INSECURITY: WITHIN THE PAST 12 MONTHS, THE FOOD YOU BOUGHT JUST DIDN'T LAST AND YOU DIDN'T HAVE MONEY TO GET MORE.: NEVER TRUE

## 2024-07-15 SDOH — ECONOMIC STABILITY: INCOME INSECURITY: HOW HARD IS IT FOR YOU TO PAY FOR THE VERY BASICS LIKE FOOD, HOUSING, MEDICAL CARE, AND HEATING?: NOT HARD AT ALL

## 2024-07-15 NOTE — PROGRESS NOTES
Subjective:      Patient ID: Magan Oleary is a 74 y.o. female.    HPI: 6 month Follow Up    Chief Complaint   Patient presents with    6 Month Follow-Up    Orders     4 wheel walker with a seat - Rolator     Medication Refill       Patient Active Problem List   Diagnosis    Hyperlipemia    Hypothyroidism    Depression    Chronic anxiety    Osteopenia    Medication monitoring encounter    H/O: CVA (cerebrovascular accident), aneurysm bleed, Jan 2015    Abnormality of gait following cerebrovascular accident    GERD (gastroesophageal reflux disease)    Cognitive impairment due to CVA.    OAB (overactive bladder)    Chronic nausea    SOB (shortness of breath)    Hypoxia    Major depressive disorder, single episode, in full remission (HCC)    Smoking greater than 40 pack years    Pulmonary emphysema (HCC)    Syncope and collapse    Concussion with loss of consciousness    Laceration of head    Alcohol abuse    Spinal stenosis, cervical region    Normal pressure hydrocephalus (HCC)    Michael's edema of vocal folds    Exposure to severe acute respiratory syndrome coronavirus 2 (SARS-CoV-2)    Fever    Multiple closed fractures of ribs of right side    Multiple rib fractures involving four or more ribs    Acute pain due to trauma    Community acquired bacterial pneumonia    Contracture of joint of hand    Encounter for other orthopedic aftercare       MAMMO - 2022  COLONOSCOPY - 2022   DEXA - 2023 Osteoporosis  CT Lung Screen - 2024    NEURO - Dr. Sr  NEUROSURG - Dr. Moon  UROLOGY - Hiral Constantino  PULM - [facility]  GASTRO - Dr. Chacon  CARDIO - Dr. Ferrara  ENT - Dr. Winter    Denies CP, SOB or chest tightness. ECHO 2019 EF 60% with LV function normal.  HOLTER MONITOR done in January 2023 for tachycardia.  Avg HR 95.  Set up with CARDIO in 1 week      PFT 2019 showed moderate emphysema.  40 pack year hx.  Symbicort and Anoro and NEB tx.   O2 3-4 L at home she uses rest and activity.   5 L at HS for O2 Needs to be

## 2024-07-16 ASSESSMENT — ENCOUNTER SYMPTOMS
CONSTIPATION: 0
WHEEZING: 0
SHORTNESS OF BREATH: 1

## 2024-08-23 DIAGNOSIS — F32.5 MAJOR DEPRESSIVE DISORDER, SINGLE EPISODE, IN FULL REMISSION (HCC): ICD-10-CM

## 2024-08-23 RX ORDER — TROSPIUM CHLORIDE 20 MG/1
20 TABLET, FILM COATED ORAL 2 TIMES DAILY
Qty: 180 TABLET | Refills: 0 | Status: SHIPPED | OUTPATIENT
Start: 2024-08-23

## 2024-08-23 NOTE — TELEPHONE ENCOUNTER
Magan A Hamilton called requesting a refill on the following medications:  Requested Prescriptions     Pending Prescriptions Disp Refills    trospium (SANCTURA) 20 MG tablet [Pharmacy Med Name: Trospium Chloride 20 MG Oral Tablet] 180 tablet 0     Sig: Take 1 tablet by mouth twice daily     Pharmacy verified:  .[email or web address]      Date of last visit: 10/03/2023  Date of next visit (if applicable): 08/27/2024

## 2024-08-25 RX ORDER — DULOXETIN HYDROCHLORIDE 30 MG/1
CAPSULE, DELAYED RELEASE ORAL
Qty: 90 CAPSULE | Refills: 3 | Status: SHIPPED | OUTPATIENT
Start: 2024-08-25

## 2024-08-27 ENCOUNTER — OFFICE VISIT (OUTPATIENT)
Dept: UROLOGY | Age: 74
End: 2024-08-27
Payer: MEDICARE

## 2024-08-27 VITALS — RESPIRATION RATE: 22 BRPM | WEIGHT: 128 LBS | BODY MASS INDEX: 22.68 KG/M2 | HEIGHT: 63 IN

## 2024-08-27 DIAGNOSIS — N39.46 MIXED INCONTINENCE: Primary | ICD-10-CM

## 2024-08-27 DIAGNOSIS — N39.0 RECURRENT UTI: ICD-10-CM

## 2024-08-27 DIAGNOSIS — R35.0 URINARY FREQUENCY: ICD-10-CM

## 2024-08-27 LAB — POST VOID RESIDUAL (PVR): 23 ML

## 2024-08-27 PROCEDURE — 0509F URINE INCON PLAN DOCD: CPT | Performed by: NURSE PRACTITIONER

## 2024-08-27 PROCEDURE — G8427 DOCREV CUR MEDS BY ELIG CLIN: HCPCS | Performed by: NURSE PRACTITIONER

## 2024-08-27 PROCEDURE — 1036F TOBACCO NON-USER: CPT | Performed by: NURSE PRACTITIONER

## 2024-08-27 PROCEDURE — G8399 PT W/DXA RESULTS DOCUMENT: HCPCS | Performed by: NURSE PRACTITIONER

## 2024-08-27 PROCEDURE — 1123F ACP DISCUSS/DSCN MKR DOCD: CPT | Performed by: NURSE PRACTITIONER

## 2024-08-27 PROCEDURE — 51798 US URINE CAPACITY MEASURE: CPT | Performed by: NURSE PRACTITIONER

## 2024-08-27 PROCEDURE — 1090F PRES/ABSN URINE INCON ASSESS: CPT | Performed by: NURSE PRACTITIONER

## 2024-08-27 PROCEDURE — 99214 OFFICE O/P EST MOD 30 MIN: CPT | Performed by: NURSE PRACTITIONER

## 2024-08-27 PROCEDURE — G8420 CALC BMI NORM PARAMETERS: HCPCS | Performed by: NURSE PRACTITIONER

## 2024-08-27 PROCEDURE — 3017F COLORECTAL CA SCREEN DOC REV: CPT | Performed by: NURSE PRACTITIONER

## 2024-08-27 RX ORDER — TROSPIUM CHLORIDE 20 MG/1
20 TABLET, FILM COATED ORAL NIGHTLY
Qty: 90 TABLET | Refills: 3 | Status: SHIPPED | OUTPATIENT
Start: 2024-08-27

## 2024-08-27 ASSESSMENT — ENCOUNTER SYMPTOMS
BACK PAIN: 0
VOMITING: 0
ABDOMINAL PAIN: 0
NAUSEA: 0

## 2024-08-27 NOTE — PROGRESS NOTES
[facility] [place] [facility]  [facility]  [address]  [address]  [facility] [address]  Dept: [phone]  Loc: [phone]    Visit Date: 8/27/2024        HPI:     Magan Oleary is a 74 y.o. female who presents today for:  Chief Complaint   Patient presents with    Follow-up       HPI    Pt seen in follow up for urinary frequency and urgency.  Daughter accompanies pt to appt today.       Magan has a hx of OAB with mixed incontinence.  She previously failed to have improvement on oxybutynin.  Myrbetriq worked but she was unable to afford cost of medication.  Vesicare improved symptoms but still had episodes of complete incontinence.  At appt 8/2022 Vesicare was stopped and she was started on trospium 20 mg BID which she continues at this time.  Completed pelvic floor therapy previously.      Advanced therapies for OAB/incontinence previously discussed with myself and Dr. Sharpe.       Pt noted several UTIs in 2023.  Cystoscopy by Dr. Sharpe 8/29/23 with moderate bladder trabeculation and severe residual urine.  Interstim was discussed for pt's urinary retention and possible overflow incontinence.  Pt has been hesitant to consider SNS.      Renal US 8/22/23 with mild R sided hydronephrosis and incompletely distended urinary bladder.  CT abd/pelvis 4/2024 negative for any acute urologic findings or hydronephrosis.       Hx of chronic constipation.    Notes she has incontinence over night and at times during the day.  Daughter reports pt strains at times to start her flow of urine.  Unable to urinate in office today.  Bladder scan 23 mls.      Current Outpatient Medications   Medication Sig Dispense Refill    DULoxetine (CYMBALTA) 30 MG extended release capsule TAKE 1 CAPSULE BY MOUTH ONCE DAILY WITH  60MG  CAPSULE 90 capsule 3    trospium (SANCTURA) 20 MG tablet Take 1 tablet by mouth twice daily 180 tablet 0    levothyroxine (SYNTHROID) 50 MCG tablet Take 1 tablet by mouth daily 90

## 2024-10-02 ENCOUNTER — COMMUNITY OUTREACH (OUTPATIENT)
Dept: FAMILY MEDICINE CLINIC | Age: 74
End: 2024-10-02

## 2024-10-22 ENCOUNTER — TELEPHONE (OUTPATIENT)
Dept: FAMILY MEDICINE CLINIC | Age: 74
End: 2024-10-22

## 2024-10-22 DIAGNOSIS — R39.15 URGENCY OF URINATION: Primary | ICD-10-CM

## 2024-10-22 NOTE — TELEPHONE ENCOUNTER
Pt called office c/o a possible UTI. Pt c/o urgency and frequency of urination the past week. No dysuria or hematuria. Pt is asking for a lab order to [facility] so she can be tested. Advised pt we would call her back first thing in the morning with response.

## 2024-10-23 NOTE — TELEPHONE ENCOUNTER
Patient called back and stated that she didn't get a call back. Informed patient we did call her back and left a message. She stated that she would like a call at her number [phone] and not her daughters number.     Offered appointment for tomorrow but she stated that she has to schedule a ride and she doesn't think she can get one tomorrow. She stated that it would be easier for her to go to the lab instead.

## 2024-10-24 NOTE — TELEPHONE ENCOUNTER
Called patient and left detailed message. Informed if she would like to use [facility] they would have the order available. If she uses a different lab to call the office and we can fax the order.

## 2024-11-05 ENCOUNTER — TELEPHONE (OUTPATIENT)
Dept: CARDIAC REHAB | Age: 74
End: 2024-11-05

## 2024-11-05 NOTE — TELEPHONE ENCOUNTER
PULMONARY REHABILITATION REFERRAL  COPD Exacerbation    Pulmonary Rehab Evaluation order received.  Called pt  and there was no answer, so left message asking the pt to call us back.

## 2024-11-05 NOTE — TELEPHONE ENCOUNTER
PULMONARY REHABILITATION REFERRAL  COPD Exacerbation    Pulmonary Rehab Evaluation order received.  Pt called back and I spoke with patient about the benefits of supervised comfortable, progressive exercise with oxygen saturation monitoring and pulmonary education.  Eval set up for 11/26 at 4pm and pt knows where to come for the appt and had no questions..  We will set up [facility] per pt request.  Pt currently not doing any PT/OT/HH.

## 2024-11-07 ENCOUNTER — LAB (OUTPATIENT)
Dept: LAB | Age: 74
End: 2024-11-07

## 2024-11-07 DIAGNOSIS — R39.15 URGENCY OF URINATION: ICD-10-CM

## 2024-11-07 LAB
BACTERIA URNS QL MICRO: ABNORMAL /HPF
BILIRUB UR QL STRIP.AUTO: NEGATIVE
CASTS #/AREA URNS LPF: ABNORMAL /LPF
CASTS 2: ABNORMAL /LPF
CHARACTER UR: ABNORMAL
COLOR, UA: YELLOW
CRYSTALS URNS MICRO: ABNORMAL
EPITHELIAL CELLS, UA: ABNORMAL /HPF
GLUCOSE UR QL STRIP.AUTO: NEGATIVE MG/DL
HGB UR QL STRIP.AUTO: NEGATIVE
KETONES UR QL STRIP.AUTO: NEGATIVE
MISCELLANEOUS 2: ABNORMAL
NITRITE UR QL STRIP: NEGATIVE
PH UR STRIP.AUTO: 5.5 [PH] (ref 5–9)
PROT UR STRIP.AUTO-MCNC: NEGATIVE MG/DL
RBC URINE: ABNORMAL /HPF
RENAL EPI CELLS #/AREA URNS HPF: ABNORMAL /[HPF]
SP GR UR REFRACT.AUTO: 1.03 (ref 1–1.03)
UROBILINOGEN, URINE: 0.2 EU/DL (ref 0–1)
WBC #/AREA URNS HPF: ABNORMAL /HPF
WBC #/AREA URNS HPF: ABNORMAL /[HPF]
YEAST LIKE FUNGI URNS QL MICRO: ABNORMAL

## 2024-11-08 DIAGNOSIS — N39.0 ACUTE UTI: Primary | ICD-10-CM

## 2024-11-08 RX ORDER — CIPROFLOXACIN 500 MG/1
500 TABLET, FILM COATED ORAL 2 TIMES DAILY
Qty: 14 TABLET | Refills: 0 | Status: SHIPPED | OUTPATIENT
Start: 2024-11-08 | End: 2024-11-10

## 2024-11-10 LAB
BACTERIA UR CULT: ABNORMAL
BACTERIA UR CULT: ABNORMAL
ORGANISM: ABNORMAL
ORGANISM: ABNORMAL

## 2024-11-10 RX ORDER — AMOXICILLIN 500 MG/1
500 CAPSULE ORAL 2 TIMES DAILY
Qty: 14 CAPSULE | Refills: 0 | Status: SHIPPED | OUTPATIENT
Start: 2024-11-10 | End: 2024-11-17

## 2024-11-11 LAB
BACTERIA UR CULT: ABNORMAL
BACTERIA UR CULT: ABNORMAL
ORGANISM: ABNORMAL
ORGANISM: ABNORMAL

## 2024-11-12 ENCOUNTER — TELEPHONE (OUTPATIENT)
Dept: FAMILY MEDICINE CLINIC | Age: 74
End: 2024-11-12

## 2024-11-12 NOTE — TELEPHONE ENCOUNTER
Called patient and informed she verbalized understanding.   
Called patient and left a message to call the office.   
No there is not.  This is a common condition as we get older - vasomotor rhinitis.   No rx medications will help.  Ok to try Coricidin to dry it up.   
Patient called and stated that she has a runny nose all the time. She stated that she probably goes through 2 boxes of kleenex a week.   She wanted to know if there is something you can prescribe to help with the runny nose?  
I have personally performed a face to face diagnostic evaluation on this patient. I have reviewed the ACP note and agree with the history, exam and plan of care, except as noted.

## 2024-11-21 DIAGNOSIS — J44.9 CHRONIC OBSTRUCTIVE PULMONARY DISEASE, UNSPECIFIED COPD TYPE (HCC): Primary | ICD-10-CM

## 2024-11-22 ENCOUNTER — LAB (OUTPATIENT)
Dept: LAB | Age: 74
End: 2024-11-22

## 2024-11-22 DIAGNOSIS — N39.0 ACUTE UTI: ICD-10-CM

## 2024-11-23 LAB
BACTERIA UR CULT: ABNORMAL
ORGANISM: ABNORMAL

## 2024-11-26 ENCOUNTER — HOSPITAL ENCOUNTER (OUTPATIENT)
Dept: CARDIAC REHAB | Age: 74
Setting detail: THERAPIES SERIES
Discharge: HOME | End: 2024-11-26
Payer: MEDICARE

## 2024-11-26 VITALS — HEIGHT: 63 IN | WEIGHT: 130.2 LBS | BODY MASS INDEX: 23.07 KG/M2

## 2024-11-26 PROCEDURE — 94625 PHY/QHP OP PULM RHB W/O MNTR: CPT

## 2024-11-26 RX ORDER — ALBUTEROL SULFATE 0.83 MG/ML
2.5 SOLUTION RESPIRATORY (INHALATION) PRN
COMMUNITY

## 2024-11-26 ASSESSMENT — PATIENT HEALTH QUESTIONNAIRE - PHQ9
SUM OF ALL RESPONSES TO PHQ QUESTIONS 1-9: 17
8. MOVING OR SPEAKING SO SLOWLY THAT OTHER PEOPLE COULD HAVE NOTICED. OR THE OPPOSITE, BEING SO FIGETY OR RESTLESS THAT YOU HAVE BEEN MOVING AROUND A LOT MORE THAN USUAL: MORE THAN HALF THE DAYS
6. FEELING BAD ABOUT YOURSELF - OR THAT YOU ARE A FAILURE OR HAVE LET YOURSELF OR YOUR FAMILY DOWN: MORE THAN HALF THE DAYS
SUM OF ALL RESPONSES TO PHQ QUESTIONS 1-9: 17
1. LITTLE INTEREST OR PLEASURE IN DOING THINGS: MORE THAN HALF THE DAYS
4. FEELING TIRED OR HAVING LITTLE ENERGY: MORE THAN HALF THE DAYS
9. THOUGHTS THAT YOU WOULD BE BETTER OFF DEAD, OR OF HURTING YOURSELF: SEVERAL DAYS
10. IF YOU CHECKED OFF ANY PROBLEMS, HOW DIFFICULT HAVE THESE PROBLEMS MADE IT FOR YOU TO DO YOUR WORK, TAKE CARE OF THINGS AT HOME, OR GET ALONG WITH OTHER PEOPLE: VERY DIFFICULT
7. TROUBLE CONCENTRATING ON THINGS, SUCH AS READING THE NEWSPAPER OR WATCHING TELEVISION: MORE THAN HALF THE DAYS
3. TROUBLE FALLING OR STAYING ASLEEP: MORE THAN HALF THE DAYS
2. FEELING DOWN, DEPRESSED OR HOPELESS: MORE THAN HALF THE DAYS
SUM OF ALL RESPONSES TO PHQ9 QUESTIONS 1 & 2: 4
5. POOR APPETITE OR OVEREATING: MORE THAN HALF THE DAYS
SUM OF ALL RESPONSES TO PHQ QUESTIONS 1-9: 17
SUM OF ALL RESPONSES TO PHQ QUESTIONS 1-9: 16

## 2024-11-26 NOTE — PLAN OF CARE
retraining       ADL Goals Reassessment        []  Pt is progressing  []  Pt is not progressing      []  ADL’s getting easier  []  ADL’s not getting easier          []  Pt states activities are getting easier/ able to go longer/ not get as SOB   ADL Goals Reassessment        []  Pt is progressing  []  Pt is not progressing      []  ADL’s getting easier  []  ADL’s not getting easier          []  Pt states activities are getting easier/ able to go longer/ not get as SOB ADL Goals Reassessment        []  Pt is progressing  []  Pt is not progressing      []  ADL’s getting easier  []  ADL’s not getting easier          []  Pt states activities are getting easier/ able to go longer/ not get as SOB   ADL Goals   Discharge        []  Pt showed strength and endurance gains  []  Pt did not progress    Goal achieved?  []  Yes  []  No                      Outcomes:  See Health and Functioning from the CAT tool and Strength and Endurance from 6 MWD above             EXACERBAT'N PREVENTION & MANAGEMENT  INITIAL ASSESSMENT      Pt reports;  [] 0 respiratory infections  [x] 1   [] >2  [x] Hospitalized in last 12 months x2, once for pneumonia and once for broken ribs   EXACERBAT'N PREVENTION & MANAGEMENT  PLAN        Address via Disease Self Management and Exacerbation prevention class:    -Hydration for mucus    -Hand Hygiene    -Evaluation of Sputum    -When to call MD  -S/Sx to report  -Decrease exposure to irritants/ exacerbators     EXACERBAT'N PREVENTION & MANAGEMENT  RE ASSESSMENT      []  Improved hydration    []  Correct hand washing tech’s and alcohol gel usage    []  Sputum assessment    []  Ability to recognize exacerbation and when to call MD         EXACERBAT'N PREVENTION & MANAGEMENT  RE ASSESSMENT      []  Improved hydration    []  Correct hand washing tech’s and alcohol gel usage    []  Sputum assessment    []  Ability to recognize exacerbation and when to call MD         EXACERBAT'N PREVENTION & MANAGEMENT  RE

## 2024-12-03 ENCOUNTER — HOSPITAL ENCOUNTER (OUTPATIENT)
Dept: CARDIAC REHAB | Age: 74
Setting detail: THERAPIES SERIES
Discharge: HOME | End: 2024-12-03
Attending: INTERNAL MEDICINE
Payer: MEDICARE

## 2024-12-03 PROCEDURE — 94626 PHY/QHP OP PULM RHB W/MNTR: CPT

## 2024-12-05 ENCOUNTER — APPOINTMENT (OUTPATIENT)
Dept: CARDIAC REHAB | Age: 74
End: 2024-12-05
Attending: INTERNAL MEDICINE
Payer: MEDICARE

## 2024-12-09 ENCOUNTER — HOSPITAL ENCOUNTER (EMERGENCY)
Age: 74
Discharge: HOME | End: 2024-12-09
Payer: MEDICARE

## 2024-12-09 ENCOUNTER — APPOINTMENT (OUTPATIENT)
Dept: GENERAL RADIOLOGY | Age: 74
End: 2024-12-09
Payer: MEDICARE

## 2024-12-09 VITALS
OXYGEN SATURATION: 97 % | HEART RATE: 97 BPM | SYSTOLIC BLOOD PRESSURE: 125 MMHG | DIASTOLIC BLOOD PRESSURE: 78 MMHG | RESPIRATION RATE: 22 BRPM | TEMPERATURE: 97.5 F

## 2024-12-09 DIAGNOSIS — J44.1 COPD EXACERBATION (HCC): Primary | ICD-10-CM

## 2024-12-09 PROCEDURE — 99213 OFFICE O/P EST LOW 20 MIN: CPT

## 2024-12-09 PROCEDURE — 99213 OFFICE O/P EST LOW 20 MIN: CPT | Performed by: NURSE PRACTITIONER

## 2024-12-09 PROCEDURE — 71046 X-RAY EXAM CHEST 2 VIEWS: CPT

## 2024-12-09 RX ORDER — PREDNISONE 20 MG/1
20 TABLET ORAL 2 TIMES DAILY
Qty: 10 TABLET | Refills: 0 | Status: SHIPPED | OUTPATIENT
Start: 2024-12-09 | End: 2024-12-14

## 2024-12-09 RX ORDER — HYDROXYZINE HYDROCHLORIDE 25 MG/1
TABLET, FILM COATED ORAL
Qty: 90 TABLET | Refills: 0 | Status: SHIPPED | OUTPATIENT
Start: 2024-12-09

## 2024-12-09 RX ORDER — AZITHROMYCIN 250 MG/1
TABLET, FILM COATED ORAL
Qty: 6 TABLET | Refills: 0 | Status: SHIPPED | OUTPATIENT
Start: 2024-12-09 | End: 2024-12-19

## 2024-12-09 NOTE — ED NOTES
Pt to [facility] with c/o cough and chest congestion for 3 weeks. Pt reports they havent felt well and reports feeling weak and extreme fatigue. She reports feel confused at times. She was exposed to her grandson that had pneumonia. Pt does have hx of COPD. She arrives on 4L NC. She denies feeling SOB at this time,      Tenisha Mckeon, SERENITY  12/09/24 1307

## 2024-12-09 NOTE — DISCHARGE INSTRUCTIONS
No pneumonia on x-ray. Medications as prescribed.  Recommend close f/u with primary care provider. Report to ED with new or severe symptoms.

## 2024-12-09 NOTE — DISCHARGE INSTR - COC
Continuity of Care Form    Patient Name: Magan Oleary   :  1950  MRN:  [id number]    Admit date:  2024  Discharge date:  ***    Code Status Order: Prior   Advance Directives:   Advance Care Flowsheet Documentation             Admitting Physician:  No admitting provider for patient encounter.  PCP: Óscar Baird, APRN - CNP    Discharging Nurse: ***  Discharging Hospital Unit/Room#:   Discharging Unit Phone Number: ***    Emergency Contact:   Extended Emergency Contact Information  Primary Emergency Contact: Sil Simons  Address: [address] [place] [address] [facility]  Home Phone: [phone]  Mobile Phone: [phone]  Relation: Child  Hearing or visual needs: None  Other needs: None  Preferred language: English   needed? No  Secondary Emergency Contact: Cher Cleaning  Address: [address]           [address]           [place], [place] [address] [place] [address]  Home Phone: [phone]  Mobile Phone: [phone]  Relation: Child    Past Surgical History:  Past Surgical History:   Procedure Laterality Date    BRAIN ANEURYSM SURGERY      CARPAL TUNNEL RELEASE      COLONOSCOPY      , ,     COSMETIC SURGERY  ,    juvederm,botox    COSMETIC SURGERY      Restylane, Botox    EGD          EXTERNAL EAR SURGERY      reconstruction of lft ear w/re-opening of canal & pinning back of superior ear    EXTERNAL EAR SURGERY      abscess lft ear    EXTERNAL EAR SURGERY      hematoma left ear    NM BIOPSY BREAST OPEN INCISIONAL Left     benign    NM BIOPSY BREAST OPEN INCISIONAL Right     benign       Immunization History:   Immunization History   Administered Date(s) Administered    COVID-19, MODERNA BLUE border, Primary or Immunocompromised, (age 12y+), IM, 100 mcg/0.5mL 2021, 2021, 2021    COVID-19, MODERNA Bivalent, (age 12y+), IM, 50 mcg/0.5 mL 2022    COVID-19, MODERNA Booster BLUE  syndrome coronavirus 2 (SARS-CoV-2) Z20.822    Fever R50.9    Multiple closed fractures of ribs of right side S22.41XA    Multiple rib fractures involving four or more ribs S22.49XA    Acute pain due to trauma G89.11    Community acquired bacterial pneumonia J15.9    Contracture of joint of hand M24.549    Encounter for other orthopedic aftercare Z47.89       Isolation/Infection:   Isolation            No Isolation          Patient Infection Status       Infection Onset Added Last Indicated Last Indicated By Review Planned Expiration Resolved Resolved By    MRSA 08/26/20 08/28/20 08/26/20 Culture, Urine        Urine 8/2020  Nares 10/2021 OIO    Resolved    MRSA  07/30/15 07/30/15 Trinh Garrison, RN   05/01/19 Trinh Garrison, RN                       Nurse Assessment:  Last Vital Signs: /78   Pulse 97   Temp 97.5 °F (36.4 °C) (Oral)   Resp 22   SpO2 97%     Last documented pain score (0-10 scale):    Last Weight:   Wt Readings from Last 1 Encounters:   11/26/24 59.1 kg (130 lb 3.2 oz)     Mental Status:  {IP PT MENTAL STATUS:20030}    IV Access:  {[facility] [place] [facility] IV ACCESS:700966811}    Nursing Mobility/ADLs:  Walking   {CHP DME ADLs:806013300}  Transfer  {CHP DME ADLs:890835209}  Bathing  {CHP DME ADLs:238164939}  Dressing  {CHP DME ADLs:377380779}  Toileting  {CHP DME ADLs:302452746}  Feeding  {P DME ADLs:817263585}  Med Admin  {P DME ADLs:313098742}  Med Delivery   { LORRAINE MED Delivery:254808029}    Wound Care Documentation and Therapy:        Elimination:  Continence:   Bowel: {YES / NO:19727}  Bladder: {YES / NO:19727}  Urinary Catheter: {Urinary Catheter:143055081}   Colostomy/Ileostomy/Ileal Conduit: {YES / NO:19727}       Date of Last BM: ***  No intake or output data in the 24 hours ending 12/09/24 1614  No intake/output data recorded.    Safety Concerns:     { LORRAINE Safety Concerns:279152975}    Impairments/Disabilities:      {[facility] [place] [facility] Impairments/Disabilities:739053832}    Nutrition

## 2024-12-10 ENCOUNTER — APPOINTMENT (OUTPATIENT)
Dept: CARDIAC REHAB | Age: 74
End: 2024-12-10
Attending: INTERNAL MEDICINE
Payer: MEDICARE

## 2024-12-10 ASSESSMENT — ENCOUNTER SYMPTOMS
COUGH: 1
SHORTNESS OF BREATH: 1

## 2024-12-10 NOTE — ED PROVIDER NOTES
[facility] URGENT CARE  UrgentCare Encounter      CHIEFCOMPLAINT       Chief Complaint   Patient presents with    Cough    Chest Congestion       Nurses Notes reviewed and I agree except as noted in the HPI.  HISTORY OF PRESENT ILLNESS   Magan Oleary is a 74 y.o. female who presents to urgent care with complaints of persistent cough for 3 weeks.  She is on home oxygen due to her COPD.  She states that she has had increased sputum.  Denies fevers.  She has been exposed to her grandson who recently had pneumonia.    REVIEW OF SYSTEMS     Review of Systems   Constitutional:  Positive for fatigue.   Respiratory:  Positive for cough and shortness of breath.        PAST MEDICAL HISTORY         Diagnosis Date    Aneurysm, cerebral     COPD (chronic obstructive pulmonary disease) (HCC)     Dupuytren's contracture of both hands     Hiatal hernia     Hyperlipidemia     Osteoarthritis     Pneumonia     Stroke (HCC)     Thyroid disease     Unspecified cerebral artery occlusion with cerebral infarction        SURGICAL HISTORY     Patient  has a past surgical history that includes Colonoscopy; External ear surgery (2000); External ear surgery (1999); External ear surgery (1997); Cosmetic surgery (2009,2010); Cosmetic surgery (2013); Brain aneurysm surgery; pr biopsy breast open incisional (Left, 1984); pr biopsy breast open incisional (Right, 1986); EGD; and Carpal tunnel release.    CURRENT MEDICATIONS       Discharge Medication List as of 12/9/2024  4:10 PM        CONTINUE these medications which have NOT CHANGED    Details   hydrOXYzine HCl (ATARAX) 25 MG tablet TAKE 1/2 (ONE-HALF) TO 1 TABLET BY MOUTH EVERY 8 HOURS AS NEEDED FOR ANXIETY, Disp-90 tablet, R-0Normal      albuterol (PROVENTIL) (2.5 MG/3ML) 0.083% nebulizer solution Take 3 mLs by nebulization as needed for WheezingHistorical Med      trospium (SANCTURA) 20 MG tablet Take 1 tablet by mouth at bedtime, Disp-90 tablet, R-3Normal      !! DULoxetine

## 2024-12-12 ENCOUNTER — HOSPITAL ENCOUNTER (OUTPATIENT)
Dept: CARDIAC REHAB | Age: 74
Setting detail: THERAPIES SERIES
End: 2024-12-12
Attending: INTERNAL MEDICINE
Payer: MEDICARE

## 2024-12-17 ENCOUNTER — APPOINTMENT (OUTPATIENT)
Dept: CARDIAC REHAB | Age: 74
End: 2024-12-17
Attending: INTERNAL MEDICINE
Payer: MEDICARE

## 2024-12-19 ENCOUNTER — HOSPITAL ENCOUNTER (OUTPATIENT)
Dept: CARDIAC REHAB | Age: 74
Setting detail: THERAPIES SERIES
End: 2024-12-19
Attending: INTERNAL MEDICINE
Payer: MEDICARE

## 2024-12-24 ENCOUNTER — APPOINTMENT (OUTPATIENT)
Dept: CARDIAC REHAB | Age: 74
End: 2024-12-24
Attending: INTERNAL MEDICINE
Payer: MEDICARE

## 2024-12-26 ENCOUNTER — HOSPITAL ENCOUNTER (OUTPATIENT)
Dept: CARDIAC REHAB | Age: 74
Setting detail: THERAPIES SERIES
Discharge: HOME | End: 2024-12-26
Attending: INTERNAL MEDICINE
Payer: MEDICARE

## 2024-12-26 PROCEDURE — 94626 PHY/QHP OP PULM RHB W/MNTR: CPT

## 2024-12-31 ENCOUNTER — APPOINTMENT (OUTPATIENT)
Dept: CARDIAC REHAB | Age: 74
End: 2024-12-31
Attending: INTERNAL MEDICINE
Payer: MEDICARE

## 2025-01-02 ENCOUNTER — TELEPHONE (OUTPATIENT)
Dept: FAMILY MEDICINE CLINIC | Age: 75
End: 2025-01-02

## 2025-01-02 ENCOUNTER — HOSPITAL ENCOUNTER (OUTPATIENT)
Dept: CARDIAC REHAB | Age: 75
Setting detail: THERAPIES SERIES
End: 2025-01-02
Attending: INTERNAL MEDICINE
Payer: MEDICARE

## 2025-01-02 RX ORDER — HYDROXYZINE HYDROCHLORIDE 25 MG/1
TABLET, FILM COATED ORAL
Qty: 90 TABLET | Refills: 0 | Status: SHIPPED | OUTPATIENT
Start: 2025-01-02

## 2025-01-02 NOTE — TELEPHONE ENCOUNTER
She is up to date on pneumonia.  She can get FLU and RSV at pharmacy.  Will discuss osteoporosis injection at upcoming appt 1/15/25 - put in appointment notes.

## 2025-01-02 NOTE — TELEPHONE ENCOUNTER
Patient called in questioning vaccinations. She is interested in receiving Flu, RSV and Pneumonia. Not certain which ones you would tell her to get and which ones we can do in the office. Also questioned receiving a Osteoporosis Injection. Please advise.

## 2025-01-06 ENCOUNTER — TELEPHONE (OUTPATIENT)
Dept: FAMILY MEDICINE CLINIC | Age: 75
End: 2025-01-06

## 2025-01-06 DIAGNOSIS — J43.9 PULMONARY EMPHYSEMA, UNSPECIFIED EMPHYSEMA TYPE (HCC): ICD-10-CM

## 2025-01-06 RX ORDER — ALBUTEROL SULFATE 90 UG/1
1-2 INHALANT RESPIRATORY (INHALATION) EVERY 6 HOURS PRN
Qty: 18 G | Refills: 1 | Status: SHIPPED | OUTPATIENT
Start: 2025-01-06

## 2025-01-06 NOTE — TELEPHONE ENCOUNTER
Patient is requesting a new script for albuterol rescue inhaler. Stated she has not used it for a while, but does need it now. Please advise.

## 2025-01-07 ENCOUNTER — HOSPITAL ENCOUNTER (OUTPATIENT)
Dept: CARDIAC REHAB | Age: 75
Setting detail: THERAPIES SERIES
Discharge: HOME | End: 2025-01-07
Attending: INTERNAL MEDICINE
Payer: MEDICARE

## 2025-01-07 PROCEDURE — 94626 PHY/QHP OP PULM RHB W/MNTR: CPT

## 2025-01-09 ENCOUNTER — APPOINTMENT (OUTPATIENT)
Dept: CARDIAC REHAB | Age: 75
End: 2025-01-09
Attending: INTERNAL MEDICINE
Payer: MEDICARE

## 2025-01-14 ENCOUNTER — HOSPITAL ENCOUNTER (OUTPATIENT)
Dept: CARDIAC REHAB | Age: 75
Setting detail: THERAPIES SERIES
End: 2025-01-14
Attending: INTERNAL MEDICINE
Payer: MEDICARE

## 2025-01-16 ENCOUNTER — HOSPITAL ENCOUNTER (OUTPATIENT)
Dept: CARDIAC REHAB | Age: 75
Setting detail: THERAPIES SERIES
Discharge: HOME | End: 2025-01-16
Attending: INTERNAL MEDICINE
Payer: MEDICARE

## 2025-01-16 NOTE — PLAN OF CARE
Pt doing recommended home activity  []  Pt not doing home activitiy         ADL Goals   Initial Assessment      [x] Decrease SOB with ADLs      [x] Decreased SOB overall      ADL Goals Intervention/ Education Plan      -Initiate TX, RT and chair squats and increase pt’s strength and endurance        -Pacing, Breathing retraining       ADL Goals Reassessment        []  Pt is progressing  []  Pt is not progressing      []  ADL’s getting easier  []  ADL’s not getting easier          []  Pt states activities are getting easier/ able to go longer/ not get as SOB   ADL Goals Reassessment        []  Pt is progressing  []  Pt is not progressing      []  ADL’s getting easier  []  ADL’s not getting easier          []  Pt states activities are getting easier/ able to go longer/ not get as SOB ADL Goals Reassessment        []  Pt is progressing  []  Pt is not progressing      []  ADL’s getting easier  []  ADL’s not getting easier          []  Pt states activities are getting easier/ able to go longer/ not get as SOB   ADL Goals   Discharge        []  Pt showed strength and endurance gains  []  Pt did not progress    Goal achieved?  []  Yes  []  No                      Outcomes:  See Health and Functioning from the CAT tool and Strength and Endurance from 6 MWD above             EXACERBAT'N PREVENTION & MANAGEMENT  INITIAL ASSESSMENT      Pt reports;  [] 0 respiratory infections  [x] 1   [] >2  [x] Hospitalized in last 12 months x2, once for pneumonia and once for broken ribs   EXACERBAT'N PREVENTION & MANAGEMENT  PLAN        Address via Disease Self Management and Exacerbation prevention class:    -Hydration for mucus    -Hand Hygiene    -Evaluation of Sputum    -When to call MD  -S/Sx to report  -Decrease exposure to irritants/ exacerbators     EXACERBAT'N PREVENTION & MANAGEMENT  RE ASSESSMENT      []  Improved hydration    []  Correct hand washing tech’s and alcohol gel usage    []  Sputum assessment    []  Ability to

## 2025-01-20 ENCOUNTER — OFFICE VISIT (OUTPATIENT)
Dept: FAMILY MEDICINE CLINIC | Age: 75
End: 2025-01-20

## 2025-01-20 VITALS
RESPIRATION RATE: 16 BRPM | DIASTOLIC BLOOD PRESSURE: 60 MMHG | BODY MASS INDEX: 22.7 KG/M2 | HEART RATE: 72 BPM | HEIGHT: 63 IN | SYSTOLIC BLOOD PRESSURE: 98 MMHG | WEIGHT: 128.12 LBS

## 2025-01-20 DIAGNOSIS — Z23 NEED FOR INFLUENZA VACCINATION: ICD-10-CM

## 2025-01-20 DIAGNOSIS — E03.9 ACQUIRED HYPOTHYROIDISM: ICD-10-CM

## 2025-01-20 DIAGNOSIS — Z12.31 BREAST CANCER SCREENING BY MAMMOGRAM: ICD-10-CM

## 2025-01-20 DIAGNOSIS — Z00.00 MEDICARE ANNUAL WELLNESS VISIT, SUBSEQUENT: Primary | ICD-10-CM

## 2025-01-20 DIAGNOSIS — J43.2 CENTRILOBULAR EMPHYSEMA (HCC): ICD-10-CM

## 2025-01-20 DIAGNOSIS — I49.5 BRADY-TACHY SYNDROME (HCC): ICD-10-CM

## 2025-01-20 DIAGNOSIS — G91.2 NORMAL PRESSURE HYDROCEPHALUS (HCC): ICD-10-CM

## 2025-01-20 DIAGNOSIS — R73.01 IFG (IMPAIRED FASTING GLUCOSE): ICD-10-CM

## 2025-01-20 DIAGNOSIS — M81.6 LOCALIZED OSTEOPOROSIS WITHOUT CURRENT PATHOLOGICAL FRACTURE: ICD-10-CM

## 2025-01-20 DIAGNOSIS — J96.11 CHRONIC RESPIRATORY FAILURE WITH HYPOXIA: ICD-10-CM

## 2025-01-20 DIAGNOSIS — E55.9 VITAMIN D DEFICIENCY: ICD-10-CM

## 2025-01-20 DIAGNOSIS — E78.2 MIXED HYPERLIPIDEMIA: ICD-10-CM

## 2025-01-20 PROBLEM — R52 UNCONTROLLED PAIN: Status: ACTIVE | Noted: 2024-04-29

## 2025-01-20 PROBLEM — G91.9 HYDROCEPHALUS (HCC): Status: ACTIVE | Noted: 2024-04-29

## 2025-01-20 RX ORDER — DENOSUMAB 60 MG/ML
60 INJECTION SUBCUTANEOUS
Qty: 1 ML | Refills: 1 | Status: SHIPPED | OUTPATIENT
Start: 2025-01-20

## 2025-01-20 SDOH — ECONOMIC STABILITY: FOOD INSECURITY: WITHIN THE PAST 12 MONTHS, YOU WORRIED THAT YOUR FOOD WOULD RUN OUT BEFORE YOU GOT MONEY TO BUY MORE.: NEVER TRUE

## 2025-01-20 SDOH — ECONOMIC STABILITY: FOOD INSECURITY: WITHIN THE PAST 12 MONTHS, THE FOOD YOU BOUGHT JUST DIDN'T LAST AND YOU DIDN'T HAVE MONEY TO GET MORE.: NEVER TRUE

## 2025-01-20 ASSESSMENT — LIFESTYLE VARIABLES
HOW MANY STANDARD DRINKS CONTAINING ALCOHOL DO YOU HAVE ON A TYPICAL DAY: 1 OR 2
HOW OFTEN DO YOU HAVE A DRINK CONTAINING ALCOHOL: MONTHLY OR LESS

## 2025-01-20 ASSESSMENT — ENCOUNTER SYMPTOMS
SHORTNESS OF BREATH: 1
WHEEZING: 0
CONSTIPATION: 0

## 2025-01-20 ASSESSMENT — PATIENT HEALTH QUESTIONNAIRE - PHQ9
8. MOVING OR SPEAKING SO SLOWLY THAT OTHER PEOPLE COULD HAVE NOTICED. OR THE OPPOSITE, BEING SO FIGETY OR RESTLESS THAT YOU HAVE BEEN MOVING AROUND A LOT MORE THAN USUAL: MORE THAN HALF THE DAYS
7. TROUBLE CONCENTRATING ON THINGS, SUCH AS READING THE NEWSPAPER OR WATCHING TELEVISION: MORE THAN HALF THE DAYS
SUM OF ALL RESPONSES TO PHQ QUESTIONS 1-9: 19
SUM OF ALL RESPONSES TO PHQ QUESTIONS 1-9: 19
4. FEELING TIRED OR HAVING LITTLE ENERGY: NEARLY EVERY DAY
5. POOR APPETITE OR OVEREATING: NEARLY EVERY DAY
10. IF YOU CHECKED OFF ANY PROBLEMS, HOW DIFFICULT HAVE THESE PROBLEMS MADE IT FOR YOU TO DO YOUR WORK, TAKE CARE OF THINGS AT HOME, OR GET ALONG WITH OTHER PEOPLE: VERY DIFFICULT
6. FEELING BAD ABOUT YOURSELF - OR THAT YOU ARE A FAILURE OR HAVE LET YOURSELF OR YOUR FAMILY DOWN: MORE THAN HALF THE DAYS
SUM OF ALL RESPONSES TO PHQ QUESTIONS 1-9: 19
SUM OF ALL RESPONSES TO PHQ9 QUESTIONS 1 & 2: 4
9. THOUGHTS THAT YOU WOULD BE BETTER OFF DEAD, OR OF HURTING YOURSELF: NOT AT ALL
1. LITTLE INTEREST OR PLEASURE IN DOING THINGS: MORE THAN HALF THE DAYS
3. TROUBLE FALLING OR STAYING ASLEEP: NEARLY EVERY DAY
SUM OF ALL RESPONSES TO PHQ QUESTIONS 1-9: 19
2. FEELING DOWN, DEPRESSED OR HOPELESS: MORE THAN HALF THE DAYS

## 2025-01-20 NOTE — PATIENT INSTRUCTIONS
You may receive a survey about your visit with us today. The feedback from our patients helps us identify what is working well and where the service to all patients can be enhanced. Thank you!        Learning About Mindfulness for Stress  What are mindfulness and stress?     Stress is your body's response to a hard situation. Your body can have a physical, emotional, or mental response. A lot of things can cause stress. You may feel stress when you go on a job interview, take a test, or run a race. This kind of short-term stress is normal and even useful. It can help you if you need to work hard or react quickly.  Stress also can last a long time. Long-term stress is caused by stressful situations or events. Examples of long-term stress include long-term health problems, ongoing problems at work, and conflicts in your family. Long-term stress can harm your health.  Mindfulness is a focus only on things happening in the present moment. It's a process of purposefully paying attention to and being aware of your surroundings, your emotions, your thoughts, and how your body feels. You are aware of these things, but you aren't judging these experiences as \"good\" or \"bad.\" Mindfulness can help you learn to calm your mind and body to help you cope with illness, pain, and stress.  How does mindfulness help to relieve stress?  Mindfulness can help quiet your mind and relax your body. Studies show that it can help some people sleep better, feel less anxious, and bring their blood pressure down. And it's been shown to help some people live and cope better with certain health problems like heart disease, depression, chronic pain, and cancer.  How do you practice mindfulness?  To be mindful is to pay attention, to be present, and to be accepting. Like any new skill or habit, being mindful can take practice.  When you're mindful, you do just one thing and you pay close attention to that one thing. For example, you may sit quietly

## 2025-01-20 NOTE — PROGRESS NOTES
Immunizations Administered       Name Date Dose Route    Influenza, FLUAD, (age 65 y+), IM, Trivalent PF, 0.5mL 1/20/2025 0.5 mL Intramuscular    Site: Deltoid- Left    Lot: [id number]    NDC: 83531-688-43            After obtaining consent, and per orders of Óscar Baird CNP, injection of Fluad given in Left deltoid by Lucrecia Ivory CMA ([facility]). Patient instructed to report any adverse reaction to me immediately.      
Medicare Annual Wellness Visit    Magan Oleary is here for Medicare AWV, 6 Month Follow-Up, Immunizations (Flu vaccine ), Nasal Congestion (Runny nose), and Other (Discuss osteoporosis )    Assessment & Plan   Medicare annual wellness visit, subsequent  Mixed hyperlipidemia  IFG (impaired fasting glucose)  -     CBC with Auto Differential; Future  -     Comprehensive Metabolic Panel; Future  -     Hemoglobin A1C; Future  Vitamin D deficiency  -     Vitamin D 25 Hydroxy; Future  Acquired hypothyroidism  -     TSH; Future  -     T4, Free; Future  -     Lipid Panel; Future  Chronic respiratory failure with hypoxia  Shane-tachy syndrome (HCC)  Normal pressure hydrocephalus (HCC)  Centrilobular emphysema (HCC)  Localized osteoporosis without current pathological fracture  -     denosumab (PROLIA) 60 MG/ML SOSY SC injection; Inject 1 mL into the skin every 6 months, Disp-1 mL, R-1Normal  Breast cancer screening by mammogram  -     DINA DIGITAL SCREEN W OR WO CAD BILATERAL; Future  Need for influenza vaccination  -     Influenza, FLUAD Trivalent, (age 65 y+), IM, Preservative Free, 0.5mL       No follow-ups on file.     Subjective       Patient's complete Health Risk Assessment and screening values have been reviewed and are found in Flowsheets. The following problems were reviewed today and where indicated follow up appointments were made and/or referrals ordered.    Positive Risk Factor Screenings with Interventions:      Depression:  PHQ-2 Score: 4  PHQ-9 Total Score: 19  Total Score Interpretation: 15-19 = moderately severe depression  Interventions:  Life style changes to improve mood reviewed           Inactivity:  On average, how many days per week do you engage in moderate to strenuous exercise (like a brisk walk)?: 0 days (!) Abnormal  On average, how many minutes do you engage in exercise at this level?: 0 min  Interventions:  Patient declined any further interventions or treatment    Poor Eating 
05/28/2024       BP wnl    BP Readings from Last 3 Encounters:   01/20/25 98/60   12/09/24 125/78   07/15/24 106/62     Labs reviewed.      Lab Results   Component Value Date    LABA1C 5.5 05/29/2024    LABA1C 5.5 12/18/2023    LABA1C 5.7 12/20/2022     Lab Results   Component Value Date     05/29/2024       No components found for: \"CHLPL\"  Lab Results   Component Value Date    TRIG 83 12/18/2023    TRIG 80 06/28/2021    TRIG 110 06/16/2020     Lab Results   Component Value Date    HDL 77 12/18/2023     06/28/2021     (A) 06/16/2020     No components found for: \"LDLCALC\"    No components found for: \"LABVLDL\"      Chemistry        Component Value Date/Time     07/08/2024 1615    K 4.8 07/08/2024 1615    K 4.2 05/31/2024 0525     07/08/2024 1615    CO2 27 07/08/2024 1615    BUN 13 07/08/2024 1615    CREATININE 0.6 07/08/2024 1615        Component Value Date/Time    CALCIUM 9.9 07/08/2024 1615    ALKPHOS 61 12/20/2023 1510    AST 28 12/20/2023 1510    ALT 20 12/20/2023 1510    BILITOT 0.3 12/20/2023 1510          Lab Results   Component Value Date    TSH 0.353 (L) 05/28/2024       Lab Results   Component Value Date    WBC 8.1 07/08/2024    HGB 13.3 07/08/2024    HCT 42.0 07/08/2024    MCV 89.9 07/08/2024     07/08/2024         Health Maintenance   Topic Date Due    Respiratory Syncytial Virus (RSV) Pregnant or age 60 yrs+ (1 - Risk 60-74 years 1-dose series) Never done    Breast cancer screen  05/02/2024    Flu vaccine (1) 08/01/2024    COVID-19 Vaccine (7 - 2023-24 season) 09/01/2024    Lipids  12/18/2024    Lung Cancer Screening &/or Counseling  08/19/2025    Depression Monitoring  01/20/2026    Annual Wellness Visit (Medicare)  01/21/2026    Colorectal Cancer Screen  11/18/2032    DTaP/Tdap/Td vaccine (3 - Td or Tdap) 09/28/2033    DEXA (modify frequency per FRAX score)  Completed    Shingles vaccine  Completed    Pneumococcal 65+ years Vaccine  Completed    Hepatitis C

## 2025-01-21 ENCOUNTER — APPOINTMENT (OUTPATIENT)
Dept: CARDIAC REHAB | Age: 75
End: 2025-01-21
Attending: INTERNAL MEDICINE
Payer: MEDICARE

## 2025-01-23 ENCOUNTER — APPOINTMENT (OUTPATIENT)
Dept: CARDIAC REHAB | Age: 75
End: 2025-01-23
Attending: INTERNAL MEDICINE
Payer: MEDICARE

## 2025-01-28 ENCOUNTER — HOSPITAL ENCOUNTER (OUTPATIENT)
Dept: CARDIAC REHAB | Age: 75
Setting detail: THERAPIES SERIES
Discharge: HOME | End: 2025-01-28
Attending: INTERNAL MEDICINE
Payer: MEDICARE

## 2025-01-28 PROCEDURE — 94626 PHY/QHP OP PULM RHB W/MNTR: CPT

## 2025-01-30 ENCOUNTER — HOSPITAL ENCOUNTER (OUTPATIENT)
Dept: CARDIAC REHAB | Age: 75
Setting detail: THERAPIES SERIES
Discharge: HOME | End: 2025-01-30
Attending: INTERNAL MEDICINE
Payer: MEDICARE

## 2025-01-30 PROCEDURE — 94626 PHY/QHP OP PULM RHB W/MNTR: CPT

## 2025-01-31 ENCOUNTER — LAB (OUTPATIENT)
Dept: LAB | Age: 75
End: 2025-01-31

## 2025-01-31 DIAGNOSIS — E03.9 ACQUIRED HYPOTHYROIDISM: ICD-10-CM

## 2025-01-31 DIAGNOSIS — R73.01 IFG (IMPAIRED FASTING GLUCOSE): ICD-10-CM

## 2025-01-31 DIAGNOSIS — E03.9 HYPOTHYROIDISM, UNSPECIFIED TYPE: ICD-10-CM

## 2025-01-31 DIAGNOSIS — E55.9 VITAMIN D DEFICIENCY: ICD-10-CM

## 2025-01-31 LAB
25(OH)D3 SERPL-MCNC: 103 NG/ML (ref 30–100)
ALBUMIN SERPL BCG-MCNC: 4.7 G/DL (ref 3.5–5.1)
ALP SERPL-CCNC: 45 U/L (ref 38–126)
ALT SERPL W/O P-5'-P-CCNC: 16 U/L (ref 11–66)
ANION GAP SERPL CALC-SCNC: 12 MEQ/L (ref 8–16)
AST SERPL-CCNC: 26 U/L (ref 5–40)
BASOPHILS ABSOLUTE: 0.1 THOU/MM3 (ref 0–0.1)
BASOPHILS NFR BLD AUTO: 1.2 %
BILIRUB SERPL-MCNC: 0.4 MG/DL (ref 0.3–1.2)
BUN SERPL-MCNC: 18 MG/DL (ref 7–22)
CALCIUM SERPL-MCNC: 9.5 MG/DL (ref 8.5–10.5)
CHLORIDE SERPL-SCNC: 105 MEQ/L (ref 98–111)
CHOLEST SERPL-MCNC: 196 MG/DL (ref 100–199)
CO2 SERPL-SCNC: 28 MEQ/L (ref 23–33)
CREAT SERPL-MCNC: 0.7 MG/DL (ref 0.4–1.2)
DEPRECATED MEAN GLUCOSE BLD GHB EST-ACNC: 99 MG/DL (ref 70–126)
DEPRECATED RDW RBC AUTO: 42.5 FL (ref 35–45)
EOSINOPHIL NFR BLD AUTO: 2.3 %
EOSINOPHILS ABSOLUTE: 0.2 THOU/MM3 (ref 0–0.4)
ERYTHROCYTE [DISTWIDTH] IN BLOOD BY AUTOMATED COUNT: 12.8 % (ref 11.5–14.5)
GFR SERPL CREATININE-BSD FRML MDRD: 90 ML/MIN/1.73M2
GLUCOSE SERPL-MCNC: 87 MG/DL (ref 70–108)
HBA1C MFR BLD HPLC: 5.3 % (ref 4.4–6.4)
HCT VFR BLD AUTO: 43.7 % (ref 37–47)
HDLC SERPL-MCNC: 81 MG/DL
HGB BLD-MCNC: 13.8 GM/DL (ref 12–16)
IMM GRANULOCYTES # BLD AUTO: 0.02 THOU/MM3 (ref 0–0.07)
IMM GRANULOCYTES NFR BLD AUTO: 0.3 %
LDLC SERPL CALC-MCNC: 95 MG/DL
LYMPHOCYTES ABSOLUTE: 2.5 THOU/MM3 (ref 1–4.8)
LYMPHOCYTES NFR BLD AUTO: 36.6 %
MCH RBC QN AUTO: 28.7 PG (ref 26–33)
MCHC RBC AUTO-ENTMCNC: 31.6 GM/DL (ref 32.2–35.5)
MCV RBC AUTO: 90.9 FL (ref 81–99)
MONOCYTES ABSOLUTE: 0.6 THOU/MM3 (ref 0.4–1.3)
MONOCYTES NFR BLD AUTO: 8.8 %
NEUTROPHILS ABSOLUTE: 3.5 THOU/MM3 (ref 1.8–7.7)
NEUTROPHILS NFR BLD AUTO: 50.8 %
NRBC BLD AUTO-RTO: 0 /100 WBC
PLATELET # BLD AUTO: 276 THOU/MM3 (ref 130–400)
PMV BLD AUTO: 12.2 FL (ref 9.4–12.4)
POTASSIUM SERPL-SCNC: 4.2 MEQ/L (ref 3.5–5.2)
PROT SERPL-MCNC: 7.6 G/DL (ref 6.1–8)
RBC # BLD AUTO: 4.81 MILL/MM3 (ref 4.2–5.4)
SODIUM SERPL-SCNC: 145 MEQ/L (ref 135–145)
T4 FREE SERPL-MCNC: 1.09 NG/DL (ref 0.93–1.68)
TRIGL SERPL-MCNC: 101 MG/DL (ref 0–199)
TSH SERPL DL<=0.005 MIU/L-ACNC: 0.9 UIU/ML (ref 0.4–4.2)
WBC # BLD AUTO: 6.9 THOU/MM3 (ref 4.8–10.8)

## 2025-01-31 RX ORDER — LEVOTHYROXINE SODIUM 75 UG/1
75 TABLET ORAL DAILY
Qty: 90 TABLET | Refills: 3 | Status: SHIPPED | OUTPATIENT
Start: 2025-01-31

## 2025-02-03 DIAGNOSIS — F41.9 CHRONIC ANXIETY: ICD-10-CM

## 2025-02-03 DIAGNOSIS — M81.6 LOCALIZED OSTEOPOROSIS WITHOUT CURRENT PATHOLOGICAL FRACTURE: Primary | ICD-10-CM

## 2025-02-03 RX ORDER — FLUTICASONE PROPIONATE 50 MCG
1 SPRAY, SUSPENSION (ML) NASAL DAILY
Qty: 16 G | Refills: 0 | Status: SHIPPED | OUTPATIENT
Start: 2025-02-03

## 2025-02-03 NOTE — TELEPHONE ENCOUNTER
Called pharmacy and prolia requires PA. Pharmacy is trying to send PA code through cover my meds.

## 2025-02-03 NOTE — TELEPHONE ENCOUNTER
Patient called and stated that she needs a refill on flonase.     She also wanted to know if we have heard anything on patient prolia prescription. She stated that she spoke with the pharmacy and they told her to contact our office.   Informed patient I don't see that we have heard from the pharmacy but that I would call pharmacy and check to see if medication needed a PA. She verbalized understanding.

## 2025-02-04 ENCOUNTER — HOSPITAL ENCOUNTER (OUTPATIENT)
Dept: CARDIAC REHAB | Age: 75
Setting detail: THERAPIES SERIES
Discharge: HOME | End: 2025-02-04
Attending: INTERNAL MEDICINE
Payer: MEDICARE

## 2025-02-04 PROCEDURE — 94626 PHY/QHP OP PULM RHB W/MNTR: CPT

## 2025-02-04 NOTE — TELEPHONE ENCOUNTER
Approved today by [employer] 2017  PA Case: [id number], Status: Approved, Coverage Starts on: 11/6/2024 12:00:00 AM, Coverage Ends on: 2/4/2026 12:00:00 AM.  Authorization Expiration Date: 2/3/2026    Called pharmacy and left a message with approval.     Called patient and left a message

## 2025-02-05 ENCOUNTER — TELEPHONE (OUTPATIENT)
Dept: FAMILY MEDICINE CLINIC | Age: 75
End: 2025-02-05

## 2025-02-05 RX ORDER — RISEDRONATE SODIUM 150 MG/1
1 TABLET, FILM COATED ORAL
Qty: 1 TABLET | Refills: 11 | Status: SHIPPED | OUTPATIENT
Start: 2025-02-05

## 2025-02-05 RX ORDER — HYDROXYZINE HYDROCHLORIDE 25 MG/1
TABLET, FILM COATED ORAL
Qty: 90 TABLET | Refills: 2 | Status: SHIPPED | OUTPATIENT
Start: 2025-02-05

## 2025-02-05 NOTE — TELEPHONE ENCOUNTER
Called patient and informed. She stated that daughter went to  the prescription and with insurance the injection will be $1,000 every 6 months and she just can't afford it.   She stated that when she spoke with pharmacy they mentioned there is a once a month pill. Patient was interested in that.       She also stated that she need a prescription for hydroxyzine.     Informed patient of thyroid lab results again and she stated that she still feels like she will need the anxiety medication even with the increase of the thyroid medication.       She will check with [facility] pharmacy later today

## 2025-02-05 NOTE — TELEPHONE ENCOUNTER
Approved today by [employer] 2017  PA Case: [id number], Status: Approved, Coverage Starts on: 11/6/2024 12:00:00 AM, Coverage Ends on: 2/5/2026 12:00:00 AM.      Called pharmacy and left a message with approval     Spoke with patient earlier today about changing of medication and she stated that she would check later in the day for the medication.

## 2025-02-06 ENCOUNTER — APPOINTMENT (OUTPATIENT)
Dept: CARDIAC REHAB | Age: 75
End: 2025-02-06
Attending: INTERNAL MEDICINE
Payer: MEDICARE

## 2025-02-11 ENCOUNTER — APPOINTMENT (OUTPATIENT)
Dept: CARDIAC REHAB | Age: 75
End: 2025-02-11
Attending: INTERNAL MEDICINE
Payer: MEDICARE

## 2025-02-11 ENCOUNTER — TELEPHONE (OUTPATIENT)
Dept: FAMILY MEDICINE CLINIC | Age: 75
End: 2025-02-11

## 2025-02-11 DIAGNOSIS — M81.6 LOCALIZED OSTEOPOROSIS WITHOUT CURRENT PATHOLOGICAL FRACTURE: Primary | ICD-10-CM

## 2025-02-11 RX ORDER — ALENDRONATE SODIUM 70 MG/1
70 TABLET ORAL
Qty: 12 TABLET | Refills: 3 | Status: SHIPPED | OUTPATIENT
Start: 2025-02-11

## 2025-02-11 NOTE — TELEPHONE ENCOUNTER
Pt called office after speaking with insurance and she would like to go back on Fosamax weekly. Says the other is too costly. Pt is asking for this rx to be sent to [facility] [address]. If no call back she will check with the pharmacy after 5pm.

## 2025-02-11 NOTE — TELEPHONE ENCOUNTER
Patient calling regarding cost of Actonel $50/month.  She does not believe this is affordable for her and would like to go back on Fosamax.  She is going to check with insurance on the cost of Fosamax and get back with us.

## 2025-02-13 ENCOUNTER — APPOINTMENT (OUTPATIENT)
Dept: CARDIAC REHAB | Age: 75
End: 2025-02-13
Attending: INTERNAL MEDICINE
Payer: MEDICARE

## 2025-02-18 ENCOUNTER — APPOINTMENT (OUTPATIENT)
Dept: CARDIAC REHAB | Age: 75
End: 2025-02-18
Attending: INTERNAL MEDICINE
Payer: MEDICARE

## 2025-02-20 ENCOUNTER — APPOINTMENT (OUTPATIENT)
Dept: CARDIAC REHAB | Age: 75
End: 2025-02-20
Attending: INTERNAL MEDICINE
Payer: MEDICARE

## 2025-02-25 ENCOUNTER — HOSPITAL ENCOUNTER (OUTPATIENT)
Dept: CARDIAC REHAB | Age: 75
Setting detail: THERAPIES SERIES
Discharge: HOME | End: 2025-02-25
Attending: INTERNAL MEDICINE
Payer: MEDICARE

## 2025-02-25 PROCEDURE — 94626 PHY/QHP OP PULM RHB W/MNTR: CPT

## 2025-02-26 ENCOUNTER — TELEPHONE (OUTPATIENT)
Dept: FAMILY MEDICINE CLINIC | Age: 75
End: 2025-02-26

## 2025-02-26 ENCOUNTER — LAB (OUTPATIENT)
Dept: LAB | Age: 75
End: 2025-02-26

## 2025-02-26 DIAGNOSIS — R30.0 DYSURIA: ICD-10-CM

## 2025-02-26 DIAGNOSIS — R30.0 DYSURIA: Primary | ICD-10-CM

## 2025-02-26 LAB
BACTERIA: ABNORMAL
BILIRUB UR QL STRIP: NEGATIVE
CASTS #/AREA URNS LPF: ABNORMAL /LPF
CASTS #/AREA URNS LPF: ABNORMAL /LPF
CHARACTER UR: ABNORMAL
CHARCOAL URNS QL MICRO: ABNORMAL
COLOR UR: YELLOW
CRYSTALS URNS QL MICRO: ABNORMAL
EPITHELIAL CELLS, UA: ABNORMAL /HPF
GLUCOSE UR QL STRIP.AUTO: NEGATIVE MG/DL
HGB UR QL STRIP.AUTO: NEGATIVE
KETONES UR QL STRIP.AUTO: NEGATIVE
LEUKOCYTE ESTERASE UR QL STRIP.AUTO: ABNORMAL
NITRITE UR QL STRIP.AUTO: NEGATIVE
PH UR STRIP.AUTO: 7.5 [PH] (ref 5–9)
PROT UR STRIP.AUTO-MCNC: NEGATIVE MG/DL
RBC #/AREA URNS HPF: ABNORMAL /HPF
RENAL EPI CELLS #/AREA URNS HPF: ABNORMAL /[HPF]
SPECIFIC GRAVITY UA: 1.02 (ref 1–1.03)
UROBILINOGEN, URINE: 0.2 EU/DL (ref 0–1)
WBC #/AREA URNS HPF: ABNORMAL /HPF
YEAST LIKE FUNGI URNS QL MICRO: ABNORMAL

## 2025-02-26 NOTE — TELEPHONE ENCOUNTER
Patient c/o dysuria, incontinence, urinary urgency, urinary frequency x 2 days.  Denies hematuria.  Requesting order to have urine done at [facility].  Please advise

## 2025-02-27 ENCOUNTER — HOSPITAL ENCOUNTER (OUTPATIENT)
Dept: CARDIAC REHAB | Age: 75
Setting detail: THERAPIES SERIES
End: 2025-02-27
Attending: INTERNAL MEDICINE
Payer: MEDICARE

## 2025-02-27 LAB
BACTERIA UR CULT: ABNORMAL
ORGANISM: ABNORMAL

## 2025-02-27 RX ORDER — CIPROFLOXACIN 250 MG/1
250 TABLET, FILM COATED ORAL 2 TIMES DAILY
Qty: 14 TABLET | Refills: 0 | Status: SHIPPED | OUTPATIENT
Start: 2025-02-27 | End: 2025-03-06

## 2025-03-04 ENCOUNTER — HOSPITAL ENCOUNTER (OUTPATIENT)
Dept: CARDIAC REHAB | Age: 75
Setting detail: THERAPIES SERIES
Discharge: HOME | End: 2025-03-04
Attending: INTERNAL MEDICINE
Payer: MEDICARE

## 2025-03-04 PROCEDURE — 94626 PHY/QHP OP PULM RHB W/MNTR: CPT

## 2025-03-06 ENCOUNTER — HOSPITAL ENCOUNTER (OUTPATIENT)
Dept: CARDIAC REHAB | Age: 75
Setting detail: THERAPIES SERIES
Discharge: HOME | End: 2025-03-06
Attending: INTERNAL MEDICINE
Payer: MEDICARE

## 2025-03-06 PROCEDURE — 94626 PHY/QHP OP PULM RHB W/MNTR: CPT

## 2025-03-11 ENCOUNTER — HOSPITAL ENCOUNTER (OUTPATIENT)
Dept: CARDIAC REHAB | Age: 75
Setting detail: THERAPIES SERIES
Discharge: HOME | End: 2025-03-11
Attending: INTERNAL MEDICINE
Payer: MEDICARE

## 2025-03-11 PROCEDURE — 94626 PHY/QHP OP PULM RHB W/MNTR: CPT

## 2025-03-13 ENCOUNTER — HOSPITAL ENCOUNTER (OUTPATIENT)
Dept: CARDIAC REHAB | Age: 75
Setting detail: THERAPIES SERIES
Discharge: HOME | End: 2025-03-13
Attending: INTERNAL MEDICINE
Payer: MEDICARE

## 2025-03-13 PROCEDURE — 94626 PHY/QHP OP PULM RHB W/MNTR: CPT

## 2025-03-13 NOTE — PLAN OF CARE
before and did do 2 of the education sessions in 2022. Exacerbation Prevention & Management Goals/Education  Reassessment        []  Pt has had class   [x]  Pt has not had class        []  Pt has had all classes  []  Pt has had most classes  [x]  Pt has had no classes Exacerbation Prevention & Management Goals/Education  Discharge        []  Pt had class   Date:    []  Pt did not have class        []  Pt attended all relevant classes and all questions were answered  []  Pt has had most classes  []  Pt has had no classes                   PHYSICIAN INTERACTION    MD Initial Assessment Review    [x] Initial  Assessment Reviewed    [x] Treatment Plan and Goals support patient needs/ abilities      Cosigned and dated below:   PHYSICIAN INTERACTION    MD 30 day and Plan Review:      [x] I agree with the plan  [x] Continue with progression and instruction  [] Continue, but with the following changes:      Cosigned and dated below: PHYSICIAN INTERACTION    MD 30 day Reassessment Review:    [x]  Continue with the current program  []  Continue, but with the following changes:  []  Discharge patient      Cosigned and dated below: PHYSICIAN INTERACTION    MD 30 day Reassessment Review:    [x]  Continue with the current program  []  Continue, but with the following changes:  []  Discharge patient      Cosigned and dated below: PHYSICIAN INTERACTION    MD 30 day Reassessment Review:    [x]  Continue with the current program  []  Continue, but with the following changes:  []  Discharge patient      Cosigned and dated below: PHYSICIAN INTERACTION    MD 30 day   Discharge Review:    [x]  Discharge patient      [] Pt completed program  [] Pt had to stop        Cosigned and dated below:     Date/Time User Provider Type Action   11/26/2024  8:46 PM Óscar Ivy MD Physician Cosign   11/26/2024  5:43 PM Carolyn Miranda P Respiratory Therapist Sign     Cosigned by: Óscar Ivy MD at 12/26/2024  4:11 PM     Revision

## 2025-03-18 ENCOUNTER — HOSPITAL ENCOUNTER (OUTPATIENT)
Dept: CARDIAC REHAB | Age: 75
Setting detail: THERAPIES SERIES
Discharge: HOME | End: 2025-03-18
Attending: INTERNAL MEDICINE
Payer: MEDICARE

## 2025-03-18 PROCEDURE — 94626 PHY/QHP OP PULM RHB W/MNTR: CPT

## 2025-03-20 ENCOUNTER — HOSPITAL ENCOUNTER (OUTPATIENT)
Dept: CARDIAC REHAB | Age: 75
Setting detail: THERAPIES SERIES
Discharge: HOME | End: 2025-03-20
Attending: INTERNAL MEDICINE
Payer: MEDICARE

## 2025-03-20 PROCEDURE — 94626 PHY/QHP OP PULM RHB W/MNTR: CPT

## 2025-03-25 ENCOUNTER — HOSPITAL ENCOUNTER (OUTPATIENT)
Dept: CARDIAC REHAB | Age: 75
Setting detail: THERAPIES SERIES
Discharge: HOME | End: 2025-03-25
Attending: INTERNAL MEDICINE
Payer: MEDICARE

## 2025-03-25 PROCEDURE — 94626 PHY/QHP OP PULM RHB W/MNTR: CPT

## 2025-03-27 ENCOUNTER — TELEPHONE (OUTPATIENT)
Dept: FAMILY MEDICINE CLINIC | Age: 75
End: 2025-03-27

## 2025-03-27 ENCOUNTER — HOSPITAL ENCOUNTER (OUTPATIENT)
Dept: CARDIAC REHAB | Age: 75
Setting detail: THERAPIES SERIES
End: 2025-03-27
Attending: INTERNAL MEDICINE
Payer: MEDICARE

## 2025-03-27 ENCOUNTER — TELEPHONE (OUTPATIENT)
Dept: UROLOGY | Age: 75
End: 2025-03-27

## 2025-03-27 ENCOUNTER — OFFICE VISIT (OUTPATIENT)
Dept: UROLOGY | Age: 75
End: 2025-03-27
Payer: MEDICARE

## 2025-03-27 VITALS
SYSTOLIC BLOOD PRESSURE: 132 MMHG | WEIGHT: 128 LBS | OXYGEN SATURATION: 90 % | DIASTOLIC BLOOD PRESSURE: 66 MMHG | BODY MASS INDEX: 23.55 KG/M2 | HEART RATE: 78 BPM | HEIGHT: 62 IN

## 2025-03-27 DIAGNOSIS — R39.15 URINARY URGENCY: ICD-10-CM

## 2025-03-27 DIAGNOSIS — N39.46 MIXED INCONTINENCE: Primary | ICD-10-CM

## 2025-03-27 DIAGNOSIS — R35.0 URINARY FREQUENCY: ICD-10-CM

## 2025-03-27 DIAGNOSIS — N39.0 RECURRENT UTI: ICD-10-CM

## 2025-03-27 PROCEDURE — 1159F MED LIST DOCD IN RCRD: CPT

## 2025-03-27 PROCEDURE — 99214 OFFICE O/P EST MOD 30 MIN: CPT

## 2025-03-27 PROCEDURE — 1123F ACP DISCUSS/DSCN MKR DOCD: CPT

## 2025-03-27 PROCEDURE — 1036F TOBACCO NON-USER: CPT

## 2025-03-27 PROCEDURE — 0509F URINE INCON PLAN DOCD: CPT

## 2025-03-27 PROCEDURE — G8420 CALC BMI NORM PARAMETERS: HCPCS

## 2025-03-27 PROCEDURE — G8399 PT W/DXA RESULTS DOCUMENT: HCPCS

## 2025-03-27 PROCEDURE — 3017F COLORECTAL CA SCREEN DOC REV: CPT

## 2025-03-27 PROCEDURE — G8427 DOCREV CUR MEDS BY ELIG CLIN: HCPCS

## 2025-03-27 PROCEDURE — 1090F PRES/ABSN URINE INCON ASSESS: CPT

## 2025-03-27 ASSESSMENT — ENCOUNTER SYMPTOMS: ABDOMINAL PAIN: 0

## 2025-03-27 NOTE — PROGRESS NOTES
[facility] [place] [facility]  [facility]  [address]  [address]  [facility] [address]  Dept: [phone]  Loc: [phone]    Visit Date: 3/27/2025    History of Present Illness  Magan Oleary is a 74 y.o. female,Established patient, here for evaluation of the following chief complaint(s):  Dysuria    Pt seen in follow up for urinary frequency and urgency.  Daughters accompanying pt to appt today.       3/27/2025  Complaints of urinary frequency, urgency. 4-5 PPD. Up 2-3 times a night. Trospium is working alright, minimal side effects. On cranberry for UTI prevention. Poor water intake. Patient is hesitant for trialing Interstim. 3 cultures since November, most recent with mixed growth.       Summary of Prior Visits:  Magan has a hx of OAB with mixed incontinence.  She previously failed to have improvement on oxybutynin.  Myrbetriq worked but she was unable to afford cost of medication.  Vesicare improved symptoms but still had episodes of complete incontinence.  At appt 8/2022 Vesicare was stopped and she was started on trospium 20 mg BID which she continues at this time.  Completed pelvic floor therapy previously. Has discussed and declined 3rd line therapies with Dr. Sharpe and Shakira in past.      Pt noted several UTIs in 2023.  Cystoscopy by Dr. Sharpe 8/29/23 with moderate bladder trabeculation and severe residual urine.  Interstim was discussed for pt's urinary retention and possible overflow incontinence.  Pt has been hesitant to consider SNS.       Renal US 8/22/23 with mild R sided hydronephrosis and incompletely distended urinary bladder.  CT abd/pelvis 4/2024 negative for any acute urologic findings or hydronephrosis.       Hx of chronic constipation.    Current Outpatient Medications   Medication Sig Dispense Refill    alendronate (FOSAMAX) 70 MG tablet Take 1 tablet by mouth every 7 days 12 tablet 3    hydrOXYzine HCl (ATARAX) 25 MG tablet TAKE 1/2 TO 1 (ONE-HALF TO

## 2025-03-27 NOTE — TELEPHONE ENCOUNTER
DO NOT TAKE  FISH OIL, MOBIC, IBUPROFEN, MOTRIN-LIKE DRUGS AND ANY MULTIVITAMINS OR OVER THE COUNTER SUPPLEMENTS 14 DAYS PRIOR     OK TO USE TYLENOL    You have been scheduled for the procedure marked below:      Procedure: PERIPHERAL NERVE EVALUATION           Date: 5/8/2025                     Time:8:15AM    Place of Service: [facility], [address] [place] [address]                                Phone # [phone]  Opt # 4       FOLLOW UP APPOINTMENT FOR LEAD REMOVAL IS ON 5/15/2025 AT 11:15AM. MAKE SURE YOU BRING YOUR COMPLETED VOIDING DIARY WITH YOU.

## 2025-03-27 NOTE — TELEPHONE ENCOUNTER
Pt called office stating someone called her a couple months ago stating her insurance covers testing to find out what diseases she is prone to. This company sent her swabs in the mail to collect the samples at home to be tested. There are a couple results from [facility] scanned into [employer] on 12/31/24, 1/9/25 and 2/11/25. Pt would like you to take a look at these and call her back.

## 2025-03-27 NOTE — PATIENT INSTRUCTIONS
Please call the office at [phone] if you have any questions or concerns following your visit. If you were prescribed a new medication and have questions, feel free to call. For any emergent issues, please go to the nearest emergency room for further evaluation.    Will set up for [facility] trial in [facility] to evaluate effectiveness.

## 2025-04-01 ENCOUNTER — APPOINTMENT (OUTPATIENT)
Dept: CARDIAC REHAB | Age: 75
End: 2025-04-01
Attending: INTERNAL MEDICINE
Payer: MEDICARE

## 2025-04-03 ENCOUNTER — HOSPITAL ENCOUNTER (OUTPATIENT)
Dept: CARDIAC REHAB | Age: 75
Setting detail: THERAPIES SERIES
Discharge: HOME | End: 2025-04-03
Attending: INTERNAL MEDICINE
Payer: MEDICARE

## 2025-04-03 PROCEDURE — 94626 PHY/QHP OP PULM RHB W/MNTR: CPT

## 2025-04-08 ENCOUNTER — HOSPITAL ENCOUNTER (OUTPATIENT)
Dept: CARDIAC REHAB | Age: 75
Setting detail: THERAPIES SERIES
Discharge: HOME | End: 2025-04-08
Attending: INTERNAL MEDICINE
Payer: MEDICARE

## 2025-04-08 PROCEDURE — 94626 PHY/QHP OP PULM RHB W/MNTR: CPT

## 2025-04-10 ENCOUNTER — HOSPITAL ENCOUNTER (OUTPATIENT)
Dept: CARDIAC REHAB | Age: 75
Setting detail: THERAPIES SERIES
Discharge: HOME | End: 2025-04-10
Attending: INTERNAL MEDICINE
Payer: MEDICARE

## 2025-04-10 PROCEDURE — 94626 PHY/QHP OP PULM RHB W/MNTR: CPT

## 2025-04-10 NOTE — PLAN OF CARE
PULMONARY REHABILITATION   [facility] Facility-Based Therapy for COPD   INDIVIDUAL TREATMENT PLAN (ITP)         Name: Magan HOOD Anirudh DUMONTB.: 1950       Age: 74 y.o.    MRN: [id number]              Diagnosis:Moderate COPDGOLD Stage 2     PFT:FEV1/FVC: 59,FEV1: 59     Allergies:   Allergies        Allergies   Allergen Reactions    Macrobid [Nitrofurantoin] Diarrhea    Sulfa Antibiotics Nausea And Vomiting            Patient Goals:            [x] Breathe Better                        [x] Increase my Enurance                          [x] Control Cough                                                             [x] Ease ADL's                             [x] Rely Less on Others                             [x] Fell less anxious/more confident                                                  [x] Have More Engery                 [x] Understand and Use Meds Correctly               [x]Make Fewer visits to hospital  [] Quit Smoking              Transportation needed?   [x]        [facility] set up?    [x]     - [facility] will be set up for pts appointments.                      INDIVIDUAL TREATMENT PLAN     INITIAL ASSESSMENT     Day #1 INDIVIDUAL TREATMENT PLAN     PLAN        Day #2-30 INDIVIDUAL TREATMENT PLAN     RE ASSESSMENT     Day #31-60 INDIVIDUAL TREATMENT PLAN     RE ASSESSMENT     Day #61-90 INDIVIDUAL TREATMENT PLAN     RE ASSESSMENT     Day # INDIVIDUAL TREATMENT PLAN     DISCHARGE        Last Day          Date: 2024       Date: 2024    Date: 2025  Pt has only been at 2 exercise sessions since last assessment.  Pt not here today for reasssessment.    Date: 2025  Pt has only been to 3 exercise sessions since last assessment.  Pt not here today for reassessment.     Date: 3/13/2025    Date:    EXERCISE   INITIAL ASSESSMENT        Prescribed Oxygen Use  Activity: 3-4 L/min per chart and 4LPM per pt  [x]Continuous at home  [x]Breath Actuated when using POC

## 2025-04-12 DIAGNOSIS — F32.9 REACTIVE DEPRESSION: ICD-10-CM

## 2025-04-13 RX ORDER — DULOXETIN HYDROCHLORIDE 60 MG/1
60 CAPSULE, DELAYED RELEASE ORAL DAILY
Qty: 90 CAPSULE | Refills: 3 | Status: SHIPPED | OUTPATIENT
Start: 2025-04-13

## 2025-04-15 ENCOUNTER — HOSPITAL ENCOUNTER (OUTPATIENT)
Dept: CARDIAC REHAB | Age: 75
Setting detail: THERAPIES SERIES
Discharge: HOME | End: 2025-04-15
Attending: INTERNAL MEDICINE
Payer: MEDICARE

## 2025-04-15 PROCEDURE — 94626 PHY/QHP OP PULM RHB W/MNTR: CPT

## 2025-04-16 ENCOUNTER — TELEPHONE (OUTPATIENT)
Dept: FAMILY MEDICINE CLINIC | Age: 75
End: 2025-04-16

## 2025-04-16 NOTE — TELEPHONE ENCOUNTER
Patient calling and requesting a copy of her [facility] results from 2/11/25 be mailed to her.  Printed report out and mailed to patient

## 2025-04-17 ENCOUNTER — APPOINTMENT (OUTPATIENT)
Dept: CARDIAC REHAB | Age: 75
End: 2025-04-17
Attending: INTERNAL MEDICINE
Payer: MEDICARE

## 2025-04-22 ENCOUNTER — HOSPITAL ENCOUNTER (OUTPATIENT)
Dept: CARDIAC REHAB | Age: 75
Setting detail: THERAPIES SERIES
Discharge: HOME | End: 2025-04-22
Attending: INTERNAL MEDICINE
Payer: MEDICARE

## 2025-04-22 PROCEDURE — 94626 PHY/QHP OP PULM RHB W/MNTR: CPT

## 2025-04-23 ENCOUNTER — TELEPHONE (OUTPATIENT)
Dept: UROLOGY | Age: 75
End: 2025-04-23

## 2025-04-23 NOTE — TELEPHONE ENCOUNTER
Patient is not ready to have the PNE completed and cancelled the appointment.    When do you want a follow up appointment made

## 2025-04-24 ENCOUNTER — HOSPITAL ENCOUNTER (OUTPATIENT)
Dept: CARDIAC REHAB | Age: 75
Setting detail: THERAPIES SERIES
Discharge: HOME | End: 2025-04-24
Attending: INTERNAL MEDICINE
Payer: MEDICARE

## 2025-04-24 PROCEDURE — 94626 PHY/QHP OP PULM RHB W/MNTR: CPT

## 2025-04-28 ENCOUNTER — TELEPHONE (OUTPATIENT)
Dept: FAMILY MEDICINE CLINIC | Age: 75
End: 2025-04-28

## 2025-04-28 DIAGNOSIS — R00.0 TACHYCARDIA: Primary | ICD-10-CM

## 2025-04-28 DIAGNOSIS — R00.2 PALPITATIONS: ICD-10-CM

## 2025-04-28 NOTE — TELEPHONE ENCOUNTER
Patient states at [facility] they have made mention of her heart rate being elevated during and still after Rehab.  She states the other day it was 155.  She is questioning if she should see CARDIO again.  Last seen Dr. Williamson in 2024.      Please advise

## 2025-04-28 NOTE — TELEPHONE ENCOUNTER
Called patient and informed. She verbalized understanding. She is aware [facility] will call her to schedule an appointment.

## 2025-04-29 ENCOUNTER — APPOINTMENT (OUTPATIENT)
Dept: CARDIAC REHAB | Age: 75
End: 2025-04-29
Attending: INTERNAL MEDICINE
Payer: MEDICARE

## 2025-05-01 ENCOUNTER — HOSPITAL ENCOUNTER (OUTPATIENT)
Dept: CARDIAC REHAB | Age: 75
Setting detail: THERAPIES SERIES
Discharge: HOME | End: 2025-05-01
Attending: INTERNAL MEDICINE
Payer: MEDICARE

## 2025-05-01 PROCEDURE — 94626 PHY/QHP OP PULM RHB W/MNTR: CPT

## 2025-05-06 ENCOUNTER — HOSPITAL ENCOUNTER (OUTPATIENT)
Dept: CARDIAC REHAB | Age: 75
Setting detail: THERAPIES SERIES
Discharge: HOME | End: 2025-05-06
Attending: INTERNAL MEDICINE
Payer: MEDICARE

## 2025-05-06 PROCEDURE — 94626 PHY/QHP OP PULM RHB W/MNTR: CPT

## 2025-05-08 ENCOUNTER — HOSPITAL ENCOUNTER (OUTPATIENT)
Dept: CARDIAC REHAB | Age: 75
Setting detail: THERAPIES SERIES
Discharge: HOME | End: 2025-05-08
Attending: INTERNAL MEDICINE
Payer: MEDICARE

## 2025-05-08 PROCEDURE — 94626 PHY/QHP OP PULM RHB W/MNTR: CPT

## 2025-05-08 NOTE — PLAN OF CARE
attending    PSYCHO SOCIAL DISCHARGE        [] Pt is coping well  [] Pt needs more assistance-        Post Rehab PHQ-9   Depression Score: **                          Post Rehab UCSD SOB  Score: **                     Psychosocial Goals   Initial Assessment     [x] Maintain/ Decrease Depression Levels        [x] Maintain/ Decrease Anxiety Levels           [x] Learn about Emotional Health              [x] Increase QoL   (CAT tool)              [x] Give the CAT tool for HR QoL        Pre Rehab  CAT score:  28/ 40          Psychosocial Goals  Plan        - Attend Stress/ Anxiety/ Dyspnea - Panic control class        - Initiate MI, get stronger, more endurance and more confidence        30 day CAT score:  / 40     Psychosocial Goals  Reassessment        [] Pt is progressing  [] Pt is not progressing                          60 day CAT score:  Not here to do today.  Psychosocial Goals  Reassessment        [] Pt is progressing  [] Pt is not progressing                          90 day CAT score:  Not here today to do.  Psychosocial Goals  Reassessment        [x] Pt is progressing  [] Pt is not progressing                          120 day CAT score:  25/ 40    Psychosocial Goals   Discharge        [] Pt is progressing  [] Pt is not progressing                          Post Rehab   CAT score:  / 40                      OUTCOMES     PHQ-9:  Did pt drop a severity level or maintain in the minimal range?  [] Y  [] N     UCSD SOB  Did pt decrease their number by 5 or more?  [] Y  [] N     CAT scores:  Did pt drop by 2 or more points from Pre to Post?  [] Y  [] N         Psychosocial Intervention/ Education   Initial Assessment     [x] Pt is being treated for depression/ anxiety           [x] Pt would benefit from MI’s Psychosocial Issues Class (Stress, Depression, Anxiety, and Intimacy    Psychosocial  Intervention/ Education  Plan        [] Pt to contact physician if any severe changes occur in mood           -  Pt will attend

## 2025-05-13 ENCOUNTER — TELEPHONE (OUTPATIENT)
Dept: FAMILY MEDICINE CLINIC | Age: 75
End: 2025-05-13

## 2025-05-13 DIAGNOSIS — J30.89 NON-SEASONAL ALLERGIC RHINITIS, UNSPECIFIED TRIGGER: Primary | ICD-10-CM

## 2025-05-13 RX ORDER — TRIAMCINOLONE ACETONIDE 55 UG/1
2 SPRAY, METERED NASAL DAILY
Qty: 3 EACH | Refills: 3 | Status: SHIPPED | OUTPATIENT
Start: 2025-05-13

## 2025-05-13 NOTE — TELEPHONE ENCOUNTER
Pt called office stating she has had a constant runny nose \"for as long as I can remember\". No relief with OTC. Thinks it might be due to her COPD or allergies. Pt is asking if you would prescribe her Nasacort to [address] Or she is asking what else you would recommend for her?    Call pt back.

## 2025-05-15 ENCOUNTER — HOSPITAL ENCOUNTER (OUTPATIENT)
Dept: CARDIAC REHAB | Age: 75
Setting detail: THERAPIES SERIES
Discharge: HOME | End: 2025-05-15
Attending: INTERNAL MEDICINE
Payer: MEDICARE

## 2025-05-15 ENCOUNTER — TELEPHONE (OUTPATIENT)
Dept: FAMILY MEDICINE CLINIC | Age: 75
End: 2025-05-15

## 2025-05-15 PROCEDURE — 94626 PHY/QHP OP PULM RHB W/MNTR: CPT

## 2025-05-15 NOTE — TELEPHONE ENCOUNTER
Pt called office wanting to know if she should get another colonoscopy. She has \"severe\" constipation, sometimes she goes an entire week with no BM. \"But when I do go it's diarrhea\". No abdominal pains. No nausea or vomiting. No rectal bleeding. She has tried to increase fruits and vegetables in her diet.     Pt aware we will call her back in the morning.

## 2025-05-16 NOTE — TELEPHONE ENCOUNTER
Patient called and informed. Recommendation is:  Miralax 1 scoop daily and Colace Stool Softner 1 tab daily.     She verbalized understanding.

## 2025-05-19 ENCOUNTER — TELEPHONE (OUTPATIENT)
Dept: FAMILY MEDICINE CLINIC | Age: 75
End: 2025-05-19

## 2025-05-19 DIAGNOSIS — Z78.0 POST-MENOPAUSAL: Primary | ICD-10-CM

## 2025-05-19 NOTE — TELEPHONE ENCOUNTER
Patient called and stated that she believes she is to have a DEXA scan completed in .   Patient last order for a DEXA scan was 10/18/2023 but the order has .     Patient stated that she thought she was to get that completed once a year however her insurance will only cover every other year.   She would like an order for a DEXA scan so she can get it scheduled in  as her insurance will cover it at that time.

## 2025-05-20 ENCOUNTER — HOSPITAL ENCOUNTER (OUTPATIENT)
Dept: CARDIAC REHAB | Age: 75
Setting detail: THERAPIES SERIES
Discharge: HOME | End: 2025-05-20
Attending: INTERNAL MEDICINE
Payer: MEDICARE

## 2025-05-20 PROCEDURE — 94626 PHY/QHP OP PULM RHB W/MNTR: CPT

## 2025-05-22 ENCOUNTER — HOSPITAL ENCOUNTER (OUTPATIENT)
Dept: CARDIAC REHAB | Age: 75
Setting detail: THERAPIES SERIES
Discharge: HOME | End: 2025-05-22
Attending: INTERNAL MEDICINE
Payer: MEDICARE

## 2025-05-22 PROCEDURE — 94626 PHY/QHP OP PULM RHB W/MNTR: CPT

## 2025-05-27 ENCOUNTER — APPOINTMENT (OUTPATIENT)
Dept: CARDIAC REHAB | Age: 75
End: 2025-05-27
Attending: INTERNAL MEDICINE
Payer: MEDICARE

## 2025-05-29 ENCOUNTER — HOSPITAL ENCOUNTER (OUTPATIENT)
Dept: CARDIAC REHAB | Age: 75
Setting detail: THERAPIES SERIES
Discharge: HOME | End: 2025-05-29
Attending: INTERNAL MEDICINE
Payer: MEDICARE

## 2025-05-29 PROCEDURE — 94626 PHY/QHP OP PULM RHB W/MNTR: CPT

## 2025-06-02 ENCOUNTER — HOSPITAL ENCOUNTER (OUTPATIENT)
Dept: WOMENS IMAGING | Age: 75
Discharge: HOME | End: 2025-06-02
Payer: MEDICARE

## 2025-06-02 DIAGNOSIS — Z78.0 POST-MENOPAUSAL: ICD-10-CM

## 2025-06-02 PROCEDURE — 77080 DXA BONE DENSITY AXIAL: CPT

## 2025-06-03 ENCOUNTER — RESULTS FOLLOW-UP (OUTPATIENT)
Dept: FAMILY MEDICINE CLINIC | Age: 75
End: 2025-06-03

## 2025-06-03 ENCOUNTER — HOSPITAL ENCOUNTER (OUTPATIENT)
Dept: CARDIAC REHAB | Age: 75
Setting detail: THERAPIES SERIES
Discharge: HOME | End: 2025-06-03
Attending: INTERNAL MEDICINE
Payer: MEDICARE

## 2025-06-03 PROCEDURE — 94626 PHY/QHP OP PULM RHB W/MNTR: CPT

## 2025-06-03 NOTE — TELEPHONE ENCOUNTER
Patient notified of above and understanding voiced.  Patient did not start Prolia due to insurance.  Taking Fosamax weekly when remembers.  Requesting to see if she can take a pill monthly instead of weekly.  She will contact insurance to see if Boniva is a covered alternative.

## 2025-06-04 ENCOUNTER — TELEPHONE (OUTPATIENT)
Dept: FAMILY MEDICINE CLINIC | Age: 75
End: 2025-06-04

## 2025-06-04 DIAGNOSIS — R30.0 DYSURIA: Primary | ICD-10-CM

## 2025-06-04 NOTE — TELEPHONE ENCOUNTER
Patient called in with concerns of having a UTI. Patient would like to to have an order placed to have her Urine Tested at [facility]. Patient complains of urgency, discomfort with urination and hesitancy.  Please advise. Patient uses [facility] on [address] if you would like to send in an antibiotic. Please advise.

## 2025-06-05 ENCOUNTER — HOSPITAL ENCOUNTER (OUTPATIENT)
Dept: CARDIAC REHAB | Age: 75
Setting detail: THERAPIES SERIES
Discharge: HOME | End: 2025-06-05
Attending: INTERNAL MEDICINE
Payer: MEDICARE

## 2025-06-05 NOTE — PLAN OF CARE
PULMONARY REHABILITATION   [facility] Facility-Based Therapy for COPD   INDIVIDUAL TREATMENT PLAN (ITP)         Name: Magan HOOD Anirudh DUMONTB.: 1950       Age: 74 y.o.    MRN: [id number]              Diagnosis:Moderate COPDGOLD Stage 2     PFT:FEV1/FVC: 59,FEV1: 59     Allergies:   Allergies           Allergies   Allergen Reactions    Macrobid [Nitrofurantoin] Diarrhea    Sulfa Antibiotics Nausea And Vomiting            Patient Goals:            [x] Breathe Better                        [x] Increase my Enurance                          [x] Control Cough                                                             [x] Ease ADL's                             [x] Rely Less on Others                             [x] Fell less anxious/more confident                                                  [x] Have More Engery                 [x] Understand and Use Meds Correctly               [x]Make Fewer visits to hospital  [] Quit Smoking              Transportation needed?   [x]        [employer] & other things you bake set up?    [x]     - [facility] will be set up for pts appointments.                      INDIVIDUAL TREATMENT PLAN     INITIAL ASSESSMENT     Day #1 INDIVIDUAL TREATMENT PLAN     PLAN        Day #2-30 INDIVIDUAL TREATMENT PLAN     RE ASSESSMENT     Day #31-60 INDIVIDUAL TREATMENT PLAN     RE ASSESSMENT     Day #61-90 INDIVIDUAL TREATMENT PLAN     RE ASSESSMENT     Day # INDIVIDUAL TREATMENT PLAN     DISCHARGE        Last Day          Date: 2024       Date: 2024    Date: 2025  Pt has only been at 2 exercise sessions since last assessment.  Pt not here today for reasssessment.    Date: 2025  Pt has only been to 3 exercise sessions since last assessment.  Pt not here today for reassessment.     Date: 3/13/2025    Date:    EXERCISE   INITIAL ASSESSMENT        Prescribed Oxygen Use  Activity: 3-4 L/min per chart and 4LPM per pt  [x]Continuous at home  [x]Breath Actuated when using POC

## 2025-06-06 ENCOUNTER — LAB (OUTPATIENT)
Dept: LAB | Age: 75
End: 2025-06-06

## 2025-06-06 DIAGNOSIS — R30.0 DYSURIA: ICD-10-CM

## 2025-06-06 LAB
BACTERIA URNS QL MICRO: ABNORMAL /HPF
BILIRUB UR QL STRIP.AUTO: NEGATIVE
CASTS #/AREA URNS LPF: ABNORMAL /LPF
CASTS 2: ABNORMAL /LPF
CHARACTER UR: ABNORMAL
COLOR, UA: YELLOW
CRYSTALS URNS MICRO: ABNORMAL
EPITHELIAL CELLS, UA: ABNORMAL /HPF
GLUCOSE UR QL STRIP.AUTO: NEGATIVE MG/DL
HGB UR QL STRIP.AUTO: NEGATIVE
KETONES UR QL STRIP.AUTO: NEGATIVE
MISCELLANEOUS 2: ABNORMAL
MUCOUS THREADS URNS QL MICRO: ABNORMAL
NITRITE UR QL STRIP: NEGATIVE
PH UR STRIP.AUTO: 7.5 [PH] (ref 5–9)
PROT UR STRIP.AUTO-MCNC: ABNORMAL MG/DL
RBC URINE: ABNORMAL /HPF
RENAL EPI CELLS #/AREA URNS HPF: ABNORMAL /[HPF]
SP GR UR REFRACT.AUTO: 1.02 (ref 1–1.03)
UROBILINOGEN, URINE: 1 EU/DL (ref 0–1)
WBC #/AREA URNS HPF: ABNORMAL /HPF
WBC #/AREA URNS HPF: ABNORMAL /[HPF]
YEAST LIKE FUNGI URNS QL MICRO: ABNORMAL

## 2025-06-06 NOTE — TELEPHONE ENCOUNTER
Patient calling and headed to lab to provide urine sample now.  Requesting ATB.  Pharmacy is [facility].  Please advise.

## 2025-06-07 ENCOUNTER — RESULTS FOLLOW-UP (OUTPATIENT)
Dept: FAMILY MEDICINE CLINIC | Age: 75
End: 2025-06-07

## 2025-06-07 DIAGNOSIS — N39.0 ACUTE UTI: Primary | ICD-10-CM

## 2025-06-07 RX ORDER — CIPROFLOXACIN 500 MG/1
500 TABLET, FILM COATED ORAL 2 TIMES DAILY
Qty: 10 TABLET | Refills: 0 | Status: SHIPPED | OUTPATIENT
Start: 2025-06-07 | End: 2025-06-12

## 2025-06-10 ENCOUNTER — HOSPITAL ENCOUNTER (OUTPATIENT)
Dept: CARDIAC REHAB | Age: 75
Setting detail: THERAPIES SERIES
Discharge: HOME | End: 2025-06-10
Attending: INTERNAL MEDICINE
Payer: MEDICARE

## 2025-06-10 PROCEDURE — 94626 PHY/QHP OP PULM RHB W/MNTR: CPT

## 2025-06-10 PROCEDURE — 94625 PHY/QHP OP PULM RHB W/O MNTR: CPT

## 2025-06-10 NOTE — TELEPHONE ENCOUNTER
I have located item below.    At my desk for now    LM for pt to cb to inform her and see when she can .    Once pt confirms, will route this to LR for her to update charge in recent vs this   No would recommend taking Miralax 1 scoop daily and Colace Stool Softner 1 tab daily.  Both are OTC

## 2025-06-10 NOTE — PROGRESS NOTES
PULMONARY REHABILITATION / RTR PROGRAM  EDUCATION SESSION      Magan ERWIN Anirudh  Session: __31___      ANATOMY/LIVING WITH CHRONIC LUNG DISEASE -  I went over how our lungs work and what happens in our body when we breathe.  I then further explained what is happening in their lungs due to chronic lung disease and we discussed the several different types of chronic lung disease.     BREATHING RETRAINING - I went over pursed lip breathing and diaphragmatic breathing with the pt.   I had pt try both breathing techniques and explained to him/her the importance and benefits of these 2 breathing techniques.      DYSPNEA CYCLE -  I explained to the Dyspnea cycle to the pt and gave them tips on how to break the cycle.       PREVENTING EXACERBATIONS - I went over with pt what an exacerbation is and how to prevent COPD flare ups/Exacerbations, like avoiding pollution, very hot or very cold weather, avoiding strong odors, perfumes, , taking meds as prescribed and avoiding paint/dust/new carpet fumes, etc.     Pt had no questions at this time.     Time spent: 33 Minutes.

## 2025-06-12 ENCOUNTER — HOSPITAL ENCOUNTER (OUTPATIENT)
Dept: CARDIAC REHAB | Age: 75
Setting detail: THERAPIES SERIES
Discharge: HOME | End: 2025-06-12
Attending: INTERNAL MEDICINE
Payer: MEDICARE

## 2025-06-12 PROCEDURE — 94626 PHY/QHP OP PULM RHB W/MNTR: CPT

## 2025-06-17 ENCOUNTER — APPOINTMENT (OUTPATIENT)
Dept: CARDIAC REHAB | Age: 75
End: 2025-06-17
Attending: INTERNAL MEDICINE
Payer: MEDICARE

## 2025-06-18 ENCOUNTER — PATIENT MESSAGE (OUTPATIENT)
Dept: FAMILY MEDICINE CLINIC | Age: 75
End: 2025-06-18

## 2025-06-18 DIAGNOSIS — N39.0 ACUTE UTI: Primary | ICD-10-CM

## 2025-06-19 ENCOUNTER — HOSPITAL ENCOUNTER (OUTPATIENT)
Dept: CARDIAC REHAB | Age: 75
Setting detail: THERAPIES SERIES
End: 2025-06-19
Attending: INTERNAL MEDICINE
Payer: MEDICARE

## 2025-06-19 RX ORDER — CEPHALEXIN 500 MG/1
500 CAPSULE ORAL 3 TIMES DAILY
Qty: 21 CAPSULE | Refills: 0 | Status: SHIPPED | OUTPATIENT
Start: 2025-06-19 | End: 2025-06-26

## 2025-06-24 ENCOUNTER — HOSPITAL ENCOUNTER (OUTPATIENT)
Dept: CARDIAC REHAB | Age: 75
Setting detail: THERAPIES SERIES
End: 2025-06-24
Attending: INTERNAL MEDICINE
Payer: MEDICARE

## 2025-07-03 ENCOUNTER — OFFICE VISIT (OUTPATIENT)
Dept: FAMILY MEDICINE CLINIC | Age: 75
End: 2025-07-03

## 2025-07-03 VITALS
RESPIRATION RATE: 16 BRPM | SYSTOLIC BLOOD PRESSURE: 120 MMHG | DIASTOLIC BLOOD PRESSURE: 60 MMHG | HEART RATE: 80 BPM | OXYGEN SATURATION: 96 % | WEIGHT: 126.8 LBS | BODY MASS INDEX: 23.19 KG/M2

## 2025-07-03 DIAGNOSIS — F41.9 CHRONIC ANXIETY: ICD-10-CM

## 2025-07-03 DIAGNOSIS — R00.2 PALPITATIONS: ICD-10-CM

## 2025-07-03 DIAGNOSIS — J43.2 CENTRILOBULAR EMPHYSEMA (HCC): ICD-10-CM

## 2025-07-03 DIAGNOSIS — R00.0 TACHYCARDIA: Primary | ICD-10-CM

## 2025-07-03 DIAGNOSIS — R11.0 NAUSEA: ICD-10-CM

## 2025-07-03 DIAGNOSIS — I95.1 ORTHOSTATIC HYPOTENSION: ICD-10-CM

## 2025-07-03 DIAGNOSIS — K21.9 GASTROESOPHAGEAL REFLUX DISEASE WITHOUT ESOPHAGITIS: ICD-10-CM

## 2025-07-03 RX ORDER — METOPROLOL SUCCINATE 25 MG/1
25 TABLET, EXTENDED RELEASE ORAL DAILY
Qty: 90 TABLET | Refills: 3 | Status: SHIPPED | OUTPATIENT
Start: 2025-07-03

## 2025-07-03 RX ORDER — ONDANSETRON 4 MG/1
4 TABLET, FILM COATED ORAL EVERY 6 HOURS PRN
Qty: 60 TABLET | Refills: 1 | Status: SHIPPED | OUTPATIENT
Start: 2025-07-03

## 2025-07-03 RX ORDER — FAMOTIDINE 40 MG/1
40 TABLET, FILM COATED ORAL EVERY EVENING
Qty: 90 TABLET | Refills: 3 | Status: SHIPPED | OUTPATIENT
Start: 2025-07-03

## 2025-07-03 NOTE — PROGRESS NOTES
Magan Oleary (1950) 75 y.o. female here for evaluation of the following chief complaint(s):      HPI:  Chief Complaint   Patient presents with    Nausea & Vomiting    Dizziness     With changing of position    Gastroesophageal Reflux     Complains of lightheadednss, racing HR with position changes.  Daughter who is RN checked her BP when sitting and standing and they were different.   BP wnl in office.  N/V with lightheadedness.   Ongoing x 3 months.  Has not been taking her Toprol during this timeframe.        Denies CP, SOB or chest tightness. ECHO and STRESS 2024.  48 Hour HOLTER MONITOR done in march 2024 for tachycardia.  Avg HR 90.  Follows with [facility]    ECHO 3/2024:    Left Ventricle: Normal left ventricular systolic function with a visually estimated EF of 60 - 65%. Left ventricle size is normal. Normal wall thickness. Normal wall motion. Normal diastolic function.    IVC/SVC: IVC diameter is less than or equal to 21 mm and decreases greater than 50% during inspiration; therefore the estimated right atrial pressure is normal (~3 mmHg). IVC size is normal.    Image quality is adequate.     PFT 2019 showed moderate emphysema.  40 pack year hx.  Symbicort and Anoro and NEB tx.   O2 3-4 L at home she uses rest and activity.   5 L at HS for O2 Needs to be monitored her COPD with the use of oxygen.  Following with PULM at [facility] and gets yearly CT Chest through PULM      Vitals:    07/03/25 1107   BP: 120/60   Pulse: 80   Resp: 16   SpO2: 96%     Complaints of reflux.  Heartburn.  Hx of being on PPI.  Currently not on anything.       Patient Active Problem List   Diagnosis    Hyperlipemia    Hypothyroidism    Depression    Chronic anxiety    Osteopenia    Medication monitoring encounter    H/O: CVA (cerebrovascular accident), aneurysm bleed, Jan 2015    Abnormality of gait following cerebrovascular accident    GERD (gastroesophageal reflux disease)    Cognitive impairment due to CVA.    OAB (overactive

## 2025-07-07 RX ORDER — VALACYCLOVIR HYDROCHLORIDE 500 MG/1
500 TABLET, FILM COATED ORAL DAILY
Qty: 90 TABLET | Refills: 3 | Status: SHIPPED | OUTPATIENT
Start: 2025-07-07

## 2025-07-07 NOTE — TELEPHONE ENCOUNTER
Pt called office stating [facility] was suppose to send over a refill request for her Valtrex. Advised I did not see the request, but would send one to PCP today for her. Pt would like to be on suppressive therapy and wants to confirm she is to be on that treatment.     Call pt back.

## 2025-07-08 ENCOUNTER — TELEPHONE (OUTPATIENT)
Dept: FAMILY MEDICINE CLINIC | Age: 75
End: 2025-07-08

## 2025-07-08 ENCOUNTER — HOSPITAL ENCOUNTER (OUTPATIENT)
Dept: CARDIAC REHAB | Age: 75
Setting detail: THERAPIES SERIES
Discharge: HOME | End: 2025-07-08
Payer: MEDICARE

## 2025-07-08 PROCEDURE — 94626 PHY/QHP OP PULM RHB W/MNTR: CPT

## 2025-07-08 NOTE — TELEPHONE ENCOUNTER
Approved today by [employer] 2017  PA Case: [id number], Status: Approved, Coverage Starts on: 4/8/2025 12:00:00 AM, Coverage Ends on: 7/8/2026 12:00:00 AM.  Effective Date: 4/8/2025  Authorization Expiration Date: 7/8/2026    Left voicemail on provider line with [facility]. Also notified patient.

## 2025-07-10 ENCOUNTER — HOSPITAL ENCOUNTER (OUTPATIENT)
Dept: CARDIAC REHAB | Age: 75
Setting detail: THERAPIES SERIES
Discharge: HOME | End: 2025-07-10
Payer: MEDICARE

## 2025-07-10 PROCEDURE — 94626 PHY/QHP OP PULM RHB W/MNTR: CPT

## 2025-07-15 ENCOUNTER — APPOINTMENT (OUTPATIENT)
Dept: CARDIAC REHAB | Age: 75
End: 2025-07-15
Payer: MEDICARE

## 2025-07-15 DIAGNOSIS — E78.2 MIXED HYPERLIPIDEMIA: ICD-10-CM

## 2025-07-15 DIAGNOSIS — R41.89 COGNITIVE IMPAIRMENT: ICD-10-CM

## 2025-07-15 DIAGNOSIS — Z86.73 H/O: CVA (CEREBROVASCULAR ACCIDENT): ICD-10-CM

## 2025-07-15 RX ORDER — SIMVASTATIN 10 MG
10 TABLET ORAL NIGHTLY
Qty: 90 TABLET | Refills: 0 | Status: SHIPPED | OUTPATIENT
Start: 2025-07-15

## 2025-07-15 RX ORDER — DONEPEZIL HYDROCHLORIDE 10 MG/1
10 TABLET, FILM COATED ORAL NIGHTLY
Qty: 90 TABLET | Refills: 0 | Status: SHIPPED | OUTPATIENT
Start: 2025-07-15

## 2025-07-15 RX ORDER — ATOMOXETINE 40 MG/1
40 CAPSULE ORAL DAILY
Qty: 90 CAPSULE | Refills: 0 | Status: SHIPPED | OUTPATIENT
Start: 2025-07-15

## 2025-07-17 ENCOUNTER — HOSPITAL ENCOUNTER (OUTPATIENT)
Dept: CARDIAC REHAB | Age: 75
Setting detail: THERAPIES SERIES
Discharge: HOME | End: 2025-07-17
Payer: MEDICARE

## 2025-07-17 PROCEDURE — 94625 PHY/QHP OP PULM RHB W/O MNTR: CPT

## 2025-07-17 PROCEDURE — 94626 PHY/QHP OP PULM RHB W/MNTR: CPT

## 2025-07-17 ASSESSMENT — PATIENT HEALTH QUESTIONNAIRE - PHQ9
4. FEELING TIRED OR HAVING LITTLE ENERGY: MORE THAN HALF THE DAYS
3. TROUBLE FALLING OR STAYING ASLEEP: NEARLY EVERY DAY
9. THOUGHTS THAT YOU WOULD BE BETTER OFF DEAD, OR OF HURTING YOURSELF: NOT AT ALL
8. MOVING OR SPEAKING SO SLOWLY THAT OTHER PEOPLE COULD HAVE NOTICED. OR THE OPPOSITE, BEING SO FIGETY OR RESTLESS THAT YOU HAVE BEEN MOVING AROUND A LOT MORE THAN USUAL: MORE THAN HALF THE DAYS
SUM OF ALL RESPONSES TO PHQ QUESTIONS 1-9: 14
SUM OF ALL RESPONSES TO PHQ QUESTIONS 1-9: 14
1. LITTLE INTEREST OR PLEASURE IN DOING THINGS: SEVERAL DAYS
5. POOR APPETITE OR OVEREATING: MORE THAN HALF THE DAYS
SUM OF ALL RESPONSES TO PHQ QUESTIONS 1-9: 14
6. FEELING BAD ABOUT YOURSELF - OR THAT YOU ARE A FAILURE OR HAVE LET YOURSELF OR YOUR FAMILY DOWN: MORE THAN HALF THE DAYS
SUM OF ALL RESPONSES TO PHQ QUESTIONS 1-9: 14
7. TROUBLE CONCENTRATING ON THINGS, SUCH AS READING THE NEWSPAPER OR WATCHING TELEVISION: SEVERAL DAYS
2. FEELING DOWN, DEPRESSED OR HOPELESS: SEVERAL DAYS

## 2025-07-17 NOTE — PROGRESS NOTES
PULMONARY REHABILITATION / RTR PROGRAM  EDUCATION SESSION      Magan ERWIN Anirudh  Session: __36___    RESPIRATORY MEDICATIONS - I discussed with Magan the purpose, procedure and side effects of all their respiratory medications.  The pts home respiratory medications are Breztri MDI, Albuterol MDI, Daliresp and Albuterol aerosols.       MDI / AEROSOLS -  We went over the proper technique for the pts MDI's and/or aerosol treatments.  I also discussed the importance of using a spacer with the MDI's. Pt does not have a spacer for her MDI's, encouraged pt to ask her dr for spacer to be used with breztri and albuterol mdi's.  Reminded pt to rinse out her mouth after the breztri to prevent thrush.  Pt states she has not used her home aerosols in probably a year.  I did go over how to use them and told pt if she does have to use them again that she should contact her home medical equipment company to check machine and get new supplies and also to contact dr  has meds she currently has(if she has any left) may be .       SECRETION CLEARANCE - I went over techniques to help clear secretions.  Also explained the importance of drinking enough fluids/water to keep secretions thin, which makes it easier to cough it out.       HOME O2 USE -  The pt has home O2.   I went over Oxygen safety and the Do's and Don'ts of Home O2.     EXERCISE- Encouraged pt to continue with home exercise. Reminded her to wear home O2 with exercise and went over reasons to stop exercising, like pain, sob, lightheaded.        Pt had no questions at this time.       Time Spent: 40 minutes

## 2025-07-17 NOTE — PLAN OF CARE
PREVENTION & MANAGEMENT  DISCHARGE           []  Addressed questions and pt feels comfortable with hydration, hand washing, sputum assessment and exacerbation knowledge.   Exacerbation Prevention & Management Goals/Education  Reassessment           []  Pt has had class   [x]  Pt has not had class           []  Pt has had all classes  []  Pt has had some classes  [x]  Pt has had no classes- declines                         Exacerbation Prevention & Management Goals/Education  Reassessment           []  Pt has had class   [x]  Pt has not had class           []  Pt has had all classes  []  Pt has had some classes  [x]  Pt has had no classes Exacerbation Prevention & Management Goals/Education  Reassessment           []  Pt has had class   []  Pt has not had class           []  Pt has had all classes  []  Pt has had some classes  []  Pt has had no classes Exacerbation Prevention & Management Goals/Education  Reassessment           []  Pt has had class   []  Pt has not had class           []  Pt has had all classes  []  Pt has had most classes  []  Pt has had no classes Exacerbation Prevention & Management Goals/Education  Discharge           []  Pt had class   Date:    []  Pt did not have class           []  Pt attended all relevant classes and all questions were answered  []  Pt has had most classes  []  Pt has had no classes                           PHYSICIAN INTERACTION     MD 30 day Reassessment Review:     [x]  Continue with the current program  []  Continue, but with the following changes:  []  Discharge patient        Cosigned and dated below: PHYSICIAN INTERACTION     MD 30 day Reassessment Review:     [x]  Continue with the current program  []  Continue, but with the following changes:  []  Discharge patient        Cosigned and dated below: PHYSICIAN INTERACTION     MD 30 day Reassessment Review:     [x]  Continue with the current program  []  Continue, but with the following changes:  []  Discharge patient

## 2025-07-29 RX ORDER — TROSPIUM CHLORIDE 20 MG/1
20 TABLET, FILM COATED ORAL NIGHTLY
Qty: 90 TABLET | Refills: 0 | Status: SHIPPED | OUTPATIENT
Start: 2025-07-29

## 2025-08-01 ENCOUNTER — TELEPHONE (OUTPATIENT)
Dept: CARDIAC REHAB | Age: 75
End: 2025-08-01

## 2025-08-01 NOTE — TELEPHONE ENCOUNTER
PULMONARY REHABILITATION       PULMONARY REHAB: PATIENT NOT ENROLLED       Patient Name: Magan Oleary   Patient YOB: 1950      Referring Provider: Elsa Castro   Diagnosis: COPD  Date Patient Referred: 7/15/2025      Thank you for referring your patient to our Pulmonary Rehab program. At this time, the referred patient is currently not enrolled in our program for the following reason(s):    []  Patient is not interested    []  PFT does not qualify patient for Pulmonary Rehab/ RTR     []  Attempted to call patient, call has not been returned    [x]  Insurance Issue(s) (i.e. lack of coverage, copay, etc)- SEE NOTE BELOW    []  Other:       Note:   Elsa,  this pt does not qualify to do Pulmonary rehab again due to Medicare insurance.  Medicare insurance pays for 72 visits of pulmonary rehab a lifetime and this pt has already done 72 visits.  (She did 24 visits in 2022, 12 visits in 2024 and just finished 36 visits).  I did call and notify Sil, the pts daughter and emergency contact, that pt unable to do pulmonary rehab again due to Medicare guidelines.       Thank you for your continued support of our Pulmonary Rehab program. If the patient would like to attend rehab in the future, please send a new referral. Please call us at [phone] if you have any questions or concerns.      Yanelis Miranda, RRT, CPFT, CTTS 8/1/2025  Pulmonary Rehab Staff Signature

## 2025-08-04 ENCOUNTER — TELEPHONE (OUTPATIENT)
Dept: FAMILY MEDICINE CLINIC | Age: 75
End: 2025-08-04

## 2025-08-05 ENCOUNTER — TELEPHONE (OUTPATIENT)
Dept: FAMILY MEDICINE CLINIC | Age: 75
End: 2025-08-05

## 2025-08-14 DIAGNOSIS — E78.2 MIXED HYPERLIPIDEMIA: ICD-10-CM

## 2025-08-14 RX ORDER — SIMVASTATIN 10 MG
10 TABLET ORAL NIGHTLY
Qty: 90 TABLET | Refills: 3 | Status: SHIPPED | OUTPATIENT
Start: 2025-08-14

## 2025-08-15 ENCOUNTER — TELEPHONE (OUTPATIENT)
Dept: UROLOGY | Age: 75
End: 2025-08-15

## 2025-08-22 ENCOUNTER — OFFICE VISIT (OUTPATIENT)
Dept: UROLOGY | Age: 75
End: 2025-08-22

## 2025-08-22 VITALS — HEIGHT: 62 IN | RESPIRATION RATE: 18 BRPM | BODY MASS INDEX: 23.83 KG/M2 | WEIGHT: 129.5 LBS

## 2025-08-22 DIAGNOSIS — N39.46 MIXED INCONTINENCE: Primary | ICD-10-CM

## 2025-08-22 LAB — POST VOID RESIDUAL (PVR): 5 ML

## 2025-08-22 ASSESSMENT — ENCOUNTER SYMPTOMS
NAUSEA: 0
BACK PAIN: 0
ABDOMINAL PAIN: 0
VOMITING: 0

## 2025-09-01 RX ORDER — FLUTICASONE PROPIONATE 50 MCG
SPRAY, SUSPENSION (ML) NASAL
Qty: 16 G | Refills: 11 | Status: SHIPPED | OUTPATIENT
Start: 2025-09-01